# Patient Record
Sex: MALE | Race: WHITE | Employment: OTHER | URBAN - METROPOLITAN AREA
[De-identification: names, ages, dates, MRNs, and addresses within clinical notes are randomized per-mention and may not be internally consistent; named-entity substitution may affect disease eponyms.]

---

## 2017-04-07 ENCOUNTER — HOSPITAL ENCOUNTER (OUTPATIENT)
Dept: LAB | Age: 72
Discharge: HOME | End: 2017-04-07
Attending: INTERNAL MEDICINE
Payer: MEDICARE

## 2017-04-07 DIAGNOSIS — I48.92 ATRIAL FLUTTER, UNSPECIFIED TYPE (HCC): ICD-10-CM

## 2017-04-07 PROBLEM — I21.19 AMI INFERIOR WALL (HCC): Status: ACTIVE | Noted: 2017-04-07

## 2017-04-07 PROBLEM — I49.3 PVC'S (PREMATURE VENTRICULAR CONTRACTIONS): Status: ACTIVE | Noted: 2017-04-07

## 2017-04-07 PROBLEM — I25.10 CORONARY ATHEROSCLEROSIS OF NATIVE CORONARY VESSEL: Status: ACTIVE | Noted: 2017-04-07

## 2017-04-07 PROBLEM — Z98.61 S/P PTCA (PERCUTANEOUS TRANSLUMINAL CORONARY ANGIOPLASTY): Status: ACTIVE | Noted: 2017-04-07

## 2017-04-07 PROBLEM — I48.91 ATRIAL FIBRILLATION (HCC): Status: ACTIVE | Noted: 2017-04-07

## 2017-04-07 PROBLEM — E78.5 DYSLIPIDEMIA: Status: ACTIVE | Noted: 2017-04-07

## 2017-04-07 PROBLEM — I10 HYPERTENSION: Status: ACTIVE | Noted: 2017-04-07

## 2017-04-07 LAB
ANION GAP BLD CALC-SCNC: 8 MMOL/L
BASOPHILS # BLD AUTO: 0 K/UL (ref 0–0.2)
BASOPHILS # BLD: 0 % (ref 0–2)
BUN SERPL-MCNC: 14 MG/DL (ref 8–23)
CALCIUM SERPL-MCNC: 8.6 MG/DL (ref 8.3–10.4)
CHLORIDE SERPL-SCNC: 98 MMOL/L (ref 98–107)
CO2 SERPL-SCNC: 30 MMOL/L (ref 23–32)
CREAT SERPL-MCNC: 1.2 MG/DL (ref 0.6–1)
DIFFERENTIAL METHOD BLD: ABNORMAL
EOSINOPHIL # BLD: 0.2 K/UL (ref 0–0.8)
EOSINOPHIL NFR BLD: 2 % (ref 0.5–7.8)
ERYTHROCYTE [DISTWIDTH] IN BLOOD BY AUTOMATED COUNT: 12.9 % (ref 11.9–14.6)
GLUCOSE SERPL-MCNC: 101 MG/DL (ref 65–100)
HCT VFR BLD AUTO: 39.9 % (ref 41.1–50.3)
HGB BLD-MCNC: 14.4 G/DL (ref 13.6–17.2)
LYMPHOCYTES # BLD AUTO: 24 % (ref 13–44)
LYMPHOCYTES # BLD: 1.6 K/UL (ref 0.5–4.6)
MCH RBC QN AUTO: 32.8 PG (ref 26.1–32.9)
MCHC RBC AUTO-ENTMCNC: 36.1 G/DL (ref 31.4–35)
MCV RBC AUTO: 90.9 FL (ref 79.6–97.8)
MONOCYTES # BLD: 0.7 K/UL (ref 0.1–1.3)
MONOCYTES NFR BLD AUTO: 10 % (ref 4–12)
NEUTS SEG # BLD: 4.4 K/UL (ref 1.7–8.2)
NEUTS SEG NFR BLD AUTO: 64 % (ref 43–78)
PLATELET # BLD AUTO: 179 K/UL (ref 150–450)
PMV BLD AUTO: 8.8 FL (ref 10.8–14.1)
POTASSIUM SERPL-SCNC: 3.7 MMOL/L (ref 3.5–5.1)
RBC # BLD AUTO: 4.39 M/UL (ref 4.23–5.67)
SODIUM SERPL-SCNC: 136 MMOL/L (ref 136–145)
T4 SERPL-MCNC: 7.3 UG/DL (ref 4.8–13.9)
TSH SERPL DL<=0.005 MIU/L-ACNC: 3.76 UIU/ML (ref 0.36–3.74)
WBC # BLD AUTO: 6.9 K/UL (ref 4.3–11.1)

## 2017-04-07 PROCEDURE — 80048 BASIC METABOLIC PNL TOTAL CA: CPT | Performed by: INTERNAL MEDICINE

## 2017-04-07 PROCEDURE — 36415 COLL VENOUS BLD VENIPUNCTURE: CPT | Performed by: INTERNAL MEDICINE

## 2017-04-07 PROCEDURE — 85025 COMPLETE CBC W/AUTO DIFF WBC: CPT | Performed by: INTERNAL MEDICINE

## 2017-04-07 PROCEDURE — 84436 ASSAY OF TOTAL THYROXINE: CPT | Performed by: INTERNAL MEDICINE

## 2017-04-07 PROCEDURE — 84443 ASSAY THYROID STIM HORMONE: CPT | Performed by: INTERNAL MEDICINE

## 2018-07-20 ENCOUNTER — APPOINTMENT (OUTPATIENT)
Dept: GENERAL RADIOLOGY | Age: 73
DRG: 065 | End: 2018-07-20
Attending: EMERGENCY MEDICINE
Payer: MEDICARE

## 2018-07-20 ENCOUNTER — HOSPITAL ENCOUNTER (EMERGENCY)
Age: 73
Discharge: STILL ADMITTED OR ADMINISTRATIVE | End: 2018-07-20
Attending: EMERGENCY MEDICINE
Payer: COMMERCIAL

## 2018-07-20 ENCOUNTER — APPOINTMENT (OUTPATIENT)
Dept: CT IMAGING | Age: 73
DRG: 065 | End: 2018-07-20
Attending: EMERGENCY MEDICINE
Payer: MEDICARE

## 2018-07-20 ENCOUNTER — HOSPITAL ENCOUNTER (INPATIENT)
Age: 73
LOS: 1 days | Discharge: HOME | DRG: 065 | End: 2018-07-23
Attending: EMERGENCY MEDICINE | Admitting: INTERNAL MEDICINE
Payer: MEDICARE

## 2018-07-20 DIAGNOSIS — I63.9 CEREBROVASCULAR ACCIDENT (CVA), UNSPECIFIED MECHANISM (HCC): Primary | ICD-10-CM

## 2018-07-20 PROBLEM — R29.810 FACIAL DROOP: Status: ACTIVE | Noted: 2018-07-20

## 2018-07-20 PROBLEM — R47.81 SLURRED SPEECH: Status: ACTIVE | Noted: 2018-07-20

## 2018-07-20 PROBLEM — G45.9 TIA (TRANSIENT ISCHEMIC ATTACK): Status: ACTIVE | Noted: 2018-07-20

## 2018-07-20 PROBLEM — I48.92 ATRIAL FLUTTER (HCC): Status: RESOLVED | Noted: 2017-04-07 | Resolved: 2018-07-20

## 2018-07-20 LAB
ANION GAP SERPL CALC-SCNC: 8 MMOL/L (ref 7–16)
BASOPHILS # BLD: 0 K/UL (ref 0–0.2)
BASOPHILS NFR BLD: 0 % (ref 0–2)
BUN SERPL-MCNC: 15 MG/DL (ref 8–23)
CALCIUM SERPL-MCNC: 9.1 MG/DL (ref 8.3–10.4)
CHLORIDE SERPL-SCNC: 96 MMOL/L (ref 98–107)
CO2 SERPL-SCNC: 29 MMOL/L (ref 21–32)
CREAT SERPL-MCNC: 0.93 MG/DL (ref 0.8–1.5)
DIFFERENTIAL METHOD BLD: ABNORMAL
EOSINOPHIL # BLD: 0.1 K/UL (ref 0–0.8)
EOSINOPHIL NFR BLD: 1 % (ref 0.5–7.8)
ERYTHROCYTE [DISTWIDTH] IN BLOOD BY AUTOMATED COUNT: 13.1 % (ref 11.9–14.6)
GLUCOSE BLD STRIP.AUTO-MCNC: 102 MG/DL (ref 65–100)
GLUCOSE SERPL-MCNC: 94 MG/DL (ref 65–100)
HCT VFR BLD AUTO: 40.1 % (ref 41.1–50.3)
HGB BLD-MCNC: 15 G/DL (ref 13.6–17.2)
IMM GRANULOCYTES # BLD: 0 K/UL (ref 0–0.5)
IMM GRANULOCYTES NFR BLD AUTO: 0 % (ref 0–5)
INR BLD: 1 (ref 0.9–1.2)
LACTATE BLD-SCNC: 1.1 MMOL/L (ref 0.5–1.9)
LYMPHOCYTES # BLD: 1.9 K/UL (ref 0.5–4.6)
LYMPHOCYTES NFR BLD: 19 % (ref 13–44)
MCH RBC QN AUTO: 34.5 PG (ref 26.1–32.9)
MCHC RBC AUTO-ENTMCNC: 37.4 G/DL (ref 31.4–35)
MCV RBC AUTO: 92.2 FL (ref 79.6–97.8)
MONOCYTES # BLD: 0.7 K/UL (ref 0.1–1.3)
MONOCYTES NFR BLD: 7 % (ref 4–12)
NEUTS SEG # BLD: 7.3 K/UL (ref 1.7–8.2)
NEUTS SEG NFR BLD: 73 % (ref 43–78)
PLATELET # BLD AUTO: 214 K/UL (ref 150–450)
PMV BLD AUTO: 9 FL (ref 10.8–14.1)
POTASSIUM SERPL-SCNC: 3.5 MMOL/L (ref 3.5–5.1)
PT BLD: 11.6 SECS (ref 9.6–11.6)
RBC # BLD AUTO: 4.35 M/UL (ref 4.23–5.67)
SODIUM SERPL-SCNC: 133 MMOL/L (ref 136–145)
TROPONIN I BLD-MCNC: 0 NG/ML (ref 0.02–0.05)
WBC # BLD AUTO: 10.1 K/UL (ref 4.3–11.1)

## 2018-07-20 PROCEDURE — 85610 PROTHROMBIN TIME: CPT | Performed by: EMERGENCY MEDICINE

## 2018-07-20 PROCEDURE — 70450 CT HEAD/BRAIN W/O DYE: CPT

## 2018-07-20 PROCEDURE — 82962 GLUCOSE BLOOD TEST: CPT

## 2018-07-20 PROCEDURE — 94762 N-INVAS EAR/PLS OXIMTRY CONT: CPT | Performed by: EMERGENCY MEDICINE

## 2018-07-20 PROCEDURE — 71045 X-RAY EXAM CHEST 1 VIEW: CPT

## 2018-07-20 PROCEDURE — 96374 THER/PROPH/DIAG INJ IV PUSH: CPT | Performed by: EMERGENCY MEDICINE

## 2018-07-20 PROCEDURE — 85025 COMPLETE CBC W/AUTO DIFF WBC: CPT | Performed by: EMERGENCY MEDICINE

## 2018-07-20 PROCEDURE — 84484 ASSAY OF TROPONIN QUANT: CPT

## 2018-07-20 PROCEDURE — 81003 URINALYSIS AUTO W/O SCOPE: CPT | Performed by: EMERGENCY MEDICINE

## 2018-07-20 PROCEDURE — 85610 PROTHROMBIN TIME: CPT

## 2018-07-20 PROCEDURE — 74011250636 HC RX REV CODE- 250/636: Performed by: FAMILY MEDICINE

## 2018-07-20 PROCEDURE — 74011000250 HC RX REV CODE- 250: Performed by: EMERGENCY MEDICINE

## 2018-07-20 PROCEDURE — 99218 HC RM OBSERVATION: CPT

## 2018-07-20 PROCEDURE — 93005 ELECTROCARDIOGRAM TRACING: CPT | Performed by: EMERGENCY MEDICINE

## 2018-07-20 PROCEDURE — 83605 ASSAY OF LACTIC ACID: CPT

## 2018-07-20 PROCEDURE — 80048 BASIC METABOLIC PNL TOTAL CA: CPT | Performed by: EMERGENCY MEDICINE

## 2018-07-20 PROCEDURE — 75810000275 HC EMERGENCY DEPT VISIT NO LEVEL OF CARE: Performed by: EMERGENCY MEDICINE

## 2018-07-20 PROCEDURE — 99285 EMERGENCY DEPT VISIT HI MDM: CPT | Performed by: EMERGENCY MEDICINE

## 2018-07-20 RX ORDER — ASPIRIN 81 MG/1
81 TABLET ORAL DAILY
Status: DISCONTINUED | OUTPATIENT
Start: 2018-07-21 | End: 2018-07-20 | Stop reason: SDUPTHER

## 2018-07-20 RX ORDER — ENALAPRILAT 1.25 MG/ML
1.25 INJECTION INTRAVENOUS
Status: DISCONTINUED | OUTPATIENT
Start: 2018-07-20 | End: 2018-07-23 | Stop reason: HOSPADM

## 2018-07-20 RX ORDER — PANTOPRAZOLE SODIUM 40 MG/1
40 TABLET, DELAYED RELEASE ORAL
Status: DISCONTINUED | OUTPATIENT
Start: 2018-07-21 | End: 2018-07-23 | Stop reason: HOSPADM

## 2018-07-20 RX ORDER — ATORVASTATIN CALCIUM 40 MG/1
40 TABLET, FILM COATED ORAL
Status: DISCONTINUED | OUTPATIENT
Start: 2018-07-20 | End: 2018-07-20

## 2018-07-20 RX ORDER — SODIUM CHLORIDE 9 MG/ML
100 INJECTION, SOLUTION INTRAVENOUS CONTINUOUS
Status: DISPENSED | OUTPATIENT
Start: 2018-07-20 | End: 2018-07-21

## 2018-07-20 RX ORDER — LABETALOL HYDROCHLORIDE 5 MG/ML
20 INJECTION, SOLUTION INTRAVENOUS
Status: COMPLETED | OUTPATIENT
Start: 2018-07-20 | End: 2018-07-20

## 2018-07-20 RX ORDER — MORPHINE SULFATE 2 MG/ML
2 INJECTION, SOLUTION INTRAMUSCULAR; INTRAVENOUS
Status: DISCONTINUED | OUTPATIENT
Start: 2018-07-20 | End: 2018-07-23 | Stop reason: HOSPADM

## 2018-07-20 RX ORDER — HYDROCODONE BITARTRATE AND ACETAMINOPHEN 10; 325 MG/1; MG/1
1 TABLET ORAL
Status: DISCONTINUED | OUTPATIENT
Start: 2018-07-20 | End: 2018-07-23 | Stop reason: HOSPADM

## 2018-07-20 RX ORDER — SODIUM CHLORIDE 0.9 % (FLUSH) 0.9 %
5-10 SYRINGE (ML) INJECTION EVERY 8 HOURS
Status: DISCONTINUED | OUTPATIENT
Start: 2018-07-20 | End: 2018-07-23 | Stop reason: HOSPADM

## 2018-07-20 RX ORDER — ASPIRIN 325 MG
325 TABLET ORAL DAILY
Status: DISCONTINUED | OUTPATIENT
Start: 2018-07-21 | End: 2018-07-22

## 2018-07-20 RX ORDER — SODIUM CHLORIDE 0.9 % (FLUSH) 0.9 %
5-10 SYRINGE (ML) INJECTION AS NEEDED
Status: DISCONTINUED | OUTPATIENT
Start: 2018-07-20 | End: 2018-07-23 | Stop reason: HOSPADM

## 2018-07-20 RX ORDER — GABAPENTIN 400 MG/1
400 CAPSULE ORAL 2 TIMES DAILY
Status: DISCONTINUED | OUTPATIENT
Start: 2018-07-21 | End: 2018-07-23 | Stop reason: HOSPADM

## 2018-07-20 RX ORDER — METOPROLOL SUCCINATE 100 MG/1
100 TABLET, EXTENDED RELEASE ORAL DAILY
Status: DISCONTINUED | OUTPATIENT
Start: 2018-07-21 | End: 2018-07-23 | Stop reason: HOSPADM

## 2018-07-20 RX ORDER — CARBAMAZEPINE 200 MG/1
200 TABLET ORAL 2 TIMES DAILY
Status: DISCONTINUED | OUTPATIENT
Start: 2018-07-21 | End: 2018-07-21

## 2018-07-20 RX ORDER — GABAPENTIN 300 MG/1
300 CAPSULE ORAL 3 TIMES DAILY
Status: DISCONTINUED | OUTPATIENT
Start: 2018-07-20 | End: 2018-07-20

## 2018-07-20 RX ADMIN — LABETALOL HYDROCHLORIDE 20 MG: 5 INJECTION INTRAVENOUS at 19:22

## 2018-07-20 RX ADMIN — SODIUM CHLORIDE 100 ML/HR: 900 INJECTION, SOLUTION INTRAVENOUS at 23:29

## 2018-07-20 RX ADMIN — Medication 10 ML: at 23:29
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
## 2018-07-20 NOTE — IP AVS SNAPSHOT
[facility] 
 
 
 [address] 
[phone] Patient: Andrew Gonzalez MRN: [id number] [id number] About your hospitalization You were admitted on:  July 20, 2018 You last received care in the:  [facility] 7 MED SURG You were discharged on:  July 23, 2018 Why you were hospitalized Your primary diagnosis was:  Stroke (Hcc) Your diagnoses also included:  Tia (Transient Ischemic Attack), Hypertension, Dyslipidemia, Atrial Fibrillation (Hcc), Coronary Atherosclerosis Of Native Coronary Vessel, Slurred Speech, Facial Droop Follow-up Information Follow up With Details Comments Contact Info Eri Wells MD On 8/1/2018 at 10:45 a.m. [address] [place] [address] Drive [address] [facility] [address] 
[phone] Your Scheduled Appointments Wednesday August 01, 2018  7:30 AM EDT  
ECHOCARDIOGRAM with CLARIBEL ECHO 26 [facility] CARDIOLOGY [place] OFFICE ([address] [place] [address]) 2 Kelly Galvez 
[address] Sonja Cooper 81  
[phone] Friday August 17, 2018  8:45 AM EDT Office Visit with Margarita Wilkins MD  
[facility] ([address] [place] [address]) 2 Kelly [address] Sonja Cooper 81  
[phone] Discharge Orders None A check john indicates which time of day the medication should be taken. My Medications START taking these medications Instructions Each Dose to Equal  
 Morning Noon Evening Bedtime  
 fenofibrate 160 mg tablet Commonly known as:  LOFIBRA Start taking on:  7/24/2018 Your next dose is:  Tomorrow Morning Take 1 Tab by mouth daily. 160 mg CONTINUE taking these medications Instructions Each Dose to Equal  
 Morning Noon Evening Bedtime  
 apixaban 5 mg tablet Commonly known as:  Darren Albert Your next dose is: This evening Take 1 Tab by mouth two (2) times a day. 5 mg APPLE CIDER VINEGAR PO Your next dose is:  Resume home schedule Take  by mouth. aspirin delayed-release 81 mg tablet Your next dose is:  Tomorrow Morning Take 1 Tab by mouth daily. 81 mg CALCIUM 600-D3 PLUS PO Your next dose is:  Resume home schedule Take  by mouth. co-enzyme Q-10 100 mg capsule Commonly known as:  CO Q-10 Your next dose is:  Tomorrow Morning Take 100 mg by mouth daily. 100 mg FISH OIL PO Your next dose is:  Resume home schedule Take  by mouth.  
     
   
   
   
  
 gabapentin 300 mg capsule Commonly known as:  NEURONTIN Your next dose is: This evening Take 400 mg by mouth two (2) times a day. 400 mg  
    
  
   
   
  
   
  
 losartan-hydroCHLOROthiazide 100-25 mg per tablet Commonly known as:  HYZAAR Your next dose is:  Tomorrow Morning TAKE 1 TABLET BY MOUTH DAILY  
     
  
   
   
   
  
 metoprolol succinate 100 mg tablet Commonly known as:  TOPROL-XL Your next dose is:  Tomorrow Morning TAKE 1 TABLET BY MOUTH DAILY  
     
  
   
   
   
  
 multivitamin tablet Commonly known as:  ONE A DAY Your next dose is:  Tomorrow Morning Take 1 Tab by mouth daily. 1 Tab NITROSTAT 0.4 mg SL tablet Generic drug:  nitroglycerin TAKE AS DIRECTED. NORCO  mg tablet Generic drug:  HYDROcodone-acetaminophen Your next dose is: Take on as needed schedule Take 1 Tab by mouth two (2) times daily as needed for Pain. 1 Tab PriLOSEC OTC 20 mg tablet Generic drug:  omeprazole Your next dose is:  Tomorrow Morning Take 20 mg by mouth daily. Indications: patient unsure of particular medication  20 mg  
 PROBIOTIC 4X PO Your next dose is:  Tomorrow Morning Take  by mouth daily. TEGretol 200 mg tablet Generic drug:  carBAMazepine Your next dose is: This evening Take 200 mg by mouth two (2) times a day. 200 mg  
    
  
   
   
  
   
  
 TURMERIC ROOT EXTRACT PO Your next dose is:  Resume home schedule Take  by mouth. VITAMIN B12 PO Your next dose is:  Resume home schedule Take  by mouth. Where to Get Your Medications Information on where to get these meds will be given to you by the nurse or doctor. ! Ask your nurse or doctor about these medications  
  fenofibrate 160 mg tablet Opioid Education Prescription Opioids: What You Need to Know: 
 
Prescription opioids can be used to help relieve moderate-to-severe pain and are often prescribed following a surgery or injury, or for certain health conditions. These medications can be an important part of treatment but also come with serious risks. Opioids are strong pain medicines. Examples include hydrocodone, oxycodone, fentanyl, and morphine. Heroin is an example of an illegal opioid. It is important to work with your health care provider to make sure you are getting the safest, most effective care. WHAT ARE THE RISKS AND SIDE EFFECTS OF OPIOID USE? Prescription opioids carry serious risks of addiction and overdose, especially with prolonged use. An opioid overdose, often marked by slow breathing, can cause sudden death. The use of prescription opioids can have a number of side effects as well, even when taken as directed. · Tolerance-meaning you might need to take more of a medication for the same pain relief · Physical dependence-meaning you have symptoms of withdrawal when the medication is stopped.   Withdrawal symptoms can include nausea, sweating, chills, diarrhea, stomach cramps, and muscle aches. Withdrawal can last up to several weeks, depending on which drug you took and how long you took it. · Increased sensitivity to pain · Constipation · Nausea, vomiting, and dry mouth · Sleepiness and dizziness · Confusion · Depression · Low levels of testosterone that can result in lower sex drive, energy, and strength · Itching and sweating RISKS ARE GREATER WITH:      
· History of drug misuse, substance use disorder, or overdose · Mental health conditions (such as depression or anxiety) · Sleep apnea · Older age (72 years or older) · Pregnancy Avoid alcohol while taking prescription opioids. Also, unless specifically advised by your health care provider, medications to avoid include: · Benzodiazepines (such as Xanax or Valium) · Muscle relaxants (such as Soma or Flexeril) · Hypnotics (such as Ambien or Lunesta) · Other prescription opioids KNOW YOUR OPTIONS Talk to your health care provider about ways to manage your pain that don't involve prescription opioids. Some of these options may actually work better and have fewer risks and side effects. Options may include: 
· Pain relievers such as acetaminophen, ibuprofen, and naproxen · Some medications that are also used for depression or seizures · Physical therapy and exercise · Counseling to help patients learn how to cope better with triggers of pain and stress. · Application of heat or cold compress · Massage therapy · Relaxation techniques Be Informed Make sure you know the name of your medication, how much and how often to take it, and its potential risks & side effects. IF YOU ARE PRESCRIBED OPIOIDS FOR PAIN: 
· Never take opioids in greater amounts or more often than prescribed. Remember the goal is not to be pain-free but to manage your pain at a tolerable level. · Follow up with your primary care provider to: · Work together to create a plan on how to manage your pain. · Talk about ways to help manage your pain that don't involve prescription opioids. · Talk about any and all concerns and side effects. · Help prevent misuse and abuse. · Never sell or share prescription opioids · Help prevent misuse and abuse. · Store prescription opioids in a secure place and out of reach of others (this may include visitors, children, friends, and family). · Safely dispose of unused/unwanted prescription opioids: Find your community drug take-back program or your pharmacy mail-back program, or flush them down the toilet, following guidance from the Food and Drug Administration (www.fda.gov/Drugs/ResourcesForYou). · Visit www.cdc.gov/drugoverdose to learn about the risks of opioid abuse and overdose. · If you believe you may be struggling with addiction, tell your health care provider and ask for guidance or call [employer] at 4-262-854-HQEU. Discharge Instructions Stroke: After Your Visit Your Care Instructions You have had a stroke. Risk factors for stroke include being overweight, smoking, and sedentary lifestyle. This means that the blood flow to a part of your brain was blocked for some time, which damages the nerve cells in that part of the brain. The part of your body controlled by that part of your brain may not function properly now. The brain is an amazing organ that can heal itself to some degree. The stroke you had damaged part of your brain, but other parts of your brain may take over in some way for the damaged areas. You have already started this process. Going home may be hard for you and your family. The more you can try to do for yourself, the better. Remember to take each day one at a time. Follow-up care is a key part of your treatment and safety.  Be sure to make and go to all appointments, and call your doctor if you are having problems. Its also a good idea to know your test results and keep a list of the medicines you take. How can you care for yourself at home? Enter a stroke rehabilitation (rehab) program, if your doctor recommends it. Physical, speech, and occupational therapies can help you manage bathing, dressing, eating, and other basics of daily living. Eat a heart-healthy diet that is low in cholesterol, saturated fat, and salt. Eat lots of fresh fruits and vegetables and foods high in fiber. Increase your activities slowly. Take short rest breaks when you get tired. Gradually increase the amount you walk. Start out by walking a little more than you did the day before. Do not drive until your doctor says it is okay. It is normal to feel sad or depressed after a stroke. If the blues last, talk to your doctor. If you are having problems with urine leakage, go to the bathroom at regular times, including when you first wake up and at bedtime. Also, limit fluids after dinner. If you are constipated, drink plenty of fluids, enough so that your urine is light yellow or clear like water. If you have kidney, heart, or liver disease and have to limit fluids, talk with your doctor before you increase the amount of fluids you drink. Set up a regular time for using the toilet. If you continue to have constipation, your doctor may suggest using a bulking agent, such as Metamucil, or a stool softener, laxative, or enema. Medicines Take your medicines exactly as prescribed. Call your doctor if you think you are having a problem with your medicine. You may be taking several medicines. ACE (angiotensin-converting enzyme) inhibitors, angiotensin II receptor blockers (ARBs), beta-blockers, diuretics (water pills), and calcium channel blockers control your blood pressure. Statins help lower cholesterol.  Your doctor may also prescribe medicines for depression, pain, sleep problems, anxiety, or agitation. If your doctor has given you medicine that prevents blood clots, such as warfarin (Coumadin), aspirin combined with extended-release dipyridamole (Aggrenox), clopidogrel (Plavix), or aspirin to prevent another stroke, you should: 
Tell your dentist, pharmacist, and other health professionals that you take these medicines. Watch for unusual bruising or bleeding, such as blood in your urine, red or black stools, or bleeding from your nose or gums. Get regular blood tests to check your clotting time if you are taking Coumadin. Wear medical alert jewelry that says you take blood thinners. You can buy this at most drugsEarlyDoces. Do not take any over-the-counter medicines or herbal products without talking to your doctor first. 
If you take birth control pills or hormone replacement therapy, talk to your doctor about whether they are right for you. For family members and caregivers Make the home safe. Set up a room so that your loved one does not have to climb stairs. Be sure the bathroom is on the same floor. Move throw rugs and furniture that could cause falls, and make sure that the lighting is good. Put grab bars and seats in tubs and showers. Find out what your loved one can do and what he or she needs help with. Try not to do things for your loved one that your loved one can do on his or her own. Help him or her learn and practice new skills. Visit and talk with your loved one often. Try doing activities together that you both enjoy, such as playing cards or board games. Keep in touch with your loved one's friends as much as you can, and encourage them to visit. Take care of yourself. Do not try to do everything yourself. Ask other family members to help. Eat well, get enough rest, and take time to do things that you enjoy. Keep up with your own doctor visits, and make sure to take your medicines regularly. Get out of the house as much as you can. Join a local support group. Find out if you qualify for home health care visits to help with rehab or for adult day care. When should you call for help? Call 911 anytime you think you may need emergency care. For example, call if: 
You have signs of another stroke. These may include: 
Sudden numbness, paralysis, or weakness in your face, arm, or leg, especially on only one side of your body. New problems with walking or balance. Sudden vision changes. Drooling or slurred speech. New problems speaking or understanding simple statements, or you feel confused. A sudden, severe headache that is different from past headaches. Call 911 even if these symptoms go away in a few minutes. You cough up blood. You vomit blood or what looks like coffee grounds. You pass maroon or very bloody stools. Call your doctor now or seek immediate medical care if: 
You have new bruises or blood spots under your skin. You have a nosebleed. Your gums bleed when you brush your teeth. You have blood in your urine. Your stools are black and tarlike or have streaks of blood. You have vaginal bleeding when you are not having your period, or heavy period bleeding. You have new symptoms that may be related to your stroke, such as falls or trouble swallowing. Watch closely for changes in your health, and be sure to contact your doctor if you have any problems. Where can you learn more? Go to [employer].be Enter X324  in the search box to learn more about \"Stroke: After Your Visit\". © 5883-9048 Healthwise, Incorporated. Care instructions adapted under license by [facility] "[employer]" (which disclaims liability or warranty for this information).  This care instruction is for use with your licensed healthcare professional. If you have questions about a medical condition or this instruction, always ask your healthcare professional. Mayra Tejada disclaims any warranty or liability for your use of this information. DISCHARGE SUMMARY from Nurse PATIENT INSTRUCTIONS: 
 
 
F-face looks uneven A-arms unable to move or move unevenly S-speech slurred or non-existent T-time-call 911 as soon as signs and symptoms begin-DO NOT go Back to bed or wait to see if you get better-TIME IS BRAIN. Warning Signs of HEART ATTACK Call 911 if you have these symptoms: 
? Chest discomfort. Most heart attacks involve discomfort in the center of the chest that lasts more than a few minutes, or that goes away and comes back. It can feel like uncomfortable pressure, squeezing, fullness, or pain. ? Discomfort in other areas of the upper body. Symptoms can include pain or discomfort in one or both arms, the back, neck, jaw, or stomach. ? Shortness of breath with or without chest discomfort. ? Other signs may include breaking out in a cold sweat, nausea, or lightheadedness. Don't wait more than five minutes to call [address]! Fast action can save your life. Calling 911 is almost always the fastest way to get lifesaving treatment. Emergency Medical Services staff can begin treatment when they arrive  up to an hour sooner than if someone gets to the hospital by car. The discharge information has been reviewed with the patient. The patient verbalized understanding. Discharge medications reviewed with the patient and appropriate educational materials and side effects teaching were provided. ___________________________________________________________________________________________________________________________________ MyChart Announcement  We are excited to announce that we are making your provider's discharge notes available to you in eMeter. You will see these notes when they are completed and signed by the physician that discharged you from your recent hospital stay. If you have any questions or concerns about any information you see in eMeter, please call the Health Information Department where you were seen or reach out to your Primary Care Provider for more information about your plan of care. Introducing [facility]! [employer] introduces eMeter patient portal. Now you can access parts of your medical record, email your doctor's office, and request medication refills online. 1. In your internet browser, go to [email or web address]://[email or web address]. [employer] 2. Click on the First Time User? Click Here link in the Sign In box. You will see the New Member Sign Up page. 3. Enter your eMeter Access Code exactly as it appears below. You will not need to use this code after youve completed the sign-up process. If you do not sign up before the expiration date, you must request a new code. · eMeter Access Code: [email or web address] Expires: 10/18/2018  6:55 PM 
 
4. Enter the last four digits of your Social Security Number (xxxx) and Date of Birth (mm/dd/yyyy) as indicated and click Submit. You will be taken to the next sign-up page. 5. Create a eMeter ID. This will be your eMeter login ID and cannot be changed, so think of one that is secure and easy to remember. 6. Create a eMeter password. You can change your password at any time. 7. Enter your Password Reset Question and Answer. This can be used at a later time if you forget your password. 8. Enter your e-mail address. You will receive e-mail notification when new information is available in [address] 9. Click Sign Up. You can now view and download portions of your medical record. 10. Click the Download Summary menu link to download a portable copy of your medical information. If you have questions, please visit the Frequently Asked Questions section of the MyChart website. Remember, [employer] is NOT to be used for urgent needs. For medical emergencies, dial 911. Now available from your iPhone and Android! Introducing Pino Kimbrough As a [address] [place] [address] patient, I wanted to make you aware of our electronic visit tool called Pino Kimbrough. [facility] allows you to connect within minutes with a medical provider 24 hours a day, seven days a week via a mobile device or tablet or logging into a secure website from your computer. You can access Pino Kimbrough from anywhere in the [place]. A virtual visit might be right for you when you have a simple condition and feel like you just dont want to get out of bed, or cant get away from work for an appointment, when your regular [address] [place] [address] provider is not available (evenings, weekends or holidays), or when youre out of town and need minor care. Electronic visits cost only $49 and if the [facility] [address] 24[facility]7 provider determines a prescription is needed to treat your condition, one can be electronically transmitted to a nearby pharmacy*. Please take a moment to enroll today if you have not already done so. The enrollment process is free and takes just a few minutes. To enroll, please download the [employer] 24/7 amaya to your tablet or phone, or visit [email or web address] to enroll on your computer. And, as an [address] [place] [address] patient with a [employer] account, the results of your visits will be scanned into your electronic medical record and your primary care provider will be able to view the scanned results. We urge you to continue to see your regular [address] [place] [address] provider for your ongoing medical care.   And while your primary care provider may not be the one available when you seek a Pino Kimbrough virtual visit, the peace of mind you get from getting a real diagnosis real time can be priceless. For more information on Pino Kimbrough, view our Frequently Asked Questions (FAQs) at [email or web address]. Sincerely, 
 
Sanjuanita Rebollar MD 
Chief Medical Officer [employer] *:  certain medications cannot be prescribed via Pino Kimbrough Providers Seen During Your Hospitalization Provider Specialty Primary office phone Teo Steven MD Emergency Medicine [phone] Vaishnavi Alfredo MD [facility] [phone] Immunizations Administered for This Admission Name Date  
 TB Skin Test (PPD) Intradermal 7/22/2018 Your Primary Care Physician (PCP) Primary Care Physician Office Phone Office Fax Mathew Frederick [phone] You are allergic to the following Allergen Reactions Codeine Unknown (comments) Iodinated Contrast- Oral And Iv Dye Unknown (comments) Recent Documentation Height Weight BMI Smoking Status 1.727 m 86.2 kg 28.89 kg/m2 Former Smoker Emergency Contacts Name Discharge Info Relation Home Work Mobile Brenton Kiran [22] [phone] Rosaura Boudreaux  Daughter [21] [phone] Patient Belongings The following personal items are in your possession at time of discharge: 
  Dental Appliances: With patient, Partials, Lowers, Uppers  Visual Aid: Glasses, With patient      Home Medications: None   Jewelry: None  Clothing: At bedside    Other Valuables: Eyeglasses  Personal Items Sent to Safe: none Please provide this summary of care documentation to your next provider. Signatures-by signing, you are acknowledging that this After Visit Summary has been reviewed with you and you have received a copy. Patient Signature:  ____________________________________________________________ Date:  ____________________________________________________________  
  
Janyth Mins Provider Signature:  ____________________________________________________________ Date:  ____________________________________________________________

## 2018-07-21 ENCOUNTER — APPOINTMENT (OUTPATIENT)
Dept: MRI IMAGING | Age: 73
DRG: 065 | End: 2018-07-21
Attending: FAMILY MEDICINE
Payer: MEDICARE

## 2018-07-21 ENCOUNTER — APPOINTMENT (OUTPATIENT)
Dept: ULTRASOUND IMAGING | Age: 73
DRG: 065 | End: 2018-07-21
Attending: FAMILY MEDICINE
Payer: MEDICARE

## 2018-07-21 LAB
ANION GAP SERPL CALC-SCNC: 7 MMOL/L (ref 7–16)
ATRIAL RATE: 76 BPM
BNP SERPL-MCNC: 360 PG/ML
BUN SERPL-MCNC: 10 MG/DL (ref 8–23)
CALCIUM SERPL-MCNC: 8.3 MG/DL (ref 8.3–10.4)
CALCULATED P AXIS, ECG09: 66 DEGREES
CALCULATED R AXIS, ECG10: -20 DEGREES
CALCULATED T AXIS, ECG11: 81 DEGREES
CHLORIDE SERPL-SCNC: 101 MMOL/L (ref 98–107)
CHOLEST SERPL-MCNC: 184 MG/DL
CO2 SERPL-SCNC: 29 MMOL/L (ref 21–32)
CREAT SERPL-MCNC: 0.72 MG/DL (ref 0.8–1.5)
DIAGNOSIS, 93000: NORMAL
ERYTHROCYTE [DISTWIDTH] IN BLOOD BY AUTOMATED COUNT: 13.1 % (ref 11.9–14.6)
EST. AVERAGE GLUCOSE BLD GHB EST-MCNC: 97 MG/DL
GLUCOSE SERPL-MCNC: 96 MG/DL (ref 65–100)
HBA1C MFR BLD: 5 % (ref 4.8–6)
HCT VFR BLD AUTO: 36.3 % (ref 41.1–50.3)
HDLC SERPL-MCNC: 31 MG/DL (ref 40–60)
HDLC SERPL: 5.9 {RATIO}
HGB BLD-MCNC: 13.4 G/DL (ref 13.6–17.2)
LDLC SERPL CALC-MCNC: 123.6 MG/DL
LIPID PROFILE,FLP: ABNORMAL
MCH RBC QN AUTO: 33.9 PG (ref 26.1–32.9)
MCHC RBC AUTO-ENTMCNC: 34 G/DL (ref 31.4–35)
MCV RBC AUTO: 91.9 FL (ref 79.6–97.8)
P-R INTERVAL, ECG05: 188 MS
PLATELET # BLD AUTO: 161 K/UL (ref 150–450)
PMV BLD AUTO: 8.7 FL (ref 10.8–14.1)
POTASSIUM SERPL-SCNC: 3 MMOL/L (ref 3.5–5.1)
POTASSIUM SERPL-SCNC: 3.1 MMOL/L (ref 3.5–5.1)
Q-T INTERVAL, ECG07: 400 MS
QRS DURATION, ECG06: 120 MS
QTC CALCULATION (BEZET), ECG08: 450 MS
RBC # BLD AUTO: 3.95 M/UL (ref 4.23–5.67)
SODIUM SERPL-SCNC: 137 MMOL/L (ref 136–145)
TRIGL SERPL-MCNC: 147 MG/DL (ref 35–150)
VENTRICULAR RATE, ECG03: 76 BPM
VLDLC SERPL CALC-MCNC: 29.4 MG/DL (ref 6–23)
WBC # BLD AUTO: 6.2 K/UL (ref 4.3–11.1)

## 2018-07-21 PROCEDURE — C8929 TTE W OR WO FOL WCON,DOPPLER: HCPCS

## 2018-07-21 PROCEDURE — 97530 THERAPEUTIC ACTIVITIES: CPT

## 2018-07-21 PROCEDURE — 93880 EXTRACRANIAL BILAT STUDY: CPT

## 2018-07-21 PROCEDURE — G8996 SWALLOW CURRENT STATUS: HCPCS

## 2018-07-21 PROCEDURE — 74011250637 HC RX REV CODE- 250/637: Performed by: FAMILY MEDICINE

## 2018-07-21 PROCEDURE — 80048 BASIC METABOLIC PNL TOTAL CA: CPT | Performed by: FAMILY MEDICINE

## 2018-07-21 PROCEDURE — 74011250636 HC RX REV CODE- 250/636: Performed by: NURSE PRACTITIONER

## 2018-07-21 PROCEDURE — 97161 PT EVAL LOW COMPLEX 20 MIN: CPT

## 2018-07-21 PROCEDURE — 70551 MRI BRAIN STEM W/O DYE: CPT

## 2018-07-21 PROCEDURE — 92610 EVALUATE SWALLOWING FUNCTION: CPT

## 2018-07-21 PROCEDURE — 80061 LIPID PANEL: CPT | Performed by: FAMILY MEDICINE

## 2018-07-21 PROCEDURE — G8987 SELF CARE CURRENT STATUS: HCPCS

## 2018-07-21 PROCEDURE — 74011000250 HC RX REV CODE- 250: Performed by: FAMILY MEDICINE

## 2018-07-21 PROCEDURE — G8988 SELF CARE GOAL STATUS: HCPCS

## 2018-07-21 PROCEDURE — 36415 COLL VENOUS BLD VENIPUNCTURE: CPT | Performed by: FAMILY MEDICINE

## 2018-07-21 PROCEDURE — 85027 COMPLETE CBC AUTOMATED: CPT | Performed by: FAMILY MEDICINE

## 2018-07-21 PROCEDURE — 83880 ASSAY OF NATRIURETIC PEPTIDE: CPT | Performed by: NURSE PRACTITIONER

## 2018-07-21 PROCEDURE — G8978 MOBILITY CURRENT STATUS: HCPCS

## 2018-07-21 PROCEDURE — 97165 OT EVAL LOW COMPLEX 30 MIN: CPT

## 2018-07-21 PROCEDURE — 74011250636 HC RX REV CODE- 250/636: Performed by: FAMILY MEDICINE

## 2018-07-21 PROCEDURE — 83036 HEMOGLOBIN GLYCOSYLATED A1C: CPT | Performed by: FAMILY MEDICINE

## 2018-07-21 PROCEDURE — 99218 HC RM OBSERVATION: CPT

## 2018-07-21 PROCEDURE — 74011250637 HC RX REV CODE- 250/637: Performed by: NURSE PRACTITIONER

## 2018-07-21 PROCEDURE — 77030020263 HC SOL INJ SOD CL0.9% LFCR 1000ML

## 2018-07-21 PROCEDURE — G8979 MOBILITY GOAL STATUS: HCPCS

## 2018-07-21 PROCEDURE — G8997 SWALLOW GOAL STATUS: HCPCS

## 2018-07-21 PROCEDURE — 84132 ASSAY OF SERUM POTASSIUM: CPT | Performed by: NURSE PRACTITIONER

## 2018-07-21 RX ORDER — POTASSIUM CHLORIDE 20 MEQ/1
40 TABLET, EXTENDED RELEASE ORAL
Status: COMPLETED | OUTPATIENT
Start: 2018-07-21 | End: 2018-07-21

## 2018-07-21 RX ORDER — CARBAMAZEPINE 200 MG/1
100 TABLET ORAL 2 TIMES DAILY
Status: DISCONTINUED | OUTPATIENT
Start: 2018-07-21 | End: 2018-07-23 | Stop reason: HOSPADM

## 2018-07-21 RX ORDER — POTASSIUM CHLORIDE 14.9 MG/ML
20 INJECTION INTRAVENOUS
Status: COMPLETED | OUTPATIENT
Start: 2018-07-21 | End: 2018-07-21

## 2018-07-21 RX ADMIN — HYDROCHLOROTHIAZIDE: 25 TABLET ORAL at 10:23

## 2018-07-21 RX ADMIN — CARBAMAZEPINE 100 MG: 200 TABLET ORAL at 10:23

## 2018-07-21 RX ADMIN — MORPHINE SULFATE 2 MG: 2 INJECTION, SOLUTION INTRAMUSCULAR; INTRAVENOUS at 07:21

## 2018-07-21 RX ADMIN — PANTOPRAZOLE SODIUM 40 MG: 40 TABLET, DELAYED RELEASE ORAL at 10:23

## 2018-07-21 RX ADMIN — CARBAMAZEPINE 100 MG: 200 TABLET ORAL at 22:23

## 2018-07-21 RX ADMIN — GABAPENTIN 400 MG: 400 CAPSULE ORAL at 22:23

## 2018-07-21 RX ADMIN — APIXABAN 5 MG: 5 TABLET, FILM COATED ORAL at 22:23

## 2018-07-21 RX ADMIN — APIXABAN 5 MG: 5 TABLET, FILM COATED ORAL at 10:24

## 2018-07-21 RX ADMIN — GABAPENTIN 400 MG: 400 CAPSULE ORAL at 10:23

## 2018-07-21 RX ADMIN — HYDROCODONE BITARTRATE AND ACETAMINOPHEN 1 TABLET: 10; 325 TABLET ORAL at 22:23

## 2018-07-21 RX ADMIN — POTASSIUM CHLORIDE 40 MEQ: 20 TABLET, EXTENDED RELEASE ORAL at 10:23

## 2018-07-21 RX ADMIN — ASPIRIN 325 MG ORAL TABLET 325 MG: 325 PILL ORAL at 10:23

## 2018-07-21 RX ADMIN — Medication 10 ML: at 07:22

## 2018-07-21 RX ADMIN — POTASSIUM CHLORIDE 40 MEQ: 20 TABLET, EXTENDED RELEASE ORAL at 15:44

## 2018-07-21 RX ADMIN — PERFLUTREN 1 ML: 6.52 INJECTION, SUSPENSION INTRAVENOUS at 12:00

## 2018-07-21 RX ADMIN — POTASSIUM CHLORIDE 20 MEQ: 200 INJECTION, SOLUTION INTRAVENOUS at 19:00

## 2018-07-21 RX ADMIN — POTASSIUM CHLORIDE 20 MEQ: 200 INJECTION, SOLUTION INTRAVENOUS at 15:44

## 2018-07-21 RX ADMIN — METOPROLOL SUCCINATE 100 MG: 100 TABLET, EXTENDED RELEASE ORAL at 10:24

## 2018-07-21 NOTE — PROGRESS NOTES
Problem: Falls - Risk of  Goal: *Absence of Falls  Document Sasha Fall Risk and appropriate interventions in the flowsheet.    Fall Risk Interventions:  Mobility Interventions: Bed/chair exit alarm, OT consult for ADLs, Patient to call before getting OOB, PT Consult for mobility concerns, PT Consult for assist device competence, Strengthening exercises (ROM-active/passive), Utilize walker, cane, or other assistive device         Medication Interventions: Assess postural VS orthostatic hypotension, Bed/chair exit alarm, Patient to call before getting OOB, Teach patient to arise slowly    Elimination Interventions: Call light in reach, Bed/chair exit alarm, Patient to call for help with toileting needs, Toileting schedule/hourly rounds

## 2018-07-21 NOTE — PROGRESS NOTES
Ischemic Stroke without Activase/TIA     VTE Prophylaxis: Yes on Eliquis     Antiplatelet: Yes: Aspirin     Statin if LDL Greater Than or Equal to100: NO     BP Parameters: Less Than 220/120 for 24 hours, IV PRN for >180 SBP     Controlled With: PRN - IV     Dysphagia Screen Completed: Yes: Fail     Patient has PEG, NG Tube, Feeding Tube: No     Medication orders per above route: No - Call MD; Consider consult to pharmacy     Nutrition Status: NPO until cleared by speech     NIH Stroke Scale Complete: Yes: 5     Frequency of Vital Signs: Every 4 hours      Frequency of Neuro Checks: Every 4 hours     Daily Education/Care Plan Updated: YES    Bedside report with Devin Oconnor RN

## 2018-07-21 NOTE — ROUTINE PROCESS
Spoke with Fredrick Culver NP about ordering a Statin normally ordered in CVA/ TIA work-up. Ish said they are holding off on a statin for now due to myalgia.

## 2018-07-21 NOTE — PROGRESS NOTES
Verified with Graciela LILLY in the ED that patient's Urine dipstick was collected and that it was normal.  She states that it will be scanned in the system later.
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
## 2018-07-21 NOTE — ED NOTES
TRANSFER - OUT REPORT:    Verbal report given to MAXX Abdul on Sloan Flores  being transferred to [phone] for routine progression of care       Report consisted of patients Situation, Background, Assessment and   Recommendations(SBAR). Information from the following report(s) SBAR, ED Summary, Intake/Output and Recent Results was reviewed with the receiving nurse. Lines:   Peripheral IV Left Antecubital (Active)   Site Assessment Clean, dry, & intact 7/20/2018  7:10 PM   Phlebitis Assessment 0 7/20/2018  7:10 PM   Infiltration Assessment 0 7/20/2018  7:10 PM   Dressing Status Clean, dry, & intact 7/20/2018  7:10 PM       Peripheral IV 07/20/18 Left Forearm (Active)   Site Assessment Clean, dry, & intact 7/20/2018  8:14 PM   Phlebitis Assessment 0 7/20/2018  8:14 PM   Infiltration Assessment 0 7/20/2018  8:14 PM   Dressing Status Clean, dry, & intact 7/20/2018  8:14 PM        Opportunity for questions and clarification was provided.       Patient transported with:   O2 @ 4 liters

## 2018-07-21 NOTE — PROGRESS NOTES
7/21/2018  Attempted to see patient this afternoon for OT evaluation, however pt currently getting an ECHO. Will re-attempt as time permits.    Thank you,  Gume Geller, OT

## 2018-07-21 NOTE — PROGRESS NOTES
Notified Dr. Kelly Hutton on patient's PTA medications and NPO status. New orders received, see MAR.

## 2018-07-21 NOTE — PROGRESS NOTES
Problem: Pressure Injury - Risk of  Goal: *Prevention of pressure injury  Document Elie Scale and appropriate interventions in the flowsheet. Outcome: Progressing Towards Goal  Pressure Injury Interventions:  Sensory Interventions: Assess changes in LOC    Moisture Interventions: Absorbent underpads    Activity Interventions: Increase time out of bed, Pressure redistribution bed/mattress(bed type), PT/OT evaluation    Mobility Interventions: HOB 30 degrees or less, Pressure redistribution bed/mattress (bed type), PT/OT evaluation    Nutrition Interventions: Document food/fluid/supplement intake                    Problem: Falls - Risk of  Goal: *Absence of Falls  Document Sasha Fall Risk and appropriate interventions in the flowsheet.    Outcome: Progressing Towards Goal  Fall Risk Interventions:  Mobility Interventions: Bed/chair exit alarm, Patient to call before getting OOB, PT Consult for mobility concerns, PT Consult for assist device competence         Medication Interventions: Evaluate medications/consider consulting pharmacy    Elimination Interventions: Call light in reach, Patient to call for help with toileting needs

## 2018-07-21 NOTE — PROGRESS NOTES
Problem: Self Care Deficits Care Plan (Adult)  Goal: *Acute Goals and Plan of Care (Insert Text)  1. Patient will complete dynamic standing balance for ADL with supervision to improve safety with daily activity. 2. Patient will complete total body dressing and bathing with modified independence and adaptive equipment as needed. 3. Patient will participate in BUE therapeutic exercises to increase strength in R UE to at least 4+/5 for participation in ADLs and functional transfers. 4. Patient will participate in [address] [place] [address] therapeutic activities to increase coordination in R UE to [facility] for bimanual fine motor ADLs. 5. Patient will tolerate 30 minutes of ADL with 1-2 rest breaks to improve activity tolerance for ADL. 6. Patient will be independent with functional transfers to demonstrate appropriate safety and decreased risk for falls. Timeframe: 7 visits       OCCUPATIONAL THERAPY: Initial Assessment, Treatment Day: 1st and PM 7/21/2018  OBSERVATION: Hospital Day: 2  [place] / [place] / Product Type: Medicare /      NAME/AGE/GENDER: Jade Sidhu is a 74 y.o. 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 Susan Woo was admitted with stroke-like symptoms. Pt lives with his friend in a two story home (lives on the first) with a walk-in shower and is very independent and working at baseline. Pt's MRI reveals tiny infarcts in the L MCA/PCA watershed territory. This session, pt presented supine in the bed with supportive family/friends at bedside. Pt is alert and appropriate for age.  Pt does c/o headache and has nasal cannula in his nose (typically does not wear O2). 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 Pt was supervision for bed mobility. Pt does demonstrate decreased strength in the R dominant UE/ strength. Pt also reports impaired sensation in the R UE as compared L UE. Pt had some difficulty with proprioception of the R UE with testing. Pt also appears to be less coordinated in the R UE with rapid alternating movements and finger opposition. Pt completes functional mobility/transfers with CGA. Pt's O2 removed and O2 dropped to 88%, but this quickly improved to 95% with deep breathing. Pt was able to complete functional mobility to the bathroom and stood with SBA in the bathroom. Pt able to wash hands without difficulty. Pt worked on opening packages/bottles with R hand with some increased time and difficulty with use of R hand. Pt was able to don/doff socks with no problem at the edge of the bed. Pt demonstrated deficits in strength, sensation, coordination and dynamic balance impacting ADLs. At the end of the session, pt was left supine in bed with needs in reach. Pt presented below functional baseline and would benefit from skilled OT services to address deficits. This section established at most recent assessment   PROBLEM LIST (Impairments causing functional limitations):  1. Decreased Strength  2. Decreased ADL/Functional Activities  3. Decreased Transfer Abilities  4. Decreased Ambulation Ability/Technique  5. Decreased Balance  6. Increased Pain  7. Decreased Activity Tolerance  8. Increased Shortness of Breath  9. Decreased Flexibility/Joint Mobility  10. Decreased [place] with Home Exercise Program   INTERVENTIONS PLANNED: (Benefits and precautions of occupational therapy have been discussed with the patient.)  1. Activities of daily living training  2. Adaptive equipment training  3. Balance training  4. Clothing management  5.  Neuromuscular re-eduation  6. Therapeutic activity  7. Therapeutic exercise     TREATMENT PLAN: Frequency/Duration: Follow patient 3 times per week to address above goals. Rehabilitation Potential For Stated Goals: Excellent     RECOMMENDED REHABILITATION/EQUIPMENT: (at time of discharge pending progress): Due to the probability of continued deficits (see above) this patient will likely need continued skilled occupational therapy after discharge. (potentially outpatient occupational therapy services)  Equipment:    TBD   ui           OCCUPATIONAL PROFILE AND HISTORY:   History of Present Injury/Illness (Reason for Referral):  See H&P  Past Medical History/Comorbidities:   Mr. Tom Ramey  has a past medical history of Atrial fibrillation ([address] Atrial flutter ([address] (4/7/2017); and CAD (coronary artery disease). Mr. Tom Ramey  has a past surgical history that includes hx heart catheterization (08/14/2006) and hx coronary stent placement. Social History/Living Environment:   Home Environment: Private residence  # Steps to Enter: 0  One/Two Story Residence: Two story, live on 1st floor  # of Interior Steps: 13  Height of Each Step (in): 7 inches  Interior Rails: Both  Lift Chair Available: No  Living Alone: No  Support Systems: Family member(s), Friends \ neighbors  Patient Expects to be Discharged to[de-identified] Private residence  Current DME Used/Available at Home: Blood pressure cuff  Tub or Shower Type:  (pt has both)  Prior Level of Function/Work/Activity:  Pt lives at home with a friend. Prior to admission pt was independent with ADL/working/driving.    Dominant Side:         RIGHT  Personal Factors:          Profession:  Works as a contractor (stands on roofs, etc)        Other factors that influence how disability is experienced by the patient:  Hx of CAD, a-fib   Number of Personal Factors/Comorbidities that affect the Plan of Care: Extensive review of physical, cognitive, and psychosocial performance (3+):  HIGH COMPLEXITY   ASSESSMENT OF OCCUPATIONAL PERFORMANCE[de-identified]   Activities of Daily Living:   Basic ADLs (From Assessment) Complex ADLs (From Assessment)   Feeding: Setup  Oral Facial Hygiene/Grooming: Supervision  Bathing: Contact guard assistance  Upper Body Dressing: Setup  Lower Body Dressing: Contact guard assistance  Toileting: Stand by assistance Instrumental ADL  Meal Preparation: Minimum assistance  Homemaking: Moderate assistance   Grooming/Bathing/Dressing Activities of Daily Living     Cognitive Retraining  Safety/Judgement: Fall prevention                 Functional Transfers  Toilet Transfer : Contact guard assistance     Bed/Mat Mobility  Rolling: Supervision  Supine to Sit: Supervision  Sit to Supine: Supervision  Sit to Stand: Contact guard assistance  Bed to Chair: Contact guard assistance  Scooting: Supervision       Most Recent Physical Functioning:   Gross Assessment:  AROM: Generally decreased, functional  Strength: Generally decreased, functional  Coordination: Generally decreased, functional (slowed in the R UE)  Tone: Normal  Sensation: Impaired (decreased in the R UE)               Posture:  Posture (WDL): Exceptions to WDL  Posture Assessment:  Forward head, Rounded shoulders  Balance:  Sitting: Intact  Standing: Impaired  Standing - Static: Good  Standing - Dynamic : Fair Bed Mobility:  Rolling: Supervision  Supine to Sit: Supervision  Sit to Supine: Supervision  Scooting: Supervision  Wheelchair Mobility:     Transfers:  Sit to Stand: Contact guard assistance  Stand to Sit: Contact guard assistance  Bed to Chair: Contact guard assistance            Patient Vitals for the past 6 hrs:   BP BP Patient Position SpO2 Pulse   07/21/18 1200 165/74 At rest 98 % 77   07/21/18 1422 (!) 172/92 At rest 98 % 98       Mental Status  Neurologic State: Alert  Orientation Level: Oriented X4  Cognition: Appropriate for age attention/concentration, Follows commands  Perception: Appears intact  Perseveration: No perseveration noted  Safety/Judgement: Fall prevention                          Physical Skills Involved:  1. Balance  2. Strength  3. Activity Tolerance  4. Sensation  5. Fine Motor Control  6. Gross Motor Control  7. Pain (acute) Cognitive Skills Affected (resulting in the inability to perform in a timely and safe manner):  1. None Psychosocial Skills Affected:  1. Habits/Routines   Number of elements that affect the Plan of Care: 5+:  HIGH COMPLEXITY   CLINICAL DECISION MAKIN[address] [place] [address] AM-PAC 6 Clicks   Daily Activity Inpatient Short Form  How much help from another person does the patient currently need. .. Total A Lot A Little None   1. Putting on and taking off regular lower body clothing? [] 1   [] 2   [x] 3   [] 4   2. Bathing (including washing, rinsing, drying)? [] 1   [] 2   [x] 3   [] 4   3. Toileting, which includes using toilet, bedpan or urinal?   [] 1   [] 2   [x] 3   [] 4   4. Putting on and taking off regular upper body clothing? [] 1   [] 2   [x] 3   [] 4   5. Taking care of personal grooming such as brushing teeth? [] 1   [] 2   [x] 3   [] 4   6. Eating meals? [] 1   [] 2   [x] 3   [] 4   © , Trustees of [address] [place] [address], under license to [employer] | [employer]. All rights reserved      Score:  Initial: 18 Most Recent: X (Date: -- )    Interpretation of Tool:  Represents activities that are increasingly more difficult (i.e. Bed mobility, Transfers, Gait). Score 24 23 22-20 19-15 14-10 9-7 6     Modifier CH CI CJ CK CL CM CN      ? Self Care:     - CURRENT STATUS: CK - 40%-59% impaired, limited or restricted    - GOAL STATUS: CJ - 20%-39% impaired, limited or restricted    - D/C STATUS:  ---------------To be determined---------------  [place] / [place] / Product Type: Medicare /      Medical Necessity:     · Patient demonstrates excellent rehab potential due to higher previous functional level.   Reason for Services/Other Comments:  · Patient continues to require skilled intervention due to decreased functional use of R dominant UE for ADL/functional transfers. Use of outcome tool(s) and clinical judgement create a POC that gives a: LOW COMPLEXITY         TREATMENT:   (In addition to Assessment/Re-Assessment sessions the following treatments were rendered)     Pre-treatment Symptoms/Complaints:    Pain: Initial:   Pain Intensity 1: 4  Pain Location 1: Head  Pain Intervention(s) 1: Repositioned  Post Session:  same     Therapeutic Activity: (    8 minutes): Therapeutic activities including Bed transfers, Chair transfers, Ambulation on level ground and activities such as opening packages, bottles, and donning/doffing socks with R hand to improve mobility, strength, balance and coordination. Required minimal Safety awareness training;Manual cues; Verbal cues; Visual/Demos to promote dynamic balance in standing.     n/a    Braces/Orthotics/Lines/Etc:   · IV  · O2 Device: Nasal cannula  Treatment/Session Assessment:    · Response to Treatment:  Pt tolerated well with excellent participation. · Interdisciplinary Collaboration:   o Occupational Therapist  o Registered Nurse  · After treatment position/precautions:   o Supine in bed  o Bed/Chair-wheels locked  o Call light within reach  o RN notified  o Family at bedside   · Compliance with Program/Exercises: Will assess as treatment progresses. · Recommendations/Intent for next treatment session: \"Next visit will focus on advancements to more challenging activities and reduction in assistance provided\".   Total Treatment Duration:  OT Patient Time In/Time Out  Time In: 1422  Time Out: 1200 [facility]
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
## 2018-07-21 NOTE — PROGRESS NOTES
Problem: Dysphagia (Adult)  Goal: *Acute Goals and Plan of Care (Insert Text)  STG: Pt will demonstrate zero signs/sx of aspiration with regular textures with thin liquids with 90% accuracy during all meals. STG: Pt will complete a full speech, language and cognitive evaluation without assistance with 100% accuracy during session. LTG: Pt will demonstrate zero signs/sx of aspiration with least restrictive diet with 100% accuracy during all meals. Speech language pathology: bedside swallow note: Initial Assessment    NAME/AGE/GENDER: Becky Shannon is a 74 y.o. male  DATE: 7/21/2018  PRIMARY DIAGNOSIS: TIA (transient ischemic attack)       ICD-10: Treatment Diagnosis: R13.11 oral phase dysphagia  INTERDISCIPLINARY COLLABORATION: Registered Nurse  PRECAUTIONS/ALLERGIES: Codeine and Iodinated contrast- oral and iv dyeASSESSMENT:Based on the objective data described below, Mr. Krystian Virgen presents with no overt signs/sx of aspiration with thin via cup and straw, pureed, mixed and solid. Recommend regular/thin. Will follow for ST evaluation pending MRI results. Patient will benefit from skilled intervention to address the below impairments. ?????? ? ? This section established at most recent assessment??????????  PROBLEM LIST (Impairments causing functional limitations):  1. dysphagia  REHABILITATION POTENTIAL FOR STATED GOALS: Good  PLAN OF CARE:   Patient will benefit from skilled intervention to address the following impairments.   RECOMMENDATIONS AND PLANNED INTERVENTIONS (Benefits and precautions of therapy have been discussed with the patient.):  · PO:  Regular  · Liquids:  regular thin  MEDICATIONS:  · With liquid  COMPENSATORY STRATEGIES/MODIFICATIONS INCLUDING:  · None  OTHER RECOMMENDATIONS (including follow up treatment recommendations):   · Patient education  RECOMMENDED DIET MODIFICATIONS DISCUSSED WITH:  · Nursing  · Patient  FREQUENCY/DURATION: Continue to follow patient 3 times a week RECOMMENDED REHABILITATION/EQUIPMENT: (at time of discharge pending progress): Due to the probability of continued deficits (see above) this patient will likely need continued skilled speech therapy after discharge. SUBJECTIVE:   I am feeling better than yesterday  History of Present Injury/Illness: Mr. Lupis Menendez  has a past medical history of Atrial fibrillation ([facility] [address] Atrial flutter ([facility] [address] (4/7/2017); and CAD (coronary artery disease). He also  has a past surgical history that includes hx heart catheterization (08/14/2006) and hx coronary stent placement. Present Symptoms:  Pain Intensity 1: 5  Pain Location 1: Face  Pain Orientation 1: Right  Pain Intervention(s) 1: Medication (see MAR)  Current Medications:   No current facility-administered medications on file prior to encounter. Current Outpatient Prescriptions on File Prior to Encounter   Medication Sig Dispense Refill    TURMERIC ROOT EXTRACT PO Take  by mouth.  APPLE CIDER VINEGAR PO Take  by mouth.  metoprolol succinate (TOPROL-XL) 100 mg tablet TAKE 1 TABLET BY MOUTH DAILY 30 Tab 5    apixaban (ELIQUIS) 5 mg tablet Take 1 Tab by mouth two (2) times a day. 60 Tab 11    losartan-hydroCHLOROthiazide (HYZAAR) 100-25 mg per tablet TAKE 1 TABLET BY MOUTH DAILY 30 Tab 6    aspirin delayed-release 81 mg tablet Take 1 Tab by mouth daily. 1 Tab 0    co-enzyme Q-10 (CO Q-10) 100 mg capsule Take 100 mg by mouth daily.  DOCOSAHEXANOIC ACID/EPA (FISH OIL PO) Take  by mouth.  multivitamin (ONE A DAY) tablet Take 1 Tab by mouth daily.  CYANOCOBALAMIN, VITAMIN B-12, (VITAMIN B12 PO) Take  by mouth.  CA/D3/MAG OX/ZINC//ARLETTE/BOR (CALCIUM 600-D3 PLUS PO) Take  by mouth.  B INFANTIS/B ANI/B CHESTER/B BIFID (PROBIOTIC 4X PO) Take  by mouth daily.  omeprazole (PRILOSEC OTC) 20 mg tablet Take 20 mg by mouth daily.  Indications: patient unsure of particular medication      HYDROcodone-acetaminophen (NORCO)  mg tablet Take 1 Tab by mouth two (2) times daily as needed for Pain.  gabapentin (NEURONTIN) 300 mg capsule Take 400 mg by mouth two (2) times a day.  carBAMazepine (TEGRETOL) 200 mg tablet Take 200 mg by mouth two (2) times a day.  NITROSTAT 0.4 mg SL tablet TAKE AS DIRECTED. 25 Tab 11     Current Dietary Status:     NPO  Social History/Home Situation:   Home Environment: Private residence  # Steps to Enter: 0  One/Two Story Residence: Two story  # of Interior Steps: 13  Height of Each Step (in): 7 inches  Interior Rails: Both  Lift Chair Available: No  Living Alone: No  Support Systems: Family member(s), Friends \ neighbors, Child(flakita)  Patient Expects to be Discharged to[de-identified] Private residence  Current DME Used/Available at Home: Blood pressure cuff  OBJECTIVE:     Oral Motor Structure/Speech:  Oral-Motor Structure/Motor Speech  Labial: No impairment  Oral Hygiene: adequate  Lingual: No impairment    Cognitive and Communication Status:  Neurologic State: Alert  Orientation Level: Oriented X4  Cognition: Follows commands  Perception: Appears intact  Perseveration: No perseveration noted  Safety/Judgement: Fall prevention    BEDSIDE SWALLOW EVALUATION  Oral Assessment:  Oral Assessment  Labial: No impairment  Oral Hygiene: adequate  Lingual: No impairment  P.O. Trials:  Patient Position: upright in bed    The patient was given teaspoon amounts of the following:   Consistency Presented: Puree; Solid; Thin liquid;Mixed consistency  How Presented: Straw;Spoon;Cup/sip; Self-fed/presented    ORAL PHASE:  Bolus Acceptance: No impairment  Bolus Formation/Control: No impairment  Propulsion: No impairment     Oral Residue: None    PHARYNGEAL PHASE:  Initiation of Swallow: No impairment     Aspiration Signs/Symptoms: None  Vocal Quality: No impairment           Pharyngeal Phase Characteristics: No impairment, issues, or problems     OTHER OBSERVATIONS:  Rate/bite size: WNL   Endurance: WNL   Comments:      Tool Used: Dysphagia Outcome and Severity Scale (RACHANA)    Score Comments   Normal Diet  [] 7 With no strategies or extra time needed   Functional Swallow  [x] 6 May have mild oral or pharyngeal delay       Mild Dysphagia    [] 5 Which may require one diet consistency restricted (those who demonstrate penetration which is entirely cleared on MBS would be included)   Mild-Moderate Dysphagia  [] 4 With 1-2 diet consistencies restricted       Moderate Dysphagia  [] 3 With 2 or more diet consistencies restricted       Moderately Severe Dysphagia  [] 2 With partial PO strategies (trials with ST only)       Severe Dysphagia  [] 1 With inability to tolerate any PO safely          Score:  Initial: 6 Most Recent: X (Date: -- )   Interpretation of Tool: The Dysphagia Outcome and Severity Scale (RACHANA) is a simple, easy-to-use, 7-point scale developed to systematically rate the functional severity of dysphagia based on objective assessment and make recommendations for diet level, independence level, and type of nutrition. Score 7 6 5 4 3 2 1   Modifier CH CI CJ CK CL CM CN   ?  Swallowing:     - CURRENT STATUS: CI - 1%-19% impaired, limited or restricted    - GOAL STATUS:  CH - 0% impaired, limited or restricted    - D/C STATUS:  ---------------To be determined---------------  [place] / [place] / Product Type: Medicare /     TREATMENT:    (In addition to Assessment/Re-Assessment sessions the following treatments were rendered)  Assessment/Reassessment only, no treatment provided today  MODALITIES:         ORAL MOTOR  EXERCISES:        LARYNGEAL / PHARYNGEAL EXERCISES:        __________________________________________________________________________________________________  Safety:   After treatment position/precautions:  · Call light within reach  · RN notified  · Family at bedside  · Upright in Bed  Treatment Assessment:    Progression/Medical Necessity:   · Patient demonstrates good rehab potential due to higher previous functional level. Compliance with Program/Exercises: Will assess as treatment progresses. Reason for Continuation of Services/Other Comments:  · dysphagia  Recommendations/Intent for next treatment session: \"Treatment next visit will focus on diet tolerance/ST evaluation \".     Total Treatment Duration:  Time In: 3300  Time Out: [address] [place] MA/CCC/SLP
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
## 2018-07-21 NOTE — PROGRESS NOTES
Ischemic Stroke without Activase/TIA      VTE Prophylaxis: Yes on Eliquis      Antiplatelet: Yes: Aspirin      Statin if LDL Greater Than or Equal to100: NO      BP Parameters: Less Than 220/120 for 24 hours, IV PRN for >180 SBP      Controlled With: PRN - IV      Dysphagia Screen Completed: Yes: Pass      Patient has PEG, NG Tube, Feeding Tube: No      Medication orders per above route: Yes      Nutrition Status: Adequate. Regular diet.      NIH Stroke Scale Complete: Yes: 5      Frequency of Vital Signs: Every 4 hours      Frequency of Neuro Checks: Every 4 hours      Daily Education/Care Plan Updated: YES     Bedside report with Zeeshan Montes RN.

## 2018-07-21 NOTE — PROGRESS NOTES
Report from [facility] in the ER    Ischemic Stroke without Activase/TIA    VTE Prophylaxis: No    Antiplatelet: Yes: Aspirin    Statin if LDL Greater Than or Equal to100: Yes: Lipitor (once cleared by speech)    BP Parameters: Less Than 220/120 for 24 hours, then consult MD for parameters    Controlled With: PRN - IV    Dysphagia Screen Completed: Yes: Fail    Patient has PEG, NG Tube, Feeding Tube: No    Medication orders per above route: No - Call MD; Consider consult to pharmacy    Nutrition Status: NPO until cleared by speech    NIH Stroke Scale Complete: Yes: 5    Frequency of Vital Signs: Every 4 hours     Frequency of Neuro Checks: Every 4 hours    Daily Education/Care Plan Updated: Will be once patient arives on floor     TRANSFER - IN REPORT:    Verbal report received from [place], RN on [employer]  being received from ED for routine progression of care      Report consisted of patients Situation, Background, Assessment and   Recommendations(SBAR). Information from the following report(s) SBAR, Kardex, ED Summary, [facility] and Recent Results was reviewed with the receiving nurse. Opportunity for questions and clarification was provided. Assessment completed upon patients arrival to unit and care assumed.              Renetta Candelario RN

## 2018-07-21 NOTE — PROGRESS NOTES
07/20/18 2151   Assessment  Type   Assessment Type Admission assessment   Dual Skin Pressure Injury Assessment   Dual Skin Pressure Injury Assessment WDL   Second Care Provider (Based on Facility Policy) Lilian Caldera RN   Skin Integumentary   Skin Integumentary (WDL) X   Skin Color Appropriate for ethnicity   Skin Condition/Temp Dry; Warm   Skin Integrity Scars (comment); Tattoos (comment)  (abdomen)   Hair Growth Present   Varicosities Present       Dual Skin Assessment    Skin assessment completed with Lilian Caldera RN. Patient has a scar to his abdomen. Patient has varicosities to his bilateral lower legs. Patient has dry flaky bilateral feet with thickened toenails. Patient has scattered tattoos. No other skin issues noted. No abnormalities noted.           Nahid Lucia RN    7/21/2018 5:36 AM

## 2018-07-22 PROBLEM — I63.9 STROKE (HCC): Status: ACTIVE | Noted: 2018-07-22

## 2018-07-22 LAB
ANION GAP SERPL CALC-SCNC: 6 MMOL/L (ref 7–16)
BUN SERPL-MCNC: 8 MG/DL (ref 8–23)
CALCIUM SERPL-MCNC: 8.8 MG/DL (ref 8.3–10.4)
CHLORIDE SERPL-SCNC: 106 MMOL/L (ref 98–107)
CO2 SERPL-SCNC: 26 MMOL/L (ref 21–32)
CREAT SERPL-MCNC: 0.76 MG/DL (ref 0.8–1.5)
ERYTHROCYTE [DISTWIDTH] IN BLOOD BY AUTOMATED COUNT: 13.8 % (ref 11.9–14.6)
GLUCOSE SERPL-MCNC: 98 MG/DL (ref 65–100)
HCT VFR BLD AUTO: 40.1 % (ref 41.1–50.3)
HGB BLD-MCNC: 14.2 G/DL (ref 13.6–17.2)
MCH RBC QN AUTO: 33.5 PG (ref 26.1–32.9)
MCHC RBC AUTO-ENTMCNC: 35.4 G/DL (ref 31.4–35)
MCV RBC AUTO: 94.6 FL (ref 79.6–97.8)
PLATELET # BLD AUTO: 158 K/UL (ref 150–450)
PMV BLD AUTO: 8.7 FL (ref 10.8–14.1)
POTASSIUM SERPL-SCNC: 4.2 MMOL/L (ref 3.5–5.1)
RBC # BLD AUTO: 4.24 M/UL (ref 4.23–5.67)
SODIUM SERPL-SCNC: 138 MMOL/L (ref 136–145)
WBC # BLD AUTO: 7.2 K/UL (ref 4.3–11.1)

## 2018-07-22 PROCEDURE — 65270000029 HC RM PRIVATE

## 2018-07-22 PROCEDURE — 36415 COLL VENOUS BLD VENIPUNCTURE: CPT | Performed by: FAMILY MEDICINE

## 2018-07-22 PROCEDURE — 85027 COMPLETE CBC AUTOMATED: CPT | Performed by: FAMILY MEDICINE

## 2018-07-22 PROCEDURE — 74011250637 HC RX REV CODE- 250/637: Performed by: FAMILY MEDICINE

## 2018-07-22 PROCEDURE — 74011250637 HC RX REV CODE- 250/637: Performed by: INTERNAL MEDICINE

## 2018-07-22 PROCEDURE — 80048 BASIC METABOLIC PNL TOTAL CA: CPT | Performed by: FAMILY MEDICINE

## 2018-07-22 PROCEDURE — 86580 TB INTRADERMAL TEST: CPT | Performed by: INTERNAL MEDICINE

## 2018-07-22 PROCEDURE — 99218 HC RM OBSERVATION: CPT

## 2018-07-22 PROCEDURE — 74011000302 HC RX REV CODE- 302: Performed by: INTERNAL MEDICINE

## 2018-07-22 RX ORDER — FENOFIBRATE 160 MG/1
160 TABLET ORAL DAILY
Status: DISCONTINUED | OUTPATIENT
Start: 2018-07-23 | End: 2018-07-23 | Stop reason: HOSPADM

## 2018-07-22 RX ORDER — GUAIFENESIN 100 MG/5ML
81 LIQUID (ML) ORAL DAILY
Status: DISCONTINUED | OUTPATIENT
Start: 2018-07-23 | End: 2018-07-23 | Stop reason: HOSPADM

## 2018-07-22 RX ORDER — POTASSIUM CHLORIDE 20 MEQ/1
40 TABLET, EXTENDED RELEASE ORAL
Status: COMPLETED | OUTPATIENT
Start: 2018-07-22 | End: 2018-07-22

## 2018-07-22 RX ADMIN — HYDROCODONE BITARTRATE AND ACETAMINOPHEN 1 TABLET: 10; 325 TABLET ORAL at 21:51

## 2018-07-22 RX ADMIN — GABAPENTIN 400 MG: 400 CAPSULE ORAL at 08:33

## 2018-07-22 RX ADMIN — HYDROCODONE BITARTRATE AND ACETAMINOPHEN 1 TABLET: 10; 325 TABLET ORAL at 11:55

## 2018-07-22 RX ADMIN — Medication 10 ML: at 05:40

## 2018-07-22 RX ADMIN — POTASSIUM CHLORIDE 40 MEQ: 20 TABLET, EXTENDED RELEASE ORAL at 08:44

## 2018-07-22 RX ADMIN — ASPIRIN 325 MG ORAL TABLET 325 MG: 325 PILL ORAL at 08:33

## 2018-07-22 RX ADMIN — CARBAMAZEPINE 100 MG: 200 TABLET ORAL at 08:33

## 2018-07-22 RX ADMIN — TUBERCULIN PURIFIED PROTEIN DERIVATIVE 5 UNITS: 5 INJECTION, SOLUTION INTRADERMAL at 08:45

## 2018-07-22 RX ADMIN — HYDROCODONE BITARTRATE AND ACETAMINOPHEN 1 TABLET: 10; 325 TABLET ORAL at 05:43

## 2018-07-22 RX ADMIN — HYDROCHLOROTHIAZIDE: 25 TABLET ORAL at 08:33

## 2018-07-22 RX ADMIN — Medication 5 ML: at 21:51

## 2018-07-22 RX ADMIN — APIXABAN 5 MG: 5 TABLET, FILM COATED ORAL at 21:52

## 2018-07-22 RX ADMIN — METOPROLOL SUCCINATE 100 MG: 100 TABLET, EXTENDED RELEASE ORAL at 08:33

## 2018-07-22 RX ADMIN — PANTOPRAZOLE SODIUM 40 MG: 40 TABLET, DELAYED RELEASE ORAL at 05:44

## 2018-07-22 RX ADMIN — CARBAMAZEPINE 100 MG: 200 TABLET ORAL at 21:52

## 2018-07-22 RX ADMIN — APIXABAN 5 MG: 5 TABLET, FILM COATED ORAL at 08:33

## 2018-07-22 RX ADMIN — Medication 5 ML: at 14:00

## 2018-07-22 RX ADMIN — GABAPENTIN 400 MG: 400 CAPSULE ORAL at 21:50

## 2018-07-22 NOTE — PROGRESS NOTES
Ischemic Stroke without Activase/TIA      VTE Prophylaxis: Yes on Eliquis      Antiplatelet: Yes: Aspirin      Statin if LDL Greater Than or Equal to100: NO - due to patient's history of myalgia.      BP Parameters: Less Than 220/120 for 24 hours, IV PRN for >180 SBP      Controlled With: PRN - IV      Dysphagia Screen Completed: Yes: Pass      Patient has PEG, NG Tube, Feeding Tube: No      Medication orders per above route: No - Call MD; Consider consult to pharmacy      Nutrition Status: Cardiac      NIH Stroke Scale Complete: Yes: 5      Frequency of Vital Signs: Every 4 hours      Frequency of Neuro Checks: Every 4 hours      Daily Education/Care Plan Updated: YES      Bedside report with Simin Baca RN

## 2018-07-22 NOTE — PROGRESS NOTES
Problem: Falls - Risk of  Goal: *Absence of Falls  Document Sasha Fall Risk and appropriate interventions in the flowsheet.    Outcome: Progressing Towards Goal  Fall Risk Interventions:  Mobility Interventions: Bed/chair exit alarm, OT consult for ADLs, Patient to call before getting OOB, PT Consult for mobility concerns, PT Consult for assist device competence, Strengthening exercises (ROM-active/passive), Utilize walker, cane, or other assistive device         Medication Interventions: Assess postural VS orthostatic hypotension, Bed/chair exit alarm, Patient to call before getting OOB, Teach patient to arise slowly    Elimination Interventions: Bed/chair exit alarm, Call light in reach, Patient to call for help with toileting needs, Toileting schedule/hourly rounds

## 2018-07-22 NOTE — PROGRESS NOTES
Problem: Pressure Injury - Risk of  Goal: *Prevention of pressure injury  Document Elie Scale and appropriate interventions in the flowsheet. Outcome: Progressing Towards Goal  Pressure Injury Interventions:  Sensory Interventions: Assess changes in LOC    Moisture Interventions: Absorbent underpads, Limit adult briefs    Activity Interventions: Increase time out of bed, Pressure redistribution bed/mattress(bed type), PT/OT evaluation    Mobility Interventions: HOB 30 degrees or less, Pressure redistribution bed/mattress (bed type), PT/OT evaluation    Nutrition Interventions: Document food/fluid/supplement intake                    Problem: Falls - Risk of  Goal: *Absence of Falls  Document Sasha Fall Risk and appropriate interventions in the flowsheet.    Outcome: Progressing Towards Goal  Fall Risk Interventions:  Mobility Interventions: Bed/chair exit alarm, PT Consult for mobility concerns, PT Consult for assist device competence         Medication Interventions: Patient to call before getting OOB    Elimination Interventions: Call light in reach
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
## 2018-07-22 NOTE — PROGRESS NOTES
Attempted to visit  Family present    Tomasa Cuadra, staff [address], [id number] [facility] [address]  /   [email or web address]

## 2018-07-22 NOTE — PROGRESS NOTES
Ischemic Stroke without Activase/TIA      VTE Prophylaxis: Yes on Eliquis      Antiplatelet: Yes: Aspirin      Statin if LDL Greater Than or Equal to100: NO - due to patient's history of myalgia.      BP Parameters: Less Than 220/120 for 24 hours, IV PRN for >180 SBP      Controlled With: PRN - IV      Dysphagia Screen Completed: Yes: Pass      Patient has PEG, NG Tube, Feeding Tube: No      Medication orders per above route:  Yes      Nutrition Status: Cardiac      NIH Stroke Scale Complete: Yes: 5      Frequency of Vital Signs: Every 4 hours      Frequency of Neuro Checks: Every 4 hours      Daily Education/Care Plan Updated: YES     Bedside report with [facility] RN  [address], RN

## 2018-07-23 VITALS
RESPIRATION RATE: 18 BRPM | DIASTOLIC BLOOD PRESSURE: 81 MMHG | OXYGEN SATURATION: 97 % | HEART RATE: 69 BPM | SYSTOLIC BLOOD PRESSURE: 149 MMHG | TEMPERATURE: 97.8 F | WEIGHT: 190 LBS | BODY MASS INDEX: 28.79 KG/M2 | HEIGHT: 68 IN

## 2018-07-23 LAB
ANION GAP SERPL CALC-SCNC: 9 MMOL/L (ref 7–16)
BUN SERPL-MCNC: 11 MG/DL (ref 8–23)
CALCIUM SERPL-MCNC: 8.3 MG/DL (ref 8.3–10.4)
CHLORIDE SERPL-SCNC: 104 MMOL/L (ref 98–107)
CO2 SERPL-SCNC: 25 MMOL/L (ref 21–32)
CREAT SERPL-MCNC: 0.88 MG/DL (ref 0.8–1.5)
ERYTHROCYTE [DISTWIDTH] IN BLOOD BY AUTOMATED COUNT: 13.8 % (ref 11.9–14.6)
GLUCOSE SERPL-MCNC: 82 MG/DL (ref 65–100)
HCT VFR BLD AUTO: 39.3 % (ref 41.1–50.3)
HGB BLD-MCNC: 14 G/DL (ref 13.6–17.2)
MCH RBC QN AUTO: 34.1 PG (ref 26.1–32.9)
MCHC RBC AUTO-ENTMCNC: 35.6 G/DL (ref 31.4–35)
MCV RBC AUTO: 95.9 FL (ref 79.6–97.8)
MM INDURATION POC: 0 MM (ref 0–5)
PLATELET # BLD AUTO: 163 K/UL (ref 150–450)
PMV BLD AUTO: 8.9 FL (ref 10.8–14.1)
POTASSIUM SERPL-SCNC: 3.6 MMOL/L (ref 3.5–5.1)
PPD POC: NEGATIVE NEGATIVE
RBC # BLD AUTO: 4.1 M/UL (ref 4.23–5.67)
SODIUM SERPL-SCNC: 138 MMOL/L (ref 136–145)
WBC # BLD AUTO: 6.1 K/UL (ref 4.3–11.1)

## 2018-07-23 PROCEDURE — 74011250637 HC RX REV CODE- 250/637: Performed by: FAMILY MEDICINE

## 2018-07-23 PROCEDURE — 74011250637 HC RX REV CODE- 250/637: Performed by: INTERNAL MEDICINE

## 2018-07-23 PROCEDURE — 80048 BASIC METABOLIC PNL TOTAL CA: CPT | Performed by: FAMILY MEDICINE

## 2018-07-23 PROCEDURE — 85027 COMPLETE CBC AUTOMATED: CPT | Performed by: FAMILY MEDICINE

## 2018-07-23 PROCEDURE — 36415 COLL VENOUS BLD VENIPUNCTURE: CPT | Performed by: FAMILY MEDICINE

## 2018-07-23 PROCEDURE — 92523 SPEECH SOUND LANG COMPREHEN: CPT

## 2018-07-23 RX ORDER — FENOFIBRATE 160 MG/1
160 TABLET ORAL DAILY
Qty: 30 TAB | Refills: 0 | Status: SHIPPED | OUTPATIENT
Start: 2018-07-24 | End: 2018-08-17 | Stop reason: SDUPTHER

## 2018-07-23 RX ADMIN — FENOFIBRATE 160 MG: 160 TABLET ORAL at 08:50

## 2018-07-23 RX ADMIN — APIXABAN 5 MG: 5 TABLET, FILM COATED ORAL at 08:01

## 2018-07-23 RX ADMIN — METOPROLOL SUCCINATE 100 MG: 100 TABLET, EXTENDED RELEASE ORAL at 08:01

## 2018-07-23 RX ADMIN — HYDROCODONE BITARTRATE AND ACETAMINOPHEN 1 TABLET: 10; 325 TABLET ORAL at 06:20

## 2018-07-23 RX ADMIN — HYDROCHLOROTHIAZIDE: 25 TABLET ORAL at 08:00

## 2018-07-23 RX ADMIN — Medication 10 ML: at 06:20

## 2018-07-23 RX ADMIN — ASPIRIN 81 MG 81 MG: 81 TABLET ORAL at 08:01

## 2018-07-23 RX ADMIN — GABAPENTIN 400 MG: 400 CAPSULE ORAL at 06:26

## 2018-07-23 RX ADMIN — PANTOPRAZOLE SODIUM 40 MG: 40 TABLET, DELAYED RELEASE ORAL at 06:19

## 2018-07-23 RX ADMIN — Medication 10 ML: at 14:07

## 2018-07-23 RX ADMIN — CARBAMAZEPINE 100 MG: 200 TABLET ORAL at 06:26

## 2018-07-23 NOTE — PROGRESS NOTES
Problem: Interdisciplinary Rounds  Goal: Interdisciplinary Rounds  Outcome: Progressing Towards Goal  Interdisciplinary team rounds were held 7/23/2018 with the following team members:Care Management, Nurse Practitioner, Physical Therapy and  and the patient. Anticipate discharge home today after cardiology & neurology consults. Plan of care discussed. See clinical pathway and/or care plan for interventions and desired outcomes.

## 2018-07-23 NOTE — PROGRESS NOTES
During IDT rounds on this date team discussed discharge plan with patient. Therapies are recommending PT/OT/ST outpatient follow up. CM submitted by fax revised order to [facility] therapy center for ST/PT/OT OP services at [facility] location.

## 2018-07-23 NOTE — PROGRESS NOTES
[facility] CARDIOLOGY PROGRESS NOTE 
      
 
7/23/2018 3:38 PM 
 
Admit Date: 7/20/2018 Subjective:  
Patient is back to baseline. CVA is very unusual for AFib event as these are generally large CVA's. ROS: 
Cardiovascular:  As noted above Objective:  
  
Vitals:  
 07/23/18 0000 07/23/18 0400 07/23/18 0745 07/23/18 1122 BP: 128/73 127/78 (!) 170/99 145/81 Pulse: 75 70 67 66 Resp: 18 18 16 19 Temp: 97.8 °F (36.6 °C) 97.5 °F (36.4 °C) 97.9 °F (36.6 °C) 97.8 °F (36.6 °C) SpO2: 97% 99% 97% 96% Weight:      
Height:      
 
 
Physical Exam: 
General-No Acute Distress Neck- supple, no JVD 
CV- regular rate and rhythm no MRG Lung- clear bilaterally Abd- soft, nontender, nondistended Ext- no edema bilaterally. Skin- warm and dry Data Review:  
Recent Labs  
   07/23/18 
 0516  07/22/18 
 0724   07/21/18 
 0507   07/20/18 
 1859 NA  138  138   --   137   < >   --   
K  3.6  4.2   < >  3.0*   < >   --   
BUN  11  8   --   10   < >   --   
CREA  0.88  0.76*   --   0.72*   < >   --   
GLU  82  98   --   96   < >   --   
WBC  6.1  7.2   --   6.2   < >   --   
HGB  14.0  14.2   --   13.4*   < >   --   
HCT  39.3*  40.1*   --   36.3*   < >   --   
PLT  163  158   --   161   < >   --   
INR   --    --    --    --    --   1.0  
CHOL   --    --    --   184   --    --   
LDLC   --    --    --   123.6*   --    --   
HDL   --    --    --   31*   --    --   
 < > = values in this interval not displayed. Assessment/Plan:  
 
Principal Problem: 
  Stroke ([address] (7/22/2018) Encouraged compliance with medical therapy - Eliquis 5mg BID and ASA 81mg daily Active Problems: 
  Dyslipidemia (4/7/2017) Intolerant of statin therapy - consider Repatha in office due to recent stroke most likely be due to microemboli from aorta or carotid arteries Hypertension (4/7/2017) This is controlled Coronary atherosclerosis of native coronary vessel (4/7/2017)   This is stable Atrial fibrillation ([facility] [facility] (4/7/2017) Restart Eliquis 5mg BID and add ASA 81mg daily TIA (transient ischemic attack) (7/20/2018) As above Slurred speech (7/20/2018) Resolved Facial droop (7/20/2018) Resolved Patient stable to DC home. Follow up as scheduled in office Dodie Mata MD 
7/23/2018 3:38 PM

## 2018-07-23 NOTE — PROGRESS NOTES
Ischemic Stroke without Activase/TIA      VTE Prophylaxis: Yes on Eliquis      Antiplatelet: Yes: Aspirin      Statin if LDL Greater Than or Equal to100: NO - due to patient's history of myalgia.      BP Parameters: Less Than 220/120 for 24 hours, IV PRN for >180 SBP      Controlled With: PRN - IV      Dysphagia Screen Completed: Yes: Pass      Patient has PEG, NG Tube, Feeding Tube: No      Medication orders per above route:  Yes      Nutrition Status: Cardiac      NIH Stroke Scale Complete: Yes: 5      Frequency of Vital Signs: Every 4 hours      Frequency of Neuro Checks: Every 4 hours      Daily Education/Care Plan Updated: YES      Bedside report with Marquis Chen RN  [id number], RN

## 2018-07-23 NOTE — PROGRESS NOTES
STG: Pt will participate with further assessment of cognitive function with higher level ADLs x1  LTG: Pt will reach highest cognitive function for maximum independence at discharge    Speech language pathology: Speech-language and cognitive note: Initial Assessment    NAME/AGE/GENDER: Mecca Rater is a 74 y.o. male  DATE: 7/23/2018  PRIMARY DIAGNOSIS: TIA (transient ischemic attack)  Stroke [facility])       ICD-10: Treatment Diagnosis: cognitive communication deficit R41.841  INTERDISCIPLINARY COLLABORATION: case managerASSESSMENT:Based on the objective data described below, Mr. Cori Anthony presents with mild cognitive deficits related to organization, executive function, and short term memory. 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 He is oriented and appropriate in conversation. Iftikhar Cognitive Assessment completed with a score of 22/30 and norm score of 26 or greater. Executive function: 2/5; Pt demonstrated difficulty with basic trail making task and had difficulty setting the correct time on a clock with max cues required; pt understood the time presented as it was written out and verbalized and he repeated back to clinician several times but demonstrated difficulty problem solving through where to place the hands  Naming:   3/3  Attention:   5/6; Pt has bilateral hearing loss with 60% loss with high frequency so this was accounted for during attention tasks  Language:   2/3; 8 words elicited during divergent naming task; avg being 11  Abstraction:   2/2  Delayed Recall:   3/5; recalled 4/5 with semantic cues  Orientation:   5/6; patient read his anticipated discharge date from the board instead of today's date  Several small infarcts in L MCA territory. Appears to be functioning slightly below baseline with higher level cognitive tasks scoring 22/30 with a norm score of 26 or greater on cognitive assessment and is independent and working at baseline.   Recommendation for follow up therapy discussed with patient. His wife was on the phone and then sleeping during the assessment. Recommend OP speech therapy for cognitive tx be included to discharge plan. Patient will benefit from skilled intervention to address the below impairments. ?????? ? ? This section established at most recent assessment??????????  PROBLEM LIST (Impairments causing functional limitations): 1. cognition  REHABILITATION POTENTIAL FOR STATED GOALS: Good  PLAN OF CARE:   Patient will benefit from skilled intervention to address the following impairments. INTERVENTIONS PLANNED: (Benefits and precautions of therapy have been discussed with the patient.)  1. cognition  FREQUENCY/DURATION: Continue to follow patient 3 times a week for duration of hospital stay to address above goals. RECOMMENDED REHABILITATION/EQUIPMENT: (at time of discharge pending progress): Due to the probability of continued deficits (see above) this patient will likely need continued skilled speech therapy after discharge. SUBJECTIVE:   Cooperative. History of Present Injury/Illness: Mr. Susan Woo  has a past medical history of Atrial fibrillation ([facility] [address] Atrial flutter ([facility] (4/7/2017); and CAD (coronary artery disease). .  He also  has a past surgical history that includes hx heart catheterization (08/14/2006) and hx coronary stent placement. Present Symptoms: AmS   Pain Intensity 1: 0  Pain Location 1: Face, Head  Pain Orientation 1: Right  Pain Intervention(s) 1: Medication (see MAR)  Current Medications:   No current facility-administered medications on file prior to encounter. Current Outpatient Prescriptions on File Prior to Encounter   Medication Sig Dispense Refill    TURMERIC ROOT EXTRACT PO Take  by mouth.  APPLE CIDER VINEGAR PO Take  by mouth.  metoprolol succinate (TOPROL-XL) 100 mg tablet TAKE 1 TABLET BY MOUTH DAILY 30 Tab 5    apixaban (ELIQUIS) 5 mg tablet Take 1 Tab by mouth two (2) times a day.  60 Tab 11  losartan-hydroCHLOROthiazide (HYZAAR) 100-25 mg per tablet TAKE 1 TABLET BY MOUTH DAILY 30 Tab 6    aspirin delayed-release 81 mg tablet Take 1 Tab by mouth daily. 1 Tab 0    co-enzyme Q-10 (CO Q-10) 100 mg capsule Take 100 mg by mouth daily.  DOCOSAHEXANOIC ACID/EPA (FISH OIL PO) Take  by mouth.  multivitamin (ONE A DAY) tablet Take 1 Tab by mouth daily.  CYANOCOBALAMIN, VITAMIN B-12, (VITAMIN B12 PO) Take  by mouth.  CA/D3/MAG OX/ZINC//ARLETTE/BOR (CALCIUM 600-D3 PLUS PO) Take  by mouth.  B INFANTIS/B ANI/B CHESTER/B BIFID (PROBIOTIC 4X PO) Take  by mouth daily.  omeprazole (PRILOSEC OTC) 20 mg tablet Take 20 mg by mouth daily. Indications: patient unsure of particular medication      HYDROcodone-acetaminophen (NORCO)  mg tablet Take 1 Tab by mouth two (2) times daily as needed for Pain.  gabapentin (NEURONTIN) 300 mg capsule Take 400 mg by mouth two (2) times a day.  carBAMazepine (TEGRETOL) 200 mg tablet Take 200 mg by mouth two (2) times a day.       NITROSTAT 0.4 mg SL tablet TAKE AS DIRECTED. 25 Tab 11     Current Dietary Status:  Regular textures      History of reflux:  yes   Reflux medication: prilosec  Social History/Home Situation: home with wife  Home Environment: Private residence  # Steps to Enter: 0  One/Two Story Residence: Two story, live on 1st floor  # of Interior Steps: 13  Height of Each Step (in): 7 inches  Interior Rails: Both  Lift Chair Available: No  Living Alone: No  Support Systems: Family member(s), Friends \ neighbors  Patient Expects to be Discharged to[de-identified] Private residence  Current DME Used/Available at Home: Blood pressure cuff  Tub or Shower Type:  (pt has both)  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  OBJECTIVE:   Oral Motor Structure/Speech:  Oral-Motor Structure/Motor Speech  Labial: No impairment  Oral Hygiene: adequate  Lingual: No impairment    SPEECH-LANGUAGE COGNITIVE EVALUATION  Tests Given:MOCA    Mental Status:  Neurologic State: Alert  Orientation Level: Oriented X4  Cognition: Follows commands       Motor Speech:   WFL    Auditory Comprehension:    hearing loss    Reading Comprehension:   Reading Comprehension  Visual Impairment: Glasses/contacts  Pre-Morbid Reading Status: Literate  Scanning/Tracking : No impairment    Neuro-Linguistics:  Verbal Reasoning Tasks: No Impairment     Verbal Organization: Impaired (mild)      Attention: Impaired    Memory: Impaired (mild)       Pragmatics:  Pragmatics Impairment: No impairment       Assessment/Reassessment only, no treatment provided today    Tool Used: Functional [place] Measure (FIMTM)   Score Comments   Eating       Comprehension       Expression       Social Interaction       Problem Solving       Memory  5        Score:  Initial: 5 Most Recent: X (Date: -- )   Interpretation of Tool: Provides a uniform system of measurement for disability based on the International Classification of Impairment, Disabilities and Handicaps; measures the level of a patient's disability and indicates how much assistance is required for the individual to carry out activities of daily living. Score 7 6 5 4 3 2 1   Modifier CH CI CJ CK CL CM CN   ? Swallowing:     - CURRENT STATUS: CJ - 20%-39% impaired, limited or restricted    - GOAL STATUS:  CI - 1%-19% impaired, limited or restricted    - D/C STATUS:  ---------------To be determined---------------  [place] / [place] / Product Type: Medicare /   __________________________________________________________________________________________________  Safety:   After treatment position/precautions:  · Family at bedside  · Upright in Bed  Progression/Medical Necessity:   · Skilled intervention continues to be required due to decreased cognitive skills and decreased independence with activities of daily living. Compliance with Program/Exercises: Will assess as treatment progresses. Reason for Continuation of Services/Other Comments:  · Patient continues to require skilled intervention due to patient unable to attend/participate in therapy as expected.   Recommendations/Intent for next treatment session: \"Treatment next visit will focus on higher level cognitive tasks  Total Treatment Duration:  Time In: 1003  Time Out: [address] MURPHY, CCC-SLP

## 2018-07-23 NOTE — DISCHARGE INSTRUCTIONS
Stroke: After Your Visit     Your Care Instructions     You have had a stroke. Risk factors for stroke include being overweight, smoking, and sedentary lifestyle. This means that the blood flow to a part of your brain was blocked for some time, which damages the nerve cells in that part of the brain. The part of your body controlled by that part of your brain may not function properly now. The brain is an amazing organ that can heal itself to some degree. The stroke you had damaged part of your brain, but other parts of your brain may take over in some way for the damaged areas. You have already started this process. Going home may be hard for you and your family. The more you can try to do for yourself, the better. Remember to take each day one at a time. Follow-up care is a key part of your treatment and safety. Be sure to make and go to all appointments, and call your doctor if you are having problems. Its also a good idea to know your test results and keep a list of the medicines you take. How can you care for yourself at home? Enter a stroke rehabilitation (rehab) program, if your doctor recommends it. Physical, speech, and occupational therapies can help you manage bathing, dressing, eating, and other basics of daily living. Eat a heart-healthy diet that is low in cholesterol, saturated fat, and salt. Eat lots of fresh fruits and vegetables and foods high in fiber. Increase your activities slowly. Take short rest breaks when you get tired. Gradually increase the amount you walk. Start out by walking a little more than you did the day before. Do not drive until your doctor says it is okay. It is normal to feel sad or depressed after a stroke. If the blues last, talk to your doctor. If you are having problems with urine leakage, go to the bathroom at regular times, including when you first wake up and at bedtime. Also, limit fluids after dinner.   If you are constipated, drink plenty of fluids, enough so that your urine is light yellow or clear like water. If you have kidney, heart, or liver disease and have to limit fluids, talk with your doctor before you increase the amount of fluids you drink. Set up a regular time for using the toilet. If you continue to have constipation, your doctor may suggest using a bulking agent, such as Metamucil, or a stool softener, laxative, or enema. Medicines  Take your medicines exactly as prescribed. Call your doctor if you think you are having a problem with your medicine. You may be taking several medicines. ACE (angiotensin-converting enzyme) inhibitors, angiotensin II receptor blockers (ARBs), beta-blockers, diuretics (water pills), and calcium channel blockers control your blood pressure. Statins help lower cholesterol. Your doctor may also prescribe medicines for depression, pain, sleep problems, anxiety, or agitation. If your doctor has given you medicine that prevents blood clots, such as warfarin (Coumadin), aspirin combined with extended-release dipyridamole (Aggrenox), clopidogrel (Plavix), or aspirin to prevent another stroke, you should:  Tell your dentist, pharmacist, and other health professionals that you take these medicines. Watch for unusual bruising or bleeding, such as blood in your urine, red or black stools, or bleeding from your nose or gums. Get regular blood tests to check your clotting time if you are taking Coumadin. Wear medical alert jewelry that says you take blood thinners. You can buy this at most drugstores. Do not take any over-the-counter medicines or herbal products without talking to your doctor first.  If you take birth control pills or hormone replacement therapy, talk to your doctor about whether they are right for you. For family members and caregivers  Make the home safe. Set up a room so that your loved one does not have to climb stairs. Be sure the bathroom is on the same floor.  Move throw rugs and furniture that could cause falls, and make sure that the lighting is good. Put grab bars and seats in tubs and showers. Find out what your loved one can do and what he or she needs help with. Try not to do things for your loved one that your loved one can do on his or her own. Help him or her learn and practice new skills. Visit and talk with your loved one often. Try doing activities together that you both enjoy, such as playing cards or board games. Keep in touch with your loved one's friends as much as you can, and encourage them to visit. Take care of yourself. Do not try to do everything yourself. Ask other family members to help. Eat well, get enough rest, and take time to do things that you enjoy. Keep up with your own doctor visits, and make sure to take your medicines regularly. Get out of the house as much as you can. Join a local support group. Find out if you qualify for home health care visits to help with rehab or for adult day care. When should you call for help? Call 911 anytime you think you may need emergency care. For example, call if:  You have signs of another stroke. These may include:  Sudden numbness, paralysis, or weakness in your face, arm, or leg, especially on only one side of your body. New problems with walking or balance. Sudden vision changes. Drooling or slurred speech. New problems speaking or understanding simple statements, or you feel confused. A sudden, severe headache that is different from past headaches. Call 911 even if these symptoms go away in a few minutes. You cough up blood. You vomit blood or what looks like coffee grounds. You pass maroon or very bloody stools. Call your doctor now or seek immediate medical care if:  You have new bruises or blood spots under your skin. You have a nosebleed. Your gums bleed when you brush your teeth. You have blood in your urine. Your stools are black and tarlike or have streaks of blood.   You have vaginal bleeding when you are not having your period, or heavy period bleeding. You have new symptoms that may be related to your stroke, such as falls or trouble swallowing. Watch closely for changes in your health, and be sure to contact your doctor if you have any problems. Where can you learn more? Go to LocPlanet.be    Enter C294  in the search box to learn more about \"Stroke: After Your Visit\". © 1865-2419 [employer], [employer]. Care instructions adapted under license by [employer] (which disclaims liability or warranty for this information). This care instruction is for use with your licensed healthcare professional. If you have questions about a medical condition or this instruction, always ask your healthcare professional. [place] any warranty or liability for your use of this information. DISCHARGE SUMMARY from Nurse    PATIENT INSTRUCTIONS:    After general anesthesia or intravenous sedation, for 24 hours or while taking prescription Narcotics:  · Limit your activities  · Do not drive and operate hazardous machinery  · Do not make important personal or business decisions  · Do  not drink alcoholic beverages  · If you have not urinated within 8 hours after discharge, please contact your surgeon on call. Report the following to your surgeon:  · Excessive pain, swelling, redness or odor of or around the surgical area  · Temperature over 100.5  · Nausea and vomiting lasting longer than 4 hours or if unable to take medications  · Any signs of decreased circulation or nerve impairment to extremity: change in color, persistent  numbness, tingling, coldness or increase pain  · Any questions    What to do at Home:  Recommended activity: Activity as tolerated, cardiac diet as tolerated. *  Please give a list of your current medications to your Primary Care Provider.     *  Please update this list whenever your medications are discontinued, doses are      changed, or new medications (including over-the-counter products) are added. *  Please carry medication information at all times in case of emergency situations. These are general instructions for a healthy lifestyle:    No smoking/ No tobacco products/ Avoid exposure to second hand smoke  Surgeon General's Warning:  Quitting smoking now greatly reduces serious risk to your health. Obesity, smoking, and sedentary lifestyle greatly increases your risk for illness    A healthy diet, regular physical exercise & weight monitoring are important for maintaining a healthy lifestyle    You may be retaining fluid if you have a history of heart failure or if you experience any of the following symptoms:  Weight gain of 3 pounds or more overnight or 5 pounds in a week, increased swelling in our hands or feet or shortness of breath while lying flat in bed. Please call your doctor as soon as you notice any of these symptoms; do not wait until your next office visit. Recognize signs and symptoms of STROKE:    F-face looks uneven    A-arms unable to move or move unevenly    S-speech slurred or non-existent    T-time-call 911 as soon as signs and symptoms begin-DO NOT go       Back to bed or wait to see if you get better-TIME IS BRAIN. Warning Signs of HEART ATTACK     Call 911 if you have these symptoms:   Chest discomfort. Most heart attacks involve discomfort in the center of the chest that lasts more than a few minutes, or that goes away and comes back. It can feel like uncomfortable pressure, squeezing, fullness, or pain.  Discomfort in other areas of the upper body. Symptoms can include pain or discomfort in one or both arms, the back, neck, jaw, or stomach.  Shortness of breath with or without chest discomfort.  Other signs may include breaking out in a cold sweat, nausea, or lightheadedness. Don't wait more than five minutes to call 911 - MINUTES MATTER! Fast action can save your life.  Calling 911 is almost always the fastest way to get lifesaving treatment. Emergency Medical Services staff can begin treatment when they arrive -- up to an hour sooner than if someone gets to the hospital by car. The discharge information has been reviewed with the patient. The patient verbalized understanding. Discharge medications reviewed with the patient and appropriate educational materials and side effects teaching were provided.   ___________________________________________________________________________________________________________________________________
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
## 2018-07-23 NOTE — PROGRESS NOTES
Problem: Pressure Injury - Risk of  Goal: *Prevention of pressure injury  Document Elie Scale and appropriate interventions in the flowsheet. Outcome: Progressing Towards Goal  Pressure Injury Interventions:  Sensory Interventions: Assess changes in LOC, Avoid rigorous massage over bony prominences, Check visual cues for pain, Discuss PT/OT consult with provider, Keep linens dry and wrinkle-free, Minimize linen layers, Pressure redistribution bed/mattress (bed type), Use 30-degree side-lying position    Moisture Interventions: Absorbent underpads    Activity Interventions: Increase time out of bed, Pressure redistribution bed/mattress(bed type), PT/OT evaluation    Mobility Interventions: HOB 30 degrees or less, Pressure redistribution bed/mattress (bed type), PT/OT evaluation    Nutrition Interventions: Document food/fluid/supplement intake                    Problem: Falls - Risk of  Goal: *Absence of Falls  Document Sasha Fall Risk and appropriate interventions in the flowsheet.    Outcome: Progressing Towards Goal  Fall Risk Interventions:  Mobility Interventions: Bed/chair exit alarm, OT consult for ADLs, Patient to call before getting OOB, PT Consult for mobility concerns, PT Consult for assist device competence         Medication Interventions: Evaluate medications/consider consulting pharmacy, Patient to call before getting OOB    Elimination Interventions: Call light in reach, Patient to call for help with toileting needs

## 2018-07-23 NOTE — PROGRESS NOTES
CM met with patient regarding discharge planning consult. Also present was patient's friend, Farhan Denton, with whom he lives. Patient and friend live in a 2 level home with 10 stairs inside, 0 steps at the entrance, and a walk-in shower. Patient's son and daughter live nearby. No additional support network available. Prior to admission, patient was fully independent ambulating, managing ADLs, driving, and working full time in construction (installing windows). He confirmed his PCP as listed; last contact approximately 6 months ago. Confirmed insurance as listed as well. No history of HH or STR. No use of home oxygen or dialysis. Patient has access to [facility] [employer] and cane at home, but has not had need of them in the past.  No anticipated need for additional DME. Patient reports having living will, but not HCPOA. He is not a . PLAN FOR DISCHARGE:   Plan discussed with patient and friend, Farhan Denton. Patient to return home with OP therapy services through [facility] location. Order faxed for OP therapy to therapy center scheduling on this date. Patient states he feels as though his strength is back to baseline, but agrees to follow up with OP PT for balance as recommended. No other needs anticipated at this time. CM will remain available. Care Management Interventions  PCP Verified by CM: Yes  Mode of Transport at Discharge: Other (see comment) (Friend Katheryn Crews - [phone])  Transition of Care Consult (CM Consult): Discharge Planning (Home with OP therapy for balance needs)  Discharge Durable Medical Equipment: No  Physical Therapy Consult: Yes  Occupational Therapy Consult: Yes  Speech Therapy Consult: Yes  Current Support Network: Own Home, Other, Family Lives Nearby (Lives with friend Kimmy Delgado).  Son/daughter live nearby)  Confirm Follow Up Transport: Friends  Plan discussed with Pt/Family/Caregiver: Yes  Freedom of Choice Offered: Yes  Discharge Location  Discharge Placement: Home with outpatient services

## 2019-01-31 PROBLEM — I50.22 CHRONIC SYSTOLIC CONGESTIVE HEART FAILURE (HCC): Chronic | Status: ACTIVE | Noted: 2019-01-31

## 2019-02-08 ENCOUNTER — HOSPITAL ENCOUNTER (OUTPATIENT)
Dept: LAB | Age: 74
Discharge: HOME | End: 2019-02-08
Attending: INTERNAL MEDICINE
Payer: MEDICARE

## 2019-02-08 DIAGNOSIS — I25.118 ATHEROSCLEROSIS OF NATIVE CORONARY ARTERY OF NATIVE HEART WITH STABLE ANGINA PECTORIS (HCC): ICD-10-CM

## 2019-02-08 LAB
ANION GAP SERPL CALC-SCNC: 5 MMOL/L
BASOPHILS # BLD: 0.1 K/UL (ref 0–0.2)
BASOPHILS NFR BLD: 1 % (ref 0–2)
BUN SERPL-MCNC: 16 MG/DL (ref 8–23)
CALCIUM SERPL-MCNC: 8.4 MG/DL (ref 8.3–10.4)
CHLORIDE SERPL-SCNC: 102 MMOL/L (ref 98–107)
CO2 SERPL-SCNC: 29 MMOL/L (ref 21–32)
CREAT SERPL-MCNC: 1.1 MG/DL (ref 0.8–1.5)
DIFFERENTIAL METHOD BLD: ABNORMAL
EOSINOPHIL # BLD: 0.1 K/UL (ref 0–0.8)
EOSINOPHIL NFR BLD: 2 % (ref 0.5–7.8)
ERYTHROCYTE [DISTWIDTH] IN BLOOD BY AUTOMATED COUNT: 12.9 % (ref 11.9–14.6)
GLUCOSE SERPL-MCNC: 101 MG/DL (ref 65–100)
HCT VFR BLD AUTO: 40.1 % (ref 41.1–50.3)
HGB BLD-MCNC: 14.1 G/DL (ref 13.6–17.2)
IMM GRANULOCYTES # BLD AUTO: 0 K/UL (ref 0–0.5)
IMM GRANULOCYTES NFR BLD AUTO: 0 % (ref 0–5)
LYMPHOCYTES # BLD: 1.3 K/UL (ref 0.5–4.6)
LYMPHOCYTES NFR BLD: 20 % (ref 13–44)
MCH RBC QN AUTO: 33.3 PG (ref 26.1–32.9)
MCHC RBC AUTO-ENTMCNC: 35.2 G/DL (ref 31.4–35)
MCV RBC AUTO: 94.8 FL (ref 79.6–97.8)
MONOCYTES # BLD: 0.5 K/UL (ref 0.1–1.3)
MONOCYTES NFR BLD: 7 % (ref 4–12)
NEUTS SEG # BLD: 4.8 K/UL (ref 1.7–8.2)
NEUTS SEG NFR BLD: 71 % (ref 43–78)
NRBC # BLD: 0 K/UL (ref 0–0.2)
PLATELET # BLD AUTO: 224 K/UL (ref 150–450)
PMV BLD AUTO: 8.3 FL (ref 9.4–12.3)
POTASSIUM SERPL-SCNC: 3.5 MMOL/L (ref 3.5–5.1)
RBC # BLD AUTO: 4.23 M/UL (ref 4.23–5.6)
SODIUM SERPL-SCNC: 136 MMOL/L (ref 136–145)
WBC # BLD AUTO: 6.8 K/UL (ref 4.3–11.1)

## 2019-02-08 PROCEDURE — 80048 BASIC METABOLIC PNL TOTAL CA: CPT

## 2019-02-08 PROCEDURE — 36415 COLL VENOUS BLD VENIPUNCTURE: CPT

## 2019-02-08 PROCEDURE — 85025 COMPLETE CBC W/AUTO DIFF WBC: CPT

## 2019-06-06 ENCOUNTER — APPOINTMENT (OUTPATIENT)
Dept: GENERAL RADIOLOGY | Age: 74
DRG: 229 | End: 2019-06-06
Attending: EMERGENCY MEDICINE
Payer: MEDICARE

## 2019-06-06 ENCOUNTER — HOSPITAL ENCOUNTER (INPATIENT)
Age: 74
LOS: 11 days | Discharge: NURSING, REHAB OR LONG-TERM CARE | DRG: 229 | End: 2019-06-17
Attending: EMERGENCY MEDICINE | Admitting: THORACIC SURGERY (CARDIOTHORACIC VASCULAR SURGERY)
Payer: MEDICARE

## 2019-06-06 DIAGNOSIS — R04.2 HEMOPTYSIS: ICD-10-CM

## 2019-06-06 DIAGNOSIS — I48.91 ATRIAL FIBRILLATION WITH RVR (HCC): ICD-10-CM

## 2019-06-06 DIAGNOSIS — Z99.11 ENCOUNTER FOR WEANING FROM VENTILATOR (HCC): ICD-10-CM

## 2019-06-06 DIAGNOSIS — Z95.1 S/P CABG X 3: ICD-10-CM

## 2019-06-06 DIAGNOSIS — I50.22 CHRONIC SYSTOLIC CONGESTIVE HEART FAILURE (HCC): Chronic | ICD-10-CM

## 2019-06-06 DIAGNOSIS — R79.89 ELEVATED BRAIN NATRIURETIC PEPTIDE (BNP) LEVEL: ICD-10-CM

## 2019-06-06 DIAGNOSIS — I48.91 ATRIAL FIBRILLATION, UNSPECIFIED TYPE (HCC): ICD-10-CM

## 2019-06-06 DIAGNOSIS — Z87.891 HISTORY OF SMOKING 25-50 PACK YEARS: ICD-10-CM

## 2019-06-06 DIAGNOSIS — J39.8 TRACHEOBRONCHOMALACIA: ICD-10-CM

## 2019-06-06 DIAGNOSIS — I21.4 NSTEMI (NON-ST ELEVATED MYOCARDIAL INFARCTION) (HCC): Primary | ICD-10-CM

## 2019-06-06 DIAGNOSIS — I25.118 ATHEROSCLEROSIS OF NATIVE CORONARY ARTERY OF NATIVE HEART WITH STABLE ANGINA PECTORIS (HCC): ICD-10-CM

## 2019-06-06 DIAGNOSIS — G50.0 TRIGEMINAL NEURALGIA: Chronic | ICD-10-CM

## 2019-06-06 PROBLEM — I49.3 PVC'S (PREMATURE VENTRICULAR CONTRACTIONS): Status: RESOLVED | Noted: 2017-04-07 | Resolved: 2019-06-06

## 2019-06-06 PROBLEM — R47.81 SLURRED SPEECH: Status: RESOLVED | Noted: 2018-07-20 | Resolved: 2019-06-06

## 2019-06-06 PROBLEM — I21.19 AMI INFERIOR WALL (HCC): Status: RESOLVED | Noted: 2017-04-07 | Resolved: 2019-06-06

## 2019-06-06 PROBLEM — R07.9 CHEST PAIN: Status: ACTIVE | Noted: 2019-06-06

## 2019-06-06 PROBLEM — I63.9 STROKE (HCC): Status: RESOLVED | Noted: 2018-07-22 | Resolved: 2019-06-06

## 2019-06-06 PROBLEM — R29.810 FACIAL DROOP: Status: RESOLVED | Noted: 2018-07-20 | Resolved: 2019-06-06

## 2019-06-06 PROBLEM — Z98.61 S/P PTCA (PERCUTANEOUS TRANSLUMINAL CORONARY ANGIOPLASTY): Status: RESOLVED | Noted: 2017-04-07 | Resolved: 2019-06-06

## 2019-06-06 LAB
ALBUMIN SERPL-MCNC: 3.9 G/DL (ref 3.2–4.6)
ALBUMIN/GLOB SERPL: 1.1 {RATIO} (ref 1.2–3.5)
ALP SERPL-CCNC: 75 U/L (ref 50–136)
ALT SERPL-CCNC: 21 U/L (ref 12–65)
ANION GAP SERPL CALC-SCNC: 11 MMOL/L (ref 7–16)
AST SERPL-CCNC: 16 U/L (ref 15–37)
BASOPHILS # BLD: 0 K/UL (ref 0–0.2)
BASOPHILS NFR BLD: 0 % (ref 0–2)
BILIRUB SERPL-MCNC: 1.5 MG/DL (ref 0.2–1.1)
BUN SERPL-MCNC: 9 MG/DL (ref 8–23)
CALCIUM SERPL-MCNC: 9.1 MG/DL (ref 8.3–10.4)
CHLORIDE SERPL-SCNC: 99 MMOL/L (ref 98–107)
CO2 SERPL-SCNC: 23 MMOL/L (ref 21–32)
CREAT SERPL-MCNC: 0.98 MG/DL (ref 0.8–1.5)
DIFFERENTIAL METHOD BLD: ABNORMAL
EOSINOPHIL # BLD: 0.1 K/UL (ref 0–0.8)
EOSINOPHIL NFR BLD: 1 % (ref 0.5–7.8)
ERYTHROCYTE [DISTWIDTH] IN BLOOD BY AUTOMATED COUNT: 13.3 % (ref 11.9–14.6)
GLOBULIN SER CALC-MCNC: 3.4 G/DL (ref 2.3–3.5)
GLUCOSE SERPL-MCNC: 130 MG/DL (ref 65–100)
HCT VFR BLD AUTO: 40.5 % (ref 41.1–50.3)
HGB BLD-MCNC: 14.7 G/DL (ref 13.6–17.2)
IMM GRANULOCYTES # BLD AUTO: 0 K/UL (ref 0–0.5)
IMM GRANULOCYTES NFR BLD AUTO: 0 % (ref 0–5)
LYMPHOCYTES # BLD: 1.9 K/UL (ref 0.5–4.6)
LYMPHOCYTES NFR BLD: 20 % (ref 13–44)
MCH RBC QN AUTO: 32.9 PG (ref 26.1–32.9)
MCHC RBC AUTO-ENTMCNC: 36.3 G/DL (ref 31.4–35)
MCV RBC AUTO: 90.6 FL (ref 79.6–97.8)
MONOCYTES # BLD: 0.6 K/UL (ref 0.1–1.3)
MONOCYTES NFR BLD: 7 % (ref 4–12)
NEUTS SEG # BLD: 6.6 K/UL (ref 1.7–8.2)
NEUTS SEG NFR BLD: 71 % (ref 43–78)
NRBC # BLD: 0 K/UL (ref 0–0.2)
PLATELET # BLD AUTO: 231 K/UL (ref 150–450)
PMV BLD AUTO: 8.7 FL (ref 9.4–12.3)
POTASSIUM SERPL-SCNC: 3.2 MMOL/L (ref 3.5–5.1)
PROT SERPL-MCNC: 7.3 G/DL (ref 6.3–8.2)
RBC # BLD AUTO: 4.47 M/UL (ref 4.23–5.6)
SODIUM SERPL-SCNC: 133 MMOL/L (ref 136–145)
TROPONIN I BLD-MCNC: 0.14 NG/ML (ref 0.02–0.05)
WBC # BLD AUTO: 9.3 K/UL (ref 4.3–11.1)

## 2019-06-06 PROCEDURE — 93005 ELECTROCARDIOGRAM TRACING: CPT | Performed by: EMERGENCY MEDICINE

## 2019-06-06 PROCEDURE — 80053 COMPREHEN METABOLIC PANEL: CPT

## 2019-06-06 PROCEDURE — 74011250636 HC RX REV CODE- 250/636: Performed by: EMERGENCY MEDICINE

## 2019-06-06 PROCEDURE — 74011000250 HC RX REV CODE- 250: Performed by: NURSE PRACTITIONER

## 2019-06-06 PROCEDURE — 74011250636 HC RX REV CODE- 250/636: Performed by: NURSE PRACTITIONER

## 2019-06-06 PROCEDURE — 74011000258 HC RX REV CODE- 258: Performed by: NURSE PRACTITIONER

## 2019-06-06 PROCEDURE — 74011250637 HC RX REV CODE- 250/637: Performed by: EMERGENCY MEDICINE

## 2019-06-06 PROCEDURE — 96374 THER/PROPH/DIAG INJ IV PUSH: CPT | Performed by: EMERGENCY MEDICINE

## 2019-06-06 PROCEDURE — 85025 COMPLETE CBC W/AUTO DIFF WBC: CPT

## 2019-06-06 PROCEDURE — 71045 X-RAY EXAM CHEST 1 VIEW: CPT

## 2019-06-06 PROCEDURE — 74011000250 HC RX REV CODE- 250: Performed by: EMERGENCY MEDICINE

## 2019-06-06 PROCEDURE — 74011636637 HC RX REV CODE- 636/637: Performed by: NURSE PRACTITIONER

## 2019-06-06 PROCEDURE — 74011250637 HC RX REV CODE- 250/637: Performed by: NURSE PRACTITIONER

## 2019-06-06 PROCEDURE — 99285 EMERGENCY DEPT VISIT HI MDM: CPT | Performed by: EMERGENCY MEDICINE

## 2019-06-06 PROCEDURE — 84484 ASSAY OF TROPONIN QUANT: CPT

## 2019-06-06 PROCEDURE — 96375 TX/PRO/DX INJ NEW DRUG ADDON: CPT | Performed by: EMERGENCY MEDICINE

## 2019-06-06 PROCEDURE — 65660000000 HC RM CCU STEPDOWN

## 2019-06-06 RX ORDER — SODIUM CHLORIDE 0.9 % (FLUSH) 0.9 %
5-40 SYRINGE (ML) INJECTION EVERY 8 HOURS
Status: DISCONTINUED | OUTPATIENT
Start: 2019-06-07 | End: 2019-06-07

## 2019-06-06 RX ORDER — METOPROLOL SUCCINATE 50 MG/1
100 TABLET, EXTENDED RELEASE ORAL DAILY
Status: DISCONTINUED | OUTPATIENT
Start: 2019-06-07 | End: 2019-06-11

## 2019-06-06 RX ORDER — HEPARIN SODIUM 5000 [USP'U]/100ML
12-25 INJECTION, SOLUTION INTRAVENOUS
Status: DISCONTINUED | OUTPATIENT
Start: 2019-06-06 | End: 2019-06-07

## 2019-06-06 RX ORDER — HYDROMORPHONE HYDROCHLORIDE 1 MG/ML
0.2 INJECTION, SOLUTION INTRAMUSCULAR; INTRAVENOUS; SUBCUTANEOUS ONCE
Status: COMPLETED | OUTPATIENT
Start: 2019-06-06 | End: 2019-06-06

## 2019-06-06 RX ORDER — ASPIRIN 81 MG/1
81 TABLET ORAL DAILY
Status: DISCONTINUED | OUTPATIENT
Start: 2019-06-07 | End: 2019-06-11

## 2019-06-06 RX ORDER — ACETAMINOPHEN 325 MG/1
650 TABLET ORAL
Status: DISCONTINUED | OUTPATIENT
Start: 2019-06-06 | End: 2019-06-10

## 2019-06-06 RX ORDER — SENNOSIDES 8.6 MG/1
2 TABLET ORAL AS NEEDED
COMMUNITY
End: 2019-07-19 | Stop reason: SDUPTHER

## 2019-06-06 RX ORDER — DILTIAZEM HYDROCHLORIDE 5 MG/ML
20 INJECTION INTRAVENOUS
Status: COMPLETED | OUTPATIENT
Start: 2019-06-06 | End: 2019-06-06

## 2019-06-06 RX ORDER — DIPHENHYDRAMINE HCL 25 MG
50 CAPSULE ORAL EVERY 6 HOURS
Status: DISCONTINUED | OUTPATIENT
Start: 2019-06-07 | End: 2019-06-08

## 2019-06-06 RX ORDER — NITROGLYCERIN 0.4 MG/1
0.4 TABLET SUBLINGUAL
Status: DISCONTINUED | OUTPATIENT
Start: 2019-06-06 | End: 2019-06-11

## 2019-06-06 RX ORDER — HYDROCODONE BITARTRATE AND ACETAMINOPHEN 10; 325 MG/1; MG/1
1 TABLET ORAL
Status: DISCONTINUED | OUTPATIENT
Start: 2019-06-06 | End: 2019-06-10 | Stop reason: ALTCHOICE

## 2019-06-06 RX ORDER — NITROGLYCERIN 0.4 MG/1
0.4 TABLET SUBLINGUAL
Status: DISCONTINUED | OUTPATIENT
Start: 2019-06-06 | End: 2019-06-06 | Stop reason: SDUPTHER

## 2019-06-06 RX ORDER — PANTOPRAZOLE SODIUM 40 MG/1
40 TABLET, DELAYED RELEASE ORAL
Status: DISCONTINUED | OUTPATIENT
Start: 2019-06-07 | End: 2019-06-11

## 2019-06-06 RX ORDER — ASPIRIN 325 MG
325 TABLET ORAL
Status: COMPLETED | OUTPATIENT
Start: 2019-06-06 | End: 2019-06-06

## 2019-06-06 RX ORDER — ONDANSETRON 2 MG/ML
4 INJECTION INTRAMUSCULAR; INTRAVENOUS
Status: DISCONTINUED | OUTPATIENT
Start: 2019-06-06 | End: 2019-06-10

## 2019-06-06 RX ORDER — SODIUM CHLORIDE 0.9 % (FLUSH) 0.9 %
5-40 SYRINGE (ML) INJECTION AS NEEDED
Status: DISCONTINUED | OUTPATIENT
Start: 2019-06-06 | End: 2019-06-11

## 2019-06-06 RX ORDER — FENOFIBRATE 160 MG/1
160 TABLET ORAL DAILY
Status: DISCONTINUED | OUTPATIENT
Start: 2019-06-07 | End: 2019-06-17 | Stop reason: HOSPADM

## 2019-06-06 RX ORDER — CARBAMAZEPINE 200 MG/1
200 TABLET ORAL 2 TIMES DAILY
Status: DISCONTINUED | OUTPATIENT
Start: 2019-06-07 | End: 2019-06-17 | Stop reason: HOSPADM

## 2019-06-06 RX ORDER — SODIUM CHLORIDE 9 MG/ML
75 INJECTION, SOLUTION INTRAVENOUS CONTINUOUS
Status: DISCONTINUED | OUTPATIENT
Start: 2019-06-07 | End: 2019-06-08

## 2019-06-06 RX ORDER — MORPHINE SULFATE 2 MG/ML
2 INJECTION, SOLUTION INTRAMUSCULAR; INTRAVENOUS
Status: DISCONTINUED | OUTPATIENT
Start: 2019-06-06 | End: 2019-06-09

## 2019-06-06 RX ORDER — FAMOTIDINE 20 MG/1
20 TABLET, FILM COATED ORAL 2 TIMES DAILY
Status: DISCONTINUED | OUTPATIENT
Start: 2019-06-07 | End: 2019-06-10

## 2019-06-06 RX ORDER — PREDNISONE 50 MG/1
50 TABLET ORAL EVERY 6 HOURS
Status: DISCONTINUED | OUTPATIENT
Start: 2019-06-07 | End: 2019-06-08

## 2019-06-06 RX ADMIN — NITROGLYCERIN 0.4 MG: 0.4 TABLET, ORALLY DISINTEGRATING SUBLINGUAL at 21:49

## 2019-06-06 RX ADMIN — FAMOTIDINE 20 MG: 20 TABLET ORAL at 23:57

## 2019-06-06 RX ADMIN — PREDNISONE 50 MG: 50 TABLET ORAL at 23:57

## 2019-06-06 RX ADMIN — DILTIAZEM HYDROCHLORIDE 2.5 MG/HR: 5 INJECTION, SOLUTION INTRAVENOUS at 22:55

## 2019-06-06 RX ADMIN — HEPARIN SODIUM 12 UNITS/KG/HR: 5000 INJECTION, SOLUTION INTRAVENOUS at 22:57

## 2019-06-06 RX ADMIN — NITROGLYCERIN 1 INCH: 20 OINTMENT TOPICAL at 23:57

## 2019-06-06 RX ADMIN — Medication 10 ML: at 23:57

## 2019-06-06 RX ADMIN — HYDROMORPHONE HYDROCHLORIDE 0.2 MG: 1 INJECTION, SOLUTION INTRAMUSCULAR; INTRAVENOUS; SUBCUTANEOUS at 20:44

## 2019-06-06 RX ADMIN — ASPIRIN 325 MG ORAL TABLET 325 MG: 325 PILL ORAL at 21:35

## 2019-06-06 RX ADMIN — DIPHENHYDRAMINE HYDROCHLORIDE 50 MG: 25 CAPSULE ORAL at 23:57

## 2019-06-06 RX ADMIN — HYDROCODONE BITARTRATE AND ACETAMINOPHEN 1 TABLET: 10; 325 TABLET ORAL at 23:57

## 2019-06-06 RX ADMIN — SODIUM CHLORIDE 75 ML/HR: 900 INJECTION, SOLUTION INTRAVENOUS at 23:45

## 2019-06-06 RX ADMIN — DILTIAZEM HYDROCHLORIDE 20 MG: 5 INJECTION INTRAVENOUS at 21:34

## 2019-06-06 NOTE — ED TRIAGE NOTES
C/o right sided facial pain, onset this AM approx 1100. Reports hx trigeminal neuralgia. Currently taking tegretol, gabapentin and norco however denies relief. Reports substernal chest pain non-radiating onset approx 3 hours ago r/t facial pain. States constant since onset. Noted to have contacted pain management today, states no call back. Noted MD to have ordered medrol however denies receiving notification. Heart rate in 130s.

## 2019-06-07 ENCOUNTER — APPOINTMENT (OUTPATIENT)
Dept: ULTRASOUND IMAGING | Age: 74
DRG: 229 | End: 2019-06-07
Attending: PHYSICIAN ASSISTANT
Payer: MEDICARE

## 2019-06-07 PROBLEM — I21.4 NSTEMI (NON-ST ELEVATED MYOCARDIAL INFARCTION) (HCC): Status: ACTIVE | Noted: 2019-06-06

## 2019-06-07 LAB
ANION GAP SERPL CALC-SCNC: 8 MMOL/L (ref 7–16)
APTT PPP: 100.8 SEC (ref 24.7–39.8)
APTT PPP: 50 SEC (ref 24.7–39.8)
APTT PPP: 66.6 SEC (ref 24.7–39.8)
ATRIAL RATE: 127 BPM
ATRIAL RATE: 73 BPM
BUN SERPL-MCNC: 10 MG/DL (ref 8–23)
CALCIUM SERPL-MCNC: 8.2 MG/DL (ref 8.3–10.4)
CALCULATED P AXIS, ECG09: 69 DEGREES
CALCULATED R AXIS, ECG10: -45 DEGREES
CALCULATED R AXIS, ECG10: -47 DEGREES
CALCULATED T AXIS, ECG11: 103 DEGREES
CALCULATED T AXIS, ECG11: 122 DEGREES
CHLORIDE SERPL-SCNC: 101 MMOL/L (ref 98–107)
CHOLEST SERPL-MCNC: 178 MG/DL
CO2 SERPL-SCNC: 26 MMOL/L (ref 21–32)
CREAT SERPL-MCNC: 0.81 MG/DL (ref 0.8–1.5)
DIAGNOSIS, 93000: NORMAL
DIAGNOSIS, 93000: NORMAL
ERYTHROCYTE [DISTWIDTH] IN BLOOD BY AUTOMATED COUNT: 13.4 % (ref 11.9–14.6)
GLUCOSE SERPL-MCNC: 132 MG/DL (ref 65–100)
HCT VFR BLD AUTO: 37.4 % (ref 41.1–50.3)
HDLC SERPL-MCNC: 37 MG/DL (ref 40–60)
HDLC SERPL: 4.8 {RATIO}
HGB BLD-MCNC: 13.3 G/DL (ref 13.6–17.2)
LDLC SERPL CALC-MCNC: 126.2 MG/DL
LIPID PROFILE,FLP: ABNORMAL
MCH RBC QN AUTO: 32.8 PG (ref 26.1–32.9)
MCHC RBC AUTO-ENTMCNC: 35.6 G/DL (ref 31.4–35)
MCV RBC AUTO: 92.1 FL (ref 79.6–97.8)
NRBC # BLD: 0 K/UL (ref 0–0.2)
P-R INTERVAL, ECG05: 186 MS
PLATELET # BLD AUTO: 174 K/UL (ref 150–450)
PMV BLD AUTO: 8.7 FL (ref 9.4–12.3)
POTASSIUM SERPL-SCNC: 3.6 MMOL/L (ref 3.5–5.1)
Q-T INTERVAL, ECG07: 342 MS
Q-T INTERVAL, ECG07: 440 MS
QRS DURATION, ECG06: 114 MS
QRS DURATION, ECG06: 120 MS
QTC CALCULATION (BEZET), ECG08: 484 MS
QTC CALCULATION (BEZET), ECG08: 495 MS
RBC # BLD AUTO: 4.06 M/UL (ref 4.23–5.6)
SODIUM SERPL-SCNC: 135 MMOL/L (ref 136–145)
TRIGL SERPL-MCNC: 74 MG/DL (ref 35–150)
TROPONIN I SERPL-MCNC: 21.3 NG/ML (ref 0.02–0.05)
VENTRICULAR RATE, ECG03: 126 BPM
VENTRICULAR RATE, ECG03: 73 BPM
VLDLC SERPL CALC-MCNC: 14.8 MG/DL (ref 6–23)
WBC # BLD AUTO: 7.9 K/UL (ref 4.3–11.1)

## 2019-06-07 PROCEDURE — 85730 THROMBOPLASTIN TIME PARTIAL: CPT

## 2019-06-07 PROCEDURE — 81003 URINALYSIS AUTO W/O SCOPE: CPT

## 2019-06-07 PROCEDURE — 74011000258 HC RX REV CODE- 258: Performed by: INTERNAL MEDICINE

## 2019-06-07 PROCEDURE — C8929 TTE W OR WO FOL WCON,DOPPLER: HCPCS

## 2019-06-07 PROCEDURE — 84484 ASSAY OF TROPONIN QUANT: CPT

## 2019-06-07 PROCEDURE — 74011636637 HC RX REV CODE- 636/637: Performed by: NURSE PRACTITIONER

## 2019-06-07 PROCEDURE — 74011000250 HC RX REV CODE- 250: Performed by: INTERNAL MEDICINE

## 2019-06-07 PROCEDURE — 65660000000 HC RM CCU STEPDOWN

## 2019-06-07 PROCEDURE — C1887 CATHETER, GUIDING: HCPCS

## 2019-06-07 PROCEDURE — C1769 GUIDE WIRE: HCPCS

## 2019-06-07 PROCEDURE — 74011636320 HC RX REV CODE- 636/320: Performed by: INTERNAL MEDICINE

## 2019-06-07 PROCEDURE — 80061 LIPID PANEL: CPT

## 2019-06-07 PROCEDURE — 77030019569 HC BND COMPR RAD TERU -B

## 2019-06-07 PROCEDURE — 99152 MOD SED SAME PHYS/QHP 5/>YRS: CPT

## 2019-06-07 PROCEDURE — 77030015766

## 2019-06-07 PROCEDURE — 93458 L HRT ARTERY/VENTRICLE ANGIO: CPT

## 2019-06-07 PROCEDURE — 93880 EXTRACRANIAL BILAT STUDY: CPT

## 2019-06-07 PROCEDURE — 74011250637 HC RX REV CODE- 250/637: Performed by: INTERNAL MEDICINE

## 2019-06-07 PROCEDURE — 74011250637 HC RX REV CODE- 250/637: Performed by: PHYSICIAN ASSISTANT

## 2019-06-07 PROCEDURE — 36415 COLL VENOUS BLD VENIPUNCTURE: CPT

## 2019-06-07 PROCEDURE — 74011250636 HC RX REV CODE- 250/636: Performed by: INTERNAL MEDICINE

## 2019-06-07 PROCEDURE — 74011250637 HC RX REV CODE- 250/637: Performed by: NURSE PRACTITIONER

## 2019-06-07 PROCEDURE — 80048 BASIC METABOLIC PNL TOTAL CA: CPT

## 2019-06-07 PROCEDURE — C1725 CATH, TRANSLUMIN NON-LASER: HCPCS

## 2019-06-07 PROCEDURE — C1894 INTRO/SHEATH, NON-LASER: HCPCS

## 2019-06-07 PROCEDURE — 93005 ELECTROCARDIOGRAM TRACING: CPT | Performed by: INTERNAL MEDICINE

## 2019-06-07 PROCEDURE — 85027 COMPLETE CBC AUTOMATED: CPT

## 2019-06-07 PROCEDURE — 77010033678 HC OXYGEN DAILY

## 2019-06-07 PROCEDURE — 4A023N7 MEASUREMENT OF CARDIAC SAMPLING AND PRESSURE, LEFT HEART, PERCUTANEOUS APPROACH: ICD-10-PCS | Performed by: INTERNAL MEDICINE

## 2019-06-07 PROCEDURE — 74011250636 HC RX REV CODE- 250/636

## 2019-06-07 PROCEDURE — 77030004558 HC CATH ANGI DX SUPR TORQ CARD -A

## 2019-06-07 PROCEDURE — 77030020263 HC SOL INJ SOD CL0.9% LFCR 1000ML

## 2019-06-07 PROCEDURE — 87641 MR-STAPH DNA AMP PROBE: CPT

## 2019-06-07 PROCEDURE — 94760 N-INVAS EAR/PLS OXIMETRY 1: CPT

## 2019-06-07 PROCEDURE — 74011250636 HC RX REV CODE- 250/636: Performed by: NURSE PRACTITIONER

## 2019-06-07 PROCEDURE — 99153 MOD SED SAME PHYS/QHP EA: CPT

## 2019-06-07 PROCEDURE — B2111ZZ FLUOROSCOPY OF MULTIPLE CORONARY ARTERIES USING LOW OSMOLAR CONTRAST: ICD-10-PCS | Performed by: INTERNAL MEDICINE

## 2019-06-07 PROCEDURE — B2151ZZ FLUOROSCOPY OF LEFT HEART USING LOW OSMOLAR CONTRAST: ICD-10-PCS | Performed by: INTERNAL MEDICINE

## 2019-06-07 RX ORDER — SODIUM CHLORIDE 0.9 % (FLUSH) 0.9 %
5-40 SYRINGE (ML) INJECTION EVERY 8 HOURS
Status: DISCONTINUED | OUTPATIENT
Start: 2019-06-07 | End: 2019-06-11

## 2019-06-07 RX ORDER — SODIUM CHLORIDE 9 MG/ML
250 INJECTION, SOLUTION INTRAVENOUS CONTINUOUS
Status: DISPENSED | OUTPATIENT
Start: 2019-06-07 | End: 2019-06-07

## 2019-06-07 RX ORDER — SODIUM CHLORIDE 0.9 % (FLUSH) 0.9 %
5-40 SYRINGE (ML) INJECTION AS NEEDED
Status: DISCONTINUED | OUTPATIENT
Start: 2019-06-07 | End: 2019-06-07

## 2019-06-07 RX ORDER — FENTANYL CITRATE 50 UG/ML
25-100 INJECTION, SOLUTION INTRAMUSCULAR; INTRAVENOUS
Status: DISCONTINUED | OUTPATIENT
Start: 2019-06-07 | End: 2019-06-10 | Stop reason: HOSPADM

## 2019-06-07 RX ORDER — ROSUVASTATIN CALCIUM 20 MG/1
40 TABLET, COATED ORAL
Status: DISCONTINUED | OUTPATIENT
Start: 2019-06-07 | End: 2019-06-17 | Stop reason: HOSPADM

## 2019-06-07 RX ORDER — DILTIAZEM HYDROCHLORIDE 5 MG/ML
20 INJECTION INTRAVENOUS ONCE
Status: COMPLETED | OUTPATIENT
Start: 2019-06-07 | End: 2019-06-07

## 2019-06-07 RX ORDER — HEPARIN SODIUM 200 [USP'U]/100ML
2 INJECTION, SOLUTION INTRAVENOUS CONTINUOUS
Status: ACTIVE | OUTPATIENT
Start: 2019-06-07 | End: 2019-06-07

## 2019-06-07 RX ORDER — LOSARTAN POTASSIUM 50 MG/1
100 TABLET ORAL DAILY
Status: DISCONTINUED | OUTPATIENT
Start: 2019-06-08 | End: 2019-06-09 | Stop reason: SDUPTHER

## 2019-06-07 RX ORDER — HEPARIN SODIUM 5000 [USP'U]/ML
35 INJECTION, SOLUTION INTRAVENOUS; SUBCUTANEOUS ONCE
Status: COMPLETED | OUTPATIENT
Start: 2019-06-07 | End: 2019-06-07

## 2019-06-07 RX ORDER — HEPARIN SODIUM 5000 [USP'U]/100ML
12-25 INJECTION, SOLUTION INTRAVENOUS
Status: DISCONTINUED | OUTPATIENT
Start: 2019-06-07 | End: 2019-06-11

## 2019-06-07 RX ORDER — DOCUSATE SODIUM 100 MG/1
100 CAPSULE, LIQUID FILLED ORAL
Status: DISCONTINUED | OUTPATIENT
Start: 2019-06-07 | End: 2019-06-11

## 2019-06-07 RX ORDER — LIDOCAINE HYDROCHLORIDE 10 MG/ML
2-20 INJECTION INFILTRATION; PERINEURAL
Status: DISCONTINUED | OUTPATIENT
Start: 2019-06-07 | End: 2019-06-10 | Stop reason: HOSPADM

## 2019-06-07 RX ORDER — MIDAZOLAM HYDROCHLORIDE 1 MG/ML
.5-2 INJECTION, SOLUTION INTRAMUSCULAR; INTRAVENOUS
Status: DISCONTINUED | OUTPATIENT
Start: 2019-06-07 | End: 2019-06-10 | Stop reason: HOSPADM

## 2019-06-07 RX ADMIN — DIPHENHYDRAMINE HYDROCHLORIDE 50 MG: 25 CAPSULE ORAL at 06:13

## 2019-06-07 RX ADMIN — PANTOPRAZOLE SODIUM 40 MG: 40 TABLET, DELAYED RELEASE ORAL at 04:51

## 2019-06-07 RX ADMIN — HEPARIN SODIUM 2 ML/HR: 5000 INJECTION, SOLUTION INTRAVENOUS; SUBCUTANEOUS at 10:43

## 2019-06-07 RX ADMIN — HYDROCODONE BITARTRATE AND ACETAMINOPHEN 1 TABLET: 10; 325 TABLET ORAL at 04:51

## 2019-06-07 RX ADMIN — PREDNISONE 50 MG: 50 TABLET ORAL at 06:13

## 2019-06-07 RX ADMIN — NITROGLYCERIN 1 INCH: 20 OINTMENT TOPICAL at 05:14

## 2019-06-07 RX ADMIN — FAMOTIDINE 20 MG: 20 TABLET ORAL at 07:12

## 2019-06-07 RX ADMIN — SODIUM CHLORIDE 75 ML/HR: 900 INJECTION, SOLUTION INTRAVENOUS at 21:22

## 2019-06-07 RX ADMIN — FENTANYL CITRATE 50 MCG: 50 INJECTION, SOLUTION INTRAMUSCULAR; INTRAVENOUS at 10:55

## 2019-06-07 RX ADMIN — HYDROCHLOROTHIAZIDE: 25 TABLET ORAL at 07:10

## 2019-06-07 RX ADMIN — HEPARIN SODIUM 3050 UNITS: 5000 INJECTION INTRAVENOUS; SUBCUTANEOUS at 20:32

## 2019-06-07 RX ADMIN — HEPARIN SODIUM 16 UNITS/KG/HR: 5000 INJECTION, SOLUTION INTRAVENOUS at 21:23

## 2019-06-07 RX ADMIN — MIDAZOLAM HYDROCHLORIDE 2 MG: 1 INJECTION, SOLUTION INTRAMUSCULAR; INTRAVENOUS at 10:55

## 2019-06-07 RX ADMIN — DIPHENHYDRAMINE HYDROCHLORIDE 50 MG: 25 CAPSULE ORAL at 14:09

## 2019-06-07 RX ADMIN — METOPROLOL SUCCINATE 100 MG: 50 TABLET, EXTENDED RELEASE ORAL at 07:12

## 2019-06-07 RX ADMIN — DILTIAZEM HYDROCHLORIDE 20 MG: 5 INJECTION INTRAVENOUS at 07:40

## 2019-06-07 RX ADMIN — SODIUM CHLORIDE 75 ML/HR: 900 INJECTION, SOLUTION INTRAVENOUS at 14:53

## 2019-06-07 RX ADMIN — HEPARIN SODIUM 3050 UNITS: 5000 INJECTION INTRAVENOUS; SUBCUTANEOUS at 04:43

## 2019-06-07 RX ADMIN — NITROGLYCERIN 0.4 MG: 0.4 TABLET, ORALLY DISINTEGRATING SUBLINGUAL at 18:41

## 2019-06-07 RX ADMIN — Medication 10 ML: at 21:20

## 2019-06-07 RX ADMIN — ASPIRIN 81 MG: 81 TABLET, COATED ORAL at 07:12

## 2019-06-07 RX ADMIN — MORPHINE SULFATE 2 MG: 2 INJECTION, SOLUTION INTRAMUSCULAR; INTRAVENOUS at 05:12

## 2019-06-07 RX ADMIN — PERFLUTREN 1 ML: 6.52 INJECTION, SUSPENSION INTRAVENOUS at 15:32

## 2019-06-07 RX ADMIN — VERAPAMIL HYDROCHLORIDE 2 ML: 2.5 INJECTION, SOLUTION INTRAVENOUS at 11:00

## 2019-06-07 RX ADMIN — SODIUM CHLORIDE 250 ML/HR: 900 INJECTION, SOLUTION INTRAVENOUS at 13:13

## 2019-06-07 RX ADMIN — GABAPENTIN 400 MG: 100 CAPSULE ORAL at 18:30

## 2019-06-07 RX ADMIN — DEXTROSE 150 MG: 50 INJECTION, SOLUTION INTRAVENOUS at 13:19

## 2019-06-07 RX ADMIN — PREDNISONE 50 MG: 50 TABLET ORAL at 13:23

## 2019-06-07 RX ADMIN — FAMOTIDINE 20 MG: 20 TABLET ORAL at 18:30

## 2019-06-07 RX ADMIN — NITROGLYCERIN 1 INCH: 20 OINTMENT TOPICAL at 18:30

## 2019-06-07 RX ADMIN — FENOFIBRATE 160 MG: 160 TABLET ORAL at 07:12

## 2019-06-07 RX ADMIN — CARBAMAZEPINE 200 MG: 200 TABLET ORAL at 18:30

## 2019-06-07 RX ADMIN — LIDOCAINE HYDROCHLORIDE 4 ML: 10 INJECTION, SOLUTION INFILTRATION; PERINEURAL at 10:57

## 2019-06-07 RX ADMIN — GABAPENTIN 400 MG: 100 CAPSULE ORAL at 07:11

## 2019-06-07 RX ADMIN — DOCUSATE SODIUM 100 MG: 100 CAPSULE, LIQUID FILLED ORAL at 19:19

## 2019-06-07 RX ADMIN — NITROGLYCERIN 1 INCH: 20 OINTMENT TOPICAL at 13:23

## 2019-06-07 RX ADMIN — Medication 10 ML: at 05:02

## 2019-06-07 RX ADMIN — PREDNISONE 50 MG: 50 TABLET ORAL at 18:29

## 2019-06-07 RX ADMIN — ROSUVASTATIN CALCIUM 40 MG: 20 TABLET, FILM COATED ORAL at 19:19

## 2019-06-07 RX ADMIN — Medication 10 ML: at 13:24

## 2019-06-07 RX ADMIN — AMIODARONE HYDROCHLORIDE 1 MG/MIN: 900 INJECTION, SOLUTION INTRAVENOUS at 13:24

## 2019-06-07 RX ADMIN — NITROGLYCERIN 1 INCH: 20 OINTMENT TOPICAL at 23:11

## 2019-06-07 RX ADMIN — MORPHINE SULFATE 2 MG: 2 INJECTION, SOLUTION INTRAMUSCULAR; INTRAVENOUS at 14:49

## 2019-06-07 RX ADMIN — HYDROCODONE BITARTRATE AND ACETAMINOPHEN 1 TABLET: 10; 325 TABLET ORAL at 23:32

## 2019-06-07 RX ADMIN — IOPAMIDOL 120 ML: 755 INJECTION, SOLUTION INTRAVENOUS at 11:27

## 2019-06-07 RX ADMIN — PREDNISONE 50 MG: 50 TABLET ORAL at 23:11

## 2019-06-07 RX ADMIN — CARBAMAZEPINE 200 MG: 200 TABLET ORAL at 07:12

## 2019-06-07 RX ADMIN — BIVALIRUDIN 1.75 MG/KG/HR: 250 INJECTION, POWDER, LYOPHILIZED, FOR SOLUTION INTRAVENOUS at 11:07

## 2019-06-07 RX ADMIN — DIPHENHYDRAMINE HYDROCHLORIDE 50 MG: 25 CAPSULE ORAL at 23:11

## 2019-06-07 RX ADMIN — AMIODARONE HYDROCHLORIDE 1 MG/MIN: 900 INJECTION, SOLUTION INTRAVENOUS at 21:20

## 2019-06-07 NOTE — PROCEDURES
Brief Cardiac Procedure Note    Patient: Natalie Phillip MRN: [id number]  SSN: [id number]    YOB: 1945  Age: 66 y.o. Sex: male      Date of Procedure: 6/7/2019     Pre-procedure Diagnosis: NSTEMI    Post-procedure Diagnosis: Coronary Artery Disease    Procedure: Left Heart Catheterization with Percutaneous Coronary Intervention    Brief Description of Procedure: See note    Performed By: Guido Sagastume MD     Assistants: None    Anesthesia: Moderate Sedation    Estimated Blood Loss: Less than 10 mL      Specimens: None    Implants: None    Findings:   LV:  EF 40%  LM:  NML  LAD:  95% prox and 90% mid  LCx:  100% prox and fills by collateral  RCA:  50% mid, 90% PDA, 80% MARY ANNE    PCI pLCx 100-100%   Unable to cross    Complications: None    Recommendations: Continue medical therapy.   CABG/MAZE/MICHEAL consult    Signed By: Guido Sagastume MD     June 7, 2019

## 2019-06-07 NOTE — PROGRESS NOTES
Chart screened by  for discharge planning. No needs identified at this time. Please consult  if any new issues arise. Pt admits awaiting washout for tentative CABG. Discharge plans pending surgery. Care Management Interventions  PCP Verified by CM: Yes(Dr. Lan Treadwell (last visit January 2019))  Mode of Transport at Discharge:  Other (see comment)(TBD based on need)  Transition of Care Consult (CM Consult): Discharge Planning  Physical Therapy Consult: No  Occupational Therapy Consult: No  Speech Therapy Consult: No  Current Support Network: Lives with Spouse, Own Home  Confirm Follow Up Transport: Family  Freedom of Choice Offered: Yes  Discharge Location  Discharge Placement: Unable to determine at this time

## 2019-06-07 NOTE — PROGRESS NOTES
TRANSFER - IN REPORT:    Verbal report received from Katya Edwards RN(name) on [employer]  being received from cath lab(unit) for routine progression of care      Report consisted of patients Situation, Background, Assessment and   Recommendations(SBAR). Information from the following report(s) SBAR, Kardex, Procedure Summary, MAR and Cardiac Rhythm NSR was reviewed with the receiving nurse. Opportunity for questions and clarification was provided. Assessment completed upon patients arrival to unit and care assumed. Dual skin assessment completed upon pt's return from cath lab. R radial puncture site with TR band intact, no bleeding/hematoma noted, radial pulse palpable 2+. Sacrum with no redness or breakdown noted. No other abnormalities.

## 2019-06-07 NOTE — ED PROVIDER NOTES
Patient complains of right facial pain and chest pain since noon today. He has chronic trigeminal neuralgia but has been worse today. Chest pain is a pressure / tightness associated with SOB. He had some chest pain yesterday that was relieved with nitroglycerin x 1. He has also had some hemoptysis for the past 3 mornings - just some blood streaked sputum that he coughed up while clearing his throat. He skipped his aspirin the last three days because of the blood. He has been taking his Eliquis. He has three Coronary stents - 2006. He has Atrial fibrillation. Heart rate has been fast today which he attributed to his pain. No nausea or vomiting.             Past Medical History:   Diagnosis Date    Atrial fibrillation ([address]     Atrial flutter ([facility] [address] 4/7/2017    CAD (coronary artery disease)        Past Surgical History:   Procedure Laterality Date    HX CORONARY STENT PLACEMENT      HX HEART CATHETERIZATION  08/14/2006         Family History:   Problem Relation Age of Onset    Other Other         cerebral aneurysm        Social History     Socioeconomic History    Marital status:      Spouse name: Not on file    Number of children: Not on file    Years of education: Not on file    Highest education level: Not on file   Occupational History    Not on file   Social Needs    Financial resource strain: Not on file    Food insecurity:     Worry: Not on file     Inability: Not on file    Transportation needs:     Medical: Not on file     Non-medical: Not on file   Tobacco Use    Smoking status: Former Smoker    Smokeless tobacco: Never Used   Substance and Sexual Activity    Alcohol use: No    Drug use: Not on file    Sexual activity: Not on file   Lifestyle    Physical activity:     Days per week: Not on file     Minutes per session: Not on file    Stress: Not on file   Relationships    Social connections:     Talks on phone: Not on file     Gets together: Not on file     Attends Taoist service: Not on file     Active member of club or organization: Not on file     Attends meetings of clubs or organizations: Not on file     Relationship status: Not on file    Intimate partner violence:     Fear of current or ex partner: Not on file     Emotionally abused: Not on file     Physically abused: Not on file     Forced sexual activity: Not on file   Other Topics Concern    Not on file   Social History Narrative    Not on file         ALLERGIES: Codeine and Iodinated contrast- oral and iv dye    Review of Systems   Constitutional: Negative for appetite change and fever. HENT:        Facial pain, hemoptysis   Eyes: Negative. Respiratory: Positive for shortness of breath. Negative for cough. Cardiovascular: Positive for chest pain and palpitations. Negative for leg swelling. Gastrointestinal: Negative. Genitourinary: Negative. Musculoskeletal: Negative. Neurological: Negative. Hematological:        Eliquis and aspirin       Vitals:    06/06/19 1948   BP: 162/85   Pulse: (!) 131   Resp: 20   Temp: 97.8 °F (36.6 °C)   SpO2: 97%   Weight: 88.5 kg (195 lb)   Height: 5' 8\" (1.727 m)            Physical Exam   Constitutional: He is oriented to person, place, and time. He appears well-developed and well-nourished. HENT:   Head: Normocephalic and atraumatic. Right Ear: External ear normal.   Left Ear: External ear normal.   Mouth/Throat: Oropharynx is clear and moist.   Eyes: Pupils are equal, round, and reactive to light. Conjunctivae and EOM are normal. No scleral icterus. Neck: Normal range of motion. Neck supple. No JVD present. Cardiovascular: Normal heart sounds and intact distal pulses. Irregularly irregular rate 130   Pulmonary/Chest: Effort normal and breath sounds normal.   Abdominal: Soft. Bowel sounds are normal. He exhibits no mass. There is no tenderness. Musculoskeletal: Normal range of motion. He exhibits no edema or tenderness.    Neurological: He is alert and oriented to person, place, and time. Skin: Skin is warm and dry. Psychiatric: He has a normal mood and affect. His behavior is normal.   Nursing note and vitals reviewed. MDM  Number of Diagnoses or Management Options  Diagnosis management comments: Chest pain and facial pain  Dialudid 0.2 mg IV - improved  EKG atrial fib with rate 130. IVCD LBBB pattern.    Labs reviewed  Troponin 0.14  Aspirin 325 mg po, nitroglycerin 0.4 SL, Cardizem 20 mg IV  Rate slowed to 75  Cardizem drip  Consult Cardiology - Dr. Andrew Rausch - will see         Amount and/or Complexity of Data Reviewed  Clinical lab tests: ordered and reviewed  Tests in the radiology section of CPT®: ordered  Decide to obtain previous medical records or to obtain history from someone other than the patient: yes  Review and summarize past medical records: yes  Discuss the patient with other providers: yes  Independent visualization of images, tracings, or specimens: yes    Critical Care  Total time providing critical care: 30-74 minutes (Critical care time 35 minutes)         Procedures

## 2019-06-07 NOTE — CONSULTS
Renny Saul. MD Piper Gross. MD Yash Haddad         6/6/2019        1945    REFERRING PHYSICIAN:  Dr. Audrey Davis:  The patient is a 66 y.o. male who presented to the ER with severe right sided jaw pain and chest pain. He was found to be in atrial fibrillation with RVR. His troponin was elevated. He has known history of a-fib and CAD with prior MI and PCI. He had a prior CVA when he was off of Eliquis. His troponin dora to 21.30. He converted to sinus rhythm spontaneously prior to cardioversion. He underwent cardiac catheterization today that showed severe multivessel disease. The proximal LCx was occluded and could not be crossed. He denies any prior episodes of chest pain prior to episode that brought him to the ER.      Risk factors include prior CVA, HTN, dyslipidemia    ** No prior cardiac surgery, prior vascular surgery, anesthetic complication, lung disease, DVT or PE, GI bleeding       Past Medical History:   Diagnosis Date    [facility] [address] [address]     Atrial flutter ([facility] [address] 4/7/2017    CAD (coronary artery disease)        Past Surgical History:   Procedure Laterality Date    HX CORONARY STENT PLACEMENT      HX HEART CATHETERIZATION  08/14/2006       Family History   Problem Relation Age of Onset    Other Other         cerebral aneurysm        Social History     Socioeconomic History    Marital status:      Spouse name: Not on file    Number of children: Not on file    Years of education: Not on file    Highest education level: Not on file   Occupational History    Not on file   Social Needs    Financial resource strain: Not on file    Food insecurity:     Worry: Not on file     Inability: Not on file    Transportation needs:     Medical: Not on file     Non-medical: Not on file   Tobacco Use    Smoking status: Former Smoker    Smokeless tobacco: Never Used   Substance and Sexual Activity    Alcohol use: No    Drug use: Not on file    Sexual activity: Not on file   Lifestyle    Physical activity:     Days per week: Not on file     Minutes per session: Not on file    Stress: Not on file   Relationships    Social connections:     Talks on phone: Not on file     Gets together: Not on file     Attends Confucianism service: Not on file     Active member of club or organization: Not on file     Attends meetings of clubs or organizations: Not on file     Relationship status: Not on file    Intimate partner violence:     Fear of current or ex partner: Not on file     Emotionally abused: Not on file     Physically abused: Not on file     Forced sexual activity: Not on file   Other Topics Concern    Not on file   Social History Narrative    Not on file       Allergies   Allergen Reactions    Codeine Unknown (comments)    Iodinated Contrast- Oral And Iv Dye Unknown (comments)       No current facility-administered medications on file prior to encounter. Current Outpatient Medications on File Prior to Encounter   Medication Sig Dispense Refill    senna (SENNA-GEN) 8.6 mg tablet Take 2 Tabs by mouth as needed for Constipation.  nicotinic acid (NIACIN) 500 mg tablet Take 500 mg by mouth Daily (before breakfast).  nitroglycerin (NITROSTAT) 0.4 mg SL tablet TAKE AS DIRECTED. 25 Tab 11    metoprolol succinate (TOPROL-XL) 100 mg tablet TAKE 1 TABLET BY MOUTH DAILY 30 Tab 5    losartan-hydroCHLOROthiazide (HYZAAR) 100-25 mg per tablet TAKE 1 TABLET BY MOUTH DAILY 30 Tab 6    apixaban (ELIQUIS) 5 mg tablet Take 1 Tab by mouth two (2) times a day. 60 Tab 11    fenofibrate (LOFIBRA) 160 mg tablet Take 1 Tab by mouth daily. 30 Tab 5    TURMERIC ROOT EXTRACT PO Take  by mouth.  APPLE CIDER VINEGAR PO Take  by mouth.  aspirin delayed-release 81 mg tablet Take 1 Tab by mouth daily. 1 Tab 0    co-enzyme Q-10 (CO Q-10) 100 mg capsule Take 100 mg by mouth daily.       DOCOSAHEXANOIC ACID/EPA (FISH OIL PO) Take  by mouth.      multivitamin (ONE A DAY) tablet Take 1 Tab by mouth daily.  CYANOCOBALAMIN, VITAMIN B-12, (VITAMIN B12 PO) Take  by mouth.  CA/D3/MAG OX/ZINC//ARLETTE/BOR (CALCIUM 600-D3 PLUS PO) Take  by mouth.  B INFANTIS/B ANI/B CHESTER/B BIFID (PROBIOTIC 4X PO) Take  by mouth daily.  omeprazole (PRILOSEC OTC) 20 mg tablet Take 20 mg by mouth daily. Indications: patient unsure of particular medication      HYDROcodone-acetaminophen (NORCO)  mg tablet Take 1 Tab by mouth two (2) times daily as needed for Pain.  gabapentin (NEURONTIN) 300 mg capsule Take 400 mg by mouth two (2) times a day.  carBAMazepine (TEGRETOL) 200 mg tablet Take 200 mg by mouth two (2) times a day. REVIEW OF SYSTEMS:  Review of Systems   Constitution: Negative for chills, fever, malaise/fatigue, weight gain and weight loss. HENT: Negative for ear pain, hearing loss, nosebleeds, sore throat and tinnitus. Eyes: Negative for blurred vision, vision loss in left eye and vision loss in right eye. Cardiovascular: Positive for chest pain. Negative for dyspnea on exertion, leg swelling, near-syncope, orthopnea, palpitations, paroxysmal nocturnal dyspnea and syncope. Respiratory: Negative for cough, hemoptysis, shortness of breath, sputum production and wheezing. Endocrine: Negative for cold intolerance, heat intolerance and polydipsia. Hematologic/Lymphatic: Does not bruise/bleed easily. Skin: Negative for color change and rash. Musculoskeletal: Negative for back pain, joint pain, joint swelling and myalgias. Pos for jaw pain   Gastrointestinal: Negative for abdominal pain, constipation, diarrhea, dysphagia, heartburn, hematemesis, melena, nausea and vomiting. Genitourinary: Negative for dysuria, frequency, hematuria and urgency. Neurological: Negative for difficulty with concentration, dizziness, headaches, light-headedness, numbness, paresthesias, seizures, vertigo and weakness. Psychiatric/Behavioral: Negative for altered mental status and depression. Physical Exam  Vitals:    06/07/19 1138 06/07/19 1216 06/07/19 1231 06/07/19 1245   BP: 117/64 128/74 126/77 134/78   Pulse: 70 71 70 72   Resp: 16 10 10 12   Temp:       SpO2: 94% 94% 96% 94%   Weight:       Height:           Physical Exam:  General: Well Developed, Well Nourished, No Acute Distress  HEENT: Normocephalic, pupils equal and round, no scleral icterus  Neck: supple, no JVD  Chest wall: No deformity  Heart: S1S2 with RRR without murmurs or gallops  Lungs: Clear throughout auscultation bilaterally without adventitious sounds  Vascular: Pulses are full and equal. There is no venous stasis disease.   Abd: soft, nontender, nondistended, with good bowel sounds, no pulsatile masses  Ext: warm, no edema, calves supple/nontender, pulses 2+ bilaterally  Skin: warm and dry, no rashes, cyanosis, jaundice, ecchymoses or evidence of skin breakdown  Psychiatric: Normal mood and affect  Neurologic: Alert and oriented X 3, no focal deficit noted    Labs:   Recent Labs     06/07/19  0359 06/06/19 2051 06/06/19 1957   *  --  133*   K 3.6  --  3.2*   BUN 10  --  9   CREA 0.81  --  0.98   *  --  130*   WBC 7.9  --  9.3   HGB 13.3*  --  14.7   HCT 37.4*  --  40.5*     --  231   CHOL 178  --   --    HDL 37*  --   --    CHHD 4.8  --   --    LDLC 126.2*  --   --    VLDL 14.8  --   --    TNIPOC  --  0.14*  --    TROIQ 21.30*  --   --        Lab Results   Component Value Date/Time    Cholesterol, total 178 06/07/2019 03:59 AM    HDL Cholesterol 37 (L) 06/07/2019 03:59 AM    LDL, calculated 126.2 (H) 06/07/2019 03:59 AM    VLDL, calculated 14.8 06/07/2019 03:59 AM    Triglyceride 74 06/07/2019 03:59 AM    CHOL/HDL Ratio 4.8 06/07/2019 03:59 AM       Recent Labs     06/07/19  0359 06/06/19 2051   TNIPOC  --  0.14*   TROIQ 21.30*  --        Assessment:     Principal Problem:    Atrial fibrillation with RVR ([facility] [address] (6/6/2019) Active Problems:    Dyslipidemia (4/7/2017)    Hypertension (4/7/2017)    Coronary atherosclerosis of native coronary vessel (4/7/2017)    Atrial fibrillation ([facility] [address] (4/7/2017)    Chronic systolic congestive heart failure ([facility] [address] (1/31/2019)    NSTEMI (6/6/2019)      Plan:     Loraine Alvarado is to see preoperative teaching film that thoroughly discusses procedure, risks, and possible complications. Risks, benefits, and alternatives were discussed to include, but not limited to:     1. Bleeding  2. Arrhythmia   3. Infection including mediastinitis  4. Myocardial infarction  5. Need for reoperation  6. Renal failure  7. Respiratory failure  8. Stroke  9. Potential death    Pt was on Eliquis prior to admission, last dose 6/6. Dr. Alex Dennis will personally view the cardiac catheterization films and labs. Echocardiogram is pending. Pt wishes to proceed with CABG surgery after Eliquis washout.        Aparna Baez PA-C

## 2019-06-07 NOTE — PROGRESS NOTES
TRANSFER - OUT REPORT:    Verbal report given to [facility] - DEEDEE MCDONNELL RN(name) on [employer]  being transferred to CPRU(unit) for routine progression of care       Report consisted of patients Situation, Background, Assessment and   Recommendations(SBAR). Information from the following report(s) Procedure Summary, MAR and Cardiac Rhythm NSR was reviewed with the receiving nurse. Lines:   Peripheral IV 06/06/19 Left Antecubital (Active)   Site Assessment Clean, dry, & intact 6/7/2019  7:41 AM   Phlebitis Assessment 0 6/7/2019  7:41 AM   Infiltration Assessment 0 6/7/2019  7:41 AM   Dressing Status Clean, dry, & intact 6/7/2019  7:41 AM   Dressing Type Tape;Transparent 6/7/2019  7:41 AM   Hub Color/Line Status Green; Infusing;Patent 6/7/2019  7:41 AM       Peripheral IV 06/06/19 Anterior;Proximal;Right Forearm (Active)   Site Assessment Clean, dry, & intact 6/7/2019  7:41 AM   Phlebitis Assessment 0 6/7/2019  7:41 AM   Infiltration Assessment 0 6/7/2019  7:41 AM   Dressing Status Clean, dry, & intact 6/7/2019  7:41 AM   Dressing Type Tape;Transparent 6/7/2019  7:41 AM   Hub Color/Line Status Green; Infusing;Patent 6/7/2019  7:41 AM        Opportunity for questions and clarification was provided.       Patient transported with:   O2 @ 2 liters  Registered Nurse     [facility] Dr ORSALES  Diagnostic only  CTS cx  Right radial access - TR band @ 1129 w/ 14 ml air in vband - site C/D/I  Versed 2mg IV  Fent 50 mcg IV  Angiomax

## 2019-06-07 NOTE — PROGRESS NOTES
Report received from [address] [place] [address]. Procedural findings communicated. Intra procedural  medication administration reviewed. Progression of care discussed.      Patient received into [id number] [facility] 4 post sheath removal.     Access site without bleeding or swelling yes    Dressing dry and intact yes    Patient instructed to limit movement to right upper extremity    Routine post procedural vital signs and site assessment initiated yes

## 2019-06-07 NOTE — H&P
[address] Cardiology History & Physical      Date of  Admission: 6/6/2019  7:50 PM     Primary Care Physician: Dr. Nahid Kan  Primary Cardiologist: Dr. Savanna Medina  Admitting Physician: Dr. Savanna Medina     CC: chest pain, palpitations, right jaw pain    HPI:  Justine Duran is a 66 y.o. male with past medical history of AFIB on Eliquis, CAD and trigeminal neuralgia who presented to the ER with complaints of severe right-sided facial/jaw pain that started around 11am. Approximately 3 hours PTA, he reports that he developed substernal chest pain but is relating it to his severe jaw/facial pain. Upon arrival to the ER, patient was found to be in AFIB w RVR with rates in the 130s. He was treated with 20mg IV Cardizem, 325mg ASA and 0.2mg IV dilaudid. He is now chest pain free and describes very mild discomfort in his jaw. He was seen in the office earlier this year due to worsening dyspnea. LHC and MARCK/eCV was recommended at that time, but patient did not schedule.      Social history -- former smoker      Past Medical History:   Diagnosis Date    Atrial fibrillation ([address]     Atrial flutter ([address] 4/7/2017    CAD (coronary artery disease)       Past Surgical History:   Procedure Laterality Date    HX CORONARY STENT PLACEMENT      HX HEART CATHETERIZATION  08/14/2006       Allergies   Allergen Reactions    Codeine Unknown (comments)    Iodinated Contrast- Oral And Iv Dye Unknown (comments)      Social History     Socioeconomic History    Marital status:      Spouse name: Not on file    Number of children: Not on file    Years of education: Not on file    Highest education level: Not on file   Occupational History    Not on file   Social Needs    Financial resource strain: Not on file    Food insecurity:     Worry: Not on file     Inability: Not on file    Transportation needs:     Medical: Not on file     Non-medical: Not on file   Tobacco Use    Smoking status: Former Smoker    Smokeless tobacco: Never Used   Substance and Sexual Activity    Alcohol use: No    Drug use: Not on file    Sexual activity: Not on file   Lifestyle    Physical activity:     Days per week: Not on file     Minutes per session: Not on file    Stress: Not on file   Relationships    Social connections:     Talks on phone: Not on file     Gets together: Not on file     Attends Voodoo service: Not on file     Active member of club or organization: Not on file     Attends meetings of clubs or organizations: Not on file     Relationship status: Not on file    Intimate partner violence:     Fear of current or ex partner: Not on file     Emotionally abused: Not on file     Physically abused: Not on file     Forced sexual activity: Not on file   Other Topics Concern    Not on file   Social History Narrative    Not on file     Family History   Problem Relation Age of Onset    Other Other         cerebral aneurysm         Current Facility-Administered Medications   Medication Dose Route Frequency    nitroglycerin (NITROSTAT) tablet 0.4 mg  0.4 mg SubLINGual Q5MIN PRN    dilTIAZem (CARDIZEM) 125 mg in dextrose 5% 125 mL infusion  0-15 mg/hr IntraVENous TITRATE    heparin 25,000 units in dextrose 500 mL infusion  12-25 Units/kg/hr IntraVENous TITRATE     Current Outpatient Medications   Medication Sig    nicotinic acid (NIACIN) 500 mg tablet Take 500 mg by mouth Daily (before breakfast).  nitroglycerin (NITROSTAT) 0.4 mg SL tablet TAKE AS DIRECTED.  metoprolol succinate (TOPROL-XL) 100 mg tablet TAKE 1 TABLET BY MOUTH DAILY    losartan-hydroCHLOROthiazide (HYZAAR) 100-25 mg per tablet TAKE 1 TABLET BY MOUTH DAILY    apixaban (ELIQUIS) 5 mg tablet Take 1 Tab by mouth two (2) times a day.  fenofibrate (LOFIBRA) 160 mg tablet Take 1 Tab by mouth daily.  TURMERIC ROOT EXTRACT PO Take  by mouth.  APPLE CIDER VINEGAR PO Take  by mouth.  aspirin delayed-release 81 mg tablet Take 1 Tab by mouth daily.     co-enzyme Q-10 (CO Q-10) 100 mg capsule Take 100 mg by mouth daily.  DOCOSAHEXANOIC ACID/EPA (FISH OIL PO) Take  by mouth.  multivitamin (ONE A DAY) tablet Take 1 Tab by mouth daily.  CYANOCOBALAMIN, VITAMIN B-12, (VITAMIN B12 PO) Take  by mouth.  CA/D3/MAG OX/ZINC//ARLETTE/BOR (CALCIUM 600-D3 PLUS PO) Take  by mouth.  B INFANTIS/B ANI/B CHESTER/B BIFID (PROBIOTIC 4X PO) Take  by mouth daily.  omeprazole (PRILOSEC OTC) 20 mg tablet Take 20 mg by mouth daily. Indications: patient unsure of particular medication    HYDROcodone-acetaminophen (NORCO)  mg tablet Take 1 Tab by mouth two (2) times daily as needed for Pain.  gabapentin (NEURONTIN) 300 mg capsule Take 400 mg by mouth two (2) times a day.  carBAMazepine (TEGRETOL) 200 mg tablet Take 200 mg by mouth two (2) times a day. Review of Systems    Review of Systems   Constitutional: Negative for diaphoresis. Respiratory: Negative for shortness of breath. Cardiovascular: Positive for chest pain and palpitations. Gastrointestinal: Negative for nausea and vomiting. Musculoskeletal:        + right sided jaw pain   All other systems reviewed and are negative. Subjective:     Visit Vitals  /77   Pulse 73   Temp 97.8 °F (36.6 °C)   Resp 15   Ht 5' 8\" (1.727 m)   Wt 88.5 kg (195 lb)   SpO2 97%   BMI 29.65 kg/m²     Physical Exam   Constitutional: He is oriented to person, place, and time and well-developed, well-nourished, and in no distress. Cardiovascular: Normal rate. An irregularly irregular rhythm present. Pulmonary/Chest: Effort normal and breath sounds normal.   Abdominal: Soft. Bowel sounds are normal.   Musculoskeletal: Normal range of motion. He exhibits no edema. Neurological: He is alert and oriented to person, place, and time. Skin: Skin is warm and dry.    Psychiatric: Affect normal.       Cardiographics  Telemetry: AFIB  ECG: atrial fibrillation, rate 130s  Echocardiogram: Not done    Labs: Recent Results (from the past 24 hour(s))   EKG, 12 LEAD, INITIAL    Collection Time: 06/06/19  7:44 PM   Result Value Ref Range    Ventricular Rate 126 BPM    Atrial Rate 127 BPM    QRS Duration 120 ms    Q-T Interval 342 ms    QTC Calculation (Bezet) 495 ms    Calculated R Axis -45 degrees    Calculated T Axis 103 degrees    Diagnosis       Atrial fibrillation with rapid ventricular response with premature   ventricular or aberrantly conducted complexes  Left axis deviation  Incomplete left bundle branch block  Marked ST abnormality, possible lateral subendocardial injury  Abnormal ECG  When compared with ECG of 20-JUL-2018 19:08,  Atrial fibrillation has replaced Sinus rhythm  Vent. rate has increased BY  50 BPM  Incomplete left bundle branch block is now Present     CBC WITH AUTOMATED DIFF    Collection Time: 06/06/19  7:57 PM   Result Value Ref Range    WBC 9.3 4.3 - 11.1 K/uL    RBC 4.47 4.23 - 5.6 M/uL    HGB 14.7 13.6 - 17.2 g/dL    HCT 40.5 (L) 41.1 - 50.3 %    MCV 90.6 79.6 - 97.8 FL    MCH 32.9 26.1 - 32.9 PG    MCHC 36.3 (H) 31.4 - 35.0 g/dL    RDW 13.3 11.9 - 14.6 %    PLATELET 739 927 - 022 K/uL    MPV 8.7 (L) 9.4 - 12.3 FL    ABSOLUTE NRBC 0.00 0.0 - 0.2 K/uL    DF AUTOMATED      NEUTROPHILS 71 43 - 78 %    LYMPHOCYTES 20 13 - 44 %    MONOCYTES 7 4.0 - 12.0 %    EOSINOPHILS 1 0.5 - 7.8 %    BASOPHILS 0 0.0 - 2.0 %    IMMATURE GRANULOCYTES 0 0.0 - 5.0 %    ABS. NEUTROPHILS 6.6 1.7 - 8.2 K/UL    ABS. LYMPHOCYTES 1.9 0.5 - 4.6 K/UL    ABS. MONOCYTES 0.6 0.1 - 1.3 K/UL    ABS. EOSINOPHILS 0.1 0.0 - 0.8 K/UL    ABS. BASOPHILS 0.0 0.0 - 0.2 K/UL    ABS. IMM.  GRANS. 0.0 0.0 - 0.5 K/UL   METABOLIC PANEL, COMPREHENSIVE    Collection Time: 06/06/19  7:57 PM   Result Value Ref Range    Sodium 133 (L) 136 - 145 mmol/L    Potassium 3.2 (L) 3.5 - 5.1 mmol/L    Chloride 99 98 - 107 mmol/L    CO2 23 21 - 32 mmol/L    Anion gap 11 7 - 16 mmol/L    Glucose 130 (H) 65 - 100 mg/dL    BUN 9 8 - 23 MG/DL    Creatinine 0. 98 0.8 - 1.5 MG/DL    GFR est AA >60 >60 ml/min/1.73m2    GFR est non-AA >60 >60 ml/min/1.73m2    Calcium 9.1 8.3 - 10.4 MG/DL    Bilirubin, total 1.5 (H) 0.2 - 1.1 MG/DL    ALT (SGPT) 21 12 - 65 U/L    AST (SGOT) 16 15 - 37 U/L    Alk. phosphatase 75 50 - 136 U/L    Protein, total 7.3 6.3 - 8.2 g/dL    Albumin 3.9 3.2 - 4.6 g/dL    Globulin 3.4 2.3 - 3.5 g/dL    A-G Ratio 1.1 (L) 1.2 - 3.5     POC TROPONIN-I    Collection Time: 06/06/19  8:51 PM   Result Value Ref Range    Troponin-I (POC) 0.14 (H) 0.02 - 0.05 ng/ml       Patient has been seen and examined by Dr. Sujit Garcia and he agrees with the following assessment and plan:     Assessment/Plan:        Atrial fibrillation with RVR -- admit to telemetry. Start IV cardizem for rate control. Last dose of Eliquis was the morning of 6/6, will start IV heparin drip without bolus. NPO after midnight for MARCK/eCV in the AM.       Chest pain -- follow serial cardiac enzymes. Continue daily ASA, ARB and BB. Not on statin therapy as outpatient. Contiue fenofibrate. Due to mildly elevated troponin, will start IV heparin (last dose of Eliquis was the AM of 6/6/19). NPO after midnight with IV hydration. Plan for C with possible PCI on 6/7/19. Pre-medications started on admission IV contrast allergy (prednisone 50mg Q6, benadryl 50mg Q6 and pepcid 20mg BID)     Dyslipidemia -- not on statin therapy as outpatient. Only takes fenofibrate      Hypertension -- continue Hyzaar and toprol. Monitor BP closely. Titrate medications as needed.        Coronary atherosclerosis of native coronary vessel -- see above      Overview: 2006:  PCI RCA - Liberte, PCI LAD Cypher, PCI LCx Cypher      01/2014:  Stress testing with fixed inferior defect - no ischemia      11/2014:  EF with EF 50-55% and inferior HK       07/2018:  EF 45-50%      01/2019:  EF 40-45%            Atrial fibrillation -- see above        Ema Lovelace NP  6/6/2019 10:28 PM

## 2019-06-07 NOTE — PROGRESS NOTES
Problem: Falls - Risk of  Goal: *Absence of Falls  Description  Document Tia Manus Fall Risk and appropriate interventions in the flowsheet.   Outcome: Progressing Towards Goal  Note:   Fall Risk Interventions:    Medication Interventions: Patient to call before getting OOB, Teach patient to arise slowly    Elimination Interventions: Call light in reach, Patient to call for help with toileting needs, Urinal in reach

## 2019-06-07 NOTE — PROGRESS NOTES
Pt converted back to NSR, HR 59-61, BP 96/52. No c/o pain at this time, will continue to monitor closely.

## 2019-06-07 NOTE — PROGRESS NOTES
TRANSFER - OUT REPORT:    Verbal report given to [address] on Becky Shannon  being transferred to [address] [place] [address] for routine post - op       Report consisted of patients Situation, Background, Assessment and   Recommendations(SBAR). Information from the following report(s) Procedure Summary, Intake/Output, MAR and Cardiac Rhythm NSR was reviewed with the receiving nurse. Lines:   Peripheral IV 06/06/19 Left Antecubital (Active)   Site Assessment Clean, dry, & intact 6/7/2019 12:45 PM   Phlebitis Assessment 0 6/7/2019 12:45 PM   Infiltration Assessment 0 6/7/2019 12:45 PM   Dressing Status Clean, dry, & intact 6/7/2019 12:45 PM   Dressing Type Tape;Transparent 6/7/2019 12:45 PM   Hub Color/Line Status Green;Capped 6/7/2019 12:45 PM       Peripheral IV 06/06/19 Anterior;Proximal;Right Forearm (Active)   Site Assessment Clean, dry, & intact 6/7/2019 12:45 PM   Phlebitis Assessment 0 6/7/2019 12:45 PM   Infiltration Assessment 0 6/7/2019 12:45 PM   Dressing Status Clean, dry, & intact 6/7/2019 12:45 PM   Dressing Type Tape;Transparent 6/7/2019 12:45 PM   Hub Color/Line Status Green;Capped 6/7/2019 12:45 PM        Opportunity for questions and clarification was provided.       Patient transported with:   Monitor  Registered Nurse

## 2019-06-07 NOTE — ED NOTES
TRANSFER - OUT REPORT:    Verbal report given to Dana Arguelles RN(name) on [employer]  being transferred to 2225(unit) for routine progression of care       Report consisted of patients Situation, Background, Assessment and   Recommendations(SBAR). Information from the following report(s) ED Summary, MAR, Recent Results and Cardiac Rhythm a.fib was reviewed with the receiving nurse. Lines:   Peripheral IV 06/06/19 Left Antecubital (Active)   Site Assessment Clean, dry, & intact 6/6/2019 11:08 PM   Phlebitis Assessment 0 6/6/2019 11:08 PM   Infiltration Assessment 0 6/6/2019 11:08 PM   Dressing Status Clean, dry, & intact 6/6/2019 11:08 PM   Hub Color/Line Status Green 6/6/2019 11:08 PM       Peripheral IV 06/06/19 Anterior;Proximal;Right Forearm (Active)   Site Assessment Clean, dry, & intact 6/6/2019 11:08 PM   Phlebitis Assessment 0 6/6/2019 11:08 PM   Infiltration Assessment 0 6/6/2019 11:08 PM   Dressing Status Clean, dry, & intact 6/6/2019 11:08 PM   Hub Color/Line Status Green 6/6/2019 11:08 PM        Opportunity for questions and clarification was provided.       Patient transported with:   Monitor  O2 @ 2 liters  Registered Nurse

## 2019-06-07 NOTE — PROGRESS NOTES
Bedside shift change report given to [id number] (oncoming nurse) by Leobardo Scherer (offgoing nurse). Report included the following information SBAR, Kardex, Intake/Output, MAR, Recent Results and Cardiac Rhythm A Fib.

## 2019-06-07 NOTE — PROCEDURES
[facility]  CARDIAC CATH    Name:  Etelvina Bennett  MR#:  [id number]  :  1945  ACCOUNT #:  [de-identified]  DATE OF SERVICE:  2019    PRIMARY CARDIOLOGIST:  Little Milner MD    PRIMARY CARE PHYSICIAN:  Tyron Srivastava MD    BRIEF HISTORY:  The patient is a 27-year-old gentleman admitted with AFib with RVR, chest pain, non-ST-elevation myocardial infarction, referred for cardiac catheterization. PROCEDURES PERFORMED:  Left heart catheterization with bilateral selective coronary angiography and left ventriculography. Attempted PCI of the left circumflex. PREOPERATIVE DIAGNOSIS:  Non-ST-elevation myocardial infarction. POSTOPERATIVE DIAGNOSIS:  Systolic heart failure with three-vessel atherosclerotic coronary disease. SURGEON:  Priyank Milner MD    ASSISTANT:  None. ANESTHESIA:  Conscious sedation. Start time 10:50, end time 11:29    MEDICATIONS:  2 mg of Versed, 50 mcg of fentanyl. MONITORING RN:  Nick Cordova. COMPLICATIONS:  None. IMPLANTS:  None. SPECIMENS:  None. ESTIMATED BLOOD LOSS:  10 mL. PROCEDURE:  After informed consent, he was prepped and draped in the usual sterile fashion. The right wrist was infiltrated with lidocaine. The right radial artery was accessed. A 6-[place] sheath was advanced. A 6-[place] Tiger catheter was utilized for left and right coronary injections. A 6-[place] angled pigtail for left ventriculography. Isovue contrast utilized. FINDINGS:  1. Left ventricle:  Mildly dilated left ventricular cavity with mild to moderate LV systolic dysfunction. Ejection fraction 40-45% with lateral akinesis. Left ventricular end-diastolic pressure measured 22 mmHg. There was no aortic valve gradient. 2.  Left main:  Left main is moderate in size, moderately calcified, bifurcates into LAD and circumflex systems. There is no significant disease. 3.  Left anterior descending coronary:   This is heavily calcified proximal to the previously placed stent. There is a 90% proximal stenosis and then 80% stenosis distal to the stent. The stent itself remains widely patent. There are two diagonals which remain widely patent. 4.  Left circumflex coronary: This is occluded proximally. 5.  Right coronary: This is heavily calcified. There is a long 50% prox and mid. There was a 90% posterior descending and 80% posterolateral branch stenosis. Both of these vessels are quite large. ATTEMPTED PCI:  Lesion:  Proximal left circumflex. Pre-stenosis 100%, post-stenosis 100%. DETAILS:  The patient was anticoagulated with Angiomax. A 6-[place] XB3.5 guide was utilized. The circumflex was retroflexed. We were unable to cross the cath. There were excellent collaterals to the circumflex from the right coronary artery and it was felt that this may represent more of a chronic lesion. Given the excellent collaterals and complicated three-vessel atherosclerotic coronary disease, the patient will be referred for coronary bypass grafting. Successful hemostasis with pneumatic radial band. NOTE:  The patient converted to sinus rhythm spontaneously. RECOMMENDATIONS:  1. Consideration for coronary bypass grafting. 2.  Spontaneous conversion to sinus rhythm. We will initiate IV amiodarone therapy to avoid recurrent AFib with RVR. 3.  Complex three-vessel atherosclerotic coronary disease as described above. 4.  Chronic systolic heart failure. 5.  Consideration for maze and left atrial appendage occlusion. Thank you for allowing us to participate in the care of this patient. Any questions or concerns, please feel free to contact me.       Jo Ham MD      [id number]  D:  06/07/2019 11:46  T:  06/07/2019 11:58  JOB #:  [id number]  CC:  MD Arely Ocampo MD

## 2019-06-07 NOTE — PROGRESS NOTES
TRANSFER - IN REPORT:    Verbal report received from [facility], [address] on Juana Burgos being received from ER for routine progression of care      Report consisted of patients Situation, Background, Assessment and Recommendations(SBAR). Information from the following report(s) SBAR, Kardex, MAR, Recent Results and Cardiac Rhythm A Fib was reviewed with the receiving nurse. Opportunity for questions and clarification was provided. Assessment completed upon patients arrival to unit and care assumed. Skin assessment completed. No breakdown noted.

## 2019-06-07 NOTE — PROGRESS NOTES
06/07/19 0617   Vital Signs   Pulse (Heart Rate) (!) 103   BP (!) 172/100   MAP (Monitor) 128   Increased Cardizem to 5 mg/hr.

## 2019-06-08 ENCOUNTER — APPOINTMENT (OUTPATIENT)
Dept: GENERAL RADIOLOGY | Age: 74
DRG: 229 | End: 2019-06-08
Attending: INTERNAL MEDICINE
Payer: MEDICARE

## 2019-06-08 LAB
APPEARANCE UR: CLEAR
APTT PPP: 133 SEC (ref 24.7–39.8)
APTT PPP: 56.6 SEC (ref 24.7–39.8)
APTT PPP: 80.7 SEC (ref 24.7–39.8)
BACTERIA SPEC CULT: NORMAL
BILIRUB UR QL: NEGATIVE
COLOR UR: ABNORMAL
EST. AVERAGE GLUCOSE BLD GHB EST-MCNC: 97 MG/DL
GLUCOSE UR STRIP.AUTO-MCNC: NEGATIVE MG/DL
HBA1C MFR BLD: 5 % (ref 4.8–6)
HGB UR QL STRIP: NEGATIVE
INR PPP: 1.2
KETONES UR QL STRIP.AUTO: ABNORMAL MG/DL
LEUKOCYTE ESTERASE UR QL STRIP.AUTO: NEGATIVE
MAGNESIUM SERPL-MCNC: 1.9 MG/DL (ref 1.8–2.4)
NITRITE UR QL STRIP.AUTO: NEGATIVE
PH UR STRIP: 6 [PH] (ref 5–9)
PROT UR STRIP-MCNC: NEGATIVE MG/DL
PROTHROMBIN TIME: 15.4 SEC (ref 11.7–14.5)
SERVICE CMNT-IMP: NORMAL
SP GR UR REFRACTOMETRY: 1.06 (ref 1–1.02)
UROBILINOGEN UR QL STRIP.AUTO: 0.2 EU/DL (ref 0.2–1)

## 2019-06-08 PROCEDURE — 83735 ASSAY OF MAGNESIUM: CPT

## 2019-06-08 PROCEDURE — 74011250637 HC RX REV CODE- 250/637: Performed by: THORACIC SURGERY (CARDIOTHORACIC VASCULAR SURGERY)

## 2019-06-08 PROCEDURE — 74011250637 HC RX REV CODE- 250/637: Performed by: INTERNAL MEDICINE

## 2019-06-08 PROCEDURE — 85610 PROTHROMBIN TIME: CPT

## 2019-06-08 PROCEDURE — 74011636637 HC RX REV CODE- 636/637: Performed by: NURSE PRACTITIONER

## 2019-06-08 PROCEDURE — 71045 X-RAY EXAM CHEST 1 VIEW: CPT

## 2019-06-08 PROCEDURE — 83036 HEMOGLOBIN GLYCOSYLATED A1C: CPT

## 2019-06-08 PROCEDURE — 74011250637 HC RX REV CODE- 250/637: Performed by: NURSE PRACTITIONER

## 2019-06-08 PROCEDURE — 65660000000 HC RM CCU STEPDOWN

## 2019-06-08 PROCEDURE — 74011250636 HC RX REV CODE- 250/636: Performed by: NURSE PRACTITIONER

## 2019-06-08 PROCEDURE — 74011250636 HC RX REV CODE- 250/636: Performed by: INTERNAL MEDICINE

## 2019-06-08 PROCEDURE — 36415 COLL VENOUS BLD VENIPUNCTURE: CPT

## 2019-06-08 PROCEDURE — 85730 THROMBOPLASTIN TIME PARTIAL: CPT

## 2019-06-08 PROCEDURE — 74011250637 HC RX REV CODE- 250/637: Performed by: PHYSICIAN ASSISTANT

## 2019-06-08 RX ORDER — AMIODARONE HYDROCHLORIDE 200 MG/1
400 TABLET ORAL EVERY 12 HOURS
Status: DISCONTINUED | OUTPATIENT
Start: 2019-06-08 | End: 2019-06-16

## 2019-06-08 RX ORDER — POLYETHYLENE GLYCOL 3350 17 G/17G
17 POWDER, FOR SOLUTION ORAL
Status: DISCONTINUED | OUTPATIENT
Start: 2019-06-08 | End: 2019-06-11

## 2019-06-08 RX ORDER — FUROSEMIDE 10 MG/ML
20 INJECTION INTRAMUSCULAR; INTRAVENOUS ONCE
Status: DISCONTINUED | OUTPATIENT
Start: 2019-06-08 | End: 2019-06-08

## 2019-06-08 RX ORDER — FUROSEMIDE 10 MG/ML
40 INJECTION INTRAMUSCULAR; INTRAVENOUS ONCE
Status: COMPLETED | OUTPATIENT
Start: 2019-06-08 | End: 2019-06-08

## 2019-06-08 RX ORDER — HEPARIN SODIUM 5000 [USP'U]/ML
35 INJECTION, SOLUTION INTRAVENOUS; SUBCUTANEOUS ONCE
Status: COMPLETED | OUTPATIENT
Start: 2019-06-08 | End: 2019-06-08

## 2019-06-08 RX ADMIN — HYDROCODONE BITARTRATE AND ACETAMINOPHEN 1 TABLET: 10; 325 TABLET ORAL at 20:45

## 2019-06-08 RX ADMIN — NITROGLYCERIN 1 INCH: 20 OINTMENT TOPICAL at 18:07

## 2019-06-08 RX ADMIN — NITROGLYCERIN 1 INCH: 20 OINTMENT TOPICAL at 06:17

## 2019-06-08 RX ADMIN — NITROGLYCERIN 1 INCH: 20 OINTMENT TOPICAL at 12:08

## 2019-06-08 RX ADMIN — CARBAMAZEPINE 200 MG: 200 TABLET ORAL at 18:06

## 2019-06-08 RX ADMIN — HYDROCODONE BITARTRATE AND ACETAMINOPHEN 1 TABLET: 10; 325 TABLET ORAL at 06:56

## 2019-06-08 RX ADMIN — FUROSEMIDE 40 MG: 10 INJECTION, SOLUTION INTRAVENOUS at 12:07

## 2019-06-08 RX ADMIN — METOPROLOL SUCCINATE 100 MG: 50 TABLET, EXTENDED RELEASE ORAL at 09:03

## 2019-06-08 RX ADMIN — HEPARIN SODIUM 3150 UNITS: 5000 INJECTION INTRAVENOUS; SUBCUTANEOUS at 19:00

## 2019-06-08 RX ADMIN — PANTOPRAZOLE SODIUM 40 MG: 40 TABLET, DELAYED RELEASE ORAL at 06:16

## 2019-06-08 RX ADMIN — FENOFIBRATE 160 MG: 160 TABLET ORAL at 09:03

## 2019-06-08 RX ADMIN — Medication 10 ML: at 09:14

## 2019-06-08 RX ADMIN — CARBAMAZEPINE 200 MG: 200 TABLET ORAL at 09:03

## 2019-06-08 RX ADMIN — GABAPENTIN 400 MG: 100 CAPSULE ORAL at 18:06

## 2019-06-08 RX ADMIN — HYDROCODONE BITARTRATE AND ACETAMINOPHEN 1 TABLET: 10; 325 TABLET ORAL at 15:23

## 2019-06-08 RX ADMIN — Medication 10 ML: at 20:44

## 2019-06-08 RX ADMIN — FUROSEMIDE 40 MG: 10 INJECTION, SOLUTION INTRAVENOUS at 21:58

## 2019-06-08 RX ADMIN — ROSUVASTATIN CALCIUM 40 MG: 20 TABLET, FILM COATED ORAL at 20:45

## 2019-06-08 RX ADMIN — FAMOTIDINE 20 MG: 20 TABLET ORAL at 09:03

## 2019-06-08 RX ADMIN — PREDNISONE 50 MG: 50 TABLET ORAL at 06:17

## 2019-06-08 RX ADMIN — NITROGLYCERIN 1 INCH: 20 OINTMENT TOPICAL at 23:07

## 2019-06-08 RX ADMIN — DOCUSATE SODIUM 100 MG: 100 CAPSULE, LIQUID FILLED ORAL at 20:45

## 2019-06-08 RX ADMIN — AMIODARONE HYDROCHLORIDE 400 MG: 200 TABLET ORAL at 20:44

## 2019-06-08 RX ADMIN — POLYETHYLENE GLYCOL 3350 17 G: 17 POWDER, FOR SOLUTION ORAL at 15:23

## 2019-06-08 RX ADMIN — GABAPENTIN 400 MG: 100 CAPSULE ORAL at 09:03

## 2019-06-08 RX ADMIN — DIPHENHYDRAMINE HYDROCHLORIDE 50 MG: 25 CAPSULE ORAL at 06:17

## 2019-06-08 RX ADMIN — ASPIRIN 81 MG: 81 TABLET, COATED ORAL at 09:03

## 2019-06-08 RX ADMIN — AMIODARONE HYDROCHLORIDE 400 MG: 200 TABLET ORAL at 09:32

## 2019-06-08 RX ADMIN — LOSARTAN POTASSIUM 100 MG: 50 TABLET ORAL at 09:02

## 2019-06-08 NOTE — PROGRESS NOTES
Problem: Falls - Risk of  Goal: *Absence of Falls  Description  Document Veena Duffy Fall Risk and appropriate interventions in the flowsheet.   Outcome: Progressing Towards Goal  Note:   Fall Risk Interventions:  Mobility Interventions: Communicate number of staff needed for ambulation/transfer, Patient to call before getting OOB    Medication Interventions: Patient to call before getting OOB, Teach patient to arise slowly    Elimination Interventions: Call light in reach, Patient to call for help with toileting needs, Urinal in reach, Stay With Me (per policy)

## 2019-06-08 NOTE — PROGRESS NOTES
Problem: Falls - Risk of  Goal: *Absence of Falls  Description  Document Ajay Askew Fall Risk and appropriate interventions in the flowsheet.   Outcome: Progressing Towards Goal

## 2019-06-08 NOTE — PROGRESS NOTES
[facility] CARDIOLOGY PROGRESS NOTE           6/8/2019 8:10 AM    Admit Date: 6/6/2019    Admit Diagnosis: Atrial fibrillation with RVR (HCC) [I48.91]      Subjective:   No complaints this AM, no chest pain or shortness of breath    Interval History: (History of pertinent interval events obtained from nursing staff)  Cath yesterday with obstructive CAD, plan for possible CABG    ROS:  GEN:  No fever or chills  Cardiovascular:  As noted above  Pulmonary:  As noted above  Neuro:  No new focal motor or sensory loss      Objective:     Vitals:    06/08/19 0259 06/08/19 0500 06/08/19 0600 06/08/19 0659   BP: 119/58 135/68 118/55 122/84   Pulse: 66 62 62 71   Resp: 18   18   Temp: 98.2 °F (36.8 °C)   97.9 °F (36.6 °C)   SpO2: 95%   94%   Weight: 89.4 kg (197 lb)      Height:           Physical Exam:  General-Well Developed, Well Nourished, No Acute Distress, Alert & Oriented x 3, appropriate mood. Neck- supple, no JVD  CV- regular rate and rhythm no MRG  Lung- clear bilaterally  Abd- soft, nontender, nondistended  Ext- no edema bilaterally.   Skin- warm and dry    Current Facility-Administered Medications   Medication Dose Route Frequency    polyethylene glycol (MIRALAX) packet 17 g  17 g Oral DAILY PRN    fentaNYL citrate (PF) injection  mcg   mcg IntraVENous Multiple    lidocaine (XYLOCAINE) 10 mg/mL (1 %) injection 2-20 mL  2-20 mL IntraDERMal Multiple    midazolam (VERSED) injection 0.5-2 mg  0.5-2 mg IntraVENous Multiple    rosuvastatin (CRESTOR) tablet 40 mg  40 mg Oral QHS    amiodarone (CORDARONE) 450 mg in dextrose 5% 250 mL infusion  1 mg/min IntraVENous CONTINUOUS    heparin 25,000 units in dextrose 500 mL infusion  12-25 Units/kg/hr IntraVENous TITRATE    sodium chloride (NS) flush 5-40 mL  5-40 mL IntraVENous Q8H    losartan (COZAAR) tablet 100 mg  100 mg Oral DAILY    docusate sodium (COLACE) capsule 100 mg  100 mg Oral DAILY PRN    aspirin delayed-release tablet 81 mg  81 mg Oral DAILY    carBAMazepine (TEGretol) tablet 200 mg  200 mg Oral BID    fenofibrate (LOFIBRA) tablet 160 mg  160 mg Oral DAILY    gabapentin (NEURONTIN) capsule 400 mg  400 mg Oral BID    metoprolol succinate (TOPROL-XL) XL tablet 100 mg  100 mg Oral DAILY    pantoprazole (PROTONIX) tablet 40 mg  40 mg Oral ACB    sodium chloride (NS) flush 5-40 mL  5-40 mL IntraVENous PRN    nitroglycerin (NITROBID) 2 % ointment 1 Inch  1 Inch Topical Q6H    nitroglycerin (NITROSTAT) tablet 0.4 mg  0.4 mg SubLINGual Q5MIN PRN    acetaminophen (TYLENOL) tablet 650 mg  650 mg Oral Q4H PRN    HYDROcodone-acetaminophen (NORCO)  mg tablet 1 Tab  1 Tab Oral Q4H PRN    ondansetron (ZOFRAN) injection 4 mg  4 mg IntraVENous Q4H PRN    morphine injection 2 mg  2 mg IntraVENous Q4H PRN    0.9% sodium chloride infusion  75 mL/hr IntraVENous CONTINUOUS    predniSONE (DELTASONE) tablet 50 mg  50 mg Oral Q6H    diphenhydrAMINE (BENADRYL) capsule 50 mg  50 mg Oral Q6H    famotidine (PEPCID) tablet 20 mg  20 mg Oral BID     Data Review:   Recent Results (from the past 24 hour(s))   EKG, 12 LEAD, INITIAL    Collection Time: 06/07/19 10:30 AM   Result Value Ref Range    Ventricular Rate 73 BPM    Atrial Rate 73 BPM    P-R Interval 186 ms    QRS Duration 114 ms    Q-T Interval 440 ms    QTC Calculation (Bezet) 484 ms    Calculated P Axis 69 degrees    Calculated R Axis -47 degrees    Calculated T Axis 122 degrees    Diagnosis       Normal sinus rhythm  Possible Left atrial enlargement  Left axis deviation  Incomplete right bundle branch block  Anterior infarct , age undetermined  ST & T wave abnormality, consider lateral ischemia  Abnormal ECG  When compared with ECG of 06-JUN-2019 19:44,  Sinus rhythm has replaced Atrial fibrillation  Vent.  rate has decreased BY  53 BPM  Incomplete right bundle branch block has replaced Incomplete left bundle   branch block  Confirmed by Lara Ruiz MD (), FIDE YOU ([id number]) on 6/7/2019 5:12:38 PM     PTT    Collection Time: 06/07/19  1:46 PM   Result Value Ref Range    aPTT 100.8 (H) 24.7 - 39.8 SEC   PTT    Collection Time: 06/07/19  7:20 PM   Result Value Ref Range    aPTT 66.6 (H) 24.7 - 39.8 SEC   MSSA/MRSA SC BY PCR, NASAL SWAB    Collection Time: 06/07/19 11:20 PM   Result Value Ref Range    Special Requests: NO SPECIAL REQUESTS      Culture result:        SA target not detected. A MRSA NEGATIVE, SA NEGATIVE test result does not preclude MRSA or SA nasal colonization.    URINALYSIS W/ RFLX MICROSCOPIC    Collection Time: 06/07/19 11:20 PM   Result Value Ref Range    Color VANESA      Appearance CLEAR      Specific gravity 1.060 (H) 1.001 - 1.023      pH (UA) 6.0 5.0 - 9.0      Protein NEGATIVE  NEG mg/dL    Glucose NEGATIVE  mg/dL    Ketone TRACE (A) NEG mg/dL    Bilirubin NEGATIVE  NEG      Blood NEGATIVE  NEG      Urobilinogen 0.2 0.2 - 1.0 EU/dL    Nitrites NEGATIVE  NEG      Leukocyte Esterase NEGATIVE  NEG     HEMOGLOBIN A1C WITH EAG    Collection Time: 06/08/19  2:48 AM   Result Value Ref Range    Hemoglobin A1c 5.0 4.8 - 6.0 %    Est. average glucose 97 mg/dL   PROTHROMBIN TIME + INR    Collection Time: 06/08/19  2:48 AM   Result Value Ref Range    Prothrombin time 15.4 (H) 11.7 - 14.5 sec    INR 1.2     MAGNESIUM    Collection Time: 06/08/19  2:48 AM   Result Value Ref Range    Magnesium 1.9 1.8 - 2.4 mg/dL   PTT    Collection Time: 06/08/19  2:48 AM   Result Value Ref Range    aPTT 133.0 (H) 24.7 - 39.8 SEC       EKG:  (EKG has been independently visualized by me with interpretation below)  Assessment:     Principal Problem:    Atrial fibrillation with RVR ([address] (6/6/2019)    Active Problems:    Dyslipidemia (4/7/2017)      Hypertension (4/7/2017)      Coronary atherosclerosis of native coronary vessel (4/7/2017)      Overview: 2006:  PCI RCA - Liberte, PCI LAD Cypher, PCI LCx Cypher      01/2014:  Stress testing with fixed inferior defect - no ischemia      11/2014:  EF with EF 50-55% and inferior HK       07/2018:  EF 45-50%      01/2019:  EF 40-45%            Atrial fibrillation ([facility] (4/7/2017)      Chronic systolic congestive heart failure ([facility] (1/31/2019)      Overview: 2014:  EF 55%      07/2018:  EF 45-50%      01/2019:  EF 40-45%      NSTEMI (non-ST elevated myocardial infarction) ([facility] (6/6/2019)      Plan:   1. CAD: CT surgery consulted, plan for probable CABG/Maze/MICHEAL closure, cont current meds  2. Afib: currently NSR, eliquis on hold for CABG, on heparin, transition to oral amiodarone today, cont metoprolol  3. PPx: heparin    Romaine Sharma MD  Cardiology/Electrophysiology

## 2019-06-08 NOTE — PROGRESS NOTES
Bedside shift change report given to Dahlia Contreras (oncoming nurse) by Nancy Borja (offgoing nurse). Report included the following information SBAR, Kardex, Intake/Output, MAR, Recent Results and Cardiac Rhythm NSR.

## 2019-06-09 ENCOUNTER — APPOINTMENT (OUTPATIENT)
Dept: CT IMAGING | Age: 74
DRG: 229 | End: 2019-06-09
Attending: NURSE PRACTITIONER
Payer: MEDICARE

## 2019-06-09 ENCOUNTER — ANESTHESIA EVENT (OUTPATIENT)
Dept: SURGERY | Age: 74
DRG: 229 | End: 2019-06-09
Payer: MEDICARE

## 2019-06-09 ENCOUNTER — APPOINTMENT (OUTPATIENT)
Dept: GENERAL RADIOLOGY | Age: 74
DRG: 229 | End: 2019-06-09
Attending: THORACIC SURGERY (CARDIOTHORACIC VASCULAR SURGERY)
Payer: MEDICARE

## 2019-06-09 PROBLEM — G50.0 TRIGEMINAL NEURALGIA: Chronic | Status: ACTIVE | Noted: 2019-06-09

## 2019-06-09 PROBLEM — E78.5 DYSLIPIDEMIA: Chronic | Status: ACTIVE | Noted: 2017-04-07

## 2019-06-09 PROBLEM — I10 HYPERTENSION: Chronic | Status: ACTIVE | Noted: 2017-04-07

## 2019-06-09 PROBLEM — R04.2 HEMOPTYSIS: Status: ACTIVE | Noted: 2019-06-09

## 2019-06-09 LAB
ANION GAP SERPL CALC-SCNC: 9 MMOL/L (ref 7–16)
APTT PPP: 126.2 SEC (ref 24.7–39.8)
APTT PPP: 82.4 SEC (ref 24.7–39.8)
ATRIAL RATE: 68 BPM
BASOPHILS # BLD: 0 K/UL (ref 0–0.2)
BASOPHILS NFR BLD: 0 % (ref 0–2)
BUN SERPL-MCNC: 20 MG/DL (ref 8–23)
CALCIUM SERPL-MCNC: 7.8 MG/DL (ref 8.3–10.4)
CALCULATED P AXIS, ECG09: 66 DEGREES
CALCULATED R AXIS, ECG10: -33 DEGREES
CALCULATED T AXIS, ECG11: 105 DEGREES
CHLORIDE SERPL-SCNC: 94 MMOL/L (ref 98–107)
CO2 SERPL-SCNC: 27 MMOL/L (ref 21–32)
CREAT SERPL-MCNC: 1.06 MG/DL (ref 0.8–1.5)
DIAGNOSIS, 93000: NORMAL
DIFFERENTIAL METHOD BLD: ABNORMAL
EOSINOPHIL # BLD: 0 K/UL (ref 0–0.8)
EOSINOPHIL NFR BLD: 0 % (ref 0.5–7.8)
ERYTHROCYTE [DISTWIDTH] IN BLOOD BY AUTOMATED COUNT: 13.9 % (ref 11.9–14.6)
GLUCOSE SERPL-MCNC: 87 MG/DL (ref 65–100)
HCT VFR BLD AUTO: 33.7 % (ref 41.1–50.3)
HGB BLD-MCNC: 12 G/DL (ref 13.6–17.2)
IMM GRANULOCYTES # BLD AUTO: 0.1 K/UL (ref 0–0.5)
IMM GRANULOCYTES NFR BLD AUTO: 0 % (ref 0–5)
LYMPHOCYTES # BLD: 1.6 K/UL (ref 0.5–4.6)
LYMPHOCYTES NFR BLD: 11 % (ref 13–44)
MCH RBC QN AUTO: 33.5 PG (ref 26.1–32.9)
MCHC RBC AUTO-ENTMCNC: 35.6 G/DL (ref 31.4–35)
MCV RBC AUTO: 94.1 FL (ref 79.6–97.8)
MONOCYTES # BLD: 1.4 K/UL (ref 0.1–1.3)
MONOCYTES NFR BLD: 10 % (ref 4–12)
NEUTS SEG # BLD: 11.1 K/UL (ref 1.7–8.2)
NEUTS SEG NFR BLD: 78 % (ref 43–78)
NRBC # BLD: 0 K/UL (ref 0–0.2)
P-R INTERVAL, ECG05: 170 MS
PLATELET # BLD AUTO: 178 K/UL (ref 150–450)
PMV BLD AUTO: 9.1 FL (ref 9.4–12.3)
POTASSIUM SERPL-SCNC: 3.5 MMOL/L (ref 3.5–5.1)
Q-T INTERVAL, ECG07: 452 MS
QRS DURATION, ECG06: 128 MS
QTC CALCULATION (BEZET), ECG08: 480 MS
RBC # BLD AUTO: 3.58 M/UL (ref 4.23–5.6)
SODIUM SERPL-SCNC: 130 MMOL/L (ref 136–145)
TROPONIN I SERPL-MCNC: 3.26 NG/ML (ref 0.02–0.05)
VENTRICULAR RATE, ECG03: 68 BPM
WBC # BLD AUTO: 14.1 K/UL (ref 4.3–11.1)

## 2019-06-09 PROCEDURE — 74011250637 HC RX REV CODE- 250/637: Performed by: NURSE PRACTITIONER

## 2019-06-09 PROCEDURE — 99223 1ST HOSP IP/OBS HIGH 75: CPT | Performed by: INTERNAL MEDICINE

## 2019-06-09 PROCEDURE — 74011250637 HC RX REV CODE- 250/637: Performed by: THORACIC SURGERY (CARDIOTHORACIC VASCULAR SURGERY)

## 2019-06-09 PROCEDURE — 74011000250 HC RX REV CODE- 250: Performed by: INTERNAL MEDICINE

## 2019-06-09 PROCEDURE — 84484 ASSAY OF TROPONIN QUANT: CPT

## 2019-06-09 PROCEDURE — 83520 IMMUNOASSAY QUANT NOS NONAB: CPT

## 2019-06-09 PROCEDURE — 0BJ08ZZ INSPECTION OF TRACHEOBRONCHIAL TREE, VIA NATURAL OR ARTIFICIAL OPENING ENDOSCOPIC: ICD-10-PCS | Performed by: INTERNAL MEDICINE

## 2019-06-09 PROCEDURE — 74011250637 HC RX REV CODE- 250/637: Performed by: PHYSICIAN ASSISTANT

## 2019-06-09 PROCEDURE — 36415 COLL VENOUS BLD VENIPUNCTURE: CPT

## 2019-06-09 PROCEDURE — 74011250636 HC RX REV CODE- 250/636: Performed by: INTERNAL MEDICINE

## 2019-06-09 PROCEDURE — 86923 COMPATIBILITY TEST ELECTRIC: CPT

## 2019-06-09 PROCEDURE — 77030012699 HC VLV SUC CNTRL OCOA -A: Performed by: INTERNAL MEDICINE

## 2019-06-09 PROCEDURE — 71045 X-RAY EXAM CHEST 1 VIEW: CPT

## 2019-06-09 PROCEDURE — 85730 THROMBOPLASTIN TIME PARTIAL: CPT

## 2019-06-09 PROCEDURE — 71250 CT THORAX DX C-: CPT

## 2019-06-09 PROCEDURE — 76040000027: Performed by: INTERNAL MEDICINE

## 2019-06-09 PROCEDURE — 99153 MOD SED SAME PHYS/QHP EA: CPT | Performed by: INTERNAL MEDICINE

## 2019-06-09 PROCEDURE — 93005 ELECTROCARDIOGRAM TRACING: CPT | Performed by: INTERNAL MEDICINE

## 2019-06-09 PROCEDURE — 94760 N-INVAS EAR/PLS OXIMETRY 1: CPT

## 2019-06-09 PROCEDURE — 74011250637 HC RX REV CODE- 250/637: Performed by: INTERNAL MEDICINE

## 2019-06-09 PROCEDURE — 31645 BRNCHSC W/THER ASPIR 1ST: CPT | Performed by: INTERNAL MEDICINE

## 2019-06-09 PROCEDURE — 80048 BASIC METABOLIC PNL TOTAL CA: CPT

## 2019-06-09 PROCEDURE — 99152 MOD SED SAME PHYS/QHP 5/>YRS: CPT | Performed by: INTERNAL MEDICINE

## 2019-06-09 PROCEDURE — 65660000000 HC RM CCU STEPDOWN

## 2019-06-09 PROCEDURE — 74011250636 HC RX REV CODE- 250/636

## 2019-06-09 PROCEDURE — 86900 BLOOD TYPING SEROLOGIC ABO: CPT

## 2019-06-09 PROCEDURE — 85025 COMPLETE CBC W/AUTO DIFF WBC: CPT

## 2019-06-09 PROCEDURE — 74011250636 HC RX REV CODE- 250/636: Performed by: NURSE PRACTITIONER

## 2019-06-09 PROCEDURE — 86038 ANTINUCLEAR ANTIBODIES: CPT

## 2019-06-09 PROCEDURE — 77010033678 HC OXYGEN DAILY

## 2019-06-09 PROCEDURE — 83516 IMMUNOASSAY NONANTIBODY: CPT

## 2019-06-09 RX ORDER — SODIUM CHLORIDE 9 MG/ML
1000 INJECTION, SOLUTION INTRAVENOUS CONTINUOUS
Status: DISCONTINUED | OUTPATIENT
Start: 2019-06-09 | End: 2019-06-09 | Stop reason: HOSPADM

## 2019-06-09 RX ORDER — NALOXONE HYDROCHLORIDE 0.4 MG/ML
0.1 INJECTION, SOLUTION INTRAMUSCULAR; INTRAVENOUS; SUBCUTANEOUS
Status: CANCELLED | OUTPATIENT
Start: 2019-06-09

## 2019-06-09 RX ORDER — FLUMAZENIL 0.1 MG/ML
0.2 INJECTION INTRAVENOUS AS NEEDED
Status: CANCELLED | OUTPATIENT
Start: 2019-06-09

## 2019-06-09 RX ORDER — NALOXONE HYDROCHLORIDE 0.4 MG/ML
0.4 INJECTION, SOLUTION INTRAMUSCULAR; INTRAVENOUS; SUBCUTANEOUS
Status: DISCONTINUED | OUTPATIENT
Start: 2019-06-09 | End: 2019-06-09 | Stop reason: HOSPADM

## 2019-06-09 RX ORDER — SODIUM CHLORIDE 0.9 % (FLUSH) 0.9 %
5-40 SYRINGE (ML) INJECTION AS NEEDED
Status: DISCONTINUED | OUTPATIENT
Start: 2019-06-09 | End: 2019-06-09 | Stop reason: HOSPADM

## 2019-06-09 RX ORDER — FLUMAZENIL 0.1 MG/ML
0.2 INJECTION INTRAVENOUS
Status: DISCONTINUED | OUTPATIENT
Start: 2019-06-09 | End: 2019-06-09 | Stop reason: HOSPADM

## 2019-06-09 RX ORDER — DIPHENHYDRAMINE HYDROCHLORIDE 50 MG/ML
12.5 INJECTION, SOLUTION INTRAMUSCULAR; INTRAVENOUS
Status: CANCELLED | OUTPATIENT
Start: 2019-06-09

## 2019-06-09 RX ORDER — HYDROMORPHONE HYDROCHLORIDE 2 MG/ML
0.5 INJECTION, SOLUTION INTRAMUSCULAR; INTRAVENOUS; SUBCUTANEOUS
Status: CANCELLED | OUTPATIENT
Start: 2019-06-09

## 2019-06-09 RX ORDER — LIDOCAINE HYDROCHLORIDE 40 MG/ML
SOLUTION TOPICAL AS NEEDED
Status: DISCONTINUED | OUTPATIENT
Start: 2019-06-09 | End: 2019-06-11

## 2019-06-09 RX ORDER — MORPHINE SULFATE 2 MG/ML
2 INJECTION, SOLUTION INTRAMUSCULAR; INTRAVENOUS
Status: DISCONTINUED | OUTPATIENT
Start: 2019-06-09 | End: 2019-06-11

## 2019-06-09 RX ORDER — LIDOCAINE HYDROCHLORIDE 20 MG/ML
JELLY TOPICAL AS NEEDED
Status: DISCONTINUED | OUTPATIENT
Start: 2019-06-09 | End: 2019-06-11

## 2019-06-09 RX ORDER — MUPIROCIN 20 MG/G
OINTMENT TOPICAL EVERY 12 HOURS
Status: DISCONTINUED | OUTPATIENT
Start: 2019-06-09 | End: 2019-06-10 | Stop reason: SDUPTHER

## 2019-06-09 RX ORDER — OXYCODONE HYDROCHLORIDE 5 MG/1
5 TABLET ORAL
Status: CANCELLED | OUTPATIENT
Start: 2019-06-09 | End: 2019-06-10

## 2019-06-09 RX ORDER — SODIUM CHLORIDE, SODIUM LACTATE, POTASSIUM CHLORIDE, CALCIUM CHLORIDE 600; 310; 30; 20 MG/100ML; MG/100ML; MG/100ML; MG/100ML
75 INJECTION, SOLUTION INTRAVENOUS CONTINUOUS
Status: CANCELLED | OUTPATIENT
Start: 2019-06-09

## 2019-06-09 RX ORDER — OXYCODONE HYDROCHLORIDE 5 MG/1
10 TABLET ORAL
Status: CANCELLED | OUTPATIENT
Start: 2019-06-09 | End: 2019-06-10

## 2019-06-09 RX ORDER — LOSARTAN POTASSIUM 50 MG/1
100 TABLET ORAL
Status: DISCONTINUED | OUTPATIENT
Start: 2019-06-09 | End: 2019-06-10

## 2019-06-09 RX ORDER — MUPIROCIN CALCIUM 20 MG/G
CREAM TOPICAL EVERY 12 HOURS
Status: DISCONTINUED | OUTPATIENT
Start: 2019-06-09 | End: 2019-06-09 | Stop reason: SDUPTHER

## 2019-06-09 RX ORDER — SODIUM CHLORIDE 0.9 % (FLUSH) 0.9 %
5-40 SYRINGE (ML) INJECTION EVERY 8 HOURS
Status: DISCONTINUED | OUTPATIENT
Start: 2019-06-09 | End: 2019-06-09 | Stop reason: HOSPADM

## 2019-06-09 RX ORDER — FENTANYL CITRATE 50 UG/ML
50 INJECTION, SOLUTION INTRAMUSCULAR; INTRAVENOUS
Status: DISCONTINUED | OUTPATIENT
Start: 2019-06-09 | End: 2019-06-09 | Stop reason: HOSPADM

## 2019-06-09 RX ORDER — MIDAZOLAM HYDROCHLORIDE 1 MG/ML
.25-5 INJECTION, SOLUTION INTRAMUSCULAR; INTRAVENOUS
Status: DISCONTINUED | OUTPATIENT
Start: 2019-06-09 | End: 2019-06-09 | Stop reason: HOSPADM

## 2019-06-09 RX ADMIN — LIDOCAINE HYDROCHLORIDE: 40 SOLUTION TOPICAL at 16:00

## 2019-06-09 RX ADMIN — NITROGLYCERIN 1 INCH: 20 OINTMENT TOPICAL at 23:43

## 2019-06-09 RX ADMIN — Medication 10 ML: at 06:14

## 2019-06-09 RX ADMIN — Medication 10 ML: at 21:40

## 2019-06-09 RX ADMIN — NITROGLYCERIN 1 INCH: 20 OINTMENT TOPICAL at 18:36

## 2019-06-09 RX ADMIN — MORPHINE SULFATE 2 MG: 2 INJECTION, SOLUTION INTRAMUSCULAR; INTRAVENOUS at 09:29

## 2019-06-09 RX ADMIN — AMIODARONE HYDROCHLORIDE 400 MG: 200 TABLET ORAL at 21:39

## 2019-06-09 RX ADMIN — LACTULOSE 30 ML: 20 SOLUTION ORAL at 18:45

## 2019-06-09 RX ADMIN — LOSARTAN POTASSIUM 100 MG: 50 TABLET ORAL at 21:39

## 2019-06-09 RX ADMIN — AMIODARONE HYDROCHLORIDE 400 MG: 200 TABLET ORAL at 08:48

## 2019-06-09 RX ADMIN — FAMOTIDINE 20 MG: 20 TABLET ORAL at 18:35

## 2019-06-09 RX ADMIN — SODIUM CHLORIDE 1000 ML: 900 INJECTION, SOLUTION INTRAVENOUS at 15:30

## 2019-06-09 RX ADMIN — GABAPENTIN 400 MG: 100 CAPSULE ORAL at 08:49

## 2019-06-09 RX ADMIN — FENTANYL CITRATE 50 MCG: 50 INJECTION, SOLUTION INTRAMUSCULAR; INTRAVENOUS at 15:30

## 2019-06-09 RX ADMIN — ROSUVASTATIN CALCIUM 40 MG: 20 TABLET, FILM COATED ORAL at 21:40

## 2019-06-09 RX ADMIN — MIDAZOLAM HYDROCHLORIDE 2 MG: 1 INJECTION, SOLUTION INTRAMUSCULAR; INTRAVENOUS at 15:30

## 2019-06-09 RX ADMIN — MORPHINE SULFATE 2 MG: 2 INJECTION, SOLUTION INTRAMUSCULAR; INTRAVENOUS at 11:27

## 2019-06-09 RX ADMIN — CARBAMAZEPINE 200 MG: 200 TABLET ORAL at 08:49

## 2019-06-09 RX ADMIN — METOPROLOL SUCCINATE 100 MG: 50 TABLET, EXTENDED RELEASE ORAL at 21:39

## 2019-06-09 RX ADMIN — Medication 10 ML: at 09:29

## 2019-06-09 RX ADMIN — MUPIROCIN: 20 OINTMENT TOPICAL at 21:40

## 2019-06-09 RX ADMIN — PANTOPRAZOLE SODIUM 40 MG: 40 TABLET, DELAYED RELEASE ORAL at 06:14

## 2019-06-09 RX ADMIN — NITROGLYCERIN 0.4 MG: 0.4 TABLET, ORALLY DISINTEGRATING SUBLINGUAL at 09:08

## 2019-06-09 RX ADMIN — NITROGLYCERIN 1 INCH: 20 OINTMENT TOPICAL at 12:00

## 2019-06-09 RX ADMIN — HEPARIN SODIUM 14 UNITS/KG/HR: 5000 INJECTION, SOLUTION INTRAVENOUS at 18:51

## 2019-06-09 RX ADMIN — MORPHINE SULFATE 2 MG: 2 INJECTION, SOLUTION INTRAMUSCULAR; INTRAVENOUS at 07:22

## 2019-06-09 RX ADMIN — NITROGLYCERIN 1 INCH: 20 OINTMENT TOPICAL at 06:14

## 2019-06-09 RX ADMIN — NITROGLYCERIN 0.4 MG: 0.4 TABLET, ORALLY DISINTEGRATING SUBLINGUAL at 08:53

## 2019-06-09 RX ADMIN — Medication 10 ML: at 18:57

## 2019-06-09 RX ADMIN — CARBAMAZEPINE 200 MG: 200 TABLET ORAL at 18:35

## 2019-06-09 RX ADMIN — LIDOCAINE HYDROCHLORIDE: 20 JELLY TOPICAL at 15:30

## 2019-06-09 RX ADMIN — GABAPENTIN 400 MG: 100 CAPSULE ORAL at 18:35

## 2019-06-09 RX ADMIN — HEPARIN SODIUM 14 UNITS/KG/HR: 5000 INJECTION, SOLUTION INTRAVENOUS at 17:25

## 2019-06-09 RX ADMIN — HYDROCODONE BITARTRATE AND ACETAMINOPHEN 1 TABLET: 10; 325 TABLET ORAL at 00:40

## 2019-06-09 RX ADMIN — HYDROCODONE BITARTRATE AND ACETAMINOPHEN 1 TABLET: 10; 325 TABLET ORAL at 06:14

## 2019-06-09 RX ADMIN — Medication 10 ML: at 08:50

## 2019-06-09 RX ADMIN — FENTANYL CITRATE 50 MCG: 50 INJECTION, SOLUTION INTRAMUSCULAR; INTRAVENOUS at 15:35

## 2019-06-09 RX ADMIN — Medication 10 ML: at 16:00

## 2019-06-09 NOTE — PROGRESS NOTES
Pt in route to bronch lab and converted to A fib HR ranging 80-99. When pt returned from [facility] lab his HR ranged from 70-90 A FIB.

## 2019-06-09 NOTE — PROGRESS NOTES
IV heparin rate has been restarted 14 units/kg/hr.  Order placed for PTT at 2330    Lab Results   Component Value Date/Time    aPTT 82.4 (H) 06/09/2019 08:10 AM

## 2019-06-09 NOTE — PROGRESS NOTES
TRANSFER - OUT REPORT:    Verbal report given to Steffany Gilliam RN(name) on [employer]  being transferred to 2225(unit) for routine post - op bronchoscopy. Report consisted of patients Situation, Background, Assessment and   Recommendations(SBAR). Information from the following report(s) SBAR, Procedure Summary, Recent Results and Cardiac Rhythm Afib was reviewed with the receiving nurse. Lines:   Peripheral IV 06/06/19 Left Antecubital (Active)   Site Assessment Clean, dry, & intact 6/9/2019  3:00 PM   Phlebitis Assessment 0 6/9/2019  3:00 PM   Infiltration Assessment 0 6/9/2019  3:00 PM   Dressing Status Clean, dry, & intact 6/9/2019  3:00 PM   Dressing Type Transparent;Tape 6/9/2019  3:00 PM   Hub Color/Line Status Patent;Capped;Green 6/9/2019  3:00 PM   Alcohol Cap Used No 6/9/2019  3:00 PM       Peripheral IV 06/06/19 Anterior;Proximal;Right Forearm (Active)   Site Assessment Clean, dry, & intact 6/9/2019  3:00 PM   Phlebitis Assessment 0 6/9/2019  3:00 PM   Infiltration Assessment 0 6/9/2019  3:00 PM   Dressing Status Clean, dry, & intact 6/9/2019  3:00 PM   Dressing Type Transparent;Tape 6/9/2019  3:00 PM   Hub Color/Line Status Patent;Capped;Green 6/9/2019  3:00 PM   Alcohol Cap Used No 6/9/2019  3:00 PM    Opportunity for questions and clarification was provided.       Patient transported with:   O2 @ 6 liters

## 2019-06-09 NOTE — PROGRESS NOTES
Had SOB last night requiring Lasix. Also episode of hemoptysis. Needs CABG and Maze. Will schedule for tomorrow after clearance by Pulmonary for the hemoptysis. I have examined the patient, no changes in patient's medical status. Necessity for procedure is still present and the H&P is still current.

## 2019-06-09 NOTE — ACP (ADVANCE CARE PLANNING)
HCPOA consult & HCPOA completed. Requested by staff on behalf of pt & family; pt will have surgery tomorrow. Also, initial visit to assess pt's spiritual needs. Feeling today?  good    Receiving good care?  yes    Needs from Spiritual Care:  prayers    Ministry of presence & prayer to demonstrate caring & concern, convey emotional & spiritual support.     Chaplain Marcia Pedro MDiv,ThM,PhD

## 2019-06-09 NOTE — PROGRESS NOTES
[facility] CARDIOLOGY PROGRESS NOTE           6/9/2019 7:40 AM    Admit Date: 6/6/2019    Admit Diagnosis: Atrial fibrillation with RVR ([address] [I48.91]      Subjective:   Shortness of breath worse yesterday and overnight, no chest pain, coughing up some blood tinged sputum this AM    Interval History: (History of pertinent interval events obtained from nursing staff)  Blood tinged sputum as noted above, 2200 UOP but only -440    ROS:  GEN:  No fever or chills  Cardiovascular:  As noted above  Pulmonary:  As noted above  Neuro:  No new focal motor or sensory loss      Objective:     Vitals:    06/08/19 2307 06/09/19 0345 06/09/19 0616 06/09/19 0718   BP: 106/61 98/58 140/71 119/71   Pulse: 60 (!) 57 61 68   Resp: 20 20  22   Temp: 98.1 °F (36.7 °C) 97.5 °F (36.4 °C)  98.4 °F (36.9 °C)   SpO2: 95% 96%  97%   Weight:  93 kg (205 lb)     Height:           Physical Exam:  General-Well Developed, Well Nourished, No Acute Distress, Alert & Oriented x 3, appropriate mood. Neck- supple, no JVD  CV- regular rate and rhythm no MRG  Lung- bibasilar crackles  Abd- soft, nontender, nondistended  Ext- no edema bilaterally.   Skin- warm and dry    Current Facility-Administered Medications   Medication Dose Route Frequency    polyethylene glycol (MIRALAX) packet 17 g  17 g Oral DAILY PRN    amiodarone (CORDARONE) tablet 400 mg  400 mg Oral Q12H    fentaNYL citrate (PF) injection  mcg   mcg IntraVENous Multiple    lidocaine (XYLOCAINE) 10 mg/mL (1 %) injection 2-20 mL  2-20 mL IntraDERMal Multiple    midazolam (VERSED) injection 0.5-2 mg  0.5-2 mg IntraVENous Multiple    rosuvastatin (CRESTOR) tablet 40 mg  40 mg Oral QHS    heparin 25,000 units in dextrose 500 mL infusion  12-25 Units/kg/hr IntraVENous TITRATE    sodium chloride (NS) flush 5-40 mL  5-40 mL IntraVENous Q8H    losartan (COZAAR) tablet 100 mg  100 mg Oral DAILY    docusate sodium (COLACE) capsule 100 mg  100 mg Oral DAILY PRN  aspirin delayed-release tablet 81 mg  81 mg Oral DAILY    carBAMazepine (TEGretol) tablet 200 mg  200 mg Oral BID    fenofibrate (LOFIBRA) tablet 160 mg  160 mg Oral DAILY    gabapentin (NEURONTIN) capsule 400 mg  400 mg Oral BID    metoprolol succinate (TOPROL-XL) XL tablet 100 mg  100 mg Oral DAILY    pantoprazole (PROTONIX) tablet 40 mg  40 mg Oral ACB    sodium chloride (NS) flush 5-40 mL  5-40 mL IntraVENous PRN    nitroglycerin (NITROBID) 2 % ointment 1 Inch  1 Inch Topical Q6H    nitroglycerin (NITROSTAT) tablet 0.4 mg  0.4 mg SubLINGual Q5MIN PRN    acetaminophen (TYLENOL) tablet 650 mg  650 mg Oral Q4H PRN    HYDROcodone-acetaminophen (NORCO)  mg tablet 1 Tab  1 Tab Oral Q4H PRN    ondansetron (ZOFRAN) injection 4 mg  4 mg IntraVENous Q4H PRN    morphine injection 2 mg  2 mg IntraVENous Q4H PRN    famotidine (PEPCID) tablet 20 mg  20 mg Oral BID     Data Review:   Recent Results (from the past 24 hour(s))   PTT    Collection Time: 06/08/19 10:21 AM   Result Value Ref Range    aPTT 80.7 (H) 24.7 - 39.8 SEC   PTT    Collection Time: 06/08/19  5:01 PM   Result Value Ref Range    aPTT 56.6 (H) 24.7 - 39.8 SEC   PTT    Collection Time: 06/09/19 12:39 AM   Result Value Ref Range    aPTT 126.2 (H) 24.7 - 39.8 SEC       EKG:  (EKG has been independently visualized by me with interpretation below)  Assessment:     Principal Problem:    Atrial fibrillation with RVR ([facility] (6/6/2019)    Active Problems:    Dyslipidemia (4/7/2017)      Hypertension (4/7/2017)      Coronary atherosclerosis of native coronary vessel (4/7/2017)      Overview: 2006:  PCI RCA - Liberte, PCI LAD Cypher, PCI LCx Cypher      01/2014:  Stress testing with fixed inferior defect - no ischemia      11/2014:  EF with EF 50-55% and inferior HK       07/2018:  EF 45-50%      01/2019:  EF 40-45%            Atrial fibrillation ([facility] [address] (4/7/2017)      Chronic systolic congestive heart failure ([facility] (1/31/2019)      Overview: 2014: EF 55%      07/2018:  EF 45-50%      01/2019:  EF 40-45%      NSTEMI (non-ST elevated myocardial infarction) ([facility] [address] (6/6/2019)      Plan:   1. CAD: CT surgery consulted, plan for probable CABG/Maze/MICHEAL closure, cont current meds  2. Afib: currently NSR, eliquis on hold for CABG, on heparin, transitioned to oral amiodarone, cont metoprolol  3. Shortness of breath: additional lasix last night, CXR with improved pulmonary edema, follow up UOP and may give additional lasix pending lab work this AM  4. Hemoptysis: blood tinged sputum, H/H relatively stable, will continue heparin but if worsens may need to stop today  5. PPx: heparin    Clarisse Sharma MD  Cardiology/Electrophysiology

## 2019-06-09 NOTE — PROCEDURES
PROCEDURE    Bronchoscopy with airway inspection /cleanout     INDICATION   Hemoptysis    EQUIPMENT:  Olympus T 180 Bronchoscope    ANESTHESIA    Concious sedation with: Fentanyl 100 mcg IV; Versed 2mg IV; Lidocaine 200 mg to tracheo-bronchial tree and vocal cords. IMAGING:  CT Chest 6/9/19        AIRWAY INSPECTION    After obtaining informed consent, orally with use of a bite block, an Olympus T 180 Bronchoscope was introduced into the trachea without complication. RIGHT    LOCATION NORM/ABNORM DESCRIPTION   Larynx NL    VOCAL CORDS NL    TRACHEA ABNL Sonia Read blood, no focal bleeding lesion   KATHARINA ABNL \"   RMSB ABNL \"   RUL ABNL \"   BI ABNL \"   RML ABNL \"   RLL ABNL \"   SUP SEGM RLL NL    MED BASAL NL    ANTERIOR BASAL NL    LATERAL BASAL NL    POSTERIOR BASAL NL               LEFT    LOCATION NORM/ABNORM DESCRIPTION   LMSB ABNL Sonia Read blood, no focal bleeding lesion   ANGEL ABNL \"   LINGULA ABNL \"   LLL ABNL \"   SUPERIOR SEG LLL NL    SUYAPA-MEDIAL LLL NL    LATERAL LLL NL    POSTERIOR LLL NL      No samples were obtained both due to the presence of beltran blood in all airways and because the patient's sats were marginal during the procedure. The procedure was completed without complication and the patient tolerated the procedure well. EBL: None from procedure. Active bleeding throughout all airways. Recommendations:  Patient appears to have diffuse alveolar bleeding. In the past this has resolved with holding anticoagulation. Will check an TAMARA, ANCA panel and anti GBM to look for signs of vasculitis as a cause. If these are negative he likely has bland hemorrhage from elevated LVEDP. At present there is no intervention to stem his hemorrhage short of stopping his heparin drip. He will likely be at ongoing high risk of hemorrhage as long as he is on blood thinner. He already has significant B infiltrates seen on CT that are likely due to either blood, edema, or both.    He was somewhat difficult to oxygenate during bronchoscopy and may be difficult to oxygenate during and after surgical intervention as well.      Emil Buckner MD

## 2019-06-09 NOTE — PROGRESS NOTES
HCPOA consult & HCPOA completed. Requested by staff on behalf of pt & family; pt will have surgery tomorrow. Also, initial visit to assess pt's spiritual needs. Feeling today?  good    Receiving good care?  yes    Needs from Spiritual Care:  prayers    Ministry of presence & prayer to demonstrate caring & concern, convey emotional & spiritual support.     omer Gallardo MDiv,ThM,PhD

## 2019-06-09 NOTE — PROGRESS NOTES
Bedside shift change report given to Steffany Gilliam (oncoming nurse) by Saturnino Cuadra (offgoing nurse). Report included the following information SBAR, Kardex, Intake/Output, MAR, Recent Results and Cardiac Rhythm NSR.

## 2019-06-09 NOTE — ANESTHESIA PREPROCEDURE EVALUATION
Relevant Problems   No relevant active problems       Anesthetic History               Review of Systems / Medical History  Patient summary reviewed, nursing notes reviewed and pertinent labs reviewed    Pulmonary        Sleep apnea: No treatment        Comments: Hemoptysis - bronch 6/9 showed diffuse alveolar bleeding   Neuro/Psych       CVA: no residual symptoms      Comments: Trigeminal neuralgia Cardiovascular    Hypertension: well controlled        Dysrhythmias : atrial fibrillation  CAD and cardiac stents    Exercise tolerance: >4 METS  Comments: Echo - EF 55%, mild MR/TR  Cath - LAD 90%, Cx 100%, posteriorlateral/PD - 80%   GI/Hepatic/Renal  Within defined limits              Endo/Other  Within defined limits           Other Findings            Physical Exam    Airway  Mallampati: II  TM Distance: 4 - 6 cm  Neck ROM: normal range of motion   Mouth opening: Normal     Cardiovascular    Rhythm: irregular  Rate: normal         Dental    Dentition: Lower partial plate and Upper partial plate     Pulmonary  Breath sounds clear to auscultation               Abdominal         Other Findings            Anesthetic Plan    ASA: 4  Anesthesia type: general    Monitoring Plan: Arterial line, BIS, CVP, [place] and MARCK      Induction: Intravenous  Anesthetic plan and risks discussed with: Patient and Son / Daughter

## 2019-06-09 NOTE — PROGRESS NOTES
Spoke with Thai Nguyen NP concerning increased crackles and pt coughing up frothy, bloody sputum. New orders for lasix 40 mg IV one time and chest xray in am received.

## 2019-06-09 NOTE — PROGRESS NOTES
Dr Marge Merrill paged. Pt c/o right jaw pain level 10. Pt states his chronic pain does not normally hurt at this level. Nitro. 2 tabs given no relief from jaw pain. Dr. Carlos Eduardo Lott ordered 12 lead EKG, increased frequency of morphine and troponin level.

## 2019-06-09 NOTE — CONSULTS
CONSULT NOTE    Mignon Montana    6/9/2019    Date of Admission:  6/6/2019    The patient's chart is reviewed and the patient is discussed with the staff. Subjective:     Patient is a 66 y.o.  male seen and evaluated at the request of Dr. Concetta Chaney. Patient has a history of TIA, trigeminal neuralgia, HTN, HL, a.fib on Eliquis / ASA, chronic sCHF, and CAD. He is followed by Cardiology as an outpatient and was seen for worsening dyspnea with recommendation for [address] / MARCK/eCV but this was never scheduled. He presented to the ER with c/o R sided facial/jaw pain and substernal chest pain. In the ER he was found to be in a.fib with RVR with HR in the 130s and troponin of 0.14 >>>21.30. 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 He was started on a heparin gtt (last dose of eliquis 6/6) in anticipation of surgical intervention. He was given lasix for volume overload with 2 liters urine output yesterday (net -265 ml). Patient has a remote history of tobacco abuse  - smoked 1.5 ppd x 14 years before quitting 40 years ago. He has never been dx with lung disease, does not require inhalers or home O2. Patient states that he has had some hemoptysis over the last couple of months but it is usually pink, frothy sputum and not dark red blood as he is currently having. He usually stops his ASA and it resolves. Yesterday he had several episodes where he coughed up a moderate amount of dark red blood. He has not had any so far today. Surgery is scheduled for tomorrow and we have been consulted for airway inspection for surgical clearance.         Review of Systems  Constitutional: negative  Eyes: negative  Ears, nose, mouth, throat, and face: positive for trigeminal neuralgia / some recent difficulty swallowing  Respiratory: positive for cough, hemoptysis or dyspnea on exertion  Cardiovascular: negative for chest pain, palpitations, lower extremity edema  Gastrointestinal: positive for reflux symptoms  Genitourinary:negative  Integument/breast: negative  Hematologic/lymphatic: negative  Musculoskeletal:negative  Neurological: negative  Behavioral/Psych: negative  Endocrine: negative    Patient Active Problem List   Diagnosis Code    Dyslipidemia E78.5    Hypertension I10    Coronary atherosclerosis of native coronary vessel I25.10    Atrial fibrillation (HCC) I48.91    TIA (transient ischemic attack) G45.9    Chronic systolic congestive heart failure (HCC) I50.22    Atrial fibrillation with RVR (HCC) I48.91    NSTEMI (non-ST elevated myocardial infarction) ([facility] [address] I21.4       Prior to Admission Medications   Prescriptions Last Dose Informant Patient Reported? Taking? APPLE CIDER VINEGAR PO   Yes No   Sig: Take  by mouth. B INFANTIS/B ANI/B CHESTER/B BIFID (PROBIOTIC 4X PO)   Yes No   Sig: Take  by mouth daily. CA/D3/MAG OX/ZINC//ARLETTE/BOR (CALCIUM 600-D3 PLUS PO)   Yes No   Sig: Take  by mouth. CYANOCOBALAMIN, VITAMIN B-12, (VITAMIN B12 PO)   Yes No   Sig: Take  by mouth. DOCOSAHEXANOIC ACID/EPA (FISH OIL PO)   Yes No   Sig: Take  by mouth. HYDROcodone-acetaminophen (NORCO)  mg tablet   Yes No   Sig: Take 1 Tab by mouth two (2) times daily as needed for Pain. TURMERIC ROOT EXTRACT PO   Yes No   Sig: Take  by mouth. apixaban (ELIQUIS) 5 mg tablet   No No   Sig: Take 1 Tab by mouth two (2) times a day. aspirin delayed-release 81 mg tablet   No No   Sig: Take 1 Tab by mouth daily. carBAMazepine (TEGRETOL) 200 mg tablet   Yes No   Sig: Take 200 mg by mouth two (2) times a day. co-enzyme Q-10 (CO Q-10) 100 mg capsule   Yes No   Sig: Take 100 mg by mouth daily. fenofibrate (LOFIBRA) 160 mg tablet   No No   Sig: Take 1 Tab by mouth daily. gabapentin (NEURONTIN) 300 mg capsule   Yes No   Sig: Take 400 mg by mouth two (2) times a day.    losartan-hydroCHLOROthiazide (HYZAAR) 100-25 mg per tablet   No No   Sig: TAKE 1 TABLET BY MOUTH DAILY   metoprolol succinate (TOPROL-XL) 100 mg tablet   No No   Sig: TAKE 1 TABLET BY MOUTH DAILY   multivitamin (ONE A DAY) tablet   Yes No   Sig: Take 1 Tab by mouth daily. nicotinic acid (NIACIN) 500 mg tablet Not Taking at Unknown time  Yes No   Sig: Take 500 mg by mouth Daily (before breakfast). nitroglycerin (NITROSTAT) 0.4 mg SL tablet   No No   Sig: TAKE AS DIRECTED.   omeprazole (PRILOSEC OTC) 20 mg tablet   Yes No   Sig: Take 20 mg by mouth daily. Indications: patient unsure of particular medication   senna (SENNA-GEN) 8.6 mg tablet   Yes Yes   Sig: Take 2 Tabs by mouth as needed for Constipation.       Facility-Administered Medications: None       Past Medical History:   Diagnosis Date    Atrial fibrillation ([facility] [address]     Atrial flutter ([facility] [address] 4/7/2017    CAD (coronary artery disease)     Cancer (HCC)     prostate    Ill-defined condition     trigeminal neuralgia    Sleep apnea     does not wear CPAP    Stroke [facility])      Past Surgical History:   Procedure Laterality Date    HX CORONARY STENT PLACEMENT      HX HEART CATHETERIZATION  08/14/2006    HX PROSTATE SURGERY       Social History     Socioeconomic History    Marital status:      Spouse name: Not on file    Number of children: Not on file    Years of education: Not on file    Highest education level: Not on file   Occupational History    Not on file   Social Needs    Financial resource strain: Not on file    Food insecurity:     Worry: Not on file     Inability: Not on file    Transportation needs:     Medical: Not on file     Non-medical: Not on file   Tobacco Use    Smoking status: Former Smoker    Smokeless tobacco: Never Used   Substance and Sexual Activity    Alcohol use: No    Drug use: Not on file    Sexual activity: Not on file   Lifestyle    Physical activity:     Days per week: Not on file     Minutes per session: Not on file    Stress: Not on file Relationships    Social connections:     Talks on phone: Not on file     Gets together: Not on file     Attends Rest[employer] service: Not on file     Active member of club or organization: Not on file     Attends meetings of clubs or organizations: Not on file     Relationship status: Not on file    Intimate partner violence:     Fear of current or ex partner: Not on file     Emotionally abused: Not on file     Physically abused: Not on file     Forced sexual activity: Not on file   Other Topics Concern     Service Not Asked    Blood Transfusions Not Asked    Caffeine Concern Not Asked    Occupational Exposure Not Asked    Hobby Hazards Not Asked    Sleep Concern Not Asked    Stress Concern Not Asked    Weight Concern Not Asked    Special Diet Not Asked    Back Care Not Asked    Exercise Not Asked    Bike Helmet Not Asked    Seat Belt Not Asked    Self-Exams Not Asked   Social History Narrative    Not on file     Family History   Problem Relation Age of Onset    Other Other         cerebral aneurysm      Allergies   Allergen Reactions    Codeine Unknown (comments)    Iodinated Contrast- Oral And Iv Dye Unknown (comments)       Current Facility-Administered Medications   Medication Dose Route Frequency    polyethylene glycol (MIRALAX) packet 17 g  17 g Oral DAILY PRN    amiodarone (CORDARONE) tablet 400 mg  400 mg Oral Q12H    fentaNYL citrate (PF) injection  mcg   mcg IntraVENous Multiple    lidocaine (XYLOCAINE) 10 mg/mL (1 %) injection 2-20 mL  2-20 mL IntraDERMal Multiple    midazolam (VERSED) injection 0.5-2 mg  0.5-2 mg IntraVENous Multiple    rosuvastatin (CRESTOR) tablet 40 mg  40 mg Oral QHS    heparin 25,000 units in dextrose 500 mL infusion  12-25 Units/kg/hr IntraVENous TITRATE    sodium chloride (NS) flush 5-40 mL  5-40 mL IntraVENous Q8H    losartan (COZAAR) tablet 100 mg  100 mg Oral DAILY    docusate sodium (COLACE) capsule 100 mg  100 mg Oral DAILY PRN  aspirin delayed-release tablet 81 mg  81 mg Oral DAILY    carBAMazepine (TEGretol) tablet 200 mg  200 mg Oral BID    fenofibrate (LOFIBRA) tablet 160 mg  160 mg Oral DAILY    gabapentin (NEURONTIN) capsule 400 mg  400 mg Oral BID    metoprolol succinate (TOPROL-XL) XL tablet 100 mg  100 mg Oral DAILY    pantoprazole (PROTONIX) tablet 40 mg  40 mg Oral ACB    sodium chloride (NS) flush 5-40 mL  5-40 mL IntraVENous PRN    nitroglycerin (NITROBID) 2 % ointment 1 Inch  1 Inch Topical Q6H    nitroglycerin (NITROSTAT) tablet 0.4 mg  0.4 mg SubLINGual Q5MIN PRN    acetaminophen (TYLENOL) tablet 650 mg  650 mg Oral Q4H PRN    HYDROcodone-acetaminophen (NORCO)  mg tablet 1 Tab  1 Tab Oral Q4H PRN    ondansetron (ZOFRAN) injection 4 mg  4 mg IntraVENous Q4H PRN    morphine injection 2 mg  2 mg IntraVENous Q4H PRN    famotidine (PEPCID) tablet 20 mg  20 mg Oral BID       Objective:     Vitals:    06/09/19 0345 06/09/19 0616 06/09/19 0718 06/09/19 0853   BP: 98/58 140/71 119/71 133/74   Pulse: (!) 57 61 68    Resp: 20  22    Temp: 97.5 °F (36.4 °C)  98.4 °F (36.9 °C)    SpO2: 96%  97%    Weight: 205 lb (93 kg)      Height:           PHYSICAL EXAM     Constitutional:  the patient is well developed and in no acute distress  EENMT:  Sclera clear, pupils equal, oral mucosa moist  Respiratory: diffuse crackles bilateral posterior, O2 at 2 lpm  Cardiovascular:  RRR without M,G,R  Gastrointestinal: soft and non-tender; with positive bowel sounds. Musculoskeletal: warm without cyanosis. There is no lower extremity edema.   Skin:  no jaundice or rashes, no open wounds   Neurologic: no gross neuro deficits     Psychiatric:  alert and oriented x 3    CXR:    6/8 6/9        Recent Labs     06/09/19  0810 06/08/19  0248 06/07/19  0359 06/06/19  1957   WBC 14.1*  --  7.9 9.3   HGB 12.0*  --  13.3* 14.7   HCT 33.7*  --  37.4* 40.5*     --  174 231   INR  --  1.2  --   --      Recent Labs     06/09/19  0810 06/08/19  0248 06/07/19  0359 06/06/19 1957   *  --  135* 133*   K 3.5  --  3.6 3.2*   CL 94*  --  101 99   GLU 87  --  132* 130*   CO2 27  --  26 23   BUN 20  --  10 9   CREA 1.06  --  0.81 0.98   MG  --  1.9  --   --    CA 7.8*  --  8.2* 9.1   TROIQ  --   --  21.30*  --    ALB  --   --   --  3.9   TBILI  --   --   --  1.5*   ALT  --   --   --  21   SGOT  --   --   --  16     No results for input(s): PH, PCO2, PO2, HCO3 in the last 72 hours. No results for input(s): LCAD, LAC in the last 72 hours.     Assessment:  (Medical Decision Making)     Hospital Problems  Date Reviewed: 6/9/2019          Codes Class Noted POA    Hemoptysis ICD-10-CM: R04.2  ICD-9-CM: 786.30  6/9/2019 Unknown    Dark red blood yesterday  None so far today  Has had some hemopytsis in the recent past but it was pink / frothy      Trigeminal neuralgia (Chronic) ICD-10-CM: G50.0  ICD-9-CM: 350.1  6/9/2019 Yes        * (Principal) Atrial fibrillation with RVR ([facility] ICD-10-CM: I48.91  ICD-9-CM: 427.31  6/6/2019 Yes    Rate now controlled      NSTEMI (non-ST elevated myocardial infarction) ([facility] ICD-10-CM: I21.4  ICD-9-CM: 410.70  6/6/2019 Yes    Severe MVCAD  Plan for CABG tomorrow      Chronic systolic congestive heart failure (HCC) (Chronic) ICD-10-CM: I50.22  ICD-9-CM: 428.22, 428.0  1/31/2019 Yes     2014:  EF 55%  07/2018:  EF 45-50%  01/2019:  EF 40-45%         Dyslipidemia (Chronic) ICD-10-CM: E78.5  ICD-9-CM: 272.4  4/7/2017 Yes        Hypertension (Chronic) ICD-10-CM: I10  ICD-9-CM: 401.9  4/7/2017 Yes        Coronary atherosclerosis of native coronary vessel ICD-10-CM: I25.10  ICD-9-CM: 414.01  4/7/2017 Yes     2006:  PCI RCA - Liberte, PCI LAD Cypher, PCI LCx Cypher  01/2014:  Stress testing with fixed inferior defect - no ischemia  11/2014:  EF with EF 50-55% and inferior HK   07/2018:  EF 45-50%  01/2019:  EF 40-45%         Atrial fibrillation ([facility] [address] ICD-10-CM: I48.91  ICD-9-CM: 427.31  4/7/2017 Yes         Na+ 130    WBC 7.9 >>>14.1    Plan:  (Medical Decision Making)     --NPO now - did have a small amount of breakfast this am  --CT chest without contrast  --hold heparin for 4 hours if ok with CT surgery  --plan for bronch this afternoon at 1500    More than 50% of the time documented was spent in face-to-face contact with the patient and in the care of the patient on the floor/unit where the patient is located. Thank you very much for this referral.  We appreciate the opportunity to participate in this patient's care. Will follow along with above stated plan. Khalif Tomlin NP      Lungs:  Diffuse B rhonchi. [place] blood in sputum cups next to him. Heart:  RRR with no Murmur/Rubs/Gallops    Additional Comments:    Patient with hemoptysis that began while on blood thinner. Only preceding symptom was dysphagia which he says he has been having for the past month. No unintentional weight loss. + chest pain. Only other bleeding happened a few weeks ago on eliquis and asa. No bleeding from other sites. WIll obtain CT chest to evaluate for any focal abnormalities. Bronch this afternoon for airway inspection. If focal bleeding site will attempt cautery. If diffuse oozing, however, no interventions other than holding anticoagulation. Not sure how minimal his post op anticoagulation regimen can be but minimizing blood thinners after his surgery will likely be necessary. I have spoken with and examined the patient. I agree with the above assessment and plan as documented.     Hugo Valdez MD

## 2019-06-10 ENCOUNTER — ANESTHESIA (OUTPATIENT)
Dept: SURGERY | Age: 74
DRG: 229 | End: 2019-06-10
Payer: MEDICARE

## 2019-06-10 ENCOUNTER — APPOINTMENT (OUTPATIENT)
Dept: GENERAL RADIOLOGY | Age: 74
DRG: 229 | End: 2019-06-10
Attending: PHYSICIAN ASSISTANT
Payer: MEDICARE

## 2019-06-10 PROBLEM — Z95.1 S/P CABG X 3: Status: ACTIVE | Noted: 2019-06-10

## 2019-06-10 PROBLEM — J39.8 TRACHEOBRONCHOMALACIA: Status: ACTIVE | Noted: 2019-06-10

## 2019-06-10 PROBLEM — I25.10 CAD (CORONARY ARTERY DISEASE): Status: ACTIVE | Noted: 2019-06-10

## 2019-06-10 PROBLEM — Z99.11 ENCOUNTER FOR WEANING FROM VENTILATOR (HCC): Status: ACTIVE | Noted: 2019-06-10

## 2019-06-10 LAB
ANION GAP SERPL CALC-SCNC: 6 MMOL/L (ref 7–16)
ANION GAP SERPL CALC-SCNC: 8 MMOL/L (ref 7–16)
APTT PPP: 33.7 SEC (ref 24.7–39.8)
APTT PPP: 54.9 SEC (ref 24.7–39.8)
ARTERIAL PATENCY WRIST A: ABNORMAL
ATRIAL RATE: 77 BPM
BASE DEFICIT BLD-SCNC: 1 MMOL/L
BASE DEFICIT BLD-SCNC: 1 MMOL/L
BASE EXCESS BLD CALC-SCNC: 0 MMOL/L
BASE EXCESS BLD CALC-SCNC: 1 MMOL/L
BASE EXCESS BLD CALC-SCNC: 2 MMOL/L
BASOPHILS # BLD: 0 K/UL (ref 0–0.2)
BASOPHILS NFR BLD: 0 % (ref 0–2)
BDY SITE: ABNORMAL
BODY TEMPERATURE: 98.6
BUN SERPL-MCNC: 15 MG/DL (ref 8–23)
BUN SERPL-MCNC: 16 MG/DL (ref 8–23)
CA-I BLD-MCNC: 1.06 MMOL/L (ref 1.12–1.32)
CA-I BLD-MCNC: 1.06 MMOL/L (ref 1.12–1.32)
CA-I BLD-MCNC: 1.07 MMOL/L (ref 1.12–1.32)
CA-I BLD-MCNC: 1.08 MMOL/L (ref 1.12–1.32)
CA-I BLD-MCNC: 1.09 MMOL/L (ref 1.12–1.32)
CA-I BLD-MCNC: 1.21 MMOL/L (ref 1.12–1.32)
CA-I BLD-MCNC: 1.27 MMOL/L (ref 1.12–1.32)
CALCIUM SERPL-MCNC: 7.9 MG/DL (ref 8.3–10.4)
CALCIUM SERPL-MCNC: 7.9 MG/DL (ref 8.3–10.4)
CALCULATED P AXIS, ECG09: 81 DEGREES
CALCULATED R AXIS, ECG10: -30 DEGREES
CALCULATED T AXIS, ECG11: 92 DEGREES
CHLORIDE SERPL-SCNC: 101 MMOL/L (ref 98–107)
CHLORIDE SERPL-SCNC: 96 MMOL/L (ref 98–107)
CO2 SERPL-SCNC: 26 MMOL/L (ref 21–32)
CO2 SERPL-SCNC: 30 MMOL/L (ref 21–32)
COLLECT TIME,HTIME: 1248
CREAT SERPL-MCNC: 1 MG/DL (ref 0.8–1.5)
CREAT SERPL-MCNC: 1.01 MG/DL (ref 0.8–1.5)
CRP SERPL HS-MCNC: 77.3 MG/L
DIAGNOSIS, 93000: NORMAL
DIFFERENTIAL METHOD BLD: ABNORMAL
EOSINOPHIL # BLD: 0 K/UL (ref 0–0.8)
EOSINOPHIL NFR BLD: 0 % (ref 0.5–7.8)
ERYTHROCYTE [DISTWIDTH] IN BLOOD BY AUTOMATED COUNT: 14 % (ref 11.9–14.6)
ERYTHROCYTE [DISTWIDTH] IN BLOOD BY AUTOMATED COUNT: 14 % (ref 11.9–14.6)
ERYTHROCYTE [SEDIMENTATION RATE] IN BLOOD: 16 MM/HR (ref 0–20)
EXHALED MINUTE VOLUME, VE: 11.4 L/MIN
FIBRINOGEN PPP-MCNC: 317 MG/DL (ref 190–501)
GAS FLOW.O2 O2 DELIVERY SYS: ABNORMAL L/MIN
GAS FLOW.O2 SETTING OXYMISER: 16 BPM
GLUCOSE BLD STRIP.AUTO-MCNC: 104 MG/DL (ref 65–100)
GLUCOSE BLD STRIP.AUTO-MCNC: 107 MG/DL (ref 65–100)
GLUCOSE BLD STRIP.AUTO-MCNC: 107 MG/DL (ref 65–100)
GLUCOSE BLD STRIP.AUTO-MCNC: 112 MG/DL (ref 65–100)
GLUCOSE BLD STRIP.AUTO-MCNC: 112 MG/DL (ref 65–100)
GLUCOSE BLD STRIP.AUTO-MCNC: 114 MG/DL (ref 65–100)
GLUCOSE BLD STRIP.AUTO-MCNC: 117 MG/DL (ref 65–100)
GLUCOSE BLD STRIP.AUTO-MCNC: 117 MG/DL (ref 65–100)
GLUCOSE BLD STRIP.AUTO-MCNC: 118 MG/DL (ref 65–100)
GLUCOSE BLD STRIP.AUTO-MCNC: 122 MG/DL (ref 65–100)
GLUCOSE BLD STRIP.AUTO-MCNC: 124 MG/DL (ref 65–100)
GLUCOSE BLD STRIP.AUTO-MCNC: 127 MG/DL (ref 65–100)
GLUCOSE BLD STRIP.AUTO-MCNC: 129 MG/DL (ref 65–100)
GLUCOSE BLD STRIP.AUTO-MCNC: 134 MG/DL (ref 65–100)
GLUCOSE BLD STRIP.AUTO-MCNC: 138 MG/DL (ref 65–100)
GLUCOSE BLD STRIP.AUTO-MCNC: 146 MG/DL (ref 65–100)
GLUCOSE SERPL-MCNC: 113 MG/DL (ref 65–100)
GLUCOSE SERPL-MCNC: 91 MG/DL (ref 65–100)
HCO3 BLD-SCNC: 24.2 MMOL/L (ref 22–26)
HCO3 BLD-SCNC: 24.2 MMOL/L (ref 22–26)
HCO3 BLD-SCNC: 25.8 MMOL/L (ref 22–26)
HCO3 BLD-SCNC: 26.3 MMOL/L (ref 22–26)
HCO3 BLD-SCNC: 26.5 MMOL/L (ref 22–26)
HCO3 BLD-SCNC: 27.2 MMOL/L (ref 22–26)
HCO3 BLD-SCNC: 27.6 MMOL/L (ref 22–26)
HCT VFR BLD AUTO: 25 % (ref 41.1–50.3)
HCT VFR BLD AUTO: 28 % (ref 41.1–50.3)
HCT VFR BLD AUTO: 29 % (ref 41.1–50.3)
HCT VFR BLD AUTO: 30.5 % (ref 41.1–50.3)
HGB BLD-MCNC: 10.1 G/DL (ref 13.6–17.2)
HGB BLD-MCNC: 10.6 G/DL (ref 13.6–17.2)
HGB BLD-MCNC: 8.6 G/DL (ref 13.6–17.2)
HGB BLD-MCNC: 9.9 G/DL (ref 13.6–17.2)
IMM GRANULOCYTES # BLD AUTO: 0 K/UL (ref 0–0.5)
IMM GRANULOCYTES NFR BLD AUTO: 0 % (ref 0–5)
INR PPP: 1.4
INSPIRATION.DURATION SETTING TIME VENT: 0.9 SEC
LYMPHOCYTES # BLD: 1 K/UL (ref 0.5–4.6)
LYMPHOCYTES NFR BLD: 10 % (ref 13–44)
MAGNESIUM SERPL-MCNC: 2.6 MG/DL (ref 1.8–2.4)
MAGNESIUM SERPL-MCNC: 2.7 MG/DL (ref 1.8–2.4)
MAGNESIUM SERPL-MCNC: 3.1 MG/DL (ref 1.8–2.4)
MCH RBC QN AUTO: 33.4 PG (ref 26.1–32.9)
MCH RBC QN AUTO: 33.9 PG (ref 26.1–32.9)
MCHC RBC AUTO-ENTMCNC: 34.8 G/DL (ref 31.4–35)
MCHC RBC AUTO-ENTMCNC: 35.4 G/DL (ref 31.4–35)
MCV RBC AUTO: 95.9 FL (ref 79.6–97.8)
MCV RBC AUTO: 96.2 FL (ref 79.6–97.8)
MONOCYTES # BLD: 1.3 K/UL (ref 0.1–1.3)
MONOCYTES NFR BLD: 12 % (ref 4–12)
NEUTS SEG # BLD: 7.7 K/UL (ref 1.7–8.2)
NEUTS SEG NFR BLD: 77 % (ref 43–78)
NRBC # BLD: 0 K/UL (ref 0–0.2)
NRBC # BLD: 0 K/UL (ref 0–0.2)
O2/TOTAL GAS SETTING VFR VENT: 50 %
P-R INTERVAL, ECG05: 200 MS
PCO2 BLD: 38.5 MMHG (ref 35–45)
PCO2 BLD: 41.9 MMHG (ref 35–45)
PCO2 BLD: 43.8 MMHG (ref 35–45)
PCO2 BLD: 44.1 MMHG (ref 35–45)
PCO2 BLD: 47.4 MMHG (ref 35–45)
PCO2 BLD: 47.7 MMHG (ref 35–45)
PCO2 BLD: 48.5 MMHG (ref 35–45)
PEEP RESPIRATORY: 8 CMH2O
PH BLD: 7.33 [PH] (ref 7.35–7.45)
PH BLD: 7.35 [PH] (ref 7.35–7.45)
PH BLD: 7.36 [PH] (ref 7.35–7.45)
PH BLD: 7.37 [PH] (ref 7.35–7.45)
PH BLD: 7.37 [PH] (ref 7.35–7.45)
PH BLD: 7.38 [PH] (ref 7.35–7.45)
PH BLD: 7.45 [PH] (ref 7.35–7.45)
PLATELET # BLD AUTO: 154 K/UL (ref 150–450)
PLATELET # BLD AUTO: 159 K/UL (ref 150–450)
PMV BLD AUTO: 9.3 FL (ref 9.4–12.3)
PMV BLD AUTO: 9.4 FL (ref 9.4–12.3)
PO2 BLD: 155 MMHG (ref 75–100)
PO2 BLD: 239 MMHG (ref 75–100)
PO2 BLD: 245 MMHG (ref 75–100)
PO2 BLD: 257 MMHG (ref 75–100)
PO2 BLD: 46 MMHG (ref 75–100)
PO2 BLD: 81 MMHG (ref 75–100)
PO2 BLD: 85 MMHG (ref 75–100)
POTASSIUM BLD-SCNC: 3.2 MMOL/L (ref 3.5–5.1)
POTASSIUM BLD-SCNC: 3.5 MMOL/L (ref 3.5–5.1)
POTASSIUM BLD-SCNC: 3.5 MMOL/L (ref 3.5–5.1)
POTASSIUM BLD-SCNC: 3.7 MMOL/L (ref 3.5–5.1)
POTASSIUM BLD-SCNC: 3.8 MMOL/L (ref 3.5–5.1)
POTASSIUM BLD-SCNC: 4.1 MMOL/L (ref 3.5–5.1)
POTASSIUM BLD-SCNC: 4.3 MMOL/L (ref 3.5–5.1)
POTASSIUM SERPL-SCNC: 3.4 MMOL/L (ref 3.5–5.1)
POTASSIUM SERPL-SCNC: 3.7 MMOL/L (ref 3.5–5.1)
POTASSIUM SERPL-SCNC: 3.8 MMOL/L (ref 3.5–5.1)
POTASSIUM SERPL-SCNC: 4.2 MMOL/L (ref 3.5–5.1)
PROTHROMBIN TIME: 16.5 SEC (ref 11.7–14.5)
Q-T INTERVAL, ECG07: 448 MS
QRS DURATION, ECG06: 124 MS
QTC CALCULATION (BEZET), ECG08: 506 MS
RBC # BLD AUTO: 2.92 M/UL (ref 4.23–5.6)
RBC # BLD AUTO: 3.17 M/UL (ref 4.23–5.6)
SAO2 % BLD: 100 % (ref 95–98)
SAO2 % BLD: 78 % (ref 95–98)
SAO2 % BLD: 95 % (ref 95–98)
SAO2 % BLD: 96 % (ref 95–98)
SAO2 % BLD: 99 % (ref 95–98)
SERVICE CMNT-IMP: ABNORMAL
SERVICE CMNT-IMP: ABNORMAL
SODIUM BLD-SCNC: 130 MMOL/L (ref 136–145)
SODIUM BLD-SCNC: 131 MMOL/L (ref 136–145)
SODIUM BLD-SCNC: 132 MMOL/L (ref 136–145)
SODIUM SERPL-SCNC: 132 MMOL/L (ref 136–145)
SODIUM SERPL-SCNC: 135 MMOL/L (ref 136–145)
SPECIMEN TYPE: ABNORMAL
VENTILATION MODE VENT: ABNORMAL
VENTRICULAR RATE, ECG03: 77 BPM
VT SETTING VENT: 500 ML
WBC # BLD AUTO: 10.1 K/UL (ref 4.3–11.1)
WBC # BLD AUTO: 11.5 K/UL (ref 4.3–11.1)

## 2019-06-10 PROCEDURE — 77030002970 HC SUT PLEDG TELE -A: Performed by: THORACIC SURGERY (CARDIOTHORACIC VASCULAR SURGERY)

## 2019-06-10 PROCEDURE — P9045 ALBUMIN (HUMAN), 5%, 250 ML: HCPCS

## 2019-06-10 PROCEDURE — 77030012699 HC VLV SUC CNTRL OCOA -A: Performed by: INTERNAL MEDICINE

## 2019-06-10 PROCEDURE — 77030005537 HC CATH URETH BARD -A: Performed by: THORACIC SURGERY (CARDIOTHORACIC VASCULAR SURGERY)

## 2019-06-10 PROCEDURE — 77030008477 HC STYL SATN SLP COVD -A: Performed by: ANESTHESIOLOGY

## 2019-06-10 PROCEDURE — 77030018836 HC SOL IRR NACL ICUM -A

## 2019-06-10 PROCEDURE — 85027 COMPLETE CBC AUTOMATED: CPT

## 2019-06-10 PROCEDURE — 74011250637 HC RX REV CODE- 250/637: Performed by: INTERNAL MEDICINE

## 2019-06-10 PROCEDURE — 77030002933 HC SUT MCRYL J&J -A: Performed by: THORACIC SURGERY (CARDIOTHORACIC VASCULAR SURGERY)

## 2019-06-10 PROCEDURE — 71045 X-RAY EXAM CHEST 1 VIEW: CPT

## 2019-06-10 PROCEDURE — 77030002520 HC INSRT CLMP LATIS STLTH AMR -B: Performed by: THORACIC SURGERY (CARDIOTHORACIC VASCULAR SURGERY)

## 2019-06-10 PROCEDURE — 85652 RBC SED RATE AUTOMATED: CPT

## 2019-06-10 PROCEDURE — 86038 ANTINUCLEAR ANTIBODIES: CPT

## 2019-06-10 PROCEDURE — 77030010938 HC CLP LIG TELE -A: Performed by: THORACIC SURGERY (CARDIOTHORACIC VASCULAR SURGERY)

## 2019-06-10 PROCEDURE — C1751 CATH, INF, PER/CENT/MIDLINE: HCPCS | Performed by: NURSE ANESTHETIST, CERTIFIED REGISTERED

## 2019-06-10 PROCEDURE — 77030006690 HC BLD OPHTH BVR BD -B: Performed by: THORACIC SURGERY (CARDIOTHORACIC VASCULAR SURGERY)

## 2019-06-10 PROCEDURE — 74011250637 HC RX REV CODE- 250/637: Performed by: NURSE PRACTITIONER

## 2019-06-10 PROCEDURE — 77030039425 HC BLD LARYNG TRULITE DISP TELE -A: Performed by: ANESTHESIOLOGY

## 2019-06-10 PROCEDURE — 77030005521 HC CATH URETH FOL38 BARD -B: Performed by: THORACIC SURGERY (CARDIOTHORACIC VASCULAR SURGERY)

## 2019-06-10 PROCEDURE — 93005 ELECTROCARDIOGRAM TRACING: CPT | Performed by: PHYSICIAN ASSISTANT

## 2019-06-10 PROCEDURE — 76010000198 HC CV SURG 5 TO 5.5 HR INTENSV-TIER 1: Performed by: THORACIC SURGERY (CARDIOTHORACIC VASCULAR SURGERY)

## 2019-06-10 PROCEDURE — 83735 ASSAY OF MAGNESIUM: CPT

## 2019-06-10 PROCEDURE — 77030013884 HC PRB ELECSRG BPLR ATRC -I: Performed by: THORACIC SURGERY (CARDIOTHORACIC VASCULAR SURGERY)

## 2019-06-10 PROCEDURE — 77030018547 HC SUT ETHBND1 J&J -B: Performed by: THORACIC SURGERY (CARDIOTHORACIC VASCULAR SURGERY)

## 2019-06-10 PROCEDURE — 76060000042 HC ANESTHESIA 5.5 TO 6 HR: Performed by: THORACIC SURGERY (CARDIOTHORACIC VASCULAR SURGERY)

## 2019-06-10 PROCEDURE — 77030013838 HC OCCL INT FLRST BAXT -B: Performed by: THORACIC SURGERY (CARDIOTHORACIC VASCULAR SURGERY)

## 2019-06-10 PROCEDURE — 77030013292 HC BOWL MX PRSM J&J -A: Performed by: NURSE ANESTHETIST, CERTIFIED REGISTERED

## 2019-06-10 PROCEDURE — 77030018834: Performed by: THORACIC SURGERY (CARDIOTHORACIC VASCULAR SURGERY)

## 2019-06-10 PROCEDURE — 77030010813: Performed by: THORACIC SURGERY (CARDIOTHORACIC VASCULAR SURGERY)

## 2019-06-10 PROCEDURE — 74011250637 HC RX REV CODE- 250/637: Performed by: THORACIC SURGERY (CARDIOTHORACIC VASCULAR SURGERY)

## 2019-06-10 PROCEDURE — 77030018571 HC SUT PROL1 J&J -B: Performed by: THORACIC SURGERY (CARDIOTHORACIC VASCULAR SURGERY)

## 2019-06-10 PROCEDURE — 77030002986 HC SUT PROL J&J -A: Performed by: THORACIC SURGERY (CARDIOTHORACIC VASCULAR SURGERY)

## 2019-06-10 PROCEDURE — 77030020263 HC SOL INJ SOD CL0.9% LFCR 1000ML

## 2019-06-10 PROCEDURE — 77030002996 HC SUT SLK J&J -A: Performed by: THORACIC SURGERY (CARDIOTHORACIC VASCULAR SURGERY)

## 2019-06-10 PROCEDURE — 77030013797 HC KT TRNSDUC PRSSR EDWD -A: Performed by: THORACIC SURGERY (CARDIOTHORACIC VASCULAR SURGERY)

## 2019-06-10 PROCEDURE — 77030006247 HC LD PCMKR MYOCRD BPLR TEMP MEDT -B: Performed by: THORACIC SURGERY (CARDIOTHORACIC VASCULAR SURGERY)

## 2019-06-10 PROCEDURE — 74011000250 HC RX REV CODE- 250

## 2019-06-10 PROCEDURE — 86580 TB INTRADERMAL TEST: CPT | Performed by: PHYSICIAN ASSISTANT

## 2019-06-10 PROCEDURE — 0BC38ZZ EXTIRPATION OF MATTER FROM RIGHT MAIN BRONCHUS, VIA NATURAL OR ARTIFICIAL OPENING ENDOSCOPIC: ICD-10-PCS | Performed by: INTERNAL MEDICINE

## 2019-06-10 PROCEDURE — 77030003010 HC SUT SURG STL J&J -B: Performed by: THORACIC SURGERY (CARDIOTHORACIC VASCULAR SURGERY)

## 2019-06-10 PROCEDURE — C1729 CATH, DRAINAGE: HCPCS | Performed by: THORACIC SURGERY (CARDIOTHORACIC VASCULAR SURGERY)

## 2019-06-10 PROCEDURE — 77030020751 HC FLTR TBNG TRNSFUS HAEM -A: Performed by: THORACIC SURGERY (CARDIOTHORACIC VASCULAR SURGERY)

## 2019-06-10 PROCEDURE — 82803 BLOOD GASES ANY COMBINATION: CPT

## 2019-06-10 PROCEDURE — 77030018729 HC ELECTRD DEFIB PAD CARD -B: Performed by: THORACIC SURGERY (CARDIOTHORACIC VASCULAR SURGERY)

## 2019-06-10 PROCEDURE — 74011000302 HC RX REV CODE- 302: Performed by: PHYSICIAN ASSISTANT

## 2019-06-10 PROCEDURE — 86141 C-REACTIVE PROTEIN HS: CPT

## 2019-06-10 PROCEDURE — 74011250636 HC RX REV CODE- 250/636

## 2019-06-10 PROCEDURE — 021109W BYPASS CORONARY ARTERY, TWO ARTERIES FROM AORTA WITH AUTOLOGOUS VENOUS TISSUE, OPEN APPROACH: ICD-10-PCS | Performed by: THORACIC SURGERY (CARDIOTHORACIC VASCULAR SURGERY)

## 2019-06-10 PROCEDURE — P9047 ALBUMIN (HUMAN), 25%, 50ML: HCPCS

## 2019-06-10 PROCEDURE — 77030031139 HC SUT VCRL2 J&J -A: Performed by: THORACIC SURGERY (CARDIOTHORACIC VASCULAR SURGERY)

## 2019-06-10 PROCEDURE — 77030034888 HC SUT PROL 2 J&J -B: Performed by: THORACIC SURGERY (CARDIOTHORACIC VASCULAR SURGERY)

## 2019-06-10 PROCEDURE — 77030020782 HC GWN BAIR PAWS FLX 3M -B: Performed by: NURSE ANESTHETIST, CERTIFIED REGISTERED

## 2019-06-10 PROCEDURE — 77030018548 HC SUT ETHBND2 J&J -B: Performed by: THORACIC SURGERY (CARDIOTHORACIC VASCULAR SURGERY)

## 2019-06-10 PROCEDURE — 76010000155 HC AUTO TRANSFUSION/CELL SAVER: Performed by: THORACIC SURGERY (CARDIOTHORACIC VASCULAR SURGERY)

## 2019-06-10 PROCEDURE — 74011250636 HC RX REV CODE- 250/636: Performed by: INTERNAL MEDICINE

## 2019-06-10 PROCEDURE — 77030029004 HC CLP ATRI LAA EXCL ATRC -G1: Performed by: THORACIC SURGERY (CARDIOTHORACIC VASCULAR SURGERY)

## 2019-06-10 PROCEDURE — 77030005518 HC CATH URETH FOL 2W BARD -B: Performed by: THORACIC SURGERY (CARDIOTHORACIC VASCULAR SURGERY)

## 2019-06-10 PROCEDURE — 74011250636 HC RX REV CODE- 250/636: Performed by: ANESTHESIOLOGY

## 2019-06-10 PROCEDURE — 74011000258 HC RX REV CODE- 258

## 2019-06-10 PROCEDURE — 77030009355 HC CRDPLG DEL SET QUES -C: Performed by: THORACIC SURGERY (CARDIOTHORACIC VASCULAR SURGERY)

## 2019-06-10 PROCEDURE — 77030016687: Performed by: THORACIC SURGERY (CARDIOTHORACIC VASCULAR SURGERY)

## 2019-06-10 PROCEDURE — 77030005401 HC CATH RAD ARRO -A: Performed by: NURSE ANESTHETIST, CERTIFIED REGISTERED

## 2019-06-10 PROCEDURE — 74011000250 HC RX REV CODE- 250: Performed by: INTERNAL MEDICINE

## 2019-06-10 PROCEDURE — 94640 AIRWAY INHALATION TREATMENT: CPT

## 2019-06-10 PROCEDURE — 77030016564 HC BLD STRNL SAW4 CNMD -B: Performed by: THORACIC SURGERY (CARDIOTHORACIC VASCULAR SURGERY)

## 2019-06-10 PROCEDURE — 77030025646 HC AUTOTRNSFUS KT TERU -C: Performed by: THORACIC SURGERY (CARDIOTHORACIC VASCULAR SURGERY)

## 2019-06-10 PROCEDURE — 77030002987 HC SUT PROL J&J -B: Performed by: THORACIC SURGERY (CARDIOTHORACIC VASCULAR SURGERY)

## 2019-06-10 PROCEDURE — 36600 WITHDRAWAL OF ARTERIAL BLOOD: CPT

## 2019-06-10 PROCEDURE — 02L70CK OCCLUSION OF LEFT ATRIAL APPENDAGE WITH EXTRALUMINAL DEVICE, OPEN APPROACH: ICD-10-PCS | Performed by: THORACIC SURGERY (CARDIOTHORACIC VASCULAR SURGERY)

## 2019-06-10 PROCEDURE — 85610 PROTHROMBIN TIME: CPT

## 2019-06-10 PROCEDURE — 74011250637 HC RX REV CODE- 250/637: Performed by: PHYSICIAN ASSISTANT

## 2019-06-10 PROCEDURE — 85730 THROMBOPLASTIN TIME PARTIAL: CPT

## 2019-06-10 PROCEDURE — 80048 BASIC METABOLIC PNL TOTAL CA: CPT

## 2019-06-10 PROCEDURE — 82962 GLUCOSE BLOOD TEST: CPT

## 2019-06-10 PROCEDURE — 74011250636 HC RX REV CODE- 250/636: Performed by: THORACIC SURGERY (CARDIOTHORACIC VASCULAR SURGERY)

## 2019-06-10 PROCEDURE — 02580ZZ DESTRUCTION OF CONDUCTION MECHANISM, OPEN APPROACH: ICD-10-PCS | Performed by: THORACIC SURGERY (CARDIOTHORACIC VASCULAR SURGERY)

## 2019-06-10 PROCEDURE — C1769 GUIDE WIRE: HCPCS | Performed by: THORACIC SURGERY (CARDIOTHORACIC VASCULAR SURGERY)

## 2019-06-10 PROCEDURE — 77030012390 HC DRN CHST BTL GTNG -B: Performed by: THORACIC SURGERY (CARDIOTHORACIC VASCULAR SURGERY)

## 2019-06-10 PROCEDURE — 76040000025: Performed by: INTERNAL MEDICINE

## 2019-06-10 PROCEDURE — 5A1221Z PERFORMANCE OF CARDIAC OUTPUT, CONTINUOUS: ICD-10-PCS | Performed by: THORACIC SURGERY (CARDIOTHORACIC VASCULAR SURGERY)

## 2019-06-10 PROCEDURE — 74011000272 HC RX REV CODE- 272: Performed by: THORACIC SURGERY (CARDIOTHORACIC VASCULAR SURGERY)

## 2019-06-10 PROCEDURE — 82947 ASSAY GLUCOSE BLOOD QUANT: CPT

## 2019-06-10 PROCEDURE — 02100Z9 BYPASS CORONARY ARTERY, ONE ARTERY FROM LEFT INTERNAL MAMMARY, OPEN APPROACH: ICD-10-PCS | Performed by: THORACIC SURGERY (CARDIOTHORACIC VASCULAR SURGERY)

## 2019-06-10 PROCEDURE — 85025 COMPLETE CBC W/AUTO DIFF WBC: CPT

## 2019-06-10 PROCEDURE — 77030002888 HC SUT CHRMC J&J -A: Performed by: THORACIC SURGERY (CARDIOTHORACIC VASCULAR SURGERY)

## 2019-06-10 PROCEDURE — 84132 ASSAY OF SERUM POTASSIUM: CPT

## 2019-06-10 PROCEDURE — 77030034927 HC PK PROC CPB INSPIRE PERF LIVA -F: Performed by: THORACIC SURGERY (CARDIOTHORACIC VASCULAR SURGERY)

## 2019-06-10 PROCEDURE — 31645 BRNCHSC W/THER ASPIR 1ST: CPT | Performed by: INTERNAL MEDICINE

## 2019-06-10 PROCEDURE — 77030002912 HC SUT ETHBND J&J -A: Performed by: THORACIC SURGERY (CARDIOTHORACIC VASCULAR SURGERY)

## 2019-06-10 PROCEDURE — 65610000006 HC RM INTENSIVE CARE

## 2019-06-10 PROCEDURE — 77030020407 HC IV BLD WRMR ST 3M -A: Performed by: NURSE ANESTHETIST, CERTIFIED REGISTERED

## 2019-06-10 PROCEDURE — 85018 HEMOGLOBIN: CPT

## 2019-06-10 PROCEDURE — 36415 COLL VENOUS BLD VENIPUNCTURE: CPT

## 2019-06-10 PROCEDURE — 77030025827 HC BG BLD DNR AUTLG MEDT -A: Performed by: THORACIC SURGERY (CARDIOTHORACIC VASCULAR SURGERY)

## 2019-06-10 PROCEDURE — 99233 SBSQ HOSP IP/OBS HIGH 50: CPT | Performed by: INTERNAL MEDICINE

## 2019-06-10 PROCEDURE — 06BQ0ZZ EXCISION OF LEFT SAPHENOUS VEIN, OPEN APPROACH: ICD-10-PCS | Performed by: THORACIC SURGERY (CARDIOTHORACIC VASCULAR SURGERY)

## 2019-06-10 PROCEDURE — 74011000250 HC RX REV CODE- 250: Performed by: PHYSICIAN ASSISTANT

## 2019-06-10 PROCEDURE — 77010033711 HC HIGH FLOW OXYGEN

## 2019-06-10 PROCEDURE — 77030008703 HC TU ET UNCUF COVD -A: Performed by: ANESTHESIOLOGY

## 2019-06-10 PROCEDURE — 77030018836 HC SOL IRR NACL ICUM -A: Performed by: THORACIC SURGERY (CARDIOTHORACIC VASCULAR SURGERY)

## 2019-06-10 PROCEDURE — 77030019908 HC STETH ESOPH SIMS -A: Performed by: NURSE ANESTHETIST, CERTIFIED REGISTERED

## 2019-06-10 PROCEDURE — 74011250636 HC RX REV CODE- 250/636: Performed by: PHYSICIAN ASSISTANT

## 2019-06-10 PROCEDURE — 77030013861 HC PNCH AORT CLNCUT QUES -B: Performed by: THORACIC SURGERY (CARDIOTHORACIC VASCULAR SURGERY)

## 2019-06-10 PROCEDURE — 85384 FIBRINOGEN ACTIVITY: CPT

## 2019-06-10 PROCEDURE — 77030013794 HC KT TRNSDUC BLD EDWD -B: Performed by: NURSE ANESTHETIST, CERTIFIED REGISTERED

## 2019-06-10 PROCEDURE — 77030020751 HC FLTR TBNG TRNSFUS HAEM -A: Performed by: NURSE ANESTHETIST, CERTIFIED REGISTERED

## 2019-06-10 PROCEDURE — 77030008771 HC TU NG SALEM SUMP -A: Performed by: ANESTHESIOLOGY

## 2019-06-10 DEVICE — DEVICE OCCL CLP L40MM STD HD ARTC PLUNG GRP STIFF SHFT FOR: Type: IMPLANTABLE DEVICE | Site: HEART | Status: FUNCTIONAL

## 2019-06-10 RX ORDER — SODIUM CHLORIDE, SODIUM LACTATE, POTASSIUM CHLORIDE, CALCIUM CHLORIDE 600; 310; 30; 20 MG/100ML; MG/100ML; MG/100ML; MG/100ML
INJECTION, SOLUTION INTRAVENOUS
Status: DISCONTINUED | OUTPATIENT
Start: 2019-06-10 | End: 2019-06-10 | Stop reason: HOSPADM

## 2019-06-10 RX ORDER — SODIUM CHLORIDE 0.9 % (FLUSH) 0.9 %
5-40 SYRINGE (ML) INJECTION EVERY 8 HOURS
Status: DISCONTINUED | OUTPATIENT
Start: 2019-06-10 | End: 2019-06-11

## 2019-06-10 RX ORDER — NOREPINEPHRINE BIT/0.9 % NACL 4MG/250ML
0-16 PLASTIC BAG, INJECTION (ML) INTRAVENOUS
Status: DISCONTINUED | OUTPATIENT
Start: 2019-06-10 | End: 2019-06-11

## 2019-06-10 RX ORDER — CEFAZOLIN SODIUM/WATER 2 G/20 ML
2 SYRINGE (ML) INTRAVENOUS EVERY 8 HOURS
Status: COMPLETED | OUTPATIENT
Start: 2019-06-10 | End: 2019-06-11

## 2019-06-10 RX ORDER — KETOROLAC TROMETHAMINE 15 MG/ML
15 INJECTION, SOLUTION INTRAMUSCULAR; INTRAVENOUS
Status: COMPLETED | OUTPATIENT
Start: 2019-06-10 | End: 2019-06-10

## 2019-06-10 RX ORDER — MAGNESIUM SULFATE 1 G/100ML
1 INJECTION INTRAVENOUS AS NEEDED
Status: DISCONTINUED | OUTPATIENT
Start: 2019-06-10 | End: 2019-06-11

## 2019-06-10 RX ORDER — OXYCODONE AND ACETAMINOPHEN 5; 325 MG/1; MG/1
1 TABLET ORAL
Status: DISCONTINUED | OUTPATIENT
Start: 2019-06-10 | End: 2019-06-17 | Stop reason: HOSPADM

## 2019-06-10 RX ORDER — SODIUM CHLORIDE, SODIUM LACTATE, POTASSIUM CHLORIDE, CALCIUM CHLORIDE 600; 310; 30; 20 MG/100ML; MG/100ML; MG/100ML; MG/100ML
75 INJECTION, SOLUTION INTRAVENOUS CONTINUOUS
Status: DISCONTINUED | OUTPATIENT
Start: 2019-06-10 | End: 2019-06-10 | Stop reason: HOSPADM

## 2019-06-10 RX ORDER — CEFAZOLIN SODIUM/WATER 2 G/20 ML
2 SYRINGE (ML) INTRAVENOUS
Status: COMPLETED | OUTPATIENT
Start: 2019-06-10 | End: 2019-06-10

## 2019-06-10 RX ORDER — MUPIROCIN 20 MG/G
OINTMENT TOPICAL 2 TIMES DAILY
Status: DISCONTINUED | OUTPATIENT
Start: 2019-06-10 | End: 2019-06-11

## 2019-06-10 RX ORDER — VECURONIUM BROMIDE FOR INJECTION 1 MG/ML
INJECTION, POWDER, LYOPHILIZED, FOR SOLUTION INTRAVENOUS AS NEEDED
Status: DISCONTINUED | OUTPATIENT
Start: 2019-06-10 | End: 2019-06-10 | Stop reason: HOSPADM

## 2019-06-10 RX ORDER — SUFENTANIL CITRATE 50 UG/ML
INJECTION EPIDURAL; INTRAVENOUS AS NEEDED
Status: DISCONTINUED | OUTPATIENT
Start: 2019-06-10 | End: 2019-06-10 | Stop reason: HOSPADM

## 2019-06-10 RX ORDER — NITROGLYCERIN 20 MG/100ML
10-100 INJECTION INTRAVENOUS
Status: DISCONTINUED | OUTPATIENT
Start: 2019-06-10 | End: 2019-06-11

## 2019-06-10 RX ORDER — KETOROLAC TROMETHAMINE 15 MG/ML
15 INJECTION, SOLUTION INTRAMUSCULAR; INTRAVENOUS EVERY 6 HOURS
Status: DISCONTINUED | OUTPATIENT
Start: 2019-06-11 | End: 2019-06-12

## 2019-06-10 RX ORDER — MIDAZOLAM HYDROCHLORIDE 1 MG/ML
INJECTION, SOLUTION INTRAMUSCULAR; INTRAVENOUS AS NEEDED
Status: DISCONTINUED | OUTPATIENT
Start: 2019-06-10 | End: 2019-06-10 | Stop reason: HOSPADM

## 2019-06-10 RX ORDER — DOBUTAMINE HYDROCHLORIDE 200 MG/100ML
2-10 INJECTION INTRAVENOUS
Status: DISCONTINUED | OUTPATIENT
Start: 2019-06-10 | End: 2019-06-11

## 2019-06-10 RX ORDER — MIDAZOLAM HYDROCHLORIDE 1 MG/ML
1 INJECTION, SOLUTION INTRAMUSCULAR; INTRAVENOUS
Status: DISCONTINUED | OUTPATIENT
Start: 2019-06-10 | End: 2019-06-11

## 2019-06-10 RX ORDER — PAPAVERINE HYDROCHLORIDE 30 MG/ML
INJECTION INTRAMUSCULAR; INTRAVENOUS AS NEEDED
Status: DISCONTINUED | OUTPATIENT
Start: 2019-06-10 | End: 2019-06-10 | Stop reason: HOSPADM

## 2019-06-10 RX ORDER — MUPIROCIN 20 MG/G
1 OINTMENT TOPICAL 2 TIMES DAILY
Status: DISCONTINUED | OUTPATIENT
Start: 2019-06-10 | End: 2019-06-10 | Stop reason: HOSPADM

## 2019-06-10 RX ORDER — ALBUMIN HUMAN 50 G/1000ML
25 SOLUTION INTRAVENOUS ONCE
Status: COMPLETED | OUTPATIENT
Start: 2019-06-10 | End: 2019-06-11

## 2019-06-10 RX ORDER — FENTANYL CITRATE 50 UG/ML
25 INJECTION, SOLUTION INTRAMUSCULAR; INTRAVENOUS ONCE
Status: COMPLETED | OUTPATIENT
Start: 2019-06-10 | End: 2019-06-10

## 2019-06-10 RX ORDER — POTASSIUM CHLORIDE 14.9 MG/ML
10 INJECTION INTRAVENOUS AS NEEDED
Status: DISCONTINUED | OUTPATIENT
Start: 2019-06-10 | End: 2019-06-11

## 2019-06-10 RX ORDER — ALBUTEROL SULFATE 0.83 MG/ML
2.5 SOLUTION RESPIRATORY (INHALATION)
Status: DISCONTINUED | OUTPATIENT
Start: 2019-06-10 | End: 2019-06-11

## 2019-06-10 RX ORDER — SODIUM CHLORIDE 9 MG/ML
INJECTION, SOLUTION INTRAVENOUS
Status: DISCONTINUED | OUTPATIENT
Start: 2019-06-10 | End: 2019-06-10 | Stop reason: HOSPADM

## 2019-06-10 RX ORDER — ETOMIDATE 2 MG/ML
INJECTION INTRAVENOUS AS NEEDED
Status: DISCONTINUED | OUTPATIENT
Start: 2019-06-10 | End: 2019-06-10 | Stop reason: HOSPADM

## 2019-06-10 RX ORDER — LIDOCAINE HYDROCHLORIDE 20 MG/ML
INJECTION, SOLUTION EPIDURAL; INFILTRATION; INTRACAUDAL; PERINEURAL AS NEEDED
Status: DISCONTINUED | OUTPATIENT
Start: 2019-06-10 | End: 2019-06-10 | Stop reason: HOSPADM

## 2019-06-10 RX ORDER — FENTANYL CITRATE 50 UG/ML
INJECTION, SOLUTION INTRAMUSCULAR; INTRAVENOUS
Status: COMPLETED
Start: 2019-06-10 | End: 2019-06-10

## 2019-06-10 RX ORDER — ONDANSETRON 2 MG/ML
4-8 INJECTION INTRAMUSCULAR; INTRAVENOUS
Status: DISCONTINUED | OUTPATIENT
Start: 2019-06-10 | End: 2019-06-11

## 2019-06-10 RX ORDER — SODIUM CHLORIDE 0.9 % (FLUSH) 0.9 %
5-40 SYRINGE (ML) INJECTION AS NEEDED
Status: DISCONTINUED | OUTPATIENT
Start: 2019-06-10 | End: 2019-06-10 | Stop reason: HOSPADM

## 2019-06-10 RX ORDER — ACETAMINOPHEN 325 MG/1
650 TABLET ORAL
Status: DISCONTINUED | OUTPATIENT
Start: 2019-06-10 | End: 2019-06-11

## 2019-06-10 RX ORDER — ROCURONIUM BROMIDE 10 MG/ML
INJECTION, SOLUTION INTRAVENOUS AS NEEDED
Status: DISCONTINUED | OUTPATIENT
Start: 2019-06-10 | End: 2019-06-10 | Stop reason: HOSPADM

## 2019-06-10 RX ORDER — AMIODARONE HYDROCHLORIDE 200 MG/1
600 TABLET ORAL DAILY
Status: DISCONTINUED | OUTPATIENT
Start: 2019-06-10 | End: 2019-06-10 | Stop reason: HOSPADM

## 2019-06-10 RX ORDER — MIDAZOLAM HYDROCHLORIDE 1 MG/ML
2 INJECTION, SOLUTION INTRAMUSCULAR; INTRAVENOUS
Status: DISCONTINUED | OUTPATIENT
Start: 2019-06-10 | End: 2019-06-10 | Stop reason: HOSPADM

## 2019-06-10 RX ORDER — HEPARIN SODIUM 1000 [USP'U]/ML
INJECTION, SOLUTION INTRAVENOUS; SUBCUTANEOUS AS NEEDED
Status: DISCONTINUED | OUTPATIENT
Start: 2019-06-10 | End: 2019-06-10 | Stop reason: HOSPADM

## 2019-06-10 RX ORDER — HEPARIN SODIUM 5000 [USP'U]/ML
35 INJECTION, SOLUTION INTRAVENOUS; SUBCUTANEOUS ONCE
Status: COMPLETED | OUTPATIENT
Start: 2019-06-10 | End: 2019-06-10

## 2019-06-10 RX ORDER — EPHEDRINE SULFATE 50 MG/ML
INJECTION, SOLUTION INTRAVENOUS AS NEEDED
Status: DISCONTINUED | OUTPATIENT
Start: 2019-06-10 | End: 2019-06-10 | Stop reason: HOSPADM

## 2019-06-10 RX ORDER — LIDOCAINE HYDROCHLORIDE 10 MG/ML
0.1 INJECTION INFILTRATION; PERINEURAL AS NEEDED
Status: DISCONTINUED | OUTPATIENT
Start: 2019-06-10 | End: 2019-06-10 | Stop reason: HOSPADM

## 2019-06-10 RX ORDER — SODIUM CHLORIDE 9 MG/ML
25 INJECTION, SOLUTION INTRAVENOUS CONTINUOUS
Status: DISCONTINUED | OUTPATIENT
Start: 2019-06-10 | End: 2019-06-11

## 2019-06-10 RX ORDER — NALOXONE HYDROCHLORIDE 0.4 MG/ML
0.4 INJECTION, SOLUTION INTRAMUSCULAR; INTRAVENOUS; SUBCUTANEOUS AS NEEDED
Status: DISCONTINUED | OUTPATIENT
Start: 2019-06-10 | End: 2019-06-11

## 2019-06-10 RX ORDER — NITROGLYCERIN 20 MG/100ML
INJECTION INTRAVENOUS
Status: DISCONTINUED | OUTPATIENT
Start: 2019-06-10 | End: 2019-06-10 | Stop reason: HOSPADM

## 2019-06-10 RX ORDER — SODIUM CHLORIDE 0.9 % (FLUSH) 0.9 %
5-40 SYRINGE (ML) INJECTION EVERY 8 HOURS
Status: DISCONTINUED | OUTPATIENT
Start: 2019-06-10 | End: 2019-06-10 | Stop reason: HOSPADM

## 2019-06-10 RX ORDER — ALBUMIN HUMAN 50 G/1000ML
SOLUTION INTRAVENOUS
Status: COMPLETED
Start: 2019-06-10 | End: 2019-06-10

## 2019-06-10 RX ORDER — CHLORHEXIDINE GLUCONATE 1.2 MG/ML
10 RINSE ORAL 2 TIMES DAILY
Status: DISCONTINUED | OUTPATIENT
Start: 2019-06-10 | End: 2019-06-11

## 2019-06-10 RX ORDER — CEFAZOLIN SODIUM 1 G/3ML
INJECTION, POWDER, FOR SOLUTION INTRAMUSCULAR; INTRAVENOUS AS NEEDED
Status: DISCONTINUED | OUTPATIENT
Start: 2019-06-10 | End: 2019-06-10 | Stop reason: HOSPADM

## 2019-06-10 RX ORDER — DEXTROSE, SODIUM CHLORIDE, AND POTASSIUM CHLORIDE 5; .45; .15 G/100ML; G/100ML; G/100ML
25 INJECTION INTRAVENOUS CONTINUOUS
Status: DISCONTINUED | OUTPATIENT
Start: 2019-06-10 | End: 2019-06-11

## 2019-06-10 RX ORDER — PROPOFOL 10 MG/ML
5-50 VIAL (ML) INTRAVENOUS
Status: DISCONTINUED | OUTPATIENT
Start: 2019-06-10 | End: 2019-06-11

## 2019-06-10 RX ORDER — SODIUM CHLORIDE 0.9 % (FLUSH) 0.9 %
5-40 SYRINGE (ML) INJECTION AS NEEDED
Status: DISCONTINUED | OUTPATIENT
Start: 2019-06-10 | End: 2019-06-11

## 2019-06-10 RX ORDER — DEXTROSE 50 % IN WATER (D50W) INTRAVENOUS SYRINGE
25 AS NEEDED
Status: DISCONTINUED | OUTPATIENT
Start: 2019-06-10 | End: 2019-06-11

## 2019-06-10 RX ORDER — PROTAMINE SULFATE 10 MG/ML
INJECTION, SOLUTION INTRAVENOUS AS NEEDED
Status: DISCONTINUED | OUTPATIENT
Start: 2019-06-10 | End: 2019-06-10 | Stop reason: HOSPADM

## 2019-06-10 RX ADMIN — SUFENTANIL CITRATE 30 MCG: 50 INJECTION EPIDURAL; INTRAVENOUS at 11:41

## 2019-06-10 RX ADMIN — METOPROLOL SUCCINATE 100 MG: 50 TABLET, EXTENDED RELEASE ORAL at 06:50

## 2019-06-10 RX ADMIN — ROCURONIUM BROMIDE 50 MG: 10 INJECTION, SOLUTION INTRAVENOUS at 07:16

## 2019-06-10 RX ADMIN — MUPIROCIN 1 G: 20 OINTMENT TOPICAL at 05:47

## 2019-06-10 RX ADMIN — SODIUM CHLORIDE, SODIUM LACTATE, POTASSIUM CHLORIDE, AND CALCIUM CHLORIDE 75 ML/HR: 600; 310; 30; 20 INJECTION, SOLUTION INTRAVENOUS at 06:05

## 2019-06-10 RX ADMIN — MIDAZOLAM HYDROCHLORIDE 1 MG: 1 INJECTION, SOLUTION INTRAMUSCULAR; INTRAVENOUS at 07:16

## 2019-06-10 RX ADMIN — ALBUTEROL SULFATE 2.5 MG: 2.5 SOLUTION RESPIRATORY (INHALATION) at 20:16

## 2019-06-10 RX ADMIN — MIDAZOLAM HYDROCHLORIDE 5 MG: 1 INJECTION, SOLUTION INTRAMUSCULAR; INTRAVENOUS at 10:57

## 2019-06-10 RX ADMIN — SUFENTANIL CITRATE 20 MCG: 50 INJECTION EPIDURAL; INTRAVENOUS at 08:51

## 2019-06-10 RX ADMIN — SODIUM CHLORIDE, SODIUM LACTATE, POTASSIUM CHLORIDE, CALCIUM CHLORIDE: 600; 310; 30; 20 INJECTION, SOLUTION INTRAVENOUS at 07:02

## 2019-06-10 RX ADMIN — AMIODARONE HYDROCHLORIDE 400 MG: 200 TABLET ORAL at 20:10

## 2019-06-10 RX ADMIN — SUFENTANIL CITRATE 20 MCG: 50 INJECTION EPIDURAL; INTRAVENOUS at 08:53

## 2019-06-10 RX ADMIN — ACETAMINOPHEN 325 MG: 325 TABLET, FILM COATED ORAL at 17:57

## 2019-06-10 RX ADMIN — OXYCODONE AND ACETAMINOPHEN 1 TABLET: 5; 325 TABLET ORAL at 17:24

## 2019-06-10 RX ADMIN — Medication 2 G: at 20:10

## 2019-06-10 RX ADMIN — MORPHINE SULFATE 2 MG: 2 INJECTION, SOLUTION INTRAMUSCULAR; INTRAVENOUS at 20:18

## 2019-06-10 RX ADMIN — Medication 10 ML: at 15:51

## 2019-06-10 RX ADMIN — EPHEDRINE SULFATE 5 MG: 50 INJECTION, SOLUTION INTRAVENOUS at 07:27

## 2019-06-10 RX ADMIN — FENTANYL CITRATE 25 MCG: 50 INJECTION, SOLUTION INTRAMUSCULAR; INTRAVENOUS at 15:45

## 2019-06-10 RX ADMIN — MUPIROCIN: 20 OINTMENT TOPICAL at 20:11

## 2019-06-10 RX ADMIN — GABAPENTIN 400 MG: 100 CAPSULE ORAL at 05:25

## 2019-06-10 RX ADMIN — ROCURONIUM BROMIDE 50 MG: 10 INJECTION, SOLUTION INTRAVENOUS at 10:57

## 2019-06-10 RX ADMIN — CHLORHEXIDINE GLUCONATE 10 ML: 1.2 RINSE ORAL at 14:00

## 2019-06-10 RX ADMIN — SODIUM CHLORIDE: 9 INJECTION, SOLUTION INTRAVENOUS at 07:36

## 2019-06-10 RX ADMIN — DEXTROSE MONOHYDRATE, SODIUM CHLORIDE, AND POTASSIUM CHLORIDE 25 ML/HR: 50; 4.5; 1.49 INJECTION, SOLUTION INTRAVENOUS at 14:00

## 2019-06-10 RX ADMIN — SUFENTANIL CITRATE 20 MCG: 50 INJECTION EPIDURAL; INTRAVENOUS at 08:55

## 2019-06-10 RX ADMIN — ALBUTEROL SULFATE 2.5 MG: 2.5 SOLUTION RESPIRATORY (INHALATION) at 16:57

## 2019-06-10 RX ADMIN — FAMOTIDINE 20 MG: 10 INJECTION, SOLUTION INTRAVENOUS at 20:11

## 2019-06-10 RX ADMIN — ROSUVASTATIN CALCIUM 40 MG: 20 TABLET, FILM COATED ORAL at 20:13

## 2019-06-10 RX ADMIN — FAMOTIDINE 20 MG: 20 TABLET ORAL at 05:24

## 2019-06-10 RX ADMIN — OXYCODONE AND ACETAMINOPHEN 1 TABLET: 5; 325 TABLET ORAL at 21:35

## 2019-06-10 RX ADMIN — FENTANYL CITRATE 25 MCG: 50 INJECTION INTRAMUSCULAR; INTRAVENOUS at 15:45

## 2019-06-10 RX ADMIN — AMIODARONE HYDROCHLORIDE 600 MG: 200 TABLET ORAL at 05:24

## 2019-06-10 RX ADMIN — ALBUMIN HUMAN 25 G: 50 SOLUTION INTRAVENOUS at 18:27

## 2019-06-10 RX ADMIN — MIDAZOLAM HYDROCHLORIDE 2 MG: 1 INJECTION, SOLUTION INTRAMUSCULAR; INTRAVENOUS at 07:02

## 2019-06-10 RX ADMIN — MORPHINE SULFATE 2 MG: 2 INJECTION, SOLUTION INTRAMUSCULAR; INTRAVENOUS at 14:30

## 2019-06-10 RX ADMIN — Medication 10 ML: at 20:22

## 2019-06-10 RX ADMIN — ASPIRIN 81 MG: 81 TABLET, COATED ORAL at 05:25

## 2019-06-10 RX ADMIN — FENOFIBRATE 160 MG: 160 TABLET ORAL at 05:24

## 2019-06-10 RX ADMIN — ETOMIDATE 14 MG: 2 INJECTION INTRAVENOUS at 07:16

## 2019-06-10 RX ADMIN — KETOROLAC TROMETHAMINE 15 MG: 15 INJECTION, SOLUTION INTRAMUSCULAR; INTRAVENOUS at 17:56

## 2019-06-10 RX ADMIN — LIDOCAINE HYDROCHLORIDE 60 MG: 20 INJECTION, SOLUTION EPIDURAL; INFILTRATION; INTRACAUDAL; PERINEURAL at 07:16

## 2019-06-10 RX ADMIN — VECURONIUM BROMIDE FOR INJECTION 3 MG: 1 INJECTION, POWDER, LYOPHILIZED, FOR SOLUTION INTRAVENOUS at 08:00

## 2019-06-10 RX ADMIN — TUBERCULIN PURIFIED PROTEIN DERIVATIVE 5 UNITS: 5 INJECTION, SOLUTION INTRADERMAL at 20:22

## 2019-06-10 RX ADMIN — VECURONIUM BROMIDE FOR INJECTION 3 MG: 1 INJECTION, POWDER, LYOPHILIZED, FOR SOLUTION INTRAVENOUS at 09:54

## 2019-06-10 RX ADMIN — SUFENTANIL CITRATE 150 MCG: 50 INJECTION EPIDURAL; INTRAVENOUS at 07:16

## 2019-06-10 RX ADMIN — HEPARIN SODIUM 26000 UNITS: 1000 INJECTION, SOLUTION INTRAVENOUS; SUBCUTANEOUS at 09:31

## 2019-06-10 RX ADMIN — PROTAMINE SULFATE 260 MG: 10 INJECTION, SOLUTION INTRAVENOUS at 11:36

## 2019-06-10 RX ADMIN — ALBUMIN (HUMAN) 25 G: 12.5 INJECTION, SOLUTION INTRAVENOUS at 18:27

## 2019-06-10 RX ADMIN — EPHEDRINE SULFATE 5 MG: 50 INJECTION, SOLUTION INTRAVENOUS at 09:28

## 2019-06-10 RX ADMIN — SODIUM CHLORIDE 25 ML/HR: 900 INJECTION, SOLUTION INTRAVENOUS at 13:40

## 2019-06-10 RX ADMIN — CEFAZOLIN SODIUM 2 G: 1 INJECTION, POWDER, FOR SOLUTION INTRAMUSCULAR; INTRAVENOUS at 11:30

## 2019-06-10 RX ADMIN — PANTOPRAZOLE SODIUM 40 MG: 40 TABLET, DELAYED RELEASE ORAL at 05:24

## 2019-06-10 RX ADMIN — NITROGLYCERIN 10 MCG/MIN: 20 INJECTION INTRAVENOUS at 07:40

## 2019-06-10 RX ADMIN — CARBAMAZEPINE 200 MG: 200 TABLET ORAL at 05:25

## 2019-06-10 RX ADMIN — SUFENTANIL CITRATE 10 MCG: 50 INJECTION EPIDURAL; INTRAVENOUS at 08:50

## 2019-06-10 RX ADMIN — DOBUTAMINE HYDROCHLORIDE 2 MCG/KG/MIN: 200 INJECTION INTRAVENOUS at 15:42

## 2019-06-10 RX ADMIN — HEPARIN SODIUM 3250 UNITS: 5000 INJECTION INTRAVENOUS; SUBCUTANEOUS at 00:39

## 2019-06-10 RX ADMIN — Medication 2 G: at 08:00

## 2019-06-10 NOTE — PROGRESS NOTES
Spiritual Care visit. Initial Visit, Pre Surgery Consult. Visit and prayer before patient goes to surgery.     Visit by Cassi Shine M.Ed., Th.B. ,Staff

## 2019-06-10 NOTE — PROGRESS NOTES
cxr noted and congested by. Given copious secretions will bronch and then they can extubation.     Nico Payne MD

## 2019-06-10 NOTE — PROGRESS NOTES
Critical Care Daily Progress Note: 6/10/2019  Admission Date: 6/6/2019     The patient's chart is reviewed and the patient is discussed with the staff. Patient is a 66 y.o.  male seen and evaluated at the request of Dr. Alex Dennis. Patient has a history of TIA, trigeminal neuralgia, HTN, HL, a.fib on Eliquis / ASA, chronic sCHF, and CAD. He is followed by Cardiology as an outpatient and was seen for worsening dyspnea with recommendation for [facility] / MARCK/eCV but this was never scheduled. He presented to the ER with c/o R sided facial/jaw pain and substernal chest pain. In the ER he was found to be in a.fib with RVR with HR in the 130s and troponin of 0.14 >>>21.30. 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 He was started on a heparin gtt (last dose of eliquis 6/6) in anticipation of surgical intervention. He was given lasix for volume overload with 2 liters urine output yesterday (net -265 ml).     Patient has a remote history of tobacco abuse  - smoked 1.5 ppd x 14 years before quitting 40 years ago. He has never been dx with lung disease, does not require inhalers or home O2. Patient states that he has had some hemoptysis over the last couple of months but it is usually pink, frothy sputum and not dark red blood as he is currently having. He usually stops his ASA and it resolves. Yesterday he had several episodes where he coughed up a moderate amount of dark red blood. He has not had any so far today. Surgery is scheduled for tomorrow and we have been consulted for airway inspection for surgical clearance. He had diagnostic bronch yesterday with no bleeding or lesions seen. Subjective:     Now post CABG x 3. Had no bleeding from lungs operatively. Now in SR post MAZE. Only on NTG at present.      Current Facility-Administered Medications   Medication Dose Route Frequency    0.9% sodium chloride infusion  25 mL/hr IntraVENous CONTINUOUS    dextrose 5% - 0.45% NaCl with KCl 20 mEq/L infusion  25 mL/hr IntraVENous CONTINUOUS    sodium chloride (NS) flush 5-40 mL  5-40 mL IntraVENous Q8H    sodium chloride (NS) flush 5-40 mL  5-40 mL IntraVENous PRN    acetaminophen (TYLENOL) tablet 650 mg  650 mg Oral Q4H PRN    oxyCODONE-acetaminophen (PERCOCET) 5-325 mg per tablet 1 Tab  1 Tab Oral Q4H PRN    naloxone (NARCAN) injection 0.4 mg  0.4 mg IntraVENous PRN    mupirocin (BACTROBAN) 2 % ointment   Both Nostrils BID    ceFAZolin (ANCEF) 2 g/20 mL in sterile water IV syringe  2 g IntraVENous Q8H    DOBUTamine (DOBUTREX) 500 mg/250 mL (2,000 mcg/mL) infusion  2-10 mcg/kg/min IntraVENous TITRATE    EPINEPHrine (ADRENALIN) 4 mg in 0.9% sodium chloride 250 mL infusion  0.01-0.08 mcg/kg/min IntraVENous TITRATE    nitroglycerin (Tridil) 200 mcg/ml infusion   mcg/min IntraVENous TITRATE    PHENYLephrine (ANTONELLA-SYNEPHRINE) 30 mg in 0.9% sodium chloride 250 mL infusion   mcg/min IntraVENous TITRATE    NOREPINephrine (LEVOPHED) 8 mg in dextrose 5% 250 mL infusion  0.01-0.2 mcg/kg/min IntraVENous TITRATE    ondansetron (ZOFRAN) injection 4-8 mg  4-8 mg IntraVENous Q4H PRN    insulin regular (NOVOLIN R, HUMULIN R) 100 Units in 0.9% sodium chloride 100 mL infusion  1 Units/hr IntraVENous TITRATE    dextrose (D50W) injection syrg 12.5 g  25 mL IntraVENous PRN    magnesium sulfate 1 g/100 ml IVPB (premix or compounded)  1 g IntraVENous PRN    potassium chloride 10 mEq in 50 ml IVPB  10 mEq IntraVENous PRN    midazolam (VERSED) injection 1 mg  1 mg IntraVENous Q1H PRN    propofol (DIPRIVAN) infusion  5-50 mcg/kg/min IntraVENous TITRATE    chlorhexidine (PERIDEX) 0.12 % mouthwash 10 mL  10 mL Oral BID    tuberculin injection 5 Units  5 Units IntraDERMal ONCE    famotidine (PF) (PEPCID) 20 mg in sodium chloride 0.9% 10 mL injection  20 mg IntraVENous Q12H    morphine injection 2 mg  2 mg IntraVENous Q1H PRN    lidocaine (XYLOCAINE) 4 % (40 mg/mL) topical solution   Topical PRN    promethazine (PHENERGAN) with saline injection 6.25 mg  6.25 mg IntraVENous MULTIPLE DOSE GIVEN    lidocaine (XYLOCAINE) 2 % jelly   Mucous Membrane PRN    polyethylene glycol (MIRALAX) packet 17 g  17 g Oral DAILY PRN    amiodarone (CORDARONE) tablet 400 mg  400 mg Oral Q12H    rosuvastatin (CRESTOR) tablet 40 mg  40 mg Oral QHS    heparin 25,000 units in dextrose 500 mL infusion  12-25 Units/kg/hr IntraVENous TITRATE    sodium chloride (NS) flush 5-40 mL  5-40 mL IntraVENous Q8H    docusate sodium (COLACE) capsule 100 mg  100 mg Oral DAILY PRN    aspirin delayed-release tablet 81 mg  81 mg Oral DAILY    carBAMazepine (TEGretol) tablet 200 mg  200 mg Oral BID    fenofibrate (LOFIBRA) tablet 160 mg  160 mg Oral DAILY    gabapentin (NEURONTIN) capsule 400 mg  400 mg Oral BID    metoprolol succinate (TOPROL-XL) XL tablet 100 mg  100 mg Oral DAILY    pantoprazole (PROTONIX) tablet 40 mg  40 mg Oral ACB    sodium chloride (NS) flush 5-40 mL  5-40 mL IntraVENous PRN    nitroglycerin (NITROBID) 2 % ointment 1 Inch  1 Inch Topical Q6H    nitroglycerin (NITROSTAT) tablet 0.4 mg  0.4 mg SubLINGual Q5MIN PRN    acetaminophen (TYLENOL) tablet 650 mg  650 mg Oral Q4H PRN    HYDROcodone-acetaminophen (NORCO)  mg tablet 1 Tab  1 Tab Oral Q4H PRN    ondansetron (ZOFRAN) injection 4 mg  4 mg IntraVENous Q4H PRN    famotidine (PEPCID) tablet 20 mg  20 mg Oral BID     Facility-Administered Medications Ordered in Other Encounters   Medication Dose Route Frequency    midazolam (VERSED) injection    PRN    lactated Ringers infusion    CONTINUOUS    ePHEDrine (MISTOLE) 50 mg/mL injection   IntraVENous PRN    SUFentanil (SUFENTA) injection   IntraVENous PRN    lidocaine (PF) (XYLOCAINE) 20 mg/mL (2 %) injection   IntraVENous PRN    etomidate (AMIDATE) 2 mg/mL injection    PRN    rocuronium (ZEMURON) injection IntraVENous PRN    nitroglycerin (Tridil) 200 mcg/ml infusion    CONTINUOUS    aminocaproic acid (AMICAR) 5 g in dextrose 5% 250 mL infusion   IntraVENous CONTINUOUS    PHENYLephrine (ANTONELLA-SYNEPHRINE) 30,000 mcg in 0.9% sodium chloride 250 mL infusion   IntraVENous CONTINUOUS    vecuronium (NORCURON) injection   IntraVENous PRN    heparin (porcine) 1,000 unit/mL injection    PRN    0.9% sodium chloride infusion    CONTINUOUS    EPINEPHrine (ADRENALIN) 4,000 mcg in 0.9% sodium chloride 250 mL infusion   IntraVENous CONTINUOUS    ceFAZolin (ANCEF) injection   IntraVENous PRN    protamine injection    PRN       Review of Systems    Unobtainable due to patient status. Objective:     Vitals:    06/10/19 0323 06/10/19 0500 06/10/19 0550 06/10/19 1228   BP: 113/70  132/73 118/55   Pulse: 70  83 70   Resp: 18  18 (!) 98   Temp: 98.1 °F (36.7 °C)  98.2 °F (36.8 °C) 97.7 °F (36.5 °C)   SpO2: 96%  97% 98%   Weight:  196 lb (88.9 kg)     Height:  5' 8\" (1.727 m)         Intake and Output:   06/08 1901 - 06/10 0700  In: 2260.9 [P.O.:1250; I.V.:1010.9]  Out: 1800 [Urine:1800]  06/10 0701 - 06/10 1900  In: 6376.5 [I.V.:6026.5]  Out: 56 [Urine:650]    Physical Exam:          Constitutional:  intubated and mechanically ventilated.   EENMT:  Sclera clear, pupils equal, oral mucosa moist  Respiratory: clear  Cardiovascular:  RRR with no M,G,R;  Gastrointestinal:  soft with no tenderness; positive bowel sounds present  Musculoskeletal:  warm with no cyanosis, no lower extremity edema  Skin:  no jaundice or ecchymosis  Neurologic: no gross neuro deficits     Psychiatric:  sedated    LINES:    ETT, CT x 2, RIJ cordis/SG, gomez, A-line    DRIPS:    NTG.    CXR:  pending          Ventilator Settings  Mode FIO2 Rate Tidal Volume Pressure PEEP                        Peak airway pressure:     Minute ventilation:       ABG:   Recent Labs     06/10/19  1153 06/10/19  1114 06/10/19  1043   PHI 7.350 7.372 7.364   PCO2I 43.8 47.4* 47.7*   PO2I 81 239* 245*   HCO3I 24.2 27.6* 27.2*        LAB  Recent Labs     06/10/19  1153 06/10/19  1114 06/10/19  1043 06/10/19  1009 06/10/19  0943   GLUCPOC 112* 124* 117* 107* 112*     Recent Labs     06/10/19  0320 06/09/19  0810 06/08/19  0248   WBC 10.1 14.1*  --    HGB 10.6* 12.0*  --    HCT 30.5* 33.7*  --     178  --    INR  --   --  1.2     Recent Labs     06/10/19  0320 06/09/19  0810 06/08/19  0248   * 130*  --    K 3.4* 3.5  --    CL 96* 94*  --    CO2 30 27  --    GLU 91 87  --    BUN 16 20  --    CREA 1.01 1.06  --    MG  --   --  1.9   CA 7.9* 7.8*  --      No results for input(s): LCAD, LAC in the last 72 hours. Assessment:  (Medical Decision Making)     Hospital Problems  Date Reviewed: 6/9/2019          Codes Class Noted POA    CAD (coronary artery disease) ICD-10-CM: I25.10  ICD-9-CM: 414.00  6/10/2019 Unknown        S/P CABG x 3 ICD-10-CM: Z95.1  ICD-9-CM: V45.81  6/10/2019 Unknown    Stable    Hemoptysis ICD-10-CM: R04.2  ICD-9-CM: 786.30  6/9/2019 Unknown    Seems to have resolved.  Etiology unclear    Trigeminal neuralgia (Chronic) ICD-10-CM: G50.0  ICD-9-CM: 350.1  6/9/2019 Yes        * (Principal) Atrial fibrillation with RVR (HCC) ICD-10-CM: I48.91  ICD-9-CM: 427.31  6/6/2019 Yes    Now in SR post MAZE    NSTEMI (non-ST elevated myocardial infarction) ([facility] [address] ICD-10-CM: I21.4  ICD-9-CM: 410.70  6/6/2019 Yes        Chronic systolic congestive heart failure (HCC) (Chronic) ICD-10-CM: I50.22  ICD-9-CM: 428.22, 428.0  1/31/2019 Yes    Overview Addendum 1/31/2019 11:22 AM by Ryan Hampton MD     2014:  EF 55%  07/2018:  EF 45-50%  01/2019:  EF 40-45%             Dyslipidemia (Chronic) ICD-10-CM: E78.5  ICD-9-CM: 272.4  4/7/2017 Yes        Hypertension (Chronic) ICD-10-CM: I10  ICD-9-CM: 401.9  4/7/2017 Yes        Coronary atherosclerosis of native coronary vessel ICD-10-CM: I25.10  ICD-9-CM: 414.01  4/7/2017 Yes    Overview Addendum 1/31/2019 11:15 AM by Alfreda Delcid, Claudeen Crisp, MD     2006:  PCI RCA - Liberte, PCI LAD Cypher, PCI LCx Cypher  01/2014:  Stress testing with fixed inferior defect - no ischemia  11/2014:  EF with EF 50-55% and inferior HK   07/2018:  EF 45-50%  01/2019:  EF 40-45%               Atrial fibrillation ([facility] [address] ICD-10-CM: I48.91  ICD-9-CM: 427.31  4/7/2017 Yes            Encounter for weaning from ventilator- wean per protocol    Plan:  (Medical Decision Making)     Wean vent per protocol. Check CXR once completed. Screen for underlying inflammatory disorder with CRP, ESR, TAMARA. Monitor for recurrent hemoptysis. Follow H/H.   --    More than 50% of the time documented was spent in face-to-face contact with the patient and in the care of the patient on the floor/unit where the patient is located.     Adry Spence MD

## 2019-06-10 NOTE — PROGRESS NOTES
Date of Surgery Update:  Leobardo Said was seen and examined. No significant change since seen by Dr. Terri Jenkins expect x-ray with increased congestion/infiltrates vs last x-ray Also with increased secretions when suctioning by respiratory recently. Some old bloody secretions.  .     Signed By: Jared Lyle MD     Ingrid 10, 2019 3:49 PM

## 2019-06-10 NOTE — PROGRESS NOTES
FAST TRACK PROTOCOL     Verbal report received from [address] [place] [address] on [facility]  being received from CVOR(unit) for ordered procedure      Report consisted of patients Situation, Background, Assessment and   Recommendations(SBAR). Information from the following report(s) SBAR, Kardex, OR Summary, Procedure Summary, Recent Results and Cardiac Rhythm NSR was reviewed with the receiving nurse. Opportunity for questions and clarification was provided. Assessment completed upon patients arrival to unit and care assumed. Risk Factors:  Impaired LV function  Acute coronary syndrome within the last 30 days  Re-operative surgery  Peripheral vascular disease  Preoperative intra-aortic balloon pump  Increased baseline creatinine  Complex surgery  Age > 79  Female sex  Surgery > 3 hours  Cross clamp time > 65 minutes    FAST TRACK PROTOCOL  I. Policy:     A patient meeting criteria is to be placed on a predefined fast-track cardiac surgery protocol. II. Purpose:   1. To provide guidelines to identify patients appropriate for fast-track cardiac surgery. 2. Identify risk factors for fast track cardiac surgery failure. 3. To provide a uniform system by which fast track patients progress from the ICU   setting to the intermediate care unit. 4. To decrease time to extubation of acceptable cardiac surgery patients to <6 hours post arrival to the CVICU.  5. To decrease the ICU length of stay while improving outcomes and reducing ICU associated cost.  III. Objectives:   1. Early extubation of cardiac surgery patients (<6 hrs). 2. Decrease length of stay in the CVICU. 3. Decrease length of stay in the hospital.    IV. Responsibility:  Anesthesiologist, Cardiac Surgeon, CRNA, Perfusionist, Pulmonologist, CVICU Nursing staff and Respiratory Care Practitioners    V. Procedure:  Perioperative and PACU Management of Fast Track Cardiac Surgery Patients  A. Identifying Fast Track Patients  1.  Elective and Urgent cases: Emergent cases are excluded from protocol. 2. Hemodynamically stable on minimal inotropic support. a. Epinephrine <0.05 mcg/kg/min and/or Norepinephrine <0.1mcg/kg/min   3. Core temperature >/= 36 C   4. Bleeding <200 mL/hr in the 1st hour then < 100mL/hr      a. Intraoperative transfusion is not an exclusion criteria as long as      there no evidence hemodynamically significant bleeding   5. Decision to place patient on Fast Track protocol is made by the interdisciplinary team upon arrival to the CVICU  6. SBAR will include a description of the intubation  B. Perioperative Management of Fast Track cardiac surgery patients:  1. Intraoperative medication goals shall be at the discretion of anesthesia care team based on evidence based fast-track recommendations. 2.  Hand off procedure with the CVICU nurse being primarily responsible for the care of the patient. a. Hand off sheet shall include: Difficulty of intubation, total         narcotic dosage, ventilator settings, inotropic support, cross        clamp time, risk factors for fast-track failure. b. A green [place] is to be placed at the patients bedside         identifying the patient as a fast track candidate. This will be        removed if patient fails to remain a fast track patient. 3.  Patients are to be accessed for reversal of residual neuromuscular        blockade 1 hour after anesthesia stop time. If residual neuromuscular        blockade is felt to be delaying extubation, CVICU staff will contact the        OR desk to have the patient accessed for reversal.     a. Anesthesiologist is to be notified prior to reversal agent          administration  C. Extubation criteria of Fast Track Patients:    1. Awake and Alert patients with clinically recovered motor strength    2. No new neurological deficits    3. Stable EKG    4. Chest radiograph within acceptable limits    5.   No concern for excessive bleeding (i.e. Bleeding < 200mL/hr in 1st        hour then <100 mL/hr)    6. Temperature > 36 C    7. Electrolytes within normal limits    8. Hemodynamically stable      a. MAP >65 mmHg or within 25% of baseline      b. HR> 50; <100     9. Acceptable ABG on FiO2 < 0.5      a. PaO2 >/= 90 mmHg     b. PaCO2 32-45 mmHg  c. pH 7.30-7.45  10. If anesthesia record shows a difficult intubation, DO NOT extubate            without notification of MD.  11. If ABG's are indicated, results must be maintained within acceptable         range for individual patient. 12. Patient can lift head off the pillow on command and or has firm . 13. Patient has intact gag, tracheal and carinal airway reflexes during             suctioning. 14. Patient is free from excessive respiratory secretions. 15. Extubate patient per protocol and maintain SaO2 >92%. 16. Respiratory mechanics when patient is on PS. Acceptable                                    mechanics:  a. VT >6cc/kg  b. NIF >-20 ccH2O  c. FVC > 8-10cc/kg. d. RSBI < 105  17. Pulmonologist will be informed of extubations when a patient does            not meet criteria defined in this protocol or is a known difficult airway  D. Post Extubation Care  1. Place patient on Airvo for 24-48 hours as tolerated. 2.   Continuous SpO2 monitoring  3. Draw ABG 30 minutes to an hour after extubation only if patient             deteriorates. 4.   Incentive spirometry, cough and deep breathe q1 hour while awake. 5.   Begin bronchodilator therapy if ordered. 6.   Wean O2 to keep SpO2 >92%. 7.   Notify pulmonologist if patient develops stridor/respiratory difficulty            following extubation. 8.   May give racemic epinephrine aerosol for development of stridor. E. Defined risk factors for fast track cardiac surgery failure include but not limited          to:    1. Impaired LV function    2. Acute coronary syndrome within the last 30 days    3. Re-operative surgery    4. Peripheral vascular disease    5. Preoperative intra-aortic balloon pump    6. Increased baseline creatinine    7. Complex surgery    8. Age > 79    9. Female sex    8. Surgery > 3 hours    11. Cross clamp time > 65 minutes  VI. Documentation   All relevant documentation shall be entered into the patients electronic medical  record in [address] [place] [address]. VII. Sources  Golden LOWE et al. Fast-Track Practice in Cardiac Surgery: Results and  Predictors of Outcome. Interactive Cardiovascular and Thoracic Surgery. 15 (2012) [phone]. Salbador PS et al. A Systematic Review of the Safety and Effectiveness of  Fast-Track Cardiac Anesthesia. Anesthesiology. 2003; 99: 982-987. Ken Setters Method in Cardiac Surgery: Evaluation of Risks and  Benefits of Continuous      Administration Technique. [address] [facility], [address]. 2008; [phone]. Sara PAYNE et al. Fast-Track Anesthesia and Cardiac Surgery: A Retrospective  Cohort Study of 9000     Patients. Anesthesia and Analgesia. Vol. 108 (2009) 3: 325-814. Garfield Zaragoza et al. Independent Risk Factors for Fast-Track Failure Using a  Predefined Fast-Track    Protocol in Preselected Cardiac Surgery Patients. Journal of Cardiothoracic and  Vascular Anesthesia. Vol 29, No 6, 2015: [id number].

## 2019-06-10 NOTE — PROGRESS NOTES
[facility] CARDIOLOGY PROGRESS NOTE           6/10/2019 4:57 PM    Admit Date: 6/6/2019      Subjective:   Patient now extubated. Hemodynamics are stable. ROS:  Cardiovascular:  As noted above    Objective:      Vitals:    06/10/19 1239 06/10/19 1245 06/10/19 1300 06/10/19 1512   BP:       Pulse: 74 77 70 71   Resp: 20 19   Temp:       SpO2: 96% 98% 99% 100%   Weight:       Height:           Physical Exam:  General-No Acute Distress  Neck- supple, no JVD  CV- regular rate and rhythm no MRG  Lung- diminished bilaterally  Abd- soft, nontender, nondistended  Ext- no edema bilaterally. Skin- warm and dry    Data Review:   Recent Labs     06/10/19  1252 06/10/19  0320 06/09/19  0810  06/08/19  0248   * 132* 130*   < >  --    K 3.7 3.4* 3.5   < >  --    MG 3.1*  --   --   --  1.9   BUN 15 16 20   < >  --    CREA 1.00 1.01 1.06   < >  --    * 91 87   < >  --    WBC 11.5* 10.1 14.1*   < >  --    HGB 9.9* 10.6* 12.0*   < >  --    HCT 28.0* 30.5* 33.7*   < >  --     159 178   < >  --    INR 1.4  --   --   --  1.2   TROIQ  --   --  3.26*  --   --     < > = values in this interval not displayed. Assessment/Plan:     Principal Problem:    Atrial fibrillation with RVR ([address] (6/6/2019)    In SR. S/P MAZE and MICHEAL occlusion. Active Problems:    Dyslipidemia (4/7/2017)    On statin therapy       Hypertension (4/7/2017)    This is stable and will monitor post-op      Coronary atherosclerosis of native coronary vessel (4/7/2017)    S/P CABG      Atrial fibrillation ([address] (4/7/2017)    As above       Chronic systolic congestive heart failure ([address] (1/31/2019)    Volume is stable and monitor. Restart OMT as tolerates.   EF 50-55% by echo 6/7/2019      NSTEMI (non-ST elevated myocardial infarction) ([facility] [address] (6/6/2019)    S/P CABG      Hemoptysis (6/9/2019)    Per pulmonary       CAD (coronary artery disease) (6/10/2019)    S/P CABG      S/P CABG x 3 (6/10/2019)    POD#1 Encounter for weaning from ventilator [facility]) (6/10/2019)    Extubated       Tracheobronchomalacia (6/10/2019)    Per pulmonary           Cristel Nieves MD  6/10/2019 4:57 PM

## 2019-06-10 NOTE — PERIOP NOTES
Updated patient family at 8:29 AM  Updated patient family at 10:05 AM  Updated patient family at 12:16 PM    Four digit code obtained

## 2019-06-10 NOTE — PROGRESS NOTES
Pt transferred to CVICU s/p surgery. CM to continue to follow for anticipated discharge needs. Discharge planning pending progress after surgery. CM to continue to follow.

## 2019-06-10 NOTE — PROGRESS NOTES
Changed to CPAP mode after pt passed all mechanics, suctioned mode/large amt of dark bloody secretions.  MD notified

## 2019-06-10 NOTE — ANESTHESIA PROCEDURE NOTES
Central Line Placement    Start time: 6/10/2019 7:24 AM  End time: 6/10/2019 7:36 AM  Performed by: Luna Murphy MD  Authorized by: Luna Murphy MD     Indications: central pressure monitoring, need for vasopressors and vascular access  Preanesthetic Checklist: patient identified, risks and benefits discussed, anesthesia consent, site marked, patient being monitored and timeout performed    Timeout Time: 07:23       Pre-procedure: All elements of maximal sterile barrier technique followed? Yes    2% Chlorhexidine for cutaneous antisepsis, Hand hygiene performed prior to catheter insertion and Ultrasound guidance    Sterile Ultrasound Technique followed?: Yes        Ultrasound Image Stored? Image stored    Procedure:   Prep:  Chlorhexidine  Location:  Internal jugular  Orientation:  Right  Patient position:  Trendelenburg  Catheter type:  Double lumen  Catheter size:  9 Fr    Number of attempts:  1  Successful placement: Yes      Assessment:   Post-procedure:  Catheter secured and sterile dressing applied  Assessment:  Blood return through all ports, free fluid flow and guidewire removal verified  Insertion:  Uncomplicated  Patient tolerance:  Patient tolerated the procedure well with no immediate complications  Real-time ultrasound guidance utilized for 18G Jelco access to IJ. Venous placement confirmed with manometry and wire visualization in vein. Double-lumen 9Fr catheter placed over wire using Seldinger technique. Wire removed and placed off of field. PA catheter floated without complication to depth of 51 cm.

## 2019-06-10 NOTE — PROGRESS NOTES
Dual skin assessment completed with secondary RN - sacrum visualized, no Allevyn on arrival, skin intact/no breakdown noted - Allevyn placed on arrival to CVICU.

## 2019-06-10 NOTE — PROGRESS NOTES
Patient transported via stretcher to Pre-op holding. Family at bedside went to the Surgical waiting area. No issues on transport. Rosamaria, accepting RN, was at bedside upon patient's arrival to Pre-Op.

## 2019-06-10 NOTE — PROGRESS NOTES
TRANSFER - OUT REPORT:    Verbal report given to Bobby Navarro RN on Jade Sidhu  being transferred to Pre-Op for routine progression of care       Report consisted of patients Situation, Background, Assessment and   Recommendations(SBAR). Information from the following report(s) SBAR, Kardex, ED Summary, Procedure Summary, Intake/Output, MAR, Recent Results, Med Rec Status and Cardiac Rhythm of Atrial Fibrillation was reviewed with the receiving nurse. Lines:   Peripheral IV 06/06/19 Left Antecubital (Active)   Site Assessment Clean, dry, & intact 6/9/2019 11:43 PM   Phlebitis Assessment 0 6/9/2019 11:43 PM   Infiltration Assessment 0 6/9/2019 11:43 PM   Dressing Status Clean, dry, & intact 6/9/2019 11:43 PM   Dressing Type Transparent;Tape 6/9/2019 11:43 PM   Hub Color/Line Status Infusing;Patent 6/9/2019 11:43 PM   Alcohol Cap Used No 6/9/2019  3:00 PM       Peripheral IV 06/06/19 Anterior;Proximal;Right Forearm (Active)   Site Assessment Clean, dry, & intact 6/9/2019 11:43 PM   Phlebitis Assessment 0 6/9/2019 11:43 PM   Infiltration Assessment 0 6/9/2019 11:43 PM   Dressing Status Clean, dry, & intact 6/9/2019 11:43 PM   Dressing Type Transparent;Tape 6/9/2019 11:43 PM   Hub Color/Line Status Capped; Patent 6/9/2019 11:43 PM   Alcohol Cap Used No 6/9/2019  3:00 PM        Opportunity for questions and clarification was provided.       Patient transported with:   Monitor  O2 @ 2 liters  Patient-specific medications from Pharmacy  Registered Nurse

## 2019-06-10 NOTE — PERIOP NOTES
TRANSFER - IN REPORT:    Verbal report received from Shoaib De Luna, RN on [facility]  being received from [phone] for routine progression of care      Report consisted of patients Situation, Background, Assessment and   Recommendations(SBAR). Information from the following report(s) SBAR was reviewed with the receiving nurse. Opportunity for questions and clarification was provided. Assessment to be completed upon patients arrival to unit and care to be assumed.

## 2019-06-10 NOTE — ANESTHESIA PROCEDURE NOTES
Arterial Line Placement    Start time: 6/10/2019 7:04 AM  End time: 6/10/2019 7:06 AM  Performed by: Socorro Oliveros CRNA  Authorized by: Darling Schuster MD     Pre-Procedure  Indications:  Arterial pressure monitoring and blood sampling  Preanesthetic Checklist: patient identified, risks and benefits discussed, anesthesia consent, site marked, patient being monitored, timeout performed and patient being monitored    Timeout Time: 07:04        Procedure:   Prep:  ChloraPrep  Seldinger Technique?: Yes    Orientation:  Left  Location:  Radial artery  Catheter size:  20 G  Number of attempts:  1    Assessment:   Post-procedure:  Line secured and sterile dressing applied  Patient Tolerance:  Patient tolerated the procedure well with no immediate complications  Comment:   Left arm prepped with ChloraPrep, 0.8ml of 1% lidocaine infiltrated at skin, Seldinger technique, good blood return, good waveform.

## 2019-06-10 NOTE — ANESTHESIA POSTPROCEDURE EVALUATION
Procedure(s):  CORONARY ARTERY BYPASS GRAFT (CABG X 3)/ LIMA  MAZE PROCEDURE/ CLIPPING OF LEFT ATRIAL APPENDAGE  VEIN HARVEST/ GREATER SAPHENOUS  ESOPHAGEAL TRANS ECHOCARDIOGRAM.    general    Anesthesia Post Evaluation      Multimodal analgesia: multimodal analgesia used between 6 hours prior to anesthesia start to PACU discharge  Patient location during evaluation: ICU  Patient participation: complete - patient cannot participate  Level of consciousness: obtunded/minimal responses  Airway patency: patent  Anesthetic complications: no  Cardiovascular status: acceptable and hemodynamically stable  Respiratory status: acceptable, ETT and ventilator  Hydration status: acceptable  Post anesthesia nausea and vomiting:  none      Vitals Value Taken Time   BP     Temp     Pulse 72 6/10/2019  2:22 PM   Resp 28 6/10/2019  2:22 PM   SpO2 99 % 6/10/2019  2:22 PM   Vitals shown include unvalidated device data.

## 2019-06-10 NOTE — PROGRESS NOTES
Bedside shift change report given to Ventura Denise RN (oncoming nurse) by Raghavendra Goldstein RN (offgoing nurse). Report included the following information SBAR, Kardex, ED Summary, Procedure Summary, Intake/Output, MAR, Recent Results, Med Rec Status and Cardiac Rhythm of Atrial Fibrillation.

## 2019-06-10 NOTE — PROGRESS NOTES
RT at bedside. Pt able to meet requirements for extubation (physical mechanics and NIF). Procedure explained to Pt. Pt nodded understanding. VSS. Pt extubated at this time. Mouth and throat suctioned as Pt coughed to removed all secretions. BBS , no stridor noted. Pt able to verbalize name and cough effectively. Pt tolerated procedure well, no acute distress noted. Pt placed on 40L 50%FiO2 via airvo, O2 sat 96%. Will monitor respiratory status.

## 2019-06-10 NOTE — ROUTINE PROCESS
Called to bedside to assist Dr. Heidy Freeman with bronchoscopy at bedside. Consent obtained via telephone from primary nurse. Timeout completed (patient identification completed). NO sedation medications given by ENDO RN. Patient tolerated procedure well, no adverse reactions. Report given to primary nurse in CV ICU. See MD note.

## 2019-06-10 NOTE — PROCEDURES
Procedure: Bronchoscopy    Time Out Done -- Correct Patient Identified. Everyone Agrees. Anesthesia- 25 mcg Fentanyl  Indication- as per above  Post Procedure diagnosis : as per above  Instrument-  Olympus bronchosope     Procedure   The flexible fiberoptic bronchoscope was introduced through the ETT with elbow. Advanced down trachea to quita. Quita sharp without any pathology noted. Noted dynamic collapse with respiration    Advanced to left main stem bronchus and down to ANGEL and LLL. Noted normal anatomy and segments. Clear -- very dynamic airway -- collapsing almost 90% with respiration. Scope then passed to Right main stem bronchus and noted some thick old bloody secretions. Cleared with saline. Dynamic airway with 90% collapse with respiration. Noted normal RUL anatomy. Then advanced to bronchus intermedius and noted RML and RLL with normal segments. Again with collapsible airway. Patient did have old bloody secretions/plugs that needed to be cleared with normal saline. No active bleeding noted. Patient tolerated procedure well, no complications. EBL -- none    Samples sent for:    No samples sent.       Wilbert Macias MD

## 2019-06-10 NOTE — PROGRESS NOTES
Changed pt black to PS of 6, +8 and 40% after Bronch per Dr. Chavez Jamey.  Received verbal order to continue to wean to extubate

## 2019-06-10 NOTE — ANESTHESIA PROCEDURE NOTES
MARCK  Date/Time: 6/10/2019 7:45 AM      Procedure Details: probe placement, image aquisition & interpretation        Procedure Note    Performed by: Zoila Hannon MD  Authorized by: Zoila Hannon MD       Indications: assessment of ascending aorta  Modalities: 2D, CF  Probe Type: multiplane  Insertion: atraumatic  Patient Status: intubated    Echocardiographic and Doppler Measurements   Aorta  Size  Diam(cm)  Dissection PlaqueThick(mm)  Plaque Mobile    Ascending normal   0-3     Arch normal   0-3     Descending normal   0-3           Valves  Annulus  Stenosis  Area/Grad  Regurg  Leaflet   Morph  Leaflet   Motion    Aortic normal none  1+ normal normal    Mitral normal none  1+ normal normal    Tricuspid normal none              Atria  Size  SEC (smoke)  Thrombus  Tumor  Device    Rt Atrium normal        Lt Atrium normal         Interatrial Septum Morphology: normal    Interventricular Septum Morphology: normal    Ventricle  Cavity Size  Cavity Dimension Hypertrophy  Thrombus  Gloal FXN  EF    RV normal    normal     LV normal    normal          Post Intervention Follow-up Study  Ventricular Global Function: unchanged  Ventricular Regional Function: unchanged     Valve  Function  Regurgitation  Area    Aortic no change 1+     Mitral no change 1+     Tricuspid       Prosthetic        Complications: None

## 2019-06-10 NOTE — PROGRESS NOTES
Hibiclens bath care provided per CABG pre-op orders. Patient tolerated well. Thorough care taken with chest and bilateral lower extremities. Deena hugger gown applied following bath care.

## 2019-06-11 ENCOUNTER — APPOINTMENT (OUTPATIENT)
Dept: GENERAL RADIOLOGY | Age: 74
DRG: 229 | End: 2019-06-11
Attending: PHYSICIAN ASSISTANT
Payer: MEDICARE

## 2019-06-11 LAB
ANION GAP SERPL CALC-SCNC: 6 MMOL/L (ref 7–16)
ATRIAL RATE: 63 BPM
BASOPHILS # BLD: 0 K/UL (ref 0–0.2)
BASOPHILS NFR BLD: 0 % (ref 0–2)
BUN SERPL-MCNC: 20 MG/DL (ref 8–23)
C-ANCA TITR SER IF: NORMAL TITER
CALCIUM SERPL-MCNC: 7.4 MG/DL (ref 8.3–10.4)
CALCULATED P AXIS, ECG09: 69 DEGREES
CALCULATED R AXIS, ECG10: 13 DEGREES
CALCULATED T AXIS, ECG11: 70 DEGREES
CHLORIDE SERPL-SCNC: 104 MMOL/L (ref 98–107)
CO2 SERPL-SCNC: 24 MMOL/L (ref 21–32)
CREAT SERPL-MCNC: 1.13 MG/DL (ref 0.8–1.5)
DIAGNOSIS, 93000: NORMAL
DIFFERENTIAL METHOD BLD: ABNORMAL
EOSINOPHIL # BLD: 0 K/UL (ref 0–0.8)
EOSINOPHIL NFR BLD: 0 % (ref 0.5–7.8)
ERYTHROCYTE [DISTWIDTH] IN BLOOD BY AUTOMATED COUNT: 14.3 % (ref 11.9–14.6)
GLUCOSE BLD STRIP.AUTO-MCNC: 100 MG/DL (ref 65–100)
GLUCOSE BLD STRIP.AUTO-MCNC: 105 MG/DL (ref 65–100)
GLUCOSE BLD STRIP.AUTO-MCNC: 111 MG/DL (ref 65–100)
GLUCOSE BLD STRIP.AUTO-MCNC: 147 MG/DL (ref 65–100)
GLUCOSE BLD STRIP.AUTO-MCNC: 156 MG/DL (ref 65–100)
GLUCOSE BLD STRIP.AUTO-MCNC: 169 MG/DL (ref 65–100)
GLUCOSE BLD STRIP.AUTO-MCNC: 97 MG/DL (ref 65–100)
GLUCOSE SERPL-MCNC: 96 MG/DL (ref 65–100)
HCT VFR BLD AUTO: 24.7 % (ref 41.1–50.3)
HGB BLD-MCNC: 8.4 G/DL (ref 13.6–17.2)
IMM GRANULOCYTES # BLD AUTO: 0 K/UL (ref 0–0.5)
IMM GRANULOCYTES NFR BLD AUTO: 1 % (ref 0–5)
LYMPHOCYTES # BLD: 0.3 K/UL (ref 0.5–4.6)
LYMPHOCYTES NFR BLD: 4 % (ref 13–44)
MAGNESIUM SERPL-MCNC: 2.6 MG/DL (ref 1.8–2.4)
MCH RBC QN AUTO: 32.8 PG (ref 26.1–32.9)
MCHC RBC AUTO-ENTMCNC: 34 G/DL (ref 31.4–35)
MCV RBC AUTO: 96.5 FL (ref 79.6–97.8)
MM INDURATION POC: 0 MM (ref 0–5)
MONOCYTES # BLD: 0.6 K/UL (ref 0.1–1.3)
MONOCYTES NFR BLD: 7 % (ref 4–12)
MYELOPEROXIDASE AB SER IA-ACNC: <9 U/ML (ref 0–9)
NEUTS SEG # BLD: 7.8 K/UL (ref 1.7–8.2)
NEUTS SEG NFR BLD: 89 % (ref 43–78)
NRBC # BLD: 0 K/UL (ref 0–0.2)
P-ANCA ATYPICAL TITR SER IF: NORMAL TITER
P-ANCA TITR SER IF: NORMAL TITER
P-R INTERVAL, ECG05: 188 MS
PLATELET # BLD AUTO: 96 K/UL (ref 150–450)
PMV BLD AUTO: 9.4 FL (ref 9.4–12.3)
POTASSIUM SERPL-SCNC: 3.9 MMOL/L (ref 3.5–5.1)
PPD POC: NEGATIVE NEGATIVE
PROTEINASE3 AB SER IA-ACNC: <3.5 U/ML (ref 0–3.5)
Q-T INTERVAL, ECG07: 458 MS
QRS DURATION, ECG06: 116 MS
QTC CALCULATION (BEZET), ECG08: 468 MS
RBC # BLD AUTO: 2.56 M/UL (ref 4.23–5.6)
SODIUM SERPL-SCNC: 134 MMOL/L (ref 136–145)
VENTRICULAR RATE, ECG03: 63 BPM
WBC # BLD AUTO: 8.7 K/UL (ref 4.3–11.1)

## 2019-06-11 PROCEDURE — 65660000004 HC RM CVT STEPDOWN

## 2019-06-11 PROCEDURE — 94640 AIRWAY INHALATION TREATMENT: CPT

## 2019-06-11 PROCEDURE — 83735 ASSAY OF MAGNESIUM: CPT

## 2019-06-11 PROCEDURE — 74011250637 HC RX REV CODE- 250/637: Performed by: THORACIC SURGERY (CARDIOTHORACIC VASCULAR SURGERY)

## 2019-06-11 PROCEDURE — 99233 SBSQ HOSP IP/OBS HIGH 50: CPT | Performed by: INTERNAL MEDICINE

## 2019-06-11 PROCEDURE — 74011250637 HC RX REV CODE- 250/637: Performed by: PHYSICIAN ASSISTANT

## 2019-06-11 PROCEDURE — 85025 COMPLETE CBC W/AUTO DIFF WBC: CPT

## 2019-06-11 PROCEDURE — 36600 WITHDRAWAL OF ARTERIAL BLOOD: CPT

## 2019-06-11 PROCEDURE — 80048 BASIC METABOLIC PNL TOTAL CA: CPT

## 2019-06-11 PROCEDURE — 77010033678 HC OXYGEN DAILY

## 2019-06-11 PROCEDURE — 74011250637 HC RX REV CODE- 250/637: Performed by: INTERNAL MEDICINE

## 2019-06-11 PROCEDURE — 74011000250 HC RX REV CODE- 250: Performed by: INTERNAL MEDICINE

## 2019-06-11 PROCEDURE — 82962 GLUCOSE BLOOD TEST: CPT

## 2019-06-11 PROCEDURE — 74011636637 HC RX REV CODE- 636/637: Performed by: THORACIC SURGERY (CARDIOTHORACIC VASCULAR SURGERY)

## 2019-06-11 PROCEDURE — 74011250636 HC RX REV CODE- 250/636: Performed by: PHYSICIAN ASSISTANT

## 2019-06-11 PROCEDURE — 71045 X-RAY EXAM CHEST 1 VIEW: CPT

## 2019-06-11 PROCEDURE — 74011250636 HC RX REV CODE- 250/636: Performed by: THORACIC SURGERY (CARDIOTHORACIC VASCULAR SURGERY)

## 2019-06-11 PROCEDURE — 74011000250 HC RX REV CODE- 250: Performed by: PHYSICIAN ASSISTANT

## 2019-06-11 PROCEDURE — 74011250637 HC RX REV CODE- 250/637: Performed by: NURSE PRACTITIONER

## 2019-06-11 PROCEDURE — 93005 ELECTROCARDIOGRAM TRACING: CPT | Performed by: PHYSICIAN ASSISTANT

## 2019-06-11 RX ORDER — POTASSIUM CHLORIDE 20 MEQ/1
40 TABLET, EXTENDED RELEASE ORAL
Status: DISCONTINUED | OUTPATIENT
Start: 2019-06-11 | End: 2019-06-17 | Stop reason: HOSPADM

## 2019-06-11 RX ORDER — ZOLPIDEM TARTRATE 5 MG/1
5 TABLET ORAL
Status: DISCONTINUED | OUTPATIENT
Start: 2019-06-11 | End: 2019-06-17 | Stop reason: HOSPADM

## 2019-06-11 RX ORDER — MAG HYDROX/ALUMINUM HYD/SIMETH 200-200-20
30 SUSPENSION, ORAL (FINAL DOSE FORM) ORAL
Status: DISCONTINUED | OUTPATIENT
Start: 2019-06-11 | End: 2019-06-17 | Stop reason: HOSPADM

## 2019-06-11 RX ORDER — POTASSIUM CHLORIDE 20 MEQ/1
20 TABLET, EXTENDED RELEASE ORAL
Status: DISCONTINUED | OUTPATIENT
Start: 2019-06-11 | End: 2019-06-17 | Stop reason: HOSPADM

## 2019-06-11 RX ORDER — FAMOTIDINE 20 MG/1
20 TABLET, FILM COATED ORAL 2 TIMES DAILY
Status: DISCONTINUED | OUTPATIENT
Start: 2019-06-11 | End: 2019-06-17 | Stop reason: HOSPADM

## 2019-06-11 RX ORDER — AMOXICILLIN 250 MG
2 CAPSULE ORAL 2 TIMES DAILY
Status: DISCONTINUED | OUTPATIENT
Start: 2019-06-11 | End: 2019-06-14

## 2019-06-11 RX ORDER — SODIUM CHLORIDE 0.9 % (FLUSH) 0.9 %
5-40 SYRINGE (ML) INJECTION EVERY 8 HOURS
Status: DISCONTINUED | OUTPATIENT
Start: 2019-06-11 | End: 2019-06-17 | Stop reason: HOSPADM

## 2019-06-11 RX ORDER — LANOLIN ALCOHOL/MO/W.PET/CERES
400 CREAM (GRAM) TOPICAL
Status: DISCONTINUED | OUTPATIENT
Start: 2019-06-11 | End: 2019-06-17 | Stop reason: HOSPADM

## 2019-06-11 RX ORDER — INSULIN LISPRO 100 [IU]/ML
INJECTION, SOLUTION INTRAVENOUS; SUBCUTANEOUS
Status: DISCONTINUED | OUTPATIENT
Start: 2019-06-11 | End: 2019-06-12

## 2019-06-11 RX ORDER — METOPROLOL TARTRATE 25 MG/1
25 TABLET, FILM COATED ORAL 2 TIMES DAILY
Status: DISCONTINUED | OUTPATIENT
Start: 2019-06-11 | End: 2019-06-12

## 2019-06-11 RX ORDER — MUPIROCIN 20 MG/G
OINTMENT TOPICAL 2 TIMES DAILY
Status: COMPLETED | OUTPATIENT
Start: 2019-06-11 | End: 2019-06-15

## 2019-06-11 RX ORDER — FUROSEMIDE 40 MG/1
40 TABLET ORAL DAILY
Status: DISCONTINUED | OUTPATIENT
Start: 2019-06-12 | End: 2019-06-12

## 2019-06-11 RX ORDER — ASPIRIN 81 MG/1
81 TABLET ORAL DAILY
Status: DISCONTINUED | OUTPATIENT
Start: 2019-06-12 | End: 2019-06-17 | Stop reason: HOSPADM

## 2019-06-11 RX ORDER — POTASSIUM CHLORIDE 750 MG/1
10 TABLET, EXTENDED RELEASE ORAL DAILY
Status: COMPLETED | OUTPATIENT
Start: 2019-06-12 | End: 2019-06-14

## 2019-06-11 RX ORDER — SODIUM CHLORIDE 0.9 % (FLUSH) 0.9 %
5-40 SYRINGE (ML) INJECTION AS NEEDED
Status: DISCONTINUED | OUTPATIENT
Start: 2019-06-11 | End: 2019-06-17 | Stop reason: HOSPADM

## 2019-06-11 RX ORDER — ACETAMINOPHEN 325 MG/1
650 TABLET ORAL
Status: DISCONTINUED | OUTPATIENT
Start: 2019-06-11 | End: 2019-06-17 | Stop reason: HOSPADM

## 2019-06-11 RX ORDER — AMIODARONE HYDROCHLORIDE 200 MG/1
200 TABLET ORAL EVERY 12 HOURS
Status: DISCONTINUED | OUTPATIENT
Start: 2019-06-11 | End: 2019-06-11

## 2019-06-11 RX ADMIN — METOPROLOL TARTRATE 25 MG: 25 TABLET, FILM COATED ORAL at 17:46

## 2019-06-11 RX ADMIN — KETOROLAC TROMETHAMINE 15 MG: 15 INJECTION, SOLUTION INTRAMUSCULAR; INTRAVENOUS at 21:45

## 2019-06-11 RX ADMIN — MUPIROCIN: 20 OINTMENT TOPICAL at 21:52

## 2019-06-11 RX ADMIN — SENNOSIDES AND DOCUSATE SODIUM 2 TABLET: 8.6; 5 TABLET ORAL at 21:45

## 2019-06-11 RX ADMIN — OXYCODONE AND ACETAMINOPHEN 1 TABLET: 5; 325 TABLET ORAL at 13:52

## 2019-06-11 RX ADMIN — OXYCODONE AND ACETAMINOPHEN 1 TABLET: 5; 325 TABLET ORAL at 06:14

## 2019-06-11 RX ADMIN — Medication 10 ML: at 21:45

## 2019-06-11 RX ADMIN — MUPIROCIN: 20 OINTMENT TOPICAL at 09:34

## 2019-06-11 RX ADMIN — Medication 10 ML: at 17:47

## 2019-06-11 RX ADMIN — CARBAMAZEPINE 200 MG: 200 TABLET ORAL at 17:46

## 2019-06-11 RX ADMIN — Medication 2 G: at 03:31

## 2019-06-11 RX ADMIN — FENOFIBRATE 160 MG: 160 TABLET ORAL at 09:31

## 2019-06-11 RX ADMIN — POTASSIUM CHLORIDE 10 MEQ: 200 INJECTION, SOLUTION INTRAVENOUS at 07:15

## 2019-06-11 RX ADMIN — KETOROLAC TROMETHAMINE 15 MG: 15 INJECTION, SOLUTION INTRAMUSCULAR; INTRAVENOUS at 01:11

## 2019-06-11 RX ADMIN — OXYCODONE AND ACETAMINOPHEN 1 TABLET: 5; 325 TABLET ORAL at 02:14

## 2019-06-11 RX ADMIN — OXYCODONE AND ACETAMINOPHEN 1 TABLET: 5; 325 TABLET ORAL at 21:44

## 2019-06-11 RX ADMIN — ALBUTEROL SULFATE 2.5 MG: 2.5 SOLUTION RESPIRATORY (INHALATION) at 13:24

## 2019-06-11 RX ADMIN — FAMOTIDINE 20 MG: 20 TABLET ORAL at 17:46

## 2019-06-11 RX ADMIN — Medication 10 ML: at 13:53

## 2019-06-11 RX ADMIN — INSULIN LISPRO 2 UNITS: 100 INJECTION, SOLUTION INTRAVENOUS; SUBCUTANEOUS at 21:53

## 2019-06-11 RX ADMIN — INSULIN LISPRO 2 UNITS: 100 INJECTION, SOLUTION INTRAVENOUS; SUBCUTANEOUS at 17:45

## 2019-06-11 RX ADMIN — FAMOTIDINE 20 MG: 10 INJECTION, SOLUTION INTRAVENOUS at 09:31

## 2019-06-11 RX ADMIN — GABAPENTIN 400 MG: 100 CAPSULE ORAL at 17:46

## 2019-06-11 RX ADMIN — AMIODARONE HYDROCHLORIDE 400 MG: 200 TABLET ORAL at 09:31

## 2019-06-11 RX ADMIN — Medication 1 EACH: at 17:45

## 2019-06-11 RX ADMIN — ROSUVASTATIN CALCIUM 40 MG: 20 TABLET, FILM COATED ORAL at 21:44

## 2019-06-11 RX ADMIN — AMIODARONE HYDROCHLORIDE 400 MG: 200 TABLET ORAL at 21:44

## 2019-06-11 RX ADMIN — ALBUTEROL SULFATE 2.5 MG: 2.5 SOLUTION RESPIRATORY (INHALATION) at 08:20

## 2019-06-11 RX ADMIN — KETOROLAC TROMETHAMINE 15 MG: 15 INJECTION, SOLUTION INTRAMUSCULAR; INTRAVENOUS at 09:31

## 2019-06-11 RX ADMIN — PANTOPRAZOLE SODIUM 40 MG: 40 TABLET, DELAYED RELEASE ORAL at 09:31

## 2019-06-11 RX ADMIN — Medication 10 ML: at 05:45

## 2019-06-11 RX ADMIN — KETOROLAC TROMETHAMINE 15 MG: 15 INJECTION, SOLUTION INTRAMUSCULAR; INTRAVENOUS at 15:46

## 2019-06-11 NOTE — PROGRESS NOTES
Problem: Falls - Risk of  Goal: *Absence of Falls  Description  Document Dante Diggs Fall Risk and appropriate interventions in the flowsheet.   Outcome: Progressing Towards Goal

## 2019-06-11 NOTE — PROGRESS NOTES
TRANSFER - IN REPORT: 
 
Verbal report to Raul Avendano RN,  received from Leroy Paez RN(name) on [employer]  being received from CV[facility](unit) for routine progression of care Report consisted of patients Situation, Background, Assessment and  
Recommendations(SBAR). Information from the following report(s) SBAR, Kardex, OR Summary, Intake/Output, MAR, Recent Results and Cardiac Rhythm NSR was reviewed with the receiving nurse. Opportunity for questions and clarification was provided. Assessment completed upon patients arrival to unit and care assumed. Dual skin assessment completed with RN. Allevyn dressing pulled back and replaced, no redness or breakdown noted.

## 2019-06-11 NOTE — PROGRESS NOTES
CV Progress Note    Admit Date: 6/6/2019    POD 1    Subjective:     Patient present conditions: Awake, Alert and Cooperative. Review of Systems   Cardiac: Vital signs stable. Lines in  Respiratory: Chest x-ray clear. Minimal chest tube drainage. extubated  Neuro: Moves all four extremities. Incision: Dry. GI: Taking liquids. Objective:     Vitals:  Blood pressure 110/59, pulse 61, temperature 98.1 °F (36.7 °C), resp. rate 10, height 5' 8\" (1.727 m), weight 196 lb (88.9 kg), SpO2 97 %. I/O:  No intake/output data recorded. 06/09 1901 - 06/11 0700  In: 8429.6 [P.O.:240; I.V.:7839.6]  Out: 2175 [Urine:1365]    Heart: No Murmur. Lung: Working with IS. Neuro: Cooperative. Incisions: Dry.     ECG/Telemetry: Unchanged from Pre-Op    Labs:  Recent Results (from the past 12 hour(s))   GLUCOSE, POC    Collection Time: 06/10/19  9:16 PM   Result Value Ref Range    Glucose (POC) 107 (H) 65 - 100 mg/dL   HGB & HCT    Collection Time: 06/10/19  9:18 PM   Result Value Ref Range    HGB 8.6 (L) 13.6 - 17.2 g/dL    HCT 25.0 (L) 41.1 - 50.3 %   POTASSIUM    Collection Time: 06/10/19  9:18 PM   Result Value Ref Range    Potassium 3.8 3.5 - 5.1 mmol/L   MAGNESIUM    Collection Time: 06/10/19  9:18 PM   Result Value Ref Range    Magnesium 2.6 (H) 1.8 - 2.4 mg/dL   GLUCOSE, POC    Collection Time: 06/10/19 10:54 PM   Result Value Ref Range    Glucose (POC) 114 (H) 65 - 100 mg/dL   GLUCOSE, POC    Collection Time: 06/11/19 12:15 AM   Result Value Ref Range    Glucose (POC) 111 (H) 65 - 100 mg/dL   GLUCOSE, POC    Collection Time: 06/11/19  3:00 AM   Result Value Ref Range    Glucose (POC) 105 (H) 65 - 100 mg/dL   CBC WITH AUTOMATED DIFF    Collection Time: 06/11/19  3:02 AM   Result Value Ref Range    WBC 8.7 4.3 - 11.1 K/uL    RBC 2.56 (L) 4.23 - 5.6 M/uL    HGB 8.4 (L) 13.6 - 17.2 g/dL    HCT 24.7 (L) 41.1 - 50.3 %    MCV 96.5 79.6 - 97.8 FL    MCH 32.8 26.1 - 32.9 PG    MCHC 34.0 31.4 - 35.0 g/dL    RDW 14.3 11.9 - 14.6 %    PLATELET 96 (L) 944 - 450 K/uL    MPV 9.4 9.4 - 12.3 FL    ABSOLUTE NRBC 0.00 0.0 - 0.2 K/uL    DF AUTOMATED      NEUTROPHILS 89 (H) 43 - 78 %    LYMPHOCYTES 4 (L) 13 - 44 %    MONOCYTES 7 4.0 - 12.0 %    EOSINOPHILS 0 (L) 0.5 - 7.8 %    BASOPHILS 0 0.0 - 2.0 %    IMMATURE GRANULOCYTES 1 0.0 - 5.0 %    ABS. NEUTROPHILS 7.8 1.7 - 8.2 K/UL    ABS. LYMPHOCYTES 0.3 (L) 0.5 - 4.6 K/UL    ABS. MONOCYTES 0.6 0.1 - 1.3 K/UL    ABS. EOSINOPHILS 0.0 0.0 - 0.8 K/UL    ABS. BASOPHILS 0.0 0.0 - 0.2 K/UL    ABS. IMM.  GRANS. 0.0 0.0 - 0.5 K/UL   METABOLIC PANEL, BASIC    Collection Time: 06/11/19  3:02 AM   Result Value Ref Range    Sodium 134 (L) 136 - 145 mmol/L    Potassium 3.9 3.5 - 5.1 mmol/L    Chloride 104 98 - 107 mmol/L    CO2 24 21 - 32 mmol/L    Anion gap 6 (L) 7 - 16 mmol/L    Glucose 96 65 - 100 mg/dL    BUN 20 8 - 23 MG/DL    Creatinine 1.13 0.8 - 1.5 MG/DL    GFR est AA >60 >60 ml/min/1.73m2    GFR est non-AA >60 >60 ml/min/1.73m2    Calcium 7.4 (L) 8.3 - 10.4 MG/DL   MAGNESIUM    Collection Time: 06/11/19  3:02 AM   Result Value Ref Range    Magnesium 2.6 (H) 1.8 - 2.4 mg/dL   GLUCOSE, POC    Collection Time: 06/11/19  5:09 AM   Result Value Ref Range    Glucose (POC) 100 65 - 100 mg/dL   EKG, 12 LEAD, SUBSEQUENT    Collection Time: 06/11/19  6:24 AM   Result Value Ref Range    Ventricular Rate 63 BPM    Atrial Rate 63 BPM    P-R Interval 188 ms    QRS Duration 116 ms    Q-T Interval 458 ms    QTC Calculation (Bezet) 468 ms    Calculated P Axis 69 degrees    Calculated R Axis 13 degrees    Calculated T Axis 70 degrees    Diagnosis       Normal sinus rhythm  Incomplete right bundle branch block  Borderline ECG  When compared with ECG of 10-MEHREEN-2019 12:45,  T wave inversion less evident in Lateral leads  Confirmed by NELLA ALANIS (), Nadja Paige ([id number]) on 6/11/2019 7:23:05 AM     GLUCOSE, POC    Collection Time: 06/11/19  6:42 AM   Result Value Ref Range    Glucose (POC) 97 65 - 100 mg/dL       Assessment: Stable. Plan transfer soon. Still requires inotrope, otherwise making good progress      Plan/Recommendations/Medical Decision Making:     Continue present treatment    Discontinue: Chest tubes today. See orders    Ira Jalloh.  Hector Kothari MD

## 2019-06-11 NOTE — PROGRESS NOTES
Problem: CABG: Day of Surgery (Initiate SCIP measures for post-op care)  Goal: Activity/Safety  Outcome: Progressing Towards Goal     Problem: CABG: Day of Surgery (Initiate SCIP measures for post-op care)  Goal: Treatments/Interventions/Procedures  Outcome: Progressing Towards Goal     Problem: CABG: Day of Surgery (Initiate SCIP measures for post-op care)  Goal: *Hemodynamically stable (eg: Cardiac output/index; pulmonary arterial pressures; mixed venous oxygen saturation within set parameters)  Outcome: Progressing Towards Goal     Problem: CABG: Day of Surgery (Initiate SCIP measures for post-op care)  Goal: Activity/Safety  Outcome: Progressing Towards Goal

## 2019-06-11 NOTE — PROGRESS NOTES
TRANSFER - OUT REPORT:    Verbal report given to German King RN(name) on [employer]  being transferred to [facility](unit) for routine progression of care       Report consisted of patients Situation, Background, Assessment and   Recommendations(SBAR). Information from the following report(s) SBAR, Kardex, OR Summary, Procedure Summary, Intake/Output, MAR and Recent Results was reviewed with the receiving nurse. Lines:   Double Lumen 06/10/19 Right Internal jugular (Active)   Central Line Being Utilized Yes 6/11/2019  7:00 AM   Criteria for Appropriate Use Hemodynamically unstable, requiring monitoring lines, vasopressors, or volume resuscitation 6/11/2019  7:00 AM   Site Assessment Clean, dry, & intact 6/11/2019  7:00 AM   Infiltration Assessment 0 6/11/2019  7:00 AM   Affected Extremity/Extremities Color distal to insertion site pink (or appropriate for race); Pulses palpable 6/11/2019  7:00 AM   Date of Last Dressing Change 06/10/19 6/11/2019  7:00 AM   Dressing Status Clean, dry, & intact; Occlusive 6/11/2019  7:00 AM   Dressing Type Disk with Chlorhexadine gluconate (CHG); Tape;Transparent 6/11/2019  7:00 AM   Action Taken Dressing changed 6/10/2019 12:28 PM   Proximal Hub Color/Line Status White;Capped; Infusing;Patent 6/11/2019  7:00 AM   Positive Blood Return (Medial Site) Yes 6/11/2019  7:00 AM   Distal Hub Color/Line Status Brown;Capped; Infusing;Patent 6/11/2019  7:00 AM   Positive Blood Return (Lateral Site) Yes 6/11/2019  7:00 AM   Alcohol Cap Used No 6/11/2019  7:00 AM       Peripheral IV 06/06/19 Left Antecubital (Active)   Site Assessment Clean, dry, & intact 6/11/2019  7:00 AM   Phlebitis Assessment 0 6/11/2019  7:00 AM   Infiltration Assessment 0 6/11/2019  7:00 AM   Dressing Status Clean, dry, & intact; Occlusive 6/11/2019  7:00 AM   Dressing Type Tape;Transparent 6/11/2019  7:00 AM   Hub Color/Line Status Green;Capped;Flushed;Patent 6/11/2019  7:00 AM   Alcohol Cap Used No 6/11/2019  7:00 AM Peripheral IV 06/06/19 Anterior;Proximal;Right Forearm (Active)   Site Assessment Clean, dry, & intact 6/11/2019  7:00 AM   Phlebitis Assessment 0 6/11/2019  7:00 AM   Infiltration Assessment 0 6/11/2019  7:00 AM   Dressing Status Clean, dry, & intact; Occlusive 6/11/2019  7:00 AM   Dressing Type Tape;Transparent 6/11/2019  7:00 AM   Hub Color/Line Status Green;Capped;Flushed;Patent 6/11/2019  7:00 AM   Alcohol Cap Used No 6/11/2019  7:00 AM        Opportunity for questions and clarification was provided.       Patient transported with:   Monitor  Registered Nurse

## 2019-06-11 NOTE — PROGRESS NOTES
Critical Care Daily Progress Note: 6/11/2019  Admission Date: 6/6/2019     The patient's chart is reviewed and the patient is discussed with the staff. Patient is a 66 y.o.  male seen and evaluated at the request of Dr. Mustapha Branham. Patient has a history of TIA, trigeminal neuralgia, HTN, HL, a.fib on Eliquis / ASA, chronic sCHF, and CAD. He is followed by Cardiology as an outpatient and was seen for worsening dyspnea with recommendation for [facility] / MARCK/eCV but this was never scheduled. He presented to the ER with c/o R sided facial/jaw pain and substernal chest pain. In the ER he was found to be in a.fib with RVR with HR in the 130s and troponin of 0.14 >>>21.30. 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 He was started on a heparin gtt (last dose of eliquis 6/6) in anticipation of surgical intervention. He was given lasix for volume overload with 2 liters urine output yesterday (net -265 ml).     Patient has a remote history of tobacco abuse  - smoked 1.5 ppd x 14 years before quitting 40 years ago. He has never been dx with lung disease, does not require inhalers or home O2. Patient states that he has had some hemoptysis over the last couple of months but it is usually pink, frothy sputum and not dark red blood as he is currently having. He usually stops his ASA and it resolves. Yesterday he had several episodes where he coughed up a moderate amount of dark red blood. He has not had any so far today. Surgery is scheduled for tomorrow and we have been consulted for airway inspection for surgical clearance. He had diagnostic bronch yesterday with no bleeding or lesions seen. Subjective:     CABG x 3 yesterday. Post op had bronch to clear old bloody mucus plugs and then extubate to airvo. Remains in NSR s/p MAZE.      Current Facility-Administered Medications   Medication Dose Route Frequency    0.9% sodium chloride infusion  25 mL/hr IntraVENous CONTINUOUS    dextrose 5% - 0.45% NaCl with KCl 20 mEq/L infusion  25 mL/hr IntraVENous CONTINUOUS    sodium chloride (NS) flush 5-40 mL  5-40 mL IntraVENous Q8H    sodium chloride (NS) flush 5-40 mL  5-40 mL IntraVENous PRN    acetaminophen (TYLENOL) tablet 650 mg  650 mg Oral Q4H PRN    oxyCODONE-acetaminophen (PERCOCET) 5-325 mg per tablet 1 Tab  1 Tab Oral Q4H PRN    naloxone (NARCAN) injection 0.4 mg  0.4 mg IntraVENous PRN    mupirocin (BACTROBAN) 2 % ointment   Both Nostrils BID    DOBUTamine (DOBUTREX) 500 mg/250 mL (2,000 mcg/mL) infusion  2-10 mcg/kg/min IntraVENous TITRATE    EPINEPHrine (ADRENALIN) 4 mg in 0.9% sodium chloride 250 mL infusion  0.01-0.08 mcg/kg/min IntraVENous TITRATE    nitroglycerin (Tridil) 200 mcg/ml infusion   mcg/min IntraVENous TITRATE    PHENYLephrine (ANTONELLA-SYNEPHRINE) 30,000 mcg in 250 mL infusion   mcg/min IntraVENous TITRATE    ondansetron (ZOFRAN) injection 4-8 mg  4-8 mg IntraVENous Q4H PRN    insulin regular (NOVOLIN R, HUMULIN R) 100 Units in 0.9% sodium chloride 100 mL infusion  1 Units/hr IntraVENous TITRATE    dextrose (D50W) injection syrg 12.5 g  25 mL IntraVENous PRN    magnesium sulfate 1 g/100 ml IVPB (premix or compounded)  1 g IntraVENous PRN    potassium chloride 10 mEq in 50 ml IVPB  10 mEq IntraVENous PRN    midazolam (VERSED) injection 1 mg  1 mg IntraVENous Q1H PRN    propofol (DIPRIVAN) infusion  5-50 mcg/kg/min IntraVENous TITRATE    chlorhexidine (PERIDEX) 0.12 % mouthwash 10 mL  10 mL Oral BID    tuberculin injection 5 Units  5 Units IntraDERMal ONCE    famotidine (PF) (PEPCID) 20 mg in sodium chloride 0.9% 10 mL injection  20 mg IntraVENous Q12H    NOREPINephrine (LEVOPHED) 4 mg/250 mL (16 mcg/mL) in NS infusion  0-16 mcg/min IntraVENous TITRATE    albuterol (PROVENTIL VENTOLIN) nebulizer solution 2.5 mg  2.5 mg Nebulization TID RT    ketorolac (TORADOL) injection 15 mg  15 mg IntraVENous Q6H    morphine injection 2 mg  2 mg IntraVENous Q1H PRN    lidocaine (XYLOCAINE) 4 % (40 mg/mL) topical solution   Topical PRN    promethazine (PHENERGAN) with saline injection 6.25 mg  6.25 mg IntraVENous MULTIPLE DOSE GIVEN    lidocaine (XYLOCAINE) 2 % jelly   Mucous Membrane PRN    polyethylene glycol (MIRALAX) packet 17 g  17 g Oral DAILY PRN    amiodarone (CORDARONE) tablet 400 mg  400 mg Oral Q12H    rosuvastatin (CRESTOR) tablet 40 mg  40 mg Oral QHS    heparin 25,000 units in dextrose 500 mL infusion  12-25 Units/kg/hr IntraVENous TITRATE    sodium chloride (NS) flush 5-40 mL  5-40 mL IntraVENous Q8H    docusate sodium (COLACE) capsule 100 mg  100 mg Oral DAILY PRN    aspirin delayed-release tablet 81 mg  81 mg Oral DAILY    carBAMazepine (TEGretol) tablet 200 mg  200 mg Oral BID    fenofibrate (LOFIBRA) tablet 160 mg  160 mg Oral DAILY    gabapentin (NEURONTIN) capsule 400 mg  400 mg Oral BID    metoprolol succinate (TOPROL-XL) XL tablet 100 mg  100 mg Oral DAILY    pantoprazole (PROTONIX) tablet 40 mg  40 mg Oral ACB    sodium chloride (NS) flush 5-40 mL  5-40 mL IntraVENous PRN    nitroglycerin (NITROSTAT) tablet 0.4 mg  0.4 mg SubLINGual Q5MIN PRN     Review of Systems  Post op chest pain, no subjective fevers, denies dyspnea. No hemoptysis today, no rash. Objective:     Vitals:    06/11/19 0531 06/11/19 0545 06/11/19 0601 06/11/19 0615   BP: 109/60  103/58    Pulse: 62 62 60 63   Resp: (!) 31 18 26 20   Temp:    98.2 °F (36.8 °C)   SpO2: 99% 99% 99% 94%   Weight:       Height:         Intake and Output:   06/09 0701 - 06/10 1900  In: 8210.1 [P.O.:490; I.V.:7370.1]  Out: 2055 [BGPVU:7128]  06/10 1901 - 06/11 0700  In: 1475.1 [P.O.:240; I.V.:1235.1]  Out: 695 [Urine:295]    Physical Exam:          Constitutional:  intubated and mechanically ventilated.   EENMT:  Sclera clear, pupils equal, oral mucosa moist  Respiratory: clear  Cardiovascular:  RRR with no M,G,R;  Gastrointestinal:  soft with no tenderness; positive bowel sounds present  Musculoskeletal:  warm with no cyanosis, no lower extremity edema  Skin:  no jaundice or ecchymosis  Neurologic: no gross neuro deficits     Psychiatric:  sedated    LINES:    CT x 2, RIJ cordis/SG, gomez, A-line    DRIPS:    Dobutamine 2-currently turned off  Naveen-currently turned off    CXR:  Continued bilateral infiltrates      Ventilator Settings  Mode FIO2 Rate Tidal Volume Pressure PEEP   Pressure support  40 %    500 ml  6 cm H2O  6 cm H20      Peak airway pressure: 16 cm H2O   Minute ventilation: 11.4 l/min     ABG:   Recent Labs     06/10/19  1252 06/10/19  1153 06/10/19  1114   PHI 7.370 7.350 7.372   PCO2I 41.9 43.8 47.4*   PO2I 85 81 239*   HCO3I 24.2 24.2 27.6*      LAB  Recent Labs     06/11/19  0509 06/11/19  0300 06/11/19  0015 06/10/19  2254 06/10/19  2116   GLUCPOC 100 105* 111* 114* 107*     Recent Labs     06/11/19  0302 06/10/19  2118 06/10/19  1628 06/10/19  1252 06/10/19  0320   WBC 8.7  --   --  11.5* 10.1   HGB 8.4* 8.6* 10.1* 9.9* 10.6*   HCT 24.7* 25.0* 29.0* 28.0* 30.5*   PLT 96*  --   --  154 159   INR  --   --   --  1.4  --      Recent Labs     06/11/19  0302 06/10/19  2118 06/10/19  1627 06/10/19  1252  06/10/19  0320   *  --   --  135*  --  132*   K 3.9 3.8 4.2 3.7  --  3.4*     --   --  101  --  96*   CO2 24  --   --  26  --  30   GLU 96  --   --  113*  --  91   BUN 20  --   --  15  --  16   CREA 1.13  --   --  1.00  --  1.01   MG 2.6* 2.6* 2.7* 3.1*   < >  --    CA 7.4*  --   --  7.9*  --  7.9*    < > = values in this interval not displayed. No results for input(s): LCAD, LAC in the last 72 hours.     Assessment:  (Medical Decision Making)     Hospital Problems  Date Reviewed: 6/9/2019          Codes Class Noted POA    CAD (coronary artery disease) ICD-10-CM: I25.10  ICD-9-CM: 414.00  6/10/2019 Unknown        S/P CABG x 3 ICD-10-CM: Z95.1  ICD-9-CM: V45.81  6/10/2019 Unknown Stable    Hemoptysis ICD-10-CM: R04.2  ICD-9-CM: 786.30  6/9/2019 Unknown    Seems to have resolved. Etiology unclear    Trigeminal neuralgia (Chronic) ICD-10-CM: G50.0  ICD-9-CM: 350.1  6/9/2019 Yes        * (Principal) Atrial fibrillation with RVR ([facility] ICD-10-CM: I48.91  ICD-9-CM: 427.31  6/6/2019 Yes    Now in SR post MAZE    NSTEMI (non-ST elevated myocardial infarction) ([facility] ICD-10-CM: I21.4  ICD-9-CM: 410.70  6/6/2019 Yes        Chronic systolic congestive heart failure (HCC) (Chronic) ICD-10-CM: I50.22  ICD-9-CM: 428.22, 428.0  1/31/2019 Yes    Overview Addendum 1/31/2019 11:22 AM by Markus Gorman MD     2014:  EF 55%  07/2018:  EF 45-50%  01/2019:  EF 40-45%             Dyslipidemia (Chronic) ICD-10-CM: E78.5  ICD-9-CM: 272.4  4/7/2017 Yes        Hypertension (Chronic) ICD-10-CM: I10  ICD-9-CM: 401.9  4/7/2017 Yes        Coronary atherosclerosis of native coronary vessel ICD-10-CM: I25.10  ICD-9-CM: 414.01  4/7/2017 Yes    Overview Addendum 1/31/2019 11:15 AM by Markus Gorman MD     2006:  PCI RCA - Liberte, PCI LAD Cypher, PCI LCx Cypher  01/2014:  Stress testing with fixed inferior defect - no ischemia  11/2014:  EF with EF 50-55% and inferior HK   07/2018:  EF 45-50%  01/2019:  EF 40-45%               Atrial fibrillation ([facility] ICD-10-CM: I48.91  ICD-9-CM: 427.31  4/7/2017 Yes            Encounter for weaning from ventilator- wean per protocol    Plan:  (Medical Decision Making)     Wean O2 as tolerated  CVP is elevated, consider early lasix given bilateral infiltrates on CXR though this may be residual blood. ESR was normal, Would consider repeating regular CRP (not high sensitivity) perhaps tomorrow further away from surgery. TAMARA, ANCA, Anti_GBM are pending  Would continue to hold full anticoagulation  Monitor for recurrent hemoptysis. Follow H/H.    Chest tubes per surgery  Mobility and IS  --    More than 50% of the time documented was spent in face-to-face contact with the patient and in the care of the patient on the floor/unit where the patient is located.     Annia Matos MD

## 2019-06-11 NOTE — PROGRESS NOTES
Pt's left radial art line removed at this time. Manual pressure held until hemostasis achieved. No bleeding or hematoma at site. Pressure dressing to site. Will monitor. Pt's [place] removed at this time. Pt given instructions for [place] pull and Pt v/u. [place] removed without difficulty, no ectopy seen on monitor. Line hub capped. Pt tolerated both procedures well. No acute distress noted. VSS throughout. Pt placed on NIBP Q 15min. Will monitor.

## 2019-06-11 NOTE — PROGRESS NOTES
[facility] CARDIOLOGY PROGRESS NOTE           6/11/2019    Admit Date: 6/6/2019      Subjective:   Patient alert and oriented. Hemodynamics are stable. ROS:  Cardiovascular:  As noted above    Objective:      Vitals:    06/11/19 0715 06/11/19 0730 06/11/19 0820 06/11/19 1030   BP:    126/73   Pulse: 62 61  65   Resp: 22 10  25   Temp:       SpO2: 97% 99% 97% 97%   Weight:       Height:           Physical Exam:  General-No Acute Distress  Neck- supple, no JVD  CV- regular rate and rhythm no MRG  Lung- diminished bilaterally  Abd- soft, nontender, nondistended  Ext- no edema bilaterally. Skin- warm and dry    Data Review:   Recent Labs     06/11/19  0302 06/10/19  2118  06/10/19  1252  06/09/19  0810   *  --   --  135*   < > 130*   K 3.9 3.8   < > 3.7   < > 3.5   MG 2.6* 2.6*   < > 3.1*  --   --    BUN 20  --   --  15   < > 20   CREA 1.13  --   --  1.00   < > 1.06   GLU 96  --   --  113*   < > 87   WBC 8.7  --   --  11.5*   < > 14.1*   HGB 8.4* 8.6*   < > 9.9*   < > 12.0*   HCT 24.7* 25.0*   < > 28.0*   < > 33.7*   PLT 96*  --   --  154   < > 178   INR  --   --   --  1.4  --   --    TROIQ  --   --   --   --   --  3.26*    < > = values in this interval not displayed. Assessment/Plan:     Principal Problem:    Atrial fibrillation with RVR ([address] (6/6/2019)    In SR. S/P MAZE and MICHEAL occlusion. Will need [address] when stable from surgical standpoint. Patient was on Eliquis preop. Active Problems:    Dyslipidemia (4/7/2017)    On statin therapy       Hypertension (4/7/2017)    This is stable and will monitor post-op      Coronary atherosclerosis of native coronary vessel (4/7/2017)    S/P CABG      Atrial fibrillation ([facility] (4/7/2017)    As above       Chronic systolic congestive heart failure ([facility] (1/31/2019)    Volume is stable and monitor. Restart OMT as tolerates.   EF 50-55% by echo 6/7/2019      NSTEMI (non-ST elevated myocardial infarction) ([facility] (6/6/2019)    S/P CABG Hemoptysis (6/9/2019)    Per pulmonary       CAD (coronary artery disease) (6/10/2019)    S/P CABG      S/P CABG x 3 (6/10/2019)    POD#1      Encounter for weaning from ventilator ([address] (6/10/2019)    Extubated       Tracheobronchomalacia (6/10/2019)    Per pulmonary           Yinka Rodriguez MD  6/11/2019

## 2019-06-11 NOTE — PROGRESS NOTES
Chest tubes off suction for >2 hours, no air leak noted with breathing or coughing. Pt ambulatory in hallway 200 feet prior to chest tube removal with minimal output after.     Chest tubes removed per orders during maximum inspiration. Sutures triple tied. Petroleum gauze placed over site and bandaged with 4x4 and tape. Pacer wires isolated and taped.     SWAN introducer cath removed with cath tip intact. Pressure held until hemostasis achieved. Site bandaged with gauze, antibiotic ointment, and tegaderm.     Pt remains resting in NAD, no s/sx SOB, SaO2 98% on supplemental O2 at 3lnc, breath sounds CTA bilaterally and diminished in bases as prior, no subcutaneous emphysema noted.  Will continue to monitor.

## 2019-06-11 NOTE — PROGRESS NOTES
Care Management Interventions  PCP Verified by CM: Yes(Dr. Pasquale Cornelius (last visit January 2019))  Mode of Transport at Discharge: Other (see comment)(TBD based on need)  Transition of Care Consult (CM Consult): Discharge Planning, Home Health, SNF  Physical Therapy Consult: Yes  Occupational Therapy Consult: No  Speech Therapy Consult: No  Current Support Network: Own Home, Other(Pt lives with his girlfriend, Italia Juárez)  Confirm Follow Up Transport: Other (see comment)(TBD based on need)  Plan discussed with Pt/Family/Caregiver: Yes  Freedom of Choice Offered: Yes  Discharge Location  Discharge Placement: Unable to determine at this time      This CM spoke with pt's son/POA, Nathan Ann, this day due to pt being in his room with nursing staff. Nathan Ann verified his PCP, insurance, emergency contact, and home address. Nathan Ann confirms pt with no difficulty obtaining his medication in the community. Pt lives at home with his girlfriend, Italia Juárez, with steps to enter. He has no home DME. Nathan Ann confirms that at baseline pt is independent with his ADLs including bathing, dressing, cooking, and driving. This CM and Nathan Ann discussed discharge planning - per Nathan Ann, they would like to have referral made to [facility] as they have spoken with admission's at facility. This CM made referral to facility via Jaswant Lao. Complicated family dynamic explained to the CM by Nathan Ann. Pt is still legally  to Efrem's mother (pt's address on file), however, he has been living with his girlfriend Italia Juárez for some time now. Nathan Ann and his sister, Luciana Valadez, have HCPOA and Durable POA. Son requests that staff do not explain pt's medical condition unless pt expressly voices his desire for staff to report information to her. Italia Juárez at this time does not have health care decision making capability over pt. Information relayed to nursing staff and request information be relayed to night shift nursing as well.      No other discharge needs and/or CM needs at this time. Will continue to follow.

## 2019-06-11 NOTE — PROGRESS NOTES
TRANSFER - IN REPORT:    Verbal report received from Asha Donis on Michelle Goldman being received from CVICU for routine progression of care      Report consisted of patients Situation, Background, Assessment and Recommendations(SBAR). Information from the following report(s) SBAR, Kardex, OR Summary, Procedure Summary, MAR and Recent Results was reviewed with the receiving nurse. Opportunity for questions and clarification was provided. Assessment completed upon patients arrival to unit and care assumed. Dual skin assessment completed on arrival: sacrum visualized, Allevyn peeled back - no breakdown noted.

## 2019-06-12 ENCOUNTER — APPOINTMENT (OUTPATIENT)
Dept: GENERAL RADIOLOGY | Age: 74
DRG: 229 | End: 2019-06-12
Attending: THORACIC SURGERY (CARDIOTHORACIC VASCULAR SURGERY)
Payer: MEDICARE

## 2019-06-12 LAB
ANA SER QL: NEGATIVE
ANA SER QL: NEGATIVE
ERYTHROCYTE [DISTWIDTH] IN BLOOD BY AUTOMATED COUNT: 14.6 % (ref 11.9–14.6)
GBM IGG SER-ACNC: 4 UNITS (ref 0–20)
GLUCOSE BLD STRIP.AUTO-MCNC: 136 MG/DL (ref 65–100)
GLUCOSE BLD STRIP.AUTO-MCNC: 138 MG/DL (ref 65–100)
GLUCOSE BLD STRIP.AUTO-MCNC: 154 MG/DL (ref 65–100)
HCT VFR BLD AUTO: 28.4 % (ref 41.1–50.3)
HGB BLD-MCNC: 9.5 G/DL (ref 13.6–17.2)
MAGNESIUM SERPL-MCNC: 2.9 MG/DL (ref 1.8–2.4)
MCH RBC QN AUTO: 33.7 PG (ref 26.1–32.9)
MCHC RBC AUTO-ENTMCNC: 33.5 G/DL (ref 31.4–35)
MCV RBC AUTO: 100.7 FL (ref 79.6–97.8)
MM INDURATION POC: 0 MM (ref 0–5)
NRBC # BLD: 0 K/UL (ref 0–0.2)
PLATELET # BLD AUTO: 132 K/UL (ref 150–450)
PMV BLD AUTO: 10.2 FL (ref 9.4–12.3)
POTASSIUM SERPL-SCNC: 4.2 MMOL/L (ref 3.5–5.1)
PPD POC: NEGATIVE NEGATIVE
RBC # BLD AUTO: 2.82 M/UL (ref 4.23–5.6)
WBC # BLD AUTO: 13.8 K/UL (ref 4.3–11.1)

## 2019-06-12 PROCEDURE — 74011250637 HC RX REV CODE- 250/637: Performed by: THORACIC SURGERY (CARDIOTHORACIC VASCULAR SURGERY)

## 2019-06-12 PROCEDURE — 65660000004 HC RM CVT STEPDOWN

## 2019-06-12 PROCEDURE — 82962 GLUCOSE BLOOD TEST: CPT

## 2019-06-12 PROCEDURE — 97110 THERAPEUTIC EXERCISES: CPT

## 2019-06-12 PROCEDURE — 97162 PT EVAL MOD COMPLEX 30 MIN: CPT

## 2019-06-12 PROCEDURE — 74011250636 HC RX REV CODE- 250/636: Performed by: INTERNAL MEDICINE

## 2019-06-12 PROCEDURE — 36415 COLL VENOUS BLD VENIPUNCTURE: CPT

## 2019-06-12 PROCEDURE — 74011250637 HC RX REV CODE- 250/637: Performed by: INTERNAL MEDICINE

## 2019-06-12 PROCEDURE — 83735 ASSAY OF MAGNESIUM: CPT

## 2019-06-12 PROCEDURE — 74011250636 HC RX REV CODE- 250/636: Performed by: THORACIC SURGERY (CARDIOTHORACIC VASCULAR SURGERY)

## 2019-06-12 PROCEDURE — 85027 COMPLETE CBC AUTOMATED: CPT

## 2019-06-12 PROCEDURE — 97530 THERAPEUTIC ACTIVITIES: CPT

## 2019-06-12 PROCEDURE — 99232 SBSQ HOSP IP/OBS MODERATE 35: CPT | Performed by: INTERNAL MEDICINE

## 2019-06-12 PROCEDURE — 77030012890

## 2019-06-12 PROCEDURE — 84132 ASSAY OF SERUM POTASSIUM: CPT

## 2019-06-12 PROCEDURE — 77010033678 HC OXYGEN DAILY

## 2019-06-12 PROCEDURE — 94760 N-INVAS EAR/PLS OXIMETRY 1: CPT

## 2019-06-12 PROCEDURE — 71046 X-RAY EXAM CHEST 2 VIEWS: CPT

## 2019-06-12 RX ORDER — METOPROLOL SUCCINATE 25 MG/1
25 TABLET, EXTENDED RELEASE ORAL DAILY
Status: DISCONTINUED | OUTPATIENT
Start: 2019-06-12 | End: 2019-06-14

## 2019-06-12 RX ORDER — BISACODYL 5 MG
10 TABLET, DELAYED RELEASE (ENTERIC COATED) ORAL DAILY PRN
Status: DISCONTINUED | OUTPATIENT
Start: 2019-06-12 | End: 2019-06-17 | Stop reason: HOSPADM

## 2019-06-12 RX ORDER — LOSARTAN POTASSIUM 25 MG/1
25 TABLET ORAL DAILY
Status: DISCONTINUED | OUTPATIENT
Start: 2019-06-12 | End: 2019-06-17 | Stop reason: HOSPADM

## 2019-06-12 RX ORDER — FUROSEMIDE 10 MG/ML
60 INJECTION INTRAMUSCULAR; INTRAVENOUS ONCE
Status: COMPLETED | OUTPATIENT
Start: 2019-06-12 | End: 2019-06-12

## 2019-06-12 RX ADMIN — AMIODARONE HYDROCHLORIDE 400 MG: 200 TABLET ORAL at 21:00

## 2019-06-12 RX ADMIN — SENNOSIDES AND DOCUSATE SODIUM 2 TABLET: 8.6; 5 TABLET ORAL at 09:03

## 2019-06-12 RX ADMIN — OXYCODONE AND ACETAMINOPHEN 1 TABLET: 5; 325 TABLET ORAL at 17:46

## 2019-06-12 RX ADMIN — BISACODYL 10 MG: 5 TABLET, COATED ORAL at 21:00

## 2019-06-12 RX ADMIN — CARBAMAZEPINE 200 MG: 200 TABLET ORAL at 17:43

## 2019-06-12 RX ADMIN — POTASSIUM CHLORIDE 10 MEQ: 10 TABLET, EXTENDED RELEASE ORAL at 09:04

## 2019-06-12 RX ADMIN — GABAPENTIN 400 MG: 100 CAPSULE ORAL at 17:43

## 2019-06-12 RX ADMIN — SENNOSIDES AND DOCUSATE SODIUM 2 TABLET: 8.6; 5 TABLET ORAL at 21:00

## 2019-06-12 RX ADMIN — OXYCODONE AND ACETAMINOPHEN 1 TABLET: 5; 325 TABLET ORAL at 04:24

## 2019-06-12 RX ADMIN — FENOFIBRATE 160 MG: 160 TABLET ORAL at 09:03

## 2019-06-12 RX ADMIN — Medication 10 ML: at 13:16

## 2019-06-12 RX ADMIN — FAMOTIDINE 20 MG: 20 TABLET ORAL at 17:43

## 2019-06-12 RX ADMIN — FAMOTIDINE 20 MG: 20 TABLET ORAL at 09:04

## 2019-06-12 RX ADMIN — AMIODARONE HYDROCHLORIDE 400 MG: 200 TABLET ORAL at 09:03

## 2019-06-12 RX ADMIN — METOPROLOL SUCCINATE 25 MG: 25 TABLET, EXTENDED RELEASE ORAL at 09:03

## 2019-06-12 RX ADMIN — Medication 10 ML: at 21:15

## 2019-06-12 RX ADMIN — Medication 10 ML: at 04:25

## 2019-06-12 RX ADMIN — OXYCODONE AND ACETAMINOPHEN 1 TABLET: 5; 325 TABLET ORAL at 13:15

## 2019-06-12 RX ADMIN — ROSUVASTATIN CALCIUM 40 MG: 20 TABLET, FILM COATED ORAL at 21:00

## 2019-06-12 RX ADMIN — LOSARTAN POTASSIUM 25 MG: 25 TABLET, FILM COATED ORAL at 09:05

## 2019-06-12 RX ADMIN — FUROSEMIDE 60 MG: 10 INJECTION, SOLUTION INTRAVENOUS at 09:05

## 2019-06-12 RX ADMIN — KETOROLAC TROMETHAMINE 15 MG: 15 INJECTION, SOLUTION INTRAMUSCULAR; INTRAVENOUS at 04:24

## 2019-06-12 RX ADMIN — ASPIRIN 81 MG: 81 TABLET, COATED ORAL at 09:04

## 2019-06-12 RX ADMIN — CARBAMAZEPINE 200 MG: 200 TABLET ORAL at 09:04

## 2019-06-12 RX ADMIN — MUPIROCIN: 20 OINTMENT TOPICAL at 21:50

## 2019-06-12 RX ADMIN — MUPIROCIN: 20 OINTMENT TOPICAL at 09:05

## 2019-06-12 RX ADMIN — GABAPENTIN 400 MG: 100 CAPSULE ORAL at 09:05

## 2019-06-12 NOTE — PROGRESS NOTES
Both chest tube sites with dressings saturated with sanguinous (middle site) and serosanguinous (L site) drainage after ambulating with PT. Also, most proximal as well as second most proximal LLE incisions completely dehisced at this point. Called and informed Darryl Clark, [address]. Telephone order received for wound consult for LLE incisions, and continue to monitor drainage from chest tube sites. Pt showered with dehisced LLE incisions covered with 4x4 and tegaderm, then cleaned with CHG and dressed with steri strips, 4x4 and paper tape. Chest tube incision sites cleaned with CHG in shower and dressed with 4x4, ABD pad and paper tape. Will continue to monitor.

## 2019-06-12 NOTE — PROGRESS NOTES
Bedside shift change report given to [facility] [id number] (oncoming nurse) by Davonte Edwards (offgoing nurse). Report included the following information SBAR, Kardex, Intake/Output, MAR, Recent Results and Cardiac Rhythm NSR.

## 2019-06-12 NOTE — PROGRESS NOTES
Problem: Mobility Impaired (Adult and Pediatric)  Goal: *Acute Goals and Plan of Care (Insert Text)  Description  LTG:  (1.)Mr. Cori Anthony will move from supine to sit and sit to supine , scoot up and down and roll side to side in bed with SUPERVISION within 7 treatment day(s). (2.)Mr. Cori Anthony will transfer from bed to chair and chair to bed with STAND BY ASSIST using the least restrictive device within 7 treatment day(s). (3.)Mr. Cori Anthony will ambulate with STAND BY ASSIST for 250 feet with the least restrictive device within 7 treatment day(s). (4.)Mr. Cori Anthony will participate in therapeutic activity/exercises x 23 minutes for increased activity tolerance within 7 treatment days. ________________________________________________________________________________________________     Outcome: Progressing Towards Goal     PHYSICAL THERAPY: Initial Assessment and AM 6/12/2019  INPATIENT: PT Visit Days : 1  [place] / Plan[place] B / Product Type: Medicare /       NAME/AGE/GENDER: Mecca Guerra is a 66 y.o. male   PRIMARY DIAGNOSIS: Atrial fibrillation with RVR (HCC) [I48.91]  CAD (coronary artery disease) [I25.10] S/P CABG x 3   S/P CABG x 3    Procedure(s) (LRB):  BRONCHOSCOPY at bedside (N/A)  2 Days Post-Op  ICD-10: Treatment Diagnosis:    · Generalized Muscle Weakness (M62.81)  · Difficulty in walking, Not elsewhere classified (R26.2)   Precaution/Allergies:  Codeine and Iodinated contrast- oral and iv dye      ASSESSMENT:     Mr. Cori Anthony is a 66 y.o. male in the hospital for the above who was sitting in recliner upon arrival.  Pt reports that he lives in a one story house with his significant other that has 1 step to enter. Pt also reported that PTA he was independent with ADLs and ambulated with independence. 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 Pt admitted to no recent falls in the past year.   Mr. Cori Anthony presents to PT with [facility] strength in B LEs. Pt also presents with good bilateral  strength. Pt educated on sternal precautions. During evaluation pt performed STS with Angel and fair standing balance. He ambulated in room with RW and CGA-Angel. Mr. Leo Blank could benefit from skilled PT as he is currently functioning below his baseline. Pt received treatment of therapeutic activity. He ambulated in zimmerman with RW/Angel demonstrating accelerated gait speed and increased trunk sway. Pt given cuing for safety and activity pacing before returning to recliner and performing seated exercises. This section established at most recent assessment   PROBLEM LIST (Impairments causing functional limitations):  1. Decreased Strength  2. Decreased Transfer Abilities  3. Decreased Ambulation Ability/Technique  4. Decreased Balance  5. Increased Pain  6. Decreased Activity Tolerance   INTERVENTIONS PLANNED: (Benefits and precautions of physical therapy have been discussed with the patient.)  1. Balance Exercise  2. Bed Mobility  3. Family Education  4. Gait Training  5. Neuromuscular Re-education/Strengthening  6. Therapeutic Activites  7. Therapeutic Exercise/Strengthening  8. Transfer Training     TREATMENT PLAN: Frequency/Duration: twice daily for duration of hospital stay  Rehabilitation Potential For Stated Goals: Good     REHAB RECOMMENDATIONS (at time of discharge pending progress):    Placement: It is my opinion, based on this patient's performance to date, that Mr. Leo Blank may benefit from participating in 1-2 additional therapy sessions in order to continue to assess for rehab potential and then make recommendation for disposition at discharge.   Equipment:    None at this time              HISTORY:   History of Present Injury/Illness (Reason for Referral):  See H&P  Past Medical History/Comorbidities:   Mr. Leo Blank  has a [facility] Atrial flutter ([facility] [address] (4/7/2017), CAD (coronary artery disease), Cancer ([facility] [address] Ill-defined condition, Sleep apnea, and Stroke ([facility] [address] Mr. Susan Woo  has a past surgical history that includes hx heart catheterization (08/14/2006); hx coronary stent placement; and hx prostate surgery. Social History/Living Environment:   Home Environment: Private residence  # Steps to Enter: 1  One/Two Story Residence: One story  Living Alone: No  Support Systems: Spouse/Significant Other/Partner  Patient Expects to be Discharged to[de-identified] Unknown  Current DME Used/Available at Home: None  Tub or Shower Type: Shower  Prior Level of Function/Work/Activity:  Lives with significant other and PTA pt was independent with ADLs and ambulation. Number of Personal Factors/Comorbidities that affect the Plan of Care: 1-2: MODERATE COMPLEXITY   EXAMINATION:   Most Recent Physical Functioning:   Gross Assessment:  AROM: Within functional limits  Strength: Within functional limits               Posture:     Balance:  Sitting: Intact; With support  Standing: Impaired  Standing - Static: Fair  Standing - Dynamic : Fair((-)) Bed Mobility:     Wheelchair Mobility:     Transfers:  Sit to Stand: Minimum assistance  Stand to Sit: Minimum assistance  Gait:     Speed/: Fluctuations; Accelerated  Step Length: Left shortened;Right shortened  Gait Abnormalities: Decreased step clearance;Trunk sway increased  Distance (ft): 100 Feet (ft)  Assistive Device: Walker, rolling  Ambulation - Level of Assistance: Contact guard assistance;Minimal assistance      Body Structures Involved:  1. Heart  2. Lungs  3. Bones  4. Muscles Body Functions Affected:  1. Cardio  2. Respiratory  3. Neuromusculoskeletal  4. Movement Related Activities and Participation Affected:  1. General Tasks and Demands  2. Mobility  3. Self Care  4. Domestic Life  5.  Community, Social and [place]   Number of elements that affect the Plan of Care: 4+: HIGH COMPLEXITY   CLINICAL PRESENTATION:   Presentation: Evolving clinical presentation with changing clinical characteristics: MODERATE COMPLEXITY   CLINICAL DECISION MAKING:   [facility] AM-PAC 6 Clicks   Basic Mobility Inpatient Short Form  How much difficulty does the patient currently have. .. Unable A Lot A Little None   1. Turning over in bed (including adjusting bedclothes, sheets and blankets)? ? 1   ? 2   ? 3   ? 4   2. Sitting down on and standing up from a chair with arms ( e.g., wheelchair, bedside commode, etc.)   ? 1   ? 2   ? 3   ? 4   3. Moving from lying on back to sitting on the side of the bed?   ? 1   ? 2   ? 3   ? 4   How much help from another person does the patient currently need. .. Total A Lot A Little None   4. Moving to and from a bed to a chair (including a wheelchair)? ? 1   ? 2   ? 3   ? 4   5. Need to walk in hospital room? ? 1   ? 2   ? 3   ? 4   6. Climbing 3-5 steps with a railing? ? 1   ? 2   ? 3   ? 4   © 2007, Trustees of [facility], under license to [employer]. All rights reserved      Score:  Initial: 18 Most Recent: X (Date: -- )    Interpretation of Tool:  Represents activities that are increasingly more difficult (i.e. Bed mobility, Transfers, Gait). Medical Necessity:     · Patient demonstrates good  ·  rehab potential due to higher previous functional level. Reason for Services/Other Comments:  · Patient continues to require skilled intervention due to decreased activity tolerance and functional mobility. · .   Use of outcome tool(s) and clinical judgement create a POC that gives a: Questionable prediction of patient's progress: MODERATE COMPLEXITY            TREATMENT:   (In addition to Assessment/Re-Assessment sessions the following treatments were rendered)   Pre-treatment Symptoms/Complaints:  Post op pain  Pain: Initial:   Pain Intensity 1: 4  Pain Location 1: Chest, Incisional  Post Session:  4/10     Therapeutic Activity: (    9 minutes):   Therapeutic activities including Chair transfers, Ambulation on level ground and seated exercises  to improve mobility, strength, balance and coordination. Required minimal   to promote static and dynamic balance in standing and safe use of RW with transfers/ambulation . Date:  6/12/19 Date:   Date:     ACTIVITY/EXERCISE AM PM AM PM AM PM   Seated LAQ 1 x 20 B        Seated marching 1 x 20 B        Seated AP 2 x 20 B        Seated hip abd/add 1 x 15 B                                   B = bilateral; AA = active assistive; A = active; P = passive        Braces/Orthotics/Lines/Etc:   · O2 Device: Nasal cannula  Treatment/Session Assessment:    · Response to Treatment:  Fairly given some decreased safety awareness. · Interdisciplinary Collaboration:   o Physical Therapist  o Registered Nurse  o   · After treatment position/precautions:   o Up in chair  o Bed/Chair-wheels locked  o Call light within reach  o RN notified  o Visitors at bedside   · Compliance with Program/Exercises: Will assess as treatment progresses  · Recommendations/Intent for next treatment session: \"Next visit will focus on advancements to more challenging activities and reduction in assistance provided\".   Total Treatment Duration:  PT Patient Time In/Time Out  Time In: 1051  Time Out: 8696 Guaynabofreddie Nelson PT, DPT

## 2019-06-12 NOTE — PROGRESS NOTES
This CM met with pt at bedside this to discuss discharge planning options. Per pt son, they have not actually spoken with pt about transitioning to STR. This CM met with pt at bedside and explained discharge options: SNF for STR vs. Home with homehealth. Pt listened to both options without objective to either. After this CM finished explanation pt did not have any questions or concerns. He requested he think about and this CM check back tomorrow. This CM phoned pt's son/POA, Jl Blackwood, to explained the discussion with pt regarding discharge planning. Explained to Jl Blcakwood that pt is alert and oriented and able to make own decisions so if pt wishes to return home with his gf, his wishes will be honored - Jl Blackwood verbalized understanding. No additional discharge needs at this time. CM to continue to follow.

## 2019-06-12 NOTE — PROGRESS NOTES
PT Note:  PT orders received/reviewed and evaluation attempted. Pt with recent removal of pacing wires. Evaluation will be re-attempted after hour of bedrest has been completed.   Thanks,  Trena Shea, PT, DPT

## 2019-06-12 NOTE — PROGRESS NOTES
Today's Date: 6/12/2019  Date of Admission: 6/6/2019    Chart Reviewed. Subjective:     Patient feels well. He denies any dyspnea. He has not had a BM yet. Medications Reviewed. Objective:     Vitals:    06/11/19 1908 06/11/19 2257 06/12/19 0241 06/12/19 0706   BP: 143/70 106/58 110/58 127/65   Pulse: 71 64 68 67   Resp: 20 20 20 18   Temp: 98.1 °F (36.7 °C) 98.7 °F (37.1 °C) 98.9 °F (37.2 °C) 98.3 °F (36.8 °C)   SpO2: 97% 96% 97% 97%   Weight: 209 lb (94.8 kg)  208 lb 11.2 oz (94.7 kg)    Height:           Intake and Output  Current Shift: No intake/output data recorded. Last 3 Shifts: 06/10 1901 - 06/12 0700  In: 2015.8 [P.O.:780; I.V.:1235.8]  Out: 1615 [Urine:935]    Physical Exam:  General: Well Developed, Well Nourished, No Acute Distress, Alert & Oriented x 3, Appropriate mood  Neck: supple, no JVD  Heart: S1S2 with RRR without murmurs or gallops  Lungs: Clear throughout auscultation bilaterally without adventitious sounds  Abd: soft, nontender, nondistended, with good bowel sounds  Ext: no edema bilaterally  Sternal incision: clean, dry, and intact  Skin: warm and dry    LABS  Data Review:   Recent Labs     06/12/19  0441 06/11/19  0302  06/10/19  1252   NA  --  134*  --  135*   K 4.2 3.9   < > 3.7   MG 2.9* 2.6*   < > 3.1*   BUN  --  20  --  15   CREA  --  1.13  --  1.00   GLU  --  96  --  113*   WBC 13.8* 8.7  --  11.5*   HGB 9.5* 8.4*   < > 9.9*   HCT 28.4* 24.7*   < > 28.0*   * 96*  --  154   INR  --   --   --  1.4    < > = values in this interval not displayed. Estimated Creatinine Clearance: 62.1 mL/min (based on SCr of 1.13 mg/dL).       Assessment/Plan:     Principal Problem:    S/P CABG x 3 (6/10/2019)  On ASA, BB, ARB, statin, pacing wires removed, on O2 by NC, continue PT    Active Problems:  Atrial fibrillation with RVR ([address] (6/6/2019)  Remains in sinus post op, will resume Eliquis 6/13, pacing wires removed today      Dyslipidemia (4/7/2017)  Continue statin Hypertension (4/7/2017)  BP stable      Coronary atherosclerosis of native coronary vessel (4/7/2017)  S/p CABG      Chronic systolic congestive heart failure ([facility] [address] (1/31/2019)  EF 50-55%      NSTEMI (non-ST elevated myocardial infarction) ([facility] [address] (6/6/2019)  S/p CABG      Hemoptysis (6/9/2019)  Due to diffuse alveolar bleeding, had bronch 6/9      Trigeminal neuralgia (6/9/2019)        CAD (coronary artery disease) (6/10/2019)  S/p CABG, continue medical therapy       Encounter for weaning from ventilator [facility]) (6/10/2019)        Tracheobronchomalacia (6/10/2019)  Pulmonary following           Jessica Macias PA-C

## 2019-06-12 NOTE — PROGRESS NOTES
TPW removed by TREVER Muhammad. Pt tolerated well. Pt instructed to remain in recliner for 1 hour, pt voiced understanding. BP cycling q 15 min, will continue to monitor.

## 2019-06-12 NOTE — PROGRESS NOTES
[facility]           6/12/2019    Admit Date: 6/6/2019      Subjective:   Patient alert and oriented. Hemodynamics are stable. ROS:  Cardiovascular:  As noted above    Objective:      Vitals:    06/11/19 1908 06/11/19 2257 06/12/19 0241 06/12/19 0706   BP: 143/70 106/58 110/58 127/65   Pulse: 71 64 68 67   Resp: 20 20 20 18   Temp: 98.1 °F (36.7 °C) 98.7 °F (37.1 °C) 98.9 °F (37.2 °C) 98.3 °F (36.8 °C)   SpO2: 97% 96% 97% 97%   Weight: 94.8 kg (209 lb)  94.7 kg (208 lb 11.2 oz)    Height:           Physical Exam:  General-No Acute Distress  Neck- supple, no JVD  CV- regular rate and rhythm no MRG  Lung- diminished bilaterally  Abd- soft, nontender, nondistended  Ext- no edema bilaterally. Skin- warm and dry    Data Review:   Recent Labs     06/12/19  0441 06/11/19  0302  06/10/19  1252  06/09/19  0810   NA  --  134*  --  135*   < > 130*   K 4.2 3.9   < > 3.7   < > 3.5   MG 2.9* 2.6*   < > 3.1*  --   --    BUN  --  20  --  15   < > 20   CREA  --  1.13  --  1.00   < > 1.06   GLU  --  96  --  113*   < > 87   WBC 13.8* 8.7  --  11.5*   < > 14.1*   HGB 9.5* 8.4*   < > 9.9*   < > 12.0*   HCT 28.4* 24.7*   < > 28.0*   < > 33.7*   * 96*  --  154   < > 178   INR  --   --   --  1.4  --   --    TROIQ  --   --   --   --   --  3.26*    < > = values in this interval not displayed. Assessment/Plan:     Principal Problem:    Atrial fibrillation with RVR ([address] (6/6/2019)    In SR. S/P MAZE and MICHEAL occlusion. Will need [address] when stable from surgical standpoint. Patient was on Eliquis preop.     Active Problems:    Dyslipidemia (4/7/2017)    On statin therapy       Hypertension (4/7/2017)    This is stable and will monitor post-op      Coronary atherosclerosis of native coronary vessel (4/7/2017)    S/P CABG      Atrial fibrillation ([facility] [address] (4/7/2017)    As above       Chronic systolic congestive heart failure ([facility] [address] (1/31/2019)    Volume is up and give dose of IV lasix and monitor. Start metoprolol succinate and losartan.   EF 50-55% by echo 6/7/2019      NSTEMI (non-ST elevated myocardial infarction) ([facility] [address] (6/6/2019)    S/P CABG      Hemoptysis (6/9/2019)    Per pulmonary       CAD (coronary artery disease) (6/10/2019)    S/P CABG      S/P CABG x 3 (6/10/2019)    POD#2      Encounter for weaning from ventilator ([facility] [address] (6/10/2019)    Extubated       Tracheobronchomalacia (6/10/2019)    Per pulmonary           Edu Yadav MD  6/12/2019

## 2019-06-12 NOTE — PROGRESS NOTES
Problem: Mobility Impaired (Adult and Pediatric)  Goal: *Acute Goals and Plan of Care (Insert Text)  Description  LTG:  (1.)Mr. Arlen Pierce will move from supine to sit and sit to supine , scoot up and down and roll side to side in bed with SUPERVISION within 7 treatment day(s). (2.)Mr. Arlen Pierce will transfer from bed to chair and chair to bed with STAND BY ASSIST using the least restrictive device within 7 treatment day(s). (3.)Mr. Arlen Pierce will ambulate with STAND BY ASSIST for 250 feet with the least restrictive device within 7 treatment day(s). (4.)Mr. Arlen Pierce will participate in therapeutic activity/exercises x 23 minutes for increased activity tolerance within 7 treatment days. ________________________________________________________________________________________________     Outcome: Progressing Towards Goal     PHYSICAL THERAPY: Daily Note and PM 6/12/2019  INPATIENT: PT Visit Days : 1  [place] / [place] / Product Type: Medicare /       NAME/AGE/GENDER: Jose Juan Kendrick is a 66 y.o. male   PRIMARY DIAGNOSIS: Atrial fibrillation with RVR (HCC) [I48.91]  CAD (coronary artery disease) [I25.10] S/P CABG x 3   S/P CABG x 3    Procedure(s) (LRB):  BRONCHOSCOPY at bedside (N/A)  2 Days Post-Op  ICD-10: Treatment Diagnosis:    · Generalized Muscle Weakness (M62.81)  · Difficulty in walking, Not elsewhere classified (R26.2)   Precaution/Allergies:  Codeine and Iodinated contrast- oral and iv dye      ASSESSMENT:     Mr. Arlen Pierce was supine in bed upon arrival and reporting some fatigue. He came to sitting with Angel and fgair sitting balance. Pt appeared more lethargic and BP recorded at 118/79. Pt given Angel for STS with RW and ambulated in zimmerman. Pt required CGA-Angel for ambulating again due to increased trunk sway and accelerated gait speed. Transferred to recliner and RN alerted to large amount of serosanguinous drainage on pt's gown.   SpO2 recorded at 91% after walk with slow rise to 94%. Pt performed TE with rest beaks. Progressed in ambulation distance. This section established at most recent assessment   PROBLEM LIST (Impairments causing functional limitations):  1. Decreased Strength  2. Decreased Transfer Abilities  3. Decreased Ambulation Ability/Technique  4. Decreased Balance  5. Increased Pain  6. Decreased Activity Tolerance   INTERVENTIONS PLANNED: (Benefits and precautions of physical therapy have been discussed with the patient.)  1. Balance Exercise  2. Bed Mobility  3. Family Education  4. Gait Training  5. Neuromuscular Re-education/Strengthening  6. Therapeutic Activites  7. Therapeutic Exercise/Strengthening  8. Transfer Training     TREATMENT PLAN: Frequency/Duration: twice daily for duration of hospital stay  Rehabilitation Potential For Stated Goals: Good     REHAB RECOMMENDATIONS (at time of discharge pending progress):    Placement: It is my opinion, based on this patient's performance to date, that Mr. Cinthya Pino may benefit from participating in 1-2 additional therapy sessions in order to continue to assess for rehab potential and then make recommendation for disposition at discharge. Equipment:    None at this time              HISTORY:   History of Present Injury/Illness (Reason for Referral):  See H&P  Past Medical History/Comorbidities:   Mr. Cinthya Pino  has a past medical history of Atrial fibrillation [facility]), Atrial flutter ([facility] [address] (4/7/2017), CAD (coronary artery disease), Cancer ([facility] [address] Ill-defined condition, Sleep apnea, and Stroke ([facility] [address] Mr. Cinthya Pino  has a past surgical history that includes hx heart catheterization (08/14/2006); hx coronary stent placement; and hx prostate surgery.   Social History/Living Environment:   Home Environment: Private residence  # Steps to Enter: 1  One/Two Story Residence: One story  Living Alone: No  Support Systems: Spouse/Significant Other/Partner  Patient Expects to be Discharged to[de-identified] Unknown  Current DME Used/Available at Home: None  Tub or Shower Type: Shower  Prior Level of Function/Work/Activity:  Lives with significant other and PTA pt was independent with ADLs and ambulation. Number of Personal Factors/Comorbidities that affect the Plan of Care: 1-2: MODERATE COMPLEXITY   EXAMINATION:   Most Recent Physical Functioning:   Gross Assessment:  AROM: Within functional limits  Strength: Within functional limits               Posture:     Balance:  Sitting: Impaired  Sitting - Static: Fair (occasional)  Sitting - Dynamic: Fair (occasional)  Standing: Impaired  Standing - Static: Fair  Standing - Dynamic : Poor Bed Mobility:  Supine to Sit: Minimum assistance  Scooting: Minimum assistance  Interventions: Manual cues; Safety awareness training;Verbal cues  Wheelchair Mobility:     Transfers:  Sit to Stand: Minimum assistance  Stand to Sit: Minimum assistance  Bed to Chair: Minimum assistance  Interventions: Safety awareness training;Manual cues; Verbal cues; Visual cues  Gait:     Speed/: Fluctuations; Accelerated  Step Length: Right shortened;Left shortened  Gait Abnormalities: Decreased step clearance;Trunk sway increased  Distance (ft): 125 Feet (ft)  Assistive Device: Walker, rolling  Ambulation - Level of Assistance: Contact guard assistance;Minimal assistance      Body Structures Involved:  1. Heart  2. Lungs  3. Bones  4. Muscles Body Functions Affected:  1. Cardio  2. Respiratory  3. Neuromusculoskeletal  4. Movement Related Activities and Participation Affected:  1. General Tasks and Demands  2. Mobility  3. Self Care  4. Domestic Life  5.  Community, Social and [place]   Number of elements that affect the Plan of Care: 4+: HIGH COMPLEXITY   CLINICAL PRESENTATION:   Presentation: Evolving clinical presentation with changing clinical characteristics: MODERATE COMPLEXITY   CLINICAL DECISION MAKIN [facility] Inpatient Short Form  How much difficulty does the patient currently have. .. Unable A Lot A Little None   1. Turning over in bed (including adjusting bedclothes, sheets and blankets)? ? 1   ? 2   ? 3   ? 4   2. Sitting down on and standing up from a chair with arms ( e.g., wheelchair, bedside commode, etc.)   ? 1   ? 2   ? 3   ? 4   3. Moving from lying on back to sitting on the side of the bed?   ? 1   ? 2   ? 3   ? 4   How much help from another person does the patient currently need. .. Total A Lot A Little None   4. Moving to and from a bed to a chair (including a wheelchair)? ? 1   ? 2   ? 3   ? 4   5. Need to walk in hospital room? ? 1   ? 2   ? 3   ? 4   6. Climbing 3-5 steps with a railing? ? 1   ? 2   ? 3   ? 4   © 2007, Trustees of [address] [place] [id number], under license to [employer] All rights reserved      Score:  Initial: 18 Most Recent: X (Date: -- )    Interpretation of Tool:  Represents activities that are increasingly more difficult (i.e. Bed mobility, Transfers, Gait). Medical Necessity:     · Patient demonstrates good  ·  rehab potential due to higher previous functional level. Reason for Services/Other Comments:  · Patient continues to require skilled intervention due to decreased activity tolerance and functional mobility. · .   Use of outcome tool(s) and clinical judgement create a POC that gives a: Questionable prediction of patient's progress: MODERATE COMPLEXITY            TREATMENT:   (In addition to Assessment/Re-Assessment sessions the following treatments were rendered)   Pre-treatment Symptoms/Complaints:  Post op pain  Pain: Initial:   Pain Intensity 1: 4  Pain Location 1: Chest, Incisional  Post Session:  7/10     Therapeutic Activity: (    15 minutes): Therapeutic activities including bed transfers, Chair transfers, and Ambulation on level ground to improve mobility, strength, balance and coordination. Required minimal   to promote static and dynamic balance in standing and safe use of RW with transfers/ambulation . Therapeutic Exercise: (8 Minutes):  Exercises per grid below to improve strength and activity tolerance. Required minimal visual and verbal cues to promote proper body alignment, promote proper body mechanics and promote proper body breathing techniques. Progressed repetitions and complexity of movement as indicated. Date:  6/12/19 Date:   Date:     ACTIVITY/EXERCISE AM PM AM PM AM PM   Seated LAQ 1 x 20 B 1 x 20 B       Seated marching 1 x 20 B 1 x 20 B       Seated AP 2 x 20 B 1 x 20 B  1 x 10 B       Seated hip abd/add 1 x 15 B 1 x 20 B                                  B = bilateral; AA = active assistive; A = active; P = passive        Braces/Orthotics/Lines/Etc:   · O2 Device: Nasal cannula  Treatment/Session Assessment:    · Response to Treatment:  See above. · Interdisciplinary Collaboration:   o Physical Therapist  o Registered Nurse  o Certified Nursing Assistant/Patient Care Technician  · After treatment position/precautions:   o Up in chair  o Bed/Chair-wheels locked  o CNA at bedside   · Compliance with Program/Exercises: Will assess as treatment progresses  · Recommendations/Intent for next treatment session: \"Next visit will focus on advancements to more challenging activities and reduction in assistance provided\".   Total Treatment Duration:  PT Patient Time In/Time Out  Time In: 1426  Time Out: [address] Elva Nelson PT, DPT

## 2019-06-12 NOTE — OP NOTES
[facility]  OPERATIVE REPORT    Name:  Nicolette Rascon  MR#:  [id number]  :  1945  ACCOUNT #:  [de-identified]  DATE OF SERVICE:  06/10/2019    PREOPERATIVE DIAGNOSES:  Coronary artery occlusive disease, paroxysmal atrial fibrillation. POSTOPERATIVE DIAGNOSES: Coronary artery occlusive disease, paroxysmal atrial fibrillation. PROCEDURE PERFORMED:  Three-vessel coronary artery bypass grafting using reverse saphenous vein graft to the obtuse marginal coronary, reverse saphenous vein graft to the right posterolateral coronary, and left internal mammary artery to left anterior descending coronary; maze procedure with ablation of the right and left pleural veins and clipping of the left atrial appendage; (arterial line and [place]-Jose catheter placed by Anesthesia); temporary right ventricular pacing wire. SURGEON:  Angelo Maradiaga MD    ASSISTANT:       ANESTHESIA:  General.    COMPLICATIONS:   .    SPECIMENS REMOVED:   .    IMPLANTS:   .    ESTIMATED BLOOD LOSS:  minimal.    CARDIOPULMONARY BYPASS TIME:  91 minutes. AORTIC CROSSCLAMP TIME:  58 minutes. PREOPERATIVE HISTORY:  This is a 78-year-old gentleman who is admitted to hospital with jaw pain that we had a previous diagnosis of trigeminal neuralgia. However, troponins were elevated. Subsequent cardiac catheterization showed severe triple-vessel disease. The patient had been on Xarelto for paroxysmal atrial fibrillation. This was discontinued and bypass surgery with maze procedure was recommended. WHAT WAS FOUND/WHAT WAS DONE:  The heart was exposed through median sternotomy. The heart was normal in size, contracted well. Three coronaries were grafted placing reverse vein to the right posterolateral coronary, reverse vein to the circumflex marginal coronary, and the internal mammary artery used to bypass the mid left anterior descending coronary.   Using the AtriCure radiofrequency device, both right and left pulmonary venous return, pulmonary veins were ablated. We ablated the base of the left atrial appendage and the left atrial appendage was clipped using the AtriCure clip. The patient came off bypass, was in sinus rhythm. PROCEDURE:  The patient was premedicated and brought to the operating room. The patient was placed supine on the table. Appropriate monitoring lines were placed including a [place]-Jose catheter and radial artery catheter. General endotracheal anesthesia was given. The anterior body and both legs were then prepped with Betadine. The patient draped into a sterile field. Saphenous vein was taken from the lower leg. The vein was prepared and the skin of the leg was closed with subcuticular closure technique. The chest was then opened in the midline and the sternum was opened vertically with a sternal saw. We opened widely into the left pleural cavity and the internal mammary artery was taken down. Heparin 300 units per kg was given. The patient was then cannulated, placed on bypass, and cooled to 32 degrees centigrade. The AtriCure device was brought to the field and there was ablation of the right and the left pulmonary veins. We also ablated the base of the left atrial appendage, and following this, the AtriCure mitral clip was brought to the field and used to clip the left atrial appendage. Aorta was then crossclamped. Blood cardioplegia was given antegrade. The circumflex marginal coronary was identified, opened for 5-mm length, and reverse vein was sutured to this vessel with a 7-0 Prolene. Next, the right posterolateral coronary was identified. Using a second reverse vein, an end-to-side anastomosis was carried out with 7-0 Prolene and finally the internal mammary artery was sutured to the mid left anterior descending coronary with a running 7-0 Prolene. Following this, the crossclamp was released and a partial clamp was placed on the ascending aorta.   Two 4-mm buttons of the aortic wall were removed. The proximal ends of both vein grafts were sutured directly to the aorta with 6-0 Prolene. The patient was then weaned from bypass without difficulty. All blood was returned to the patient after which cannulas were removed and pursestring sutures tied. Protamine was then given to reverse the heparin. Temporary right ventricular pacing wires were placed. A 32-Ghanaian tube was left to drain the mediastinum and left chest cavity. Hemostasis was satisfactory. Sternum was then approximated with interrupted stainless steel wire. Soft tissue was closed with Vicryl. The skin was closed with Vicryl using subcuticular closure technique. The patient tolerated the procedure well, was moved to the intensive care unit in stable condition. Sponge, needle, and instrument counts were reported as correct.       Adeel Ku MD      [id number]  D:  06/12/2019 7:48  T:  06/12/2019 7:55  JOB #:  [id number]  CC:  Nick Wright MD

## 2019-06-12 NOTE — PROGRESS NOTES
Large amount of serosanguinous drainage noted from L chest tube site when pt assisted to bathroom. Chest tube site dressing removed by PA when TPW pulled at 0833. Area cleaned with CHG and redressed with 4x4, ABD pad and paper tape. LLE proximal edge of proximal incision noted to bed not well approximated after ambulating to BR. Area cleaned with CHG, edges easily reapproximated with steri strip, 4x4 and paper tape applied over incision. Will continue to monitor all incisions.

## 2019-06-12 NOTE — PROGRESS NOTES
Problem: Falls - Risk of  Goal: *Absence of Falls  Description  Document Ajay Askew Fall Risk and appropriate interventions in the flowsheet.   Outcome: Progressing Towards Goal  Note:   Fall Risk Interventions:  Mobility Interventions: Communicate number of staff needed for ambulation/transfer, Patient to call before getting OOB, PT Consult for mobility concerns, Strengthening exercises (ROM-active/passive), Bed/chair exit alarm    Medication Interventions: Bed/chair exit alarm, Patient to call before getting OOB, Teach patient to arise slowly    Elimination Interventions: Bed/chair exit alarm, Call light in reach, Stay With Me (per policy), Patient to call for help with toileting needs, Urinal in reach

## 2019-06-12 NOTE — PROGRESS NOTES
Critical Care Daily Progress Note: 6/12/2019  Admission Date: 6/6/2019     The patient's chart is reviewed and the patient is discussed with the staff. Patient is a 66 y.o.  male seen and evaluated at the request of Dr. Popeye Camacho. Patient has a history of TIA, trigeminal neuralgia, HTN, HL, a.fib on Eliquis / ASA, chronic sCHF, and CAD. He is followed by Cardiology as an outpatient and was seen for worsening dyspnea with recommendation for [facility]/eCV but this was never scheduled. He presented to the ER with c/o R sided facial/jaw pain and substernal chest pain. In the ER he was found to be in a.fib with RVR with HR in the 130s and troponin of 0.14 >>>21.30. 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 He was started on a heparin gtt (last dose of eliquis 6/6) in anticipation of surgical intervention. He was given lasix for volume overload with 2 liters urine output yesterday (net -265 ml).     Patient has a remote history of tobacco abuse  - smoked 1.5 ppd x 14 years before quitting 40 years ago. He has never been dx with lung disease, does not require inhalers or home O2. Patient states that he has had some hemoptysis over the last couple of months but it is usually pink, frothy sputum and not dark red blood as he is currently having. He usually stops his ASA and it resolves. Yesterday he had several episodes where he coughed up a moderate amount of dark red blood. He has not had any so far today. Surgery is scheduled for tomorrow and we have been consulted for airway inspection for surgical clearance. He had diagnostic bronch yesterday with no bleeding or lesions seen. Subjective:     CABG x 3 6/10. Coughing up small amounts of sputum, but swallows and hasn't seen it. States cough is stronger. No new concerns. Feels like is doing well.      Current Facility-Administered Medications   Medication Dose Route Frequency    metoprolol succinate (TOPROL-XL) XL tablet 25 mg  25 mg Oral DAILY    losartan (COZAAR) tablet 25 mg  25 mg Oral DAILY    sodium chloride (NS) flush 5-40 mL  5-40 mL IntraVENous Q8H    sodium chloride (NS) flush 5-40 mL  5-40 mL IntraVENous PRN    alum-mag hydroxide-simeth (MYLANTA) oral suspension 30 mL  30 mL Oral Q4H PRN    famotidine (PEPCID) tablet 20 mg  20 mg Oral BID    acetaminophen (TYLENOL) tablet 650 mg  650 mg Oral Q4H PRN    zolpidem (AMBIEN) tablet 5 mg  5 mg Oral QHS PRN    mupirocin (BACTROBAN) 2 % ointment   Both Nostrils BID    aspirin delayed-release tablet 81 mg  81 mg Oral DAILY    magnesium oxide (MAG-OX) tablet 400 mg  400 mg Oral TID PRN    magnesium oxide (MAG-OX) tablet 400 mg  400 mg Oral QID PRN    potassium chloride (KLOR-CON) tablet 10 mEq  10 mEq Oral DAILY    potassium chloride (K-DUR, KLOR-CON) SR tablet 20 mEq  20 mEq Oral BID PRN    potassium chloride (K-DUR, KLOR-CON) SR tablet 40 mEq  40 mEq Oral BID PRN    lip protectant (BLISTEX) ointment 1 Each  1 Each Topical PRN    senna-docusate (PERICOLACE) 8.6-50 mg per tablet 2 Tab  2 Tab Oral BID    oxyCODONE-acetaminophen (PERCOCET) 5-325 mg per tablet 1 Tab  1 Tab Oral Q4H PRN    amiodarone (CORDARONE) tablet 400 mg  400 mg Oral Q12H    rosuvastatin (CRESTOR) tablet 40 mg  40 mg Oral QHS    carBAMazepine (TEGretol) tablet 200 mg  200 mg Oral BID    fenofibrate (LOFIBRA) tablet 160 mg  160 mg Oral DAILY    gabapentin (NEURONTIN) capsule 400 mg  400 mg Oral BID     Review of Systems  Post op chest pain, no subjective fevers, denies dyspnea. No hemoptysis today, no rash.      Objective:     Vitals:    06/12/19 0833 06/12/19 0847 06/12/19 0902 06/12/19 0917   BP: 153/84 (!) 170/95 168/67 147/68   Pulse: 74 74 74 77   Resp:       Temp:       SpO2:       Weight:       Height:         Intake and Output:   06/10 1901 - 06/12 0700  In: 2015.8 [P.O.:780; I.V.:1235.8]  Out: [facility]  06/12 0701 - 06/12 1900  In: 360 [P.O.:360]  Out: -     Physical Exam:          Constitutional:  intubated and mechanically ventilated. EENMT:  Sclera clear, pupils equal, oral mucosa moist  Respiratory: mild crackles bilaterally  Cardiovascular:  RRR with no M,G,R;  Gastrointestinal:  soft with no tenderness; positive bowel sounds present  Musculoskeletal:  warm with no cyanosis, no lower extremity edema  Skin:  no jaundice or ecchymosis  Neurologic: no gross neuro deficits     Psychiatric:  sedated    CXR:  6/12: improved r lung, LLL atelecatsis    Continued bilateral infiltrates      LAB  Recent Labs     06/12/19  0626 06/11/19  2143 06/11/19  1617 06/11/19  1515 06/11/19  0642   GLUCPOC 138* 169* 156* 147* 97     Recent Labs     06/12/19 0441 06/11/19  0302 06/10/19  2118  06/10/19  1252   WBC 13.8* 8.7  --   --  11.5*   HGB 9.5* 8.4* 8.6*   < > 9.9*   HCT 28.4* 24.7* 25.0*   < > 28.0*   * 96*  --   --  154   INR  --   --   --   --  1.4    < > = values in this interval not displayed. Recent Labs     06/12/19 0441 06/11/19  0302 06/10/19  2118  06/10/19  1252 06/10/19  0320   NA  --  134*  --   --  135* 132*   K 4.2 3.9 3.8   < > 3.7 3.4*   CL  --  104  --   --  101 96*   CO2  --  24  --   --  26 30   GLU  --  96  --   --  113* 91   BUN  --  20  --   --  15 16   CREA  --  1.13  --   --  1.00 1.01   MG 2.9* 2.6* 2.6*   < > 3.1*  --    CA  --  7.4*  --   --  7.9* 7.9*    < > = values in this interval not displayed. No results for input(s): LCAD, LAC in the last 72 hours. Assessment:  (Medical Decision Making)     Hospital Problems  Date Reviewed: 6/9/2019          Codes Class Noted POA    CAD (coronary artery disease) ICD-10-CM: I25.10  ICD-9-CM: 414.00  6/10/2019 Unknown        S/P CABG x 3 ICD-10-CM: Z95.1  ICD-9-CM: V45.81  6/10/2019 Unknown    Stable    Hemoptysis ICD-10-CM: R04.2  ICD-9-CM: 786.30  6/9/2019 Unknown    Seems to have resolved.  Etiology unclear    Trigeminal neuralgia (Chronic) ICD-10-CM: G50.0  ICD-9-CM: 350.1  6/9/2019 Yes        * (Principal) Atrial fibrillation with RVR ([facility] ICD-10-CM: I48.91  ICD-9-CM: 427.31  6/6/2019 Yes    Now in SR post MAZE    NSTEMI (non-ST elevated myocardial infarction) ([facility] ICD-10-CM: I21.4  ICD-9-CM: 410.70  6/6/2019 Yes        Chronic systolic congestive heart failure (HCC) (Chronic) ICD-10-CM: I50.22  ICD-9-CM: 428.22, 428.0  1/31/2019 Yes    Overview Addendum 1/31/2019 11:22 AM by Carlos Skinner MD     2014:  EF 55%  07/2018:  EF 45-50%  01/2019:  EF 40-45%             Dyslipidemia (Chronic) ICD-10-CM: E78.5  ICD-9-CM: 272.4  4/7/2017 Yes        Hypertension (Chronic) ICD-10-CM: I10  ICD-9-CM: 401.9  4/7/2017 Yes        Coronary atherosclerosis of native coronary vessel ICD-10-CM: I25.10  ICD-9-CM: 414.01  4/7/2017 Yes    Overview Addendum 1/31/2019 11:15 AM by Carlos Skinner MD     2006:  PCI RCA - Liberte, PCI LAD Cypher, PCI LCx Cypher  01/2014:  Stress testing with fixed inferior defect - no ischemia  11/2014:  EF with EF 50-55% and inferior HK   07/2018:  EF 45-50%  01/2019:  EF 40-45%               Atrial fibrillation ([facility] ICD-10-CM: I48.91  ICD-9-CM: 427.31  4/7/2017 Yes            Encounter for weaning from ventilator- wean per protocol    Plan:  (Medical Decision Making)     Wean O2 as tolerated  Lasix daily per protocol  ESR was normal, TAMARA, ANCA, Anti_GBM are negative  Monitor for recurrent hemoptysis. Could start DVT prophylaxis chemical today and could consider advancing to full anticoagulation in the next couple days if needed from cardiac standpoint. Follow H/H. Improved  Chest tubes removed today per surgery  Mobility and IS    More than 50% of the time documented was spent in face-to-face contact with the patient and in the care of the patient on the floor/unit where the patient is located.     Aniceto Nicolas MD

## 2019-06-13 PROBLEM — Z87.891 HISTORY OF SMOKING 25-50 PACK YEARS: Status: ACTIVE | Noted: 2019-06-13

## 2019-06-13 LAB
ABO + RH BLD: NORMAL
BLD PROD TYP BPU: NORMAL
BLOOD GROUP ANTIBODIES SERPL: NORMAL
BNP SERPL-MCNC: 573 PG/ML
BPU ID: NORMAL
CROSSMATCH RESULT,%XM: NORMAL
MAGNESIUM SERPL-MCNC: 2.6 MG/DL (ref 1.8–2.4)
MM INDURATION POC: 0 MM (ref 0–5)
POTASSIUM SERPL-SCNC: 4.1 MMOL/L (ref 3.5–5.1)
PPD POC: NEGATIVE NEGATIVE
SPECIMEN EXP DATE BLD: NORMAL
STATUS OF UNIT,%ST: NORMAL
UNIT DIVISION, %UDIV: 0

## 2019-06-13 PROCEDURE — 94760 N-INVAS EAR/PLS OXIMETRY 1: CPT

## 2019-06-13 PROCEDURE — 97530 THERAPEUTIC ACTIVITIES: CPT

## 2019-06-13 PROCEDURE — 83735 ASSAY OF MAGNESIUM: CPT

## 2019-06-13 PROCEDURE — 36415 COLL VENOUS BLD VENIPUNCTURE: CPT

## 2019-06-13 PROCEDURE — 74011250637 HC RX REV CODE- 250/637: Performed by: THORACIC SURGERY (CARDIOTHORACIC VASCULAR SURGERY)

## 2019-06-13 PROCEDURE — 84132 ASSAY OF SERUM POTASSIUM: CPT

## 2019-06-13 PROCEDURE — 74011000250 HC RX REV CODE- 250: Performed by: INTERNAL MEDICINE

## 2019-06-13 PROCEDURE — 74011250637 HC RX REV CODE- 250/637: Performed by: PHYSICIAN ASSISTANT

## 2019-06-13 PROCEDURE — 77030012890

## 2019-06-13 PROCEDURE — 97110 THERAPEUTIC EXERCISES: CPT

## 2019-06-13 PROCEDURE — 65660000004 HC RM CVT STEPDOWN

## 2019-06-13 PROCEDURE — 74011250636 HC RX REV CODE- 250/636: Performed by: INTERNAL MEDICINE

## 2019-06-13 PROCEDURE — 97605 NEG PRS WND THER DME<=50SQCM: CPT

## 2019-06-13 PROCEDURE — 77030019934 HC DRSG VAC ASST KCON -B

## 2019-06-13 PROCEDURE — 77010033678 HC OXYGEN DAILY

## 2019-06-13 PROCEDURE — 74750000023 HC WOUND THERAPY

## 2019-06-13 PROCEDURE — 74011250637 HC RX REV CODE- 250/637: Performed by: INTERNAL MEDICINE

## 2019-06-13 PROCEDURE — 77030019952 HC CANSTR VAC ASST KCON -B

## 2019-06-13 PROCEDURE — 94640 AIRWAY INHALATION TREATMENT: CPT

## 2019-06-13 PROCEDURE — 83880 ASSAY OF NATRIURETIC PEPTIDE: CPT

## 2019-06-13 PROCEDURE — 74011000250 HC RX REV CODE- 250: Performed by: PHYSICIAN ASSISTANT

## 2019-06-13 PROCEDURE — 99232 SBSQ HOSP IP/OBS MODERATE 35: CPT | Performed by: INTERNAL MEDICINE

## 2019-06-13 RX ORDER — ALBUTEROL SULFATE 0.83 MG/ML
2.5 SOLUTION RESPIRATORY (INHALATION)
Status: DISCONTINUED | OUTPATIENT
Start: 2019-06-13 | End: 2019-06-17 | Stop reason: HOSPADM

## 2019-06-13 RX ORDER — FUROSEMIDE 10 MG/ML
40 INJECTION INTRAMUSCULAR; INTRAVENOUS DAILY
Status: DISCONTINUED | OUTPATIENT
Start: 2019-06-13 | End: 2019-06-14

## 2019-06-13 RX ORDER — ALBUTEROL SULFATE 0.83 MG/ML
2.5 SOLUTION RESPIRATORY (INHALATION)
Status: COMPLETED | OUTPATIENT
Start: 2019-06-13 | End: 2019-06-13

## 2019-06-13 RX ORDER — BUDESONIDE 0.5 MG/2ML
500 INHALANT ORAL
Status: DISCONTINUED | OUTPATIENT
Start: 2019-06-13 | End: 2019-06-17 | Stop reason: HOSPADM

## 2019-06-13 RX ADMIN — ROSUVASTATIN CALCIUM 40 MG: 20 TABLET, FILM COATED ORAL at 22:04

## 2019-06-13 RX ADMIN — OXYCODONE AND ACETAMINOPHEN 1 TABLET: 5; 325 TABLET ORAL at 14:05

## 2019-06-13 RX ADMIN — CARBAMAZEPINE 200 MG: 200 TABLET ORAL at 17:25

## 2019-06-13 RX ADMIN — FENOFIBRATE 160 MG: 160 TABLET ORAL at 08:04

## 2019-06-13 RX ADMIN — MUPIROCIN: 20 OINTMENT TOPICAL at 22:07

## 2019-06-13 RX ADMIN — AMIODARONE HYDROCHLORIDE 400 MG: 200 TABLET ORAL at 22:03

## 2019-06-13 RX ADMIN — FAMOTIDINE 20 MG: 20 TABLET ORAL at 17:25

## 2019-06-13 RX ADMIN — GABAPENTIN 400 MG: 100 CAPSULE ORAL at 17:25

## 2019-06-13 RX ADMIN — FAMOTIDINE 20 MG: 20 TABLET ORAL at 08:05

## 2019-06-13 RX ADMIN — SENNOSIDES AND DOCUSATE SODIUM 2 TABLET: 8.6; 5 TABLET ORAL at 08:04

## 2019-06-13 RX ADMIN — LOSARTAN POTASSIUM 25 MG: 25 TABLET, FILM COATED ORAL at 08:05

## 2019-06-13 RX ADMIN — BUDESONIDE 500 MCG: 0.5 INHALANT RESPIRATORY (INHALATION) at 09:34

## 2019-06-13 RX ADMIN — Medication 10 ML: at 22:03

## 2019-06-13 RX ADMIN — FUROSEMIDE 40 MG: 10 INJECTION, SOLUTION INTRAVENOUS at 15:59

## 2019-06-13 RX ADMIN — ASPIRIN 81 MG: 81 TABLET, COATED ORAL at 08:05

## 2019-06-13 RX ADMIN — CARBAMAZEPINE 200 MG: 200 TABLET ORAL at 08:04

## 2019-06-13 RX ADMIN — POTASSIUM CHLORIDE 10 MEQ: 10 TABLET, EXTENDED RELEASE ORAL at 08:05

## 2019-06-13 RX ADMIN — AMIODARONE HYDROCHLORIDE 400 MG: 200 TABLET ORAL at 08:05

## 2019-06-13 RX ADMIN — ALBUTEROL SULFATE 2.5 MG: 2.5 SOLUTION RESPIRATORY (INHALATION) at 20:27

## 2019-06-13 RX ADMIN — METOPROLOL SUCCINATE 25 MG: 25 TABLET, EXTENDED RELEASE ORAL at 08:05

## 2019-06-13 RX ADMIN — APIXABAN 5 MG: 5 TABLET, FILM COATED ORAL at 17:25

## 2019-06-13 RX ADMIN — Medication 10 ML: at 06:11

## 2019-06-13 RX ADMIN — MUPIROCIN: 20 OINTMENT TOPICAL at 08:25

## 2019-06-13 RX ADMIN — ALBUTEROL SULFATE 2.5 MG: 2.5 SOLUTION RESPIRATORY (INHALATION) at 13:48

## 2019-06-13 RX ADMIN — APIXABAN 5 MG: 5 TABLET, FILM COATED ORAL at 09:47

## 2019-06-13 RX ADMIN — BUDESONIDE 500 MCG: 0.5 INHALANT RESPIRATORY (INHALATION) at 20:27

## 2019-06-13 RX ADMIN — ALBUTEROL SULFATE 2.5 MG: 2.5 SOLUTION RESPIRATORY (INHALATION) at 09:35

## 2019-06-13 RX ADMIN — GABAPENTIN 400 MG: 100 CAPSULE ORAL at 08:05

## 2019-06-13 NOTE — PROGRESS NOTES
This CM met with pt and his girlfriend, Lotus Kaufman, this morning at bedside to follow up on conversation with pt from yesterday regarding discharge planning. Pt is agreeable to STR and feels it would be beneficial.  Pt's girlfriend, Lotus Kaufman, is supportive of decision as well as pt needs to be strong enough to do for self before returning home with her. Informed pt that [facility] offered STR bed to him - pt accepted bed offer. This CM accepted and selected facility in [facility]. This CM updated pt's son, Justo Quinteros, on conversation with pt as well as at the pt's request.      No additional discharge needs at this time. CM to continue follow.

## 2019-06-13 NOTE — CDMP QUERY
Pt admitted with NSTEMI /Pt noted to have fluid overload documented . If possible, please document in progress notes and d/c summary if you are evaluating and /or treating any of the following: ? Acute on Chronic Systolic CHF ? Acute Systolic CHF ? Chronic Systolic and Diastolic CHF 
? Other, please specify ? Clinically unable to determine The medical record reflects the following: 
 
  Risk Factors: 66 male, hx of sCHF, afib, HTn,  
 
  Clinical Indicators: , +2 edema, EF 50-55% per documentation \" appears fluid overloaded on exam\" Treatment: IV lasix, supplemental oxygen, echo, Sandy Maria Courser RN Compliant Documentation Management Program 
[phone]

## 2019-06-13 NOTE — PROGRESS NOTES
Bedside and Verbal shift change report received from [place], [address] [facility].

## 2019-06-13 NOTE — PROGRESS NOTES
Problem: Mobility Impaired (Adult and Pediatric)  Goal: *Acute Goals and Plan of Care (Insert Text)  Description  LTG:  (1.)Mr. Krystian Virgen will move from supine to sit and sit to supine , scoot up and down and roll side to side in bed with SUPERVISION within 7 treatment day(s). (2.)Mr. Krystian Virgen will transfer from bed to chair and chair to bed with STAND BY ASSIST using the least restrictive device within 7 treatment day(s). (3.)Mr. Krystian Virgen will ambulate with STAND BY ASSIST for 250 feet with the least restrictive device within 7 treatment day(s). (4.)Mr. Krystian Virgen will participate in therapeutic activity/exercises x 23 minutes for increased activity tolerance within 7 treatment days. ________________________________________________________________________________________________     Outcome: Progressing Towards Goal     PHYSICAL THERAPY: Daily Note and AM 6/13/2019  INPATIENT: PT Visit Days : 2  [place] / [place] / Product Type: Medicare /       NAME/AGE/GENDER: Becky Shannon is a 66 y.o. male   PRIMARY DIAGNOSIS: Atrial fibrillation with RVR (HCC) [I48.91]  CAD (coronary artery disease) [I25.10] S/P CABG x 3   S/P CABG x 3    Procedure(s) (LRB):  BRONCHOSCOPY at bedside (N/A)  3 Days Post-Op  ICD-10: Treatment Diagnosis:    · Generalized Muscle Weakness (M62.81)  · Difficulty in walking, Not elsewhere classified (R26.2)   Precaution/Allergies:  Codeine and Iodinated contrast- oral and iv dye      ASSESSMENT:     Mr. Krystian Virgen is a 66 y.o. male in the hospital for the above who was sitting in recliner upon arrival.  Pt reports that he lives in a one story house with his significant other that has 1 step to enter. Pt also reported that PTA he was independent with ADLs and ambulated with independence. 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 Pt admitted to no recent falls in the past year.   Mr. Krystian Virgen is assisted to the bathroom during this treatment session  Gait training with rolling walker x 150 feet with slow collin. Patient is returned to the recliner and after a short rest break is instructed in therapeutic exercises for bilateral LE strengthening with written copy issued. Good session. Patient is making progress as he increased his gait distance this am.  Patient has a large amount of drainage from his chest tube area. RN is attending. Patient mobility is slow and delayed. Patient is left in the recliner with needs within reach. Mr. Paris Amor could benefit from skilled PT as he is currently functioning below his baseline. Will continue PT efforts. This section established at most recent assessment   PROBLEM LIST (Impairments causing functional limitations):  1. Decreased Strength  2. Decreased Transfer Abilities  3. Decreased Ambulation Ability/Technique  4. Decreased Balance  5. Increased Pain  6. Decreased Activity Tolerance   INTERVENTIONS PLANNED: (Benefits and precautions of physical therapy have been discussed with the patient.)  1. Balance Exercise  2. Bed Mobility  3. Family Education  4. Gait Training  5. Neuromuscular Re-education/Strengthening  6. Therapeutic Activites  7. Therapeutic Exercise/Strengthening  8. Transfer Training     TREATMENT PLAN: Frequency/Duration: twice daily for duration of hospital stay  Rehabilitation Potential For Stated Goals: Good     REHAB RECOMMENDATIONS (at time of discharge pending progress):    Placement: It is my opinion, based on this patient's performance to date, that Mr. Paris Amor may benefit from participating in 1-2 additional therapy sessions in order to continue to assess for rehab potential and then make recommendation for disposition at discharge.   Equipment:    None at this time              HISTORY:   History of Present Injury/Illness (Reason for Referral):  See H&P  Past Medical History/Comorbidities:   Mr. Paris Amor  has a past medical history of Atrial fibrillation [facility]), Atrial flutter [facility]) (4/7/2017), CAD (coronary artery disease), Cancer ([facility] [address] Ill-defined condition, Sleep apnea, and Stroke ([facility] [address] Mr. Booker Seaman  has a past surgical history that includes hx heart catheterization (08/14/2006); hx coronary stent placement; and hx prostate surgery. Social History/Living Environment:   Home Environment: Private residence  # Steps to Enter: 1  One/Two Story Residence: One story  Living Alone: No  Support Systems: Spouse/Significant Other/Partner  Patient Expects to be Discharged to[de-identified] Unknown  Current DME Used/Available at Home: None  Tub or Shower Type: Shower  Prior Level of Function/Work/Activity:  Lives with significant other and PTA pt was independent with ADLs and ambulation. Number of Personal Factors/Comorbidities that affect the Plan of Care: 1-2: MODERATE COMPLEXITY   EXAMINATION:   Most Recent Physical Functioning:   Gross Assessment:                  Posture:     Balance:  Sitting: Impaired  Sitting - Static: Fair (occasional)  Sitting - Dynamic: Fair (occasional)  Standing: Impaired  Standing - Static: Fair  Standing - Dynamic : Fair Bed Mobility:     Wheelchair Mobility:     Transfers:  Sit to Stand: Minimum assistance  Stand to Sit: Minimum assistance  Gait:     Gait Abnormalities: Decreased step clearance  Distance (ft): 150 Feet (ft)  Assistive Device: Walker, rolling  Ambulation - Level of Assistance: Contact guard assistance;Minimal assistance      Body Structures Involved:  1. Heart  2. Lungs  3. Bones  4. Muscles Body Functions Affected:  1. Cardio  2. Respiratory  3. Neuromusculoskeletal  4. Movement Related Activities and Participation Affected:  1. General Tasks and Demands  2. Mobility  3. Self Care  4. Domestic Life  5.  Community, Social and [address] [place], [place] [email or web address] [facility]   Number of elements that affect the Plan of Care: 4+: HIGH COMPLEXITY   CLINICAL PRESENTATION:   Presentation: Evolving clinical presentation with changing clinical characteristics: [address] [place] [id number] AM-PAC 6 Clicks   Basic Mobility Inpatient Short Form  How much difficulty does the patient currently have. .. Unable A Lot A Little None   1. Turning over in bed (including adjusting bedclothes, sheets and blankets)? ? 1   ? 2   ? 3   ? 4   2. Sitting down on and standing up from a chair with arms ( e.g., wheelchair, bedside commode, etc.)   ? 1   ? 2   ? 3   ? 4   3. Moving from lying on back to sitting on the side of the bed?   ? 1   ? 2   ? 3   ? 4   How much help from another person does the patient currently need. .. Total A Lot A Little None   4. Moving to and from a bed to a chair (including a wheelchair)? ? 1   ? 2   ? 3   ? 4   5. Need to walk in hospital room? ? 1   ? 2   ? 3   ? 4   6. Climbing 3-5 steps with a railing? ? 1   ? 2   ? 3   ? 4   © 2007, Trustees of [address] [place] [id number], under license to [employer]. All rights reserved      Score:  Initial: 18 Most Recent: X (Date: -- )    Interpretation of Tool:  Represents activities that are increasingly more difficult (i.e. Bed mobility, Transfers, Gait). Medical Necessity:     · Patient demonstrates good  ·  rehab potential due to higher previous functional level. Reason for Services/Other Comments:  · Patient continues to require skilled intervention due to decreased activity tolerance and functional mobility. · .   Use of outcome tool(s) and clinical judgement create a POC that gives a: Questionable prediction of patient's progress: MODERATE COMPLEXITY            TREATMENT:   (In addition to Assessment/Re-Assessment sessions the following treatments were rendered)   Pre-treatment Symptoms/Complaints: \"hello\"  Pain: Initial:   Pain Intensity 1: 0  Post Session:  0/10     Therapeutic Activity: (    14 minutes): Therapeutic activities including Chair transfers, Ambulation on level ground and seated exercises  to improve mobility, strength, balance and coordination.   Required minimal   to promote static and dynamic balance in standing and safe use of RW with transfers/ambulation . Therapeutic Exercise: ( 10 minutes):  Exercises per grid below to improve mobility, strength, balance and coordination. Required minimum verbal cues to promote proper body breathing techniques. Progressed repetitions and complexity of movement as indicated. Date:  6/1319 Date:   Date:     ACTIVITY/EXERCISE AM PM AM PM AM PM   Seated LAQ 10        Seated marching 10        Seated AP 10        Seated hip abd/add 10        Shoulder shugs 10        Gluteal sets 10                 B = bilateral; AA = active assistive; A = active; P = passive        Braces/Orthotics/Lines/Etc:   ·   Treatment/Session Assessment:    · Response to Treatment:  Mobility is slow and delayed  · Interdisciplinary Collaboration:   o Physical Therapy Assistant  o Registered Nurse  · After treatment position/precautions:   o Up in chair  o Bed/Chair-wheels locked  o Call light within reach  o RN notified  o Visitors at bedside   · Compliance with Program/Exercises: Will assess as treatment progresses  · Recommendations/Intent for next treatment session: \"Next visit will focus on advancements to more challenging activities and reduction in assistance provided\".   Total Treatment Duration:  PT Patient Time In/Time Out  Time In: 1005  Time Out: 1029    Madonna Lazo-Shanel, PTA

## 2019-06-13 NOTE — PROGRESS NOTES
Today's Date: 6/13/2019  Date of Admission: 6/6/2019    Chart Reviewed. Subjective:     Patient complains of wheezing and some dyspnea. Medications Reviewed. Objective:     Vitals:    06/13/19 0308 06/13/19 0730 06/13/19 0811 06/13/19 0825   BP: 125/65 165/78     Pulse: 73 78     Resp: 16 18     Temp: 99 °F (37.2 °C) 99.1 °F (37.3 °C)     SpO2: 92% 92% 92% 92%   Weight: 209 lb 1.6 oz (94.8 kg)      Height:           Intake and Output  Current Shift: No intake/output data recorded. Last 3 Shifts: 06/11 1901 - 06/13 0700  In: 1740 [P.O.:1740]  Out: 1200 [Urine:1200]    Physical Exam:  General: Well Developed, Well Nourished, No Acute Distress, Alert & Oriented x 3, Appropriate mood  Neck: supple, no JVD  Heart: S1S2 with RRR without murmurs or gallops  Lungs: expiratory wheezing bilaterally   Abd: soft, nontender, nondistended, with good bowel sounds  Ext: no edema bilaterally  Sternal incision: clean, dry, and intact  Skin: warm and dry    LABS  Data Review:   Recent Labs     06/13/19  0320 06/12/19  0441 06/11/19  0302  06/10/19  1252   NA  --   --  134*  --  135*   K 4.1 4.2 3.9   < > 3.7   MG 2.6* 2.9* 2.6*   < > 3.1*   BUN  --   --  20  --  15   CREA  --   --  1.13  --  1.00   GLU  --   --  96  --  113*   WBC  --  13.8* 8.7  --  11.5*   HGB  --  9.5* 8.4*   < > 9.9*   HCT  --  28.4* 24.7*   < > 28.0*   PLT  --  132* 96*  --  154   INR  --   --   --   --  1.4    < > = values in this interval not displayed. Estimated Creatinine Clearance: 62.1 mL/min (based on SCr of 1.13 mg/dL).       Assessment/Plan:     Principal Problem:    S/P CABG x 3 (6/10/2019)  On ASA, BB, ARB, statin, pacing wires removed, currently stable on room air, continue PT, has some drainage from CT sites and leg incisions, wound care to see regarding leg incisions     Active Problems:  Atrial fibrillation with RVR ([address] (6/6/2019)  Remains in sinus post op, will resume Eliquis, pacing wires removed       Dyslipidemia (4/7/2017)  Continue statin       Hypertension (4/7/2017)  BP stable       Coronary atherosclerosis of native coronary vessel (4/7/2017)  S/p CABG       Chronic systolic congestive heart failure ([facility] [address] (1/31/2019)  EF 50-55%       NSTEMI (non-ST elevated myocardial infarction) ([facility] [address] (6/6/2019)  S/p CABG       Hemoptysis (6/9/2019)  Due to diffuse alveolar bleeding, had bronch 6/9, will resume Eliquis and monitor        Trigeminal neuralgia (6/9/2019)          CAD (coronary artery disease) (6/10/2019)  S/p CABG, continue medical therapy        Encounter for weaning from ventilator [facility]) (6/10/2019)          Tracheobronchomalacia (6/10/2019)  Pulmonary following         Meagan Griffith PA-C

## 2019-06-13 NOTE — PROGRESS NOTES
Pulmonary Daily Progress Note: 6/13/2019  Admission Date: 6/6/2019     The patient's chart is reviewed and the patient is discussed with the staff. Patient is a 66 y.o.  male seen and evaluated at the request of Dr. Carla Doll. Patient has a history of TIA, trigeminal neuralgia, HTN, HL, a.fib on Eliquis / ASA, chronic sCHF, and CAD. He is followed by Cardiology as an outpatient and was seen for worsening dyspnea with recommendation for [facility] / MARCK/eCV but this was never scheduled. He presented to the ER with c/o R sided facial/jaw pain and substernal chest pain. In the ER he was found to be in a.fib with RVR with HR in the 130s and troponin of 0.14 >>>21.30. 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 He was started on a heparin gtt (last dose of eliquis 6/6) in anticipation of surgical intervention. He was given lasix for volume overload with 2 liters urine output yesterday (net -265 ml).     Patient has a remote history of tobacco abuse  - smoked 1.5 ppd x 14 years before quitting 40 years ago. He has never been dx with lung disease, does not require inhalers or home O2. Patient states that he has had some hemoptysis over the last couple of months but it is usually pink, frothy sputum and not dark red blood as he is currently having. He usually stops his ASA and it resolves. Yesterday he had several episodes where he coughed up a moderate amount of dark red blood. He has not had any so far today. Surgery is scheduled for tomorrow and we have been consulted for airway inspection for surgical clearance. He had diagnostic bronch yesterday with no bleeding or lesions seen. Subjective:     Patient in chair and has been having some wheezing. No pain. Does not use inhaler/nebs/oxygen at home. Did smoke for 28 pack years.      Review of Systems:  -Fever  -Headaches  -Chest pain  +Dyspnea, +wheezing, -cough  -Abdominal pain, -constipation  Leg swelling  All other organ systems grossly normal.      Current Facility-Administered Medications   Medication Dose Route Frequency    albuterol (PROVENTIL VENTOLIN) nebulizer solution 2.5 mg  2.5 mg Nebulization NOW    apixaban (ELIQUIS) tablet 5 mg  5 mg Oral BID    metoprolol succinate (TOPROL-XL) XL tablet 25 mg  25 mg Oral DAILY    losartan (COZAAR) tablet 25 mg  25 mg Oral DAILY    bisacodyl (DULCOLAX) tablet 10 mg  10 mg Oral DAILY PRN    sodium chloride (NS) flush 5-40 mL  5-40 mL IntraVENous Q8H    sodium chloride (NS) flush 5-40 mL  5-40 mL IntraVENous PRN    alum-mag hydroxide-simeth (MYLANTA) oral suspension 30 mL  30 mL Oral Q4H PRN    famotidine (PEPCID) tablet 20 mg  20 mg Oral BID    acetaminophen (TYLENOL) tablet 650 mg  650 mg Oral Q4H PRN    zolpidem (AMBIEN) tablet 5 mg  5 mg Oral QHS PRN    mupirocin (BACTROBAN) 2 % ointment   Both Nostrils BID    aspirin delayed-release tablet 81 mg  81 mg Oral DAILY    magnesium oxide (MAG-OX) tablet 400 mg  400 mg Oral TID PRN    magnesium oxide (MAG-OX) tablet 400 mg  400 mg Oral QID PRN    potassium chloride (KLOR-CON) tablet 10 mEq  10 mEq Oral DAILY    potassium chloride (K-DUR, KLOR-CON) SR tablet 20 mEq  20 mEq Oral BID PRN    potassium chloride (K-DUR, KLOR-CON) SR tablet 40 mEq  40 mEq Oral BID PRN    lip protectant (BLISTEX) ointment 1 Each  1 Each Topical PRN    senna-docusate (PERICOLACE) 8.6-50 mg per tablet 2 Tab  2 Tab Oral BID    oxyCODONE-acetaminophen (PERCOCET) 5-325 mg per tablet 1 Tab  1 Tab Oral Q4H PRN    amiodarone (CORDARONE) tablet 400 mg  400 mg Oral Q12H    rosuvastatin (CRESTOR) tablet 40 mg  40 mg Oral QHS    carBAMazepine (TEGretol) tablet 200 mg  200 mg Oral BID    fenofibrate (LOFIBRA) tablet 160 mg  160 mg Oral DAILY    gabapentin (NEURONTIN) capsule 400 mg  400 mg Oral BID          Objective:     Vitals:    06/13/19 0308 06/13/19 0730 06/13/19 6501 06/13/19 0825   BP: 125/65 165/78     Pulse: 73 78     Resp: 16 18     Temp: 99 °F (37.2 °C) 99.1 °F (37.3 °C)     SpO2: 92% 92% 92% 92%   Weight: 209 lb 1.6 oz (94.8 kg)      Height:         Intake and Output:   06/11 1901 - 06/13 0700  In: 1740 [P.O.:1740]  Out: 1200 [Urine:1200]  06/13 0701 - 06/13 1900  In: -   Out: 400 [Urine:400]    Physical Exam:          Constitutional:  Well developed WM in chair and NOT in any distress  EENMT:  Sclera clear, pupils equal, oral mucosa moist  Respiratory: few crackles in left base. No wheezing  Cardiovascular:  RRR with no M,G,R;  Gastrointestinal:  soft with no tenderness; positive bowel sounds present  Musculoskeletal:  warm with no cyanosis, no lower extremity edema  Skin:  no jaundice or ecchymosis  Neurologic: no gross neuro deficits     Psychiatric:  Awake and alert. CXR:  6/12: improved r lung, LLL atelecatsis    Continued bilateral infiltrates      LAB  Recent Labs     06/12/19  1553 06/12/19  1157 06/12/19  0626 06/11/19  2143 06/11/19  1617   GLUCPOC 154* 136* 138* 169* 156*     Recent Labs     06/12/19  0441 06/11/19  0302 06/10/19  2118  06/10/19  1252   WBC 13.8* 8.7  --   --  11.5*   HGB 9.5* 8.4* 8.6*   < > 9.9*   HCT 28.4* 24.7* 25.0*   < > 28.0*   * 96*  --   --  154   INR  --   --   --   --  1.4    < > = values in this interval not displayed. Recent Labs     06/13/19  0320 06/12/19  0441 06/11/19  0302  06/10/19  1252   NA  --   --  134*  --  135*   K 4.1 4.2 3.9   < > 3.7   CL  --   --  104  --  101   CO2  --   --  24  --  26   GLU  --   --  96  --  113*   BUN  --   --  20  --  15   CREA  --   --  1.13  --  1.00   MG 2.6* 2.9* 2.6*   < > 3.1*   CA  --   --  7.4*  --  7.9*    < > = values in this interval not displayed. No results for input(s): LCAD, LAC in the last 72 hours.     Assessment:  (Medical Decision Making)     Hospital Problems  Date Reviewed: 6/9/2019          Codes Class Noted POA    CAD (coronary artery disease) ICD-10-CM: I25.10  ICD-9-CM: 414.00  6/10/2019 Unknown        S/P CABG x 3 ICD-10-CM: Z95.1  ICD-9-CM: V45.81  6/10/2019 Unknown    Stable    Hemoptysis ICD-10-CM: R04.2  ICD-9-CM: 786.30  6/9/2019 Unknown    Seems to have resolved. Etiology unclear    Trigeminal neuralgia (Chronic) ICD-10-CM: G50.0  ICD-9-CM: 350.1  6/9/2019 Yes        * (Principal) Atrial fibrillation with RVR (HCC) ICD-10-CM: I48.91  ICD-9-CM: 427.31  6/6/2019 Yes    Now in SR post MAZE    NSTEMI (non-ST elevated myocardial infarction) ([facility] [address] ICD-10-CM: I21.4  ICD-9-CM: 410.70  6/6/2019 Yes        Chronic systolic congestive heart failure (HCC) (Chronic) ICD-10-CM: I50.22  ICD-9-CM: 428.22, 428.0  1/31/2019 Yes    Overview Addendum 1/31/2019 11:22 AM by Denise Downing MD     2014:  EF 55%  07/2018:  EF 45-50%  01/2019:  EF 40-45%             Dyslipidemia (Chronic) ICD-10-CM: E78.5  ICD-9-CM: 272.4  4/7/2017 Yes        Hypertension (Chronic) ICD-10-CM: I10  ICD-9-CM: 401.9  4/7/2017 Yes        Coronary atherosclerosis of native coronary vessel ICD-10-CM: I25.10  ICD-9-CM: 414.01  4/7/2017 Yes    Overview Addendum 1/31/2019 11:15 AM by Denise Downing MD     2006:  PCI RCA - Liberte, PCI LAD Cypher, PCI LCx Cypher  01/2014:  Stress testing with fixed inferior defect - no ischemia  11/2014:  EF with EF 50-55% and inferior HK   07/2018:  EF 45-50%  01/2019:  EF 40-45%               Atrial fibrillation ([facility] [address] ICD-10-CM: I48.91  ICD-9-CM: 427.31  4/7/2017 Yes    Still noted on monitor        Encounter for weaning from ventilator-  --off oxygen    History of smoking 15-30 pack years  --see below      Plan:  (Medical Decision Making)     --given history of 28 pack years of smoking may have some underlying COPD. Will need outpatient spirometry. For now will start on albuterol TID and Pulmicort BID with spiriva.  Will likely need outpatient LAMA/LABA  --on RA now and check ambulatory saturation and overnight  --NOT wheezing on my examination and hold steroids  --last x-ray noted to have improvement in infiltrates. Check BNP  --lasix per PMD  --PT/OT and continue IS  --Afib per PMD  --continue remaining treatment. More than 50% of the time documented was spent in face-to-face contact with the patient and in the care of the patient on the floor/unit where the patient is located.     Virgen Flores MD

## 2019-06-13 NOTE — PROGRESS NOTES
Bedside shift change report given to Sabi Mckeon [id number] (oncoming nurse) by [facility] (offgoing nurse). Report included the following information SBAR, Kardex, Intake/Output, MAR, Recent Results and Cardiac Rhythm NSR.

## 2019-06-13 NOTE — PROGRESS NOTES
Problem: Mobility Impaired (Adult and Pediatric)  Goal: *Acute Goals and Plan of Care (Insert Text)  Description  LTG:  (1.)Mr. Donny Alcantara will move from supine to sit and sit to supine , scoot up and down and roll side to side in bed with SUPERVISION within 7 treatment day(s). (2.)Mr. Donny Alcantara will transfer from bed to chair and chair to bed with STAND BY ASSIST using the least restrictive device within 7 treatment day(s). (3.)Mr. Donny Alcantara will ambulate with STAND BY ASSIST for 250 feet with the least restrictive device within 7 treatment day(s). (4.)Mr. Donny Alcantara will participate in therapeutic activity/exercises x 23 minutes for increased activity tolerance within 7 treatment days. ________________________________________________________________________________________________     Outcome: Progressing Towards Goal     PHYSICAL THERAPY: Daily Note and PM 6/13/2019  INPATIENT: PT Visit Days : 2  [place] / [place] / Product Type: Medicare /       NAME/AGE/GENDER: Rebeca Ma is a 66 y.o. male   PRIMARY DIAGNOSIS: Atrial fibrillation with RVR (HCC) [I48.91]  CAD (coronary artery disease) [I25.10] S/P CABG x 3   S/P CABG x 3    Procedure(s) (LRB):  BRONCHOSCOPY at bedside (N/A)  3 Days Post-Op  ICD-10: Treatment Diagnosis:    · Generalized Muscle Weakness (M62.81)  · Difficulty in walking, Not elsewhere classified (R26.2)   Precaution/Allergies:  Codeine and Iodinated contrast- oral and iv dye      ASSESSMENT:     Mr. Donny Alcantara is a 66 y.o. male in the hospital for the above who was sitting in recliner upon arrival.  Pt reports that he lives in a one story house with his significant other that has 1 step to enter. Pt also reported that PTA he was independent with ADLs and ambulated with independence. 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 Pt admitted to no recent falls in the past year.   Mr. Donny Alcantara is assisted to the bathroom during this treatment session  Gait training with rolling walker x 150 feet with slow collin. Patient is returned to the recliner and after a short rest break is instructed in therapeutic exercises for bilateral LE strengthening with written copy issued. Good session. Patient is making progress as he increased his gait distance this am.  Patient has a large amount of drainage from his chest tube area. RN is attending. Patient mobility is slow and delayed. Patient is left in the recliner with needs within reach. Mr. Gila Henriquez could benefit from skilled PT as he is currently functioning below his baseline. Will continue PT efforts. PM note:  Patient is sitting in the recliner and agreeable to therapy. Donned AMY hose, socks and underwear. Sit to stand with min assist.  Gait training with rolling walker x 150 feet with slow collin. Patient required one sitting rest break. Patient is placed in the bed at the end of the treatment session with needs within  Reach and alarm intact. Patient required instruction and assistance with log rolling technique. Doing better. Will continue PT efforts. This section established at most recent assessment   PROBLEM LIST (Impairments causing functional limitations):  1. Decreased Strength  2. Decreased Transfer Abilities  3. Decreased Ambulation Ability/Technique  4. Decreased Balance  5. Increased Pain  6. Decreased Activity Tolerance   INTERVENTIONS PLANNED: (Benefits and precautions of physical therapy have been discussed with the patient.)  1. Balance Exercise  2. Bed Mobility  3. Family Education  4. Gait Training  5. Neuromuscular Re-education/Strengthening  6. Therapeutic Activites  7. Therapeutic Exercise/Strengthening  8. Transfer Training     TREATMENT PLAN: Frequency/Duration: twice daily for duration of hospital stay  Rehabilitation Potential For Stated Goals: Good     REHAB RECOMMENDATIONS (at time of discharge pending progress):    Placement:   It is my opinion, based on this patient's performance to date, that Mr. Edna Herron may benefit from participating in 1-2 additional therapy sessions in order to continue to assess for rehab potential and then make recommendation for disposition at discharge. Equipment:    None at this time              HISTORY:   History of Present Injury/Illness (Reason for Referral):  See H&P  Past Medical History/Comorbidities:   Mr. Edna Herron  has a past medical history of Atrial fibrillation [facility]), Atrial flutter ([facility] [address] (4/7/2017), CAD (coronary artery disease), Cancer ([facility] [address] [facility] Ill-defined condition, Sleep apnea, and Stroke ([facility] Mr. Edna Herron  has a past surgical history that includes hx heart catheterization (08/14/2006); hx coronary stent placement; and hx prostate surgery. Social History/Living Environment:   Home Environment: Private residence  # Steps to Enter: 1  One/Two Story Residence: One story  Living Alone: No  Support Systems: Spouse/Significant Other/Partner  Patient Expects to be Discharged to[de-identified] Unknown  Current DME Used/Available at Home: None  Tub or Shower Type: Shower  Prior Level of Function/Work/Activity:  Lives with significant other and PTA pt was independent with ADLs and ambulation. Number of Personal Factors/Comorbidities that affect the Plan of Care: 1-2: MODERATE COMPLEXITY   EXAMINATION:   Most Recent Physical Functioning:   Gross Assessment:                  Posture:     Balance:  Sitting: Impaired  Sitting - Static: Fair (occasional)  Sitting - Dynamic: Fair (occasional)  Standing: Impaired  Standing - Static: Fair  Standing - Dynamic : Fair Bed Mobility:     Wheelchair Mobility:     Transfers:  Sit to Stand: Minimum assistance  Stand to Sit: Minimum assistance  Gait:     Gait Abnormalities: Decreased step clearance  Distance (ft): 150 Feet (ft)  Assistive Device: Walker, rolling  Ambulation - Level of Assistance: Contact guard assistance      Body Structures Involved:  1. Heart  2. Lungs  3. Bones  4.  Muscles Body Functions Affected:  1. Cardio  2. Respiratory  3. Neuromusculoskeletal  4. Movement Related Activities and Participation Affected:  1. General Tasks and Demands  2. Mobility  3. Self Care  4. Domestic Life  5. Community, Social and [place] [facility]   Number of elements that affect the Plan of Care: 4+: HIGH COMPLEXITY   CLINICAL PRESENTATION:   Presentation: Evolving clinical presentation with changing clinical characteristics: MODERATE COMPLEXITY   CLINICAL DECISION MAKIN [facility] Mobility Inpatient Short Form  How much difficulty does the patient currently have. .. Unable A Lot A Little None   1. Turning over in bed (including adjusting bedclothes, sheets and blankets)? ? 1   ? 2   ? 3   ? 4   2. Sitting down on and standing up from a chair with arms ( e.g., wheelchair, bedside commode, etc.)   ? 1   ? 2   ? 3   ? 4   3. Moving from lying on back to sitting on the side of the bed?   ? 1   ? 2   ? 3   ? 4   How much help from another person does the patient currently need. .. Total A Lot A Little None   4. Moving to and from a bed to a chair (including a wheelchair)? ? 1   ? 2   ? 3   ? 4   5. Need to walk in hospital room? ? 1   ? 2   ? 3   ? 4   6. Climbing 3-5 steps with a railing? ? 1   ? 2   ? 3   ? 4   © , Trustees of [facility] [employer], under license to [employer]. All rights reserved      Score:  Initial: 18 Most Recent: X (Date: -- )    Interpretation of Tool:  Represents activities that are increasingly more difficult (i.e. Bed mobility, Transfers, Gait). Medical Necessity:     · Patient demonstrates good  ·  rehab potential due to higher previous functional level. Reason for Services/Other Comments:  · Patient continues to require skilled intervention due to decreased activity tolerance and functional mobility.    · .   Use of outcome tool(s) and clinical judgement create a POC that gives a: Questionable prediction of patient's progress: MODERATE COMPLEXITY TREATMENT:   (In addition to Assessment/Re-Assessment sessions the following treatments were rendered)   Pre-treatment Symptoms/Complaints: \"Again\"  Pain: Initial:   Pain Intensity 1: 0  Post Session:  0/10     Therapeutic Activity: (    25 minutes): Therapeutic activities including Chair transfers, Ambulation on level ground and seated exercises  to improve mobility, strength, balance and coordination. Required minimal   to promote static and dynamic balance in standing and safe use of RW with transfers/ambulation . Therapeutic Exercise: (  minutes):  Exercises per grid below to improve mobility, strength, balance and coordination. Required minimum verbal cues to promote proper body breathing techniques. Progressed repetitions and complexity of movement as indicated. Date:  6/1319 Date:   Date:     ACTIVITY/EXERCISE AM PM AM PM AM PM   Seated LAQ 10        Seated marching 10        Seated AP 10        Seated hip abd/add 10        Shoulder shugs 10        Gluteal sets 10                 B = bilateral; AA = active assistive; A = active; P = passive        Braces/Orthotics/Lines/Etc:   ·   Treatment/Session Assessment:    · Response to Treatment:  Mobility is slow and delayed  · Interdisciplinary Collaboration:   o Physical Therapy Assistant  o Registered Nurse  · After treatment position/precautions:   o Up in chair  o Bed/Chair-wheels locked  o Call light within reach  o RN notified  o Visitors at bedside   · Compliance with Program/Exercises: Will assess as treatment progresses  · Recommendations/Intent for next treatment session: \"Next visit will focus on advancements to more challenging activities and reduction in assistance provided\".   Total Treatment Duration:  PT Patient Time In/Time Out  Time In: 1302  Time Out: 1327    Layton Ann PTA

## 2019-06-13 NOTE — PROGRESS NOTES
Nutrition  Reason for assessment: LOS day 6  Assessment:   Diet: Cardiac  Food/Nutrition Patient History: S/P CABG 6/10. Pt states that he did not eat breakfast this morning-only had cranberry juice and coffee. Family brought him a biscuit last night and he did not eat. Family member at bedside states that he was eating very well PTA. Pt has c/o nausea and no BM post-op. He is receptive to receiving ONS and has consumed Premier PRO in the past.  Denies any difficulty chewing/swallowing. Anthropometrics:Height: 5' 8\" (172.7 cm),  Weight: 94.8 kg (209 lb 1.6 oz), Weight Source: Bed, Body mass index is 31.79 kg/m². BMI class of overweight for age. Wt at admission (pre-op): 88.4 kg   Edema: Trace to BLEs  Macronutrient needs:  EER:  5441-8531 kcal /day (20-25 kcal/kg pre-op BW)(88.4 kg)  EPR:  106-124 grams protein/day (1.2-1.4 grams/kg pre-op BW)  Intake/Comparative Standards:Average intake for past 3 day(s)/3 recorded meal(s): 75%. This potentially meets ~85% of kcal and ~64% of protein needs    Nutrition Diagnosis: Inadequate oral intake r/t decreased appetite post-op as evidenced by pt meeting <90% EEN and <75% estimated protein needs. Intervention:  Meals and snacks: Continue current diet. Nutrition Supplement Therapy: add ensure enlive TID   Discharge nutrition plan: Too soon to determine.     Gemini Roberts [address], [address] [place] [address], [facility] [place] [address], [facility] [address]

## 2019-06-13 NOTE — PROGRESS NOTES
[facility] CARDIOLOGY PROGRESS NOTE           6/13/2019    Admit Date: 6/6/2019      Subjective:   Patient alert and oriented. HR stable. Patient reports feeling tired today. ROS:  Cardiovascular:  As noted above    Objective:      Vitals:    06/13/19 0811 06/13/19 0825 06/13/19 0937 06/13/19 1119   BP:    147/70   Pulse:    81   Resp:    19   Temp:    100.3 °F (37.9 °C)   SpO2: 92% 92% 93% 94%   Weight:       Height:           Physical Exam:  General-No Acute Distress  Neck- supple, no JVD  CV- regular rate and rhythm no MRG  Lung- diminished bilaterally  Abd- soft, nontender, nondistended  Ext- no edema bilaterally. Skin- warm and dry    Data Review:   Recent Labs     06/13/19  0320 06/12/19  0441 06/11/19  0302  06/10/19  1252   NA  --   --  134*  --  135*   K 4.1 4.2 3.9   < > 3.7   MG 2.6* 2.9* 2.6*   < > 3.1*   BUN  --   --  20  --  15   CREA  --   --  1.13  --  1.00   GLU  --   --  96  --  113*   WBC  --  13.8* 8.7  --  11.5*   HGB  --  9.5* 8.4*   < > 9.9*   HCT  --  28.4* 24.7*   < > 28.0*   PLT  --  132* 96*  --  154   INR  --   --   --   --  1.4    < > = values in this interval not displayed. Assessment/Plan:     Principal Problem:    Atrial fibrillation with RVR     In SR. S/P MAZE and MICHEAL occlusion. Will need [address] [place] [address] when stable from surgical standpoint. Patient was on Eliquis preop. Plans to go to the [facility] upon discharge. Active Problems:    Dyslipidemia     On statin therapy       Hypertension    This is stable and will monitor post-op      Coronary atherosclerosis of native coronary vessel     S/P CABG      Atrial fibrillation     As above       Chronic systolic congestive heart failure     Volume is up and give dose of IV lasix and monitor. Start metoprolol succinate and losartan.   EF 50-55% by echo 6/7/2019      NSTEMI (non-ST elevated myocardial infarction)    S/P CABG      Hemoptysis (6/9/2019)    Per pulmonary       CAD (coronary artery disease)    S/P CABG      S/P CABG x 3    POD#3      Encounter for weaning from ventilator     Extubated       Tracheobronchomalacia     Per pulmonary           Malou Page, NP  6/13/2019      [facility] CARDIOLOGY     6/13/2019     1:20 PM    I have personally seen and examined Corky Fix with  Edwar Bruno NP. I agree and confirm findings in history, physical exam, and assessment/plan as outlined above with following pertinent additions/exceptions:      PO from CABG planning on adding back ACE/BB and will give some IV lasix today appears fluid overloaded on exam. Check BMP tomorrow.     RRR normal S1/S2  Lungs Crackles  Abd NT ND  Normal PPP, 2+ edema  AOx3        Shasta Sifuentes MD

## 2019-06-13 NOTE — PROGRESS NOTES
Problem: Falls - Risk of  Goal: *Absence of Falls  Description  Document [place] Baird Fall Risk and appropriate interventions in the flowsheet. Outcome: Progressing Towards Goal  Note:   Fall Risk Interventions:  Mobility Interventions: Bed/chair exit alarm, Communicate number of staff needed for ambulation/transfer, Patient to call before getting OOB, PT Consult for mobility concerns, Strengthening exercises (ROM-active/passive)    Medication Interventions: Bed/chair exit alarm, Patient to call before getting OOB, Teach patient to arise slowly    Elimination Interventions: Call light in reach, Patient to call for help with toileting needs, Stay With Me (per policy), Urinal in reach              Problem: Pressure Injury - Risk of  Goal: *Prevention of pressure injury  Description  Document Elie Scale and appropriate interventions in the flowsheet.   Outcome: Progressing Towards Goal  Note:   Pressure Injury Interventions:  Sensory Interventions: Assess changes in LOC, Maintain/enhance activity level, Pressure redistribution bed/mattress (bed type)    Moisture Interventions: Absorbent underpads    Activity Interventions: Increase time out of bed, Pressure redistribution bed/mattress(bed type), PT/OT evaluation, Chair cushion    Mobility Interventions: Chair cushion, HOB 30 degrees or less, Pressure redistribution bed/mattress (bed type), PT/OT evaluation    Nutrition Interventions: Document food/fluid/supplement intake, Offer support with meals,snacks and hydration    Friction and Shear Interventions: Foam dressings/transparent film/skin sealants

## 2019-06-14 PROBLEM — R79.89 ELEVATED BRAIN NATRIURETIC PEPTIDE (BNP) LEVEL: Status: ACTIVE | Noted: 2019-06-14

## 2019-06-14 PROBLEM — Z99.11 ENCOUNTER FOR WEANING FROM VENTILATOR (HCC): Status: RESOLVED | Noted: 2019-06-10 | Resolved: 2019-06-14

## 2019-06-14 LAB
ANION GAP SERPL CALC-SCNC: 7 MMOL/L (ref 7–16)
BUN SERPL-MCNC: 27 MG/DL (ref 8–23)
CALCIUM SERPL-MCNC: 8 MG/DL (ref 8.3–10.4)
CHLORIDE SERPL-SCNC: 97 MMOL/L (ref 98–107)
CO2 SERPL-SCNC: 27 MMOL/L (ref 21–32)
CREAT SERPL-MCNC: 1.22 MG/DL (ref 0.8–1.5)
GLUCOSE SERPL-MCNC: 116 MG/DL (ref 65–100)
POTASSIUM SERPL-SCNC: 4 MMOL/L (ref 3.5–5.1)
SODIUM SERPL-SCNC: 131 MMOL/L (ref 136–145)

## 2019-06-14 PROCEDURE — 94640 AIRWAY INHALATION TREATMENT: CPT

## 2019-06-14 PROCEDURE — 74011250637 HC RX REV CODE- 250/637: Performed by: THORACIC SURGERY (CARDIOTHORACIC VASCULAR SURGERY)

## 2019-06-14 PROCEDURE — 74011250637 HC RX REV CODE- 250/637: Performed by: PHYSICIAN ASSISTANT

## 2019-06-14 PROCEDURE — 74750000023 HC WOUND THERAPY

## 2019-06-14 PROCEDURE — 74011250636 HC RX REV CODE- 250/636: Performed by: THORACIC SURGERY (CARDIOTHORACIC VASCULAR SURGERY)

## 2019-06-14 PROCEDURE — 74011250637 HC RX REV CODE- 250/637: Performed by: INTERNAL MEDICINE

## 2019-06-14 PROCEDURE — 97110 THERAPEUTIC EXERCISES: CPT

## 2019-06-14 PROCEDURE — 94762 N-INVAS EAR/PLS OXIMTRY CONT: CPT

## 2019-06-14 PROCEDURE — 74011000250 HC RX REV CODE- 250: Performed by: INTERNAL MEDICINE

## 2019-06-14 PROCEDURE — 74011250636 HC RX REV CODE- 250/636: Performed by: INTERNAL MEDICINE

## 2019-06-14 PROCEDURE — 36415 COLL VENOUS BLD VENIPUNCTURE: CPT

## 2019-06-14 PROCEDURE — 65660000004 HC RM CVT STEPDOWN

## 2019-06-14 PROCEDURE — 80048 BASIC METABOLIC PNL TOTAL CA: CPT

## 2019-06-14 PROCEDURE — 94760 N-INVAS EAR/PLS OXIMETRY 1: CPT

## 2019-06-14 PROCEDURE — 97530 THERAPEUTIC ACTIVITIES: CPT

## 2019-06-14 PROCEDURE — 99232 SBSQ HOSP IP/OBS MODERATE 35: CPT | Performed by: INTERNAL MEDICINE

## 2019-06-14 RX ORDER — ONDANSETRON 2 MG/ML
4 INJECTION INTRAMUSCULAR; INTRAVENOUS
Status: DISCONTINUED | OUTPATIENT
Start: 2019-06-14 | End: 2019-06-17 | Stop reason: HOSPADM

## 2019-06-14 RX ORDER — AMOXICILLIN 250 MG
2 CAPSULE ORAL
Status: DISCONTINUED | OUTPATIENT
Start: 2019-06-14 | End: 2019-06-17 | Stop reason: HOSPADM

## 2019-06-14 RX ORDER — METOPROLOL SUCCINATE 50 MG/1
50 TABLET, EXTENDED RELEASE ORAL DAILY
Status: DISCONTINUED | OUTPATIENT
Start: 2019-06-15 | End: 2019-06-15

## 2019-06-14 RX ADMIN — FAMOTIDINE 20 MG: 20 TABLET ORAL at 08:32

## 2019-06-14 RX ADMIN — CARBAMAZEPINE 200 MG: 200 TABLET ORAL at 18:07

## 2019-06-14 RX ADMIN — APIXABAN 5 MG: 5 TABLET, FILM COATED ORAL at 08:29

## 2019-06-14 RX ADMIN — ALBUTEROL SULFATE 2.5 MG: 2.5 SOLUTION RESPIRATORY (INHALATION) at 07:43

## 2019-06-14 RX ADMIN — ACETAMINOPHEN 650 MG: 325 TABLET, FILM COATED ORAL at 18:05

## 2019-06-14 RX ADMIN — LOSARTAN POTASSIUM 25 MG: 25 TABLET, FILM COATED ORAL at 08:31

## 2019-06-14 RX ADMIN — SENNOSIDES AND DOCUSATE SODIUM 2 TABLET: 8.6; 5 TABLET ORAL at 08:29

## 2019-06-14 RX ADMIN — ALBUTEROL SULFATE 2.5 MG: 2.5 SOLUTION RESPIRATORY (INHALATION) at 20:18

## 2019-06-14 RX ADMIN — AMIODARONE HYDROCHLORIDE 400 MG: 200 TABLET ORAL at 08:28

## 2019-06-14 RX ADMIN — APIXABAN 5 MG: 5 TABLET, FILM COATED ORAL at 18:07

## 2019-06-14 RX ADMIN — POTASSIUM CHLORIDE 10 MEQ: 10 TABLET, EXTENDED RELEASE ORAL at 08:36

## 2019-06-14 RX ADMIN — FAMOTIDINE 20 MG: 20 TABLET ORAL at 18:07

## 2019-06-14 RX ADMIN — AMIODARONE HYDROCHLORIDE 400 MG: 200 TABLET ORAL at 21:15

## 2019-06-14 RX ADMIN — FUROSEMIDE 40 MG: 10 INJECTION, SOLUTION INTRAVENOUS at 08:37

## 2019-06-14 RX ADMIN — CARBAMAZEPINE 200 MG: 200 TABLET ORAL at 08:33

## 2019-06-14 RX ADMIN — GABAPENTIN 400 MG: 100 CAPSULE ORAL at 18:07

## 2019-06-14 RX ADMIN — ASPIRIN 81 MG: 81 TABLET, COATED ORAL at 08:30

## 2019-06-14 RX ADMIN — MUPIROCIN: 20 OINTMENT TOPICAL at 08:28

## 2019-06-14 RX ADMIN — METOPROLOL SUCCINATE 25 MG: 25 TABLET, EXTENDED RELEASE ORAL at 08:31

## 2019-06-14 RX ADMIN — POTASSIUM CHLORIDE 40 MEQ: 20 TABLET, EXTENDED RELEASE ORAL at 09:19

## 2019-06-14 RX ADMIN — ACETAMINOPHEN 650 MG: 325 TABLET, FILM COATED ORAL at 08:26

## 2019-06-14 RX ADMIN — ACETAMINOPHEN 650 MG: 325 TABLET, FILM COATED ORAL at 13:05

## 2019-06-14 RX ADMIN — GABAPENTIN 400 MG: 100 CAPSULE ORAL at 08:31

## 2019-06-14 RX ADMIN — MUPIROCIN: 20 OINTMENT TOPICAL at 21:17

## 2019-06-14 RX ADMIN — Medication 10 ML: at 08:38

## 2019-06-14 RX ADMIN — ONDANSETRON 4 MG: 2 INJECTION INTRAMUSCULAR; INTRAVENOUS at 17:33

## 2019-06-14 RX ADMIN — Medication 10 ML: at 06:14

## 2019-06-14 RX ADMIN — ALBUTEROL SULFATE 2.5 MG: 2.5 SOLUTION RESPIRATORY (INHALATION) at 14:05

## 2019-06-14 RX ADMIN — ROSUVASTATIN CALCIUM 40 MG: 20 TABLET, FILM COATED ORAL at 21:14

## 2019-06-14 RX ADMIN — OXYCODONE AND ACETAMINOPHEN 1 TABLET: 5; 325 TABLET ORAL at 21:16

## 2019-06-14 RX ADMIN — Medication 10 ML: at 21:17

## 2019-06-14 RX ADMIN — Medication 10 ML: at 14:02

## 2019-06-14 RX ADMIN — BUDESONIDE 500 MCG: 0.5 INHALANT RESPIRATORY (INHALATION) at 20:18

## 2019-06-14 RX ADMIN — Medication 10 ML: at 17:34

## 2019-06-14 RX ADMIN — BUDESONIDE 500 MCG: 0.5 INHALANT RESPIRATORY (INHALATION) at 07:43

## 2019-06-14 RX ADMIN — TIOTROPIUM BROMIDE 18 MCG: 18 CAPSULE ORAL; RESPIRATORY (INHALATION) at 07:43

## 2019-06-14 NOTE — PROGRESS NOTES
Today's Date: 6/14/2019  Date of Admission: 6/6/2019    Chart Reviewed. Subjective:     Patient feels better. He was able to have BM yesterday. He notes less dyspnea/wheezing today. Medications Reviewed. Objective:     Vitals:    06/13/19 2300 06/14/19 0338 06/14/19 0743 06/14/19 0800   BP: 153/73 140/66  164/80   Pulse: 97 80  (!) 105   Resp: 18 18  20   Temp: 98.6 °F (37 °C) 99.4 °F (37.4 °C)  98.7 °F (37.1 °C)   SpO2: 98% 97% 95% 92%   Weight:  206 lb 3.2 oz (93.5 kg)     Height:           Intake and Output  Current Shift: 06/14 0701 - 06/14 1900  In: 120 [P.O.:120]  Out: 350 [Urine:350]   Last 3 Shifts: 06/12 1901 - 06/14 0700  In: 460 [P.O.:460]  Out: 1485 [Urine:1475; Drains:10]    Physical Exam:  General: Well Developed, Well Nourished, No Acute Distress, Alert & Oriented x 3, Appropriate mood  Neck: supple, no JVD  Heart: S1S2 with RRR without murmurs or gallops  Lungs: mostly clear, minimal expiratory wheezing   Abd: soft, nontender, nondistended, with good bowel sounds  Ext: no edema bilaterally  Sternal incision: clean, dry, and intact  Skin: warm and dry    LABS  Data Review:   Recent Labs     06/14/19  0328 06/13/19  0320 06/12/19  0441   *  --   --    K 4.0 4.1 4.2   MG  --  2.6* 2.9*   BUN 27*  --   --    CREA 1.22  --   --    *  --   --    WBC  --   --  13.8*   HGB  --   --  9.5*   HCT  --   --  28.4*   PLT  --   --  132*       Estimated Creatinine Clearance: 57.1 mL/min (based on SCr of 1.22 mg/dL).       Assessment/Plan:     Principal Problem:    S/P CABG x 3 (6/10/2019)  On ASA, BB, ARB, statin, pacing wires removed, currently stable on room air, continue PT, wound vac placed on leg incisions, continue wound care, back in a-fib and rate mildly elevated, will increase BB and monitor      Active Problems:  Atrial fibrillation with RVR ([address] (6/6/2019)  Back in a-fib, increase BB, on Eliquis       Dyslipidemia (4/7/2017)  Continue statin       Hypertension (4/7/2017)  BP stable       Coronary atherosclerosis of native coronary vessel (4/7/2017)  S/p CABG       Chronic systolic congestive heart failure ([facility] [address] (1/31/2019)  EF 50-55%       NSTEMI (non-ST elevated myocardial infarction) ([facility] [address] (6/6/2019)  S/p CABG       Hemoptysis (6/9/2019)  Due to diffuse alveolar bleeding, had bronch 6/9, Eliquis resumed, monitor        Trigeminal neuralgia (6/9/2019)          CAD (coronary artery disease) (6/10/2019)  S/p CABG, continue medical therapy        Encounter for weaning from ventilator [facility]) (6/10/2019)          Tracheobronchomalacia (6/10/2019)  Pulmonary following     Dispo   likely to [place] on Monday        Satna St PA-C

## 2019-06-14 NOTE — PROGRESS NOTES
[facility] CARDIOLOGY PROGRESS NOTE           6/14/2019 10:23 AM    Admit Date: 6/6/2019         Subjective: Doing better post lasix yesterday. Cr has increased mildly. Denies CP or SOB. ROS:  Cardiovascular:  As noted above    Objective:      Vitals:    06/13/19 2300 06/14/19 0338 06/14/19 0743 06/14/19 0800   BP: 153/73 140/66  164/80   Pulse: 97 80  (!) 105   Resp: 18 18  20   Temp: 98.6 °F (37 °C) 99.4 °F (37.4 °C)  98.7 °F (37.1 °C)   SpO2: 98% 97% 95% 92%   Weight:  93.5 kg (206 lb 3.2 oz)     Height:           On telemetry: afib      Physical Exam:  General: Well Developed, Well Nourished, No Acute Distress, Alert & Oriented x 3, Appropriate mood  Neck: supple, no JVD  Heart: irreg irreg  Lungs: Clear throughout auscultation bilaterally without adventitious sounds  Abd: soft, nontender, nondistended, with good bowel sounds  Ext: no edema bilaterally  Skin: warm and dry      Data Review:   Recent Labs     06/14/19  0328 06/13/19  0320 06/12/19  0441   *  --   --    K 4.0 4.1 4.2   MG  --  2.6* 2.9*   BUN 27*  --   --    CREA 1.22  --   --    *  --   --    WBC  --   --  13.8*   HGB  --   --  9.5*   HCT  --   --  28.4*   PLT  --   --  132*       No results for input(s): TNIPOC, TROIQ in the last 72 hours.         Assessment/Plan:     Principal Problem:    S/P CABG x 3 (6/10/2019)    Active Problems:    Dyslipidemia (4/7/2017)      Hypertension (4/7/2017)      Coronary atherosclerosis of native coronary vessel (4/7/2017)      Overview: 2006:  PCI RCA - Liberte, PCI LAD Cypher, PCI LCx Cypher      01/2014:  Stress testing with fixed inferior defect - no ischemia      11/2014:  EF with EF 50-55% and inferior HK       07/2018:  EF 45-50%      01/2019:  EF 40-45%            Atrial fibrillation ([address] (4/7/2017)      Chronic systolic congestive heart failure ([facility] (1/31/2019)      Overview: 2014:  EF 55%      07/2018:  EF 45-50%      01/2019:  EF 40-45%      Atrial fibrillation with RVR ([facility] [address] [facility] (6/6/2019)      NSTEMI (non-ST elevated myocardial infarction) ([facility] [address] [facility] (6/6/2019)      Hemoptysis (6/9/2019)      Trigeminal neuralgia (6/9/2019)      CAD (coronary artery disease) (6/10/2019)      Tracheobronchomalacia (6/10/2019)      History of smoking 25-50 pack years (6/13/2019)      Elevated brain natriuretic peptide (BNP) level (6/14/2019)    A/P  1) CAD - asa/statin  2) Afib - amiodarone/eliquis post maze and MICHEAL clip  3) LVEF - low normal with diastolic dysfunction will stop lasix today  4) HTN - controlled on BB and ACE      Chelo Norris MD  6/14/2019 10:23 AM

## 2019-06-14 NOTE — PROGRESS NOTES
Pt with tentative discharge to [facility] [address] this day, however, due to his need for a wound vac the facility will need to get one at their location prior to his admission. At this time will plan for admission to [facility] [address] tentatively Monday 6/17. This CM informed pt and son/POA at bedside of plan. No additional discharge needs at this time. CM to continue to follow.

## 2019-06-14 NOTE — PROGRESS NOTES
Problem: Mobility Impaired (Adult and Pediatric)  Goal: *Acute Goals and Plan of Care (Insert Text)  Description  LTG:  (1.)Mr. Tyler Jacob will move from supine to sit and sit to supine , scoot up and down and roll side to side in bed with SUPERVISION within 7 treatment day(s). (2.)Mr. Tyler Jacob will transfer from bed to chair and chair to bed with STAND BY ASSIST using the least restrictive device within 7 treatment day(s). (3.)Mr. Tyler Jacob will ambulate with STAND BY ASSIST for 250 feet with the least restrictive device within 7 treatment day(s). (4.)Mr. Tyler Jacob will participate in therapeutic activity/exercises x 23 minutes for increased activity tolerance within 7 treatment days. ________________________________________________________________________________________________     Outcome: Progressing Towards Goal     PHYSICAL THERAPY: Daily Note and AM 6/14/2019  INPATIENT: PT Visit Days : 3  [place] / [place] / Product Type: Medicare /       NAME/AGE/GENDER: Yolie Griggs is a 66 y.o. male   PRIMARY DIAGNOSIS: Atrial fibrillation with RVR (HCC) [I48.91]  CAD (coronary artery disease) [I25.10] S/P CABG x 3   S/P CABG x 3    Procedure(s) (LRB):  BRONCHOSCOPY at bedside (N/A)  4 Days Post-Op  ICD-10: Treatment Diagnosis:    · Generalized Muscle Weakness (M62.81)  · Difficulty in walking, Not elsewhere classified (R26.2)   Precaution/Allergies:  Codeine and Iodinated contrast- oral and iv dye      ASSESSMENT:     Mr. Tyler Jacob is a 66 y.o. male in the hospital for the above who was sitting in recliner upon arrival.  Pt reports that he lives in a one story house with his significant other that has 1 step to enter. Pt also reported that PTA he was independent with ADLs and ambulated with independence. 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 Pt admitted to no recent falls in the past year. Mr. Tyler Jacob is agreeable to therapy.   Sit to stand with min assist.  Standing balance fair,  gait training   rolling walker x 150 feet with slow collin and one sitting rest break. Patient is returned to the recliner and after a short rest break is instructed in therapeutic exercises for bilateral LE strengthening. Good session. Patient is making progress as he increased his gait distance this am.   Patient mobility is slow and delayed. Patient is left in the recliner with needs within reach. Patient is quite the talker. Mr. Jennifer Pickens could benefit from skilled PT as he is currently functioning below his baseline. Will continue PT efforts. This section established at most recent assessment   PROBLEM LIST (Impairments causing functional limitations):  1. Decreased Strength  2. Decreased Transfer Abilities  3. Decreased Ambulation Ability/Technique  4. Decreased Balance  5. Increased Pain  6. Decreased Activity Tolerance   INTERVENTIONS PLANNED: (Benefits and precautions of physical therapy have been discussed with the patient.)  1. Balance Exercise  2. Bed Mobility  3. Family Education  4. Gait Training  5. Neuromuscular Re-education/Strengthening  6. Therapeutic Activites  7. Therapeutic Exercise/Strengthening  8. Transfer Training     TREATMENT PLAN: Frequency/Duration: twice daily for duration of hospital stay  Rehabilitation Potential For Stated Goals: Good     REHAB RECOMMENDATIONS (at time of discharge pending progress):    Placement: It is my opinion, based on this patient's performance to date, that Mr. Jennifer Pickens may benefit from participating in 1-2 additional therapy sessions in order to continue to assess for rehab potential and then make recommendation for disposition at discharge.   Equipment:    None at this time              HISTORY:   History of Present Injury/Illness (Reason for Referral):  See H&P  Past Medical History/Comorbidities:   Mr. Jennifer Pickens  has a [facility] Atrial flutter ([facility] [address] (4/7/2017), CAD (coronary artery disease), Cancer ([facility] [address] Ill-defined condition, Sleep apnea, and Stroke ([facility] [address] Mr. Altagracia Jordan  has a past surgical history that includes hx heart catheterization (2006); hx coronary stent placement; and hx prostate surgery. Social History/Living Environment:   Home Environment: Private residence  # Steps to Enter: 1  One/Two Story Residence: One story  Living Alone: No  Support Systems: Spouse/Significant Other/Partner  Patient Expects to be Discharged to[de-identified] Unknown  Current DME Used/Available at Home: None  Tub or Shower Type: Shower  Prior Level of Function/Work/Activity:  Lives with significant other and PTA pt was independent with ADLs and ambulation. Number of Personal Factors/Comorbidities that affect the Plan of Care: 1-2: MODERATE COMPLEXITY   EXAMINATION:   Most Recent Physical Functioning:   Gross Assessment:                  Posture:     Balance:  Sitting: Impaired  Sitting - Static: Fair (occasional)  Sitting - Dynamic: Fair (occasional)  Standing: Impaired  Standing - Static: Fair  Standing - Dynamic : Fair Bed Mobility:     Wheelchair Mobility:     Transfers:  Sit to Stand: Minimum assistance  Stand to Sit: Minimum assistance  Gait:     Distance (ft): 150 Feet (ft)  Assistive Device: Gait belt;Walker, rolling  Ambulation - Level of Assistance: Contact guard assistance      Body Structures Involved:  1. Heart  2. Lungs  3. Bones  4. Muscles Body Functions Affected:  1. Cardio  2. Respiratory  3. Neuromusculoskeletal  4. Movement Related Activities and Participation Affected:  1. General Tasks and Demands  2. Mobility  3. Self Care  4. Domestic Life  5.  Community, Social and [place]   Number of elements that affect the Plan of Care: 4+: HIGH COMPLEXITY   CLINICAL PRESENTATION:   Presentation: Evolving clinical presentation with changing clinical characteristics: MODERATE COMPLEXITY   CLINICAL DECISION MAKIN [facility] Short Form  How much difficulty does the patient currently have. .. Unable A Lot A Little None   1. Turning over in bed (including adjusting bedclothes, sheets and blankets)? ? 1   ? 2   ? 3   ? 4   2. Sitting down on and standing up from a chair with arms ( e.g., wheelchair, bedside commode, etc.)   ? 1   ? 2   ? 3   ? 4   3. Moving from lying on back to sitting on the side of the bed?   ? 1   ? 2   ? 3   ? 4   How much help from another person does the patient currently need. .. Total A Lot A Little None   4. Moving to and from a bed to a chair (including a wheelchair)? ? 1   ? 2   ? 3   ? 4   5. Need to walk in hospital room? ? 1   ? 2   ? 3   ? 4   6. Climbing 3-5 steps with a railing? ? 1   ? 2   ? 3   ? 4   © 2007, Trustees of [facility] [employer], under license to [employer]. All rights reserved      Score:  Initial: 18 Most Recent: X (Date: -- )    Interpretation of Tool:  Represents activities that are increasingly more difficult (i.e. Bed mobility, Transfers, Gait). Medical Necessity:     · Patient demonstrates good  ·  rehab potential due to higher previous functional level. Reason for Services/Other Comments:  · Patient continues to require skilled intervention due to decreased activity tolerance and functional mobility. · .   Use of outcome tool(s) and clinical judgement create a POC that gives a: Questionable prediction of patient's progress: MODERATE COMPLEXITY            TREATMENT:   (In addition to Assessment/Re-Assessment sessions the following treatments were rendered)   Pre-treatment Symptoms/Complaints: \"hello\"  Pain: Initial:   Pain Intensity 1: 0  Post Session:  0/10     Therapeutic Activity: (    14 minutes): Therapeutic activities including Chair transfers, Ambulation on level ground and seated exercises  to improve mobility, strength, balance and coordination.   Required minimal   to promote static and dynamic balance in standing and safe use of RW with transfers/ambulation .     Therapeutic Exercise: ( 10 minutes):  Exercises per grid below to improve mobility, strength, balance and coordination. Required minimum verbal cues to promote proper body breathing techniques. Progressed repetitions and complexity of movement as indicated. Date:  6/14/19 Date:   Date:     ACTIVITY/EXERCISE AM PM AM PM AM PM   Seated LAQ 15        Seated marching 15        Seated AP 15        Seated hip abd/add 15        Shoulder shugs 15        Gluteal sets 15                 B = bilateral; AA = active assistive; A = active; P = passive        Braces/Orthotics/Lines/Etc:   · Wound vac  Treatment/Session Assessment:    · Response to Treatment:  Mobility is slow and delayed  · Interdisciplinary Collaboration:   o Physical Therapy Assistant  o Registered Nurse  · After treatment position/precautions:   o Up in chair  o Bed alarm/tab alert on  o Bed/Chair-wheels locked  o Call light within reach  o RN notified  o Visitors at bedside   · Compliance with Program/Exercises: Will assess as treatment progresses  · Recommendations/Intent for next treatment session: \"Next visit will focus on advancements to more challenging activities and reduction in assistance provided\".   Total Treatment Duration:  PT Patient Time In/Time Out  Time In: 1036  Time Out: [address] [place], PTA

## 2019-06-14 NOTE — PROGRESS NOTES
Problem: Mobility Impaired (Adult and Pediatric)  Goal: *Acute Goals and Plan of Care (Insert Text)  Description  LTG:  (1.)Mr. Gila Henriquez will move from supine to sit and sit to supine , scoot up and down and roll side to side in bed with SUPERVISION within 7 treatment day(s). (2.)Mr. Gila Henriquez will transfer from bed to chair and chair to bed with STAND BY ASSIST using the least restrictive device within 7 treatment day(s). (3.)Mr. Gila Henriquez will ambulate with STAND BY ASSIST for 250 feet with the least restrictive device within 7 treatment day(s). (4.)Mr. Gila Henriquez will participate in therapeutic activity/exercises x 23 minutes for increased activity tolerance within 7 treatment days. ________________________________________________________________________________________________     Outcome: Progressing Towards Goal     PHYSICAL THERAPY: Daily Note and PM 6/14/2019  INPATIENT: PT Visit Days : 3  [place] / Product Type: Medicare /       NAME/AGE/GENDER: Alberto Mcgee is a 66 y.o. male   PRIMARY DIAGNOSIS: Atrial fibrillation with RVR (HCC) [I48.91]  CAD (coronary artery disease) [I25.10] S/P CABG x 3   S/P CABG x 3    Procedure(s) (LRB):  BRONCHOSCOPY at bedside (N/A)  4 Days Post-Op  ICD-10: Treatment Diagnosis:    · Generalized Muscle Weakness (M62.81)  · Difficulty in walking, Not elsewhere classified (R26.2)   Precaution/Allergies:  Codeine and Iodinated contrast- oral and iv dye      ASSESSMENT:     Mr. Gila Henriquez is a 66 y.o. male in the hospital for the above who was sitting in recliner upon arrival.  Pt reports that he lives in a one story house with his significant other that has 1 step to enter. Pt also reported that PTA he was independent with ADLs and ambulated with independence. 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 Pt admitted to no recent falls in the past year. Mr. Gila Henriquez is agreeable to therapy.   Sit to stand with min assist.  Standing balance fair,  gait training   rolling walker x 150 feet with slow collin and one sitting rest break. Patient is returned to the recliner and after a short rest break is instructed in therapeutic exercises for bilateral LE strengthening. Good session. Patient is making progress as he increased his gait distance this am.   Patient mobility is slow and delayed. Patient is left in the recliner with needs within reach. Patient is quite the talker. Mr. Leo lBank could benefit from skilled PT as he is currently functioning below his baseline. Will continue PT efforts. PM note:  Patient is sitting in the recliner. Proud that he had a BM. Friend present. Patient requires assistance for sit to stand. Gait training continued with the patient maintaining am gait distance. Patient is returned to the room to supine in bed. Patient required a little assistance with bed mobility. Positioned for comfort with alarm intact. Making progress with goals. Will continue PT efforts. This section established at most recent assessment   PROBLEM LIST (Impairments causing functional limitations):  1. Decreased Strength  2. Decreased Transfer Abilities  3. Decreased Ambulation Ability/Technique  4. Decreased Balance  5. Increased Pain  6. Decreased Activity Tolerance   INTERVENTIONS PLANNED: (Benefits and precautions of physical therapy have been discussed with the patient.)  1. Balance Exercise  2. Bed Mobility  3. Family Education  4. Gait Training  5. Neuromuscular Re-education/Strengthening  6. Therapeutic Activites  7. Therapeutic Exercise/Strengthening  8. Transfer Training     TREATMENT PLAN: Frequency/Duration: twice daily for duration of hospital stay  Rehabilitation Potential For Stated Goals: Good     REHAB RECOMMENDATIONS (at time of discharge pending progress):    Placement:   It is my opinion, based on this patient's performance to date, that Mr. Leo Blank may benefit from participating in 1-2 additional therapy sessions in order to continue to assess for rehab potential and then make recommendation for disposition at discharge. Equipment:    None at this time              HISTORY:   History of Present Injury/Illness (Reason for Referral):  See H&P  Past Medical History/Comorbidities:   Mr. Leo Blank  has a past medical history of Atrial fibrillation [facility]), Atrial flutter ([facility] [address] (4/7/2017), CAD (coronary artery disease), Cancer ([facility] [address] Ill-defined condition, Sleep apnea, and [facility] Mr. Leo Blank  has a past surgical history that includes hx heart catheterization (08/14/2006); hx coronary stent placement; and hx prostate surgery. Social History/Living Environment:   Home Environment: Private residence  # Steps to Enter: 1  One/Two Story Residence: One story  Living Alone: No  Support Systems: Spouse/Significant Other/Partner  Patient Expects to be Discharged to[de-identified] Unknown  Current DME Used/Available at Home: None  Tub or Shower Type: Shower  Prior Level of Function/Work/Activity:  Lives with significant other and PTA pt was independent with ADLs and ambulation.        Number of Personal Factors/Comorbidities that affect the Plan of Care: 1-2: MODERATE COMPLEXITY   EXAMINATION:   Most Recent Physical Functioning:   Gross Assessment:                  Posture:     Balance:  Sitting: Impaired  Sitting - Static: Fair (occasional)  Sitting - Dynamic: Fair (occasional)  Standing: Impaired  Standing - Static: Fair  Standing - Dynamic : Fair Bed Mobility:  Rolling: Minimum assistance  Sit to Supine: Minimum assistance  Scooting: Minimum assistance  Wheelchair Mobility:     Transfers:  Sit to Stand: Minimum assistance  Stand to Sit: Minimum assistance  Gait:     Distance (ft): 150 Feet (ft)  Assistive Device: Gait belt;Walker, rolling  Ambulation - Level of Assistance: Contact guard assistance      Body Structures Involved:  1. Heart  2. Lungs  3. Bones  4. Muscles Body Functions Affected:  1. Cardio  2. Respiratory  3. Neuromusculoskeletal  4. Movement Related Activities and Participation Affected:  1. General Tasks and Demands  2. Mobility  3. Self Care  4. Domestic Life  5. Community, Social and [place]   Number of elements that affect the Plan of Care: 4+: HIGH COMPLEXITY   CLINICAL PRESENTATION:   Presentation: Evolving clinical presentation with changing clinical characteristics: MODERATE COMPLEXITY   CLINICAL DECISION MAKIN [facility] Mobility Inpatient Short Form  How much difficulty does the patient currently have. .. Unable A Lot A Little None   1. Turning over in bed (including adjusting bedclothes, sheets and blankets)? ? 1   ? 2   ? 3   ? 4   2. Sitting down on and standing up from a chair with arms ( e.g., wheelchair, bedside commode, etc.)   ? 1   ? 2   ? 3   ? 4   3. Moving from lying on back to sitting on the side of the bed?   ? 1   ? 2   ? 3   ? 4   How much help from another person does the patient currently need. .. Total A Lot A Little None   4. Moving to and from a bed to a chair (including a wheelchair)? ? 1   ? 2   ? 3   ? 4   5. Need to walk in hospital room? ? 1   ? 2   ? 3   ? 4   6. Climbing 3-5 steps with a railing? ? 1   ? 2   ? 3   ? 4   © , Trustees of [address] [place] [address] [address] [id number], under license to [employer]. All rights reserved      Score:  Initial: 18 Most Recent: X (Date: -- )    Interpretation of Tool:  Represents activities that are increasingly more difficult (i.e. Bed mobility, Transfers, Gait). Medical Necessity:     · Patient demonstrates good  ·  rehab potential due to higher previous functional level. Reason for Services/Other Comments:  · Patient continues to require skilled intervention due to decreased activity tolerance and functional mobility.    · .   Use of outcome tool(s) and clinical judgement create a POC that gives a: Questionable prediction of patient's progress: MODERATE COMPLEXITY            TREATMENT:   (In addition to Assessment/Re-Assessment sessions the following treatments were rendered)   Pre-treatment Symptoms/Complaints: \"Again\"  Pain: Initial:   Pain Intensity 1: 0  Post Session:  0/10     Therapeutic Activity: (    14 minutes): Therapeutic activities including Chair transfers, Ambulation on level ground and seated exercises  to improve mobility, strength, balance and coordination. Required minimal   to promote static and dynamic balance in standing and safe use of RW with transfers/ambulation . Therapeutic Exercise: (  minutes):  Exercises per grid below to improve mobility, strength, balance and coordination. Required minimum verbal cues to promote proper body breathing techniques. Progressed repetitions and complexity of movement as indicated. Date:  6/14/19 Date:   Date:     ACTIVITY/EXERCISE AM PM AM PM AM PM   Seated LAQ 15        Seated marching 15        Seated AP 15        Seated hip abd/add 15        Shoulder shugs 15        Gluteal sets 15                 B = bilateral; AA = active assistive; A = active; P = passive        Braces/Orthotics/Lines/Etc:   · Wound vac  Treatment/Session Assessment:    · Response to Treatment:  Mobility is slow and delayed  · Interdisciplinary Collaboration:   o Physical Therapy Assistant  o Registered Nurse  · After treatment position/precautions:   o Supine in bed  o Bed alarm/tab alert on  o Bed/Chair-wheels locked  o Bed in low position  o Call light within reach  o RN notified  o Visitors at bedside   · Compliance with Program/Exercises: Will assess as treatment progresses  · Recommendations/Intent for next treatment session: \"Next visit will focus on advancements to more challenging activities and reduction in assistance provided\".   Total Treatment Duration:  PT Patient Time In/Time Out  Time In: 1400  Time Out: 1416    Madonna Graham, PTA

## 2019-06-14 NOTE — PROGRESS NOTES
William Temple  Admission Date: 6/6/2019             Daily Progress Note: 6/14/2019    The patient's chart is reviewed and the patient is discussed with the staff. 66 y.o.  male with a history of TIA, trigeminal neuralgia, HTN, HL, a.fib on Eliquis / ASA, chronic sCHF, and CAD. [facility] is followed by Cardiology as an outpatient and was seen for worsening dyspnea with recommendation for LHC / MARCK/eCV but this was never scheduled. [facility] presented to the ER with c/o R sided facial/jaw pain and substernal chest pain.  In the ER he was found to be in a.fib with RVR with HR in the 130s and troponin of 0.14 >>>21. 27.  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 [facility] was started on a heparin gtt (last dose of eliquis 6/6) in anticipation of surgical intervention. [facility] was given lasix for volume overload with 2 liters urine output yesterday (net -265 ml).     Patient has a remote history of tobacco abuse  - smoked 1.5 ppd x 14 years before quitting 40 years ago. [facility] has never been dx with lung disease, does not require inhalers or home O2.  Patient states that he has had some hemoptysis over the last couple of months but it is usually pink, frothy sputum and not dark red blood as he is currently having.  He usually stops his ASA and it resolves. [place] he had several episodes where he coughed up a moderate amount of dark red blood.  He has not had any so far today.  Surgery is scheduled for tomorrow and we have been consulted for airway inspection for surgical clearance.  He had diagnostic bronch yesterday with no bleeding or lesions seen      Subjective:   Doing ok- off O2, left leg wound vac- 1085 out yesterday, in a fib    Current Facility-Administered Medications   Medication Dose Route Frequency    apixaban (ELIQUIS) tablet 5 mg  5 mg Oral BID    albuterol (PROVENTIL VENTOLIN) nebulizer solution 2.5 mg  2.5 mg Nebulization TID RT    budesonide (PULMICORT) 500 mcg/2 ml nebulizer suspension  500 mcg Nebulization BID RT    tiotropium (SPIRIVA) inhalation capsule 18 mcg  1 Cap Inhalation DAILY    furosemide (LASIX) injection 40 mg  40 mg IntraVENous DAILY    metoprolol succinate (TOPROL-XL) XL tablet 25 mg  25 mg Oral DAILY    losartan (COZAAR) tablet 25 mg  25 mg Oral DAILY    bisacodyl (DULCOLAX) tablet 10 mg  10 mg Oral DAILY PRN    sodium chloride (NS) flush 5-40 mL  5-40 mL IntraVENous Q8H    sodium chloride (NS) flush 5-40 mL  5-40 mL IntraVENous PRN    alum-mag hydroxide-simeth (MYLANTA) oral suspension 30 mL  30 mL Oral Q4H PRN    famotidine (PEPCID) tablet 20 mg  20 mg Oral BID    acetaminophen (TYLENOL) tablet 650 mg  650 mg Oral Q4H PRN    zolpidem (AMBIEN) tablet 5 mg  5 mg Oral QHS PRN    mupirocin (BACTROBAN) 2 % ointment   Both Nostrils BID    aspirin delayed-release tablet 81 mg  81 mg Oral DAILY    magnesium oxide (MAG-OX) tablet 400 mg  400 mg Oral TID PRN    magnesium oxide (MAG-OX) tablet 400 mg  400 mg Oral QID PRN    potassium chloride (KLOR-CON) tablet 10 mEq  10 mEq Oral DAILY    potassium chloride (K-DUR, KLOR-CON) SR tablet 20 mEq  20 mEq Oral BID PRN    potassium chloride (K-DUR, KLOR-CON) SR tablet 40 mEq  40 mEq Oral BID PRN    lip protectant (BLISTEX) ointment 1 Each  1 Each Topical PRN    senna-docusate (PERICOLACE) 8.6-50 mg per tablet 2 Tab  2 Tab Oral BID    oxyCODONE-acetaminophen (PERCOCET) 5-325 mg per tablet 1 Tab  1 Tab Oral Q4H PRN    amiodarone (CORDARONE) tablet 400 mg  400 mg Oral Q12H    rosuvastatin (CRESTOR) tablet 40 mg  40 mg Oral QHS    carBAMazepine (TEGretol) tablet 200 mg  200 mg Oral BID    fenofibrate (LOFIBRA) tablet 160 mg  160 mg Oral DAILY    gabapentin (NEURONTIN) capsule 400 mg  400 mg Oral BID       Review of Systems    Constitutional: negative for fever, chills, sweats  Cardiovascular: negative for chest pain, palpitations, syncope, edema  Gastrointestinal:  negative for dysphagia, reflux, vomiting, diarrhea, abdominal pain, or melena  Neurologic:  negative for focal weakness, numbness, headache    Objective:     Vitals:    06/13/19 2300 06/14/19 0338 06/14/19 0743 06/14/19 0800   BP: 153/73 140/66  164/80   Pulse: 97 80  (!) 105   Resp: 18 18  20   Temp: 98.6 °F (37 °C) 99.4 °F (37.4 °C)  98.7 °F (37.1 °C)   SpO2: 98% 97% 95% 92%   Weight:  206 lb 3.2 oz (93.5 kg)     Height:         Intake and Output:   06/12 1901 - 06/14 0700  In: 460 [P.O.:460]  Out: 1485 [PTBFX:0817; Drains:10]  No intake/output data recorded. Physical Exam:   Constitution:  the patient is well developed and in no acute distress  EENMT:  Sclera clear, pupils equal, oral mucosa moist  Respiratory: clear  Cardiovascular:  RRR without M,G,R  Gastrointestinal: soft and non-tender; with positive bowel sounds. Musculoskeletal: warm without cyanosis. There is no lower extremity edema. Skin:  no jaundice or rashes, sternal and leg wounds   Neurologic: no gross neuro deficits     Psychiatric:  alert and oriented x 3    CXR:  Elevated left hemidiaphragm      LAB  Recent Labs     06/12/19  1553 06/12/19  1157 06/12/19  0626 06/11/19  2143 06/11/19  1617   GLUCPOC 154* 136* 138* 169* 156*      Recent Labs     06/12/19  0441   WBC 13.8*   HGB 9.5*   HCT 28.4*   *     Recent Labs     06/14/19  0328 06/13/19  0320 06/12/19  0441   *  --   --    K 4.0 4.1 4.2   CL 97*  --   --    CO2 27  --   --    *  --   --    BUN 27*  --   --    CREA 1.22  --   --    MG  --  2.6* 2.9*   CA 8.0*  --   --      No results for input(s): PH, PCO2, PO2, HCO3, PHI, PCO2I, PO2I, HCO3I in the last 72 hours. No results for input(s): LCAD, LAC in the last 72 hours.       Assessment:  (Medical Decision Making)     Hospital Problems  Date Reviewed: 6/12/2019          Codes Class Noted POA    History of smoking 25-50 pack years ICD-10-CM: Z87.891  ICD-9-CM: V15.82 6/13/2019 Unknown        CAD (coronary artery disease) ICD-10-CM: I25.10  ICD-9-CM: 414.00  6/10/2019 Unknown        * (Principal) S/P CABG x 3 ICD-10-CM: Z95.1  ICD-9-CM: V45.81  6/10/2019 Unknown        Tracheobronchomalacia ICD-10-CM: J39.8  ICD-9-CM: 519.19  6/10/2019 Unknown        Hemoptysis ICD-10-CM: R04.2  ICD-9-CM: 786.30  6/9/2019 Unknown    better    Trigeminal neuralgia (Chronic) ICD-10-CM: G50.0  ICD-9-CM: 350.1  6/9/2019 Yes        [facility] RVR [facility] ICD-10-CM: I48.91  ICD-9-CM: 427.31  6/6/2019 Yes        NSTEMI (non-ST elevated myocardial infarction) ([facility] ICD-10-CM: I21.4  ICD-9-CM: 410.70  6/6/2019 Yes        Chronic systolic congestive heart failure (HCC) (Chronic) ICD-10-CM: I50.22  ICD-9-CM: 428.22, 428.0  1/31/2019 Yes    Overview Addendum 1/31/2019 11:22 AM by Ellen Justice MD     2014:  EF 55%  07/2018:  EF 45-50%  01/2019:  EF 40-45%         High bnp    Dyslipidemia (Chronic) ICD-10-CM: E78.5  ICD-9-CM: 272.4  4/7/2017 Yes        Hypertension (Chronic) ICD-10-CM: I10  ICD-9-CM: 401.9  4/7/2017 Yes        Coronary atherosclerosis of native coronary vessel ICD-10-CM: I25.10  ICD-9-CM: 414.01  4/7/2017 Yes    Overview Addendum 1/31/2019 11:15 AM by Ellen Justice MD     2006:  PCI RCA - Liberte, PCI LAD Cypher, PCI LCx Cypher  01/2014:  Stress testing with fixed inferior defect - no ischemia  11/2014:  EF with EF 50-55% and inferior HK   07/2018:  EF 45-50%  01/2019:  EF 40-45%               Atrial fibrillation ([facility] ICD-10-CM: I48.91  ICD-9-CM: 427.31  4/7/2017 Yes              Plan:  (Medical Decision Making)   1   IS  2   Lasix  3   To rehab next week?  --    More than 50% of the time documented was spent in face-to-face contact with the patient and in the care of the patient on the floor/unit where the patient is located.     Karey Delacruz MD

## 2019-06-14 NOTE — PROGRESS NOTES
Problem: Falls - Risk of  Goal: *Absence of Falls  Description  Document Melissa Zaidi Fall Risk and appropriate interventions in the flowsheet. Outcome: Progressing Towards Goal     Problem: Pressure Injury - Risk of  Goal: *Prevention of pressure injury  Description  Document Elie Scale and appropriate interventions in the flowsheet.   Outcome: Progressing Towards Goal

## 2019-06-14 NOTE — PROGRESS NOTES
Bedside shift change report given to Anaid Pink and Audrey Lopes RN's (oncoming nurse) by MAXX Coleman (offgoing nurse). Report included the following information SBAR, Kardex, Intake/Output, MAR and Recent Results.

## 2019-06-15 ENCOUNTER — APPOINTMENT (OUTPATIENT)
Dept: GENERAL RADIOLOGY | Age: 74
DRG: 229 | End: 2019-06-15
Attending: NURSE PRACTITIONER
Payer: MEDICARE

## 2019-06-15 PROCEDURE — 65660000004 HC RM CVT STEPDOWN

## 2019-06-15 PROCEDURE — 71046 X-RAY EXAM CHEST 2 VIEWS: CPT

## 2019-06-15 PROCEDURE — 74011250637 HC RX REV CODE- 250/637: Performed by: INTERNAL MEDICINE

## 2019-06-15 PROCEDURE — 74011000250 HC RX REV CODE- 250: Performed by: INTERNAL MEDICINE

## 2019-06-15 PROCEDURE — 74011250637 HC RX REV CODE- 250/637: Performed by: THORACIC SURGERY (CARDIOTHORACIC VASCULAR SURGERY)

## 2019-06-15 PROCEDURE — 99232 SBSQ HOSP IP/OBS MODERATE 35: CPT | Performed by: INTERNAL MEDICINE

## 2019-06-15 PROCEDURE — 97110 THERAPEUTIC EXERCISES: CPT

## 2019-06-15 PROCEDURE — 74011250637 HC RX REV CODE- 250/637: Performed by: PHYSICIAN ASSISTANT

## 2019-06-15 PROCEDURE — 94760 N-INVAS EAR/PLS OXIMETRY 1: CPT

## 2019-06-15 PROCEDURE — 97530 THERAPEUTIC ACTIVITIES: CPT

## 2019-06-15 PROCEDURE — 74750000023 HC WOUND THERAPY

## 2019-06-15 PROCEDURE — 94640 AIRWAY INHALATION TREATMENT: CPT

## 2019-06-15 RX ORDER — METOPROLOL SUCCINATE 50 MG/1
100 TABLET, EXTENDED RELEASE ORAL DAILY
Status: DISCONTINUED | OUTPATIENT
Start: 2019-06-15 | End: 2019-06-16

## 2019-06-15 RX ADMIN — ALBUTEROL SULFATE 2.5 MG: 2.5 SOLUTION RESPIRATORY (INHALATION) at 19:49

## 2019-06-15 RX ADMIN — AMIODARONE HYDROCHLORIDE 400 MG: 200 TABLET ORAL at 20:56

## 2019-06-15 RX ADMIN — LOSARTAN POTASSIUM 25 MG: 25 TABLET, FILM COATED ORAL at 08:43

## 2019-06-15 RX ADMIN — BUDESONIDE 500 MCG: 0.5 INHALANT RESPIRATORY (INHALATION) at 07:39

## 2019-06-15 RX ADMIN — OXYCODONE AND ACETAMINOPHEN 1 TABLET: 5; 325 TABLET ORAL at 15:15

## 2019-06-15 RX ADMIN — MUPIROCIN: 20 OINTMENT TOPICAL at 08:44

## 2019-06-15 RX ADMIN — OXYCODONE AND ACETAMINOPHEN 1 TABLET: 5; 325 TABLET ORAL at 08:50

## 2019-06-15 RX ADMIN — ALBUTEROL SULFATE 2.5 MG: 2.5 SOLUTION RESPIRATORY (INHALATION) at 13:34

## 2019-06-15 RX ADMIN — TIOTROPIUM BROMIDE 18 MCG: 18 CAPSULE ORAL; RESPIRATORY (INHALATION) at 07:39

## 2019-06-15 RX ADMIN — ASPIRIN 81 MG: 81 TABLET, COATED ORAL at 08:43

## 2019-06-15 RX ADMIN — AMIODARONE HYDROCHLORIDE 400 MG: 200 TABLET ORAL at 08:43

## 2019-06-15 RX ADMIN — GABAPENTIN 400 MG: 100 CAPSULE ORAL at 17:04

## 2019-06-15 RX ADMIN — FAMOTIDINE 20 MG: 20 TABLET ORAL at 08:43

## 2019-06-15 RX ADMIN — METOPROLOL SUCCINATE 100 MG: 50 TABLET, EXTENDED RELEASE ORAL at 08:42

## 2019-06-15 RX ADMIN — Medication 10 ML: at 15:25

## 2019-06-15 RX ADMIN — BUDESONIDE 500 MCG: 0.5 INHALANT RESPIRATORY (INHALATION) at 19:49

## 2019-06-15 RX ADMIN — OXYCODONE AND ACETAMINOPHEN 1 TABLET: 5; 325 TABLET ORAL at 20:55

## 2019-06-15 RX ADMIN — FAMOTIDINE 20 MG: 20 TABLET ORAL at 17:04

## 2019-06-15 RX ADMIN — APIXABAN 5 MG: 5 TABLET, FILM COATED ORAL at 08:43

## 2019-06-15 RX ADMIN — CARBAMAZEPINE 200 MG: 200 TABLET ORAL at 08:43

## 2019-06-15 RX ADMIN — Medication 10 ML: at 20:57

## 2019-06-15 RX ADMIN — APIXABAN 5 MG: 5 TABLET, FILM COATED ORAL at 17:04

## 2019-06-15 RX ADMIN — ALBUTEROL SULFATE 2.5 MG: 2.5 SOLUTION RESPIRATORY (INHALATION) at 07:39

## 2019-06-15 RX ADMIN — CARBAMAZEPINE 200 MG: 200 TABLET ORAL at 17:04

## 2019-06-15 RX ADMIN — GABAPENTIN 400 MG: 100 CAPSULE ORAL at 08:42

## 2019-06-15 RX ADMIN — Medication 5 ML: at 05:43

## 2019-06-15 RX ADMIN — ROSUVASTATIN CALCIUM 40 MG: 20 TABLET, FILM COATED ORAL at 20:56

## 2019-06-15 NOTE — PROGRESS NOTES
[facility] CARDIOLOGY PROGRESS NOTE           6/15/2019 10:23 AM    Admit Date: 6/6/2019         Subjective: . Denies CP or SOB. Af with rvr today    ROS:  Cardiovascular:  As noted above    Objective:      Vitals:    06/14/19 2300 06/15/19 0300 06/15/19 0734 06/15/19 0740   BP: 130/58 127/57 137/81    Pulse: 80 100 100    Resp: 18 19 18    Temp: 97.3 °F (36.3 °C) 98.2 °F (36.8 °C) 98.1 °F (36.7 °C)    SpO2: 92% 97% 97% 97%   Weight:  94.8 kg (209 lb)     Height:           On telemetry: afib      Physical Exam:  General: Well Developed, Well Nourished, No Acute Distress, Alert & Oriented x 3, Appropriate mood  Neck: supple, no JVD  Heart: irreg irreg  Lungs: Clear throughout auscultation bilaterally without adventitious sounds  Abd: soft, nontender, nondistended, with good bowel sounds  Ext: no edema bilaterally  Skin: warm and dry      Data Review:   Recent Labs     06/14/19  0328 06/13/19  0320   *  --    K 4.0 4.1   MG  --  2.6*   BUN 27*  --    CREA 1.22  --    *  --        No results for input(s): TNIPOC, TROIQ in the last 72 hours.         Assessment/Plan:     Principal Problem:    S/P CABG x 3 (6/10/2019)    Active Problems:    Dyslipidemia (4/7/2017)      Hypertension (4/7/2017)      Coronary atherosclerosis of native coronary vessel (4/7/2017)      Overview: 2006:  PCI RCA - Liberte, PCI LAD Cypher, PCI LCx Cypher      01/2014:  Stress testing with fixed inferior defect - no ischemia      11/2014:  EF with EF 50-55% and inferior HK       07/2018:  EF 45-50%      01/2019:  EF 40-45%            Atrial fibrillation ([facility] (4/7/2017)      Chronic systolic congestive heart failure ([facility] (1/31/2019)      Overview: 2014:  EF 55%      07/2018:  EF 45-50%      01/2019:  EF 40-45%      Atrial fibrillation with RVR ([address] (6/6/2019)      NSTEM[facility] (non-ST elevated [facility]) [facility] (6/6/2019)      Hemoptysis (6/9/2019)      Trigeminal neuralgia (6/9/2019)      CAD (coronary artery disease) (6/10/2019)      Tracheobronchomalacia (6/10/2019)      History of smoking 25-50 pack years (6/13/2019)      Elevated brain natriuretic peptide (BNP) level (6/14/2019)    A/P  1) CAD - asa/statin  2) Afib - amiodarone/eliquis post maze and MICHEAL clip  3) LVEF - low normal with diastolic dysfunction  4) HTN - controlled on BB and ACE      Umberto Naik MD  6/15/2019 10:23 AM

## 2019-06-15 NOTE — PROGRESS NOTES
Subjective:   No complaint    Objective:     Patient Vitals for the past 8 hrs:   BP Temp Pulse Resp SpO2   06/15/19 0740     97 %   06/15/19 0734 137/81 98.1 °F (36.7 °C) 100 18 97 %     Temp (24hrs), Av.6 °F (36.4 °C), Min:96.8 °F (36 °C), Max:98.2 °F (36.8 °C)        Heart:  regular rate and rhythm, S1, S2 normal, no murmur, click, rub or gallop  Lung:  clear to auscultation bilaterally   Incisions: Clean, dry, and intact    Labs:No results found for this or any previous visit (from the past 24 hour(s)).     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)    Active Problems:    Dyslipidemia (2017)      Hypertension (2017)      Coronary atherosclerosis of native coronary vessel (2017)      Overview: 2006:  PCI RCA - Liberte, PCI LAD Cypher, PCI LCx Cypher      2014:  Stress testing with fixed inferior defect - no ischemia      2014:  EF with EF 50-55% and inferior HK       2018:  EF 45-50%      2019:  EF 40-45%            Atrial fibrillation ([address] (2017)      Chronic systolic congestive heart failure ([address] (2019)      Overview: :  EF 55%      2018:  EF 45-50%      2019:  EF 40-45%      Atrial fibrillation with RVR ([address] (2019)      NSTEMI (non-ST elevated myocardial infarction) ([address] (2019)      Hemoptysis (2019)      Trigeminal neuralgia (2019)      CAD (coronary artery disease) (6/10/2019)      Tracheobronchomalacia (6/10/2019)      History of smoking 25-50 pack years (2019)      Elevated brain natriuretic peptide (BNP) level (2019)        Plan/Recommendations/Medical Decision Making:     Stable, ambulate, rehab Monday    See orders

## 2019-06-15 NOTE — PROGRESS NOTES
Мария Sharpe  Admission Date: 6/6/2019             Daily Progress Note: 6/15/2019    The patient's chart is reviewed and the patient is discussed with the staff. 66 y.o.  male with a history of TIA, trigeminal neuralgia, HTN, HL, a.fib on Eliquis / ASA, chronic sCHF, and CAD. [facility] is followed by Cardiology as an outpatient and was seen for worsening dyspnea with recommendation for LHC / MARCK/eCV but this was never scheduled. [facility] presented to the ER with c/o R sided facial/jaw pain and substernal chest pain.  In the ER he was found to be in a.fib with RVR with HR in the 130s and troponin of 0.14 >>>21. 27.  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 [facility] was started on a heparin gtt (last dose of eliquis 6/6) in anticipation of surgical intervention. [facility] was given lasix for volume overload with 2 liters urine output yesterday (net -265 ml).     Patient has a remote history of tobacco abuse  - smoked 1.5 ppd x 14 years before quitting 40 years ago. [facility] has never been dx with lung disease, does not require inhalers or home O2.  Patient states that he has had some hemoptysis over the last couple of months but it is usually pink, frothy sputum and not dark red blood as he is currently having.  He usually stops his ASA and it resolves. [place] he had several episodes where he coughed up a moderate amount of dark red blood.  He has not had any so far today.  Surgery is scheduled for tomorrow and we have been consulted for airway inspection for surgical clearance.  He had diagnostic bronch yesterday with no bleeding or lesions seen      Subjective:   Up in chair, off oxygen, reports productive cough, \"dark old blood\". Demonstrated [id number] with incentive spirometry.   Reports chest soreness with cough    Current Facility-Administered Medications   Medication Dose Route Frequency    metoprolol succinate (TOPROL-XL) XL tablet 50 mg  50 mg Oral DAILY    ondansetron (ZOFRAN) injection 4 mg  4 mg IntraVENous Q4H PRN    senna-docusate (PERICOLACE) 8.6-50 mg per tablet 2 Tab  2 Tab Oral BID PRN    apixaban (ELIQUIS) tablet 5 mg  5 mg Oral BID    albuterol (PROVENTIL VENTOLIN) nebulizer solution 2.5 mg  2.5 mg Nebulization TID RT    budesonide (PULMICORT) 500 mcg/2 ml nebulizer suspension  500 mcg Nebulization BID RT    tiotropium (SPIRIVA) inhalation capsule 18 mcg  1 Cap Inhalation DAILY    losartan (COZAAR) tablet 25 mg  25 mg Oral DAILY    bisacodyl (DULCOLAX) tablet 10 mg  10 mg Oral DAILY PRN    sodium chloride (NS) flush 5-40 mL  5-40 mL IntraVENous Q8H    sodium chloride (NS) flush 5-40 mL  5-40 mL IntraVENous PRN    alum-mag hydroxide-simeth (MYLANTA) oral suspension 30 mL  30 mL Oral Q4H PRN    famotidine (PEPCID) tablet 20 mg  20 mg Oral BID    acetaminophen (TYLENOL) tablet 650 mg  650 mg Oral Q4H PRN    zolpidem (AMBIEN) tablet 5 mg  5 mg Oral QHS PRN    mupirocin (BACTROBAN) 2 % ointment   Both Nostrils BID    aspirin delayed-release tablet 81 mg  81 mg Oral DAILY    magnesium oxide (MAG-OX) tablet 400 mg  400 mg Oral TID PRN    magnesium oxide (MAG-OX) tablet 400 mg  400 mg Oral QID PRN    potassium chloride (K-DUR, KLOR-CON) SR tablet 20 mEq  20 mEq Oral BID PRN    potassium chloride (K-DUR, KLOR-CON) SR tablet 40 mEq  40 mEq Oral BID PRN    lip protectant (BLISTEX) ointment 1 Each  1 Each Topical PRN    oxyCODONE-acetaminophen (PERCOCET) 5-325 mg per tablet 1 Tab  1 Tab Oral Q4H PRN    amiodarone (CORDARONE) tablet 400 mg  400 mg Oral Q12H    rosuvastatin (CRESTOR) tablet 40 mg  40 mg Oral QHS    carBAMazepine (TEGretol) tablet 200 mg  200 mg Oral BID    fenofibrate (LOFIBRA) tablet 160 mg  160 mg Oral DAILY    gabapentin (NEURONTIN) capsule 400 mg  400 mg Oral BID       Review of Systems    Constitutional: negative for fever, chills, sweats  Cardiovascular: negative for chest pain, palpitations, syncope, edema  Gastrointestinal:  negative for dysphagia, reflux, vomiting, diarrhea, abdominal pain, or melena  Neurologic:  negative for focal weakness, numbness, headache    Objective:     Vitals:    06/14/19 2300 06/15/19 0300 06/15/19 0734 06/15/19 0740   BP: 130/58 127/57 137/81    Pulse: 80 100 100    Resp: 18 19 18    Temp: 97.3 °F (36.3 °C) 98.2 °F (36.8 °C) 98.1 °F (36.7 °C)    SpO2: 92% 97% 97% 97%   Weight:  209 lb (94.8 kg)     Height:         Intake and Output:   06/13 1901 - 06/15 0700  In: 720 [P.O.:720]  Out: 2100 [Urine:2100]  No intake/output data recorded. Physical Exam:   Constitution:  the patient is well developed and in no acute distress  EENMT:  Sclera clear, pupils equal, oral mucosa moist  Respiratory: clear  Cardiovascular:  Irregular rate and rhythm without M,G,R  Gastrointestinal: soft and non-tender; with positive bowel sounds. Musculoskeletal: warm without cyanosis. Skin:  no jaundice or rashes, sternal and leg wounds, left leg wound vac   Neurologic: no gross neuro deficits     Psychiatric:  alert and oriented x 3    CXR:  Elevated left hemidiaphragm      6/12/19 chest xray- IMPRESSION:   1. Improved right basilar airspace opacities and slightly worsened left basilar  airspace opacities, pneumonia cannot be excluded. 2. Interval removal of a pulmonary arterial catheter. A metallic device overlies  the epigastric region which appears to be epicardial pacing wires. LAB  Recent Labs     06/12/19  1553 06/12/19  1157   GLUCPOC 154* 136*      No results for input(s): WBC, HGB, HCT, PLT, INR, HGBEXT, HCTEXT, PLTEXT, HGBEXT, HCTEXT, PLTEXT in the last 72 hours.     No lab exists for component: Alba Medina  Recent Labs     06/14/19  0328 06/13/19  0320   *  --    K 4.0 4.1   CL 97*  --    CO2 27  --    *  --    BUN 27*  --    CREA 1.22  --    MG  --  2.6*   CA 8.0*  --      No results for input(s): PH, PCO2, PO2, HCO3, PHI, PCO2I, PO2I, HCO3I in the last 72 hours. No results for input(s): LCAD, LAC in the last 72 hours. Assessment:  (Medical Decision Making)     Hospital Problems  Date Reviewed: 6/12/2019          Codes Class Noted POA    History of smoking 25-50 pack years ICD-10-CM: Z87.891  ICD-9-CM: V15.82  6/13/2019 Unknown        CAD (coronary artery disease) ICD-10-CM: I25.10  ICD-9-CM: 414.00  6/10/2019 Unknown        * (Principal) S/P CABG x 3 ICD-10-CM: Z95.1  ICD-9-CM: V45.81  6/10/2019 Unknown        Tracheobronchomalacia ICD-10-CM: J39.8  ICD-9-CM: 519.19  6/10/2019 Unknown        Hemoptysis ICD-10-CM: R04.2  ICD-9-CM: 786.30  6/9/2019 Unknown    better    Trigeminal neuralgia (Chronic) ICD-10-CM: G50.0  ICD-9-CM: 350.1  6/9/2019 Yes        Atrial fibrillation with RVR ([facility] ICD-10-CM: I48.91  ICD-9-CM: 427.31  6/6/2019 Yes        NSTEMI (non-ST elevated myocardial infarction) ([facility] ICD-10-CM: I21.4  ICD-9-CM: 410.70  6/6/2019 Yes        Chronic systolic congestive heart failure (HCC) (Chronic) ICD-10-CM: I50.22  ICD-9-CM: 428.22, 428.0  1/31/2019 Yes    Overview Addendum 1/31/2019 11:22 AM by Pretty Gonzales MD     2014:  EF 55%  07/2018:  EF 45-50%  01/2019:  EF 40-45%         High bnp    Dyslipidemia (Chronic) ICD-10-CM: E78.5  ICD-9-CM: 272.4  4/7/2017 Yes        Hypertension (Chronic) ICD-10-CM: I10  ICD-9-CM: 401.9  4/7/2017 Yes        Coronary atherosclerosis of native coronary vessel ICD-10-CM: I25.10  ICD-9-CM: 414.01  4/7/2017 Yes    Overview Addendum 1/31/2019 11:15 AM by Pretty Gonzales MD     2006:  PCI RCA - Liberte, PCI LAD Cypher, PCI LCx Cypher  01/2014:  Stress testing with fixed inferior defect - no ischemia  11/2014:  EF with EF 50-55% and inferior HK   07/2018:  EF 45-50%  01/2019:  EF 40-45%               Atrial fibrillation ([address] ICD-10-CM: I48.91  ICD-9-CM: 427.31  4/7/2017 Yes              Plan:  (Medical Decision Making)   1. Chest x-ray PA and lateral- reevaluate bilateral opacities  2. Receiving Albuterol, Pulmicort and spiriva  3. ? [place] Monday- awaiting wound vac at facility          More than 50% of the time documented was spent in face-to-face contact with the patient and in the care of the patient on the floor/unit where the patient is located. Gabriel Manriquez NP      Lungs:  clear  Heart:  RRR with no Murmur/Rubs/Gallops    Additional Comments:  [place] on Monday, CXR pending- ? May need lasix with elevated BNP, +4 L I/o and opacities on CXR    I have spoken with and examined the patient. I agree with the above assessment and plan as documented.     Little Colón MD

## 2019-06-15 NOTE — PROGRESS NOTES
Problem: Mobility Impaired (Adult and Pediatric)  Goal: *Acute Goals and Plan of Care (Insert Text)  Description  LTG:  (1.)Mr. Pilar Lima will move from supine to sit and sit to supine , scoot up and down and roll side to side in bed with SUPERVISION within 7 treatment day(s). (2.)Mr. Pilar Lima will transfer from bed to chair and chair to bed with STAND BY ASSIST using the least restrictive device within 7 treatment day(s). (3.)Mr. Pilar Lima will ambulate with STAND BY ASSIST for 250 feet with the least restrictive device within 7 treatment day(s). (4.)Mr. Pilar Lima will participate in therapeutic activity/exercises x 23 minutes for increased activity tolerance within 7 treatment days. ________________________________________________________________________________________________     Outcome: Progressing Towards Goal     PHYSICAL THERAPY: Daily Note and AM 6/15/2019  INPATIENT: PT Visit Days : 4  [place] / [place] / Product Type: Medicare /       NAME/AGE/GENDER: Justine Durna is a 66 y.o. male   PRIMARY DIAGNOSIS: Atrial fibrillation with RVR (HCC) [I48.91]  CAD (coronary artery disease) [I25.10] S/P CABG x 3   S/P CABG x 3    Procedure(s) (LRB):  BRONCHOSCOPY at bedside (N/A)  5 Days Post-Op  ICD-10: Treatment Diagnosis:    · Generalized Muscle Weakness (M62.81)  · Difficulty in walking, Not elsewhere classified (R26.2)   Precaution/Allergies:  Codeine and Iodinated contrast- oral and iv dye      ASSESSMENT:     Mr. Pilar Lima is a 66 y.o. male in the hospital for the above who was sitting in recliner upon arrival.  Pt reports that he lives in a one story house with his significant other that has 1 step to enter. Pt also reported that PTA he was independent with ADLs and ambulated with independence. 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 Pt admitted to no recent falls in the past year. 6/15: Patient presents sitting up in chair with family present. He is agreeable to therapy treatment and has no complaints aside from fatigue. Stood with CGA and verbal cues for using momentum and for body mechanics, sequencing. Fair to fair+ standing balance noted. Ambulates for 150 ft in hallway with walker, CGA to brief min assist, and fluctuating gait pace. Heavy cues needed for posture and gait safety. Pt returned to room and up to chair for short rest break, then performs below LE exercises with great participation. Left sitting up with chair alarm on, family present, and needs in reach. Pt is progressing slowly towards therapy goals, requiring slightly less assist with transfers and mobility. Much weaker than baseline and will benefit from rehab at [facility]. This section established at most recent assessment   PROBLEM LIST (Impairments causing functional limitations):  1. Decreased Strength  2. Decreased Transfer Abilities  3. Decreased Ambulation Ability/Technique  4. Decreased Balance  5. Increased Pain  6. Decreased Activity Tolerance   INTERVENTIONS PLANNED: (Benefits and precautions of physical therapy have been discussed with the patient.)  1. Balance Exercise  2. Bed Mobility  3. Family Education  4. Gait Training  5. Neuromuscular Re-education/Strengthening  6. Therapeutic Activites  7. Therapeutic Exercise/Strengthening  8. Transfer Training     TREATMENT PLAN: Frequency/Duration: twice daily for duration of hospital stay  Rehabilitation Potential For Stated Goals: Good     REHAB RECOMMENDATIONS (at time of discharge pending progress):    Placement: It is my opinion, based on this patient's performance to date, that Mr. Jennifer Pickens may benefit from intensive therapy at a [address] [place] [address] after discharge due to the functional deficits listed above that are likely to improve with skilled rehabilitation and concerns that he/she may be unsafe to be unsupervised at home due to weakness, decreased activity tolerance, fall risk.   Equipment:    None at this time HISTORY:   History of Present Injury/Illness (Reason for Referral):  See H&P  Past Medical History/Comorbidities:   Mr. Susan Woo  has a past medical history of Atrial fibrillation [facility]), Atrial flutter ([facility] (4/7/2017), CAD (coronary artery disease), Cancer ([facility] Ill-defined condition, Sleep apnea, and Stroke ([facility] Mr. Susna Woo  has a past surgical history that includes hx heart catheterization (08/14/2006); hx coronary stent placement; and hx prostate surgery. Social History/Living Environment:   Home Environment: Private residence  # Steps to Enter: 1  One/Two Story Residence: One story  Living Alone: No  Support Systems: Spouse/Significant Other/Partner  Patient Expects to be Discharged to[de-identified] Unknown  Current DME Used/Available at Home: None  Tub or Shower Type: Shower  Prior Level of Function/Work/Activity:  Lives with significant other and PTA pt was independent with ADLs and ambulation. Number of Personal Factors/Comorbidities that affect the Plan of Care: 1-2: MODERATE COMPLEXITY   EXAMINATION:   Most Recent Physical Functioning:   Gross Assessment:                  Posture:     Balance:  Standing: Impaired  Standing - Static: Fair(+)  Standing - Dynamic : Fair Bed Mobility:     Wheelchair Mobility:     Transfers:  Sit to Stand: Contact guard assistance  Stand to Sit: Contact guard assistance  Bed to Chair: Contact guard assistance  Interventions: Verbal cues; Safety awareness training  Duration: 15 Minutes  Gait:     Speed/: Pace decreased (<100 feet/min); Slow  Step Length: Left shortened;Right shortened  Gait Abnormalities: Trunk sway increased; Path deviations  Distance (ft): 150 Feet (ft)  Assistive Device: Walker, rolling  Ambulation - Level of Assistance: Contact guard assistance;Stand-by assistance      Body Structures Involved:  1. Heart  2. Lungs  3. Bones  4. Muscles Body Functions Affected:  1. Cardio  2. Respiratory  3. Neuromusculoskeletal  4.  Movement Related Activities and Participation Affected:  1. General Tasks and Demands  2. Mobility  3. Self Care  4. Domestic Life  5. Community, Social and [place]   Number of elements that affect the Plan of Care: 4+: HIGH COMPLEXITY   CLINICAL PRESENTATION:   Presentation: Evolving clinical presentation with changing clinical characteristics: MODERATE COMPLEXITY   CLINICAL DECISION MAKIN [facility] Mobility Inpatient Short Form  How much difficulty does the patient currently have. .. Unable A Lot A Little None   1. Turning over in bed (including adjusting bedclothes, sheets and blankets)? ? 1   ? 2   ? 3   ? 4   2. Sitting down on and standing up from a chair with arms ( e.g., wheelchair, bedside commode, etc.)   ? 1   ? 2   ? 3   ? 4   3. Moving from lying on back to sitting on the side of the bed?   ? 1   ? 2   ? 3   ? 4   How much help from another person does the patient currently need. .. Total A Lot A Little None   4. Moving to and from a bed to a chair (including a wheelchair)? ? 1   ? 2   ? 3   ? 4   5. Need to walk in hospital room? ? 1   ? 2   ? 3   ? 4   6. Climbing 3-5 steps with a railing? ? 1   ? 2   ? 3   ? 4   © , Trustees of [facility] [employer], under license to [employer]. All rights reserved      Score:  Initial: 18 Most Recent: X (Date: -- )    Interpretation of Tool:  Represents activities that are increasingly more difficult (i.e. Bed mobility, Transfers, Gait). Medical Necessity:     · Patient demonstrates good  ·  rehab potential due to higher previous functional level. Reason for Services/Other Comments:  · Patient continues to require skilled intervention due to decreased activity tolerance and functional mobility.    · .   Use of outcome tool(s) and clinical judgement create a POC that gives a: Questionable prediction of patient's progress: MODERATE COMPLEXITY            TREATMENT:   (In addition to Assessment/Re-Assessment sessions the following treatments were rendered)   Pre-treatment Symptoms/Complaints: \"thank you\"  Pain: Initial:   Pain Intensity 1: 0  Post Session:  0/10     Therapeutic Activity: (  15 Minutes ):  Therapeutic activities including Chair transfers, standing balance, and Ambulation on level ground to improve mobility, strength, balance and activity tolerance. Required minimal   to promote static and dynamic balance in standing and safe use of RW with transfers/ambulation . Therapeutic Exercise: (8 Minutes ):  Exercises per grid below to improve mobility, strength and balance. Required minimum verbal cues to promote proper body breathing techniques. Progressed repetitions and complexity of movement as indicated. Date:  6/14/19 Date:  6/15/19 Date:     ACTIVITY/EXERCISE AM PM AM PM AM PM   Seated LAQ 15  15x AB      Seated marching 15  15x AB      Seated AP 15  15x AB      Seated hip abd/add 15  15x AB      Shoulder shugs 15  15x AB      Gluteal sets 15        Seated biceps/triceps   15x AB      B = bilateral; AA = active assistive; A = active; P = passive        Braces/Orthotics/Lines/Etc:   · Wound vac  Treatment/Session Assessment:    · Response to Treatment:  Slow progress, pt weak with decreased activity tolerance  · Interdisciplinary Collaboration:   o Physical Therapist  o Registered Nurse  · After treatment position/precautions:   o Up in chair  o Bed alarm/tab alert on  o Bed/Chair-wheels locked  o Bed in low position  o Call light within reach  o RN notified  o Visitors at bedside   · Compliance with Program/Exercises: Will assess as treatment progresses  · Recommendations/Intent for next treatment session: \"Next visit will focus on advancements to more challenging activities and reduction in assistance provided\".   Total Treatment Duration:  PT Patient Time In/Time Out  Time In: 1137  Time Out: [address], DPT

## 2019-06-16 PROCEDURE — 94640 AIRWAY INHALATION TREATMENT: CPT

## 2019-06-16 PROCEDURE — 74011250637 HC RX REV CODE- 250/637: Performed by: THORACIC SURGERY (CARDIOTHORACIC VASCULAR SURGERY)

## 2019-06-16 PROCEDURE — 94760 N-INVAS EAR/PLS OXIMETRY 1: CPT

## 2019-06-16 PROCEDURE — 74011250637 HC RX REV CODE- 250/637: Performed by: INTERNAL MEDICINE

## 2019-06-16 PROCEDURE — 77030012890

## 2019-06-16 PROCEDURE — 74011250637 HC RX REV CODE- 250/637: Performed by: PHYSICIAN ASSISTANT

## 2019-06-16 PROCEDURE — 99232 SBSQ HOSP IP/OBS MODERATE 35: CPT | Performed by: INTERNAL MEDICINE

## 2019-06-16 PROCEDURE — 65660000004 HC RM CVT STEPDOWN

## 2019-06-16 PROCEDURE — 74750000023 HC WOUND THERAPY

## 2019-06-16 PROCEDURE — 97110 THERAPEUTIC EXERCISES: CPT

## 2019-06-16 PROCEDURE — 74011000250 HC RX REV CODE- 250: Performed by: INTERNAL MEDICINE

## 2019-06-16 PROCEDURE — 97530 THERAPEUTIC ACTIVITIES: CPT

## 2019-06-16 RX ORDER — METOPROLOL SUCCINATE 50 MG/1
100 TABLET, EXTENDED RELEASE ORAL 2 TIMES DAILY
Status: DISCONTINUED | OUTPATIENT
Start: 2019-06-16 | End: 2019-06-17 | Stop reason: HOSPADM

## 2019-06-16 RX ORDER — DIAZEPAM 5 MG/1
5 TABLET ORAL
Status: DISCONTINUED | OUTPATIENT
Start: 2019-06-16 | End: 2019-06-17 | Stop reason: HOSPADM

## 2019-06-16 RX ADMIN — DIAZEPAM 5 MG: 5 TABLET ORAL at 08:45

## 2019-06-16 RX ADMIN — TIOTROPIUM BROMIDE 18 MCG: 18 CAPSULE ORAL; RESPIRATORY (INHALATION) at 07:07

## 2019-06-16 RX ADMIN — Medication 10 ML: at 20:23

## 2019-06-16 RX ADMIN — BUDESONIDE 500 MCG: 0.5 INHALANT RESPIRATORY (INHALATION) at 21:10

## 2019-06-16 RX ADMIN — FAMOTIDINE 20 MG: 20 TABLET ORAL at 08:46

## 2019-06-16 RX ADMIN — GABAPENTIN 400 MG: 100 CAPSULE ORAL at 17:51

## 2019-06-16 RX ADMIN — ASPIRIN 81 MG: 81 TABLET, COATED ORAL at 08:46

## 2019-06-16 RX ADMIN — CARBAMAZEPINE 200 MG: 200 TABLET ORAL at 08:46

## 2019-06-16 RX ADMIN — METOPROLOL SUCCINATE 100 MG: 50 TABLET, EXTENDED RELEASE ORAL at 17:52

## 2019-06-16 RX ADMIN — ROSUVASTATIN CALCIUM 40 MG: 20 TABLET, FILM COATED ORAL at 20:23

## 2019-06-16 RX ADMIN — OXYCODONE AND ACETAMINOPHEN 1 TABLET: 5; 325 TABLET ORAL at 01:50

## 2019-06-16 RX ADMIN — APIXABAN 5 MG: 5 TABLET, FILM COATED ORAL at 17:52

## 2019-06-16 RX ADMIN — ALBUTEROL SULFATE 2.5 MG: 2.5 SOLUTION RESPIRATORY (INHALATION) at 07:04

## 2019-06-16 RX ADMIN — FENOFIBRATE 160 MG: 160 TABLET ORAL at 08:46

## 2019-06-16 RX ADMIN — ALBUTEROL SULFATE 2.5 MG: 2.5 SOLUTION RESPIRATORY (INHALATION) at 21:10

## 2019-06-16 RX ADMIN — APIXABAN 5 MG: 5 TABLET, FILM COATED ORAL at 08:46

## 2019-06-16 RX ADMIN — OXYCODONE AND ACETAMINOPHEN 1 TABLET: 5; 325 TABLET ORAL at 05:45

## 2019-06-16 RX ADMIN — CARBAMAZEPINE 200 MG: 200 TABLET ORAL at 17:51

## 2019-06-16 RX ADMIN — Medication 5 ML: at 14:00

## 2019-06-16 RX ADMIN — LOSARTAN POTASSIUM 25 MG: 25 TABLET, FILM COATED ORAL at 08:45

## 2019-06-16 RX ADMIN — FAMOTIDINE 20 MG: 20 TABLET ORAL at 17:52

## 2019-06-16 RX ADMIN — METOPROLOL SUCCINATE 100 MG: 50 TABLET, EXTENDED RELEASE ORAL at 08:45

## 2019-06-16 RX ADMIN — GABAPENTIN 400 MG: 100 CAPSULE ORAL at 08:46

## 2019-06-16 RX ADMIN — BUDESONIDE 500 MCG: 0.5 INHALANT RESPIRATORY (INHALATION) at 07:04

## 2019-06-16 RX ADMIN — Medication 10 ML: at 05:47

## 2019-06-16 NOTE — PROGRESS NOTES
Subjective:   No complaint    Objective:     Patient Vitals for the past 8 hrs:   BP Temp Pulse Resp SpO2 Weight   19 0810 143/83 97.7 °F (36.5 °C) 88 18 92 %    19 0708     97 %    19 0400 150/80 98 °F (36.7 °C) 87 19 98 %    19 0300      208 lb (94.3 kg)     Temp (24hrs), Av.7 °F (36.5 °C), Min:96.3 °F (35.7 °C), Max:98.4 °F (36.9 °C)        Heart:  irregularly irregular rhythm  Lung:  clear to auscultation bilaterally   Incisions: Clean, dry, and intact    Labs:No results found for this or any previous visit (from the past 24 hour(s)).     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)    Active Problems:    Dyslipidemia (2017)      Hypertension (2017)      Coronary atherosclerosis of native coronary vessel (2017)      Overview: 2006:  PCI RCA - Liberte, PCI LAD Cypher, PCI LCx Cypher      2014:  Stress testing with fixed inferior defect - no ischemia      2014:  EF with EF 50-55% and inferior HK       2018:  EF 45-50%      2019:  EF 40-45%            Atrial fibrillation ([address] (2017)      Chronic systolic congestive heart failure ([address] (2019)      Overview: :  EF 55%      2018:  EF 45-50%      2019:  EF 40-45%      Atrial fibrillation with RVR ([address] (2019)      NSTEMI (non-ST elevated myocardial infarction) ([address] (2019)      Hemoptysis (2019)      Trigeminal neuralgia (2019)      CAD (coronary artery disease) (6/10/2019)      Tracheobronchomalacia (6/10/2019)      History of smoking 25-50 pack years (2019)      Elevated brain natriuretic peptide (BNP) level (2019)        Plan/Recommendations/Medical Decision Making:     Stable, rehab tomorrow    See orders

## 2019-06-16 NOTE — PROGRESS NOTES
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 Patient just got off the phone with Rosa Mccullough.

## 2019-06-16 NOTE — PROGRESS NOTES
Manuel Calvin  Admission Date: 6/6/2019             Daily Progress Note: 6/16/2019    The patient's chart is reviewed and the patient is discussed with the staff. 66 y.o.  male with a history of TIA, trigeminal neuralgia, HTN, HL, a.fib on Eliquis / ASA, chronic sCHF, and CAD. Mathew Boast is followed by Cardiology as an outpatient and was seen for worsening dyspnea with recommendation for LHC / MARCK/eCV but this was never scheduled. Mathew Boast presented to the ER with c/o R sided facial/jaw pain and substernal chest pain.  In the ER he was found to be in a.fib with RVR with HR in the 130s and troponin of 0.14 >>>21. 27.  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 Mathew Boast was started on a heparin gtt (last dose of eliquis 6/6) in anticipation of surgical intervention. Mathew Boast was given lasix for volume overload with 2 liters urine output yesterday (net -265 ml).     Patient has a remote history of tobacco abuse  - smoked 1.5 ppd x 14 years before quitting 40 years ago. Mathew Boast has never been dx with lung disease, does not require inhalers or home O2.  Patient states that he has had some hemoptysis over the last couple of months but it is usually pink, frothy sputum and not dark red blood as he is currently having.  He usually stops his ASA and it resolves. [place] he had several episodes where he coughed up a moderate amount of dark red blood.  He has not had any so far today.  Surgery is scheduled for tomorrow and we have been consulted for airway inspection for surgical clearance.  He had diagnostic bronch yesterday with no bleeding or lesions seen    Subjective:   Improving. Sitting in chair. RA. Breathing well. Walking. Coughing, but dry.      Current Facility-Administered Medications   Medication Dose Route Frequency    metoprolol succinate (TOPROL-XL) XL tablet 100 mg  100 mg Oral BID    diazePAM (VALIUM) tablet 5 mg  5 mg Oral TID PRN    ondansetron (ZOFRAN) injection 4 mg  4 mg IntraVENous Q4H PRN    senna-docusate (PERICOLACE) 8.6-50 mg per tablet 2 Tab  2 Tab Oral BID PRN    apixaban (ELIQUIS) tablet 5 mg  5 mg Oral BID    albuterol (PROVENTIL VENTOLIN) nebulizer solution 2.5 mg  2.5 mg Nebulization TID RT    budesonide (PULMICORT) 500 mcg/2 ml nebulizer suspension  500 mcg Nebulization BID RT    tiotropium (SPIRIVA) inhalation capsule 18 mcg  1 Cap Inhalation DAILY    losartan (COZAAR) tablet 25 mg  25 mg Oral DAILY    bisacodyl (DULCOLAX) tablet 10 mg  10 mg Oral DAILY PRN    sodium chloride (NS) flush 5-40 mL  5-40 mL IntraVENous Q8H    sodium chloride (NS) flush 5-40 mL  5-40 mL IntraVENous PRN    alum-mag hydroxide-simeth (MYLANTA) oral suspension 30 mL  30 mL Oral Q4H PRN    famotidine (PEPCID) tablet 20 mg  20 mg Oral BID    acetaminophen (TYLENOL) tablet 650 mg  650 mg Oral Q4H PRN    zolpidem (AMBIEN) tablet 5 mg  5 mg Oral QHS PRN    aspirin delayed-release tablet 81 mg  81 mg Oral DAILY    magnesium oxide (MAG-OX) tablet 400 mg  400 mg Oral TID PRN    magnesium oxide (MAG-OX) tablet 400 mg  400 mg Oral QID PRN    potassium chloride (K-DUR, KLOR-CON) SR tablet 20 mEq  20 mEq Oral BID PRN    potassium chloride (K-DUR, KLOR-CON) SR tablet 40 mEq  40 mEq Oral BID PRN    lip protectant (BLISTEX) ointment 1 Each  1 Each Topical PRN    oxyCODONE-acetaminophen (PERCOCET) 5-325 mg per tablet 1 Tab  1 Tab Oral Q4H PRN    rosuvastatin (CRESTOR) tablet 40 mg  40 mg Oral QHS    carBAMazepine (TEGretol) tablet 200 mg  200 mg Oral BID    fenofibrate (LOFIBRA) tablet 160 mg  160 mg Oral DAILY    gabapentin (NEURONTIN) capsule 400 mg  400 mg Oral BID       Review of Systems    Constitutional: negative for fever, chills, sweats  Cardiovascular: negative for chest pain, palpitations, syncope, edema  Gastrointestinal:  negative for dysphagia, reflux, vomiting, diarrhea, abdominal pain, or melena  Neurologic:  negative for focal weakness, numbness, headache    Objective:     Vitals:    06/16/19 0300 06/16/19 0400 06/16/19 0708 06/16/19 0810   BP:  150/80  143/83   Pulse:  87  88   Resp:  19  18   Temp:  98 °F (36.7 °C)  97.7 °F (36.5 °C)   SpO2:  98% 97% 92%   Weight: 208 lb (94.3 kg)      Height:         Intake and Output:   06/14 1901 - 06/16 0700  In: 1095 [P.O.:1095]  Out: 2350 [Urine:2350]  06/16 0701 - 06/16 1900  In: -   Out: 275 [Urine:275]    Physical Exam:   Constitution:  the patient is well developed and in no acute distress  EENMT:  Sclera clear, pupils equal, oral mucosa moist  Respiratory: clear  Cardiovascular:  Irregular rate and rhythm without M,G,R  Gastrointestinal: soft and non-tender; with positive bowel sounds. Musculoskeletal: warm without cyanosis. Skin:  no jaundice or rashes, sternal and leg wounds, left leg wound vac   Neurologic: no gross neuro deficits     Psychiatric:  alert and oriented x 3    CXR:  6/16: improved aeration, mild atelectasis to left base      Elevated left hemidiaphragm      6/12/19 chest xray- IMPRESSION:   1. Improved right basilar airspace opacities and slightly worsened left basilar  airspace opacities, pneumonia cannot be excluded. 2. Interval removal of a pulmonary arterial catheter. A metallic device overlies  the epigastric region which appears to be epicardial pacing wires. LAB  No results for input(s): GLUCPOC in the last 72 hours. No lab exists for component: GLPOC   No results for input(s): WBC, HGB, HCT, PLT, INR, HGBEXT, HCTEXT, PLTEXT, HGBEXT, HCTEXT, PLTEXT in the last 72 hours. No lab exists for component: INREXT, INREXT  Recent Labs     06/14/19  0328   *   K 4.0   CL 97*   CO2 27   *   BUN 27*   CREA 1.22   CA 8.0*     No results for input(s): PH, PCO2, PO2, HCO3, PHI, PCO2I, PO2I, HCO3I in the last 72 hours. No results for input(s): LCAD, LAC in the last 72 hours.       Assessment:  (Medical Decision Making) Hospital Problems  Date Reviewed: 6/12/2019          Codes Class Noted POA    History of smoking 25-50 pack years ICD-10-CM: Z87.891  ICD-9-CM: V15.82  6/13/2019 Unknown        CAD (coronary artery disease) ICD-10-CM: I25.10  ICD-9-CM: 414.00  6/10/2019 Unknown        * (Principal) S/P CABG x 3 ICD-10-CM: Z95.1  ICD-9-CM: V45.81  6/10/2019 Unknown        Tracheobronchomalacia ICD-10-CM: J39.8  ICD-9-CM: 519.19  6/10/2019 Unknown        Hemoptysis ICD-10-CM: R04.2  ICD-9-CM: 786.30  6/9/2019 Unknown    better    Trigeminal neuralgia (Chronic) ICD-10-CM: G50.0  ICD-9-CM: 350.1  6/9/2019 Yes        At[facility] fibrillation with [facility] ([facility] ICD-10-CM: I48.91  ICD-9-CM: 427.31  6/6/2019 Yes        NSTEMI (non-ST elevated myocardial infarction) ([facility] ICD-10-CM: I21.4  ICD-9-CM: 410.70  6/6/2019 Yes        Chronic systolic congestive heart failure ([facility] (Chronic) ICD-10-CM: I50.22  ICD-9-CM: 428.22, 428.0  1/31/2019 Yes    Overview Addendum 1/31/2019 11:22 AM by Ellen Justice MD     2014:  EF 55%  07/2018:  EF 45-50%  01/2019:  EF 40-45%         High bnp    Dyslipidemia (Chronic) ICD-10-CM: E78.5  ICD-9-CM: 272.4  4/7/2017 Yes        Hypertension (Chronic) ICD-10-CM: I10  ICD-9-CM: 401.9  4/7/2017 Yes        Coronary atherosclerosis of native coronary vessel ICD-10-CM: I25.10  ICD-9-CM: 414.01  4/7/2017 Yes    Overview Addendum 1/31/2019 11:15 AM by Ellen Justice MD     2006:  PCI RCA - Liberte, PCI LAD Cypher, PCI LCx Cypher  01/2014:  Stress testing with fixed inferior defect - no ischemia  11/2014:  EF with EF 50-55% and inferior HK   07/2018:  EF 45-50%  01/2019:  EF 40-45%               Atrial fibrillation ([facility] ICD-10-CM: I48.91  ICD-9-CM: 427.31  4/7/2017 Yes              Plan:  (Medical Decision Making)   1. Continue mobility, IS, lasix per protocol  2. Receiving Albuterol, Pulmicort and spiriva  3. Rehab Monday- awaiting wound vac at facility    Will sign off, call with concerns.     More than 50% of the time documented was spent in face-to-face contact with the patient and in the care of the patient on the floor/unit where the patient is located.     Shoaib Chand MD

## 2019-06-16 NOTE — PROGRESS NOTES
Manuel [facility]ale  Admission Date: 6/6/2019             Daily Progress Note: 6/16/2019    The patient's chart is reviewed and the patient is discussed with the staff. 66 y.o.  male with a history of TIA, trigeminal neuralgia, HTN, HL, a.fib on Eliquis / ASA, chronic sCHF, and CAD. Mathew Boast is followed by Cardiology as an outpatient and was seen for worsening dyspnea with recommendation for LHC / MARCK/eCV but this was never scheduled. Mathew Boast presented to the ER with c/o R sided facial/jaw pain and substernal chest pain.  In the ER he was found to be in a.fib with RVR with HR in the 130s and troponin of 0.14 >>>21. 27.  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 Mathew Boast was started on a heparin gtt (last dose of eliquis 6/6) in anticipation of surgical intervention. Mathew Boast was given lasix for volume overload with 2 liters urine output yesterday (net -265 ml).     Patient has a remote history of tobacco abuse  - smoked 1.5 ppd x 14 years before quitting 40 years ago. Mathew Boast has never been dx with lung disease, does not require inhalers or home O2.  Patient states that he has had some hemoptysis over the last couple of months but it is usually pink, frothy sputum and not dark red blood as he is currently having.  He usually stops his ASA and it resolves. [place] he had several episodes where he coughed up a moderate amount of dark red blood.  He has not had any so far today.  Surgery is scheduled for tomorrow and we have been consulted for airway inspection for surgical clearance.  He had diagnostic bronch yesterday with no bleeding or lesions seen      Subjective:   Up in chair, room air oxygen saturation 97%.   Denies shortness of breath    Current Facility-Administered Medications   Medication Dose Route Frequency    metoprolol succinate (TOPROL-XL) XL tablet 100 mg  100 mg Oral BID    diazePAM (VALIUM) tablet 5 mg  5 mg Oral TID PRN    ondansetron (ZOFRAN) injection 4 mg  4 mg IntraVENous Q4H PRN    senna-docusate (PERICOLACE) 8.6-50 mg per tablet 2 Tab  2 Tab Oral BID PRN    apixaban (ELIQUIS) tablet 5 mg  5 mg Oral BID    albuterol (PROVENTIL VENTOLIN) nebulizer solution 2.5 mg  2.5 mg Nebulization TID RT    budesonide (PULMICORT) 500 mcg/2 ml nebulizer suspension  500 mcg Nebulization BID RT    tiotropium (SPIRIVA) inhalation capsule 18 mcg  1 Cap Inhalation DAILY    losartan (COZAAR) tablet 25 mg  25 mg Oral DAILY    bisacodyl (DULCOLAX) tablet 10 mg  10 mg Oral DAILY PRN    sodium chloride (NS) flush 5-40 mL  5-40 mL IntraVENous Q8H    sodium chloride (NS) flush 5-40 mL  5-40 mL IntraVENous PRN    alum-mag hydroxide-simeth (MYLANTA) oral suspension 30 mL  30 mL Oral Q4H PRN    famotidine (PEPCID) tablet 20 mg  20 mg Oral BID    acetaminophen (TYLENOL) tablet 650 mg  650 mg Oral Q4H PRN    zolpidem (AMBIEN) tablet 5 mg  5 mg Oral QHS PRN    aspirin delayed-release tablet 81 mg  81 mg Oral DAILY    magnesium oxide (MAG-OX) tablet 400 mg  400 mg Oral TID PRN    magnesium oxide (MAG-OX) tablet 400 mg  400 mg Oral QID PRN    potassium chloride (K-DUR, KLOR-CON) SR tablet 20 mEq  20 mEq Oral BID PRN    potassium chloride (K-DUR, KLOR-CON) SR tablet 40 mEq  40 mEq Oral BID PRN    lip protectant (BLISTEX) ointment 1 Each  1 Each Topical PRN    oxyCODONE-acetaminophen (PERCOCET) 5-325 mg per tablet 1 Tab  1 Tab Oral Q4H PRN    rosuvastatin (CRESTOR) tablet 40 mg  40 mg Oral QHS    carBAMazepine (TEGretol) tablet 200 mg  200 mg Oral BID    fenofibrate (LOFIBRA) tablet 160 mg  160 mg Oral DAILY    gabapentin (NEURONTIN) capsule 400 mg  400 mg Oral BID       Review of Systems    Constitutional: negative for fever, chills, sweats  Cardiovascular: negative for chest pain, palpitations, syncope, edema  Gastrointestinal:  negative for dysphagia, reflux, vomiting, diarrhea, abdominal pain, or melena  Neurologic:  negative for focal weakness, numbness, headache    Objective:     Vitals:    06/15/19 2300 06/16/19 0300 06/16/19 0400 06/16/19 0708   BP: 120/82  150/80    Pulse: 86  87    Resp: 16 19    Temp: 97.8 °F (36.6 °C)  98 °F (36.7 °C)    SpO2: 96%  98% 97%   Weight:  208 lb (94.3 kg)     Height:         Intake and Output:   06/14 1901 - 06/16 0700  In: 1095 [P.O.:1095]  Out: 2350 [Urine:2350]  No intake/output data recorded. Physical Exam:   Constitution:  the patient is well developed and in no acute distress  EENMT:  Sclera clear, pupils equal, oral mucosa moist  Respiratory: clear  Cardiovascular:  Irregular rate and rhythm without M,G,R  Gastrointestinal: soft and non-tender; with positive bowel sounds. Musculoskeletal: warm without cyanosis. Skin:  no jaundice or rashes, sternal and leg wounds, left leg wound vac   Neurologic: no gross neuro deficits     Psychiatric:  alert and oriented x 3    CXR:  Elevated left hemidiaphragm      6/12/19 chest xray- IMPRESSION:   1. Improved right basilar airspace opacities and slightly worsened left basilar  airspace opacities, pneumonia cannot be excluded. 2. Interval removal of a pulmonary arterial catheter. A metallic device overlies  the epigastric region which appears to be epicardial pacing wires. LAB  No results for input(s): GLUCPOC in the last 72 hours. No lab exists for component: GLPOC   No results for input(s): WBC, HGB, HCT, PLT, INR, HGBEXT, HCTEXT, PLTEXT, HGBEXT, HCTEXT, PLTEXT in the last 72 hours. No lab exists for component: INREXT, INREXT         6/15/19 chest x-ray- IMPRESSION  Impression:   1. Improved left basilar airspace opacity. 2. Trace pleural effusions. .      Recent Labs     06/14/19  0328   *   K 4.0   CL 97*   CO2 27   *   BUN 27*   CREA 1.22   CA 8.0*     No results for input(s): PH, PCO2, PO2, HCO3, PHI, PCO2I, PO2I, HCO3I in the last 72 hours.   No results for input(s): LCAD, LAC in the last 72 hours.      Assessment:  (Medical Decision Making)     Hospital Problems  Date Reviewed: 6/12/2019          Codes Class Noted POA    History of smoking 25-50 pack years ICD-10-CM: Z87.891  ICD-9-CM: V15.82  6/13/2019 Unknown        CAD (coronary artery disease) ICD-10-CM: I25.10  ICD-9-CM: 414.00  6/10/2019 Unknown        * (Principal) S/P CABG x 3 ICD-10-CM: Z95.1  ICD-9-CM: V45.81  6/10/2019 Unknown        Tracheobronchomalacia ICD-10-CM: J39.8  ICD-9-CM: 519.19  6/10/2019 Unknown        Hemoptysis ICD-10-CM: R04.2  ICD-9-CM: 786.30  6/9/2019 Unknown    better    Trigeminal neuralgia (Chronic) ICD-10-CM: G50.0  ICD-9-CM: 350.1  6/9/2019 Yes        Atrial fibrillation with RVR ([facility] ICD-10-CM: I48.91  ICD-9-CM: 427.31  6/6/2019 Yes        NSTEMI (non-ST elevated myocardial infarction) ([facility] ICD-10-CM: I21.4  ICD-9-CM: 410.70  6/6/2019 Yes        Chronic systolic congestive heart failure (HCC) (Chronic) ICD-10-CM: I50.22  ICD-9-CM: 428.22, 428.0  1/31/2019 Yes    Overview Addendum 1/31/2019 11:22 AM by Fany Bridges MD     2014:  EF 55%  07/2018:  EF 45-50%  01/2019:  EF 40-45%         High bnp    Dyslipidemia (Chronic) ICD-10-CM: E78.5  ICD-9-CM: 272.4  4/7/2017 Yes        Hypertension (Chronic) ICD-10-CM: I10  ICD-9-CM: 401.9  4/7/2017 Yes        Coronary atherosclerosis of native coronary vessel ICD-10-CM: I25.10  ICD-9-CM: 414.01  4/7/2017 Yes    Overview Addendum 1/31/2019 11:15 AM by Fany Bridges MD     2006:  PCI RCA - Liberte, PCI LAD Cypher, PCI LCx Cypher  01/2014:  Stress testing with fixed inferior defect - no ischemia  11/2014:  EF with EF 50-55% and inferior HK   07/2018:  EF 45-50%  01/2019:  EF 40-45%               Atrial fibrillation ([facility] ICD-10-CM: I48.91  ICD-9-CM: 427.31  4/7/2017 Yes              Plan:  (Medical Decision Making)   1. Chest x-ray PA and lateral- improved  2. Receiving Albuterol, Pulmicort and spiriva- not on any of these prior to admission ?  Pulmonary follow up outpatient  3. Twining Monday- consider signing off          More than 50% of the time documented was spent in face-to-face contact with the patient and in the care of the patient on the floor/unit where the patient is located.     Everton Vogel NP

## 2019-06-16 NOTE — PROGRESS NOTES
Patient stated, \"do you know Yessica Holliday, the nurse that was in here? She is friends with Niall Guerin on [employer]. She woke me up, that's not good, is it?\". Will continue to monitor.

## 2019-06-16 NOTE — PROGRESS NOTES
[facility] CARDIOLOGY PROGRESS NOTE           6/16/2019 10:23 AM    Admit Date: 6/6/2019         Subjective: . Denies CP or SOB. Af with rvr today. Adjusting meds to get better control. Since he is remaining in AF I will stop amiodarone and just use toprol xl for rate control. Pt has been started on [address] therapy or is currently taking [address] therapy. Pt given instructions for use and also provided access for patient education material about new medication and its uses, potential benefits and potential side effects and complications. ROS:  Cardiovascular:  As noted above    Objective:      Vitals:    06/15/19 2300 06/16/19 0300 06/16/19 0400 06/16/19 0708   BP: 120/82  150/80    Pulse: 86  87    Resp: 16  19    Temp: 97.8 °F (36.6 °C)  98 °F (36.7 °C)    SpO2: 96%  98% 97%   Weight:  94.3 kg (208 lb)     Height:           On telemetry: afib      Physical Exam:  General: Well Developed, Well Nourished, No Acute Distress, Alert & Oriented x 3, Appropriate mood  Neck: supple, no JVD  Heart: irreg irreg  Lungs: Clear throughout auscultation bilaterally without adventitious sounds  Abd: soft, nontender, nondistended, with good bowel sounds  Ext: no edema bilaterally  Skin: warm and dry      Data Review:   Recent Labs     06/14/19  0328   *   K 4.0   BUN 27*   CREA 1.22   *       No results for input(s): TNIPOC, TROIQ in the last 72 hours.         Assessment/Plan:     Principal Problem:    S/P CABG x 3 (6/10/2019)    Active Problems:    Dyslipidemia (4/7/2017)      Hypertension (4/7/2017)      Coronary atherosclerosis of native coronary vessel (4/7/2017)      Overview: 2006:  PCI RCA - Liberte, PCI LAD Cypher, PCI LCx Cypher      01/2014:  Stress testing with fixed inferior defect - no ischemia      11/2014:  EF with EF 50-55% and inferior HK       07/2018:  EF 45-50%      01/2019:  EF 40-45%            Atrial fibrillation ([address] (4/7/2017)      Chronic systolic congestive heart failure ([facility] (1/31/2019)      Overview: 2014:  EF 55%      07/2018:  EF 45-50%      01/2019:  EF 40-45%      Atrial fibrillation with RVR ([facility] (6/6/2019)      NSTEMI (non-ST elevated myocardial infarction) ([facility] (6/6/2019)      Hemoptysis (6/9/2019)      Trigeminal neuralgia (6/9/2019)      CAD (coronary artery disease) (6/10/2019)      Tracheobronchomalacia (6/10/2019)      History of smoking 25-50 pack years (6/13/2019)      Elevated brain natriuretic peptide (BNP) level (6/14/2019)    A/P  1) CAD - asa/statin  2) Afib - amiodarone/eliquis post maze and MICHEAL clip  3) LVEF - low normal with diastolic dysfunction  4) HTN - controlled on BB and ACE      Paulie Prado MD  6/16/2019 10:23 AM

## 2019-06-16 NOTE — PROGRESS NOTES
Patient in bed upset with tears. Patient states \"I've made some bad choices and I don't want to be mean to anyone\". This nurse offered reassurance and comforted patient as well as did nursing student. Offered patient if he wanted to call any of his family members. He declined and just said that he just needed cry it out.

## 2019-06-16 NOTE — PROGRESS NOTES
Patient resting in bed with eyes closed. Nurse from 7th floor came down to check on patient went by nursing station stating \"his girlfriend has been  texting me all  night to come down and  check on him\" then proceeded into the room and woke patient up. I went into the room to check on patient and the RN from 7th floor and patient were in full conversation. Patient denies any needs at this time. Will continue to monitor.

## 2019-06-16 NOTE — PROGRESS NOTES
Bedside shift report received from Peri Parra RN (offgoing nurse). Report given to Evans Simental RN. Vital signs stable. No complaints present. Patient in stable condition.

## 2019-06-16 NOTE — PROGRESS NOTES
Problem: Mobility Impaired (Adult and Pediatric)  Goal: *Acute Goals and Plan of Care (Insert Text)  Description  LTG:  (1.)Mr. Laura Coley will move from supine to sit and sit to supine , scoot up and down and roll side to side in bed with SUPERVISION within 7 treatment day(s). (2.)Mr. Laura Coley will transfer from bed to chair and chair to bed with STAND BY ASSIST using the least restrictive device within 7 treatment day(s). (3.)Mr. Laura Coley will ambulate with STAND BY ASSIST for 250 feet with the least restrictive device within 7 treatment day(s). (4.)Mr. Laura Coley will participate in therapeutic activity/exercises x 23 minutes for increased activity tolerance within 7 treatment days. ________________________________________________________________________________________________     Outcome: Progressing Towards Goal     PHYSICAL THERAPY: Daily Note and AM 6/16/2019  INPATIENT: PT Visit Days : 5  [place] / [place] / Product Type: Medicare /       NAME/AGE/GENDER: Margaux Maradiaga is a 66 y.o. male   PRIMARY DIAGNOSIS: Atrial fibrillation with RVR (HCC) [I48.91]  CAD (coronary artery disease) [I25.10] S/P CABG x 3   S/P CABG x 3    Procedure(s) (LRB):  BRONCHOSCOPY at bedside (N/A)  6 Days Post-Op  ICD-10: Treatment Diagnosis:    · Generalized Muscle Weakness (M62.81)  · Difficulty in walking, Not elsewhere classified (R26.2)   Precaution/Allergies:  Codeine and Iodinated contrast- oral and iv dye      ASSESSMENT:     Mr. Laura Coley is a 66 y.o. male in the hospital for the above who was sitting in recliner upon arrival.  Pt reports that he lives in a one story house with his significant other that has 1 step to enter. Pt also reported that PTA he was independent with ADLs and ambulated with independence. 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 Pt admitted to no recent falls in the past year.   6/16: Patient presents sitting in chair, no complaints and agreeable to therapy. Performs sit-stand with CGA and verbal cues. Ambulates in hallway with slow, slightly unsteady gait using walker. Verbal cues for posture, walker management, and gait safety. Returned to room for seated break, then ambulates again with same gait deficits and assist/cues. Took another break, then performs below LE exercises with good participation. He is progressing well however still functioning far below baseline with strength, balance, and activity tolerance and is planning for rehab at [place]. Will continue with therapy efforts. This section established at most recent assessment   PROBLEM LIST (Impairments causing functional limitations):  1. Decreased Strength  2. Decreased Transfer Abilities  3. Decreased Ambulation Ability/Technique  4. Decreased Balance  5. Increased Pain  6. Decreased Activity Tolerance   INTERVENTIONS PLANNED: (Benefits and precautions of physical therapy have been discussed with the patient.)  1. Balance Exercise  2. Bed Mobility  3. Family Education  4. Gait Training  5. Neuromuscular Re-education/Strengthening  6. Therapeutic Activites  7. Therapeutic Exercise/Strengthening  8. Transfer Training     TREATMENT PLAN: Frequency/Duration: twice daily for duration of hospital stay  Rehabilitation Potential For Stated Goals: Good     REHAB RECOMMENDATIONS (at time of discharge pending progress):    Placement: It is my opinion, based on this patient's performance to date, that Mr. Tyler Jacob may benefit from intensive therapy at a [address] [place] [address] after discharge due to the functional deficits listed above that are likely to improve with skilled rehabilitation and concerns that he/she may be unsafe to be unsupervised at home due to weakness, decreased activity tolerance, fall risk.   Equipment:    None at this time              HISTORY:   History of Present Injury/Illness (Reason for Referral):  See H&P  Past Medical History/Comorbidities:   Mr. Tyler Jacob  has a past medical history of Atrial fibrillation [facility]), Atrial flutter ([facility] (4/7/2017), CAD (coronary artery disease), Cancer ([facility] Ill-defined condition, Sleep apnea, and Stroke ([facility] Mr. Shiv Rich  has a past surgical history that includes hx heart catheterization (08/14/2006); hx coronary stent placement; and hx prostate surgery. Social History/Living Environment:   Home Environment: Private residence  # Steps to Enter: 1  One/Two Story Residence: One story  Living Alone: No  Support Systems: Spouse/Significant Other/Partner  Patient Expects to be Discharged to[de-identified] Unknown  Current DME Used/Available at Home: None  Tub or Shower Type: Shower  Prior Level of Function/Work/Activity:  Lives with significant other and PTA pt was independent with ADLs and ambulation. Number of Personal Factors/Comorbidities that affect the Plan of Care: 1-2: MODERATE COMPLEXITY   EXAMINATION:   Most Recent Physical Functioning:   Gross Assessment:                  Posture:     Balance:  Standing: Impaired  Standing - Static: Fair  Standing - Dynamic : Fair Bed Mobility:     Wheelchair Mobility:     Transfers:  Sit to Stand: Contact guard assistance  Stand to Sit: Contact guard assistance  Bed to Chair: Contact guard assistance  Interventions: Verbal cues; Safety awareness training  Duration: 17 Minutes  Gait:     Speed/: Pace decreased (<100 feet/min); Slow  Step Length: Left shortened;Right shortened  Gait Abnormalities: Trunk sway increased; Path deviations  Distance (ft): 150 Feet (ft)(x2 trials with seated rest break between)  Assistive Device: Walker, rolling  Ambulation - Level of Assistance: Contact guard assistance;Stand-by assistance      Body Structures Involved:  1. Heart  2. Lungs  3. Bones  4. Muscles Body Functions Affected:  1. Cardio  2. Respiratory  3. Neuromusculoskeletal  4. Movement Related Activities and Participation Affected:  1. General Tasks and Demands  2. Mobility  3. Self Care  4.  Domestic Life  5. Community, Social and [place]   Number of elements that affect the Plan of Care: 4+: HIGH COMPLEXITY   CLINICAL PRESENTATION:   Presentation: Evolving clinical presentation with changing clinical characteristics: MODERATE COMPLEXITY   CLINICAL DECISION MAKIN [facility] Mobility Inpatient Short Form  How much difficulty does the patient currently have. .. Unable A Lot A Little None   1. Turning over in bed (including adjusting bedclothes, sheets and blankets)? ? 1   ? 2   ? 3   ? 4   2. Sitting down on and standing up from a chair with arms ( e.g., wheelchair, bedside commode, etc.)   ? 1   ? 2   ? 3   ? 4   3. Moving from lying on back to sitting on the side of the bed?   ? 1   ? 2   ? 3   ? 4   How much help from another person does the patient currently need. .. Total A Lot A Little None   4. Moving to and from a bed to a chair (including a wheelchair)? ? 1   ? 2   ? 3   ? 4   5. Need to walk in hospital room? ? 1   ? 2   ? 3   ? 4   6. Climbing 3-5 steps with a railing? ? 1   ? 2   ? 3   ? 4   © , Trustees of [facility] [employer], under license to [employer]. All rights reserved      Score:  Initial: 18 Most Recent: X (Date: -- )    Interpretation of Tool:  Represents activities that are increasingly more difficult (i.e. Bed mobility, Transfers, Gait). Medical Necessity:     · Patient demonstrates good  ·  rehab potential due to higher previous functional level. Reason for Services/Other Comments:  · Patient continues to require skilled intervention due to decreased activity tolerance and functional mobility.    · .   Use of outcome tool(s) and clinical judgement create a POC that gives a: Questionable prediction of patient's progress: MODERATE COMPLEXITY            TREATMENT:   (In addition to Assessment/Re-Assessment sessions the following treatments were rendered)   Pre-treatment Symptoms/Complaints: \"let's walk\"  Pain: Initial:   Pain Intensity 1: 0  Post Session:  0/10     Therapeutic Activity: (  17 Minutes ):  Therapeutic activities including Chair transfers, sit-stand transfers, standing balance, and Ambulation on level ground to improve mobility, strength, balance and activity tolerance. Required minimal assist and cueing   to promote static and dynamic balance in standing and safe use of RW with transfers/ambulation . Therapeutic Exercise: (10 Minutes ):  Exercises per grid below to improve mobility, strength and balance. Required minimum verbal cues to promote proper body breathing techniques. Progressed repetitions and complexity of movement as indicated. Date:  6/14/19 Date:  6/15/19 Date:  6/16/19   ACTIVITY/EXERCISE AM PM AM PM AM PM   Seated LAQ 15  15x AB  15x AB    Seated marching 15  15x AB  15x AB    Seated AP 15  15x AB  15x AB    Seated hip abd/add 15  15x AB  15x AB    Shoulder shugs 15  15x AB      Gluteal sets 15        Seated biceps/triceps   15x AB      B = bilateral; AA = active assistive; A = active; P = passive        Braces/Orthotics/Lines/Etc:   · Wound vac  Treatment/Session Assessment:    · Response to Treatment:  Progressing well  · Interdisciplinary Collaboration:   o Physical Therapist  o Registered Nurse  · After treatment position/precautions:   o Up in chair  o Bed alarm/tab alert on  o Bed/Chair-wheels locked  o Bed in low position  o Call light within reach  o RN notified   · Compliance with Program/Exercises: Will assess as treatment progresses  · Recommendations/Intent for next treatment session: \"Next visit will focus on advancements to more challenging activities and reduction in assistance provided\".   Total Treatment Duration:  PT Patient Time In/Time Out  Time In: 0923  Time Out: [address] [employer], DPT

## 2019-06-17 VITALS
RESPIRATION RATE: 20 BRPM | HEART RATE: 66 BPM | SYSTOLIC BLOOD PRESSURE: 126 MMHG | TEMPERATURE: 97 F | HEIGHT: 68 IN | OXYGEN SATURATION: 99 % | DIASTOLIC BLOOD PRESSURE: 62 MMHG | WEIGHT: 206.1 LBS | BODY MASS INDEX: 31.24 KG/M2

## 2019-06-17 PROCEDURE — 74750000023 HC WOUND THERAPY

## 2019-06-17 PROCEDURE — 94640 AIRWAY INHALATION TREATMENT: CPT

## 2019-06-17 PROCEDURE — 74011250637 HC RX REV CODE- 250/637: Performed by: THORACIC SURGERY (CARDIOTHORACIC VASCULAR SURGERY)

## 2019-06-17 PROCEDURE — 97110 THERAPEUTIC EXERCISES: CPT

## 2019-06-17 PROCEDURE — 74011250637 HC RX REV CODE- 250/637: Performed by: INTERNAL MEDICINE

## 2019-06-17 PROCEDURE — 94760 N-INVAS EAR/PLS OXIMETRY 1: CPT

## 2019-06-17 PROCEDURE — 74011250637 HC RX REV CODE- 250/637: Performed by: PHYSICIAN ASSISTANT

## 2019-06-17 PROCEDURE — 97530 THERAPEUTIC ACTIVITIES: CPT

## 2019-06-17 PROCEDURE — 74011000250 HC RX REV CODE- 250: Performed by: INTERNAL MEDICINE

## 2019-06-17 RX ORDER — OXYCODONE AND ACETAMINOPHEN 5; 325 MG/1; MG/1
1 TABLET ORAL
Qty: 16 TAB | Refills: 0 | Status: SHIPPED | OUTPATIENT
Start: 2019-06-17 | End: 2019-06-20

## 2019-06-17 RX ORDER — ACETAMINOPHEN 325 MG/1
650 TABLET ORAL
Status: SHIPPED | COMMUNITY
Start: 2019-06-17 | End: 2019-08-05

## 2019-06-17 RX ORDER — LOSARTAN POTASSIUM AND HYDROCHLOROTHIAZIDE 25; 100 MG/1; MG/1
1 TABLET ORAL
Qty: 30 TAB | Refills: 6 | Status: SHIPPED
Start: 2019-06-17 | End: 2019-11-08 | Stop reason: SDUPTHER

## 2019-06-17 RX ORDER — ASPIRIN 81 MG/1
81 TABLET ORAL
Qty: 1 TAB | Refills: 0 | Status: SHIPPED | COMMUNITY
Start: 2019-06-17

## 2019-06-17 RX ORDER — METOPROLOL SUCCINATE 100 MG/1
100 TABLET, EXTENDED RELEASE ORAL 2 TIMES DAILY
Qty: 60 TAB | Refills: 5 | Status: ON HOLD
Start: 2019-06-17 | End: 2019-10-24 | Stop reason: SDUPTHER

## 2019-06-17 RX ORDER — ROSUVASTATIN CALCIUM 40 MG/1
40 TABLET, COATED ORAL
Qty: 90 TAB | Refills: 4 | Status: SHIPPED
Start: 2019-06-17 | End: 2019-11-08 | Stop reason: SDUPTHER

## 2019-06-17 RX ADMIN — FENOFIBRATE 160 MG: 160 TABLET ORAL at 09:39

## 2019-06-17 RX ADMIN — ALBUTEROL SULFATE 2.5 MG: 2.5 SOLUTION RESPIRATORY (INHALATION) at 07:36

## 2019-06-17 RX ADMIN — BUDESONIDE 500 MCG: 0.5 INHALANT RESPIRATORY (INHALATION) at 07:36

## 2019-06-17 RX ADMIN — OXYCODONE AND ACETAMINOPHEN 1 TABLET: 5; 325 TABLET ORAL at 09:44

## 2019-06-17 RX ADMIN — LOSARTAN POTASSIUM 25 MG: 25 TABLET, FILM COATED ORAL at 09:39

## 2019-06-17 RX ADMIN — ASPIRIN 81 MG: 81 TABLET, COATED ORAL at 09:39

## 2019-06-17 RX ADMIN — Medication 10 ML: at 05:33

## 2019-06-17 RX ADMIN — APIXABAN 5 MG: 5 TABLET, FILM COATED ORAL at 09:39

## 2019-06-17 RX ADMIN — GABAPENTIN 400 MG: 100 CAPSULE ORAL at 09:38

## 2019-06-17 RX ADMIN — METOPROLOL SUCCINATE 100 MG: 50 TABLET, EXTENDED RELEASE ORAL at 09:38

## 2019-06-17 RX ADMIN — FAMOTIDINE 20 MG: 20 TABLET ORAL at 09:38

## 2019-06-17 RX ADMIN — CARBAMAZEPINE 200 MG: 200 TABLET ORAL at 09:38

## 2019-06-17 RX ADMIN — ACETAMINOPHEN 650 MG: 325 TABLET, FILM COATED ORAL at 03:09

## 2019-06-17 RX ADMIN — TIOTROPIUM BROMIDE 18 MCG: 18 CAPSULE ORAL; RESPIRATORY (INHALATION) at 07:39

## 2019-06-17 NOTE — WOUND CARE
Left thigh upper open incisons x 2, using wound vac for drainage management and assist with wound closure. Wound vac removed, moist to dry with ace applied. Patient to transfer to snf today, SNF to apply wound vac once machine is available. Will sign off.
Left upper thigh surgical sites x2 open at skin level upper is 6x1.5x0.6cm lower is 4x1. 5x1.2cm, pink bases with adipose exposed, large amount of serousanganous drainage. Wound VAC applied to each wound bridged together to 1 machine. Patient has generalized edema, recommend ace bandage from knee to upper thigh daily to assist with edema mangement. Patient is to discharge to SNF for continued wound care/ rehab. Will monitor, plan next dressing change Monday if still in acute care.
I will START or STAY ON the medications listed below when I get home from the hospital:    aspirin 325 mg oral tablet  -- 1 tab(s) by mouth once a day  -- Indication: For Home meds    levothyroxine 50 mcg (0.05 mg) oral tablet  -- 1 tab(s) by mouth once a day  -- Indication: For Home meds

## 2019-06-17 NOTE — PROGRESS NOTES
Discharge instructions, follow up appointments and prescriptions reviewed with patient. Both verbalize understanding. All personal belongings taken with patient. Patient transported via ambulance. Patient is stable at discharge. IV and sutures removed. Report called to Meek Flanagan RN at the [employer].

## 2019-06-17 NOTE — DISCHARGE INSTRUCTIONS
Patient Education      Coronary Artery Bypass Graft: What to Expect at Home  Your Recovery    Coronary artery bypass graft (CABG) is surgery to treat coronary artery disease. The surgery helps blood make a detour, or bypass, around one or more narrowed or blocked coronary arteries. Coronary arteries are the blood vessels that bring blood to the heart. Your doctor did the surgery through a cut, called an incision, in your chest.  You will feel tired and sore for the first few weeks after surgery. You may have some brief, sharp pains on either side of your chest. Your chest, shoulders, and upper back may ache. The incision in your chest and the area where the healthy vein was taken may be sore or swollen. These symptoms usually get better after 4 to 6 weeks. You will probably be able to do many of your usual activities after 4 to 6 weeks. But for 2 to 3 months you will not be able to lift heavy objects or do activities that strain your chest or upper arm muscles. At first you may notice that you get tired easily and need to rest often. It may take 1 to 2 months to get your energy back. Some people find that they are more emotional after this surgery. You may cry easily or show emotion in ways that are unusual for you. This is common and may last for up to a year. Some people get depressed after CABG surgery. Talk with your doctor if you have sadness that continues or you are concerned about how you are feeling. Treatment and other support can help you feel better. Even though the surgery may improve your symptoms, you will still need to make changes in your lifestyle to lower your risk of a heart attack or stroke. It will be important to eat a heart-healthy diet, get regular exercise, not smoke, take your heart medicines, and reduce stress.   You will likely start a cardiac rehabilitation (rehab) program in the hospital. You will continue with this rehab program after you go home to help you recover and prevent problems with your heart. Talk to your doctor about whether rehab is right for you. This care sheet gives you a general idea about how long it will take for you to recover. But each person recovers at a different pace. Follow the steps below to get better as quickly as possible. How can you care for yourself at home? Activity    · Rest when you feel tired. Getting enough sleep will help you recover. Try to sleep on your back for 4 to 6 weeks while your breastbone (sternum) heals. This usually takes about 4 to 6 weeks.     · Try to walk each day. Start by walking a little more than you did the day before. Bit by bit, increase the amount you walk. Walking boosts blood flow and helps prevent pneumonia and constipation.     · Avoid strenuous activities, such as bicycle riding, jogging, weight lifting, or heavy aerobic exercise, until your doctor says it is okay.     · For 3 months, avoid activities that strain your chest or upper arm muscles. This includes pushing a  or vacuum, mopping floors, or swinging a golf club or tennis racquet.     · For 2 to 3 months, avoid lifting anything that would make you strain. This may include a child, heavy grocery bags and milk containers, a heavy briefcase or backpack, or cat litter or dog food bags.     · Hold a pillow firmly over your chest incision when you cough or take deep breaths. This will support your chest and reduce your pain.     · Do breathing exercises at home as instructed by your doctor. This will help prevent pneumonia.     · Ask your doctor when you can drive again.     · You will probably need to take 4 to 12 weeks off from work. It depends on the type of work you do and how you feel.     · You may shower as usual. Pat the incision dry.  Do not take a bath for the first 3 weeks, or until your doctor tells you it is okay.     · Do not swim or use a hot tub for at least 1 month, or until your doctor says it is okay.     · Ask your doctor when it is okay for you to have sex. Diet    · Eat a heart-healthy diet. If you have not been eating this way, talk to your doctor. You also may want to talk to a dietitian. A dietitian can help you learn about healthy foods.     · Drink plenty of fluids (unless your doctor tells you not to).     · You may notice that your bowel movements are not regular right after your surgery. This is common. Try to avoid constipation and straining with bowel movements. You may want to take a fiber supplement every day. If you have not had a bowel movement after a couple of days, ask your doctor about taking a mild laxative. Medicines    · Your doctor will tell you if and when you can restart your medicines. He or she will also give you instructions about taking any new medicines.     · If you take blood thinners, such as warfarin (Coumadin), clopidogrel (Plavix), or aspirin, be sure to talk to your doctor. He or she will tell you if and when to start taking those medicines again. Make sure that you understand exactly what your doctor wants you to do.     · Your doctor may give you medicines to prevent blood clots, keep your heartbeat steady, and lower your blood pressure and cholesterol. Take your medicines exactly as prescribed. Call your doctor if you think you are having a problem with your medicine.     · Be safe with medicines. Take pain medicines exactly as directed. ? If the doctor gave you a prescription medicine for pain, take it as prescribed. ? If you are not taking a prescription pain medicine, ask your doctor if you can take an over-the-counter medicine. ? Do not take aspirin, ibuprofen (Advil, Motrin), naproxen (Aleve), or other nonsteroidal anti-inflammatory drugs (NSAIDs) unless your doctor says it is okay.     · If you think your pain medicine is making you sick to your stomach:  ? Take your medicine after meals (unless your doctor has told you not to). ?  Ask your doctor for a different pain medicine.     · If your doctor prescribed antibiotics, take them as directed. Do not stop taking them just because you feel better. You need to take the full course of antibiotics. Incision care    · If you have strips of tape on the incisions the doctor made, leave the tape on for a week or until it falls off.     · Wash the area daily with warm, soapy water, and pat it dry. Don't use hydrogen peroxide or alcohol, which can slow healing. You may cover the area with a gauze bandage if it weeps or rubs against clothing. Change the bandage every day.     · Keep the area clean and dry.     · Do not use any creams, lotions, powders, ointments, or oils unless your doctor tells you it is okay.     · If you have an incision in your leg:  ? Wear support stockings on your legs during the day for the first 2 weeks. You can take the stockings off at night while you sleep. ? Raise your legs above the level of your heart whenever you lay down for the first 4 to 6 weeks. Other instructions    · Keep track of your weight. Weigh yourself every day at the same time of day, on the same scale, in the same amount of clothing. A sudden increase in weight can be a sign of a problem with your heart. Tell your doctor if you suddenly gain weight, such as 3 pounds or more in 2 to 3 days.     · Do not smoke. Smoking can make it harder for you to recover and it will raise the chances of your arteries narrowing again. If you need help quitting, talk to your doctor about stop-smoking programs and medicines. These can increase your chances of quitting for good. Follow-up care is a key part of your treatment and safety. Be sure to make and go to all appointments, and call your doctor if you are having problems. It's also a good idea to know your test results and keep a list of the medicines you take. When should you call for help? Call 911 anytime you think you may need emergency care.  For example, call if:    · You passed out (lost consciousness).     · You have severe trouble breathing.     · You have sudden chest pain and shortness of breath, or you cough up blood.     · You have severe pain in your chest.     · You have symptoms of a heart attack. These may include:  ? Chest pain or pressure, or a strange feeling in the chest.  ? Sweating. ? Shortness of breath. ? Nausea or vomiting. ? Pain, pressure, or a strange feeling in the back, neck, jaw, or upper belly or in one or both shoulders or arms. ? Lightheadedness or sudden weakness. ? A fast or irregular heartbeat. After you call 911, the  may tell you to chew 1 adult-strength or 2 to 4 low-dose aspirin. Wait for an ambulance. Do not try to drive yourself.     · You have angina symptoms (such as chest pain or pressure) that do not go away with rest or are not getting better within 5 minutes after you take a dose of nitroglycerin.    Call your doctor now or seek immediate medical care if:    · You have pain that does not get better after you take pain medicine.     · You have a fever over 100°F.     · You have loose stitches, or your incision comes open.     · Bright red blood has soaked through the bandage over your incision.     · You have signs of infection, such as:  ? Increased pain, swelling, warmth, or redness. ? Red streaks leading from the incision. ? Pus draining from the incision. ? Swollen lymph nodes in your neck, armpits, or groin. ? A fever.     · You have signs of a blood clot in a leg. If you had a vein removed from your leg, you may have tenderness and swelling while your leg heals. But signs of a blood clot may be in a different part of your leg and may include:  ? Pain in your calf, back of the knee, thigh, or groin. ?  Redness and swelling in your leg or groin.     · Your heartbeat feels very fast or slow, skips beats, or flutters.     · You are dizzy or lightheaded, or you feel like you may faint.     · You have new or increased shortness of breath.    Watch closely for changes in your health, and be sure to contact your doctor if:    · You gain weight suddenly, such as 3 pounds or more in 2 to 3 days.     · You have increased swelling in your legs, ankles, or feet.     · You have any concerns about your incision.     · You feel very sad or have other signs of depression, such as trouble sleeping or eating.     · You have questions about diet, exercise, quitting smoking, or stress reduction after surgery. Where can you learn more? Go to http://pete-michele.info/. Enter F759 in the search box to learn more about \"Coronary Artery Bypass Graft: What to Expect at Home. \"  Current as of: July 22, 2018  Content Version: 11.9  © 2006-2018 [employer]. Care instructions adapted under license by [employer] (which disclaims liability or warranty for this information). If you have questions about a medical condition or this instruction, always ask your healthcare professional. [place] [address] any warranty or liability for your use of this information. Reducing Heart Attack Risk With Daily Medicine: Care Instructions  Your Care Instructions    Heart disease is the number one cause of death. If you are at risk for heart disease, there are many medicines that can reduce your risk. These include:  · ACE inhibitors or ARBs. These are types of blood pressure medicines. They can reduce the risk of heart attacks and strokes if you are at high risk. · Statin medicines. These lower cholesterol. They can also reduce the risk of heart disease and strokes. · Aspirin and other antiplatelets. These prevent blood clots. They can help certain people lower their risk of a heart attack or stroke. · Beta-blocker medicines. These are a type of blood pressure and heart medicine. They can reduce the chance of early death if you have had a heart attack. All medicines can cause side effects.  So it is important to understand the pros and cons of any medicine you take. It is also important to take your medicines exactly as your doctor tells you to. Follow-up care is a key part of your treatment and safety. Be sure to make and go to all appointments, and call your doctor if you are having problems. It's also a good idea to know your test results and keep a list of the medicines you take. ACE inhibitors  ACE (angiotensin-converting enzyme) inhibitors are used for three main reasons. They lower blood pressure, protect the kidneys, and prevent heart attacks and strokes. Examples include benazepril (Lotensin), lisinopril (Prinivil, Zestril), and ramipril (Altace). An angiotensin II receptor blocker (ARB) may be used instead of an ACE inhibitor. ARBs help you in the same ways as ACE inhibitors. Examples include candesartan (Atacand), irbesartan (Avapro), and losartan (Cozaar). Before you start taking an ACE inhibitor or an ARB, make sure your doctor knows if:  · You are taking a water pill (diuretic). · You are taking potassium pills or using salt substitutes. · You are pregnant or breastfeeding. · You have had a kidney transplant or other kidney problems. ACE inhibitors and ARBs can cause side effects. Call your doctor right away if you have:  · Trouble breathing. · Swelling in your face, head, neck, or tongue. · Dizziness or lightheadedness. · A dry cough. Statins  Statins lower cholesterol. Examples include atorvastatin (Lipitor), lovastatin (Mevacor), pravastatin (Pravachol), and simvastatin (Zocor). Before you start taking a statin, make sure your doctor knows if:  · You have had a kidney transplant or other kidney problems. · You have liver disease. · You take any other prescription medicine, over-the-counter medicine, vitamins, supplements, or herbal remedies. · You are pregnant or breastfeeding. Statins can cause side effects. Call your doctor right away if you have:  · New, severe muscle aches. · Brown urine.   Aspirin  Taking an aspirin every day can lower your risk for a heart attack. A heart attack occurs when a blood vessel in the heart gets blocked. When this happens, oxygen can't get to the heart muscle, and part of the heart dies. Aspirin can help prevent blood clots that can block the blood vessels. Talk to your doctor before you start taking aspirin every day. He or she may recommend that you take one low-dose aspirin (81 mg) tablet each day, with a meal and a full glass of water. Taking aspirin isn't right for everyone. This is because it can cause serious bleeding. And you may not be able to use aspirin if you:  · Have asthma. · Have an ulcer or other stomach problem. · Take some other medicine (called a blood thinner) that prevents blood clots. · Are allergic to aspirin. Before having a surgery or procedure, tell your doctor or dentist that you take aspirin. He or she will tell you if you should stop taking aspirin beforehand. Make sure that you understand exactly what your doctor wants you to do. Aspirin can cause side effects. Call your doctor right away if you have:  · Unusual bleeding or bruising. · Nausea, vomiting, or heartburn. · Black or bloody stools. Beta-blockers  Beta-blockers are used for three main reasons. They lower blood pressure, relieve angina symptoms (such as chest pain or pressure), and reduce the chances of a second heart attack. They include atenolol (Tenormin), carvedilol (Coreg), and metoprolol (Lopressor). Before you start taking a beta-blocker, make sure your doctor knows if you have:  · Severe asthma or frequent asthma attacks. · A very slow pulse (less than 55 beats a minute). Beta-blockers can cause side effects. Call your doctor right away if you have:  · Wheezing or trouble breathing. · Dizziness or lightheadedness. · Asthma that gets worse. When should you call for help? Watch closely for changes in your health, and be sure to contact your doctor if you have any problems.   Where can you learn more? Go to http://pete-michele.info/. Enter R428 in the search box to learn more about \"Reducing Heart Attack Risk With Daily Medicine: Care Instructions. \"  Current as of: July 22, 2018  Content Version: 11.9  © 1857-7300 [email or web address]. Care instructions adapted under license by [employer] (which disclaims liability or warranty for this information). If you have questions about a medical condition or this instruction, always ask your healthcare professional. [address] any warranty or liability for your use of this information. Heart-Healthy Diet: Care Instructions  Your Care Instructions    A heart-healthy diet has lots of vegetables, fruits, nuts, beans, and whole grains, and is low in salt. It limits foods that are high in saturated fat, such as meats, cheeses, and fried foods. It may be hard to change your diet, but even small changes can lower your risk of heart attack and heart disease. Follow-up care is a key part of your treatment and safety. Be sure to make and go to all appointments, and call your doctor if you are having problems. It's also a good idea to know your test results and keep a list of the medicines you take. How can you care for yourself at home? Watch your portions  · Learn what a serving is. A \"serving\" and a \"portion\" are not always the same thing. Make sure that you are not eating larger portions than are recommended. For example, a serving of pasta is ½ cup. A serving size of meat is 2 to 3 ounces. A 3-ounce serving is about the size of a deck of cards. Measure serving sizes until you are good at [place]" them. Keep in mind that restaurants often serve portions that are 2 or 3 times the size of one serving. · To keep your energy level up and keep you from feeling hungry, eat often but in smaller portions. · Eat only the number of calories you need to stay at a healthy weight.  If you need to lose weight, eat fewer calories than your body burns (through exercise and other physical activity). Eat more fruits and vegetables  · Eat a variety of fruit and vegetables every day. Dark green, deep orange, red, or yellow fruits and vegetables are especially good for you. Examples include spinach, carrots, peaches, and berries. · Keep carrots, celery, and other veggies handy for snacks. Buy fruit that is in season and store it where you can see it so that you will be tempted to eat it. · Cook dishes that have a lot of veggies in them, such as stir-fries and soups. Limit saturated and trans fat  · Read food labels, and try to avoid saturated and trans fats. They increase your risk of heart disease. Trans fat is found in many processed foods such as cookies and crackers. · Use olive or canola oil when you cook. Try cholesterol-lowering spreads, such as Benecol or Take Control. · Bake, broil, grill, or steam foods instead of frying them. · Choose lean meats instead of high-fat meats such as hot dogs and sausages. Cut off all visible fat when you prepare meat. · Eat fish, skinless poultry, and meat alternatives such as soy products instead of high-fat meats. Soy products, such as tofu, may be especially good for your heart. · Choose low-fat or fat-free milk and dairy products. Eat fish  · Eat at least two servings of fish a week. Certain fish, such as salmon and tuna, contain omega-3 fatty acids, which may help reduce your risk of heart attack. Eat foods high in fiber  · Eat a variety of grain products every day. Include whole-grain foods that have lots of fiber and nutrients. Examples of whole-grain foods include oats, whole wheat bread, and brown rice. · Buy whole-grain breads and cereals, instead of white bread or pastries. Limit salt and sodium  · Limit how much salt and sodium you eat to help lower your blood pressure. · Taste food before you salt it. Add only a little salt when you think you need it. With time, your taste buds will adjust to less salt. · Eat fewer snack items, fast foods, and other high-salt, processed foods. Check food labels for the amount of sodium in packaged foods. · Choose low-sodium versions of canned goods (such as soups, vegetables, and beans). Limit sugar  · Limit drinks and foods with added sugar. These include candy, desserts, and soda pop. Limit alcohol  · Limit alcohol to no more than 2 drinks a day for men and 1 drink a day for women. Too much alcohol can cause health problems. When should you call for help? Watch closely for changes in your health, and be sure to contact your doctor if:    · You would like help planning heart-healthy meals. Where can you learn more? Go to http://pete-michele.info/. Enter V137 in the search box to learn more about \"Heart-Healthy Diet: Care Instructions. \"  Current as of: July 22, 2018  Content Version: 11.9  © 2006-2018 [employer]. Care instructions adapted under license by [employer] (which disclaims liability or warranty for this information). If you have questions about a medical condition or this instruction, always ask your healthcare professional. [place] [address] any warranty or liability for your use of this information.

## 2019-06-17 NOTE — PROGRESS NOTES
Cardiac Rehab: Spoke with patient regarding referral to cardiac rehab. Patient meets admission criteria based on CABG (06/12/19). Written information about Cardiac Rehab given and reviewed with patient. Discussed lifestyle modifications to promote cardiac wellness. Patient indicated that he may want to participate in the cardiac rehab program at the [facility] which is closer to his home in [facility]. We will forward his referral for Cardiac Rehab to the [facility] facility as requested. His Cardiologist is Dr. Jovan Pradhan.       Thank you,  COOKIE GrissomN, RN  Cardiopulmonary Rehabilitation Nurse Liaison  [facility]

## 2019-06-17 NOTE — PROGRESS NOTES
Bedside shift report given to Lupe Carmona RN (oncoming nurse) by Becca Burleson RN (offgoing nurse). Bedside shift report included the following information: SBAR, Kardex, ED Summary, MAR, and Recent Results.

## 2019-06-17 NOTE — DISCHARGE SUMMARY
Discharge Summary     Patient ID:  Justina Petersen  [id number]  54 y.o.  1945    Admit date: 6/6/2019    Discharge date:  6/17/2019    Admitting Physician: Daphne Keller MD     Discharge Physician: TREVER Hunter/Dr. Delmis Davila    Admission Diagnoses: Atrial fibrillation with RVR [facility]) [I48.91]  CAD (coronary artery disease) [I25.10]    Discharge Diagnoses:   Patient Active Problem List    Diagnosis Date Noted    Elevated brain natriuretic peptide (BNP) level 06/14/2019    History of smoking 25-50 pack years 06/13/2019    CAD (coronary artery disease) 06/10/2019    S/P CABG x 3 06/10/2019    Tracheobronchomalacia 06/10/2019    Hemoptysis 06/09/2019    Trigeminal neuralgia 06/09/2019    Atrial fibrillation with RVR ([facility] 06/06/2019    NSTEMI (non-ST elevated myocardial infarction) ([facility] 06/06/2019    Chronic systolic congestive heart failure ([facility] 01/31/2019    TIA (transient ischemic attack) 07/20/2018    Dyslipidemia 04/07/2017    Hypertension 04/07/2017    Coronary atherosclerosis of native coronary vessel 04/07/2017    Atrial fibrillation ([facility] 04/07/2017       Procedures this admission:  Diagnostic left heart catheterization  Coronary Artery Bypass Graft Surgery, MAZE and clipping of left atrial appendage  Consults: Cardiac Surgery, Pulmonary/Intensive Care    Hospital Course: The patient is a 66 y.o. male who presented to the ER with severe right sided jaw pain and chest pain. He was found to be in atrial fibrillation with RVR. His troponin was elevated. He has known history of a-fib and CAD with prior MI and PCI. He had a prior CVA when he was off of Eliquis. His troponin dora to 21.30. He converted to sinus rhythm spontaneously prior to cardioversion. He underwent cardiac catheterization that showed severe multivessel disease. The proximal LCx was occluded and could not be crossed.  He denied any prior episodes of chest pain prior to episode that brought him to the ER. CABG/MAZE was planned. He developed hemoptysis while on IV Heparin. He was seen by [facility] and underwent bronchoscopy that showed diffuse alveolar bleeding. Surgery was moved up. He underwent CABG x 3 with LIMA to LAD, reverse SVG to the OM, reverse SVG to the right posterolateral, MAZE, and left atrial appendage clipping on 6/10/19. He was transferred to [facility] on POD 1. He was in sinus rhythm post op. Eliquis was resumed. He was seen by wound care for leg incisions that opened up. A wound vac was placed. He had recurrent a-fib on 6/14. Rate became elevated and BB was increased. He progressed with PT but continued to feel weak. STR was recommended for continued rehab. He converted back to sinus rhythm. The morning of 6/17/19, patient was feeling well and was determined stable and ready for discharge to rehab facility. Patient was instructed on the importance of medication compliance and outpatient follow up. For maximized medical therapy for CAD, patient will continue ASA, BB, ACE-I and statin as well. **He is discharged with a wound vac on leg incision. The patient will have close transitional care follow up with [facility] Dr. Gisela Mahmood on June 24th at [address] [place] [address] AT [place]). The patient will follow up with Dr. Juany Dixon on July 2nd at 2:00pm and has been referred to cardiac rehab. DISPOSITION: The patient is being discharged to rehab facility in stable condition on a low saturated fat, low cholesterol and low salt diet. The patient is instructed to advance activities as tolerated to the limit of fatigue or shortness of breath. The patient is instructed to avoid all heavy lifting or activities that strain the chest or upper arm muscles. Strenuous activity should be avoided. The patient is instructed to watch for signs of infection which include: increasing area of redness, fever or purulent drainage at the incision site.  The patient is instructed to call the office or return to the ER for immediate evaluation for any shortness of breath or chest pain not relieved by NTG. Discharge Exam:   Visit Vitals  /74 (BP 1 Location: Left arm, BP Patient Position: At rest)   Pulse 69   Temp 97.1 °F (36.2 °C)   Resp 18   Ht 5' 8\" (1.727 m)   Wt 206 lb 1.6 oz (93.5 kg)   SpO2 98%   BMI 31.34 kg/m²         Physical Exam:  General: Well Developed, Well Nourished, No Acute Distress, Alert & Oriented  Neck: supple, no JVD  Heart: S1S2 with RRR without murmurs or gallops  Lungs: Clear throughout auscultation bilaterally without adventitious sounds  Abd: soft, nontender, nondistended, with good bowel sounds  Ext: warm, no edema  Sternal incision: clean, dry, and intact  Skin: warm and dry        Patient Instructions:   Current Discharge Medication List      START taking these medications    Details   acetaminophen (TYLENOL) 325 mg tablet Take 2 Tabs by mouth every four (4) hours as needed for Pain. oxyCODONE-acetaminophen (PERCOCET) 5-325 mg per tablet Take 1 Tab by mouth every four (4) hours as needed for Pain for up to 3 days. Max Daily Amount: 6 Tabs. Qty: 16 Tab, Refills: 0    Associated Diagnoses: S/P CABG x 3      rosuvastatin (CRESTOR) 40 mg tablet Take 1 Tab by mouth nightly. Qty: 90 Tab, Refills: 4         CONTINUE these medications which have CHANGED    Details   aspirin delayed-release 81 mg tablet Take 1 Tab by mouth nightly. Qty: 1 Tab, Refills: 0      losartan-hydroCHLOROthiazide (HYZAAR) 100-25 mg per tablet Take 1 Tab by mouth nightly. Qty: 30 Tab, Refills: 6      metoprolol succinate (TOPROL-XL) 100 mg tablet Take 1 Tab by mouth two (2) times a day. Qty: 60 Tab, Refills: 5         CONTINUE these medications which have NOT CHANGED    Details   senna (SENNA-GEN) 8.6 mg tablet Take 2 Tabs by mouth as needed for Constipation. nitroglycerin (NITROSTAT) 0.4 mg SL tablet TAKE AS DIRECTED.   Qty: 25 Tab, Refills: 11      apixaban (ELIQUIS) 5 mg tablet Take 1 Tab by mouth two (2) times a day. Qty: 60 Tab, Refills: 11    Associated Diagnoses: Atrial flutter, unspecified type (HCC)      TURMERIC ROOT EXTRACT PO Take  by mouth. APPLE CIDER VINEGAR PO Take  by mouth. co-enzyme Q-10 (CO Q-10) 100 mg capsule Take 100 mg by mouth daily. DOCOSAHEXANOIC ACID/EPA (FISH OIL PO) Take  by mouth.      multivitamin (ONE A DAY) tablet Take 1 Tab by mouth daily. CYANOCOBALAMIN, VITAMIN B-12, (VITAMIN B12 PO) Take  by mouth. CA/D3/MAG OX/ZINC//ARLETTE/BOR (CALCIUM 600-D3 PLUS PO) Take  by mouth. B INFANTIS/B ANI/B CHESTER/B BIFID (PROBIOTIC 4X PO) Take  by mouth daily. omeprazole (PRILOSEC OTC) 20 mg tablet Take 20 mg by mouth daily. Indications: patient unsure of particular medication      gabapentin (NEURONTIN) 300 mg capsule Take 400 mg by mouth two (2) times a day. carBAMazepine (TEGRETOL) 200 mg tablet Take 200 mg by mouth two (2) times a day.          STOP taking these medications       nicotinic acid (NIACIN) 500 mg tablet Comments:   Reason for Stopping:         fenofibrate (LOFIBRA) 160 mg tablet Comments:   Reason for Stopping:         HYDROcodone-acetaminophen (NORCO)  mg tablet Comments:   Reason for Stopping:                 Signed:  Sherrell Morin PA-C  6/17/2019  10:19 AM

## 2019-06-17 NOTE — PROGRESS NOTES
Bedside report received from Evans Simental RN. Opportunity for questions, concerns. Patient involved.

## 2019-06-17 NOTE — PROGRESS NOTES
Problem: Mobility Impaired (Adult and Pediatric)  Goal: *Acute Goals and Plan of Care (Insert Text)  Description  LTG:  (1.)Mr. Alexandra Mares will move from supine to sit and sit to supine , scoot up and down and roll side to side in bed with SUPERVISION within 7 treatment day(s). (2.)Mr. Alexandra Mares will transfer from bed to chair and chair to bed with STAND BY ASSIST using the least restrictive device within 7 treatment day(s). (3.)Mr. Alexandra Mares will ambulate with STAND BY ASSIST for 250 feet with the least restrictive device within 7 treatment day(s). Goal met 6/17/19  (4.)Mr. Alexandra Mares will participate in therapeutic activity/exercises x 23 minutes for increased activity tolerance within 7 treatment days. Goal met 6/17/19    ________________________________________________________________________________________________     Outcome: Progressing Towards Goal     PHYSICAL THERAPY: Daily Note and AM 6/17/2019  INPATIENT: PT Visit Days : 6  [place] / [place] / Product Type: Medicare /       NAME/AGE/GENDER: Abhinav Ellsworth is a 66 y.o. male   PRIMARY DIAGNOSIS: Atrial fibrillation with RVR (HCC) [I48.91]  CAD (coronary artery disease) [I25.10] S/P CABG x 3   S/P CABG x 3    Procedure(s) (LRB):  BRONCHOSCOPY at bedside (N/A)  7 Days Post-Op  ICD-10: Treatment Diagnosis:    · Generalized Muscle Weakness (M62.81)  · Difficulty in walking, Not elsewhere classified (R26.2)   Precaution/Allergies:  Codeine and Iodinated contrast- oral and iv dye      ASSESSMENT:     Mr. Alexandra Mares is a 66 y.o. male in the hospital for the above who was sitting in recliner upon arrival.  Pt reports that he lives in a one story house with his significant other that has 1 step to enter. Pt also reported that PTA he was independent with ADLs and ambulated with independence. 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 Pt admitted to no recent falls in the past year.    Patient presents sitting in chair, no complaints and agreeable to therapy. Performs sit-stand with CGA and verbal cues. Ambulates in hallway with slow, slightly unsteady gait using walker. Verbal cues for posture, walker management, and gait safety. Returned to room and after a short rest break the patient  then performs below LE exercises with good participation. He is progressing well however still functioning far below baseline with strength, balance. Will continue with therapy efforts. Scheduled for discharge to rehab today. This section established at most recent assessment   PROBLEM LIST (Impairments causing functional limitations):  1. Decreased Strength  2. Decreased Transfer Abilities  3. Decreased Ambulation Ability/Technique  4. Decreased Balance  5. Increased Pain  6. Decreased Activity Tolerance   INTERVENTIONS PLANNED: (Benefits and precautions of physical therapy have been discussed with the patient.)  1. Balance Exercise  2. Bed Mobility  3. Family Education  4. Gait Training  5. Neuromuscular Re-education/Strengthening  6. Therapeutic Activites  7. Therapeutic Exercise/Strengthening  8. Transfer Training     TREATMENT PLAN: Frequency/Duration: twice daily for duration of hospital stay  Rehabilitation Potential For Stated Goals: Good     REHAB RECOMMENDATIONS (at time of discharge pending progress):    Placement: It is my opinion, based on this patient's performance to date, that Mr. Paris Amor may benefit from intensive therapy at a [address] [place] [address] after discharge due to the functional deficits listed above that are likely to improve with skilled rehabilitation and concerns that he/she may be unsafe to be unsupervised at home due to weakness, decreased activity tolerance, fall risk.   Equipment:    None at this time              HISTORY:   History of Present Injury/Illness (Reason for Referral):  See H&P  Past Medical History/Comorbidities:   Mr. Paris Amor  has a past medical history of Atrial fibrillation ([facility] Atrial flutter ([facility] (2017), CAD (coronary artery disease), Cancer ([facility] Ill-defined condition, Sleep apnea, and Stroke ([facility] Mr. Gracia Moffett  has a past surgical history that includes hx heart catheterization (2006); hx coronary stent placement; and hx prostate surgery. Social History/Living Environment:   Home Environment: Private residence  # Steps to Enter: 1  One/Two Story Residence: One story  Living Alone: No  Support Systems: Spouse/Significant Other/Partner  Patient Expects to be Discharged to[de-identified] Unknown  Current DME Used/Available at Home: None  Tub or Shower Type: Shower  Prior Level of Function/Work/Activity:  Lives with significant other and PTA pt was independent with ADLs and ambulation. Number of Personal Factors/Comorbidities that affect the Plan of Care: 1-2: MODERATE COMPLEXITY   EXAMINATION:   Most Recent Physical Functioning:   Gross Assessment:                  Posture:     Balance:  Sitting: Intact  Sitting - Static: Good (unsupported)  Sitting - Dynamic: Fair (occasional)  Standing: Impaired  Standing - Static: Fair  Standing - Dynamic : Fair Bed Mobility:     Wheelchair Mobility:     Transfers:  Sit to Stand: Contact guard assistance  Stand to Sit: Contact guard assistance  Gait:     Distance (ft): 200 Feet (ft)  Assistive Device: Gait belt;Walker, rolling  Ambulation - Level of Assistance: Contact guard assistance      Body Structures Involved:  1. Heart  2. Lungs  3. Bones  4. Muscles Body Functions Affected:  1. Cardio  2. Respiratory  3. Neuromusculoskeletal  4. Movement Related Activities and Participation Affected:  1. General Tasks and Demands  2. Mobility  3. Self Care  4. Domestic Life  5.  Community, Social and [place]   Number of elements that affect the Plan of Care: 4+: HIGH COMPLEXITY   CLINICAL PRESENTATION:   Presentation: Evolving clinical presentation with changing clinical characteristics: MODERATE COMPLEXITY   CLINICAL DECISION MAKIN [facility] [address] [id number] AM-PAC 6 Clicks   Basic Mobility Inpatient Short Form  How much difficulty does the patient currently have. .. Unable A Lot A Little None   1. Turning over in bed (including adjusting bedclothes, sheets and blankets)? ? 1   ? 2   ? 3   ? 4   2. Sitting down on and standing up from a chair with arms ( e.g., wheelchair, bedside commode, etc.)   ? 1   ? 2   ? 3   ? 4   3. Moving from lying on back to sitting on the side of the bed?   ? 1   ? 2   ? 3   ? 4   How much help from another person does the patient currently need. .. Total A Lot A Little None   4. Moving to and from a bed to a chair (including a wheelchair)? ? 1   ? 2   ? 3   ? 4   5. Need to walk in hospital room? ? 1   ? 2   ? 3   ? 4   6. Climbing 3-5 steps with a railing? ? 1   ? 2   ? 3   ? 4   © 2007, Trustees of [address] [place] [address], under license to [employer]. All rights reserved      Score:  Initial: 18 Most Recent: X (Date: -- )    Interpretation of Tool:  Represents activities that are increasingly more difficult (i.e. Bed mobility, Transfers, Gait). Medical Necessity:     · Patient demonstrates good  ·  rehab potential due to higher previous functional level. Reason for Services/Other Comments:  · Patient continues to require skilled intervention due to decreased activity tolerance and functional mobility. · .   Use of outcome tool(s) and clinical judgement create a POC that gives a: Questionable prediction of patient's progress: MODERATE COMPLEXITY            TREATMENT:   (In addition to Assessment/Re-Assessment sessions the following treatments were rendered)   Pre-treatment Symptoms/Complaints: \"good morning\"  Pain: Initial:   Pain Intensity 1: 0  Post Session:  0/10     Therapeutic Activity: (   14 minutes ):  Therapeutic activities including Chair transfers, sit-stand transfers, standing balance, and Ambulation on level ground to improve mobility, strength, balance and activity tolerance.   Required minimal assist and cueing   to promote static and dynamic balance in standing and safe use of RW with transfers/ambulation . Therapeutic Exercise: ( 10 minutes ):  Exercises per grid below to improve mobility, strength and balance. Required minimum verbal cues to promote proper body breathing techniques. Progressed repetitions and complexity of movement as indicated. Date:  6/17/19 Date: Date:   ACTIVITY/EXERCISE AM PM AM PM AM PM   Seated LAQ 20        Seated marching 20        Seated AP 20        Seated hip abd/add 20        Shoulder shugs 20        Gluteal sets 20        Seated biceps/triceps         B = bilateral; AA = active assistive; A = active; P = passive        Braces/Orthotics/Lines/Etc:   · Wound vac  Treatment/Session Assessment:    · Response to Treatment:  Progressing well with gait and exercises  · Interdisciplinary Collaboration:   o Physical Therapy Assistant  o Registered Nurse  · After treatment position/precautions:   o Up in chair  o Bed alarm/tab alert on  o Bed/Chair-wheels locked  o Call light within reach  o RN notified   · Compliance with Program/Exercises: Compliant all of the time  · Recommendations/Intent for next treatment session: \"Next visit will focus on advancements to more challenging activities and reduction in assistance provided\".   Total Treatment Duration:  PT Patient Time In/Time Out  Time In: 0918  Time Out: 0942    Tracee Young, PTA

## 2019-06-17 NOTE — PROGRESS NOTES
Pt to discharge to [facility] this day. Transport arranged via [employer] for 11:30am . SNF packet complete. This CM informed pt and his daughter, Meek Flanagan, of transport time. No additional discharge needs voiced at this time. Milestones met.      Care Management Interventions  PCP Verified by CM: Yes(Dr. Vale Oliveira (last visit January 2019))  Mode of Transport at Discharge: BLS  Transition of Care Consult (CM Consult): Discharge Planning, SNF  Physical Therapy Consult: Yes  Occupational Therapy Consult: No  Speech Therapy Consult: No  Current Support Network: Own Home, Other(Pt lives with his girlfriend, Tran Welch)  Confirm Follow Up Transport: Other (see comment)(Scheduled transport)  Plan discussed with Pt/Family/Caregiver: Yes  Freedom of Choice Offered: Yes  Discharge Location  Discharge Placement: Skilled nursing facility

## 2019-06-24 PROBLEM — R60.0 LOCALIZED EDEMA: Status: ACTIVE | Noted: 2019-06-24

## 2019-07-13 ENCOUNTER — HOME HEALTH ADMISSION (OUTPATIENT)
Dept: HOME HEALTH SERVICES | Facility: HOME HEALTH | Age: 74
End: 2019-07-13

## 2019-07-16 ENCOUNTER — HOSPITAL ENCOUNTER (EMERGENCY)
Age: 74
Discharge: HOME | End: 2019-07-16
Attending: EMERGENCY MEDICINE
Payer: MEDICARE

## 2019-07-16 ENCOUNTER — APPOINTMENT (OUTPATIENT)
Dept: GENERAL RADIOLOGY | Age: 74
End: 2019-07-16
Attending: EMERGENCY MEDICINE
Payer: MEDICARE

## 2019-07-16 ENCOUNTER — HOSPITAL ENCOUNTER (EMERGENCY)
Dept: NUCLEAR MEDICINE | Age: 74
Discharge: HOME | End: 2019-07-16
Attending: EMERGENCY MEDICINE
Payer: MEDICARE

## 2019-07-16 ENCOUNTER — APPOINTMENT (OUTPATIENT)
Dept: CT IMAGING | Age: 74
End: 2019-07-16
Attending: EMERGENCY MEDICINE
Payer: MEDICARE

## 2019-07-16 VITALS
SYSTOLIC BLOOD PRESSURE: 154 MMHG | TEMPERATURE: 97.9 F | OXYGEN SATURATION: 100 % | WEIGHT: 203 LBS | BODY MASS INDEX: 30.77 KG/M2 | HEART RATE: 85 BPM | RESPIRATION RATE: 18 BRPM | DIASTOLIC BLOOD PRESSURE: 89 MMHG | HEIGHT: 68 IN

## 2019-07-16 DIAGNOSIS — I50.9 ACUTE ON CHRONIC CONGESTIVE HEART FAILURE, UNSPECIFIED HEART FAILURE TYPE (HCC): Primary | ICD-10-CM

## 2019-07-16 LAB
ALBUMIN SERPL-MCNC: 3.1 G/DL (ref 3.2–4.6)
ALBUMIN/GLOB SERPL: 0.8 {RATIO} (ref 1.2–3.5)
ALP SERPL-CCNC: 118 U/L (ref 50–136)
ALT SERPL-CCNC: 22 U/L (ref 12–65)
ANION GAP SERPL CALC-SCNC: 7 MMOL/L (ref 7–16)
AST SERPL-CCNC: 18 U/L (ref 15–37)
BASOPHILS # BLD: 0.1 K/UL (ref 0–0.2)
BASOPHILS NFR BLD: 1 % (ref 0–2)
BILIRUB SERPL-MCNC: 0.5 MG/DL (ref 0.2–1.1)
BNP SERPL-MCNC: 888 PG/ML
BUN SERPL-MCNC: 14 MG/DL (ref 8–23)
CALCIUM SERPL-MCNC: 8.9 MG/DL (ref 8.3–10.4)
CHLORIDE SERPL-SCNC: 95 MMOL/L (ref 98–107)
CO2 SERPL-SCNC: 29 MMOL/L (ref 21–32)
CREAT SERPL-MCNC: 0.99 MG/DL (ref 0.8–1.5)
DIFFERENTIAL METHOD BLD: ABNORMAL
EOSINOPHIL # BLD: 0.1 K/UL (ref 0–0.8)
EOSINOPHIL NFR BLD: 2 % (ref 0.5–7.8)
ERYTHROCYTE [DISTWIDTH] IN BLOOD BY AUTOMATED COUNT: 15.5 % (ref 11.9–14.6)
GLOBULIN SER CALC-MCNC: 3.9 G/DL (ref 2.3–3.5)
GLUCOSE SERPL-MCNC: 120 MG/DL (ref 65–100)
HCT VFR BLD AUTO: 30.1 % (ref 41.1–50.3)
HGB BLD-MCNC: 9.9 G/DL (ref 13.6–17.2)
IMM GRANULOCYTES # BLD AUTO: 0 K/UL (ref 0–0.5)
IMM GRANULOCYTES NFR BLD AUTO: 0 % (ref 0–5)
LYMPHOCYTES # BLD: 0.8 K/UL (ref 0.5–4.6)
LYMPHOCYTES NFR BLD: 11 % (ref 13–44)
MCH RBC QN AUTO: 30.8 PG (ref 26.1–32.9)
MCHC RBC AUTO-ENTMCNC: 32.9 G/DL (ref 31.4–35)
MCV RBC AUTO: 93.8 FL (ref 79.6–97.8)
MONOCYTES # BLD: 0.7 K/UL (ref 0.1–1.3)
MONOCYTES NFR BLD: 10 % (ref 4–12)
NEUTS SEG # BLD: 5.9 K/UL (ref 1.7–8.2)
NEUTS SEG NFR BLD: 77 % (ref 43–78)
NRBC # BLD: 0 K/UL (ref 0–0.2)
PLATELET # BLD AUTO: 236 K/UL (ref 150–450)
PMV BLD AUTO: 8.6 FL (ref 9.4–12.3)
POTASSIUM SERPL-SCNC: 3.3 MMOL/L (ref 3.5–5.1)
PROT SERPL-MCNC: 7 G/DL (ref 6.3–8.2)
RBC # BLD AUTO: 3.21 M/UL (ref 4.23–5.6)
SODIUM SERPL-SCNC: 131 MMOL/L (ref 136–145)
TROPONIN I SERPL-MCNC: 0.04 NG/ML (ref 0.02–0.05)
WBC # BLD AUTO: 7.7 K/UL (ref 4.3–11.1)

## 2019-07-16 PROCEDURE — 74011250637 HC RX REV CODE- 250/637: Performed by: EMERGENCY MEDICINE

## 2019-07-16 PROCEDURE — 96374 THER/PROPH/DIAG INJ IV PUSH: CPT | Performed by: EMERGENCY MEDICINE

## 2019-07-16 PROCEDURE — 80053 COMPREHEN METABOLIC PANEL: CPT

## 2019-07-16 PROCEDURE — 71046 X-RAY EXAM CHEST 2 VIEWS: CPT

## 2019-07-16 PROCEDURE — 99285 EMERGENCY DEPT VISIT HI MDM: CPT | Performed by: EMERGENCY MEDICINE

## 2019-07-16 PROCEDURE — 74011250636 HC RX REV CODE- 250/636: Performed by: EMERGENCY MEDICINE

## 2019-07-16 PROCEDURE — 85025 COMPLETE CBC W/AUTO DIFF WBC: CPT

## 2019-07-16 PROCEDURE — 83880 ASSAY OF NATRIURETIC PEPTIDE: CPT

## 2019-07-16 PROCEDURE — 93005 ELECTROCARDIOGRAM TRACING: CPT | Performed by: EMERGENCY MEDICINE

## 2019-07-16 PROCEDURE — 78582 LUNG VENTILAT&PERFUS IMAGING: CPT

## 2019-07-16 PROCEDURE — 84484 ASSAY OF TROPONIN QUANT: CPT

## 2019-07-16 RX ORDER — POTASSIUM CHLORIDE 20 MEQ/1
40 TABLET, EXTENDED RELEASE ORAL
Status: COMPLETED | OUTPATIENT
Start: 2019-07-16 | End: 2019-07-16

## 2019-07-16 RX ORDER — FUROSEMIDE 10 MG/ML
60 INJECTION INTRAMUSCULAR; INTRAVENOUS
Status: COMPLETED | OUTPATIENT
Start: 2019-07-16 | End: 2019-07-16

## 2019-07-16 RX ORDER — POTASSIUM CHLORIDE 1500 MG/1
TABLET, FILM COATED, EXTENDED RELEASE ORAL
Qty: 30 TAB | Refills: 0 | Status: SHIPPED | OUTPATIENT
Start: 2019-07-16 | End: 2019-08-02

## 2019-07-16 RX ADMIN — FUROSEMIDE 60 MG: 10 INJECTION, SOLUTION INTRAMUSCULAR; INTRAVENOUS at 23:21

## 2019-07-16 RX ADMIN — POTASSIUM CHLORIDE 40 MEQ: 20 TABLET, EXTENDED RELEASE ORAL at 23:21

## 2019-07-16 NOTE — ED TRIAGE NOTES
Reports SOB. States can only walk 10-15 feet and \"can't breathe. \"  Patient recently had a CABG-3 bypass.

## 2019-07-16 NOTE — ED NOTES
Report to [facility]. Deepak Felix RN to [facility] are of this pt at this time.

## 2019-07-16 NOTE — ED PROVIDER NOTES
57-year-old  male 5 weeks status post CABG presents to the emergency department complaining of severe dyspnea on exertion over the last 4 to 5 days, severe orthopnea as well. Patient is currently on blood thinners but does describe increased lower extremity edema left worse than right, but they did harvest the veins from the left leg. Denies fever or chills or cough. The history is provided by the patient and a relative. Shortness of Breath   This is a new problem. The average episode lasts 5 days. The problem occurs continuously. The current episode started more than 2 days ago. The problem has been gradually worsening. Associated symptoms include orthopnea and leg swelling. Pertinent negatives include no fever, no headaches, no coryza, no rhinorrhea, no sore throat, no swollen glands, no ear pain, no neck pain, no cough, no sputum production, no hemoptysis, no wheezing, no PND, no chest pain, no syncope, no vomiting, no abdominal pain, no rash, no leg pain and no claudication. It is unknown what precipitated the problem. He has tried nothing for the symptoms. The treatment provided no relief. He has had no prior hospitalizations. He has had no prior ED visits. He has had no prior ICU admissions. Associated medical issues include CAD, heart failure and recent surgery (CABG 5 weeks ago). Associated medical issues do not include asthma, COPD, pneumonia, chronic lung disease, PE, past MI or DVT.         Past Medical History:   Diagnosis Date    Atrial fibrillation ([facility] [address]     Atrial flutter ([facility] [address] 4/7/2017    CAD (coronary artery disease)     Cancer (HCC)     prostate    Ill-defined condition     trigeminal neuralgia    Myocardial infarction ([facility] [address] 06/2019    NSTEMI     Sleep apnea     does not wear CPAP    Stroke [facility])        Past Surgical History:   Procedure Laterality Date    HX CORONARY ARTERY BYPASS GRAFT  06/10/2019    3 vessel CABG with LIMA-LAD,SVG-OM,and SVG-R posterior lateral branch with MAZE & LA appendage clipping.  HX CORONARY STENT PLACEMENT      HX HEART CATHETERIZATION  08/14/2006    HX HEART CATHETERIZATION  06/07/2019    Severe 3 vessel CAD with LV EF=40-45% and unsuccessful LCX PCI.     HX PROSTATE SURGERY           Family History:   Problem Relation Age of Onset    Other Other         cerebral aneurysm        Social History     Socioeconomic History    Marital status:      Spouse name: Not on file    Number of children: Not on file    Years of education: Not on file    Highest education level: Not on file   Occupational History    Not on file   Social Needs    Financial resource strain: Not on file    Food insecurity:     Worry: Not on file     Inability: Not on file    Transportation needs:     Medical: Not on file     Non-medical: Not on file   Tobacco Use    Smoking status: Former Smoker     Packs/day: 2.00     Years: 14.00     Pack years: 28.00     Types: Cigarettes    Smokeless tobacco: Never Used    Tobacco comment: quit at age 27   Substance and Sexual Activity    Alcohol use: No    Drug use: Not on file    Sexual activity: Not on file   Lifestyle    Physical activity:     Days per week: Not on file     Minutes per session: Not on file    Stress: Not on file   Relationships    Social connections:     Talks on phone: Not on file     Gets together: Not on file     Attends Mandaen service: Not on file     Active member of club or organization: Not on file     Attends meetings of clubs or organizations: Not on file     Relationship status: Not on file    Intimate partner violence:     Fear of current or ex partner: Not on file     Emotionally abused: Not on file     Physically abused: Not on file     Forced sexual activity: Not on file   Other Topics Concern   [address] Service Not Asked    Blood Transfusions Not Asked    Caffeine Concern Not Asked    Occupational Exposure Not Asked   [place] Hazards Not Asked    Sleep Concern Not Asked    Stress Concern Not Asked    Weight Concern Not Asked    Special Diet Not Asked    Back Care Not Asked    Exercise Not Asked    Bike Helmet Not Asked   [address] Not Asked    Self-Exams Not Asked   Social History Narrative    Not on file         ALLERGIES: Codeine and Iodinated contrast- oral and iv dye    Review of Systems   Constitutional: Negative for fever. HENT: Negative for ear pain, rhinorrhea and sore throat. Respiratory: Positive for shortness of breath. Negative for cough, hemoptysis, sputum production, chest tightness and wheezing. Cardiovascular: Positive for orthopnea and leg swelling. Negative for chest pain, claudication, syncope and PND. Gastrointestinal: Negative for abdominal pain, nausea and vomiting. Musculoskeletal: Negative for neck pain. Skin: Negative for rash. Neurological: Negative for headaches. All other systems reviewed and are negative. Vitals:    07/16/19 1604   BP: 128/78   Pulse: 86   Resp: 18   Temp: 97.2 °F (36.2 °C)   SpO2: 98%   Weight: 92.1 kg (203 lb)   Height: 5' 8\" (1.727 m)            Physical Exam   Constitutional: He is oriented to person, place, and time. He appears well-developed and well-nourished. He appears distressed (mild). HENT:   Head: Normocephalic and atraumatic. Right Ear: External ear normal.   Left Ear: External ear normal.   Mouth/Throat: Oropharynx is clear and moist.   Eyes: Pupils are equal, round, and reactive to light. Conjunctivae and EOM are normal.   Neck: Normal range of motion. Neck supple. No thyromegaly present. Cardiovascular: Normal rate, regular rhythm, normal heart sounds and intact distal pulses. Pulmonary/Chest: Effort normal. He has no wheezes. He has no rhonchi. He has rales in the right lower field and the left lower field. Abdominal: Soft. Bowel sounds are normal. There is no tenderness. No hernia. Musculoskeletal: Normal range of motion.  He exhibits edema (3+ edema left lower extremity 2+ edema right lower extremitywithout evidence of redness or tenderness). Neurological: He is alert and oriented to person, place, and time. Skin: Skin is warm and dry. Capillary refill takes less than 2 seconds. Psychiatric: He has a normal mood and affect. His behavior is normal.   Nursing note and vitals reviewed. MDM  Number of Diagnoses or Management Options  Acute on chronic congestive heart failure, unspecified heart failure type [facility]): new and requires workup     Amount and/or Complexity of Data Reviewed  Clinical lab tests: ordered and reviewed  Tests in the radiology section of CPT®: ordered and reviewed  Obtain history from someone other than the patient: yes  Review and summarize past medical records: yes  Discuss the patient with other providers: yes (Discussed with Dr. Sara Graham of cardiology, recommends Lasix dose IV in the emergency department followed by Lasix 40 mg twice a day for 3 days with K-Dur 40 mEq twice a day for 3 days then decrease both the once daily.   Follow up with Dr. Adam Hoffman 1 week.)  Independent visualization of images, tracings, or specimens: yes    Risk of Complications, Morbidity, and/or Mortality  Presenting problems: high  Diagnostic procedures: moderate  Management options: moderate    Patient Progress  Patient progress: improved         Procedures      Results Reviewed:      Recent Results (from the past 24 hour(s))   EKG, 12 LEAD, INITIAL    Collection Time: 07/16/19  4:03 PM   Result Value Ref Range    Ventricular Rate 86 BPM    Atrial Rate 86 BPM    P-R Interval 184 ms    QRS Duration 118 ms    Q-T Interval 416 ms    QTC Calculation (Bezet) 497 ms    Calculated P Axis 83 degrees    Calculated R Axis 15 degrees    Calculated T Axis -165 degrees    Diagnosis       Sinus rhythm with marked sinus arrhythmia  Incomplete right bundle branch block  ST & T wave abnormality, consider inferolateral ischemia  Prolonged QT  Abnormal ECG  When compared with ECG of 11-JUN-2019 06:24,  Non-specific change in ST segment in Anterior leads  T wave inversion now evident in Inferior leads  T wave inversion more evident in Lateral leads     CBC WITH AUTOMATED DIFF    Collection Time: 07/16/19  4:07 PM   Result Value Ref Range    WBC 7.7 4.3 - 11.1 K/uL    RBC 3.21 (L) 4.23 - 5.6 M/uL    HGB 9.9 (L) 13.6 - 17.2 g/dL    HCT 30.1 (L) 41.1 - 50.3 %    MCV 93.8 79.6 - 97.8 FL    MCH 30.8 26.1 - 32.9 PG    MCHC 32.9 31.4 - 35.0 g/dL    RDW 15.5 (H) 11.9 - 14.6 %    PLATELET 070 753 - 545 K/uL    MPV 8.6 (L) 9.4 - 12.3 FL    ABSOLUTE NRBC 0.00 0.0 - 0.2 K/uL    DF AUTOMATED      NEUTROPHILS 77 43 - 78 %    LYMPHOCYTES 11 (L) 13 - 44 %    MONOCYTES 10 4.0 - 12.0 %    EOSINOPHILS 2 0.5 - 7.8 %    BASOPHILS 1 0.0 - 2.0 %    IMMATURE GRANULOCYTES 0 0.0 - 5.0 %    ABS. NEUTROPHILS 5.9 1.7 - 8.2 K/UL    ABS. LYMPHOCYTES 0.8 0.5 - 4.6 K/UL    ABS. MONOCYTES 0.7 0.1 - 1.3 K/UL    ABS. EOSINOPHILS 0.1 0.0 - 0.8 K/UL    ABS. BASOPHILS 0.1 0.0 - 0.2 K/UL    ABS. IMM. GRANS. 0.0 0.0 - 0.5 K/UL   METABOLIC PANEL, COMPREHENSIVE    Collection Time: 07/16/19  4:07 PM   Result Value Ref Range    Sodium 131 (L) 136 - 145 mmol/L    Potassium 3.3 (L) 3.5 - 5.1 mmol/L    Chloride 95 (L) 98 - 107 mmol/L    CO2 29 21 - 32 mmol/L    Anion gap 7 7 - 16 mmol/L    Glucose 120 (H) 65 - 100 mg/dL    BUN 14 8 - 23 MG/DL    Creatinine 0.99 0.8 - 1.5 MG/DL    GFR est AA >60 >60 ml/min/1.73m2    GFR est non-AA >60 >60 ml/min/1.73m2    Calcium 8.9 8.3 - 10.4 MG/DL    Bilirubin, total 0.5 0.2 - 1.1 MG/DL    ALT (SGPT) 22 12 - 65 U/L    AST (SGOT) 18 15 - 37 U/L    Alk.  phosphatase 118 50 - 136 U/L    Protein, total 7.0 6.3 - 8.2 g/dL    Albumin 3.1 (L) 3.2 - 4.6 g/dL    Globulin 3.9 (H) 2.3 - 3.5 g/dL    A-G Ratio 0.8 (L) 1.2 - 3.5     TROPONIN I    Collection Time: 07/16/19  4:07 PM   Result Value Ref Range    Troponin-I, Qt. 0.04 0.02 - 0.05 NG/ML   BNP    Collection Time: 07/16/19  4:07 PM   Result Value Ref Range     (H) 0 pg/mL     NM LUNG PERFUSION W VENT   Final Result   IMPRESSION: Normal ventilation/perfusion lung imaging study. XR CHEST PA LAT   Final Result   Impression:  Resolution of the left lower lobe atelectasis/infiltrate. Very   small pleural effusions, left larger than right. I discussed the results of all labs, procedures, radiographs, and treatments with the patient and available family. Treatment plan is agreed upon and the patient is ready for discharge. All voiced understanding of the discharge plan and medication instructions or changes as appropriate. Questions about treatment in the ED were answered. All were encouraged to return should symptoms worsen or new problems develop.

## 2019-07-17 LAB
ATRIAL RATE: 86 BPM
CALCULATED P AXIS, ECG09: 83 DEGREES
CALCULATED R AXIS, ECG10: 15 DEGREES
CALCULATED T AXIS, ECG11: -165 DEGREES
DIAGNOSIS, 93000: NORMAL
P-R INTERVAL, ECG05: 184 MS
Q-T INTERVAL, ECG07: 416 MS
QRS DURATION, ECG06: 118 MS
QTC CALCULATION (BEZET), ECG08: 497 MS
VENTRICULAR RATE, ECG03: 86 BPM

## 2019-07-17 NOTE — DISCHARGE INSTRUCTIONS
Patient Education     Increase your Lasix to 40 mg twice a day for 3 days, then go to 40 mg daily. For the first 3 days, also take K-Dur 40 mg twice a day, and after 3 days take it once a day. Follow-up with Dr. Estill Phalen next week. Heart Failure: Care Instructions  Your Care Instructions    Heart failure occurs when your heart does not pump as much blood as the body needs. Failure does not mean that the heart has stopped pumping but rather that it is not pumping as well as it should. Over time, this causes fluid buildup in your lungs and other parts of your body. Fluid buildup can cause shortness of breath, fatigue, swollen ankles, and other problems. By taking medicines regularly, reducing sodium (salt) in your diet, checking your weight every day, and making lifestyle changes, you can feel better and live longer. Follow-up care is a key part of your treatment and safety. Be sure to make and go to all appointments, and call your doctor if you are having problems. It's also a good idea to know your test results and keep a list of the medicines you take. How can you care for yourself at home? Medicines    · Be safe with medicines. Take your medicines exactly as prescribed. Call your doctor if you think you are having a problem with your medicine.     · Do not take any vitamins, over-the-counter medicine, or herbal products without talking to your doctor first. Onetha Popper not take ibuprofen (Advil or Motrin) and naproxen (Aleve) without talking to your doctor first. They could make your heart failure worse.     · You may be taking some of the following medicine. ? Beta-blockers can slow heart rate, decrease blood pressure, and improve your condition. Taking a beta-blocker may lower your chance of needing to be hospitalized. ? Angiotensin-converting enzyme inhibitors (ACEIs) reduce the heart's workload, lower blood pressure, and reduce swelling.  Taking an ACEI may lower your chance of needing to be hospitalized again.  ? Angiotensin II receptor blockers (ARBs) work like ACEIs. Your doctor may prescribe them instead of ACEIs. ? Diuretics, also called water pills, reduce swelling. ? Potassium supplements replace this important mineral, which is sometimes lost with diuretics. ? Aspirin and other blood thinners prevent blood clots, which can cause a stroke or heart attack.    You will get more details on the specific medicines your doctor prescribes. Diet    · Your doctor may suggest that you limit sodium to 2,000 milligrams (mg) a day or less. That is less than 1 teaspoon of salt a day, including all the salt you eat in cooking or in packaged foods. People get most of their sodium from processed foods. Fast food and restaurant meals also tend to be very high in sodium.     · Ask your doctor how much liquid you can drink each day. You may have to limit liquids.    Weight    · Weigh yourself without clothing at the same time each day. Record your weight. Call your doctor if you have a sudden weight gain, such as more than 2 to 3 pounds in a day or 5 pounds in a week. (Your doctor may suggest a different range of weight gain.) A sudden weight gain may mean that your heart failure is getting worse.    Activity level    · Start light exercise (if your doctor says it is okay). Even if you can only do a small amount, exercise will help you get stronger, have more energy, and manage your weight and your stress. Walking is an easy way to get exercise. Start out by walking a little more than you did before. Bit by bit, increase the amount you walk.     · When you exercise, watch for signs that your heart is working too hard. You are pushing yourself too hard if you cannot talk while you are exercising.  If you become short of breath or dizzy or have chest pain, stop, sit down, and rest.     · If you feel \"wiped out\" the day after you exercise, walk slower or for a shorter distance until you can work up to a better pace.     · Get enough rest at night. Sleeping with 1 or 2 pillows under your upper body and head may help you breathe easier.    Lifestyle changes    · Do not smoke. Smoking can make a heart condition worse. If you need help quitting, talk to your doctor about stop-smoking programs and medicines. These can increase your chances of quitting for good. Quitting smoking may be the most important step you can take to protect your heart.     · Limit alcohol to 2 drinks a day for men and 1 drink a day for women. Too much alcohol can cause health problems.     · Avoid getting sick from colds and the flu. Get a pneumococcal vaccine shot. If you have had one before, ask your doctor whether you need another dose. Get a flu shot each year. If you must be around people with colds or the flu, wash your hands often. When should you call for help? Call 911 if you have symptoms of sudden heart failure such as:    · You have severe trouble breathing.     · You cough up pink, foamy mucus.     · You have a new irregular or rapid heartbeat.    Call your doctor now or seek immediate medical care if:    · You have new or increased shortness of breath.     · You are dizzy or lightheaded, or you feel like you may faint.     · You have sudden weight gain, such as more than 2 to 3 pounds in a day or 5 pounds in a week. (Your doctor may suggest a different range of weight gain.)     · You have increased swelling in your legs, ankles, or feet.     · You are suddenly so tired or weak that you cannot do your usual activities.    Watch closely for changes in your health, and be sure to contact your doctor if you develop new symptoms. Where can you learn more? Go to http://pete-michele.info/. Enter E855 in the search box to learn more about \"Heart Failure: Care Instructions. \"  Current as of: July 22, 2018  Content Version: 11.9  © 1817-4227 [employer].  Care instructions adapted under license by [employer] (which disclaims liability or warranty for this information). If you have questions about a medical condition or this instruction, always ask your healthcare professional. [address] any warranty or liability for your use of this information.

## 2019-07-17 NOTE — ED NOTES
I have reviewed discharge instructions with the patient. The patient verbalized understanding. Patient left ED via Discharge Method: ambulatory to Home with family. Opportunity for questions and clarification provided. Patient given 1 scripts. To continue your aftercare when you leave the hospital, you may receive an automated call from our care team to check in on how you are doing. This is a free service and part of our promise to provide the best care and service to meet your aftercare needs.  If you have questions, or wish to unsubscribe from this service please call [phone]. Thank you for Choosing our [employer] [facility] Emergency Department.

## 2019-07-19 PROBLEM — I48.91 ATRIAL FIBRILLATION WITH RVR (HCC): Status: RESOLVED | Noted: 2019-06-06 | Resolved: 2019-07-19

## 2019-07-23 ENCOUNTER — HOSPITAL ENCOUNTER (OUTPATIENT)
Dept: CARDIAC REHAB | Age: 74
Discharge: HOME | End: 2019-07-23
Attending: PHYSICIAN ASSISTANT

## 2019-07-23 RX ORDER — TRAZODONE HYDROCHLORIDE 50 MG/1
50 TABLET ORAL
COMMUNITY
End: 2020-06-04

## 2019-07-23 NOTE — CARDIO/PULMONARY
Dear Dr. Karri Isaac: Thank you for referring your patient, David Monge (: 1945), to the Cardiopulmonary Rehabilitation Program at [facility].  Mr. Bebe Akins is a good candidate for the Cardiac Rehab Program and should see improvements with regular participation. We will be addressing appropriate interventions for modifiable risk factors with your patient during the next 12 weeks. We will contact you with any issues or concerns that may arise, or you can follow your patients progress through [address] [place] [address] at any time. A final summary will be available on [facility] when the program is completed. Again, thank you for your referral. If we can be of further assistance, please feel free to contact the Cardiopulmonary Rehab staff at [phone].     Sincerely,    COOKIE VegaN, RN  Cardiopulmonary Rehabilitation Nurse  [facility]

## 2019-07-23 NOTE — CARDIO/PULMONARY
Dear Dr. Sanjay Valladares: Your patient, Darren Rocha (: 1945), has taken the PHQ-9 as part of his admission to the Cardiac Rehab program. The results of this test indicate that he may be experiencing stress and/or depression. In our discussion, when asked if he was experiencing anxiety, depression, panic attacks or poor coping with daily life, he said he had some anxiety but Zoloft has helped with his anxiety and trazodone has helped with his sleep. He said he did not feel he needed any additional help at this time. It is our program protocol to contact the patient's PCP if the PHQ-9 score is greater than 9; his score was 22. He stated that he had no thoughts of harming himself or others. We want you to be aware of his score and our discussion today. Thank you.     Richa Sheridan, COOKIEN, RN  Cardiopulmonary Rehabilitation

## 2019-08-02 ENCOUNTER — HOSPITAL ENCOUNTER (OUTPATIENT)
Dept: LAB | Age: 74
Discharge: HOME | End: 2019-08-02
Payer: MEDICARE

## 2019-08-02 DIAGNOSIS — I50.22 CHRONIC SYSTOLIC CONGESTIVE HEART FAILURE (HCC): Chronic | ICD-10-CM

## 2019-08-02 LAB
ANION GAP SERPL CALC-SCNC: 7 MMOL/L (ref 7–16)
BNP SERPL-MCNC: 872 PG/ML
BUN SERPL-MCNC: 9 MG/DL (ref 8–23)
CALCIUM SERPL-MCNC: 8.9 MG/DL (ref 8.3–10.4)
CHLORIDE SERPL-SCNC: 98 MMOL/L (ref 98–107)
CO2 SERPL-SCNC: 30 MMOL/L (ref 21–32)
CREAT SERPL-MCNC: 0.87 MG/DL (ref 0.8–1.5)
GLUCOSE SERPL-MCNC: 105 MG/DL (ref 65–100)
MAGNESIUM SERPL-MCNC: 1.7 MG/DL (ref 1.8–2.4)
POTASSIUM SERPL-SCNC: 2.9 MMOL/L (ref 3.5–5.1)
SODIUM SERPL-SCNC: 135 MMOL/L (ref 136–145)

## 2019-08-02 PROCEDURE — 36415 COLL VENOUS BLD VENIPUNCTURE: CPT

## 2019-08-02 PROCEDURE — 83735 ASSAY OF MAGNESIUM: CPT

## 2019-08-02 PROCEDURE — 80048 BASIC METABOLIC PNL TOTAL CA: CPT

## 2019-08-02 PROCEDURE — 83880 ASSAY OF NATRIURETIC PEPTIDE: CPT

## 2019-08-06 ENCOUNTER — HOSPITAL ENCOUNTER (OUTPATIENT)
Dept: CARDIAC REHAB | Age: 74
Discharge: HOME | End: 2019-08-06
Payer: MEDICARE

## 2019-08-06 VITALS — HEIGHT: 68 IN | WEIGHT: 185.2 LBS | BODY MASS INDEX: 28.07 KG/M2

## 2019-08-06 PROCEDURE — 93798 PHYS/QHP OP CAR RHAB W/ECG: CPT

## 2019-08-07 ENCOUNTER — HOSPITAL ENCOUNTER (OUTPATIENT)
Dept: CARDIAC REHAB | Age: 74
End: 2019-08-07
Payer: MEDICARE

## 2019-08-09 ENCOUNTER — HOSPITAL ENCOUNTER (OUTPATIENT)
Dept: CARDIAC REHAB | Age: 74
Discharge: HOME | End: 2019-08-09
Payer: MEDICARE

## 2019-08-09 PROCEDURE — 93798 PHYS/QHP OP CAR RHAB W/ECG: CPT

## 2019-08-12 ENCOUNTER — HOSPITAL ENCOUNTER (OUTPATIENT)
Dept: LAB | Age: 74
Discharge: HOME | End: 2019-08-12
Payer: MEDICARE

## 2019-08-12 ENCOUNTER — HOSPITAL ENCOUNTER (OUTPATIENT)
Dept: CARDIAC REHAB | Age: 74
Discharge: HOME | End: 2019-08-12
Payer: MEDICARE

## 2019-08-12 DIAGNOSIS — I50.22 CHRONIC SYSTOLIC CONGESTIVE HEART FAILURE (HCC): Chronic | ICD-10-CM

## 2019-08-12 DIAGNOSIS — I10 ESSENTIAL HYPERTENSION: Chronic | ICD-10-CM

## 2019-08-12 LAB
ANION GAP SERPL CALC-SCNC: 9 MMOL/L (ref 7–16)
BUN SERPL-MCNC: 11 MG/DL (ref 8–23)
CALCIUM SERPL-MCNC: 8.8 MG/DL (ref 8.3–10.4)
CHLORIDE SERPL-SCNC: 100 MMOL/L (ref 98–107)
CO2 SERPL-SCNC: 25 MMOL/L (ref 21–32)
CREAT SERPL-MCNC: 1 MG/DL (ref 0.8–1.5)
GLUCOSE SERPL-MCNC: 127 MG/DL (ref 65–100)
POTASSIUM SERPL-SCNC: 3.5 MMOL/L (ref 3.5–5.1)
SODIUM SERPL-SCNC: 134 MMOL/L (ref 136–145)

## 2019-08-12 PROCEDURE — 80048 BASIC METABOLIC PNL TOTAL CA: CPT

## 2019-08-12 PROCEDURE — 93798 PHYS/QHP OP CAR RHAB W/ECG: CPT

## 2019-08-12 PROCEDURE — 36415 COLL VENOUS BLD VENIPUNCTURE: CPT

## 2019-08-14 ENCOUNTER — HOSPITAL ENCOUNTER (OUTPATIENT)
Dept: CARDIAC REHAB | Age: 74
Discharge: HOME | End: 2019-08-14
Payer: MEDICARE

## 2019-08-14 VITALS — BODY MASS INDEX: 28.28 KG/M2 | WEIGHT: 186 LBS

## 2019-08-14 PROCEDURE — 93798 PHYS/QHP OP CAR RHAB W/ECG: CPT

## 2019-08-16 ENCOUNTER — HOSPITAL ENCOUNTER (OUTPATIENT)
Dept: CARDIAC REHAB | Age: 74
Discharge: HOME | End: 2019-08-16
Payer: MEDICARE

## 2019-08-16 PROCEDURE — 93798 PHYS/QHP OP CAR RHAB W/ECG: CPT

## 2019-08-19 ENCOUNTER — HOSPITAL ENCOUNTER (OUTPATIENT)
Dept: CARDIAC REHAB | Age: 74
Discharge: HOME | End: 2019-08-19
Payer: MEDICARE

## 2019-08-19 VITALS — WEIGHT: 186 LBS | BODY MASS INDEX: 28.28 KG/M2

## 2019-08-19 PROCEDURE — 93798 PHYS/QHP OP CAR RHAB W/ECG: CPT

## 2019-08-21 ENCOUNTER — HOSPITAL ENCOUNTER (OUTPATIENT)
Dept: CARDIAC REHAB | Age: 74
End: 2019-08-21
Payer: MEDICARE

## 2019-08-23 ENCOUNTER — HOSPITAL ENCOUNTER (OUTPATIENT)
Dept: CARDIAC REHAB | Age: 74
Discharge: HOME | End: 2019-08-23
Payer: MEDICARE

## 2019-08-23 VITALS — WEIGHT: 182.4 LBS | BODY MASS INDEX: 27.73 KG/M2

## 2019-08-23 PROCEDURE — 93798 PHYS/QHP OP CAR RHAB W/ECG: CPT

## 2019-08-26 ENCOUNTER — HOSPITAL ENCOUNTER (OUTPATIENT)
Dept: CARDIAC REHAB | Age: 74
Discharge: HOME | End: 2019-08-26
Payer: MEDICARE

## 2019-08-26 PROCEDURE — 93798 PHYS/QHP OP CAR RHAB W/ECG: CPT

## 2019-08-28 ENCOUNTER — HOSPITAL ENCOUNTER (OUTPATIENT)
Dept: CARDIAC REHAB | Age: 74
Discharge: HOME | End: 2019-08-28
Payer: MEDICARE

## 2019-08-28 VITALS — BODY MASS INDEX: 27.67 KG/M2 | WEIGHT: 182 LBS

## 2019-08-28 PROCEDURE — 93798 PHYS/QHP OP CAR RHAB W/ECG: CPT

## 2019-09-04 ENCOUNTER — HOSPITAL ENCOUNTER (OUTPATIENT)
Dept: CARDIAC REHAB | Age: 74
Discharge: HOME | End: 2019-09-04
Payer: MEDICARE

## 2019-09-04 VITALS — WEIGHT: 183 LBS | BODY MASS INDEX: 27.83 KG/M2

## 2019-09-04 PROCEDURE — 93798 PHYS/QHP OP CAR RHAB W/ECG: CPT

## 2019-09-06 ENCOUNTER — HOSPITAL ENCOUNTER (OUTPATIENT)
Dept: CARDIAC REHAB | Age: 74
Discharge: HOME | End: 2019-09-06
Payer: MEDICARE

## 2019-09-06 PROCEDURE — 93798 PHYS/QHP OP CAR RHAB W/ECG: CPT

## 2019-09-09 ENCOUNTER — HOSPITAL ENCOUNTER (OUTPATIENT)
Dept: CARDIAC REHAB | Age: 74
Discharge: HOME | End: 2019-09-09
Payer: MEDICARE

## 2019-09-09 PROCEDURE — 93798 PHYS/QHP OP CAR RHAB W/ECG: CPT

## 2019-09-09 NOTE — PROGRESS NOTES
Kate Tomlin is a 66year old male CABG on 6/10/19 enrolled in cardiac rehabilitation, seen today for initial nutrition counseling. States fair energy level and good appetite today. Stated Nutrition Goals: to improve strength and stamina    Medical History: MI, SANGITA, HTN, HLD, CABG 6/10/19. Nutrition related medications/supplements: statin, betablocker, lasix 40mg- monitors weights, MVT, Probiotic, fish oil, Vit D. Nutrition Related Labs: , TG 74, HDL 37, .2 (H). 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 Physical Activity: Rehabilitation 3x per week. Food and Nutrition Intake History:   Follows a 75% plant based diet since CAGB. Has increased water intake to 1 gallon per day, and has decreased red meat intake to 1x per week. Eats out daily mainly at lunch. Stated 1 day diet recall includes   Breakfast:Bagel with PB, coffee  (black)  Lunch: Green beans, creamed potatoes, broccoli and 1 other vegetables that he could not remember. + water. Dinner: Popcorn and water. Beverages: water, coffee. Alcohol use: None     GI Hx: /Digestive Issues: GERD- states since his surgery his BM have not been regular. Attributes it to medications. Anthropometric Data:  BMI: 27.83 kg/m² ; BMI healthy for age >   Height: 5' 8\" (1.727 m). Weight: 83 kg (183 lb). - pt states he has had an intentional 20 pound weight loss x 2 months. Presently satisfied with his weight   Wt Readings from Last 3 Encounters:   09/04/19 83 kg (183 lb)   08/28/19 82.6 kg (182 lb)   08/23/19 82.7 kg (182 lb 6.4 oz)     Waist measurement (inches): 38.5 inches    Estimated Nutritional Needs:  Calories: MSJ x 1.3 (sedentary) for weight maintenance: 1840 kcal/day- no weight loss desired at this time.   Protein: ~83g/day (1g/kg BW)    Stage of Behavior Change: preparation     Nutrition Diagnosis:    Imbalanced intake of nutrients (protein) related to food related knowledge evidenced by food recall indicating inadequate intake based on assessed protein needs,     Nutrition Intervention:  1. Nutrition Education: Educated patient and spouse on cardioprotective meal pattern/Mediterranean meal pattern including   Guidelines for saturated fat (<7% total calories), sodium ( < 2000mg/day or follow MD recommendations), and added sugars ( < 25 grams for women/<36 grams for men) and high sources of each reviewed   Consumption of daily fiber intake with emphasis on 7-13 grams of soluble fiber daily and food sources reviewed.  Demonstrated portions sizes to support weight loss goals   Demonstrated food label reading and food amaya (GoGuide) for home use to support self efficacy goals.  Sodium education; reviwed\" salty six\" foods, food label reading, low sodium food sources and meal planning. 2. Reviewed lab/body comp results/goals, and nutrition modifications that will support improvements in body composition and lab values. 3  Optimize nutrition to maintain current body weigh and increase stamina/strengtht: Daily protein needs provided, protein food sources and adequacy reviewed to improve stamina and support rehab goals. Handouts provided for home use:    Lab/body comp with values .  [employer] plate method   Tips for reducing sodium    Nutrition Goals:    To improve diet quality, by decreasing intake of, sodium and refined CHO, and increasing intake of f/v by end of cardiac rehabilitation. Monitoring/Evaluation: RD to follow up with participant during rehab sessions for questions and assessment of progression toward goals. Compliance: Pt agreeable to improving protein intake.    Barriers: None identified at this time     Antonio Whitman, MS, RD, LD  Cardiac/Pulmonary Rehab Dietitian

## 2019-09-11 ENCOUNTER — HOSPITAL ENCOUNTER (OUTPATIENT)
Dept: CARDIAC REHAB | Age: 74
Discharge: HOME | End: 2019-09-11
Payer: MEDICARE

## 2019-09-11 VITALS — WEIGHT: 183 LBS | BODY MASS INDEX: 27.83 KG/M2

## 2019-09-11 PROCEDURE — 93798 PHYS/QHP OP CAR RHAB W/ECG: CPT

## 2019-09-13 ENCOUNTER — HOSPITAL ENCOUNTER (OUTPATIENT)
Dept: CARDIAC REHAB | Age: 74
End: 2019-09-13
Payer: MEDICARE

## 2019-09-16 ENCOUNTER — HOSPITAL ENCOUNTER (OUTPATIENT)
Dept: CARDIAC REHAB | Age: 74
End: 2019-09-16
Payer: MEDICARE

## 2019-09-18 ENCOUNTER — APPOINTMENT (OUTPATIENT)
Dept: CARDIAC REHAB | Age: 74
End: 2019-09-18
Payer: MEDICARE

## 2019-09-20 ENCOUNTER — HOSPITAL ENCOUNTER (OUTPATIENT)
Dept: CARDIAC REHAB | Age: 74
Discharge: HOME | End: 2019-09-20
Payer: MEDICARE

## 2019-09-20 VITALS — BODY MASS INDEX: 28.04 KG/M2 | WEIGHT: 184.4 LBS

## 2019-09-20 PROCEDURE — 93798 PHYS/QHP OP CAR RHAB W/ECG: CPT

## 2019-09-23 ENCOUNTER — HOSPITAL ENCOUNTER (OUTPATIENT)
Dept: CARDIAC REHAB | Age: 74
Discharge: HOME | End: 2019-09-23
Payer: MEDICARE

## 2019-09-23 PROCEDURE — 93798 PHYS/QHP OP CAR RHAB W/ECG: CPT

## 2019-09-25 ENCOUNTER — HOSPITAL ENCOUNTER (OUTPATIENT)
Dept: CARDIAC REHAB | Age: 74
Discharge: HOME | End: 2019-09-25
Payer: MEDICARE

## 2019-09-25 VITALS — BODY MASS INDEX: 27.98 KG/M2 | WEIGHT: 184 LBS

## 2019-09-25 PROCEDURE — 93798 PHYS/QHP OP CAR RHAB W/ECG: CPT

## 2019-09-27 ENCOUNTER — HOSPITAL ENCOUNTER (OUTPATIENT)
Dept: CARDIAC REHAB | Age: 74
Discharge: HOME | End: 2019-09-27
Payer: MEDICARE

## 2019-09-27 PROCEDURE — 93798 PHYS/QHP OP CAR RHAB W/ECG: CPT

## 2019-09-30 ENCOUNTER — HOSPITAL ENCOUNTER (OUTPATIENT)
Dept: CARDIAC REHAB | Age: 74
Discharge: HOME | End: 2019-09-30
Payer: MEDICARE

## 2019-09-30 PROCEDURE — 93798 PHYS/QHP OP CAR RHAB W/ECG: CPT

## 2019-10-02 ENCOUNTER — HOSPITAL ENCOUNTER (OUTPATIENT)
Dept: CARDIAC REHAB | Age: 74
Discharge: HOME | End: 2019-10-02
Payer: MEDICARE

## 2019-10-02 VITALS — WEIGHT: 185.8 LBS | BODY MASS INDEX: 28.25 KG/M2

## 2019-10-02 PROCEDURE — 93798 PHYS/QHP OP CAR RHAB W/ECG: CPT

## 2019-10-04 ENCOUNTER — APPOINTMENT (OUTPATIENT)
Dept: CARDIAC REHAB | Age: 74
End: 2019-10-04
Payer: MEDICARE

## 2019-10-07 ENCOUNTER — HOSPITAL ENCOUNTER (OUTPATIENT)
Dept: CARDIAC REHAB | Age: 74
Discharge: HOME | End: 2019-10-07
Payer: MEDICARE

## 2019-10-07 PROCEDURE — 93798 PHYS/QHP OP CAR RHAB W/ECG: CPT

## 2019-10-09 ENCOUNTER — HOSPITAL ENCOUNTER (OUTPATIENT)
Dept: CARDIAC REHAB | Age: 74
Discharge: HOME | End: 2019-10-09
Payer: MEDICARE

## 2019-10-09 VITALS — WEIGHT: 187.4 LBS | BODY MASS INDEX: 28.49 KG/M2

## 2019-10-09 PROCEDURE — 93798 PHYS/QHP OP CAR RHAB W/ECG: CPT

## 2019-10-11 ENCOUNTER — HOSPITAL ENCOUNTER (OUTPATIENT)
Dept: CARDIAC REHAB | Age: 74
End: 2019-10-11
Payer: MEDICARE

## 2019-10-14 ENCOUNTER — HOSPITAL ENCOUNTER (OUTPATIENT)
Dept: CARDIAC REHAB | Age: 74
End: 2019-10-14
Payer: MEDICARE

## 2019-10-16 ENCOUNTER — HOSPITAL ENCOUNTER (OUTPATIENT)
Dept: CARDIAC REHAB | Age: 74
End: 2019-10-16
Payer: MEDICARE

## 2019-10-18 ENCOUNTER — APPOINTMENT (OUTPATIENT)
Dept: CARDIAC REHAB | Age: 74
End: 2019-10-18
Payer: MEDICARE

## 2019-10-21 ENCOUNTER — HOSPITAL ENCOUNTER (OUTPATIENT)
Dept: CARDIAC REHAB | Age: 74
End: 2019-10-21
Payer: MEDICARE

## 2019-10-23 ENCOUNTER — APPOINTMENT (OUTPATIENT)
Dept: CARDIAC REHAB | Age: 74
End: 2019-10-23
Payer: MEDICARE

## 2019-10-24 ENCOUNTER — HOSPITAL ENCOUNTER (INPATIENT)
Age: 74
LOS: 2 days | Discharge: HOME | DRG: 291 | End: 2019-10-26
Attending: INTERNAL MEDICINE | Admitting: HOSPITALIST
Payer: MEDICARE

## 2019-10-24 ENCOUNTER — APPOINTMENT (OUTPATIENT)
Dept: GENERAL RADIOLOGY | Age: 74
DRG: 291 | End: 2019-10-24
Attending: HOSPITALIST
Payer: MEDICARE

## 2019-10-24 DIAGNOSIS — Z95.1 S/P CABG X 3: ICD-10-CM

## 2019-10-24 DIAGNOSIS — R60.0 LOCALIZED EDEMA: ICD-10-CM

## 2019-10-24 PROBLEM — I50.23 SYSTOLIC CHF, ACUTE ON CHRONIC (HCC): Chronic | Status: ACTIVE | Noted: 2019-01-31

## 2019-10-24 PROBLEM — R06.02 SOB (SHORTNESS OF BREATH): Status: ACTIVE | Noted: 2019-10-24

## 2019-10-24 LAB
ALBUMIN SERPL-MCNC: 3.5 G/DL (ref 3.2–4.6)
ALBUMIN/GLOB SERPL: 1 {RATIO} (ref 1.2–3.5)
ALP SERPL-CCNC: 120 U/L (ref 50–136)
ALT SERPL-CCNC: 29 U/L (ref 12–65)
ANION GAP SERPL CALC-SCNC: 7 MMOL/L (ref 7–16)
AST SERPL-CCNC: 20 U/L (ref 15–37)
ATRIAL RATE: 102 BPM
BILIRUB SERPL-MCNC: 1.7 MG/DL (ref 0.2–1.1)
BNP SERPL-MCNC: 986 PG/ML
BUN SERPL-MCNC: 14 MG/DL (ref 8–23)
CALCIUM SERPL-MCNC: 8.8 MG/DL (ref 8.3–10.4)
CALCULATED P AXIS, ECG09: 82 DEGREES
CALCULATED R AXIS, ECG10: -10 DEGREES
CALCULATED T AXIS, ECG11: 156 DEGREES
CHLORIDE SERPL-SCNC: 102 MMOL/L (ref 98–107)
CO2 SERPL-SCNC: 28 MMOL/L (ref 21–32)
CREAT SERPL-MCNC: 0.81 MG/DL (ref 0.8–1.5)
DIAGNOSIS, 93000: NORMAL
GLOBULIN SER CALC-MCNC: 3.6 G/DL (ref 2.3–3.5)
GLUCOSE SERPL-MCNC: 78 MG/DL (ref 65–100)
MAGNESIUM SERPL-MCNC: 1.7 MG/DL (ref 1.8–2.4)
P-R INTERVAL, ECG05: 186 MS
POTASSIUM SERPL-SCNC: 3.9 MMOL/L (ref 3.5–5.1)
PROT SERPL-MCNC: 7.1 G/DL (ref 6.3–8.2)
Q-T INTERVAL, ECG07: 362 MS
QRS DURATION, ECG06: 118 MS
QTC CALCULATION (BEZET), ECG08: 471 MS
SODIUM SERPL-SCNC: 137 MMOL/L (ref 136–145)
TROPONIN I SERPL-MCNC: 0.02 NG/ML (ref 0.02–0.05)
VENTRICULAR RATE, ECG03: 102 BPM

## 2019-10-24 PROCEDURE — 77010033711 HC HIGH FLOW OXYGEN

## 2019-10-24 PROCEDURE — 74011250637 HC RX REV CODE- 250/637: Performed by: NURSE PRACTITIONER

## 2019-10-24 PROCEDURE — 94640 AIRWAY INHALATION TREATMENT: CPT

## 2019-10-24 PROCEDURE — 65660000000 HC RM CCU STEPDOWN

## 2019-10-24 PROCEDURE — 83735 ASSAY OF MAGNESIUM: CPT

## 2019-10-24 PROCEDURE — 80053 COMPREHEN METABOLIC PANEL: CPT

## 2019-10-24 PROCEDURE — 36415 COLL VENOUS BLD VENIPUNCTURE: CPT

## 2019-10-24 PROCEDURE — 94760 N-INVAS EAR/PLS OXIMETRY 1: CPT

## 2019-10-24 PROCEDURE — 74011250637 HC RX REV CODE- 250/637: Performed by: HOSPITALIST

## 2019-10-24 PROCEDURE — 77030018846 HC SOL IRR STRL H20 ICUM -A

## 2019-10-24 PROCEDURE — 93005 ELECTROCARDIOGRAM TRACING: CPT | Performed by: NURSE PRACTITIONER

## 2019-10-24 PROCEDURE — 74011000250 HC RX REV CODE- 250: Performed by: NURSE PRACTITIONER

## 2019-10-24 PROCEDURE — 83880 ASSAY OF NATRIURETIC PEPTIDE: CPT

## 2019-10-24 PROCEDURE — 74011250636 HC RX REV CODE- 250/636: Performed by: NURSE PRACTITIONER

## 2019-10-24 PROCEDURE — 71045 X-RAY EXAM CHEST 1 VIEW: CPT

## 2019-10-24 PROCEDURE — 84484 ASSAY OF TROPONIN QUANT: CPT

## 2019-10-24 RX ORDER — GABAPENTIN 400 MG/1
400 CAPSULE ORAL EVERY 24 HOURS
Status: DISCONTINUED | OUTPATIENT
Start: 2019-10-24 | End: 2019-10-26 | Stop reason: HOSPADM

## 2019-10-24 RX ORDER — ASPIRIN 81 MG/1
81 TABLET ORAL
Status: DISCONTINUED | OUTPATIENT
Start: 2019-10-24 | End: 2019-10-26 | Stop reason: HOSPADM

## 2019-10-24 RX ORDER — METOPROLOL SUCCINATE 100 MG/1
100 TABLET, EXTENDED RELEASE ORAL DAILY
Status: DISCONTINUED | OUTPATIENT
Start: 2019-10-25 | End: 2019-10-26 | Stop reason: HOSPADM

## 2019-10-24 RX ORDER — ACETAMINOPHEN 325 MG/1
650 TABLET ORAL
Status: DISCONTINUED | OUTPATIENT
Start: 2019-10-24 | End: 2019-10-26 | Stop reason: HOSPADM

## 2019-10-24 RX ORDER — SERTRALINE HYDROCHLORIDE 50 MG/1
50 TABLET, FILM COATED ORAL DAILY
Status: DISCONTINUED | OUTPATIENT
Start: 2019-10-25 | End: 2019-10-26 | Stop reason: HOSPADM

## 2019-10-24 RX ORDER — GABAPENTIN 400 MG/1
800 CAPSULE ORAL
Status: DISCONTINUED | OUTPATIENT
Start: 2019-10-25 | End: 2019-10-26 | Stop reason: HOSPADM

## 2019-10-24 RX ORDER — LOSARTAN POTASSIUM 50 MG/1
100 TABLET ORAL DAILY
Status: DISCONTINUED | OUTPATIENT
Start: 2019-10-25 | End: 2019-10-26 | Stop reason: HOSPADM

## 2019-10-24 RX ORDER — CARBAMAZEPINE 200 MG/1
100 TABLET ORAL 2 TIMES DAILY
Status: DISCONTINUED | OUTPATIENT
Start: 2019-10-24 | End: 2019-10-26 | Stop reason: HOSPADM

## 2019-10-24 RX ORDER — HYDROCODONE BITARTRATE AND ACETAMINOPHEN 10; 325 MG/1; MG/1
1 TABLET ORAL
Status: DISCONTINUED | OUTPATIENT
Start: 2019-10-24 | End: 2019-10-26 | Stop reason: HOSPADM

## 2019-10-24 RX ORDER — PANTOPRAZOLE SODIUM 40 MG/1
40 TABLET, DELAYED RELEASE ORAL
Status: DISCONTINUED | OUTPATIENT
Start: 2019-10-25 | End: 2019-10-25

## 2019-10-24 RX ORDER — GABAPENTIN 400 MG/1
400 CAPSULE ORAL EVERY 24 HOURS
Status: DISCONTINUED | OUTPATIENT
Start: 2019-10-25 | End: 2019-10-26 | Stop reason: HOSPADM

## 2019-10-24 RX ORDER — DOCUSATE SODIUM 100 MG/1
100 CAPSULE, LIQUID FILLED ORAL
Status: DISCONTINUED | OUTPATIENT
Start: 2019-10-24 | End: 2019-10-26 | Stop reason: HOSPADM

## 2019-10-24 RX ORDER — FUROSEMIDE 10 MG/ML
40 INJECTION INTRAMUSCULAR; INTRAVENOUS EVERY 12 HOURS
Status: DISCONTINUED | OUTPATIENT
Start: 2019-10-24 | End: 2019-10-25

## 2019-10-24 RX ORDER — FUROSEMIDE 10 MG/ML
40 INJECTION INTRAMUSCULAR; INTRAVENOUS
Status: COMPLETED | OUTPATIENT
Start: 2019-10-24 | End: 2019-10-24

## 2019-10-24 RX ORDER — ROSUVASTATIN CALCIUM 20 MG/1
40 TABLET, COATED ORAL
Status: DISCONTINUED | OUTPATIENT
Start: 2019-10-24 | End: 2019-10-26 | Stop reason: HOSPADM

## 2019-10-24 RX ORDER — DOCUSATE SODIUM 100 MG/1
100 CAPSULE, LIQUID FILLED ORAL 2 TIMES DAILY
Status: DISCONTINUED | OUTPATIENT
Start: 2019-10-24 | End: 2019-10-24

## 2019-10-24 RX ORDER — IPRATROPIUM BROMIDE AND ALBUTEROL SULFATE 2.5; .5 MG/3ML; MG/3ML
3 SOLUTION RESPIRATORY (INHALATION)
Status: DISCONTINUED | OUTPATIENT
Start: 2019-10-24 | End: 2019-10-26 | Stop reason: HOSPADM

## 2019-10-24 RX ORDER — GABAPENTIN 400 MG/1
400 CAPSULE ORAL 3 TIMES DAILY
Status: DISCONTINUED | OUTPATIENT
Start: 2019-10-24 | End: 2019-10-24 | Stop reason: ALTCHOICE

## 2019-10-24 RX ORDER — TRAZODONE HYDROCHLORIDE 50 MG/1
50 TABLET ORAL
Status: DISCONTINUED | OUTPATIENT
Start: 2019-10-24 | End: 2019-10-26 | Stop reason: HOSPADM

## 2019-10-24 RX ORDER — FUROSEMIDE 10 MG/ML
40 INJECTION INTRAMUSCULAR; INTRAVENOUS DAILY
Status: DISCONTINUED | OUTPATIENT
Start: 2019-10-25 | End: 2019-10-24

## 2019-10-24 RX ORDER — POTASSIUM CHLORIDE 20 MEQ/1
20 TABLET, EXTENDED RELEASE ORAL EVERY 12 HOURS
Status: DISCONTINUED | OUTPATIENT
Start: 2019-10-24 | End: 2019-10-25

## 2019-10-24 RX ADMIN — HYDROCODONE BITARTRATE AND ACETAMINOPHEN 1 TABLET: 10; 325 TABLET ORAL at 23:57

## 2019-10-24 RX ADMIN — ASPIRIN 81 MG: 81 TABLET ORAL at 20:50

## 2019-10-24 RX ADMIN — IPRATROPIUM BROMIDE AND ALBUTEROL SULFATE 3 ML: .5; 3 SOLUTION RESPIRATORY (INHALATION) at 17:17

## 2019-10-24 RX ADMIN — FUROSEMIDE 40 MG: 10 INJECTION, SOLUTION INTRAMUSCULAR; INTRAVENOUS at 16:54

## 2019-10-24 RX ADMIN — CARBAMAZEPINE 100 MG: 200 TABLET ORAL at 17:01

## 2019-10-24 RX ADMIN — DOCUSATE SODIUM 100 MG: 100 CAPSULE, LIQUID FILLED ORAL at 17:01

## 2019-10-24 RX ADMIN — POTASSIUM CHLORIDE 20 MEQ: 20 TABLET, EXTENDED RELEASE ORAL at 20:50

## 2019-10-24 RX ADMIN — FUROSEMIDE 40 MG: 10 INJECTION, SOLUTION INTRAMUSCULAR; INTRAVENOUS at 17:48

## 2019-10-24 RX ADMIN — APIXABAN 5 MG: 5 TABLET, FILM COATED ORAL at 17:01

## 2019-10-24 RX ADMIN — ROSUVASTATIN CALCIUM 40 MG: 20 TABLET, COATED ORAL at 20:50

## 2019-10-24 RX ADMIN — IPRATROPIUM BROMIDE AND ALBUTEROL SULFATE 3 ML: .5; 3 SOLUTION RESPIRATORY (INHALATION) at 19:59

## 2019-10-24 RX ADMIN — GABAPENTIN 400 MG: 400 CAPSULE ORAL at 20:51

## 2019-10-24 NOTE — PROGRESS NOTES
introduced Spiritual Care services. Pt requested prayer and  offered prayer. No further needs at this time. Please consult Spiritual Care as needed. Marisela Badillo.

## 2019-10-24 NOTE — CONSULTS
[facility] Consult                Date of  Admission: 10/24/2019 12:03 PM     Primary Care Physician: Alex Aguirre., MD  Primary Cardiologist: Dr Angela Trinidad  Referring Physician: IVETT Martini  Consulting Physician: Dr Fidelia Zambrano    CC/Reason for consult: Heart Failure      Lety Guo is a 66 y.o. male with hx of HTN, A Fib, HLD, HFiEF, CAD s/p CABG x3 with MAZE, MICHEAL clip, TIA, tracheobronchomalacia, smoking, trigeminal neuralgia, and PVC's. The patient has had multiple recurrent problems post CABG including plural effusions and SOB. At his last Dr Appointment with Dr Angela Trinidad the patient EF has improved with medical therapy for HF. Since the 10th the patient started to take his lasix intermittently because he thought he might be on to much. He had reduced urine output at home, has not been on a cardiac diet, has not completed daily weights, and has not restricted his fluid intake. He started to feel SOB yesterday and went to see his PCP today prior to being sent to the ED. He has also noted some LLE with abd fullness. At night he has had orthopnea and now is at 90% of 7 lpm by NC. At the time of his exam no labs or xrays had been completed. He is noted to be HTN at the time of his admission. Recent Cardiac Synopsis w/ Labs  LHC/NST: 6/2019  1. Consideration for coronary bypass grafting. 2.  Spontaneous conversion to sinus rhythm. We will initiate IV amiodarone therapy to avoid recurrent AFib with RVR. 3.  Complex three-vessel atherosclerotic coronary disease as described above. 4.  Chronic systolic heart failure. 5.  Consideration for maze and left atrial appendage occlusion.   Echo: 9/16/2019 EF 40-45%, global hypokinesis, biatrial enlargement, moderate AI  EKG:  PPM Interrogation:     2006:  PCI RCA - Liberte, PCI LAD Cypher, PCI LCx Cypher  01/2014:  Stress testing with fixed inferior defect - no ischemia  11/2014:  EF with EF 50-55% and inferior HK   07/2018:  EF 45-50%  01/2019:  EF 40-45%  05/2019:  CABG LIMA-LAD, SVG-OM, SVG-MARY ANNE      Lab Results   Component Value Date     (L) 08/12/2019    K 3.5 08/12/2019    MG 1.7 (L) 08/02/2019    BUN 11 08/12/2019    CREA 1.00 08/12/2019    WBC 7.7 07/16/2019    HGB 9.9 (L) 07/16/2019     07/16/2019    INR 1.4 06/10/2019    TSH 3.760 (H) 04/07/2017    T4 7.3 04/07/2017        All Cardiac Markers in the last 24 hours:  No results found for: CPK, CK, CKMMB, CKMB, RCK3, CKMBT, CKMBPOC, CKNDX, CKND1, REINALDO, TROPT, TROIQ, PANCHO, TROPT, TNIPOC, BNP, BNPP, BNPNT    Wt Readings from Last 3 Encounters:   10/24/19 88.6 kg (195 lb 5.2 oz)   10/10/19 88.6 kg (195 lb 6.4 oz)   10/09/19 85 kg (187 lb 6.4 oz)         Patient Active Problem List   Diagnosis Code    Dyslipidemia E78.5    Hypertension I10    Coronary atherosclerosis of native coronary vessel I25.10    Atrial fibrillation (HCC) I48.91    TIA (transient ischemic attack) F87.2    Systolic CHF, acute on chronic ([facility]) I50.23    NSTEMI (non-ST elevated myocardial infarction) ([facility]) I21.4    Hemoptysis R04.2    Trigeminal neuralgia G50.0    CAD (coronary artery disease) I25.10    S/P CABG x 3 Z95.1    Tracheobronchomalacia J39.8    History of smoking 25-50 pack years Z87.891    Elevated brain natriuretic peptide (BNP) level R79.89    Localized edema R60.0    SOB (shortness of breath) R06.02       Past Medical History:   Diagnosis Date    Atrial fibrillation (HCC)     Atrial flutter ([facility] 4/7/2017    CAD (coronary artery disease) 2006    Cancer [facility]) 2011    prostate    GERD (gastroesophageal reflux disease)     takes omeprazole    Heart failure (HCC)     Hypertension     Ill-defined condition     trigeminal neuralgia    Myocardial infarction ([address] 06/06/2019    NSTEMI     Sleep apnea     does not wear CPAP    Stroke [facility] 2018    TIA      Past Surgical History:   Procedure Laterality Date    HX CORONARY ARTERY BYPASS GRAFT  06/10/2019    3 vessel CABG with LIMA-LAD,SVG-OM,and SVG-R posterior lateral branch with MAZE & LA appendage clipping.  HX CORONARY STENT PLACEMENT      HX HEART CATHETERIZATION  08/14/2006    HX HEART CATHETERIZATION  06/07/2019    Severe 3 vessel CAD with LV EF=40-45% and unsuccessful LCX PCI.     HX PROSTATE SURGERY       Allergies   Allergen Reactions    Codeine Unknown (comments)    Iodinated Contrast Media Unknown (comments)      Family History   Problem Relation Age of Onset    Other Other         cerebral aneurysm     Heart Disease Mother     Heart Disease Sister       Social History     Socioeconomic History    Marital status:      Spouse name: Not on file    Number of children: Not on file    Years of education: Not on file    Highest education level: Not on file   Occupational History    Not on file   Social Needs    Financial resource strain: Not on file    Food insecurity:     Worry: Not on file     Inability: Not on file    Transportation needs:     Medical: Not on file     Non-medical: Not on file   Tobacco Use    Smoking status: Former Smoker     Packs/day: 2.00     Years: 14.00     Pack years: 28.00     Types: Cigarettes    Smokeless tobacco: Never Used    Tobacco comment: quit at age 27   Substance and Sexual Activity    Alcohol use: No    Drug use: Not on file    Sexual activity: Not on file   Lifestyle    Physical activity:     Days per week: Not on file     Minutes per session: Not on file    Stress: Not on file   Relationships    Social connections:     Talks on phone: Not on file     Gets together: Not on file     Attends Yazidism service: Not on file     Active member of club or organization: Not on file     Attends meetings of clubs or organizations: Not on file     Relationship status: Not on file    Intimate partner violence:     Fear of current or ex partner: Not on file     Emotionally abused: Not on file     Physically abused: Not on file     Forced sexual activity: Not on file Other Topics Concern     Service Not Asked    Blood Transfusions Not Asked    Caffeine Concern Not Asked    Occupational Exposure Not Asked    Hobby Hazards Not Asked    Sleep Concern Not Asked    Stress Concern Not Asked    Weight Concern Not Asked    Special Diet Not Asked    Back Care Not Asked    Exercise Not Asked    Bike Helmet Not Asked    Seat Belt Not Asked    Self-Exams Not Asked   Social History Narrative    Not on file       Current Facility-Administered Medications   Medication Dose Route Frequency    apixaban (ELIQUIS) tablet 5 mg  5 mg Oral BID    aspirin delayed-release tablet 81 mg  81 mg Oral QHS    carBAMazepine (TEGretol) tablet 100 mg  100 mg Oral BID    docusate sodium (COLACE) capsule 100 mg  100 mg Oral BID    [START ON 10/25/2019] metoprolol succinate (TOPROL-XL) tablet 100 mg  100 mg Oral DAILY    [START ON 10/25/2019] pantoprazole (PROTONIX) tablet 40 mg  40 mg Oral ACB    potassium chloride (K-DUR, KLOR-CON) SR tablet 20 mEq  20 mEq Oral Q12H    [START ON 10/25/2019] sertraline (ZOLOFT) tablet 50 mg  50 mg Oral DAILY    rosuvastatin (CRESTOR) tablet 40 mg  40 mg Oral QHS    traZODone (DESYREL) tablet 50 mg  50 mg Oral QHS PRN    acetaminophen (TYLENOL) tablet 650 mg  650 mg Oral Q6H PRN    furosemide (LASIX) injection 40 mg  40 mg IntraVENous NOW    [START ON 10/25/2019] furosemide (LASIX) injection 40 mg  40 mg IntraVENous DAILY    albuterol-ipratropium (DUO-NEB) 2.5 MG-0.5 MG/3 ML  3 mL Nebulization Q4H RT    [START ON 10/25/2019] gabapentin (NEURONTIN) capsule 400 mg  400 mg Oral Q24H    And    [START ON 10/25/2019] gabapentin (NEURONTIN) capsule 800 mg  800 mg Oral PCL    And    gabapentin (NEURONTIN) capsule 400 mg  400 mg Oral Q24H       Review of Systems   Constitution: Negative for chills, fever, malaise/fatigue, weight gain and weight loss. HENT: Negative. Eyes: Negative.     Cardiovascular: Positive for dyspnea on exertion, leg swelling and orthopnea. Negative for chest pain (currently pain free), claudication, cyanosis, irregular heartbeat, near-syncope, palpitations, paroxysmal nocturnal dyspnea and syncope. Respiratory: Positive for shortness of breath. Negative for cough and wheezing. Endocrine: Negative. Skin: Negative. Musculoskeletal: Negative. Gastrointestinal: Negative for nausea and vomiting. Genitourinary: Negative. Neurological: Negative for dizziness. Psychiatric/Behavioral: Negative. Allergic/Immunologic: Negative. Physical Exam  Vitals:    10/24/19 1419 10/24/19 1610   BP: (!) 162/96    Pulse: 85    Resp: 20    Temp: 97.4 °F (36.3 °C)    SpO2: 96%    Weight:  88.6 kg (195 lb 5.2 oz)       Physical Exam:  General: Well Developed, Well Nourished, No Acute Distress  HEENT: pupils equal and round, no abnormalities noted  Neck: supple, no JVD, no carotid bruits  Heart: S1S2 with RRR without murmurs or gallops  Lungs: Decreased with rails all fields  Abd: soft, nontender,distended, fluid wave, with good bowel sounds  Ext: warm, +1-2 edema, calves supple/nontender, pulses 2+ bilaterally  Skin: warm and dry  Psychiatric: Normal mood and affect  Neurologic: Alert and oriented X 3      Labs: No results for input(s): NA, K, MG, BUN, CREA, GLU, WBC, HGB, HCT, PLT, INR, TRIGL, LDL, HDL, HGBEXT, HCTEXT, PLTEXT, INREXT in the last 72 hours. No lab exists for component: TROPI, TCHOL    Echo Results  (Last 48 hours)    None        No results found. Assessment/Plan:     Assessment:      Principal Problem:    Systolic CHF, acute on chronic (HCC) (1/31/2019)      Overview: 2014:  EF 55%      07/2018:  EF 45-50%      01/2019:  EF 40-45%      06/2019:  EF 50%      07/2019:  EF 35%      09/2019:  EF 40-45%  Continue HF approved BB, Add ARB back, Labs Pending, Pt has gotten 40 lasix already, Plan for 40 BID, Strict I and O, Daily weight, Low NA Cardiac Diet,  Renal function in the past as been close to 1.00. Replace K PRN. HF education to be completed while pt is inpatient. Further recommendations pending clinical course and attending recommendations. Active Problems:    Dyslipidemia (4/7/2017) Continue Statin      Hypertension (4/7/2017) on HF approved BB, restart ARB      Atrial fibrillation ([facility] [address] (4/7/2017) Hx of A Fib on Eliquis at home, S/p MAZE, and MICHEAL clip, EKG pending rate controlled by exam      SOB (shortness of breath) (10/24/2019) Likely 2nd to medication none compliance and now volume overloaded. Will need IV lasix to get back to dry weight, monitor vitals and daily BMP. Replace Electrolytes PRN,  Consider adding Aldactone. Will need to continue HF approved BB, add back ARB. With recent echo no need to repeat at this time. Further recommendations pending clinical course and attending recommendations. Thank you very much for this referral. We appreciate the opportunity to participate in this patient's care. We will follow along with above stated plan.     Rosa Maria Adrian, NP  Consulting MD: Dr Pineda Valdez

## 2019-10-24 NOTE — H&P
Hospitalist H&P Note     Admit Date:  10/24/2019 12:03 PM   Name:  Glen Geller   Age:  66 y.o.  :  1945   MRN:  [id number]   PCP:  Mag Snowden MD  Treatment Team: Attending Provider: Corrine Morales DO    HPI:   Patient history was obtained from the ER provider prior to seeing the patient. Patient is a 78-year-old male with past medical history significant for Hypertension, A. fib, dyslipidemia, chronic systolic CHF. Patient had an appointment today with his primary care provider as noted to be quite short of breath. This has apparently been getting worse over the past week and a half. Per the patient's wife he has not been taking Lasix at home nor has he been drinking sufficient fluids. Patient has a history of CABG and has had difficulty with pleural effusions and fluid retention. Prior echoes had showed severely reduced EF at 40 to 45%. Patient reports that he has not urinated very much in the past few days, has not had a fever heat that he knows of but has felt chilled intermittently. At the provider's office today the patient was positive for orthostatic hypotension changes. Patient was a direct admit to the eighth floor and hospitalist service asked to admit patient with acute on chronic systolic heart failure. 10 systems reviewed and negative except as noted in HPI.   Past Medical History:   Diagnosis Date    Atrial fibrillation ([address]     Atrial flutter ([address] 2017    CAD (coronary artery disease) 2006    Cancer [facility])     prostate    GERD (gastroesophageal reflux disease)     takes omeprazole    Heart failure ([address]     Hypertension     Ill-defined condition     trigeminal neuralgia    Myocardial infarction ([address] 2019    NSTEMI     Sleep apnea     does not wear CPAP    Stroke ([address] 2018    TIA      Past Surgical History:   Procedure Laterality Date    HX CORONARY ARTERY BYPASS GRAFT  06/10/2019    3 vessel CABG with LIMA-LAD,SVG-OM,and SVG-R posterior lateral branch with MAZE & LA appendage clipping.  HX CORONARY STENT PLACEMENT      HX HEART CATHETERIZATION  08/14/2006    HX HEART CATHETERIZATION  06/07/2019    Severe 3 vessel CAD with LV EF=40-45% and unsuccessful LCX PCI.  HX PROSTATE SURGERY        Allergies   Allergen Reactions    Codeine Unknown (comments)    Iodinated Contrast Media Unknown (comments)      Social History     Tobacco Use    Smoking status: Former Smoker     Packs/day: 2.00     Years: 14.00     Pack years: 28.00     Types: Cigarettes    Smokeless tobacco: Never Used    Tobacco comment: quit at age 27   Substance Use Topics    Alcohol use: No      Family History   Problem Relation Age of Onset    Other Other         cerebral aneurysm     Heart Disease Mother     Heart Disease Sister       Immunization History   Administered Date(s) Administered    TB Skin Test (PPD) Intradermal 07/22/2018, 06/10/2019     PTA Medications:  Prior to Admission Medications   Prescriptions Last Dose Informant Patient Reported? Taking? B INFANTIS/B ANI/B CHESTER/B BIFID (PROBIOTIC 4X PO)   Yes No   Sig: Take  by mouth daily. CA/D3/MAG OX/ZINC//ARLETTE/BOR (CALCIUM 600-D3 PLUS PO)   Yes No   Sig: Take  by mouth. CYANOCOBALAMIN, VITAMIN B-12, (VITAMIN B12 PO)   Yes No   Sig: Take  by mouth daily. DOCOSAHEXANOIC ACID/EPA (FISH OIL PO)   Yes No   Sig: Take  by mouth daily. HYDROcodone-acetaminophen (NORCO)  mg tablet   Yes No   Sig: Take 1 Tab by mouth every eight (8) hours as needed. TURMERIC ROOT EXTRACT PO   Yes No   Sig: Take  by mouth. apixaban (ELIQUIS) 5 mg tablet   No No   Sig: Take 1 Tab by mouth two (2) times a day. aspirin delayed-release 81 mg tablet   Yes No   Sig: Take 1 Tab by mouth nightly. carBAMazepine (TEGRETOL) 200 mg tablet   Yes No   Sig: Take 100 mg by mouth two (2) times a day. co-enzyme Q-10 (CO Q-10) 100 mg capsule   Yes No   Sig: Take 100 mg by mouth daily.    docusate sodium (COLACE) 100 mg capsule   Yes No   Sig: Take 100 mg by mouth two (2) times a day. furosemide (LASIX) 20 mg tablet   No No   Sig: Take 1 Tab by mouth daily. gabapentin (NEURONTIN) 400 mg capsule   No No   Sig: Take 2 tablets po [id number]   Patient taking differently: 400 mg three (3) times daily. 1 tab in am, 2 tabs after lunch, and 1 tab at night   losartan-hydroCHLOROthiazide (HYZAAR) 100-25 mg per tablet   No No   Sig: Take 1 Tab by mouth nightly. melatonin 3 mg tablet   Yes No   Sig: Take 5 mg by mouth nightly as needed. metoprolol succinate (TOPROL-XL) 100 mg tablet   No No   Sig: TAKE 1 TABLET BY MOUTH DAILY   multivitamin (ONE A DAY) tablet   Yes No   Sig: Take 1 Tab by mouth daily. nitroglycerin (NITROSTAT) 0.4 mg SL tablet   No No   Sig: TAKE AS DIRECTED.   omeprazole (PRILOSEC) 20 mg capsule   No No   Sig: Take 1 Cap by mouth daily. potassium chloride SR (K-TAB) 20 mEq tablet   No No   Sig: Take 2 Tabs by mouth daily. Take 3 tablets on 19 and 8/3/19 and then 2 tabs daily thereafter   Patient taking differently: Take 20 mEq by mouth daily. rosuvastatin (CRESTOR) 40 mg tablet   No No   Sig: Take 1 Tab by mouth nightly. sertraline (ZOLOFT) 50 mg tablet   Yes No   Si mg daily. traZODone (DESYREL) 50 mg tablet   Yes No   Sig: Take 50 mg by mouth nightly as needed. vit C/E/Zn/coppr/lutein/zeaxan (PRESERVISION AREDS-2 PO)   Yes No   Sig: Take  by mouth daily. Facility-Administered Medications: None       Objective:     Patient Vitals for the past 24 hrs:   Temp Pulse Resp BP SpO2   10/24/19 1419 97.4 °F (36.3 °C) 85 20 (!) 162/96 96 %     Oxygen Therapy  O2 Sat (%): 96 % (10/24/19 1419)  No intake or output data in the 24 hours ending 10/24/19 1604      Physical Exam:  General:    Well nourished. Alert. Eyes:   Normal sclera. Extraocular movements intact. ENT:  Normocephalic, atraumatic. Moist mucous membranes  CV:   RRR. No m/r/g. Peripheral pulses 2+.    Lungs:  Rales, exp wheezes  Abdomen: Soft, nontender, somewhat distended and tight. Extremities: Warm and dry. No cyanosis. 2+ edema BLE  Neurologic: No focal deficit  Skin:     No rashes or jaundice. Normal coloration  Psych:  Normal mood and affect. I reviewed the labs, imaging, EKGs, telemetry, and other studies done this admission. Data Review:   No results found for this or any previous visit (from the past 24 hour(s)). All Micro Results     None          Other Studies:  No results found.     Assessment and Plan:     Hospital Problems as of 10/24/2019 Date Reviewed: 8/20/2019          Codes Class Noted - Resolved POA    SOB (shortness of breath) ICD-10-CM: R06.02  ICD-9-CM: 786.05  10/24/2019 - Present Yes        * (Principal) Systolic CHF, acute on chronic (HCC) (Chronic) ICD-10-CM: I50.23  ICD-9-CM: 428.23, 428.0  1/31/2019 - Present Yes    Overview Addendum 10/10/2019  1:52 PM by Eliane Leavitt MD     2014:  EF 55%  07/2018:  EF 45-50%  01/2019:  EF 40-45%  06/2019:  EF 50%  07/2019:  EF 35%  09/2019:  EF 40-45%             Dyslipidemia (Chronic) ICD-10-CM: E78.5  ICD-9-CM: 272.4  4/7/2017 - Present Yes        Hypertension (Chronic) ICD-10-CM: I10  ICD-9-CM: 401.9  4/7/2017 - Present Yes        Atrial fibrillation ([facility] ICD-10-CM: I48.91  ICD-9-CM: 427.31  4/7/2017 - Present Yes              PLAN:  Acute on chronic systolic CHF  -IV Lasix  -Strict I&O  -Daily weights  -Check CMP, LFTs, BNP, CBC  -Cardiology consult  -Remote telemetry    Shortness of breath  -Oxygen to maintain sats above 90  -Nebulizers    Hypertension  -IV Lasix as noted above  -Continue home metoprolol  -Holding ACE until renal function known    A. fib  -Remote telemetry  -Continue beta-blocker    Dyslipidemia  -Continue home statin    Discharge planning: Home  DVT ppx: Patient on Eliquis    Code status:    Decision Maker: None on file    Admit status: Inpatient    Case reviewed with supervising physician - Otis Maddox MD    Estimated LOS: Greater than 2 midnights  Risk:  high    Signed:  Maryanne Alves NP

## 2019-10-24 NOTE — PROGRESS NOTES
Pt resting in the bed with friend at the bedside. Pt is stable. No skin breakdown to note. Dual skin assessment performed with Jessica Reyes RN. No needs expressed. Safety measures are in place.

## 2019-10-24 NOTE — PROGRESS NOTES
Pt resting in the bed with family/friends present in room. Pt is stable. Pt denies SOB. Respirations are even and unlabored with nasal cannula, 7 L/min. No needs expressed. SBAR report given to the oncoming nurse.

## 2019-10-25 ENCOUNTER — APPOINTMENT (OUTPATIENT)
Dept: CARDIAC REHAB | Age: 74
End: 2019-10-25
Payer: MEDICARE

## 2019-10-25 LAB
BASOPHILS # BLD: 0.1 K/UL (ref 0–0.2)
BASOPHILS NFR BLD: 1 % (ref 0–2)
DIFFERENTIAL METHOD BLD: ABNORMAL
EOSINOPHIL # BLD: 0.1 K/UL (ref 0–0.8)
EOSINOPHIL NFR BLD: 1 % (ref 0.5–7.8)
ERYTHROCYTE [DISTWIDTH] IN BLOOD BY AUTOMATED COUNT: 17.8 % (ref 11.9–14.6)
HCT VFR BLD AUTO: 36.8 % (ref 41.1–50.3)
HGB BLD-MCNC: 12.2 G/DL (ref 13.6–17.2)
IMM GRANULOCYTES # BLD AUTO: 0 K/UL (ref 0–0.5)
IMM GRANULOCYTES NFR BLD AUTO: 0 % (ref 0–5)
LYMPHOCYTES # BLD: 1.2 K/UL (ref 0.5–4.6)
LYMPHOCYTES NFR BLD: 13 % (ref 13–44)
MCH RBC QN AUTO: 31.4 PG (ref 26.1–32.9)
MCHC RBC AUTO-ENTMCNC: 33.2 G/DL (ref 31.4–35)
MCV RBC AUTO: 94.6 FL (ref 79.6–97.8)
MONOCYTES # BLD: 0.8 K/UL (ref 0.1–1.3)
MONOCYTES NFR BLD: 9 % (ref 4–12)
NEUTS SEG # BLD: 7.1 K/UL (ref 1.7–8.2)
NEUTS SEG NFR BLD: 77 % (ref 43–78)
NRBC # BLD: 0 K/UL (ref 0–0.2)
PLATELET # BLD AUTO: 192 K/UL (ref 150–450)
PMV BLD AUTO: 8.9 FL (ref 9.4–12.3)
RBC # BLD AUTO: 3.89 M/UL (ref 4.23–5.6)
WBC # BLD AUTO: 9.2 K/UL (ref 4.3–11.1)

## 2019-10-25 PROCEDURE — 65660000000 HC RM CCU STEPDOWN

## 2019-10-25 PROCEDURE — 74011000250 HC RX REV CODE- 250: Performed by: NURSE PRACTITIONER

## 2019-10-25 PROCEDURE — 74011250636 HC RX REV CODE- 250/636: Performed by: INTERNAL MEDICINE

## 2019-10-25 PROCEDURE — 94640 AIRWAY INHALATION TREATMENT: CPT

## 2019-10-25 PROCEDURE — 36415 COLL VENOUS BLD VENIPUNCTURE: CPT

## 2019-10-25 PROCEDURE — 74011250637 HC RX REV CODE- 250/637: Performed by: NURSE PRACTITIONER

## 2019-10-25 PROCEDURE — 85025 COMPLETE CBC W/AUTO DIFF WBC: CPT

## 2019-10-25 PROCEDURE — 74011250636 HC RX REV CODE- 250/636: Performed by: NURSE PRACTITIONER

## 2019-10-25 PROCEDURE — 94760 N-INVAS EAR/PLS OXIMETRY 1: CPT

## 2019-10-25 PROCEDURE — 77010033678 HC OXYGEN DAILY

## 2019-10-25 RX ORDER — FUROSEMIDE 40 MG/1
40 TABLET ORAL EVERY 12 HOURS
Status: DISCONTINUED | OUTPATIENT
Start: 2019-10-26 | End: 2019-10-26 | Stop reason: HOSPADM

## 2019-10-25 RX ORDER — POTASSIUM CHLORIDE 20 MEQ/1
20 TABLET, EXTENDED RELEASE ORAL DAILY
Status: DISCONTINUED | OUTPATIENT
Start: 2019-10-26 | End: 2019-10-26 | Stop reason: HOSPADM

## 2019-10-25 RX ORDER — FUROSEMIDE 10 MG/ML
40 INJECTION INTRAMUSCULAR; INTRAVENOUS EVERY 12 HOURS
Status: COMPLETED | OUTPATIENT
Start: 2019-10-25 | End: 2019-10-25

## 2019-10-25 RX ADMIN — LOSARTAN POTASSIUM 100 MG: 50 TABLET ORAL at 08:49

## 2019-10-25 RX ADMIN — APIXABAN 5 MG: 5 TABLET, FILM COATED ORAL at 08:49

## 2019-10-25 RX ADMIN — IPRATROPIUM BROMIDE AND ALBUTEROL SULFATE 3 ML: .5; 3 SOLUTION RESPIRATORY (INHALATION) at 23:24

## 2019-10-25 RX ADMIN — PANTOPRAZOLE SODIUM 40 MG: 40 TABLET, DELAYED RELEASE ORAL at 06:04

## 2019-10-25 RX ADMIN — IPRATROPIUM BROMIDE AND ALBUTEROL SULFATE 3 ML: .5; 3 SOLUTION RESPIRATORY (INHALATION) at 15:05

## 2019-10-25 RX ADMIN — IPRATROPIUM BROMIDE AND ALBUTEROL SULFATE 3 ML: .5; 3 SOLUTION RESPIRATORY (INHALATION) at 19:40

## 2019-10-25 RX ADMIN — METOPROLOL SUCCINATE 100 MG: 100 TABLET, EXTENDED RELEASE ORAL at 08:50

## 2019-10-25 RX ADMIN — CARBAMAZEPINE 100 MG: 200 TABLET ORAL at 08:50

## 2019-10-25 RX ADMIN — IPRATROPIUM BROMIDE AND ALBUTEROL SULFATE 3 ML: .5; 3 SOLUTION RESPIRATORY (INHALATION) at 03:54

## 2019-10-25 RX ADMIN — ROSUVASTATIN CALCIUM 40 MG: 20 TABLET, COATED ORAL at 20:55

## 2019-10-25 RX ADMIN — FUROSEMIDE 40 MG: 10 INJECTION, SOLUTION INTRAMUSCULAR; INTRAVENOUS at 17:08

## 2019-10-25 RX ADMIN — GABAPENTIN 400 MG: 400 CAPSULE ORAL at 20:55

## 2019-10-25 RX ADMIN — IPRATROPIUM BROMIDE AND ALBUTEROL SULFATE 3 ML: .5; 3 SOLUTION RESPIRATORY (INHALATION) at 08:14

## 2019-10-25 RX ADMIN — GABAPENTIN 800 MG: 400 CAPSULE ORAL at 12:18

## 2019-10-25 RX ADMIN — FUROSEMIDE 40 MG: 10 INJECTION, SOLUTION INTRAMUSCULAR; INTRAVENOUS at 06:04

## 2019-10-25 RX ADMIN — IPRATROPIUM BROMIDE AND ALBUTEROL SULFATE 3 ML: .5; 3 SOLUTION RESPIRATORY (INHALATION) at 11:38

## 2019-10-25 RX ADMIN — APIXABAN 5 MG: 5 TABLET, FILM COATED ORAL at 17:08

## 2019-10-25 RX ADMIN — CARBAMAZEPINE 100 MG: 200 TABLET ORAL at 17:08

## 2019-10-25 RX ADMIN — GABAPENTIN 400 MG: 400 CAPSULE ORAL at 06:04

## 2019-10-25 RX ADMIN — IPRATROPIUM BROMIDE AND ALBUTEROL SULFATE 3 ML: .5; 3 SOLUTION RESPIRATORY (INHALATION) at 01:19

## 2019-10-25 RX ADMIN — SERTRALINE HYDROCHLORIDE 50 MG: 50 TABLET ORAL at 08:49

## 2019-10-25 RX ADMIN — ASPIRIN 81 MG: 81 TABLET ORAL at 20:55

## 2019-10-25 NOTE — PROGRESS NOTES
Patient awake resting in recliner at this time. Respirations present. On room air. Visitor present in room. No needs expressed. Recliner wheels locked. Call light within reach. Bedside report given to Richie Funk [facility].

## 2019-10-25 NOTE — PROGRESS NOTES
Problem: Mobility Impaired (Adult and Pediatric)  Goal: *Acute Goals and Plan of Care (Insert Text)  Description  No goals. At baseline. O2 sats 92% on RA with 300 ft. Did drop at the end to 70s but right back up to 90s in the chair. Unclear if accurate as not symptomatic. Rosana Lopez PT     Outcome: Progressing Towards Goal       PHYSICAL THERAPY: Initial Assessment, Daily Note and Discharge 10/25/2019  INPATIENT:    [place] / Plan[place] / Product Type: Medicare /       NAME/AGE/GENDER: Judi Peraza is a 66 y.o. male   PRIMARY DIAGNOSIS: SOB (shortness of breath) [N79.37] Systolic CHF, acute on chronic (HCC)   Systolic CHF, acute on chronic (HCC)          ICD-10: Treatment Diagnosis:    Generalized Muscle Weakness (M62.81)  Difficulty in walking, Not elsewhere classified (R26.2)   Precaution/Allergies:  Codeine and Iodinated contrast media      ASSESSMENT:     Mr. Raghavendra Cobb presents supine O2 sats were 95% on RA. O2 sats dropped when he was on the edge of the bed putting on his socks as he holds his breath. Recovered quickly. Gait 300 ft with O2 sats staying above 90 except until the last 20 ft or so. Dropped to 70s but not clear if this was an accurate reading due to no symptoms. Mr. Raghavendra Cobb was independent with all aspects of mobility and gait. RN notified regarding O2 sats. At this time there is no indication for skilled PT. This section established at most recent assessment   PROBLEM LIST (Impairments causing functional limitations):  none    INTERVENTIONS PLANNED: (Benefits and precautions of physical therapy have been discussed with the patient.)  none      TREATMENT PLAN: Frequency/Duration: NA  Rehabilitation Potential For Stated Goals: NA     REHAB RECOMMENDATIONS (at time of discharge pending progress):    Placement: It is my opinion, based on this patient's performance to date, that Mr. Raghavendra Cobb may benefit from being discharged with NO further skilled therapy due to a proven ability to function at baseline. Equipment:   None at this time              HISTORY:   History of Present Injury/Illness (Reason for Referral):  Patient is a 66-year-old male with past medical history significant for Hypertension, A. fib, dyslipidemia, chronic systolic CHF. Patient had an appointment today with his primary care provider as noted to be quite short of breath. This has apparently been getting worse over the past week and a half. Per the patient's wife he has not been taking Lasix at home nor has he been drinking sufficient fluids. Patient has a history of CABG and has had difficulty with pleural effusions and fluid retention. Prior echoes had showed severely reduced EF at 40 to 45%. Patient reports that he has not urinated very much in the past few days, has not had a fever heat that he knows of but has felt chilled intermittently. At the provider's office today the patient was positive for orthostatic hypotension changes. Patient was a direct admit to the eighth floor and hospitalist service asked to admit patient with acute on chronic systolic heart failure. Past Medical History/Comorbidities:   Mr. Favian Flynn  has a past medical history of Atrial fibrillation [facility]), Atrial flutter ([facility] [address] (4/7/2017), CAD (coronary artery disease) (2006), Cancer ([address] (2011), GERD (gastroesophageal reflux disease), Heart failure ([address] Hypertension, Ill-defined condition, Myocardial infarction ([address] (06/06/2019), Sleep apnea, and Stroke ([address] (2018). He also has no past medical history of Aneurysm ([facility] [address] Mr. Favian Flynn  has a past surgical history that includes hx coronary stent placement; hx prostate surgery; hx heart catheterization (08/14/2006); hx heart catheterization (06/07/2019); and hx coronary artery bypass graft (06/10/2019).   Social History/Living Environment:   Home Environment: Private residence  # Steps to Enter: 1  One/Two Story Residence: One story  Living Alone: No  Support Systems: Child(flakita), Friends \ neighbors  Patient Expects to be Discharged to[de-identified] Private residence  Current DME Used/Available at Home: None  Prior Level of Function/Work/Activity:  independent  Age. CAD    Number of Personal Factors/Comorbidities that affect the Plan of Care: 1-2: MODERATE COMPLEXITY   EXAMINATION:   Most Recent Physical Functioning:   Gross Assessment:                  Posture:     Balance:  Sitting: Intact  Standing: Intact Bed Mobility:  Rolling: Independent  Supine to Sit: Independent  Wheelchair Mobility:     Transfers:  Sit to Stand: Independent  Stand to Sit: Independent  Gait:     Distance (ft): 300 Feet (ft)  Ambulation - Level of Assistance: Stand-by assistance      Body Structures Involved:  None Body Functions Affected:  None Activities and Participation Affected:  None   Number of elements that affect the Plan of Care: 1-2: LOW COMPLEXITY   CLINICAL PRESENTATION:   Presentation: Stable and uncomplicated: LOW COMPLEXITY   CLINICAL DECISION MAKIN[address] [place] [id number] AM-PAC 6 Clicks   Basic Mobility Inpatient Short Form  How much difficulty does the patient currently have. .. Unable A Lot A Little None   1. Turning over in bed (including adjusting bedclothes, sheets and blankets)? ? 1   ? 2   ? 3   ? 4   2. Sitting down on and standing up from a chair with arms ( e.g., wheelchair, bedside commode, etc.)   ? 1   ? 2   ? 3   ? 4   3. Moving from lying on back to sitting on the side of the bed?   ? 1   ? 2   ? 3   ? 4   How much help from another person does the patient currently need. .. Total A Lot A Little None   4. Moving to and from a bed to a chair (including a wheelchair)? ? 1   ? 2   ? 3   ? 4   5. Need to walk in hospital room? ? 1   ? 2   ? 3   ? 4   6. Climbing 3-5 steps with a railing? ? 1   ? 2   ? 3   ? 4   © 2007, Trustees of [address] [place] [id number], under license to [employer].  All rights reserved      Score:  Initial: 24 Most Recent: X (Date: -- ) Interpretation of Tool:  Represents activities that are increasingly more difficult (i.e. Bed mobility, Transfers, Gait). Medical Necessity:     NA.  Reason for Services/Other Comments:  NA.   Use of outcome tool(s) and clinical judgement create a POC that gives a: Clear prediction of patient's progress: LOW COMPLEXITY            TREATMENT:   (In addition to Assessment/Re-Assessment sessions the following treatments were rendered)   Pre-treatment Symptoms/Complaints:  none  Pain: Initial:   Pain Intensity 1: 0  Post Session:  none     Therapeutic Activity: (    8):  Therapeutic activities including Bed transfers, Chair transfers and Ambulation on level ground to improve mobility. Required no   to promote motor control of bilateral, upper extremity(s), lower extremity(s).          Braces/Orthotics/Lines/Etc:   O2 Device: Nasal cannula  Treatment/Session Assessment:    Response to Treatment:  good   Interdisciplinary Collaboration:   Registered Nurse  After treatment position/precautions:   Up in chair  Call light within reach  RN notified   Compliance with Program/Exercises: NA   Recommendations/Intent for next treatment session:  DC PT  Total Treatment Duration:  PT Patient Time In/Time Out  Time In: 1005  Time Out: [address], PT

## 2019-10-25 NOTE — PROGRESS NOTES
Bedside shift report received from [facility]. Assumed care of patient. Respirations regular rate & rhythm, on room air. No s/sx of distress. Sitting in chair. chair in low & locked position. Call light and personal belongings within reach. Family/visitors present at the bedside.

## 2019-10-25 NOTE — PROGRESS NOTES
Pt resting comfortably in bed at this time, alert and oriented times 3. No distress noted, respirations even and unlabored on 7 L NC high flow. 20 G IV left forearm clean dry and intact, no signs of infection noted. Pt denies pain at this time. Urinal at the bedside, pt instructed to call for assistance if needed, call light in place, will continue to monitor.

## 2019-10-25 NOTE — PROGRESS NOTES
Bedside report given to [facility] AT [facility], [address]. Pt. Is alert and oriented with no signs of distress or complaints of pain at this time.

## 2019-10-25 NOTE — INTERDISCIPLINARY ROUNDS
Interdisciplinary team rounds were held 10/25/2019 with the following team members:Care Management, Physical Therapy, Physician and Clinical Coordinator and the patient. Plan of care discussed. See clinical pathway and/or care plan for interventions and desired outcomes.

## 2019-10-25 NOTE — PROGRESS NOTES
Pt on room air at this time, 94%. Pt does not complain of shortness of breath. Will continue to monitor.

## 2019-10-25 NOTE — PROGRESS NOTES
[facility] CARDIOLOGY PROGRESS NOTE           10/25/2019 7:48 AM    Admit Date: 10/24/2019      Subjective:   Patient feels markedly better    ROS:  Cardiovascular:  As noted above    Objective:      Vitals:    10/25/19 0316 10/25/19 0356 10/25/19 0558 10/25/19 0730   BP: 118/70   136/74   Pulse: 86   81   Resp: 18   19   Temp: 97.5 °F (36.4 °C)   98 °F (36.7 °C)   SpO2: 95% 97%  97%   Weight:   84.5 kg (186 lb 4.6 oz)        Physical Exam:  General-No Acute Distress  Neck- supple, no JVD  CV- regular rate and rhythm no MRG  Lung- clear bilaterally  Abd- soft, nontender, nondistended  Ext- no edema bilaterally. Skin- warm and dry    Data Review:   Recent Labs     10/25/19  0616 10/24/19  1800   NA  --  137   K  --  3.9   MG  --  1.7*   BUN  --  14   CREA  --  0.81   GLU  --  78   WBC 9.2  --    HGB 12.2*  --    HCT 36.8*  --      --    TROIQ  --  0.02       Assessment/Plan:     Principal Problem:    Systolic CHF, acute on chronic (HCC) (1/31/2019)    EF 40-45%. Volume better. 1 additional IV lasix dose today and change to PO tomorrow.   Medical therapy appropriate     Active Problems:    Dyslipidemia (4/7/2017)    On rosuvastatin       Hypertension (4/7/2017)    Well controlled       Atrial fibrillation ([address] (4/7/2017)    On Eliquis       SOB (shortness of breath) (10/24/2019)    Improved and wean off of O2    Hopefully home tomorrow    Florentin Cano MD  10/25/2019 7:48 AM

## 2019-10-25 NOTE — COMMUNITY CARE MANAGEMENT
Met with patient in regards to HF bundle program and visits with health . Patient had very little insight into disease state/POC. Had lengthy conversation in regards to CHF and s/s recognition. We discussed importance of medication compliance as he recently had been taking diuretic sporadically. We discussed fluid and sodium restrictions as well. Patient is currently in cardiac rehab and continues to work. He declined PC consult. He is aware of HC and I have notified health  to notify patient and set up home visit s/p d/c. I have provided the patient a scale and contact information for questions/concerns s/p d/c.

## 2019-10-25 NOTE — PROGRESS NOTES
Pt complaining of pain Pt states he takes 10mg of Norco at home Q8 hrs PRN. Called Dr. Lynn Reasontigre order received. Allergy Warning reviewed pt states he does not have any allergies to Codeine and that it just constipated him.

## 2019-10-25 NOTE — PROGRESS NOTES
Hospitalist Note     Admit Date:  10/24/2019 12:03 PM   Name:  Keturah Meadows   Age:  66 y.o.  :  1945   MRN:  [id number]   PCP:  Sachi Rodrigues MD  Treatment Team: Attending Provider: Lucia De La Paz MD; Consulting Provider: Pete Cortez MD; Primary Nurse: Manuel Bowser, RN; Care Manager: Xiomara Iverson RN; Utilization Review: Oliver Cao Primary Nurse: Dione Vora     68-year-old male with past medical history significant for Hypertension, A. fib, dyslipidemia, chronic systolic CHF. Patient had an appointment today with his primary care provider as noted to be quite short of breath. This has apparently been getting worse over the past week and a half. Per the patient's wife he has not been taking Lasix at home nor has he been drinking sufficient fluids. Patient has a history of CABG and has had difficulty with pleural effusions and fluid retention. Prior echoes had showed severely reduced EF at 40 to 45%. Patient reports that he has not urinated very much in the past few days, has not had a fever heat that he knows of but has felt chilled intermittently. At the provider's office today the patient was positive for orthostatic hypotension changes. Patient was a direct admit to the eighth floor and hospitalist service asked to admit patient with acute on chronic systolic heart failure.     Today, on room air, lost about 4L of fluids, feels better    Objective:     Patient Vitals for the past 24 hrs:   Temp Pulse Resp BP SpO2   10/25/19 1526 97.7 °F (36.5 °C) 81 18 109/67 91 %   10/25/19 1506     98 %   10/25/19 1200  87      10/25/19 1142     95 %   10/25/19 1130 97.5 °F (36.4 °C) 83 18 136/72 97 %   10/25/19 1029     95 %   10/25/19 0814     95 %   10/25/19 0800  92      10/25/19 0730 98 °F (36.7 °C) 81 19 136/74 97 %   10/25/19 0356     97 %   10/25/19 0316 97.5 °F (36.4 °C) 86 18 118/70 95 %   10/25/19 0123     97 %   10/24/19 2333 97.7 °F (36.5 °C) 92 18 125/72 92 %   10/24/19 2001     92 %   10/24/19 1951 97.8 °F (36.6 °C) 98 19 152/84 90 %   10/24/19 1723     95 %     Oxygen Therapy  O2 Sat (%): 91 % (10/25/19 1526)  Pulse via Oximetry: 74 beats per minute (10/25/19 1506)  O2 Device: Room air (10/25/19 1506)  O2 Flow Rate (L/min): 8 l/min(tried to wean to 6L, pt desatted to 85) (10/25/19 7308)    Intake/Output Summary (Last 24 hours) at 10/25/2019 1554  Last data filed at 10/25/2019 1029  Gross per 24 hour   Intake 690 ml   Output 4650 ml   Net -3960 ml         Physical Exam:  General:    Well nourished. Alert. CV:   RRR. No m/r/g. Lungs:  lonny mild to moderate diffuse crackles  Abdomen: Soft, nontender, somewhat distended and tight. Extremities: Warm and dry. No cyanosis. no edema BLE  Neurologic: No focal deficit  Skin:     No rashes or jaundice. Normal coloration  Psych:  Normal mood and affect. I reviewed the labs, imaging, EKGs, telemetry, and other studies done this admission. Data Review:   Recent Results (from the past 24 hour(s))   BNP    Collection Time: 10/24/19  6:00 PM   Result Value Ref Range     (H) 0 pg/mL   MAGNESIUM    Collection Time: 10/24/19  6:00 PM   Result Value Ref Range    Magnesium 1.7 (L) 1.8 - 2.4 mg/dL   METABOLIC PANEL, COMPREHENSIVE    Collection Time: 10/24/19  6:00 PM   Result Value Ref Range    Sodium 137 136 - 145 mmol/L    Potassium 3.9 3.5 - 5.1 mmol/L    Chloride 102 98 - 107 mmol/L    CO2 28 21 - 32 mmol/L    Anion gap 7 7 - 16 mmol/L    Glucose 78 65 - 100 mg/dL    BUN 14 8 - 23 MG/DL    Creatinine 0.81 0.8 - 1.5 MG/DL    GFR est AA >60 >60 ml/min/1.73m2    GFR est non-AA >60 >60 ml/min/1.73m2    Calcium 8.8 8.3 - 10.4 MG/DL    Bilirubin, total 1.7 (H) 0.2 - 1.1 MG/DL    ALT (SGPT) 29 12 - 65 U/L    AST (SGOT) 20 15 - 37 U/L    Alk.  phosphatase 120 50 - 136 U/L    Protein, total 7.1 6.3 - 8.2 g/dL    Albumin 3.5 3.2 - 4.6 g/dL    Globulin 3.6 (H) 2.3 - 3.5 g/dL    A-G Ratio 1.0 (L) 1.2 - 3.5     TROPONIN I    Collection Time: 10/24/19  6:00 PM   Result Value Ref Range    Troponin-I, Qt. 0.02 0.02 - 0.05 NG/ML   EKG, 12 LEAD, INITIAL    Collection Time: 10/24/19  6:15 PM   Result Value Ref Range    Ventricular Rate 102 BPM    Atrial Rate 102 BPM    P-R Interval 186 ms    QRS Duration 118 ms    Q-T Interval 362 ms    QTC Calculation (Bezet) 471 ms    Calculated P Axis 82 degrees    Calculated R Axis -10 degrees    Calculated T Axis 156 degrees    Diagnosis       Sinus tachycardia  Incomplete right bundle branch block  ST & T wave abnormality, consider inferolateral ischemia  Abnormal ECG  When compared with ECG of 16-JUL-2019 16:03,  T wave inversion less evident in Inferior leads  Confirmed by Jaun Call MD (), LUCITA LAWSON ([id number]) on 10/24/2019 10:46:27 PM     CBC WITH AUTOMATED DIFF    Collection Time: 10/25/19  6:16 AM   Result Value Ref Range    WBC 9.2 4.3 - 11.1 K/uL    RBC 3.89 (L) 4.23 - 5.6 M/uL    HGB 12.2 (L) 13.6 - 17.2 g/dL    HCT 36.8 (L) 41.1 - 50.3 %    MCV 94.6 79.6 - 97.8 FL    MCH 31.4 26.1 - 32.9 PG    MCHC 33.2 31.4 - 35.0 g/dL    RDW 17.8 (H) 11.9 - 14.6 %    PLATELET 941 436 - 643 K/uL    MPV 8.9 (L) 9.4 - 12.3 FL    ABSOLUTE NRBC 0.00 0.0 - 0.2 K/uL    DF AUTOMATED      NEUTROPHILS 77 43 - 78 %    LYMPHOCYTES 13 13 - 44 %    MONOCYTES 9 4.0 - 12.0 %    EOSINOPHILS 1 0.5 - 7.8 %    BASOPHILS 1 0.0 - 2.0 %    IMMATURE GRANULOCYTES 0 0.0 - 5.0 %    ABS. NEUTROPHILS 7.1 1.7 - 8.2 K/UL    ABS. LYMPHOCYTES 1.2 0.5 - 4.6 K/UL    ABS. MONOCYTES 0.8 0.1 - 1.3 K/UL    ABS. EOSINOPHILS 0.1 0.0 - 0.8 K/UL    ABS. BASOPHILS 0.1 0.0 - 0.2 K/UL    ABS. IMM.  GRANS. 0.0 0.0 - 0.5 K/UL       All Micro Results     None          Other Studies:  Xr Chest Sngl V    Result Date: 10/24/2019  Chest portable CLINICAL INDICATION: Acute moderate dyspnea, pulmonary edema, previous heart surgery and heart attack, prostate cancer, coronary artery disease, sleep apnea, cardiac arrhythmia, GERD COMPARISON: 7/16/2019 TECHNIQUE: single AP portable view chest at 6:05 PM upright FINDINGS: Stable mediastinal and hilar contours given positioning and technique. Cardiac surgical changes, mediastinal clips, sternotomy wires again noted. There is unchanged chronic fracture of the most inferior sternotomy wire. There is diffuse pulmonary edema without evidence of a dense consolidation, pneumothorax, or large effusion. IMPRESSION: Pulmonary edema.        Assessment and Plan:     Hospital Problems as of 10/25/2019 Date Reviewed: 8/20/2019          Codes Class Noted - Resolved POA    SOB (shortness of breath) ICD-10-CM: R06.02  ICD-9-CM: 786.05  10/24/2019 - Present Yes        * (Principal) Systolic CHF, acute on chronic (HCC) (Chronic) ICD-10-CM: I50.23  ICD-9-CM: 428.23, 428.0  1/31/2019 - Present Yes    Overview Addendum 10/10/2019  1:52 PM by Clint Arthur MD     2014:  EF 55%  07/2018:  EF 45-50%  01/2019:  EF 40-45%  06/2019:  EF 50%  07/2019:  EF 35%  09/2019:  EF 40-45%             Dyslipidemia (Chronic) ICD-10-CM: E78.5  ICD-9-CM: 272.4  4/7/2017 - Present Yes        Hypertension (Chronic) ICD-10-CM: I10  ICD-9-CM: 401.9  4/7/2017 - Present Yes        Atrial fibrillation ([facility] [address] ICD-10-CM: I48.91  ICD-9-CM: 427.31  4/7/2017 - Present Yes              PLAN:  Acute on chronic systolic CHF  -IV Lasix  -Strict I&O  -Daily weights  -Check CMP, LFTs, BNP, CBC  -Cardiology consult  -Remote telemetry    Shortness of breath  -Oxygen to maintain sats above 90, was < 90 and needed 8L  -Nebulizers    Hypertension  -IV Lasix as noted above  -Continue home metoprolol  -Holding ACE until renal function known    A. fib  -Remote telemetry  -Continue beta-blocker    Dyslipidemia  -Continue home statin    Discharge planning: Home, likely tomorrow  Cont IV diuresis    Signed:  Quynh Agudelo MD

## 2019-10-25 NOTE — PROGRESS NOTES
Bedside report received from [address]. Assessment completed. Bed is in low and locked position with floor free of objects. Patient AxOx3, and resting quietly in bed. No needs noted at present. Respirations even and non labored. Verbalized understanding to call for needs. Call light within reach.

## 2019-10-25 NOTE — PROGRESS NOTES
Pt resting in chair comfortably at this time, alert and oriented times 3. No distress noted, respirations even and unlabored on room air. Pt denies pain at this time. Pt instructed to call for assistance if needed, call light in place, will continue to monitor.

## 2019-10-26 VITALS
RESPIRATION RATE: 20 BRPM | HEART RATE: 87 BPM | SYSTOLIC BLOOD PRESSURE: 144 MMHG | WEIGHT: 184.08 LBS | OXYGEN SATURATION: 93 % | BODY MASS INDEX: 27.99 KG/M2 | DIASTOLIC BLOOD PRESSURE: 84 MMHG | TEMPERATURE: 97.7 F

## 2019-10-26 PROCEDURE — 94640 AIRWAY INHALATION TREATMENT: CPT

## 2019-10-26 PROCEDURE — 74011250637 HC RX REV CODE- 250/637: Performed by: NURSE PRACTITIONER

## 2019-10-26 PROCEDURE — 74011250637 HC RX REV CODE- 250/637: Performed by: HOSPITALIST

## 2019-10-26 PROCEDURE — 74011250637 HC RX REV CODE- 250/637: Performed by: INTERNAL MEDICINE

## 2019-10-26 PROCEDURE — 74011000250 HC RX REV CODE- 250: Performed by: NURSE PRACTITIONER

## 2019-10-26 PROCEDURE — 94760 N-INVAS EAR/PLS OXIMETRY 1: CPT

## 2019-10-26 RX ORDER — DOCUSATE SODIUM 100 MG/1
100 CAPSULE, LIQUID FILLED ORAL
Qty: 60 CAP | Refills: 0 | Status: SHIPPED
Start: 2019-10-26

## 2019-10-26 RX ORDER — POTASSIUM CHLORIDE 1500 MG/1
20 TABLET, FILM COATED, EXTENDED RELEASE ORAL DAILY
Qty: 60 TAB | Refills: 0 | Status: SHIPPED
Start: 2019-10-26 | End: 2019-11-08

## 2019-10-26 RX ORDER — FUROSEMIDE 40 MG/1
40 TABLET ORAL DAILY
Qty: 90 TAB | Refills: 1 | Status: SHIPPED | OUTPATIENT
Start: 2019-10-26

## 2019-10-26 RX ADMIN — GABAPENTIN 400 MG: 400 CAPSULE ORAL at 06:41

## 2019-10-26 RX ADMIN — IPRATROPIUM BROMIDE AND ALBUTEROL SULFATE 3 ML: .5; 3 SOLUTION RESPIRATORY (INHALATION) at 07:57

## 2019-10-26 RX ADMIN — LOSARTAN POTASSIUM 100 MG: 50 TABLET ORAL at 08:51

## 2019-10-26 RX ADMIN — IPRATROPIUM BROMIDE AND ALBUTEROL SULFATE 3 ML: .5; 3 SOLUTION RESPIRATORY (INHALATION) at 11:10

## 2019-10-26 RX ADMIN — POTASSIUM CHLORIDE 20 MEQ: 20 TABLET, EXTENDED RELEASE ORAL at 08:50

## 2019-10-26 RX ADMIN — FUROSEMIDE 40 MG: 40 TABLET ORAL at 08:51

## 2019-10-26 RX ADMIN — APIXABAN 5 MG: 5 TABLET, FILM COATED ORAL at 08:50

## 2019-10-26 RX ADMIN — CARBAMAZEPINE 100 MG: 200 TABLET ORAL at 08:51

## 2019-10-26 RX ADMIN — SERTRALINE HYDROCHLORIDE 50 MG: 50 TABLET ORAL at 08:51

## 2019-10-26 RX ADMIN — METOPROLOL SUCCINATE 100 MG: 100 TABLET, EXTENDED RELEASE ORAL at 08:51

## 2019-10-26 RX ADMIN — IPRATROPIUM BROMIDE AND ALBUTEROL SULFATE 3 ML: .5; 3 SOLUTION RESPIRATORY (INHALATION) at 02:55

## 2019-10-26 NOTE — DISCHARGE INSTRUCTIONS
Patient Education        Cardiology as set up, BMP in 10 days, weight daily, cardiac diet, no salt, fluids < 1.5 L/day, PCP in 1-2 weeks, activity as tolerated. Heart Failure: Care Instructions  Your Care Instructions    Heart failure occurs when your heart does not pump as much blood as the body needs. Failure does not mean that the heart has stopped pumping but rather that it is not pumping as well as it should. Over time, this causes fluid buildup in your lungs and other parts of your body. Fluid buildup can cause shortness of breath, fatigue, swollen ankles, and other problems. By taking medicines regularly, reducing sodium (salt) in your diet, checking your weight every day, and making lifestyle changes, you can feel better and live longer. Follow-up care is a key part of your treatment and safety. Be sure to make and go to all appointments, and call your doctor if you are having problems. It's also a good idea to know your test results and keep a list of the medicines you take. How can you care for yourself at home? Medicines    · Be safe with medicines. Take your medicines exactly as prescribed. Call your doctor if you think you are having a problem with your medicine.     · Do not take any vitamins, over-the-counter medicine, or herbal products without talking to your doctor first. Otila Handy not take ibuprofen (Advil or Motrin) and naproxen (Aleve) without talking to your doctor first. They could make your heart failure worse.     · You may take some of the following medicine. ? Angiotensin-converting enzyme inhibitors (ACEIs) or angiotensin II receptor blockers (ARBs) reduce the heart's workload, lower blood pressure, and reduce swelling. Taking an ACEI or ARB may lower your chance of needing to be hospitalized. ? Beta-blockers can slow heart rate, decrease blood pressure, and improve your condition. Taking a beta-blocker may lower your chance of needing to be hospitalized. ?  Diuretics, also called water pills, reduce swelling.    You will get more details on the specific medicines your doctor prescribes. Diet    · Your doctor may suggest that you limit sodium. Your doctor can tell you how much sodium is right for you. An example is less than 3,000 mg a day. This includes all the salt you eat in cooking or in packaged foods. People get most of their sodium from processed foods. Fast food and restaurant meals also tend to be very high in sodium.     · Ask your doctor how much liquid you can drink each day. You may have to limit liquids.    Weight    · Weigh yourself without clothing at the same time each day. Record your weight. Call your doctor if you have a sudden weight gain, such as more than 2 to 3 pounds in a day or 5 pounds in a week. (Your doctor may suggest a different range of weight gain.) A sudden weight gain may mean that your heart failure is getting worse.    Activity level    · Start light exercise (if your doctor says it is okay). Even if you can only do a small amount, exercise will help you get stronger, have more energy, and manage your weight and your stress. Walking is an easy way to get exercise. Start out by walking a little more than you did before. Bit by bit, increase the amount you walk.     · When you exercise, watch for signs that your heart is working too hard. You are pushing yourself too hard if you cannot talk while you are exercising. If you become short of breath or dizzy or have chest pain, stop, sit down, and rest.     · If you feel \"wiped out\" the day after you exercise, walk slower or for a shorter distance until you can work up to a better pace.     · Get enough rest at night. Sleeping with 1 or 2 pillows under your upper body and head may help you breathe easier.    Lifestyle changes    · Do not smoke. Smoking can make a heart condition worse. If you need help quitting, talk to your doctor about stop-smoking programs and medicines.  These can increase your chances of quitting for good. Quitting smoking may be the most important step you can take to protect your heart.     · Limit alcohol to 2 drinks a day for men and 1 drink a day for women. Too much alcohol can cause health problems.     · Avoid getting sick from colds and the flu. Get a pneumococcal vaccine shot. If you have had one before, ask your doctor whether you need another dose. Get a flu shot each year. If you must be around people with colds or the flu, wash your hands often. When should you call for help? Call 911 if you have symptoms of sudden heart failure such as:    · You have severe trouble breathing.     · You cough up pink, foamy mucus.     · You have a new irregular or rapid heartbeat.    Call your doctor now or seek immediate medical care if:    · You have new or increased shortness of breath.     · You are dizzy or lightheaded, or you feel like you may faint.     · You have sudden weight gain, such as more than 2 to 3 pounds in a day or 5 pounds in a week. (Your doctor may suggest a different range of weight gain.)     · You have increased swelling in your legs, ankles, or feet.     · You are suddenly so tired or weak that you cannot do your usual activities.    Watch closely for changes in your health, and be sure to contact your doctor if you develop new symptoms. Where can you learn more? Go to http://pete-michele.info/. Enter A598 in the search box to learn more about \"Heart Failure: Care Instructions. \"  Current as of: April 9, 2019  Content Version: 12.2  © 2786-2415 [employer]. Care instructions adapted under license by [employer] (which disclaims liability or warranty for this information). If you have questions about a medical condition or this instruction, always ask your healthcare professional. [place] [address] any warranty or liability for your use of this information.          DISCHARGE SUMMARY from Nurse    PATIENT INSTRUCTIONS:    After general anesthesia or intravenous sedation, for 24 hours or while taking prescription Narcotics:  · Limit your activities  · Do not drive and operate hazardous machinery  · Do not make important personal or business decisions  · Do  not drink alcoholic beverages  · If you have not urinated within 8 hours after discharge, please contact your surgeon on call. Report the following to your surgeon:  · Excessive pain, swelling, redness or odor of or around the surgical area  · Temperature over 100.5  · Nausea and vomiting lasting longer than 4 hours or if unable to take medications  · Any signs of decreased circulation or nerve impairment to extremity: change in color, persistent  numbness, tingling, coldness or increase pain  · Any questions    What to do at Home:  Recommended activity: Activity as tolerated    If you experience any of the following symptoms fever 101.5, nausea, vomiting, pain to MD, chest pain and/or shortness of breath to ER please follow up with MD.    *  Please give a list of your current medications to your Primary Care Provider. *  Please update this list whenever your medications are discontinued, doses are      changed, or new medications (including over-the-counter products) are added. *  Please carry medication information at all times in case of emergency situations. These are general instructions for a healthy lifestyle:    No smoking/ No tobacco products/ Avoid exposure to second hand smoke  Surgeon General's Warning:  Quitting smoking now greatly reduces serious risk to your health.     Obesity, smoking, and sedentary lifestyle greatly increases your risk for illness    A healthy diet, regular physical exercise & weight monitoring are important for maintaining a healthy lifestyle    You may be retaining fluid if you have a history of heart failure or if you experience any of the following symptoms:  Weight gain of 3 pounds or more overnight or 5 pounds in a week, increased swelling in our hands or feet or shortness of breath while lying flat in bed. Please call your doctor as soon as you notice any of these symptoms; do not wait until your next office visit. The discharge information has been reviewed with the patient. The patient verbalized understanding. Discharge medications reviewed with the patient and appropriate educational materials and side effects teaching were provided.   ___________________________________________________________________________________________________________________________________

## 2019-10-26 NOTE — PROGRESS NOTES
10/26/2019 12:13 PM    Admit Date: 10/24/2019    Admit Diagnosis: SOB (shortness of breath) [R06.02]      Subjective:   No cp or sob      Objective:      Visit Vitals  /84   Pulse 87   Temp 97.7 °F (36.5 °C)   Resp 20   Wt 83.5 kg (184 lb 1.4 oz)   SpO2 93%   BMI 27.99 kg/m²       Physical Exam:  Rneay Marshal, Well Nourished, No Acute Distress, Alert & Oriented x 3, appropriate mood. Neck- supple, no JVD  CV- regular rate and rhythm no MRG  Lung- clear bilaterally  Abd- soft, nontender, nondistended  Ext- no edema bilaterally. Skin- warm and dry        Data Review:   Recent Labs     10/25/19  0616 10/24/19  1800   NA  --  137   K  --  3.9   BUN  --  14   CREA  --  0.81   WBC 9.2  --    HGB 12.2*  --    HCT 36.8*  --      --        Assessment/Plan:     Principal Problem:    Systolic CHF, acute on chronic (HCC) (1/31/2019)Improved with current therapy.  Will continue medications  Agree with meds and dc- improt of lasix stressed      Overview: 2014:  EF 55%      07/2018:  EF 45-50%      01/2019:  EF 40-45%      06/2019:  EF 50%      07/2019:  EF 35%      09/2019:  EF 40-45%    Active Problems:    Dyslipidemia (4/7/2017)      Hypertension (4/7/2017)      Atrial fibrillation ([facility] [address] (4/7/2017)      SOB (shortness of breath) (10/24/2019)

## 2019-10-26 NOTE — DISCHARGE SUMMARY
Physician Discharge Summary     Patient ID:  Darrel Diaz  [id number]  66 y.o.  1945    Admit date: 10/24/2019    Discharge date: 10-    Diagnosis:  1- Acute pulmonary edema due to acute on chronic systolic CHF, ef 59%, lost 5.7 L of fluids, improved with IV diuresis, seen by cardiology. Hx of CABG. Baseline weight 185 lb. 2- Acute hypoxic respiratory failure dt #1, resolved. 3- Hypertension, controlled. 4- hx of afib, on Eliquis. Hospital course:   66 y.o. male with hx of HTN, A Fib, HLD, HFiEF, CAD s/p CABG x3 with MAZE, MICHEAL clip, TIA, tracheobronchomalacia, smoking, trigeminal neuralgia, and PVC's. The patient has had multiple recurrent problems post CABG including plural effusions and SOB. At his last Dr Appointment with Dr Tasha Padgett the patient EF has improved with medical therapy for HF. Since the 10th the patient started to take his lasix intermittently because he thought he might be on to much. He had reduced urine output at home, has not been on a cardiac diet, has not completed daily weights, and has not restricted his fluid intake. He started to feel SOB yesterday and went to see his PCP today prior to being sent to the ED. He has also noted some LLE with abd fullness. At night he has had orthopnea and now is at 90% of 7 lpm by NC. At the time of his exam no labs or xrays had been completed. He is noted to be HTN at the time of his admission. Patient admitted and treated as above. On day of discharge, patient exam showed clear lungs and pt felt well on room air. PCP: Barbara Cadena MD    Patient Instructions:   Current Discharge Medication List      CONTINUE these medications which have CHANGED    Details   furosemide (LASIX) 40 mg tablet Take 1 Tab by mouth daily.   Qty: 90 Tab, Refills: 1    Associated Diagnoses: S/P CABG x 3; Localized edema      docusate sodium (COLACE) 100 mg capsule Take 1 Cap by mouth two (2) times daily as needed for Constipation. Qty: 60 Cap, Refills: 0      potassium chloride SR (K-TAB) 20 mEq tablet Take 1 Tab by mouth daily. Take 3 tablets on 8/2/19 and 8/3/19 and then 2 tabs daily thereafter  Qty: 60 Tab, Refills: 0         CONTINUE these medications which have NOT CHANGED    Details   omeprazole (PRILOSEC) 20 mg capsule Take 1 Cap by mouth daily. Qty: 90 Cap, Refills: 3      metoprolol succinate (TOPROL-XL) 100 mg tablet TAKE 1 TABLET BY MOUTH DAILY  Qty: 30 Tab, Refills: 5      traZODone (DESYREL) 50 mg tablet Take 50 mg by mouth nightly as needed. HYDROcodone-acetaminophen (NORCO)  mg tablet Take 1 Tab by mouth every eight (8) hours as needed. sertraline (ZOLOFT) 50 mg tablet 50 mg daily. melatonin 3 mg tablet Take 5 mg by mouth nightly as needed. gabapentin (NEURONTIN) 400 mg capsule Take 2 tablets po [id number]  Qty: 180 Cap, Refills: 6    Associated Diagnoses: Trigeminal neuralgia      aspirin delayed-release 81 mg tablet Take 1 Tab by mouth nightly. Qty: 1 Tab, Refills: 0      losartan-hydroCHLOROthiazide (HYZAAR) 100-25 mg per tablet Take 1 Tab by mouth nightly. Qty: 30 Tab, Refills: 6      rosuvastatin (CRESTOR) 40 mg tablet Take 1 Tab by mouth nightly. Qty: 90 Tab, Refills: 4      nitroglycerin (NITROSTAT) 0.4 mg SL tablet TAKE AS DIRECTED. Qty: 25 Tab, Refills: 11      apixaban (ELIQUIS) 5 mg tablet Take 1 Tab by mouth two (2) times a day. Qty: 60 Tab, Refills: 11    Associated Diagnoses: Atrial flutter, unspecified type (HCC)      carBAMazepine (TEGRETOL) 200 mg tablet Take 100 mg by mouth two (2) times a day.          STOP taking these medications       vit C/E/Zn/coppr/lutein/zeaxan (PRESERVISION AREDS-2 PO) Comments:   Reason for Stopping:         TURMERIC ROOT EXTRACT PO Comments:   Reason for Stopping:         co-enzyme Q-10 (CO Q-10) 100 mg capsule Comments:   Reason for Stopping:         DOCOSAHEXANOIC ACID/EPA (FISH OIL PO) Comments:   Reason for Stopping:         multivitamin (ONE A DAY) tablet Comments:   Reason for Stopping:         CYANOCOBALAMIN, VITAMIN B-12, (VITAMIN B12 PO) Comments:   Reason for Stopping:         CA/D3/MAG OX/ZINC//ARLETTE/BOR (CALCIUM 600-D3 PLUS PO) Comments:   Reason for Stopping:         B INFANTIS/B ANI/B CHESTER/B BIFID (PROBIOTIC 4X PO) Comments:   Reason for Stopping:               Instructions:     Cardiology as set up, BMP in 10 days, weight daily    Diet:  Low salt, low fat, Fluid <1.5 L/day. Activity: As tolerated. Condition: Stable    Follow-up with primary physician in 1-2 week. Time 35 min    Please send copy to primary physician.     Signed:  Magnolia Reece MD  10/26/2019  8:59 AM

## 2019-10-26 NOTE — PROGRESS NOTES
SBAR shift report received from [facility] [address], [address] [facility]. Pt stable. Assessment complete. Pt is sitting up in bed, watching tv. Resp even, unlabored. Pt is alert, orient X 4. Pt appears in no acute distress at this time. Pt is on RA. Pt voices no complaints or concerns at this time. Pt encouraged to call for assistance, call light in reach. Safety measures in place.  Will continue to monitor

## 2019-10-26 NOTE — ROUTINE PROCESS
CHF teaching started post introduction to pt.; aware of diagnosis. Planner/scale @ BS and will follow. Smoking/ ETOH/Illicit drug use cessation and maintain a healthy weight covered. Pt. aware that I can not prescribe nor adjust  medications: 15mins Palliative Care score:  ACP on file Refused ACP on admission Start1.5 L/D Fluid restriction/ cardiac diet CHF teaching completed, verbalize emphasis on monitoring self and report to MD: 
? If you gain 2 lbs in one day or 5 lbs in a week, and short of breath. ? If you can not lay flat without developing short of breath or rapid breathing at night; or if it wakes you up. Develop a cough or wheezing. ? If you notice swollen hands/feet/ankles or stomach with a bloated/ full feeling. ? If you are  more confused or mentally fuzzy or dizzy. ? If you notice a rapid or change in your heart rate. ? If you become more exhausted all the time and unable to do the same level of activity without stopping to catch your breath. Drink no more than 8 cups a day in 8 oz. cups. Limit Cola Drinks. Your Heart can not handle any more. Stay away from salt (limit anything with salt or sodium in it). Limit to 250mg per serving. Exercise needs to be started with your Doctors approval. 
Reduce stress; Call myself or Provider if assistance is needed. Pass post test via teach back, will make self available post DC ,if an questions arise. Diabetic teaching completed.  Planner/scale @ BS:  60 mins total

## 2019-10-26 NOTE — PROGRESS NOTES
[facility] Respirations regular rate & rhythm on room air. No s/sx of distress.

## 2019-10-26 NOTE — PROGRESS NOTES
Pt discharge home this day. No needs voiced at discharge.      Care Management Interventions  Transition of Care Consult (CM Consult): Discharge Planning  Discharge Location  Discharge Placement: Home

## 2019-10-28 ENCOUNTER — PATIENT OUTREACH (OUTPATIENT)
Dept: CASE MANAGEMENT | Age: 74
End: 2019-10-28

## 2019-10-28 NOTE — PROGRESS NOTES
This note will not be viewable in [address] Transition of Care Discharge Follow-up Questionnaire   Date/Time of Call:   10/28/19   322pm   What was the patient hospitalized for? Systolic CHF, acute on chronic     Does the patient understand his/her diagnosis and/or treatment and what happened during the hospitalization? Yes, spoke with patient, he states understanding of diagnosis and treatment; and is agreeable to call. Patient states he is doing well, taking it easy and resting this week   Did the patient receive discharge instructions? Yes    CM Assessed Risk for Readmission:       Patient stated Risk for Readmission:      moderate r/t diagnosis, comorbidities and/or complications of surgery     None stated   Review any discharge instructions (see discharge instructions/AVS in [facility]). Ask patient if they understand these. Do they have any questions? Reviewed, understanding is stated, no questions at this time       Were home services ordered (nursing, PT, OT, ST, etc.)? No           If so, has the first visit occurred? If not, why? (Assist with coordination of services if necessary.)   N/A   Was any DME ordered? No     If so, has it been received? If not, why?  (Assist patient in obtaining DME orders &/or equipment if necessary.) N/A   Complete a review of all medications (new, continued and discontinued meds per the D/C instructions and medication tab in [facility]). Completed  CHANGE how you take:  docusate sodium 100 mg capsule (COLACE)  furosemide 40 mg tablet (LASIX)  potassium chloride SR 20 mEq tablet (K-TAB)  STOP taking:  CALCIUM 600-D3 PLUS PO  co-enzyme Q-10 100 mg capsule (CO Q-10)  FISH OIL PO  multivitamin tablet (ONE A DAY)  PRESERVISION AREDS-2 PO  PROBIOTIC 4X PO  TURMERIC ROOT EXTRACT PO  VITAMIN B12 PO  Ask  melatonin 3 mg tablet   Were all new prescriptions filled?   If not, why?  (Assist patient in obtaining medications if necessary  escalate for CCM &/or SW if ongoing issues are verbalized by pt or anticipated)   No new medications at discharge   Does the patient understand the purpose and dosing instructions for all medications? (If patient has questions, provide explanation and education.)   Yes      Does the patient have any problems in performing ADLs? (If patient is unable to perform ADLs  what is the limiting factor(s)? Do they have a support system that can assist? If no support system is present, discuss possible assistance that they may be able to obtain. Escalate for CCM/SW if ongoing issues are verbalized by pt or anticipated)   Independent with ADLs       Does the patient have all follow-up appointments scheduled? 7 day f/up with PCP?   (f/up with PCP may be w/in 14 days if patient has a f/up with their specialist w/in 7 days)    7-14 day f/up with specialist?   (or per discharge instructions)    If f/up has not been made  what actions has the care coordinator made to accomplish this? Has transportation been arranged? Yes        Dr. Vane Ellison [facility])  aware of hospitalization, patient states he will call and schedule follow up    Dr. Crista Whitley 11/8/19              Yes, no transportation issues identified at this time    Any other questions or concerns expressed by the patient? No other needs or concerns identified. Patient states his gratitude for follow up. Contact information for Care Coordinator was given, instructed to call with new questions or concerns. Schedule next appointment with MARBELLA Fernandes or refer to RN Case Manager/ per the workflow guidelines. When is care coordinators next follow-up call scheduled? If referred for CCM  what RN care manager was the referral assigned? Patient will be followed by Health .              [facility] Call Completed By: Rhys Vang LPN  Care Coordinator

## 2019-10-29 ENCOUNTER — PATIENT OUTREACH (OUTPATIENT)
Dept: CASE MANAGEMENT | Age: 74
End: 2019-10-29

## 2019-10-30 ENCOUNTER — APPOINTMENT (OUTPATIENT)
Dept: CARDIAC REHAB | Age: 74
End: 2019-10-30
Payer: MEDICARE

## 2019-10-30 VITALS
BODY MASS INDEX: 27.67 KG/M2 | TEMPERATURE: 98.4 F | SYSTOLIC BLOOD PRESSURE: 134 MMHG | HEART RATE: 86 BPM | RESPIRATION RATE: 14 BRPM | OXYGEN SATURATION: 98 % | DIASTOLIC BLOOD PRESSURE: 66 MMHG | WEIGHT: 182 LBS

## 2019-10-31 NOTE — PROGRESS NOTES
This note will not be viewable in [address] Home Visit #  Health  arrived at [facility] find the patient alert and oriented per the patients norms, in no acute distress and without complaint. Patient states that he feels tired with little energy. Wishes to feel better soon. Denies SOB, has mild MALIK and nearly resolved LE edema. Safety and Physical Assessments done. Medicine reconciliation partially done. 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 He had some meds with him and others at home. The patient could tell me each medicine he takes and how often. Has not been following diet closely. I believed the weight gain was an improving sign since he lost a great deal in the hospital. Stressed that he had been carrying extra fluids. Patient will follow diet more closely. Will attempt to visit with the patient at home. Will visit again next week. Education:Explained in detail the CHF action plan to include low sodium diet and fluid restrictions, medicine compliance, daily weights and early interventions. Much emphasis given to low sodium diet, resisting the urge to over exert himself and early interventions. CHF Assessment:  Shortness of Breath 0  Edema   2  Abdomen  2  Stress   3  Daily activities  3  Sleeping  3  Energy    4  Weight trends  5  TOTAL= 22    BP: 134/66 RA Sitting    Weight: 182 lb    Medications: Patient advises that medicines are on hand and the patient is compliant. Some meds available at office for viewing, but others are at his residence. Patient very familiar with his regimen. Edema: Mild lower extremity edema (1+) to bilateral lower extremities and the abdomen is soft and non-tender. Lung Sounds:Clear and equal bilaterally. Follow Up Appointments:  10/31   0830   Dr Adam Mead   PCP  11/08   1445   Dr Lety Cornelius [facility]iology    Transportation: Self    DME: None    Safety Concerns: None at office. Hope to view residence at next visit.      Patient/Family Concerns: Lack of energy. Advised recovery was slow and steady not to rush and overexert. Tasks: Provided scale    Physical Assessment completed and noted on CHF assessment Form. Will follow up with pt in about one week. End of Visit.  Ezekiel Myrick, [facility]

## 2019-11-01 ENCOUNTER — APPOINTMENT (OUTPATIENT)
Dept: CARDIAC REHAB | Age: 74
End: 2019-11-01

## 2019-11-04 ENCOUNTER — APPOINTMENT (OUTPATIENT)
Dept: CARDIAC REHAB | Age: 74
End: 2019-11-04

## 2019-11-05 ENCOUNTER — PATIENT OUTREACH (OUTPATIENT)
Dept: CASE MANAGEMENT | Age: 74
End: 2019-11-05

## 2019-11-05 VITALS
SYSTOLIC BLOOD PRESSURE: 128 MMHG | DIASTOLIC BLOOD PRESSURE: 70 MMHG | BODY MASS INDEX: 26.76 KG/M2 | TEMPERATURE: 98.7 F | HEART RATE: 74 BPM | OXYGEN SATURATION: 97 % | RESPIRATION RATE: 14 BRPM | WEIGHT: 176 LBS

## 2019-11-05 NOTE — PROGRESS NOTES
This note will not be viewable in [address] Home Visit # 2  Health  arrived at residence to find the patient alert and oriented per the patients norms, in no acute distress and without complaint. States that his Trigeminy was acting up this morning but feels fine presently. There is no SOB mild MALIK and edema is improving. Attempting to adapt to diet but having some issues as he lives alone and eats out often. Discussed best choices at restaurants. Continues to improve. HC will see in home/office again next week. Education:Reviewed via teach back the CHF action plan to include low sodium diet and fluid restrictions, medicine compliance, daily weights and early interventions. Discussed healthy choices for dining out. CHF Assessment:  Shortness of Breath 0  Edema   1  Abdomen  1  Stress   3  Daily activities  2  Sleeping  1  Energy    3  Weight trends  2  TOTAL= 13    BP: 124/70 RA Sitting    Weight: 176 lb    Medications: All medicines are on hand and the patient remains compliant. Edema: No notable increase in edema to bilateral lower extremities and the abdomen is soft and non-tender. Lung Sounds:Clear and equal bilaterally. Follow Up Appointments:  11/08  1445   Dr Gayle [facility] Cardiology    Transportation: Self    DME: CPAP but does not use. Safety Concerns: None    Patient/Family Concerns: None    Tasks: None    Physical Assessment completed and noted on CHF assessment Form. Will follow up with pt in about one week. End of Visit.  Heather Shanks, Paramedic Health

## 2019-11-06 ENCOUNTER — APPOINTMENT (OUTPATIENT)
Dept: CARDIAC REHAB | Age: 74
End: 2019-11-06

## 2019-11-08 ENCOUNTER — APPOINTMENT (OUTPATIENT)
Dept: CARDIAC REHAB | Age: 74
End: 2019-11-08

## 2019-11-08 ENCOUNTER — HOSPITAL ENCOUNTER (OUTPATIENT)
Dept: LAB | Age: 74
Discharge: HOME | End: 2019-11-08
Payer: MEDICARE

## 2019-11-08 DIAGNOSIS — I50.23 SYSTOLIC CHF, ACUTE ON CHRONIC (HCC): Chronic | ICD-10-CM

## 2019-11-08 DIAGNOSIS — I50.22 CHRONIC SYSTOLIC CONGESTIVE HEART FAILURE (HCC): Chronic | ICD-10-CM

## 2019-11-08 DIAGNOSIS — I25.118 ATHEROSCLEROSIS OF NATIVE CORONARY ARTERY OF NATIVE HEART WITH STABLE ANGINA PECTORIS (HCC): ICD-10-CM

## 2019-11-08 LAB
ANION GAP SERPL CALC-SCNC: 7 MMOL/L (ref 7–16)
BASOPHILS # BLD: 0.1 K/UL (ref 0–0.2)
BASOPHILS NFR BLD: 1 % (ref 0–2)
BUN SERPL-MCNC: 20 MG/DL (ref 8–23)
CALCIUM SERPL-MCNC: 8.9 MG/DL (ref 8.3–10.4)
CHLORIDE SERPL-SCNC: 99 MMOL/L (ref 98–107)
CO2 SERPL-SCNC: 33 MMOL/L (ref 21–32)
CREAT SERPL-MCNC: 1 MG/DL (ref 0.8–1.5)
DIFFERENTIAL METHOD BLD: ABNORMAL
EOSINOPHIL # BLD: 0.2 K/UL (ref 0–0.8)
EOSINOPHIL NFR BLD: 3 % (ref 0.5–7.8)
ERYTHROCYTE [DISTWIDTH] IN BLOOD BY AUTOMATED COUNT: 15.9 % (ref 11.9–14.6)
GLUCOSE SERPL-MCNC: 91 MG/DL (ref 65–100)
HCT VFR BLD AUTO: 40.3 % (ref 41.1–50.3)
HGB BLD-MCNC: 13.5 G/DL (ref 13.6–17.2)
IMM GRANULOCYTES # BLD AUTO: 0 K/UL (ref 0–0.5)
IMM GRANULOCYTES NFR BLD AUTO: 0 % (ref 0–5)
LYMPHOCYTES # BLD: 1.5 K/UL (ref 0.5–4.6)
LYMPHOCYTES NFR BLD: 21 % (ref 13–44)
MAGNESIUM SERPL-MCNC: 1.9 MG/DL (ref 1.8–2.4)
MCH RBC QN AUTO: 31 PG (ref 26.1–32.9)
MCHC RBC AUTO-ENTMCNC: 33.5 G/DL (ref 31.4–35)
MCV RBC AUTO: 92.4 FL (ref 79.6–97.8)
MONOCYTES # BLD: 0.6 K/UL (ref 0.1–1.3)
MONOCYTES NFR BLD: 9 % (ref 4–12)
NEUTS SEG # BLD: 4.7 K/UL (ref 1.7–8.2)
NEUTS SEG NFR BLD: 66 % (ref 43–78)
NRBC # BLD: 0 K/UL (ref 0–0.2)
PLATELET # BLD AUTO: 219 K/UL (ref 150–450)
PMV BLD AUTO: 8.4 FL (ref 9.4–12.3)
POTASSIUM SERPL-SCNC: 3 MMOL/L (ref 3.5–5.1)
RBC # BLD AUTO: 4.36 M/UL (ref 4.23–5.6)
SODIUM SERPL-SCNC: 139 MMOL/L (ref 136–145)
WBC # BLD AUTO: 7.1 K/UL (ref 4.3–11.1)

## 2019-11-08 PROCEDURE — 85025 COMPLETE CBC W/AUTO DIFF WBC: CPT

## 2019-11-08 PROCEDURE — 36415 COLL VENOUS BLD VENIPUNCTURE: CPT

## 2019-11-08 PROCEDURE — 80048 BASIC METABOLIC PNL TOTAL CA: CPT

## 2019-11-08 PROCEDURE — 83735 ASSAY OF MAGNESIUM: CPT

## 2019-11-13 ENCOUNTER — APPOINTMENT (OUTPATIENT)
Dept: CARDIAC REHAB | Age: 74
End: 2019-11-13

## 2019-11-15 ENCOUNTER — HOSPITAL ENCOUNTER (OUTPATIENT)
Dept: CARDIAC REHAB | Age: 74
End: 2019-11-15

## 2019-11-15 ENCOUNTER — PATIENT OUTREACH (OUTPATIENT)
Dept: CASE MANAGEMENT | Age: 74
End: 2019-11-15

## 2019-11-15 VITALS
WEIGHT: 180 LBS | HEART RATE: 68 BPM | OXYGEN SATURATION: 98 % | TEMPERATURE: 98 F | RESPIRATION RATE: 16 BRPM | SYSTOLIC BLOOD PRESSURE: 124 MMHG | BODY MASS INDEX: 27.37 KG/M2 | DIASTOLIC BLOOD PRESSURE: 62 MMHG

## 2019-11-15 NOTE — PROGRESS NOTES
This note will not be viewable in [address] Home Visit # 3  Health  arrived at residence to find the patient alert and oriented per the patients norms, in no acute distress and without complaint. Patient has had some weight gain 1 or 2 days this week. Each can be traced back to working to fatigue each day and eating out. Discussed with patient the importance of not over exerting himself and being very careful with eating out. Patient voiced understanding. No SOB and mild MALIK those days. Will continue to visit in home next week. Education:Reviewed via teach back the CHF action plan to include low sodium diet and fluid restrictions, medicine compliance, daily weights and early interventions. CHF Assessment:  Shortness of Breath 0  Edema   1  Abdomen  1  Stress   3  Daily activities  1  Sleeping  1  Energy    2  Weight trends  5  TOTAL= 14    BP: 124/68 LA Sitting    Weight: 180 lb    Medications: All medicines are on hand and the patient remains compliant. Edema: No notable edema to bilateral lower extremities and the abdomen is soft and non-tender. Lung Sounds:Clear and equal bilaterally. Follow Up Appointments: None    Transportation: Self    DME: None    Safety Concerns: None    Patient/Family Concerns: None    Tasks: None    Physical Assessment completed and noted on CHF assessment Form. Will follow up with pt in about one week. End of Visit.  Silva Farias, Paramedic Health

## 2019-11-18 ENCOUNTER — APPOINTMENT (OUTPATIENT)
Dept: CARDIAC REHAB | Age: 74
End: 2019-11-18

## 2019-11-20 ENCOUNTER — APPOINTMENT (OUTPATIENT)
Dept: CARDIAC REHAB | Age: 74
End: 2019-11-20

## 2019-11-22 ENCOUNTER — PATIENT OUTREACH (OUTPATIENT)
Dept: CASE MANAGEMENT | Age: 74
End: 2019-11-22

## 2019-11-22 ENCOUNTER — APPOINTMENT (OUTPATIENT)
Dept: CARDIAC REHAB | Age: 74
End: 2019-11-22

## 2019-11-25 VITALS
WEIGHT: 180 LBS | TEMPERATURE: 97.6 F | DIASTOLIC BLOOD PRESSURE: 64 MMHG | BODY MASS INDEX: 27.37 KG/M2 | RESPIRATION RATE: 14 BRPM | HEART RATE: 70 BPM | SYSTOLIC BLOOD PRESSURE: 110 MMHG | OXYGEN SATURATION: 100 %

## 2019-11-25 NOTE — PROGRESS NOTES
This note will not be viewable in [address] Home Visit #4  Health  arrived at residence to find the patient alert and oriented per the patients norms, in no acute distress and without complaint. Noted days when weight was up but without other symptom and associated this to previous days when diet was non-compliant. No weight increase for the week. No SOB and only mild MALIK and edema resolving quickly with rest or edema overnight. Cautioned the patient against overexertion. He is back to nearly 100% of previous daily activities. Will visit by phone next week. Education:Reviewed via teach back the CHF action plan to include low sodium diet and fluid restrictions, medicine compliance, daily weights and early interventions. CHF Assessment:  Shortness of Breath 0  Edema   1  Abdomen  0  Stress   2  Daily activities  1  Sleeping  2  Energy    2  Weight trends  0  TOTAL= 8    BP:  110/64 LA Sitting    Weight: 180 lb    Medications: All medicines are on hand and the patient remains compliant. Edema: No notable edema to bilateral lower extremities and the abdomen is soft and non-tender. Lung Sounds:Clear and equal bilaterally. Follow Up Appointments: None    Transportation: Self    DME: None    Safety Concerns: None    Patient/Family Concerns: None    Tasks: None    Physical Assessment completed and noted on CHF assessment Form. Will follow up with pt in about one week. End of Visit.  Davis Berger, Paramedic Health

## 2019-11-30 ENCOUNTER — PATIENT OUTREACH (OUTPATIENT)
Dept: CASE MANAGEMENT | Age: 74
End: 2019-11-30

## 2019-12-02 NOTE — PROGRESS NOTES
This note will not be viewable in [address] Contacted patient for phone follow up. Patient denies SOB Edema or MALIK. Stated that he was carefull over Holiday but gained 1 lb. Will be strict on diet and fluids throughout the remainder of the weekend and will call ASAP for weight gain >2 lbs/night or increased SOB. Will visit patient in home next week.

## 2019-12-06 ENCOUNTER — PATIENT OUTREACH (OUTPATIENT)
Dept: CASE MANAGEMENT | Age: 74
End: 2019-12-06

## 2019-12-09 NOTE — PROGRESS NOTES
This note will not be viewable in [address] The patient reached out to advise that he would be unable to make our appointment today due to business obligations. He advised that he was maintaining diet and weights were steady. He denied any SOB or MALIK. Advised he would call for any problems or weight gains. Look to meet the patient in home again next week.

## 2019-12-12 ENCOUNTER — PATIENT OUTREACH (OUTPATIENT)
Dept: CASE MANAGEMENT | Age: 74
End: 2019-12-12

## 2019-12-12 VITALS
OXYGEN SATURATION: 99 % | SYSTOLIC BLOOD PRESSURE: 144 MMHG | TEMPERATURE: 98.4 F | WEIGHT: 181 LBS | RESPIRATION RATE: 14 BRPM | BODY MASS INDEX: 27.52 KG/M2 | HEART RATE: 68 BPM | DIASTOLIC BLOOD PRESSURE: 68 MMHG

## 2019-12-12 NOTE — PROGRESS NOTES
This note will not be viewable in [address] Home Visit #5  Health  arrived at residence to find the patient alert and oriented per the patients norms, in no acute distress and without complaint. Denies SOB MALIK or edema. Compliant with meds and diet. Will follow by phone next week. Education:Reviewed via teach back the CHF action plan to include low sodium diet and fluid restrictions, medicine compliance, daily weights and early interventions. CHF Assessment:  Shortness of Breath 0  Edema   1  Abdomen  0  Stress   2  Daily activities  1  Sleeping  1  Energy    1  Weight trends  0  TOTAL= 6    BP: 144/68 LA Sitting    Weight: 181 lb    Medications: All medicines are on hand and the patient remains compliant. Edema: No notable edema to bilateral lower extremities and the abdomen is soft and non-tender. Lung Sounds:Clear and equal bilaterally. Follow Up Appointments: None    Transportation:Self    DME: None    Safety Concerns: None    Patient/Family Concerns: None    Tasks:None    Physical Assessment completed and noted on CHF assessment Form. Will follow up with pt in about one week. End of Visit.  Mery Cartagena, Paramedic Health

## 2019-12-19 ENCOUNTER — PATIENT OUTREACH (OUTPATIENT)
Dept: CASE MANAGEMENT | Age: 74
End: 2019-12-19

## 2019-12-24 VITALS
HEART RATE: 66 BPM | BODY MASS INDEX: 27.67 KG/M2 | SYSTOLIC BLOOD PRESSURE: 140 MMHG | RESPIRATION RATE: 14 BRPM | DIASTOLIC BLOOD PRESSURE: 90 MMHG | WEIGHT: 182 LBS | OXYGEN SATURATION: 99 % | TEMPERATURE: 97.8 F

## 2019-12-24 NOTE — PROGRESS NOTES
This note will not be viewable in [address] Home Visit # 6  Health  arrived at residence to find the patient alert and oriented per the patients norms, in no acute distress. Has had cold and continues with a cough. Lung sounds are course, more so to left lower lobe. Denies fever and chills. Taking OTC cold and sinus med. Will PCP tomorrow. Will visit via phone next week. Education: Reviewed via teach back the CHF action plan to include low sodium diet and fluid restrictions, medicine compliance, daily weights and early interventions. CHF Assessment:  Shortness of Breath  0  Edema   1  Abdomen  0  Stress   2  Daily activities  1  Sleeping  1  Energy    1  Weight trends  0  TOTAL=   6    BP: 140/90 LA Sitting    Weight: 182 lb    Medications: All medicines are on hand and the patient remains compliant. Edema: No notable edema to bilateral lower extremities and the abdomen is soft and non-tender. Lung Sounds:Clear and equal bilaterally. Follow Up Appointments: [facility]    Transportation: Self    DME: None    Safety Concerns: None    Patient/Family Concerns: None    Tasks:None    Physical Assessment completed and noted on CHF assessment Form. Will follow up with pt in about one week. End of Visit.  Tristen Lion Paramedic Health

## 2019-12-27 ENCOUNTER — PATIENT OUTREACH (OUTPATIENT)
Dept: CASE MANAGEMENT | Age: 74
End: 2019-12-27

## 2019-12-27 NOTE — PROGRESS NOTES
This note will not be viewable in [address] Without complaint and without negative finding. Cold is better and he is working today. Will follow with face-to-face next week.

## 2020-01-10 ENCOUNTER — PATIENT OUTREACH (OUTPATIENT)
Dept: CASE MANAGEMENT | Age: 75
End: 2020-01-10

## 2020-01-13 NOTE — PROGRESS NOTES
This note will not be viewable in [address] Health  contacted the patient for follow up. The patient stated that he continued to do very well without edema weight gain SOB MALIK or other negative finding. Stated that he was back working to nearly 100%. Encouraged to continue but not overexert. He agreed. Will contact patient for home visit next week.

## 2020-01-24 ENCOUNTER — PATIENT OUTREACH (OUTPATIENT)
Dept: CASE MANAGEMENT | Age: 75
End: 2020-01-24

## 2020-01-24 NOTE — PROGRESS NOTES
This note will not be viewable in [address] Health  arrived at the patients residence for a final home visit to find the patient not at home. A call revealed the patient had forgotten and scheduled a work related appointment at the same time. He stated that we could have phone visit today. He advised that he was doing very well has recently seen his PCP who gave good report and is to be scheduled for routine colonoscopy in February. Encouraged the patient to follow the CHF action plan going forward and to call cardiology for any issues. Further advised the patient of his continued access to the health  line for any questions or concerns that he may have. The patient voiced his appreciation and understanding. This bundle is complete.

## 2020-03-02 ENCOUNTER — ANESTHESIA EVENT (OUTPATIENT)
Dept: ENDOSCOPY | Age: 75
End: 2020-03-02
Payer: MEDICARE

## 2020-03-02 ENCOUNTER — ANESTHESIA (OUTPATIENT)
Dept: ENDOSCOPY | Age: 75
End: 2020-03-02
Payer: MEDICARE

## 2020-03-02 ENCOUNTER — HOSPITAL ENCOUNTER (OUTPATIENT)
Age: 75
Setting detail: OUTPATIENT SURGERY
Discharge: HOME | End: 2020-03-02
Attending: INTERNAL MEDICINE | Admitting: INTERNAL MEDICINE
Payer: MEDICARE

## 2020-03-02 VITALS
OXYGEN SATURATION: 93 % | WEIGHT: 184 LBS | DIASTOLIC BLOOD PRESSURE: 75 MMHG | SYSTOLIC BLOOD PRESSURE: 127 MMHG | BODY MASS INDEX: 27.89 KG/M2 | HEIGHT: 68 IN | TEMPERATURE: 98.6 F | RESPIRATION RATE: 14 BRPM | HEART RATE: 66 BPM

## 2020-03-02 PROCEDURE — 74011250636 HC RX REV CODE- 250/636: Performed by: NURSE ANESTHETIST, CERTIFIED REGISTERED

## 2020-03-02 PROCEDURE — 74011250636 HC RX REV CODE- 250/636: Performed by: INTERNAL MEDICINE

## 2020-03-02 PROCEDURE — 74011000250 HC RX REV CODE- 250: Performed by: NURSE ANESTHETIST, CERTIFIED REGISTERED

## 2020-03-02 PROCEDURE — 76040000026: Performed by: INTERNAL MEDICINE

## 2020-03-02 PROCEDURE — 76060000032 HC ANESTHESIA 0.5 TO 1 HR: Performed by: INTERNAL MEDICINE

## 2020-03-02 RX ORDER — PROPOFOL 10 MG/ML
INJECTION, EMULSION INTRAVENOUS AS NEEDED
Status: DISCONTINUED | OUTPATIENT
Start: 2020-03-02 | End: 2020-03-02 | Stop reason: HOSPADM

## 2020-03-02 RX ORDER — LIDOCAINE HYDROCHLORIDE 20 MG/ML
INJECTION, SOLUTION EPIDURAL; INFILTRATION; INTRACAUDAL; PERINEURAL AS NEEDED
Status: DISCONTINUED | OUTPATIENT
Start: 2020-03-02 | End: 2020-03-02 | Stop reason: HOSPADM

## 2020-03-02 RX ORDER — EPHEDRINE SULFATE/0.9% NACL/PF 50 MG/5 ML
SYRINGE (ML) INTRAVENOUS AS NEEDED
Status: DISCONTINUED | OUTPATIENT
Start: 2020-03-02 | End: 2020-03-02 | Stop reason: HOSPADM

## 2020-03-02 RX ORDER — PROPOFOL 10 MG/ML
INJECTION, EMULSION INTRAVENOUS
Status: DISCONTINUED | OUTPATIENT
Start: 2020-03-02 | End: 2020-03-02 | Stop reason: HOSPADM

## 2020-03-02 RX ORDER — SODIUM CHLORIDE, SODIUM LACTATE, POTASSIUM CHLORIDE, CALCIUM CHLORIDE 600; 310; 30; 20 MG/100ML; MG/100ML; MG/100ML; MG/100ML
1000 INJECTION, SOLUTION INTRAVENOUS CONTINUOUS
Status: DISCONTINUED | OUTPATIENT
Start: 2020-03-02 | End: 2020-03-02 | Stop reason: HOSPADM

## 2020-03-02 RX ADMIN — PROPOFOL 50 MG: 10 INJECTION, EMULSION INTRAVENOUS at 12:12

## 2020-03-02 RX ADMIN — PROPOFOL 180 MCG/KG/MIN: 10 INJECTION, EMULSION INTRAVENOUS at 12:13

## 2020-03-02 RX ADMIN — Medication 10 MG: at 12:22

## 2020-03-02 RX ADMIN — Medication 10 MG: at 12:34

## 2020-03-02 RX ADMIN — LIDOCAINE HYDROCHLORIDE 40 MG: 20 INJECTION, SOLUTION EPIDURAL; INFILTRATION; INTRACAUDAL; PERINEURAL at 12:12

## 2020-03-02 RX ADMIN — SODIUM CHLORIDE, SODIUM LACTATE, POTASSIUM CHLORIDE, AND CALCIUM CHLORIDE 1000 ML: 600; 310; 30; 20 INJECTION, SOLUTION INTRAVENOUS at 11:42

## 2020-03-02 NOTE — PROCEDURES
Esophagogastroduodenoscopy (EGD) Procedure Note    Procedure: EGD    Pre-operative Diagnosis: dysphagia     Post-operative Diagnosis: Normal EGD   Empiric dilation Hoffmann 56 Fr    Recommendations:  F/u as needed with manometry if persists. Follow up:   Colonoscopy to follow    Anesthesia/Sedation:  MAC, (see separate report). Procedure Details:  Informed consent was obtained for the procedure, including sedation. Risks of perforation, hemorrhage, adverse drug reaction and aspiration were discussed. The patient was placed in the left lateral decubitus position. Based on the pre-procedure assessment, including review of the patient's medical history, medications, allergies, and review of systems, he had been deemed to be an appropriate candidate for anesthesia. The patient was monitored continuously with ECG tracing, pulse oximetry, blood pressure monitoring, and direct observations. Please refer to the anesthesia record for details and dosages of medications. The esophagus was intubated with direct visualization. The esophagus, stomach and duodenum were traversed to the full extent of the endoscope. Retroflexed views of the cardia and fundus were performed. The stomach was fully insufflated and deflated. Findings:   OROPHARYNX: The oropharynx was briefly viewed on entry and withdrawal with very brief evaluation of the cords, arytenoids and pyriform sinuses. No abnormalities found. ESOPHAGUS:  The esophagus was normal with an intact squamocolumnar z-line without erosions or evidence of Castaneda's intestinal metaplasia. The mucosa was normal.  There were no strictures, rings or noticeable narrowing of the lumen. The endoscope was easily passed into the gastric pouch and there was no apparent hiatal hernia. DILATION WITH Alicea Maia WITHOUT RESISTANCE OR BLOOD. REPEAT ENDO WITHOUT TRAMA TO THE MUCOSA. STOMACH: The gastric pouch inflated and deflated normally.   The mucosal surface and gastric rugae were normal without erosions, ulcerations, raised lesions, vascular ectasias or other anomalies. The pylorus was patent to passage of the endoscope without difficulty. DUODENUM:  The endoscope was easily passed into the third section of the duodenum. The villous mucosa was normal without blunting or scalloped folds. There were no mucosal erosions, ulcerations, raised lesions or vascular ectasias. EBL: 0 cc's. Pathology speciment:  None. Complications: No apparent complications and the patient tolerated sedation and the procedure well. Attending Attestation: I performed the procedure.     Susana Godinez MD

## 2020-03-02 NOTE — ANESTHESIA POSTPROCEDURE EVALUATION
Procedure(s):  COLONOSCOPY  ESOPHAGOGASTRODUODENOSCOPY (EGD)  ESOPHAGEAL DILATION.     total IV anesthesia    Anesthesia Post Evaluation      Multimodal analgesia: multimodal analgesia used between 6 hours prior to anesthesia start to PACU discharge  Patient location during evaluation: PACU  Patient participation: complete - patient participated  Level of consciousness: awake and awake and alert  Pain management: adequate  Airway patency: patent  Anesthetic complications: no  Cardiovascular status: acceptable  Respiratory status: acceptable  Hydration status: acceptable  Post anesthesia nausea and vomiting:  controlled      Vitals Value Taken Time   /75 3/2/2020  1:16 PM   Temp 37 °C (98.6 °F) 3/2/2020 12:45 PM   Pulse 66 3/2/2020  1:16 PM   Resp 14 3/2/2020  1:16 PM   SpO2 93 % 3/2/2020  1:16 PM

## 2020-03-02 NOTE — H&P
Chief complaint/HPI and PMH:  Please refer to outpatient note below or attached to the chart. Today's exam:  LUNGS:  Clear and equal.  COR:  RRR without murmurs heard. NEURO:  A and Oriented fully. I have thoroughly explained the preparation, procedure and sedation process to the patient as well as the risks and alternatives. They will sign informed consent prior to the procedure.         Vishal Granado MD

## 2020-03-02 NOTE — DISCHARGE INSTRUCTIONS
Gastrointestinal Esophagogastroduodenoscopy (EGD) - Upper Exam Discharge Instructions    1. Call Dr. Andrew Wright at [phone] for any problems or questions. 2. Contact the doctor's office for follow up appointment as directed. 3. Medication may cause drowsiness for several hours, therefore:  · Do not drive or operate machinery for remainder of the day. · No alcohol today. · Do not make any important or legal decisions for 24 hours. · Do not sign any legal documents for 24 hours. 5. Ordinarily, you may resume regular diet and activity after exam unless otherwise specified by your physician. 6. For mild soreness in your throat you may use Cepacol throat lozenges or warm  salt-water gargles as needed. Any additional instructions: Instructions given to Christy Kayser and other family members. Instructions given by:      Gastrointestinal Colonoscopy/Flexible Sigmoidoscopy - Lower Exam Discharge Instructions  1. Call Dr. Andrew Wright at [phone] for any problems or questions. 2. Contact the doctors office for follow up appointment as directed  3. Medication may cause drowsiness for several hours, therefore:  · Do not drive or operate machinery for reminder of the day. · No alcohol today. · Do not make any important or legal decisions for 24 hours. · Do not sign any legal documents for 24 hours. 4. Ordinarily, you may resume regular diet and activity after exam unless otherwise specified by your physician. 5. Because of air put into your colon during exam, you may experience some abdominal distension, relieved by the passage of gas, for several hours. 6. Contact your physician if you have any of the following:  · Excessive amount of bleeding - large amount when having a bowel movement. Occasional specks of blood with bowel movement would not be unusual.  · Severe abdominal pain  · Fever or Chills  Any additional instructions:         Instructions given to Christy Kayser and other family members.   Instructions given by:

## 2020-03-02 NOTE — PROCEDURES
Colonoscopy Procedure Note    Procedure: Colonoscopy    Pre-operative Diagnosis: Screening with hx of TA polyps, las colo 2011     Post-operative Diagnosis: Normal colonoscopy to the terminal ileum. Recommendations: The patient was advised not to drive today nor to make any important decisions. They may resume normal diet and medications unless otherwise instructed in recovery. Surveillance interval: 5 years    Anesthesia/Sedation:  MAC,, (see separate report). Procedure Details:  Informed consent was obtained for the procedure, including anesthesia. Risks of perforation, hemorrhage, adverse drug reaction and aspiration were discussed. The patient was placed in the left lateral decubitus position. Based on the pre-procedure assessment, including review of the patient's medical history, medications, allergies, and review of systems, he had been deemed to be an appropriate candidate for this procedure. A rectal examination was performed. The colonoscope was inserted into the rectum and advanced under direct vision to the terminal ileum. The quality of the colonic preparation was fair but with washing was adequate. A careful inspection was made as the colonoscope was withdrawn; findings and interventions are described below. Findings:   TERMINAL ILEUM: The terminal ileum was entered and appeared normal with a normal villous mucosa. COLON:  The colonic mucosa from the cecum to the rectum was carefully examined. The mucosa appeared normal with normal vascularity. There was no diverticulosis, inflammatory changes, mucosal polyps, raised lesions, vascular ectasias or abnormal pigmentation. ANUS/RECTUM: Anal exam reveals no masses or hemorrhoids, sphincter tone is normal. Rectal exam reveals no masses or hemorrhoids. Prostate exam was unremarkable for enlargement or nodules.   Direct and retroflexed views of the rectum were normal without significant hemorrhoidal engorgement or inflammation, polyps or masses. EBL: 0cc's. PATH:  None. Complications: None; patient tolerated the procedure well. Attending Attestation: I performed the procedure.     Gail Ontiveros MD

## 2020-03-02 NOTE — ANESTHESIA PREPROCEDURE EVALUATION
Relevant Problems   No relevant active problems       Anesthetic History               Review of Systems / Medical History  Patient summary reviewed, nursing notes reviewed and pertinent labs reviewed    Pulmonary        Sleep apnea: No treatment        Comments: Hemoptysis - bronch 6/9 showed diffuse alveolar bleeding   Neuro/Psych       CVA: no residual symptoms      Comments: Trigeminal neuralgia Cardiovascular    Hypertension: well controlled        Dysrhythmias : atrial fibrillation  CAD and cardiac stents    Exercise tolerance: >4 METS  Comments: Echo - EF 55%, mild MR/TR     GI/Hepatic/Renal  Within defined limits              Endo/Other  Within defined limits           Other Findings              Physical Exam    Airway  Mallampati: II  TM Distance: 4 - 6 cm  Neck ROM: normal range of motion   Mouth opening: Normal     Cardiovascular    Rhythm: irregular  Rate: normal         Dental    Dentition: Lower partial plate and Upper partial plate     Pulmonary  Breath sounds clear to auscultation               Abdominal         Other Findings            Anesthetic Plan    ASA: 3  Anesthesia type: total IV anesthesia          Induction: Intravenous  Anesthetic plan and risks discussed with: Patient and Son / Daughter

## 2021-02-18 PROBLEM — I21.4 NSTEMI (NON-ST ELEVATED MYOCARDIAL INFARCTION) (HCC): Status: RESOLVED | Noted: 2019-06-06 | Resolved: 2021-02-18

## 2021-02-18 PROBLEM — I25.10 CAD (CORONARY ARTERY DISEASE): Status: RESOLVED | Noted: 2019-06-10 | Resolved: 2021-02-18

## 2021-03-28 ENCOUNTER — APPOINTMENT (OUTPATIENT)
Dept: CT IMAGING | Age: 76
DRG: 194 | End: 2021-03-28
Attending: INTERNAL MEDICINE
Payer: MEDICARE

## 2021-03-28 ENCOUNTER — HOSPITAL ENCOUNTER (INPATIENT)
Age: 76
LOS: 4 days | Discharge: HOME | DRG: 194 | End: 2021-04-01
Attending: EMERGENCY MEDICINE | Admitting: INTERNAL MEDICINE
Payer: MEDICARE

## 2021-03-28 ENCOUNTER — APPOINTMENT (OUTPATIENT)
Dept: GENERAL RADIOLOGY | Age: 76
DRG: 194 | End: 2021-03-28
Attending: EMERGENCY MEDICINE
Payer: MEDICARE

## 2021-03-28 DIAGNOSIS — J18.9 COMMUNITY ACQUIRED PNEUMONIA OF RIGHT UPPER LOBE OF LUNG: ICD-10-CM

## 2021-03-28 DIAGNOSIS — I50.22 SYSTOLIC CHF, CHRONIC (HCC): ICD-10-CM

## 2021-03-28 DIAGNOSIS — Z95.1 S/P CABG X 3: ICD-10-CM

## 2021-03-28 DIAGNOSIS — J18.9 COMMUNITY ACQUIRED PNEUMONIA OF RIGHT MIDDLE LOBE OF LUNG: Primary | ICD-10-CM

## 2021-03-28 DIAGNOSIS — R04.2 HEMOPTYSIS: ICD-10-CM

## 2021-03-28 PROBLEM — D72.829 LEUKOCYTOSIS: Status: ACTIVE | Noted: 2021-03-28

## 2021-03-28 LAB
ALBUMIN SERPL-MCNC: 3.7 G/DL (ref 3.2–4.6)
ALBUMIN/GLOB SERPL: 1 {RATIO} (ref 1.2–3.5)
ALP SERPL-CCNC: 92 U/L (ref 50–136)
ALT SERPL-CCNC: 38 U/L (ref 12–65)
ANION GAP SERPL CALC-SCNC: 6 MMOL/L (ref 7–16)
AST SERPL-CCNC: 22 U/L (ref 15–37)
ATRIAL RATE: 92 BPM
BASOPHILS # BLD: 0 K/UL (ref 0–0.2)
BASOPHILS NFR BLD: 0 % (ref 0–2)
BILIRUB SERPL-MCNC: 0.9 MG/DL (ref 0.2–1.1)
BNP SERPL-MCNC: 1084 PG/ML
BUN SERPL-MCNC: 11 MG/DL (ref 8–23)
CALCIUM SERPL-MCNC: 8.8 MG/DL (ref 8.3–10.4)
CALCULATED P AXIS, ECG09: 74 DEGREES
CALCULATED R AXIS, ECG10: -38 DEGREES
CALCULATED T AXIS, ECG11: 109 DEGREES
CHLORIDE SERPL-SCNC: 101 MMOL/L (ref 98–107)
CO2 SERPL-SCNC: 29 MMOL/L (ref 21–32)
CREAT SERPL-MCNC: 0.84 MG/DL (ref 0.8–1.5)
DIAGNOSIS, 93000: NORMAL
DIFFERENTIAL METHOD BLD: ABNORMAL
EOSINOPHIL # BLD: 0 K/UL (ref 0–0.8)
EOSINOPHIL NFR BLD: 0 % (ref 0.5–7.8)
ERYTHROCYTE [DISTWIDTH] IN BLOOD BY AUTOMATED COUNT: 13.6 % (ref 11.9–14.6)
GLOBULIN SER CALC-MCNC: 3.6 G/DL (ref 2.3–3.5)
GLUCOSE SERPL-MCNC: 103 MG/DL (ref 65–100)
HCT VFR BLD AUTO: 42.9 % (ref 41.1–50.3)
HGB BLD-MCNC: 14.8 G/DL (ref 13.6–17.2)
IMM GRANULOCYTES # BLD AUTO: 0.1 K/UL (ref 0–0.5)
IMM GRANULOCYTES NFR BLD AUTO: 0 % (ref 0–5)
LACTATE SERPL-SCNC: 1.4 MMOL/L (ref 0.4–2)
LYMPHOCYTES # BLD: 0.4 K/UL (ref 0.5–4.6)
LYMPHOCYTES NFR BLD: 3 % (ref 13–44)
MAGNESIUM SERPL-MCNC: 1.8 MG/DL (ref 1.8–2.4)
MCH RBC QN AUTO: 35.3 PG (ref 26.1–32.9)
MCHC RBC AUTO-ENTMCNC: 34.5 G/DL (ref 31.4–35)
MCV RBC AUTO: 102.4 FL (ref 79.6–97.8)
MONOCYTES # BLD: 0.7 K/UL (ref 0.1–1.3)
MONOCYTES NFR BLD: 5 % (ref 4–12)
NEUTS SEG # BLD: 13.3 K/UL (ref 1.7–8.2)
NEUTS SEG NFR BLD: 92 % (ref 43–78)
NRBC # BLD: 0 K/UL (ref 0–0.2)
P-R INTERVAL, ECG05: 168 MS
PLATELET # BLD AUTO: 197 K/UL (ref 150–450)
PMV BLD AUTO: 8.6 FL (ref 9.4–12.3)
POTASSIUM SERPL-SCNC: 3.6 MMOL/L (ref 3.5–5.1)
PROT SERPL-MCNC: 7.3 G/DL (ref 6.3–8.2)
Q-T INTERVAL, ECG07: 362 MS
QRS DURATION, ECG06: 120 MS
QTC CALCULATION (BEZET), ECG08: 447 MS
RBC # BLD AUTO: 4.19 M/UL (ref 4.23–5.6)
SODIUM SERPL-SCNC: 136 MMOL/L (ref 136–145)
TROPONIN-HIGH SENSITIVITY: 13.4 PG/ML (ref 0–14)
TROPONIN-HIGH SENSITIVITY: 20.9 PG/ML (ref 0–14)
VENTRICULAR RATE, ECG03: 92 BPM
WBC # BLD AUTO: 14.5 K/UL (ref 4.3–11.1)

## 2021-03-28 PROCEDURE — 80053 COMPREHEN METABOLIC PANEL: CPT

## 2021-03-28 PROCEDURE — 74011250637 HC RX REV CODE- 250/637: Performed by: EMERGENCY MEDICINE

## 2021-03-28 PROCEDURE — 83735 ASSAY OF MAGNESIUM: CPT

## 2021-03-28 PROCEDURE — 99282 EMERGENCY DEPT VISIT SF MDM: CPT

## 2021-03-28 PROCEDURE — 71250 CT THORAX DX C-: CPT

## 2021-03-28 PROCEDURE — 93005 ELECTROCARDIOGRAM TRACING: CPT | Performed by: EMERGENCY MEDICINE

## 2021-03-28 PROCEDURE — 74011250637 HC RX REV CODE- 250/637: Performed by: INTERNAL MEDICINE

## 2021-03-28 PROCEDURE — 65660000000 HC RM CCU STEPDOWN

## 2021-03-28 PROCEDURE — 74011000258 HC RX REV CODE- 258: Performed by: EMERGENCY MEDICINE

## 2021-03-28 PROCEDURE — 74011250636 HC RX REV CODE- 250/636: Performed by: EMERGENCY MEDICINE

## 2021-03-28 PROCEDURE — 74011000250 HC RX REV CODE- 250: Performed by: EMERGENCY MEDICINE

## 2021-03-28 PROCEDURE — 74011250636 HC RX REV CODE- 250/636: Performed by: INTERNAL MEDICINE

## 2021-03-28 PROCEDURE — 85025 COMPLETE CBC W/AUTO DIFF WBC: CPT

## 2021-03-28 PROCEDURE — 84484 ASSAY OF TROPONIN QUANT: CPT

## 2021-03-28 PROCEDURE — 94640 AIRWAY INHALATION TREATMENT: CPT

## 2021-03-28 PROCEDURE — 83880 ASSAY OF NATRIURETIC PEPTIDE: CPT

## 2021-03-28 PROCEDURE — 83605 ASSAY OF LACTIC ACID: CPT

## 2021-03-28 PROCEDURE — 65270000029 HC RM PRIVATE

## 2021-03-28 PROCEDURE — 87040 BLOOD CULTURE FOR BACTERIA: CPT

## 2021-03-28 PROCEDURE — 71045 X-RAY EXAM CHEST 1 VIEW: CPT

## 2021-03-28 PROCEDURE — 96374 THER/PROPH/DIAG INJ IV PUSH: CPT

## 2021-03-28 RX ORDER — HYDROCHLOROTHIAZIDE 25 MG/1
25 TABLET ORAL DAILY
Status: DISCONTINUED | OUTPATIENT
Start: 2021-03-29 | End: 2021-03-31

## 2021-03-28 RX ORDER — PROMETHAZINE HYDROCHLORIDE 25 MG/1
12.5 TABLET ORAL
Status: DISCONTINUED | OUTPATIENT
Start: 2021-03-28 | End: 2021-04-01 | Stop reason: HOSPADM

## 2021-03-28 RX ORDER — CARBAMAZEPINE 200 MG/1
400 TABLET ORAL 2 TIMES DAILY
Status: DISCONTINUED | OUTPATIENT
Start: 2021-03-28 | End: 2021-04-01 | Stop reason: HOSPADM

## 2021-03-28 RX ORDER — SODIUM CHLORIDE 0.9 % (FLUSH) 0.9 %
5-40 SYRINGE (ML) INJECTION AS NEEDED
Status: DISCONTINUED | OUTPATIENT
Start: 2021-03-28 | End: 2021-04-01 | Stop reason: HOSPADM

## 2021-03-28 RX ORDER — ASPIRIN 81 MG/1
81 TABLET ORAL
Status: DISCONTINUED | OUTPATIENT
Start: 2021-03-28 | End: 2021-04-01 | Stop reason: HOSPADM

## 2021-03-28 RX ORDER — ALBUTEROL SULFATE 0.83 MG/ML
2.5 SOLUTION RESPIRATORY (INHALATION)
Status: DISCONTINUED | OUTPATIENT
Start: 2021-03-28 | End: 2021-04-01 | Stop reason: HOSPADM

## 2021-03-28 RX ORDER — SODIUM CHLORIDE 0.9 % (FLUSH) 0.9 %
5-40 SYRINGE (ML) INJECTION EVERY 8 HOURS
Status: DISCONTINUED | OUTPATIENT
Start: 2021-03-28 | End: 2021-04-01 | Stop reason: HOSPADM

## 2021-03-28 RX ORDER — ACETAMINOPHEN 325 MG/1
650 TABLET ORAL
Status: DISCONTINUED | OUTPATIENT
Start: 2021-03-28 | End: 2021-04-01 | Stop reason: HOSPADM

## 2021-03-28 RX ORDER — GUAIFENESIN 600 MG/1
1200 TABLET, EXTENDED RELEASE ORAL EVERY 12 HOURS
Status: DISCONTINUED | OUTPATIENT
Start: 2021-03-28 | End: 2021-04-01 | Stop reason: HOSPADM

## 2021-03-28 RX ORDER — ACETAMINOPHEN 650 MG/1
650 SUPPOSITORY RECTAL
Status: DISCONTINUED | OUTPATIENT
Start: 2021-03-28 | End: 2021-04-01 | Stop reason: HOSPADM

## 2021-03-28 RX ORDER — ONDANSETRON 2 MG/ML
4 INJECTION INTRAMUSCULAR; INTRAVENOUS
Status: DISCONTINUED | OUTPATIENT
Start: 2021-03-28 | End: 2021-04-01 | Stop reason: HOSPADM

## 2021-03-28 RX ORDER — HYDROCODONE BITARTRATE AND ACETAMINOPHEN 10; 325 MG/1; MG/1
1 TABLET ORAL
Status: DISCONTINUED | OUTPATIENT
Start: 2021-03-28 | End: 2021-04-01 | Stop reason: HOSPADM

## 2021-03-28 RX ORDER — AZITHROMYCIN 250 MG/1
500 TABLET, FILM COATED ORAL DAILY
Status: COMPLETED | OUTPATIENT
Start: 2021-03-29 | End: 2021-04-01

## 2021-03-28 RX ORDER — POLYETHYLENE GLYCOL 3350 17 G/17G
17 POWDER, FOR SOLUTION ORAL DAILY PRN
Status: DISCONTINUED | OUTPATIENT
Start: 2021-03-28 | End: 2021-04-01 | Stop reason: HOSPADM

## 2021-03-28 RX ORDER — LOSARTAN POTASSIUM 50 MG/1
100 TABLET ORAL DAILY
Status: DISCONTINUED | OUTPATIENT
Start: 2021-03-29 | End: 2021-03-31

## 2021-03-28 RX ORDER — IPRATROPIUM BROMIDE AND ALBUTEROL SULFATE 2.5; .5 MG/3ML; MG/3ML
3 SOLUTION RESPIRATORY (INHALATION)
Status: COMPLETED | OUTPATIENT
Start: 2021-03-28 | End: 2021-03-28

## 2021-03-28 RX ORDER — METOPROLOL SUCCINATE 100 MG/1
100 TABLET, EXTENDED RELEASE ORAL DAILY
Status: DISCONTINUED | OUTPATIENT
Start: 2021-03-28 | End: 2021-04-01 | Stop reason: HOSPADM

## 2021-03-28 RX ORDER — BENZONATATE 100 MG/1
100 CAPSULE ORAL
Status: COMPLETED | OUTPATIENT
Start: 2021-03-28 | End: 2021-03-28

## 2021-03-28 RX ORDER — FUROSEMIDE 10 MG/ML
40 INJECTION INTRAMUSCULAR; INTRAVENOUS 2 TIMES DAILY
Status: COMPLETED | OUTPATIENT
Start: 2021-03-28 | End: 2021-03-28

## 2021-03-28 RX ORDER — GABAPENTIN 400 MG/1
400 CAPSULE ORAL 3 TIMES DAILY
Status: DISCONTINUED | OUTPATIENT
Start: 2021-03-28 | End: 2021-04-01 | Stop reason: HOSPADM

## 2021-03-28 RX ADMIN — GUAIFENESIN 1200 MG: 600 TABLET ORAL at 23:04

## 2021-03-28 RX ADMIN — ASPIRIN 81 MG: 81 TABLET ORAL at 23:04

## 2021-03-28 RX ADMIN — FUROSEMIDE 40 MG: 10 INJECTION, SOLUTION INTRAMUSCULAR; INTRAVENOUS at 12:34

## 2021-03-28 RX ADMIN — GABAPENTIN 400 MG: 400 CAPSULE ORAL at 23:04

## 2021-03-28 RX ADMIN — GABAPENTIN 400 MG: 400 CAPSULE ORAL at 16:53

## 2021-03-28 RX ADMIN — BENZONATATE 100 MG: 100 CAPSULE ORAL at 09:24

## 2021-03-28 RX ADMIN — Medication 10 ML: at 23:04

## 2021-03-28 RX ADMIN — CARBAMAZEPINE 400 MG: 200 TABLET ORAL at 17:40

## 2021-03-28 RX ADMIN — IPRATROPIUM BROMIDE AND ALBUTEROL SULFATE 3 ML: .5; 3 SOLUTION RESPIRATORY (INHALATION) at 08:03

## 2021-03-28 RX ADMIN — CARBAMAZEPINE 400 MG: 200 TABLET ORAL at 12:34

## 2021-03-28 RX ADMIN — AZITHROMYCIN MONOHYDRATE 500 MG: 500 INJECTION, POWDER, LYOPHILIZED, FOR SOLUTION INTRAVENOUS at 09:15

## 2021-03-28 RX ADMIN — Medication 10 ML: at 14:45

## 2021-03-28 RX ADMIN — METOPROLOL SUCCINATE 100 MG: 100 TABLET, EXTENDED RELEASE ORAL at 12:34

## 2021-03-28 RX ADMIN — CEFTRIAXONE SODIUM 1 G: 1 INJECTION, POWDER, FOR SOLUTION INTRAMUSCULAR; INTRAVENOUS at 08:31

## 2021-03-28 RX ADMIN — GUAIFENESIN 1200 MG: 600 TABLET ORAL at 12:34

## 2021-03-28 RX ADMIN — FUROSEMIDE 40 MG: 10 INJECTION, SOLUTION INTRAMUSCULAR; INTRAVENOUS at 17:00

## 2021-03-28 NOTE — ED TRIAGE NOTES
Pt ambulatory to triage. Pt states he has SOB and hemoptysis. Pink tinged sputum noted in triage. Pt is on anticoags. Pt states he took a lasix because he thought it was FVO. Pt has pitting edema to BLE. Denies hx of DVT or PE. Pt states chest is tight.

## 2021-03-28 NOTE — ED NOTES
TRANSFER - OUT REPORT:    Verbal report given to Maritza Carty RN on Neena Hodgkin  being transferred to [facility] for routine progression of care       Report consisted of patients Situation, Background, Assessment and   Recommendations(SBAR). Information from the following report(s) SBAR, ED Summary, [facility] and Recent Results was reviewed with the receiving nurse. Lines:   Peripheral IV 03/28/21 Right Antecubital (Active)       Peripheral IV 03/28/21 Right Hand (Active)        Opportunity for questions and clarification was provided.       Patient transported with:   Mippin

## 2021-03-28 NOTE — PROGRESS NOTES
03/28/21 1444   Dual Skin Pressure Injury Assessment   Dual Skin Pressure Injury Assessment WDL   Second Care Provider (Based on [address] [place] [address]) Rajeev Serrato RN   Skin warm, dry, and intact. No pressure areas noted at this time.

## 2021-03-28 NOTE — ED PROVIDER NOTES
Presents emerge department with complaints of onset of cough and hemoptysis early this morning. He reports some mild chest tightness but no overt chest pain. Reports shortness of breath. Cough has been productive of red sputum. He denies any fever or chills and review of systems is otherwise negative. Patient does take Eliquis for history of A. fib. The history is provided by the patient and medical records. Hemoptysis   This is a new problem. The current episode started 3 to 5 hours ago. The problem occurs continuously. The problem has been gradually worsening. The emesis has an appearance of bright red blood. There has been no fever. Associated symptoms include cough. Pertinent negatives include no chills, no fever, no sweats, no abdominal pain, no diarrhea, no headaches, no arthralgias, no myalgias, no URI and no headaches. Risk factors: Eliquis. Past Medical History:   Diagnosis Date    Atrial fibrillation ([address]     Atrial flutter ([address] 4/7/2017    CAD (coronary artery disease) 2006    Cancer [facility]) 2011    prostate    GERD (gastroesophageal reflux disease)     takes omeprazole    Heart failure ([address]     Hypertension     Ill-defined condition     trigeminal neuralgia    Myocardial infarction ([facility] 06/06/2019    NSTEMI     Sleep apnea     does not wear CPAP    Stroke [facility]) 2018    TIA       Past Surgical History:   Procedure Laterality Date    COLONOSCOPY N/A 3/2/2020    COLONOSCOPY performed by Audelia Franco MD at [address] [facility]  06/10/2019    3 vessel CABG with LIMA-LAD,SVG-OM,and SVG-R posterior lateral branch with MAZE & LA appendage clipping.  HX CORONARY STENT PLACEMENT      HX HEART CATHETERIZATION  08/14/2006    HX HEART CATHETERIZATION  06/07/2019    Severe 3 vessel CAD with LV EF=40-45% and unsuccessful LCX PCI.     HX PROSTATE SURGERY           Family History:   Problem Relation Age of Onset    Other Other         cerebral aneurysm  Heart Disease Mother     Heart Disease Sister        Social History     Socioeconomic History    Marital status:      Spouse name: Not on file    Number of children: Not on file    Years of education: Not on file    Highest education level: Not on file   Occupational History    Not on file   Social Needs    Financial resource strain: Not on file    Food insecurity     Worry: Not on file     Inability: Not on file    Transportation needs     Medical: Not on file     Non-medical: Not on file   Tobacco Use    Smoking status: Former Smoker     Packs/day: 2.00     Years: 14.00     Pack years: 28.00     Types: Cigarettes    Smokeless tobacco: Never Used    Tobacco comment: quit at age 27   Substance and Sexual Activity    Alcohol use: No    Drug use: Not on file    Sexual activity: Not on file   Lifestyle    Physical activity     Days per week: Not on file     Minutes per session: Not on file    Stress: Not on file   Relationships    Social connections     Talks on phone: Not on file     Gets together: Not on file     Attends Alevism service: Not on file     Active member of club or organization: Not on file     Attends meetings of clubs or organizations: Not on file     Relationship status: Not on file    Intimate partner violence     Fear of current or ex partner: Not on file     Emotionally abused: Not on file     Physically abused: Not on file     Forced sexual activity: Not on file   Other Topics Concern     Service Not Asked    Blood Transfusions Not Asked    Caffeine Concern Not Asked    Occupational Exposure Not Asked   [employer] Hazards Not Asked    Sleep Concern Not Asked    Stress Concern Not Asked    Weight Concern Not Asked    Special Diet Not Asked    Back Care Not Asked    Exercise Not Asked    Bike Helmet Not Asked   [address] Not Asked    Self-Exams Not Asked   Social History Narrative    Not on file         ALLERGIES: Codeine and Iodinated contrast media    Review of Systems   Constitutional: Negative for chills and fever. Respiratory: Positive for cough and hemoptysis. Gastrointestinal: Negative for abdominal pain and diarrhea. Musculoskeletal: Negative for arthralgias and myalgias. Neurological: Negative for headaches. All other systems reviewed and are negative. Vitals:    03/28/21 0722   BP: (!) 183/92   Pulse: 97   Resp: 22   Temp: 99.1 °F (37.3 °C)   SpO2: 95%   Weight: 81.6 kg (180 lb)   Height: 5' 8\" (1.727 m)            Physical Exam  Vitals signs and nursing note reviewed. Constitutional:       General: He is not in acute distress. Appearance: Normal appearance. He is not ill-appearing, toxic-appearing or diaphoretic. HENT:      Head: Normocephalic and atraumatic. Nose: Nose normal.      Mouth/Throat:      Mouth: Mucous membranes are moist.      Pharynx: No oropharyngeal exudate. Eyes:      Extraocular Movements: Extraocular movements intact. Conjunctiva/sclera: Conjunctivae normal.      Pupils: Pupils are equal, round, and reactive to light. Neck:      Musculoskeletal: Normal range of motion and neck supple. No neck rigidity or muscular tenderness. Cardiovascular:      Rate and Rhythm: Normal rate and regular rhythm. Pulses: Normal pulses. Heart sounds: Normal heart sounds. Pulmonary:      Effort: Pulmonary effort is normal.      Breath sounds: Rhonchi present. Comments: Right midlung field  Abdominal:      Palpations: Abdomen is soft. Tenderness: There is no abdominal tenderness. There is no guarding or rebound. Musculoskeletal: Normal range of motion. Lymphadenopathy:      Cervical: No cervical adenopathy. Skin:     General: Skin is warm and dry. Capillary Refill: Capillary refill takes less than 2 seconds. Neurological:      General: No focal deficit present. Mental Status: He is alert and oriented to person, place, and time. Mental status is at baseline. Psychiatric:         Mood and Affect: Mood normal.         Behavior: Behavior normal.         Thought Content: Thought content normal.          MDM  Number of Diagnoses or Management Options     Amount and/or Complexity of Data Reviewed  Clinical lab tests: ordered and reviewed  Tests in the radiology section of CPT®: ordered and reviewed  Review and summarize past medical records: yes  Independent visualization of images, tracings, or specimens: yes (EKG at 0 732: Is a normal sinus rhythm, rate of 92 with left axis deviation and nonspecific interventricular conduction delay.   No acute ischemic changes.)    Risk of Complications, Morbidity, and/or Mortality  Presenting problems: moderate  Diagnostic procedures: moderate  Management options: moderate    Patient Progress  Patient progress: stable         Procedures

## 2021-03-28 NOTE — H&P
Hospitalist H&P Note     Admit Date:  3/28/2021  7:27 AM   Name:  Juanita Carnes   Age:  64 y.o.  :  1945   MRN:  [id number]   PCP:  William Starr MD  Treatment Team: Attending Provider: Estrada Chaney MD; Primary Nurse: Evin Luo RN    HPI/Subjective:   Mr. Nathan Cooley is a very nice 63 y/o WM with a h/o AFib on Eliquis, chronic sCHF (last LVEF 40-45% May 2020), HTN, HLD, CAD s/p CABG, prior tobacco abuse and trigeminal neuralgia who presents today with acute onset hemoptysis. He has felt well over the prior few days. He woke up this morning around 0300 to use the bathroom and began coughing up a small amount of bright red blood. Noticed some SOB as well so he came to the ER. Denies fevers, chills, night sweats, weight loss, orthopnea, sick contacts, chest pain, palpitations, headaches, dizziness or recent travel. He had COVID back in December. He does not regularly take his Lasix or Crestor. Last dose of Eliquis was last night. He brought in a ziploc bag of about 2oz of thin, bloody sputum. His sputum currently is more pink/blood tinged than frankly bloody and he did not have any active hemoptysis during my evaluation. Labs with WBCs 14.5 with neutrophilia, Hb 14.8. Highly sensitive trop 20.9 and 13.4 on repeat. BNP 1K. CXR with RML infiltrate. He has been given ceftriaxone and azithromycin. Hospitalist consulted for admission and further management. 10 systems reviewed and negative except as noted in HPI.   Assessment and Plan:     Hospital Problems as of 3/28/2021 Date Reviewed: 2019          Codes Class Noted - Resolved POA    Community acquired pneumonia ICD-10-CM: J18.9  ICD-9-CM: 486  3/28/2021 - Present Yes        Leukocytosis ICD-10-CM: D72.829  ICD-9-CM: 288.60  3/28/2021 - Present Yes        Systolic CHF, chronic (HCC) ICD-10-CM: I50.22  ICD-9-CM: 428.22, 428.0  3/28/2021 - Present Yes        History of smoking 25-50 pack years ICD-10-CM: Z87.891  ICD-9-CM: V15.82  6/13/2019 - Present Yes        S/P CABG x 3 ICD-10-CM: Z95.1  ICD-9-CM: V45.81  6/10/2019 - Present Yes        * (Principal) Hemoptysis ICD-10-CM: R04.2  ICD-9-CM: 786.30  6/9/2019 - Present Yes        Trigeminal neuralgia (Chronic) ICD-10-CM: G50.0  ICD-9-CM: 350.1  6/9/2019 - Present Yes        Hypertension (Chronic) ICD-10-CM: I10  ICD-9-CM: 401.9  4/7/2017 - Present Yes        Coronary atherosclerosis of native coronary vessel ICD-10-CM: I25.10  ICD-9-CM: 414.01  4/7/2017 - Present Yes    Overview Addendum 7/19/2019  4:28 PM by Adolfo Stone MD     2006:  PCI RCA - Liberte, PCI LAD Cypher, PCI LCx Cypher  01/2014:  Stress testing with fixed inferior defect - no ischemia  11/2014:  EF with EF 50-55% and inferior HK   07/2018:  EF 45-50%  01/2019:  EF 40-45%  05/2019:  CABG LIMA-LAD, SVG-OM, SVG-MARY ANNE                     Plan:  # Hemoptysis/RML infiltrate   - Hemoptysis already seems to be improved. Curbside to his Cardiologist and ok to hold Eliquis for now. Hb is 15. Will check CT chest (no contrast due to allergy). On RA. Pulm hygiene efforts. Prior smoker but quit nearly 50 years ago, no weight loss or B symptoms. Con't with CAP coverage. Will give IV Lasix today to see if it helps his SOB any. # AFib   - Con't BB, ok to hold Eliquis    # HTN   - Home meds    # Chronic sCHF   - Appears euvolemic, though is non-compliant with PO Lasix. # CAD   - ASA, non-compliant with Crestor due to myalgias. # Trigeminal neuralgia   - Home meds    Other listed chronic conditions stable; continue essential home meds. Discharge planning: Home when able. DVT ppx ordered: SCDs only. Code status:  Full code. He prefers son/daughter to be POA. Son'ts # listed on face sheet.    Estimated LOS:  Greater than 2 midnights  Risk:  high    Past Medical History:   Diagnosis Date    [facility]     Atrial flutter ([facility] 4/7/2017    CAD (coronary artery disease) 2006    Cancer [facility]) 2011 prostate    GERD (gastroesophageal reflux disease)     takes omeprazole    [facility]     Hypertension     Ill-defined condition     trigeminal neuralgia    Myocardial infarction ([facility] 06/06/2019    NSTEMI     Sleep apnea     does not wear CPAP    Stroke [facility]) 2018    TIA      Past Surgical History:   Procedure Laterality Date    COLONOSCOPY N/A 3/2/2020    COLONOSCOPY performed by Jamaica Galaviz MD at [address] [facility]  06/10/2019    3 vessel CABG with LIMA-LAD,SVG-OM,and SVG-R posterior lateral branch with MAZE & LA appendage clipping.  HX CORONARY STENT PLACEMENT      HX HEART CATHETERIZATION  08/14/2006    HX HEART CATHETERIZATION  06/07/2019    Severe 3 vessel CAD with LV EF=40-45% and unsuccessful LCX PCI.  HX PROSTATE SURGERY        Allergies   Allergen Reactions    Codeine Unknown (comments)    Iodinated Contrast Media Unknown (comments)      Social History     Tobacco Use    Smoking status: Former Smoker     Packs/day: 2.00     Years: 14.00     Pack years: 28.00     Types: Cigarettes    Smokeless tobacco: Never Used    Tobacco comment: quit at age 27   Substance Use Topics    Alcohol use: No      Family History   Problem Relation Age of Onset    Other Other         cerebral aneurysm     Heart Disease Mother     Heart Disease Sister       Family history reviewed and noncontributory to patient's acute condition.   Immunization History   Administered Date(s) Administered    Influenza High Dose Vaccine PF 10/12/2016, 10/30/2018    Influenza Vaccine 10/03/2013, 10/27/2014, 11/01/2016    Influenza Vaccine (Tri) Adjuvanted (>65 Yrs FLUAD TRI 98949) 11/02/2017, 10/30/2018, 10/17/2019    Influenza Vaccine PF 10/22/2015    Pneumococcal Conjugate (PCV-13) 01/25/2018    Pneumococcal Polysaccharide (PPSV-23) 12/07/2007    TB Skin Test (PPD) Intradermal 07/22/2018, 06/10/2019    Zoster Vaccine, Live 02/26/2010     PTA Medications:  Current Outpatient Medications   Medication Instructions    apixaban (ELIQUIS) 5 mg, Oral, 2 TIMES DAILY    aspirin delayed-release 81 mg, Oral, EVERY BEDTIME    carBAMazepine (TEGRETOL) 400 mg, Oral, 2 TIMES DAILY    docusate sodium (COLACE) 100 mg, Oral, 2 TIMES DAILY AS NEEDED    furosemide (LASIX) 40 mg, Oral, DAILY    gabapentin (NEURONTIN) 400 mg capsule Take 2 tablets po [id number]    HYDROcodone-acetaminophen (NORCO)  mg tablet 1 Tab, Oral, EVERY 8 HOURS AS NEEDED    losartan-hydroCHLOROthiazide (HYZAAR) 100-25 mg per tablet 1 Tab, Oral, EVERY BEDTIME    melatonin 5 mg, Oral, BEDTIME PRN    metoprolol succinate (TOPROL-XL) 100 mg, Oral, DAILY    nitroglycerin (NITROSTAT) 0.4 mg SL tablet TAKE AS DIRECTED.  omeprazole (PRILOSEC) 20 mg, Oral, DAILY    potassium chloride SR (K-TAB) 20 mEq tablet 20 mEq, Oral, 2 TIMES DAILY    rosuvastatin (CRESTOR) 40 mg, Oral, EVERY BEDTIME    sildenafil citrate (VIAGRA) 50 mg, Oral, AS NEEDED       Objective:     Patient Vitals for the past 24 hrs:   Temp Pulse Resp BP SpO2   03/28/21 0804     100 %   03/28/21 0722 99.1 °F (37.3 °C) 97 22 (!) 183/92 95 %     Oxygen Therapy  O2 Sat (%): 100 % (03/28/21 0804)  Pulse via Oximetry: 91 beats per minute (03/28/21 0804)  O2 Device: Room air (03/28/21 0804)    Estimated body mass index is 27.37 kg/m² as calculated from the following:    Height as of this encounter: 5' 8\" (1.727 m). Weight as of this encounter: 81.6 kg (180 lb). No intake or output data in the 24 hours ending 03/28/21 1055    *Note that automatically entered I/Os may not be accurate; dependent on patient compliance with collection and accurate  by assistants. Physical Exam:  General:    Well nourished. No overt distress. Pleasant. Eyes:   Normal sclerae. Extraocular movements intact. HENT:  Normocephalic, atraumatic. Moist mucous membranes  CV:   RRR. No m/r/g. Lungs:  Crackles mid and lower right lung fields. Clear on the left.  RA O2, current bedside sats high 90s at rest. Intermittent productve cough but no hemoptysis at the time of my evaluation. Abdomen: Soft, nontender, nondistended. Extremities: Warm and dry. No cyanosis or clubbing  Neurologic: CN II-XII grossly intact. Sensation intact. Skin:     No rashes. No jaundice. Normal coloration  Psych:  Normal mood and affect. I reviewed the labs, imaging, EKGs, telemetry, and other studies done this admission. Data Reviewed:   Recent Results (from the past 24 hour(s))   CBC WITH AUTOMATED DIFF    Collection Time: 03/28/21  7:27 AM   Result Value Ref Range    WBC 14.5 (H) 4.3 - 11.1 K/uL    RBC 4.19 (L) 4.23 - 5.6 M/uL    HGB 14.8 13.6 - 17.2 g/dL    HCT 42.9 41.1 - 50.3 %    .4 (H) 79.6 - 97.8 FL    MCH 35.3 (H) 26.1 - 32.9 PG    MCHC 34.5 31.4 - 35.0 g/dL    RDW 13.6 11.9 - 14.6 %    PLATELET 634 233 - 899 K/uL    MPV 8.6 (L) 9.4 - 12.3 FL    ABSOLUTE NRBC 0.00 0.0 - 0.2 K/uL    DF AUTOMATED      NEUTROPHILS 92 (H) 43 - 78 %    LYMPHOCYTES 3 (L) 13 - 44 %    MONOCYTES 5 4.0 - 12.0 %    EOSINOPHILS 0 (L) 0.5 - 7.8 %    BASOPHILS 0 0.0 - 2.0 %    IMMATURE GRANULOCYTES 0 0.0 - 5.0 %    ABS. NEUTROPHILS 13.3 (H) 1.7 - 8.2 K/UL    ABS. LYMPHOCYTES 0.4 (L) 0.5 - 4.6 K/UL    ABS. MONOCYTES 0.7 0.1 - 1.3 K/UL    ABS. EOSINOPHILS 0.0 0.0 - 0.8 K/UL    ABS. BASOPHILS 0.0 0.0 - 0.2 K/UL    ABS. IMM. GRANS. 0.1 0.0 - 0.5 K/UL   METABOLIC PANEL, COMPREHENSIVE    Collection Time: 03/28/21  7:27 AM   Result Value Ref Range    Sodium 136 136 - 145 mmol/L    Potassium 3.6 3.5 - 5.1 mmol/L    Chloride 101 98 - 107 mmol/L    CO2 29 21 - 32 mmol/L    Anion gap 6 (L) 7 - 16 mmol/L    Glucose 103 (H) 65 - 100 mg/dL    BUN 11 8 - 23 MG/DL    Creatinine 0.84 0.8 - 1.5 MG/DL    GFR est AA >60 >60 ml/min/1.73m2    GFR est non-AA >60 >60 ml/min/1.73m2    Calcium 8.8 8.3 - 10.4 MG/DL    Bilirubin, total 0.9 0.2 - 1.1 MG/DL    ALT (SGPT) 38 12 - 65 U/L    AST (SGOT) 22 15 - 37 U/L    Alk. phosphatase 92 50 - 136 U/L    Protein, total 7.3 6.3 - 8.2 g/dL    Albumin 3.7 3.2 - 4.6 g/dL    Globulin 3.6 (H) 2.3 - 3.5 g/dL    A-G Ratio 1.0 (L) 1.2 - 3.5     TROPONIN-HIGH SENSITIVITY    Collection Time: 03/28/21  7:27 AM   Result Value Ref Range    Troponin-High Sensitivity 20.9 (H) 0 - 14 pg/mL   MAGNESIUM    Collection Time: 03/28/21  7:27 AM   Result Value Ref Range    Magnesium 1.8 1.8 - 2.4 mg/dL   LACTIC ACID    Collection Time: 03/28/21  7:27 AM   Result Value Ref Range    Lactic acid 1.4 0.4 - 2.0 MMOL/L   NT-PRO BNP    Collection Time: 03/28/21  7:27 AM   Result Value Ref Range    NT pro-BNP 1,084 (H) <450 PG/ML   EKG, 12 LEAD, INITIAL    Collection Time: 03/28/21  7:32 AM   Result Value Ref Range    Ventricular Rate 92 BPM    Atrial Rate 92 BPM    P-R Interval 168 ms    QRS Duration 120 ms    Q-T Interval 362 ms    QTC Calculation (Bezet) 447 ms    Calculated P Axis 74 degrees    Calculated R Axis -38 degrees    Calculated T Axis 109 degrees    Diagnosis       !! AGE AND GENDER SPECIFIC ECG ANALYSIS !!   Normal sinus rhythm  Left axis deviation  Non-specific intra-ventricular conduction delay  ST & T wave abnormality, consider lateral ischemia  Abnormal ECG  When compared with ECG of 24-OCT-2019 18:15,  No significant change was found     CULTURE, BLOOD    Collection Time: 03/28/21  8:26 AM    Specimen: Blood   Result Value Ref Range    Special Requests: RIGHT  HAND        Culture result: PENDING    TROPONIN-HIGH SENSITIVITY    Collection Time: 03/28/21  9:57 AM   Result Value Ref Range    Troponin-High Sensitivity 13.4 0 - 14 pg/mL       All Micro Results     Procedure Component Value Units Date/Time    CULTURE, RESPIRATORY/SPUTUM/BRONCH Otis Gunner STAIN [[id number]]     Order Status: Sent Specimen: Sputum     CULTURE, BLOOD [[id number]] Collected: 03/28/21 0826    Order Status: Completed Specimen: Blood Updated: 03/28/21 0945     Special Requests: --        RIGHT  HAND       Culture result: PENDING Medications Administered     albuterol-ipratropium (DUO-NEB) 2.5 MG-0.5 MG/3 ML     Admin Date  03/28/2021 Action  Given Dose  3 mL Route  Nebulization Administered By  RT Shari          azithromycin (ZITHROMAX) 500 mg in 0.9% sodium chloride 250 mL (VIAL-MATE)     Admin Date  03/28/2021 Action  Given Dose  500 mg Rate  250 mL/hr Route  IntraVENous Administered By  Tania Lantigua RN          benzonatate (TESSALON) capsule 100 mg     Admin Date  03/28/2021 Action  Given Dose  100 mg Route  Oral Administered By  Tania Lantigua RN          cefTRIAXone (ROCEPHIN) 1 g in 0.9% sodium chloride (MBP/ADV) 50 mL MBP     Admin Date  03/28/2021 Action  New Bag Dose  1 g Rate  100 mL/hr Route  IntraVENous Administered By  Tania Lantigua RN                Other Studies:  No results found for this visit on 03/28/21. Xr Chest Port    Result Date: 3/28/2021  EXAM: Single view chest radiograph. INDICATION: Shortness of breath and hemoptysis. Chest tightness. COMPARISON: Chest radiograph dated October 24, 2019. FINDINGS: Diffuse right middle lobe consolidation. No pneumothorax or pleural effusion. Heart is normal in size. Left atrial appendage clip evident. Fractured inferior most sternal wire, unchanged. No acute osseous abnormality. 1. Diffuse right middle lobe consolidation, likely pneumonia. An obstructing lesion cannot be excluded. Recommend follow-up chest radiographs to ensure resolution.        SARS-CoV-2 Lab Results  \"Novel Coronavirus\" Test: No results found for: COV2NT   \"Emergent Disease\" Test: No results found for: EDPR  \"SARS-COV-2\" Test: No results found for: XGCOVT  Rapid Test: No results found for: COVR         Signed:  Sahara Guzman MD

## 2021-03-28 NOTE — PROGRESS NOTES
TRANSFER - IN REPORT:    Verbal report received from dionisio(name) on Loli Leung  being received from ED(unit) for routine progression of care      Report consisted of patients Situation, Background, Assessment and   Recommendations(SBAR). Information from the following report(s) SBAR was reviewed with the receiving nurse. Opportunity for questions and clarification was provided. Assessment completed upon patients arrival to unit and care assumed.

## 2021-03-28 NOTE — PROGRESS NOTES
Hourly rounds performed through shift, pt denies needs at this time. Bed in low position, locked and call light/personal items within reach. Will continue to monitor and report to night shift nurse.

## 2021-03-29 LAB
ANION GAP SERPL CALC-SCNC: 5 MMOL/L (ref 7–16)
BASOPHILS # BLD: 0 K/UL (ref 0–0.2)
BASOPHILS NFR BLD: 0 % (ref 0–2)
BUN SERPL-MCNC: 22 MG/DL (ref 8–23)
CALCIUM SERPL-MCNC: 8.4 MG/DL (ref 8.3–10.4)
CHLORIDE SERPL-SCNC: 100 MMOL/L (ref 98–107)
CO2 SERPL-SCNC: 30 MMOL/L (ref 21–32)
CREAT SERPL-MCNC: 0.94 MG/DL (ref 0.8–1.5)
DIFFERENTIAL METHOD BLD: ABNORMAL
EOSINOPHIL # BLD: 0.1 K/UL (ref 0–0.8)
EOSINOPHIL NFR BLD: 1 % (ref 0.5–7.8)
ERYTHROCYTE [DISTWIDTH] IN BLOOD BY AUTOMATED COUNT: 13.7 % (ref 11.9–14.6)
GLUCOSE SERPL-MCNC: 96 MG/DL (ref 65–100)
HCT VFR BLD AUTO: 37.7 % (ref 41.1–50.3)
HGB BLD-MCNC: 13 G/DL (ref 13.6–17.2)
IMM GRANULOCYTES # BLD AUTO: 0.1 K/UL (ref 0–0.5)
IMM GRANULOCYTES NFR BLD AUTO: 1 % (ref 0–5)
LYMPHOCYTES # BLD: 1 K/UL (ref 0.5–4.6)
LYMPHOCYTES NFR BLD: 9 % (ref 13–44)
MCH RBC QN AUTO: 35.3 PG (ref 26.1–32.9)
MCHC RBC AUTO-ENTMCNC: 34.5 G/DL (ref 31.4–35)
MCV RBC AUTO: 102.4 FL (ref 79.6–97.8)
MONOCYTES # BLD: 0.9 K/UL (ref 0.1–1.3)
MONOCYTES NFR BLD: 8 % (ref 4–12)
NEUTS SEG # BLD: 8.9 K/UL (ref 1.7–8.2)
NEUTS SEG NFR BLD: 81 % (ref 43–78)
NRBC # BLD: 0 K/UL (ref 0–0.2)
PLATELET # BLD AUTO: 151 K/UL (ref 150–450)
PMV BLD AUTO: 8.8 FL (ref 9.4–12.3)
POTASSIUM SERPL-SCNC: 3.4 MMOL/L (ref 3.5–5.1)
RBC # BLD AUTO: 3.68 M/UL (ref 4.23–5.6)
SODIUM SERPL-SCNC: 135 MMOL/L (ref 138–145)
WBC # BLD AUTO: 10.9 K/UL (ref 4.3–11.1)

## 2021-03-29 PROCEDURE — 74011250637 HC RX REV CODE- 250/637: Performed by: INTERNAL MEDICINE

## 2021-03-29 PROCEDURE — 2709999900 HC NON-CHARGEABLE SUPPLY

## 2021-03-29 PROCEDURE — 80048 BASIC METABOLIC PNL TOTAL CA: CPT

## 2021-03-29 PROCEDURE — 99223 1ST HOSP IP/OBS HIGH 75: CPT | Performed by: INTERNAL MEDICINE

## 2021-03-29 PROCEDURE — 36415 COLL VENOUS BLD VENIPUNCTURE: CPT

## 2021-03-29 PROCEDURE — 85025 COMPLETE CBC W/AUTO DIFF WBC: CPT

## 2021-03-29 PROCEDURE — 65660000000 HC RM CCU STEPDOWN

## 2021-03-29 PROCEDURE — 94760 N-INVAS EAR/PLS OXIMETRY 1: CPT

## 2021-03-29 PROCEDURE — 74011250636 HC RX REV CODE- 250/636: Performed by: INTERNAL MEDICINE

## 2021-03-29 PROCEDURE — 74011000258 HC RX REV CODE- 258: Performed by: INTERNAL MEDICINE

## 2021-03-29 RX ORDER — POTASSIUM CHLORIDE 20 MEQ/1
40 TABLET, EXTENDED RELEASE ORAL
Status: COMPLETED | OUTPATIENT
Start: 2021-03-29 | End: 2021-03-29

## 2021-03-29 RX ADMIN — GUAIFENESIN 1200 MG: 600 TABLET ORAL at 09:11

## 2021-03-29 RX ADMIN — AZITHROMYCIN MONOHYDRATE 500 MG: 250 TABLET ORAL at 09:11

## 2021-03-29 RX ADMIN — Medication 10 ML: at 13:38

## 2021-03-29 RX ADMIN — GABAPENTIN 400 MG: 400 CAPSULE ORAL at 09:11

## 2021-03-29 RX ADMIN — CEFTRIAXONE SODIUM 1 G: 1 INJECTION, POWDER, FOR SOLUTION INTRAMUSCULAR; INTRAVENOUS at 09:49

## 2021-03-29 RX ADMIN — GUAIFENESIN 1200 MG: 600 TABLET ORAL at 21:35

## 2021-03-29 RX ADMIN — CARBAMAZEPINE 400 MG: 200 TABLET ORAL at 09:10

## 2021-03-29 RX ADMIN — HYDROCHLOROTHIAZIDE 25 MG: 25 TABLET ORAL at 09:11

## 2021-03-29 RX ADMIN — GABAPENTIN 400 MG: 400 CAPSULE ORAL at 16:29

## 2021-03-29 RX ADMIN — LOSARTAN POTASSIUM 100 MG: 50 TABLET, FILM COATED ORAL at 09:10

## 2021-03-29 RX ADMIN — METOPROLOL SUCCINATE 100 MG: 100 TABLET, EXTENDED RELEASE ORAL at 09:11

## 2021-03-29 RX ADMIN — Medication 10 ML: at 21:36

## 2021-03-29 RX ADMIN — GABAPENTIN 400 MG: 400 CAPSULE ORAL at 21:35

## 2021-03-29 RX ADMIN — Medication 10 ML: at 06:18

## 2021-03-29 RX ADMIN — POTASSIUM CHLORIDE 40 MEQ: 20 TABLET, EXTENDED RELEASE ORAL at 09:11

## 2021-03-29 RX ADMIN — CARBAMAZEPINE 400 MG: 200 TABLET ORAL at 17:03

## 2021-03-29 NOTE — PROGRESS NOTES
Patient lying in bed talking family at bedside. No distress noted at this time. No complaints of pain. Hourly rounding completed. All needs met this shift. Bed in low position. Belongings and call light within reach. Will give report to night shift nurse.

## 2021-03-29 NOTE — CONSULTS
PULMONARY/CCM CONSULT :  3/29/2021    Date of Admission:  3/28/2021    The patient's chart has been reviewed and the chart has been discussed with nursing staff. Subjective: This patient has been seen and evaluated at the request of Dr. Kalani Owens for hemoptysis. Patient is a 64 y.o.  male presents with hemoptysis. He has underlying CAD with hx CABG, former tobacco abuse- smoked 1.5 ppd x 14 years before quitting 36 years ago. Bridgett He has never been dx with lung disease, does not require inhalers or home O2, H/O A fib on eliquis and HTN. He was seen by us in June 2019 and apparently had some hemoptysis at that time. After stopping his ASA, it would usually resolve. He underwent a diagnostic bronchoscopy with no bleeding or lesions identified at that time. He was noted to have dynamic airway collapse. He was admitted to the floor and eliquis was held. Since then, he was started on rocephin and zithromax for CAP. His hemoglobin is 13.0 and hemoptysis has improved since admission. Chest CT was done showing posterior RUL consolidation. He has been afebrile. He is on RA with oxygen saturation of 96%. WBC on admission was 14,500. We were asked to see him for hemoptysis. He reports coughing up bright red blood since yesterday which has continued but is a lesser amount. He denies having chills, weight loss or fever. He does has chest wall pain. Past Surgical History:   Procedure Laterality Date    COLONOSCOPY N/A 3/2/2020    COLON[facility] performed [facility] Anum Daniels [facility] at [facility] ENDOSCOPY    HX CORONARY ARTERY BYPASS GRAFT  06/10/2019    3 vessel CABG with LIMA-LAD,SVG-OM,and SVG-R posterior lateral branch with MAZE & LA appendage clipping.  HX CORONARY STENT PLACEMENT      HX HEART CATHETERIZATION  08/14/2006    HX HEART CATHETERIZATION  06/07/2019    Severe 3 vessel CAD with LV EF=40-45% and unsuccessful LCX PCI.     HX PROSTATE SURGERY        Social History     Tobacco Use    Smoking status: Former Smoker     Packs/day: 2.00     Years: 14.00     Pack years: 28.00     Types: Cigarettes    Smokeless tobacco: Never Used    Tobacco comment: quit at age 27   Substance Use Topics    Alcohol use: No      Family History   Problem Relation Age of Onset    Other Other         cerebral aneurysm     Heart Disease Mother     Heart Disease Sister       Allergies   Allergen Reactions    Codeine Unknown (comments)    Iodinated Contrast Media Unknown (comments)      Prior to Admission Medications   Prescriptions Last Dose Informant Patient Reported? Taking? HYDROcodone-acetaminophen (NORCO)  mg tablet 3/27/2021 at Unknown time  Yes Yes   Sig: Take 1 Tab by mouth every eight (8) hours as needed. apixaban (ELIQUIS) 5 mg tablet 3/27/2021 at Unknown time  No Yes   Sig: Take 1 Tab by mouth two (2) times a day. aspirin delayed-release 81 mg tablet 3/27/2021 at Unknown time  Yes Yes   Sig: Take 1 Tab by mouth nightly. carBAMazepine (TEGRETOL) 200 mg tablet 3/27/2021 at Unknown time  Yes Yes   Sig: Take 400 mg by mouth two (2) times a day. docusate sodium (COLACE) 100 mg capsule 3/27/2021 at Unknown time  No Yes   Sig: Take 1 Cap by mouth two (2) times daily as needed for Constipation. furosemide (LASIX) 40 mg tablet 2/28/2021 at Unknown time  No Yes   Sig: Take 1 Tab by mouth daily. Patient taking differently: Take 20 mg by mouth daily. gabapentin (NEURONTIN) 400 mg capsule 3/27/2021 at Unknown time  No Yes   Sig: Take 2 tablets po [id number]   Patient taking differently: 400 mg three (3) times daily. 1 tab in am, 2 tabs after lunch, and 1 tab at night   losartan-hydroCHLOROthiazide (HYZAAR) 100-25 mg per tablet 3/27/2021 at Unknown time  No Yes   Sig: Take 1 Tab by mouth nightly. melatonin 3 mg tablet   Yes No   Sig: Take 5 mg by mouth nightly as needed. metoprolol succinate (TOPROL-XL) 100 mg tablet 3/27/2021 at Unknown time  No Yes   Sig: Take 1 Tab by mouth daily.    nitroglycerin (NITROSTAT) 0.4 mg SL tablet   No No   Sig: TAKE AS DIRECTED.   omeprazole (PRILOSEC) 20 mg capsule 3/27/2021 at Unknown time  No Yes   Sig: Take 1 Cap by mouth daily. potassium chloride SR (K-TAB) 20 mEq tablet 3/27/2021 at Unknown time  No Yes   Sig: Take 1 Tab by mouth two (2) times a day. rosuvastatin (CRESTOR) 40 mg tablet Not Taking at Unknown time  No No   Sig: Take 1 Tab by mouth nightly. sildenafil citrate (Viagra) 50 mg tablet Unknown at Unknown time  No No   Sig: Take 1 Tab by mouth as needed for Erectile Dysfunction.       Facility-Administered Medications: None       MEDS SCHEDULED:    Current Facility-Administered Medications   Medication Dose Route Frequency    aspirin delayed-release tablet 81 mg  81 mg Oral QHS    carBAMazepine (TEGretol) tablet 400 mg  400 mg Oral BID    gabapentin (NEURONTIN) capsule 400 mg  400 mg Oral TID    HYDROcodone-acetaminophen (NORCO)  mg tablet 1 Tab  1 Tab Oral Q8H PRN    metoprolol succinate (TOPROL-XL) XL tablet 100 mg  100 mg Oral DAILY    sodium chloride (NS) flush 5-40 mL  5-40 mL IntraVENous Q8H    sodium chloride (NS) flush 5-40 mL  5-40 mL IntraVENous PRN    acetaminophen (TYLENOL) tablet 650 mg  650 mg Oral Q6H PRN    Or    acetaminophen (TYLENOL) suppository 650 mg  650 mg Rectal Q6H PRN    polyethylene glycol (MIRALAX) packet 17 g  17 g Oral DAILY PRN    promethazine (PHENERGAN) tablet 12.5 mg  12.5 mg Oral Q6H PRN    Or    ondansetron (ZOFRAN) injection 4 mg  4 mg IntraVENous Q6H PRN    cefTRIAXone (ROCEPHIN) 1 g in 0.9% sodium chloride (MBP/ADV) 50 mL MBP  1 g IntraVENous Q24H    azithromycin (ZITHROMAX) tablet 500 mg  500 mg Oral DAILY    guaiFENesin ER (MUCINEX) tablet 1,200 mg  1,200 mg Oral Q12H    albuterol (PROVENTIL VENTOLIN) nebulizer solution 2.5 mg  2.5 mg Nebulization Q4H PRN    losartan (COZAAR) tablet 100 mg  100 mg Oral DAILY    And    hydroCHLOROthiazide (HYDRODIURIL) tablet 25 mg  25 mg Oral DAILY         Review of Systems  Pertinent items are noted in HPI. Objective:     Vitals:    03/29/21 0709 03/29/21 0800 03/29/21 1047 03/29/21 1200   BP: 138/77  (!) 105/56    Pulse: 65 64 65 70   Resp: 18  18    Temp: 98 °F (36.7 °C)  97.5 °F (36.4 °C)    SpO2: 98%  96%    Weight:       Height:         No intake/output data recorded. 03/27 1901 - 03/29 0700  In: -   Out: 1225 [Urine:1225]      PHYSICAL EXAM     Physical Exam:   General:  Alert, cooperative, no acute distress, appears stated age. Eyes:  Conjunctivae/corneas clear. Nose: Nares patent and moist.    Mouth/Throat: Lips, mucosa, and tongue pink and intact. Neck: Supple, symmetrical.   Respiratory:   CTA to auscultation bilaterally on RA   Cardiovascular:  Regular rate and rhythm, S1, S2, no murmur, click, rub or gallop. GI:   Abdomen soft, non-tender. Bowel sounds active X 4 Q. Musculoskeletal: Extremities symmetrical, atraumatic, no cyanosis, no edema. SCDs   Skin: Skin color, texture, turgor normal. No rashes or lesions       Neurologic: Alert and oriented.        Activity:ambulates   Nutrition: Cardiac    CULTURES: sputum ordered    LABS    Recent Labs     03/29/21  0437 03/28/21  0727   WBC 10.9 14.5*   HGB 13.0* 14.8   HCT 37.7* 42.9    197     Recent Labs     03/29/21  0437 03/28/21  0727   * 136   K 3.4* 3.6    101   GLU 96 103*   CO2 30 29   BUN 22 11   CREA 0.94 0.84   MG  --  1.8         Assessment:     Hospital Problems  Date Reviewed: 8/20/2019          Codes Class Noted POA    Community acquired pneumonia ICD-10-CM: J18.9  ICD-9-CM: 551  3/28/2021 Yes        Leukocytosis ICD-10-CM: D72.829  ICD-9-CM: 288.60  3/28/2021 Yes        Systolic CHF, chronic (HCC) ICD-10-CM: I50.22  ICD-9-CM: 428.22, 428.0  3/28/2021 Yes        History of smoking 25-50 pack years ICD-10-CM: Z87.891  ICD-9-CM: V15.82  6/13/2019 Yes        S/P CABG x 3 ICD-10-CM: Z95.1  ICD-9-CM: V45.81  6/10/2019 Yes        * (Principal) Hemoptysis ICD-10-CM: R04.2  ICD-9-CM: 786.30  6/9/2019 Yes        Trigeminal neuralgia (Chronic) ICD-10-CM: G50.0  ICD-9-CM: 350.1  6/9/2019 Yes        Hypertension (Chronic) ICD-10-CM: I10  ICD-9-CM: 401.9  4/7/2017 Yes        Coronary atherosclerosis of native coronary vessel ICD-10-CM: I25.10  ICD-9-CM: 414.01  4/7/2017 Yes    Overview Addendum 7/19/2019  4:28 PM by Nathaly Ayala MD     2006:  PCI RCA - Liberte, PCI LAD Cypher, PCI LCx Cypher  01/2014:  Stress testing with fixed inferior defect - no ischemia  11/2014:  EF with EF 50-55% and inferior HK   07/2018:  EF 45-50%  01/2019:  EF 40-45%  05/2019:  CABG LIMA-LAD, SVG-OM, SVG-MARY ANNE                     Plan:     Here with hemoptysis and CAP. Currently, eliquis is on hold. He continues to cough up blood within the last hour (bright red). Will tentatively set him up for diagnostic bronch tomorrow. Continue abx; he is on rocephin and zithromax- culture is pending. Encouraged to mobilize. Bing Velasquez, NP-C  Lungs: No wheezing  Heart:  RRR with no Murmur/Rubs/Gallops    Additional Comments: Will schedule for inspection bronch in am and cont. IV Rocephin/Zithromax for now and keep Eliquis on hold    I have spoken with and examined the patient. I agree with the above assessment and plan as documented. Stef Delgado MD        More than 50% of time documented was spent in face-to-face contact with the patient and in the care of the patient on the floor/unit where the patient is located.

## 2021-03-29 NOTE — H&P (VIEW-ONLY)
PULMONARY/CCM CONSULT :  3/29/2021 Date of Admission:  3/28/2021 The patient's chart has been reviewed and the chart has been discussed with nursing staff. Subjective: This patient has been seen and evaluated at the request of Dr. Ave Powell for hemoptysis. Patient is a 64 y.o.  male presents with hemoptysis. He has underlying CAD with hx CABG, former tobacco abuse- smoked 1.5 ppd x 14 years before quitting 36 years ago. Becca Gaona has never been dx with lung disease, does not require inhalers or home O2, H/O A fib on eliquis and HTN. He was seen by us in June 2019 and apparently had some hemoptysis at that time. After stopping his ASA, it would usually resolve. He underwent a diagnostic bronchoscopy with no bleeding or lesions identified at that time. He was noted to have dynamic airway collapse. He was admitted to the floor and eliquis was held. Since then, he was started on rocephin and zithromax for CAP. His hemoglobin is 13.0 and hemoptysis has improved since admission. Chest CT was done showing posterior RUL consolidation. He has been afebrile. He is on RA with oxygen saturation of 96%. WBC on admission was 14,500. We were asked to see him for hemoptysis. He reports coughing up bright red blood since yesterday which has continued but is a lesser amount. He denies having chills, weight loss or fever. He does has chest wall pain. Past Surgical History:  
Procedure Laterality Date  COLONOSCOPY N/A 3/2/2020 COLONOSCOPY performed by Katia Hays MD at [address] [place] [address] Drive GRAFT  06/10/2019  
 3 vessel CABG with LIMA-LAD,SVG-OM,and SVG-R posterior lateral branch with MAZE & LA appendage clipping.  HX CORONARY STENT PLACEMENT    
 HX HEART CATHETERIZATION  08/14/2006  HX HEART CATHETERIZATION  06/07/2019 Severe 3 vessel CAD with LV EF=40-45% and unsuccessful LCX PCI.  HX PROSTATE SURGERY Social History Tobacco Use  Smoking status: Former Smoker Packs/day: 2.00 Years: 14.00 Pack years: 28.00 Types: Cigarettes  Smokeless tobacco: Never Used  Tobacco comment: quit at age 27 Substance Use Topics  Alcohol use: No  
  
Family History Problem Relation Age of Onset  Other Other   
     cerebral aneurysm  Heart Disease Mother  Heart Disease Sister Allergies Allergen Reactions  Codeine Unknown (comments)  Iodinated Contrast Media Unknown (comments) Prior to Admission Medications Prescriptions Last Dose Informant Patient Reported? Taking? HYDROcodone-acetaminophen (NORCO)  mg tablet 3/27/2021 at Unknown time  Yes Yes Sig: Take 1 Tab by mouth every eight (8) hours as needed. apixaban (ELIQUIS) 5 mg tablet 3/27/2021 at Unknown time  No Yes Sig: Take 1 Tab by mouth two (2) times a day. aspirin delayed-release 81 mg tablet 3/27/2021 at Unknown time  Yes Yes Sig: Take 1 Tab by mouth nightly. carBAMazepine (TEGRETOL) 200 mg tablet 3/27/2021 at Unknown time  Yes Yes Sig: Take 400 mg by mouth two (2) times a day. docusate sodium (COLACE) 100 mg capsule 3/27/2021 at Unknown time  No Yes Sig: Take 1 Cap by mouth two (2) times daily as needed for Constipation. furosemide (LASIX) 40 mg tablet 2/28/2021 at Unknown time  No Yes Sig: Take 1 Tab by mouth daily. Patient taking differently: Take 20 mg by mouth daily. gabapentin (NEURONTIN) 400 mg capsule 3/27/2021 at Unknown time  No Yes Sig: Take 2 tablets po [id number] Patient taking differently: 400 mg three (3) times daily. 1 tab in am, 2 tabs after lunch, and 1 tab at night  
losartan-hydroCHLOROthiazide (HYZAAR) 100-25 mg per tablet 3/27/2021 at Unknown time  No Yes Sig: Take 1 Tab by mouth nightly. melatonin 3 mg tablet   Yes No  
Sig: Take 5 mg by mouth nightly as needed. metoprolol succinate (TOPROL-XL) 100 mg tablet 3/27/2021 at Unknown time  No Yes Sig: Take 1 Tab by mouth daily.   
nitroglycerin (NITROSTAT) 0.4 mg SL tablet   No No  
Sig: TAKE AS DIRECTED.  
omeprazole (PRILOSEC) 20 mg capsule 3/27/2021 at Unknown time  No Yes Sig: Take 1 Cap by mouth daily. potassium chloride SR (K-TAB) 20 mEq tablet 3/27/2021 at Unknown time  No Yes Sig: Take 1 Tab by mouth two (2) times a day. rosuvastatin (CRESTOR) 40 mg tablet Not Taking at Unknown time  No No  
Sig: Take 1 Tab by mouth nightly. sildenafil citrate (Viagra) 50 mg tablet Unknown at Unknown time  No No  
Sig: Take 1 Tab by mouth as needed for Erectile Dysfunction. Facility-Administered Medications: None MEDS SCHEDULED:   
Current Facility-Administered Medications Medication Dose Route Frequency  aspirin delayed-release tablet 81 mg  81 mg Oral QHS  carBAMazepine (TEGretol) tablet 400 mg  400 mg Oral BID  
 gabapentin (NEURONTIN) capsule 400 mg  400 mg Oral TID  
 HYDROcodone-acetaminophen (NORCO)  mg tablet 1 Tab  1 Tab Oral Q8H PRN  
 metoprolol succinate (TOPROL-XL) XL tablet 100 mg  100 mg Oral DAILY  sodium chloride (NS) flush 5-40 mL  5-40 mL IntraVENous Q8H  
 sodium chloride (NS) flush 5-40 mL  5-40 mL IntraVENous PRN  
 acetaminophen (TYLENOL) tablet 650 mg  650 mg Oral Q6H PRN Or  
 acetaminophen (TYLENOL) suppository 650 mg  650 mg Rectal Q6H PRN  polyethylene glycol (MIRALAX) packet 17 g  17 g Oral DAILY PRN  promethazine (PHENERGAN) tablet 12.5 mg  12.5 mg Oral Q6H PRN Or  
 ondansetron (ZOFRAN) injection 4 mg  4 mg IntraVENous Q6H PRN  
 cefTRIAXone (ROCEPHIN) 1 g in 0.9% sodium chloride (MBP/ADV) 50 mL MBP  1 g IntraVENous Q24H  
 azithromycin (ZITHROMAX) tablet 500 mg  500 mg Oral DAILY  guaiFENesin ER (MUCINEX) tablet 1,200 mg  1,200 mg Oral Q12H  
 albuterol (PROVENTIL VENTOLIN) nebulizer solution 2.5 mg  2.5 mg Nebulization Q4H PRN  
 losartan (COZAAR) tablet 100 mg  100 mg Oral DAILY And  
 hydroCHLOROthiazide (HYDRODIURIL) tablet 25 mg  25 mg Oral DAILY Review of Systems Pertinent items are noted in HPI. Objective:  
 
Vitals:  
 03/29/21 0709 03/29/21 0800 03/29/21 1047 03/29/21 1200 BP: 138/77  (!) 105/56 Pulse: 65 64 65 70 Resp: 18  18 Temp: 98 °F (36.7 °C)  97.5 °F (36.4 °C) SpO2: 98%  96% Weight:      
Height:      
 
No intake/output data recorded. 03/27 1901 - 03/29 0700 In: -  
Out: 6770 [[id number] PHYSICAL EXAM  
 
Physical Exam:  
General:  Alert, cooperative, no acute distress, appears stated age. Eyes:  Conjunctivae/corneas clear. Nose: Nares patent and moist.   
Mouth/Throat: Lips, mucosa, and tongue pink and intact. Neck: Supple, symmetrical.  
Respiratory:   CTA to auscultation bilaterally on RA Cardiovascular:  Regular rate and rhythm, S1, S2, no murmur, click, rub or gallop. GI:   Abdomen soft, non-tender. Bowel sounds active X 4 Q. Musculoskeletal: Extremities symmetrical, atraumatic, no cyanosis, no edema. SCDs Skin: Skin color, texture, turgor normal. No rashes or lesions Neurologic: Alert and oriented. Activity:ambulates Nutrition: Cardiac CULTURES: sputum ordered LABS Recent Labs  
  03/29/21 
0437 03/28/21 
2750 WBC 10.9 14.5* HGB 13.0* 14.8 HCT 37.7* 42.9  197 Recent Labs  
  03/29/21 
0437 03/28/21 
4808 * 136  
K 3.4* 3.6  101 GLU 96 103* CO2 30 29 BUN 22 11 CREA 0.94 0.84  
MG  --  1.8 Assessment:  
 
Hospital Problems  Date Reviewed: 8/20/2019 Codes Class Noted POA Community acquired pneumonia ICD-10-CM: J18.9 ICD-9-CM: 927  3/28/2021 Yes Leukocytosis ICD-10-CM: U25.687 ICD-9-CM: 288.60  3/28/2021 Yes Systolic CHF, chronic (HCC) ICD-10-CM: I50.22 ICD-9-CM: 428.22, 428.0  3/28/2021 Yes History of smoking 25-50 pack years ICD-10-CM: Z87.891 ICD-9-CM: V15.82  6/13/2019 Yes S/P CABG x 3 ICD-10-CM: Z95.1 ICD-9-CM: V45.81  6/10/2019 Yes  * (Principal) Hemoptysis ICD-10-CM: R04.2 ICD-9-CM: 786.30  6/9/2019 Yes Trigeminal neuralgia (Chronic) ICD-10-CM: G50.0 ICD-9-CM: 350.1  6/9/2019 Yes Hypertension (Chronic) ICD-10-CM: I10 
ICD-9-CM: 401.9  4/7/2017 Yes Coronary atherosclerosis of native coronary vessel ICD-10-CM: I25.10 ICD-9-CM: 414.01  4/7/2017 Yes Overview Addendum 7/19/2019  4:28 PM by Emiliano Reilly MD  
  2006:  PCI RCA - Liberte, PCI LAD Cypher, PCI LCx Cypher 
01/2014:  Stress testing with fixed inferior defect - no ischemia 
11/2014:  EF with EF 50-55% and inferior HK  
07/2018:  EF 45-50% 01/2019:  EF 40-45% 05/2019:  CABG LIMA-LAD, SVG-OM, SVG-MARY ANNE Plan:  
 
Here with hemoptysis and CAP. Currently, eliquis is on hold. He continues to cough up blood within the last hour (bright red). Will tentatively set him up for diagnostic bronch tomorrow. Continue abx; he is on rocephin and zithromax- culture is pending. Encouraged to mobilize. Saleem Shoemaker, NP-C Lungs: No wheezing Heart:  RRR with no Murmur/Rubs/Gallops Additional Comments: Will schedule for inspection bronch in am and cont. IV Rocephin/Zithromax for now and keep Eliquis on hold I have spoken with and examined the patient. I agree with the above assessment and plan as documented. Nilsa Severe, MD 
 
 
 
More than 50% of time documented was spent in face-to-face contact with the patient and in the care of the patient on the floor/unit where the patient is located.

## 2021-03-29 NOTE — PROGRESS NOTES
[facility]ist Progress Note     Name:  Yuli Boogie  Age:76 y.o. Sex:male   :  1945    MRN:  [id number]     Admit Date:  3/28/2021    Reason for Admission:  Hemoptysis [R04.2]  Community acquired pneumonia [J18.9]    Hospital Course/Interval history:     65 y/o WM with a h/o AFib on Eliquis, chronic sCHF (last LVEF 40-45% May 2020), HTN, HLD, CAD s/p CABG, prior tobacco abuse and trigeminal neuralgia who presents today with acute onset hemoptysis, SOB. Had COVID . In ED, WBC 14.5 with neutrophilia, Hb 14.8. CXR with RML infiltrate. He was started on ceftriaxone and azithromycin, Eliquis held. 3/29 - pt reports feeling good today, no further hemoptysis. CT showing possible pulm hemorrhage. Assessment & Plan     Hemoptysis  ? PNA  3/29  - CT chest showing possible pulm hemorrhage  - cont Rocephin, Azithro  - pulm consulted  - holding Eliquis    Afib  - cont BB  - holding Eliquis    HTN  - cont home meds    Chronic systolic heart failure  EF 40-45% on ECHO 2020.  3/29  - appears euvolemic  - does not take Lasix at home  - monitor volume status  - cont Toprol, Losartan    Hypokalemia  3/29  - replace    CAD  - home meds    Trigeminal neuralgia  - cont home meds    Diet:  DIET CARDIAC  DVT PPx: SCDs given hemoptysis  Code status: Full Code  Disposition/Expected LOS: likely home in 1-2 days pending pulm recs      Review of Systems: 14 point review of systems is otherwise negative with the exception of the elements mentioned above.     Objective:     Patient Vitals for the past 24 hrs:   Temp Pulse Resp BP SpO2   21 1200  70      21 1047 97.5 °F (36.4 °C) 65 18 (!) 105/56 96 %   21 0800  64      21 0709 98 °F (36.7 °C) 65 18 138/77 98 %   21 0506 97.7 °F (36.5 °C) 64 18 135/70 96 %   21 2332 97.5 °F (36.4 °C) 64 18 (!) 113/57 94 %   21 1926 97.9 °F (36.6 °C) 74 18 (!) 111/56 92 %   21 1653  71      21 1526 98.5 °F (36.9 °C) 75 17 (!) 112/58 93 %   03/28/21 1444  79 17 114/68 93 %     Oxygen Therapy  O2 Sat (%): 96 % (03/29/21 1047)  Pulse via Oximetry: 75 beats per minute (03/28/21 1416)  O2 Device: Room air (03/28/21 0804)    Body mass index is 27.37 kg/m². Physical Exam:   General:  No acute distress, speaking in full sentences, no use of accessory muscles   Lungs:  Crackles R side, L CTA  CV:  Regular rate and rhythm with normal S1 and S2   Abdomen:  Soft, nontender, nondistended, normoactive bowel sounds   Extremities:  No cyanosis clubbing or edema   Neuro:  Nonfocal, A&O x3   Psych:  Normal affect     Data Review:  I have reviewed all labs, meds, and studies from the last 24 hours:    Labs:    Recent Results (from the past 24 hour(s))   METABOLIC PANEL, BASIC    Collection Time: 03/29/21  4:37 AM   Result Value Ref Range    Sodium 135 (L) 138 - 145 mmol/L    Potassium 3.4 (L) 3.5 - 5.1 mmol/L    Chloride 100 98 - 107 mmol/L    CO2 30 21 - 32 mmol/L    Anion gap 5 (L) 7 - 16 mmol/L    Glucose 96 65 - 100 mg/dL    BUN 22 8 - 23 MG/DL    Creatinine 0.94 0.8 - 1.5 MG/DL    GFR est AA >60 >60 ml/min/1.73m2    GFR est non-AA >60 >60 ml/min/1.73m2    Calcium 8.4 8.3 - 10.4 MG/DL   CBC WITH AUTOMATED DIFF    Collection Time: 03/29/21  4:37 AM   Result Value Ref Range    WBC 10.9 4.3 - 11.1 K/uL    RBC 3.68 (L) 4.23 - 5.6 M/uL    HGB 13.0 (L) 13.6 - 17.2 g/dL    HCT 37.7 (L) 41.1 - 50.3 %    .4 (H) 79.6 - 97.8 FL    MCH 35.3 (H) 26.1 - 32.9 PG    MCHC 34.5 31.4 - 35.0 g/dL    RDW 13.7 11.9 - 14.6 %    PLATELET 203 223 - 714 K/uL    MPV 8.8 (L) 9.4 - 12.3 FL    ABSOLUTE NRBC 0.00 0.0 - 0.2 K/uL    DF AUTOMATED      NEUTROPHILS 81 (H) 43 - 78 %    LYMPHOCYTES 9 (L) 13 - 44 %    MONOCYTES 8 4.0 - 12.0 %    EOSINOPHILS 1 0.5 - 7.8 %    BASOPHILS 0 0.0 - 2.0 %    IMMATURE GRANULOCYTES 1 0.0 - 5.0 %    ABS. NEUTROPHILS 8.9 (H) 1.7 - 8.2 K/UL    ABS. LYMPHOCYTES 1.0 0.5 - 4.6 K/UL    ABS. MONOCYTES 0.9 0.1 - 1.3 K/UL    ABS. EOSINOPHILS 0.1 0.0 - 0.8 K/UL    ABS. BASOPHILS 0.0 0.0 - 0.2 K/UL    ABS. IMM. GRANS. 0.1 0.0 - 0.5 K/UL       All Micro Results     Procedure Component Value Units Date/Time    CULTURE, BLOOD [[id number]] Collected: 03/28/21 0826    Order Status: Completed Specimen: Blood Updated: 03/29/21 1134     Special Requests: --        RIGHT  HAND       Culture result: NO GROWTH 1 DAY       CULTURE, RESPIRATORY/SPUTUM/BRONCH Presybeterian Chung STAIN [[id number]]     Order Status: Sent Specimen: Sputum           EKG Results     Procedure 720 Value Units Date/Time    EKG 12 LEAD INITIAL [[id number]] Collected: 03/28/21 0732    Order Status: Completed Updated: 03/28/21 1709     Ventricular Rate 92 BPM      Atrial Rate 92 BPM      P-R Interval 168 ms      QRS Duration 120 ms      Q-T Interval 362 ms      QTC Calculation (Bezet) 447 ms      Calculated P Axis 74 degrees      Calculated R Axis -38 degrees      Calculated T Axis 109 degrees      Diagnosis --     !! AGE AND GENDER SPECIFIC ECG ANALYSIS !! Normal sinus rhythm  Left axis deviation  Non-specific intra-ventricular conduction delay  ST & T wave abnormality, consider lateral ischemia  Abnormal ECG  When compared with ECG of 24-OCT-2019 18:15,  No significant change was found  Confirmed by Radha Duran MD (), FIDE YOU ([id number]) on 3/28/2021 5:09:12 PM            Other Studies:  No results found.     Current Meds:   Current Facility-Administered Medications   Medication Dose Route Frequency    aspirin delayed-release tablet 81 mg  81 mg Oral QHS    carBAMazepine (TEGretol) tablet 400 mg  400 mg Oral BID    gabapentin (NEURONTIN) capsule 400 mg  400 mg Oral TID    HYDROcodone-acetaminophen (NORCO)  mg tablet 1 Tab  1 Tab Oral Q8H PRN    metoprolol succinate (TOPROL-XL) XL tablet 100 mg  100 mg Oral DAILY    sodium chloride (NS) flush 5-40 mL  5-40 mL IntraVENous Q8H    sodium chloride (NS) flush 5-40 mL  5-40 mL IntraVENous PRN    acetaminophen (TYLENOL) tablet 650 mg  650 mg Oral Q6H PRN    Or    acetaminophen (TYLENOL) suppository 650 mg  650 mg Rectal Q6H PRN    polyethylene glycol (MIRALAX) packet 17 g  17 g Oral DAILY PRN    promethazine (PHENERGAN) tablet 12.5 mg  12.5 mg Oral Q6H PRN    Or    ondansetron (ZOFRAN) injection 4 mg  4 mg IntraVENous Q6H PRN    cefTRIAXone (ROCEPHIN) 1 g in 0.9% sodium chloride (MBP/ADV) 50 mL MBP  1 g IntraVENous Q24H    azithromycin (ZITHROMAX) tablet 500 mg  500 mg Oral DAILY    guaiFENesin ER (MUCINEX) tablet 1,200 mg  1,200 mg Oral Q12H    albuterol (PROVENTIL VENTOLIN) nebulizer solution 2.5 mg  2.5 mg Nebulization Q4H PRN    losartan (COZAAR) tablet 100 mg  100 mg Oral DAILY    And    hydroCHLOROthiazide (HYDRODIURIL) tablet 25 mg  25 mg Oral DAILY       Problem List:  Hospital Problems as of 3/29/2021 Date Reviewed: 8/20/2019          Codes Class Noted - Resolved POA    Community acquired pneumonia ICD-10-CM: J18.9  ICD-9-CM: 817  3/28/2021 - Present Yes        Leukocytosis ICD-10-CM: D72.829  ICD-9-CM: 288.60  3/28/2021 - Present Yes        Systolic CHF, chronic (HCC) ICD-10-CM: I50.22  ICD-9-CM: 428.22, 428.0  3/28/2021 - Present Yes        History of smoking 25-50 pack years ICD-10-CM: Z87.891  ICD-9-CM: V15.82  6/13/2019 - Present Yes        S/P CABG x 3 ICD-10-CM: Z95.1  ICD-9-CM: V45.81  6/10/2019 - Present Yes        * (Principal) Hemoptysis ICD-10-CM: R04.2  ICD-9-CM: 786.30  6/9/2019 - Present Yes        Trigeminal neuralgia (Chronic) ICD-10-CM: G50.0  ICD-9-CM: 350.1  6/9/2019 - Present Yes        Hypertension (Chronic) ICD-10-CM: I10  ICD-9-CM: 401.9  4/7/2017 - Present Yes        Coronary atherosclerosis of native coronary vessel ICD-10-CM: I25.10  ICD-9-CM: 414.01  4/7/2017 - Present Yes    Overview Addendum 7/19/2019  4:28 PM by Dalila Macario MD     2006:  PCI RCA - Liberte, PCI LAD Cypher, PCI LCx Cypher  01/2014:  Stress testing with fixed inferior defect - no ischemia  11/2014:  EF with EF 50-55% and inferior HK 07/2018:  EF 45-50%  01/2019:  EF 40-45%  05/2019:  CABG LIMA-LAD, SVG-OM, SVG-MARY ANNE                          Part of this note was written by using a voice dictation software and the note has been proof read but may still contain some grammatical/other typographical errors.     Signed By: Jared Christianson MD   [facility] Hospitalist Service    March 29, 2021  5:15 PM

## 2021-03-29 NOTE — PROGRESS NOTES
Met with patient in room - ppe + social distance maintained. Confirmed demographics. PCP- [facility] [place], [address]    Patient stated he is independent, lives alone, he drives and works. He stated he plans to go home @ d/c - no needs at this time. Patient's son, Joe Robbins lives nearby. CM will continue to follow for d/c planning. Care Management Interventions  PCP Verified by CM:  Yes  Mode of Transport at Discharge: Self  Transition of Care Consult (CM Consult): Discharge Planning  Discharge Durable Medical Equipment: No  Physical Therapy Consult: No  Occupational Therapy Consult: No  Speech Therapy Consult: No  Current Support Network: Own Home  Confirm Follow Up Transport: Self  The Patient and/or Patient Representative was Provided with a Choice of Provider and Agrees with the Discharge Plan?: Yes  Freedom of Choice List was Provided with Basic Dialogue that Supports the Patient's Individualized Plan of Care/Goals, Treatment Preferences and Shares the Quality Data Associated with the Providers?: Yes   Resource Information Provided?: No  Discharge Location  Discharge Placement: Home

## 2021-03-29 NOTE — PROGRESS NOTES
Problem: Falls - Risk of  Goal: *Absence of Falls  Description: Document Alicia Casillas Fall Risk and appropriate interventions in the flowsheet.   Outcome: Progressing Towards Goal  Note: Fall Risk Interventions:  Mobility Interventions: Communicate number of staff needed for ambulation/transfer, Patient to call before getting OOB         Medication Interventions: Evaluate medications/consider consulting pharmacy, Bed/chair exit alarm, Patient to call before getting OOB, Teach patient to arise slowly    Elimination Interventions: Call light in reach, Stay With Me (per policy), Patient to call for help with toileting needs    History of Falls Interventions: Evaluate medications/consider consulting pharmacy         Problem: Patient Education: Go to Patient Education Activity  Goal: Patient/Family Education  Outcome: Progressing Towards Goal

## 2021-03-30 LAB
BRONCH. LAVAGE DIFF.,BR: NORMAL
EOSINOPHIL NFR BRONCH MANUAL: 0 %
LYMPHOCYTES NFR BRONCH MANUAL: 3 %
MACROPHAGES NFR BRONCH MANUAL: 12 %
NEUTROPHILS NFR BRONCH MANUAL: 85 %

## 2021-03-30 PROCEDURE — 77030012699 HC VLV SUC CNTRL OCOA -A: Performed by: INTERNAL MEDICINE

## 2021-03-30 PROCEDURE — 74011000250 HC RX REV CODE- 250: Performed by: INTERNAL MEDICINE

## 2021-03-30 PROCEDURE — 99232 SBSQ HOSP IP/OBS MODERATE 35: CPT | Performed by: INTERNAL MEDICINE

## 2021-03-30 PROCEDURE — 2709999900 HC NON-CHARGEABLE SUPPLY: Performed by: INTERNAL MEDICINE

## 2021-03-30 PROCEDURE — 94760 N-INVAS EAR/PLS OXIMETRY 1: CPT

## 2021-03-30 PROCEDURE — 87070 CULTURE OTHR SPECIMN AEROBIC: CPT

## 2021-03-30 PROCEDURE — 74011000258 HC RX REV CODE- 258: Performed by: INTERNAL MEDICINE

## 2021-03-30 PROCEDURE — 76040000025: Performed by: INTERNAL MEDICINE

## 2021-03-30 PROCEDURE — 74011250636 HC RX REV CODE- 250/636: Performed by: INTERNAL MEDICINE

## 2021-03-30 PROCEDURE — 88112 CYTOPATH CELL ENHANCE TECH: CPT

## 2021-03-30 PROCEDURE — 31624 DX BRONCHOSCOPE/LAVAGE: CPT | Performed by: INTERNAL MEDICINE

## 2021-03-30 PROCEDURE — 65660000000 HC RM CCU STEPDOWN

## 2021-03-30 PROCEDURE — 87205 SMEAR GRAM STAIN: CPT

## 2021-03-30 PROCEDURE — 74011250637 HC RX REV CODE- 250/637: Performed by: INTERNAL MEDICINE

## 2021-03-30 PROCEDURE — 99152 MOD SED SAME PHYS/QHP 5/>YRS: CPT | Performed by: INTERNAL MEDICINE

## 2021-03-30 PROCEDURE — 89051 BODY FLUID CELL COUNT: CPT

## 2021-03-30 RX ORDER — LIDOCAINE HYDROCHLORIDE 20 MG/ML
JELLY TOPICAL ONCE
Status: COMPLETED | OUTPATIENT
Start: 2021-03-30 | End: 2021-03-30

## 2021-03-30 RX ORDER — FENTANYL CITRATE 50 UG/ML
25-100 INJECTION, SOLUTION INTRAMUSCULAR; INTRAVENOUS
Status: DISCONTINUED | OUTPATIENT
Start: 2021-03-30 | End: 2021-03-30

## 2021-03-30 RX ORDER — SODIUM CHLORIDE 9 MG/ML
1000 INJECTION, SOLUTION INTRAVENOUS CONTINUOUS
Status: DISCONTINUED | OUTPATIENT
Start: 2021-03-30 | End: 2021-03-30

## 2021-03-30 RX ORDER — NALOXONE HYDROCHLORIDE 0.4 MG/ML
0.4 INJECTION, SOLUTION INTRAMUSCULAR; INTRAVENOUS; SUBCUTANEOUS
Status: DISCONTINUED | OUTPATIENT
Start: 2021-03-30 | End: 2021-03-30

## 2021-03-30 RX ORDER — LIDOCAINE HYDROCHLORIDE 40 MG/ML
SOLUTION TOPICAL ONCE
Status: COMPLETED | OUTPATIENT
Start: 2021-03-30 | End: 2021-03-30

## 2021-03-30 RX ORDER — MIDAZOLAM HYDROCHLORIDE 1 MG/ML
.25-5 INJECTION, SOLUTION INTRAMUSCULAR; INTRAVENOUS
Status: DISCONTINUED | OUTPATIENT
Start: 2021-03-30 | End: 2021-03-30

## 2021-03-30 RX ORDER — FLUMAZENIL 0.1 MG/ML
0.2 INJECTION INTRAVENOUS
Status: DISCONTINUED | OUTPATIENT
Start: 2021-03-30 | End: 2021-03-30

## 2021-03-30 RX ADMIN — FENTANYL CITRATE 50 MCG: 50 INJECTION, SOLUTION INTRAMUSCULAR; INTRAVENOUS at 09:03

## 2021-03-30 RX ADMIN — GABAPENTIN 400 MG: 400 CAPSULE ORAL at 16:05

## 2021-03-30 RX ADMIN — HYDROCHLOROTHIAZIDE 25 MG: 25 TABLET ORAL at 08:08

## 2021-03-30 RX ADMIN — Medication 10 ML: at 05:01

## 2021-03-30 RX ADMIN — GABAPENTIN 400 MG: 400 CAPSULE ORAL at 21:03

## 2021-03-30 RX ADMIN — FENTANYL CITRATE 50 MCG: 50 INJECTION, SOLUTION INTRAMUSCULAR; INTRAVENOUS at 09:09

## 2021-03-30 RX ADMIN — LIDOCAINE HYDROCHLORIDE: 20 JELLY TOPICAL at 08:55

## 2021-03-30 RX ADMIN — CEFTRIAXONE SODIUM 1 G: 1 INJECTION, POWDER, FOR SOLUTION INTRAMUSCULAR; INTRAVENOUS at 10:21

## 2021-03-30 RX ADMIN — FENTANYL CITRATE 50 MCG: 50 INJECTION, SOLUTION INTRAMUSCULAR; INTRAVENOUS at 09:07

## 2021-03-30 RX ADMIN — AZITHROMYCIN MONOHYDRATE 500 MG: 250 TABLET ORAL at 10:21

## 2021-03-30 RX ADMIN — GUAIFENESIN 1200 MG: 600 TABLET ORAL at 21:03

## 2021-03-30 RX ADMIN — LOSARTAN POTASSIUM 100 MG: 50 TABLET, FILM COATED ORAL at 08:08

## 2021-03-30 RX ADMIN — LIDOCAINE HYDROCHLORIDE: 40 SOLUTION TOPICAL at 08:55

## 2021-03-30 RX ADMIN — Medication 5 ML: at 14:00

## 2021-03-30 RX ADMIN — CARBAMAZEPINE 400 MG: 200 TABLET ORAL at 16:05

## 2021-03-30 RX ADMIN — SODIUM CHLORIDE 1000 ML: 900 INJECTION, SOLUTION INTRAVENOUS at 08:29

## 2021-03-30 RX ADMIN — ASPIRIN 81 MG: 81 TABLET ORAL at 21:03

## 2021-03-30 RX ADMIN — METOPROLOL SUCCINATE 100 MG: 100 TABLET, EXTENDED RELEASE ORAL at 08:08

## 2021-03-30 RX ADMIN — MIDAZOLAM 2 MG: 1 INJECTION INTRAMUSCULAR; INTRAVENOUS at 09:03

## 2021-03-30 NOTE — PROGRESS NOTES
[facility] Progress Note     Name:  Nu Rader  Age:76 y.o. Sex:male   :  1945    MRN:  [id number]     Admit Date:  3/28/2021    Reason for Admission:  Hemoptysis [R04.2]  Community acquired pneumonia [J18.9]    Hospital Course/Interval history:     63 y/o WM with a h/o AFib on Eliquis, chronic sCHF (last LVEF 40-45% May 2020), HTN, HLD, CAD s/p CABG, prior tobacco abuse and trigeminal neuralgia who presents today with acute onset hemoptysis, SOB. Had COVID . In ED, WBC 14.5 with neutrophilia, Hb 14.8. CXR with RML infiltrate. He was started on ceftriaxone and azithromycin, Eliquis held. 3/29 - pt reports feeling good today, no further hemoptysis. CT showing possible pulm hemorrhage. 3/30 Pt was seen after Bronchoscopy this morning. No fever. No chills. No chest pain. No shortness of breath. Still has some cough. Assessment & Plan     Hemoptysis  ? PNA  3/29  - CT chest showing possible pulm hemorrhage  - cont Rocephin, Azithro  - pulm consulted  - holding Eliquis  3/30 Pt had Bronchoscopy this morning with suspected bleeding vessel around right upper lobe bronchus due to coughing trauma and Eliquis. Recommendations to continue Rocephin, azithromycin. Plan to hold Eliquis for approximately 5 days and then resume and observe for recurrence of hemoptysis. Afib  - cont BB  - holding Eliquis    HTN  - cont home meds    Chronic systolic heart failure  EF 40-45% on ECHO 2020.  3/30  - appears euvolemic  - does not take Lasix at home  - monitor volume status  - continue Toprol, Losartan    Hypokalemia  Repleted.  Repeat BMP in am.     CAD  - home meds    Trigeminal neuralgia  - cont home meds    Diet:  DIET CARDIAC  DVT PPx: SCDs given hemoptysis  Code status: Full Code  Disposition/Expected LOS: likely home in 1-2 days pending pulm recs      Review of Systems: 14 point review of systems is otherwise negative with the exception of the elements mentioned above.    Objective:     Patient Vitals for the past 24 hrs:   Temp Pulse Resp BP SpO2   03/30/21 1034 97.6 °F (36.4 °C) 71 17 135/78 97 %   03/30/21 0944  70 14 (!) 153/75 99 %   03/30/21 0935  73 14 (!) 166/78 99 %   03/30/21 0925  74 16 (!) 186/87 99 %   03/30/21 0917  78 18 (!) 163/98 100 %   03/30/21 0912  78 18 (!) 164/84 94 %   03/30/21 0907  77 18 (!) 166/85 95 %   03/30/21 0842  69 18 (!) 174/92 100 %   03/30/21 0825 97.9 °F (36.6 °C) 75 18 (!) 185/91 95 %   03/30/21 0756 97.9 °F (36.6 °C) 69 17 (!) 153/86 99 %   03/30/21 0538 97.6 °F (36.4 °C) 72 18 (!) 160/86 98 %   03/30/21 0000 98.3 °F (36.8 °C) 68 18 111/67 97 %   03/29/21 2000 97.8 °F (36.6 °C) 71 19 (!) 153/84 97 %   03/29/21 1532 98.4 °F (36.9 °C) 69 18 138/86 96 %   03/29/21 1447     97 %     Oxygen Therapy  O2 Sat (%): 97 % (03/30/21 1034)  Pulse via Oximetry: 71 beats per minute (03/30/21 0944)  O2 Device: Nasal cannula (03/30/21 0944)  O2 Flow Rate (L/min): 3 l/min (03/30/21 0917)    Body mass index is 28.05 kg/m². Physical Exam:   General:  No acute distress, speaking in full sentences, no use of accessory muscles   Lungs:  Crackles R side, clear on left side, no wheezing, no crackles,   CV:  Regular rate and rhythm with normal S1 and S2, no murmurs,   Abdomen:  Soft, nontender, nondistended, normoactive bowel sounds   Extremities:  No cyanosis clubbing or edema   Neuro:  Nonfocal, A&O x3   Psych:  Normal affect     Data Review:  I have reviewed all labs, meds, and studies from the last 24 hours:    Labs:    No results found for this or any previous visit (from the past 24 hour(s)).     All Micro Results     Procedure Component Value Units Date/Time    CULTURE, RESPIRATORY/SPUTUM/BRONCH Brandon Advent [[id number]]     Order Status: Sent Specimen: Sputum from Bronchial lavage     CULTURE, BLOOD [[id number]] Collected: 03/28/21 0826    Order Status: Completed Specimen: Blood Updated: 03/30/21 0948     Special Requests: -- RIGHT  HAND       Culture result: NO GROWTH 2 DAYS       CULTURE, RESPIRATORY/SPUTUM/BRONCH Juana Pate [[id number]] Collected: 03/30/21 0452    Order Status: Completed Specimen: Sputum Updated: 03/30/21 0537          EKG Results     Procedure 720 Value Units Date/Time    EKG 12 LEAD INITIAL [[id number]] Collected: 03/28/21 0732    Order Status: Completed Updated: 03/28/21 1709     Ventricular Rate 92 BPM      Atrial Rate 92 BPM      P-R Interval 168 ms      QRS Duration 120 ms      Q-T Interval 362 ms      QTC Calculation (Bezet) 447 ms      Calculated P Axis 74 degrees      Calculated R Axis -38 degrees      Calculated T Axis 109 degrees      Diagnosis --     !! AGE AND GENDER SPECIFIC ECG ANALYSIS !! Normal sinus rhythm  Left axis deviation  Non-specific intra-ventricular conduction delay  ST & T wave abnormality, consider lateral ischemia  Abnormal ECG  When compared with ECG of 24-OCT-2019 18:15,  No significant change was found  Confirmed by Bao Forrest MD (), FIDE YOU ([id number]) on 3/28/2021 5:09:12 PM            Other Studies:  No results found.     Current Meds:   Current Facility-Administered Medications   Medication Dose Route Frequency    aspirin delayed-release tablet 81 mg  81 mg Oral QHS    carBAMazepine (TEGretol) tablet 400 mg  400 mg Oral BID    gabapentin (NEURONTIN) capsule 400 mg  400 mg Oral TID    HYDROcodone-acetaminophen (NORCO)  mg tablet 1 Tab  1 Tab Oral Q8H PRN    metoprolol succinate (TOPROL-XL) XL tablet 100 mg  100 mg Oral DAILY    sodium chloride (NS) flush 5-40 mL  5-40 mL IntraVENous Q8H    sodium chloride (NS) flush 5-40 mL  5-40 mL IntraVENous PRN    acetaminophen (TYLENOL) tablet 650 mg  650 mg Oral Q6H PRN    Or    acetaminophen (TYLENOL) suppository 650 mg  650 mg Rectal Q6H PRN    polyethylene glycol (MIRALAX) packet 17 g  17 g Oral DAILY PRN    promethazine (PHENERGAN) tablet 12.5 mg  12.5 mg Oral Q6H PRN    Or    ondansetron (ZOFRAN) injection 4 mg  4 mg IntraVENous Q6H PRN    cefTRIAXone (ROCEPHIN) 1 g in 0.9% sodium chloride (MBP/ADV) 50 mL MBP  1 g IntraVENous Q24H    azithromycin (ZITHROMAX) tablet 500 mg  500 mg Oral DAILY    guaiFENesin ER (MUCINEX) tablet 1,200 mg  1,200 mg Oral Q12H    albuterol (PROVENTIL VENTOLIN) nebulizer solution 2.5 mg  2.5 mg Nebulization Q4H PRN    losartan (COZAAR) tablet 100 mg  100 mg Oral DAILY    And    hydroCHLOROthiazide (HYDRODIURIL) tablet 25 mg  25 mg Oral DAILY       Problem List:  Hospital Problems as of 3/30/2021 Date Reviewed: 8/20/2019          Codes Class Noted - Resolved POA    Community acquired pneumonia ICD-10-CM: J18.9  ICD-9-CM: 307  3/28/2021 - Present Yes        Leukocytosis ICD-10-CM: D72.829  ICD-9-CM: 288.60  3/28/2021 - Present Yes        Systolic CHF, chronic (HCC) ICD-10-CM: I50.22  ICD-9-CM: 428.22, 428.0  3/28/2021 - Present Yes        History of smoking 25-50 pack years ICD-10-CM: Z87.891  ICD-9-CM: V15.82  6/13/2019 - Present Yes        S/P CABG x 3 ICD-10-CM: Z95.1  ICD-9-CM: V45.81  6/10/2019 - Present Yes        * (Principal) Hemoptysis ICD-10-CM: R04.2  ICD-9-CM: 786.30  6/9/2019 - Present Yes        Trigeminal neuralgia (Chronic) ICD-10-CM: G50.0  ICD-9-CM: 350.1  6/9/2019 - Present Yes        Hypertension (Chronic) ICD-10-CM: I10  ICD-9-CM: 401.9  4/7/2017 - Present Yes        Coronary atherosclerosis of native coronary vessel ICD-10-CM: I25.10  ICD-9-CM: 414.01  4/7/2017 - Present Yes    Overview Addendum 7/19/2019  4:28 PM by Fortino Restrepo MD     2006:  PCI RCA - Liberte, PCI LAD Cypher, PCI LCx Cypher  01/2014:  Stress testing with fixed inferior defect - no ischemia  11/2014:  EF with EF 50-55% and inferior HK   07/2018:  EF 45-50%  01/2019:  EF 40-45%  05/2019:  CABG LIMA-LAD, SVG-OM, SVG-MARY ANNE                          Part of this note was written by using a voice dictation software and the note has been proof read but may still contain some grammatical/other typographical errors.     Signed By: Fanny Hannon MD   [facility] - [facility] Hospitalist Service    March 30, 2021  5:15 PM

## 2021-03-30 NOTE — PROGRESS NOTES
PULMONARY/CCM PROGRESS NOTE :  3/30/2021    Date of Admission:  3/28/2021    The patient's chart has been reviewed and the chart has been discussed with nursing staff. This patient has been seen and evaluated at the request of Dr. Ramón Wilson for hemoptysis. Patient is a 64 y.o.  male presents with hemoptysis. He has underlying CAD with hx CABG, former tobacco abuse- smoked 1.5 ppd x 14 years before quitting 36 years ago. David Pulido has never been dx with lung disease, does not require inhalers or home O2, H/O A fib on eliquis and HTN. He was seen by us in June 2019 and apparently had some hemoptysis at that time. After stopping his ASA, it would usually resolve. He underwent a diagnostic bronchoscopy with no bleeding or lesions identified at that time. He was noted to have dynamic airway collapse. He was admitted to the floor and eliquis was held. Since then, he was started on rocephin and zithromax for CAP. His hemoglobin is 13.0 and hemoptysis has improved since admission. Chest CT was done showing posterior RUL consolidation. He has been afebrile. He is on RA with oxygen saturation of 96%. WBC on admission was 14,500. We were asked to see him for hemoptysis. He reports coughing up bright red blood since yesterday which has continued but is a lesser amount. He denies having chills, weight loss or fever. He does has chest wall pain    Subjective:   Seen prior to bronchoscopy still coughing up BRB albeit less than yesterday    Past Surgical History:   Procedure Laterality Date    COLONOSCOPY N/A 3/2/2020    [facility]  [facility]/2019    3 vessel CABG with LIMA-LAD,SVG-OM,and SVG-R posterior lateral branch with MAZE & LA appendage clipping.     HX CORONARY STENT PLACEMENT      HX HEART CATHETERIZATION  08/14/2006    HX HEART CATHETERIZATION  06/07/2019    Severe 3 vessel CAD with LV EF=40-45% and unsuccessful LCX PCI.  HX PROSTATE SURGERY        Social History     Tobacco Use    Smoking status: Former Smoker     Packs/day: 2.00     Years: 14.00     Pack years: 28.00     Types: Cigarettes    Smokeless tobacco: Never Used    Tobacco comment: quit at age 27   Substance Use Topics    Alcohol use: No      Family History   Problem Relation Age of Onset    Other Other         cerebral aneurysm     Heart Disease Mother     Heart Disease Sister       Allergies   Allergen Reactions    Iodinated Contrast Media Unknown (comments)      Prior to Admission Medications   Prescriptions Last Dose Informant Patient Reported? Taking? HYDROcodone-acetaminophen (NORCO)  mg tablet 3/27/2021 at Unknown time  Yes Yes   Sig: Take 1 Tab by mouth every eight (8) hours as needed. apixaban (ELIQUIS) 5 mg tablet 3/27/2021 at Unknown time  No Yes   Sig: Take 1 Tab by mouth two (2) times a day. aspirin delayed-release 81 mg tablet 3/27/2021 at Unknown time  Yes Yes   Sig: Take 1 Tab by mouth nightly. carBAMazepine (TEGRETOL) 200 mg tablet 3/27/2021 at Unknown time  Yes Yes   Sig: Take 400 mg by mouth two (2) times a day. docusate sodium (COLACE) 100 mg capsule 3/27/2021 at Unknown time  No Yes   Sig: Take 1 Cap by mouth two (2) times daily as needed for Constipation. furosemide (LASIX) 40 mg tablet 2/28/2021 at Unknown time  No Yes   Sig: Take 1 Tab by mouth daily. Patient taking differently: Take 20 mg by mouth daily. gabapentin (NEURONTIN) 400 mg capsule 3/27/2021 at Unknown time  No Yes   Sig: Take 2 tablets po [id number]   Patient taking differently: 400 mg three (3) times daily. 1 tab in am, 2 tabs after lunch, and 1 tab at night   losartan-hydroCHLOROthiazide (HYZAAR) 100-25 mg per tablet 3/27/2021 at Unknown time  No Yes   Sig: Take 1 Tab by mouth nightly. melatonin 3 mg tablet   Yes No   Sig: Take 5 mg by mouth nightly as needed.    metoprolol succinate (TOPROL-XL) 100 mg tablet 3/27/2021 at Unknown time  No Yes Sig: Take 1 Tab by mouth daily. nitroglycerin (NITROSTAT) 0.4 mg SL tablet   No No   Sig: TAKE AS DIRECTED.   omeprazole (PRILOSEC) 20 mg capsule 3/27/2021 at Unknown time  No Yes   Sig: Take 1 Cap by mouth daily. potassium chloride SR (K-TAB) 20 mEq tablet 3/27/2021 at Unknown time  No Yes   Sig: Take 1 Tab by mouth two (2) times a day. rosuvastatin (CRESTOR) 40 mg tablet Not Taking at Unknown time  No No   Sig: Take 1 Tab by mouth nightly. sildenafil citrate (Viagra) 50 mg tablet Unknown at Unknown time  No No   Sig: Take 1 Tab by mouth as needed for Erectile Dysfunction.       Facility-Administered Medications: None       MEDS SCHEDULED:    Current Facility-Administered Medications   Medication Dose Route Frequency    midazolam (VERSED) injection 0.25-5 mg  0.25-5 mg IntraVENous Multiple    fentaNYL citrate (PF) injection  mcg   mcg IntraVENous Multiple    naloxone (NARCAN) injection 0.4 mg  0.4 mg IntraVENous Multiple    flumazeniL (ROMAZICON) 0.1 mg/mL injection 0.2 mg  0.2 mg IntraVENous Multiple    0.9% sodium chloride infusion 1,000 mL  1,000 mL IntraVENous CONTINUOUS    promethazine (PHENERGAN) with saline injection 12.5 mg  12.5 mg IntraVENous ENDOSCOPY PRN    aspirin delayed-release tablet 81 mg  81 mg Oral QHS    carBAMazepine (TEGretol) tablet 400 mg  400 mg Oral BID    gabapentin (NEURONTIN) capsule 400 mg  400 mg Oral TID    HYDROcodone-acetaminophen (NORCO)  mg tablet 1 Tab  1 Tab Oral Q8H PRN    metoprolol succinate (TOPROL-XL) XL tablet 100 mg  100 mg Oral DAILY    sodium chloride (NS) flush 5-40 mL  5-40 mL IntraVENous Q8H    sodium chloride (NS) flush 5-40 mL  5-40 mL IntraVENous PRN    acetaminophen (TYLENOL) tablet 650 mg  650 mg Oral Q6H PRN    Or    acetaminophen (TYLENOL) suppository 650 mg  650 mg Rectal Q6H PRN    polyethylene glycol (MIRALAX) packet 17 g  17 g Oral DAILY PRN    promethazine (PHENERGAN) tablet 12.5 mg  12.5 mg Oral Q6H PRN    Or  ondansetron (ZOFRAN) injection 4 mg  4 mg IntraVENous Q6H PRN    cefTRIAXone (ROCEPHIN) 1 g in 0.9% sodium chloride (MBP/ADV) 50 mL MBP  1 g IntraVENous Q24H    azithromycin (ZITHROMAX) tablet 500 mg  500 mg Oral DAILY    guaiFENesin ER (MUCINEX) tablet 1,200 mg  1,200 mg Oral Q12H    albuterol (PROVENTIL VENTOLIN) nebulizer solution 2.5 mg  2.5 mg Nebulization Q4H PRN    losartan (COZAAR) tablet 100 mg  100 mg Oral DAILY    And    hydroCHLOROthiazide (HYDRODIURIL) tablet 25 mg  25 mg Oral DAILY         Review of Systems  Pertinent items are noted in HPI. Objective:     Vitals:    03/30/21 0538 03/30/21 0756 03/30/21 0825 03/30/21 0842   BP: (!) 160/86 (!) 153/86 (!) 185/91 (!) 174/92   Pulse: 72 69 75 69   Resp: 18 17 18 18   Temp: 97.6 °F (36.4 °C) 97.9 °F (36.6 °C) 97.9 °F (36.6 °C)    SpO2: 98% 99% 95% 100%   Weight:       Height:         No intake/output data recorded. 03/28 1901 - 03/30 0700  In: -   Out: 1025 [Urine:1025]      PHYSICAL EXAM     Physical Exam:   General:  Alert, cooperative, no acute distress, appears stated age. Eyes:  Conjunctivae/corneas clear. Nose: Nares patent and moist.    Mouth/Throat: Lips, mucosa, and tongue pink and intact. Neck: Supple, symmetrical.   Respiratory:   CTA to auscultation bilaterally on RA   Cardiovascular:  Regular rate and rhythm, S1, S2, no murmur, click, rub or gallop. GI:   Abdomen soft, non-tender. Bowel sounds active X 4 Q. Musculoskeletal: Extremities symmetrical, atraumatic, no cyanosis, no edema. SCDs   Skin: Skin color, texture, turgor normal. No rashes or lesions       Neurologic: Alert and oriented.        Activity:ambulates   Nutrition: Cardiac    CULTURES: sputum ordered    LABS    Recent Labs     03/29/21  0437 03/28/21  0727   WBC 10.9 14.5*   HGB 13.0* 14.8   HCT 37.7* 42.9    197     Recent Labs     03/29/21  0437 03/28/21  0727   * 136   K 3.4* 3.6    101   GLU 96 103*   CO2 30 29   BUN 22 11   CREA 0. 94 0.84   MG  --  1.8         Assessment:     Hospital Problems  Date Reviewed: 8/20/2019          Codes Class Noted POA    Community acquired pneumonia ICD-10-CM: J18.9  ICD-9-CM: 486  3/28/2021 Yes        Leukocytosis ICD-10-CM: D72.829  ICD-9-CM: 288.60  3/28/2021 Yes        Systolic CHF, chronic (HCC) ICD-10-CM: I50.22  ICD-9-CM: 428.22, 428.0  3/28/2021 Yes        History of smoking 25-50 pack years ICD-10-CM: Z87.891  ICD-9-CM: V15.82  6/13/2019 Yes        S/P CABG x 3 ICD-10-CM: Z95.1  ICD-9-CM: V45.81  6/10/2019 Yes        * (Principal) Hemoptysis ICD-10-CM: R04.2  ICD-9-CM: 786.30  6/9/2019 Yes        Trigeminal neuralgia (Chronic) ICD-10-CM: G50.0  ICD-9-CM: 350.1  6/9/2019 Yes        Hypertension (Chronic) ICD-10-CM: I10  ICD-9-CM: 401.9  4/7/2017 Yes        Coronary atherosclerosis of native coronary vessel ICD-10-CM: I25.10  ICD-9-CM: 414.01  4/7/2017 Yes    Overview Addendum 7/19/2019  4:28 PM by Abelino Harley MD     2006:  PCI RCA - Liberte, PCI LAD Cypher, PCI LCx Cypher  01/2014:  Stress testing with fixed inferior defect - no ischemia  11/2014:  EF with EF 50-55% and inferior HK   07/2018:  EF 45-50%  01/2019:  EF 40-45%  05/2019:  CABG LIMA-LAD, SVG-OM, SVG-MARY ANNE                     Plan:     Here with hemoptysis and CAP. Currently, eliquis is on hold. He continues to cough up blood within the last hour (bright red). Likely due to PNA with eliquis Rx  Proceed with diagnostic bronchocopy as planned. See procedure note for findings and recommendations    Jessa Virk MD        More than 50% of time documented was spent in face-to-face contact with the patient and in the care of the patient on the floor/unit where the patient is located.

## 2021-03-30 NOTE — INTERVAL H&P NOTE
Update History & Physical 
 
The Patient's History and Physical of March 30, Procedure was reviewed with the patient and I examined the patient. There was no change. The surgical site was confirmed by the patient and me. Plan:  The risk, benefits, expected outcome, and alternative to the recommended procedure have been discussed with the patient. Patient understands and wants to proceed with the procedure.  
 
Electronically signed by Darrion Gross MD on 3/30/2021 at 9:01 AM

## 2021-03-30 NOTE — ROUTINE PROCESS
TRANSFER - OUT REPORT: 
 
Verbal report given to Fany LILLY(name) on Loli Leung  being transferred to 609(unit) for routine post - op Report consisted of patients Situation, Background, Assessment and  
Recommendations(SBAR). Information from the following report(s) Procedure Summary was reviewed with the receiving nurse. Lines:  
Peripheral IV 03/28/21 Right Antecubital (Active) Site Assessment Clean, dry, & intact 03/30/21 4182 Phlebitis Assessment 0 03/30/21 0819 Infiltration Assessment 0 03/30/21 0819 Dressing Status Intact 03/30/21 0819 Dressing Type Transparent 03/30/21 0819 Hub Color/Line Status Flushed 03/30/21 1822 Alcohol Cap Used No 03/30/21 0819 Peripheral IV 03/28/21 Right Hand (Active) Site Assessment Clean, dry, & intact 03/30/21 0252 Phlebitis Assessment 0 03/30/21 0252 Infiltration Assessment 0 03/30/21 0252 Dressing Status Clean, dry, & intact 03/30/21 0252 Dressing Type Transparent;Tape 03/30/21 0252 Hub Color/Line Status Flushed 03/30/21 0252 Alcohol Cap Used No 03/29/21 0900 Opportunity for questions and clarification was provided. Restart Eliquis in 5 days. Treat for pneumonia Patient transported with: 
 Cooliris

## 2021-03-30 NOTE — PROCEDURES
PROCEDURE    Bronchoscopy with airway inspection and BAL    INDICATION   Hemoptysis    EQUIPMENT:  Olympus H 190 Bronchhoscope. ANESTHESIA    Concious sedation with: Fentanyl 150 mcg IV; Versed 2mg IV; Lidocaine 180 mg to tracheo-bronchial tree and vocal cords    AIRWAY INSPECTION    After obtaining informed consent, using a bite block, an Olympus H 190 video bronchoscope was  introduced into the trachea through the vocal chords, without complication. RIGHT    LOCATION NORM/ABNORM DESCRIPTION   VOCAL CORDS NL    TRACHEA NL    KATHARINA NL    RMSB NL Very tortuous and dilated submucosal blood vessels(see images), mucosa was frail and bled easily with scope trauma, bleeding stopped spontaneously after 2 min with minimal blood loss, BAL lavageate was progressively less bloody with each subsequent aliquot suggesting a central airway blood source rather than pulmonary hemorrhage. RUL NL    BI NL    RML NL    SUP SEGM RLL NL    MED BASAL NL    ANTERIOR BASAL NL    LATERAL BASAL NL    POSTERIOR BASAL NL                                              LEFT    LOCATION NORMAL/ABNORMAL TYPE   LMSB NL    ANGEL NL    LINGULA NL    SUPERIOR DIVISION NL    SUPERIOR SEG LLL NL    SUYAPA-MEDIAL LLL NL    LATERAL LLL NL    POSTERIOR LLL NL      The following samples were obtained:    BAL: posterior segm RUL  Bronchial Wash:    Samples were sent for:  Cell ct with diff  Routine culture  Cytology    The procedure was completed  without complication and the patient tolerated the procedure well. EBL: minimal    Recommendations:  Suspect bleeding vessel around RUL bronchus due to coughing trauma and eliquis Rx, there was no active bleeding or blood clots seen during the procedure with the exception of scope trauma area described above. Continue Rx for PNA with zithromax and rocephin, hold eliquis till PNA better ~ 5 days then resume and observe for hemoptysis recurrence.     Steff Kyle MD

## 2021-03-30 NOTE — PROGRESS NOTES
Hourly rounds done. Pt denies pain, nausea, vomiting. Sputum sample obtained; bloody/brown, thick. NPO @ MN. All needs met at this time.

## 2021-03-31 LAB
ANION GAP SERPL CALC-SCNC: 6 MMOL/L (ref 7–16)
BASOPHILS # BLD: 0 K/UL (ref 0–0.2)
BASOPHILS NFR BLD: 0 % (ref 0–2)
BUN SERPL-MCNC: 13 MG/DL (ref 8–23)
CALCIUM SERPL-MCNC: 8.5 MG/DL (ref 8.3–10.4)
CHLORIDE SERPL-SCNC: 96 MMOL/L (ref 98–107)
CO2 SERPL-SCNC: 29 MMOL/L (ref 21–32)
CREAT SERPL-MCNC: 0.73 MG/DL (ref 0.8–1.5)
DIFFERENTIAL METHOD BLD: ABNORMAL
EOSINOPHIL # BLD: 0.1 K/UL (ref 0–0.8)
EOSINOPHIL NFR BLD: 1 % (ref 0.5–7.8)
ERYTHROCYTE [DISTWIDTH] IN BLOOD BY AUTOMATED COUNT: 13.1 % (ref 11.9–14.6)
GLUCOSE SERPL-MCNC: 84 MG/DL (ref 65–100)
HCT VFR BLD AUTO: 38.1 % (ref 41.1–50.3)
HGB BLD-MCNC: 13.3 G/DL (ref 13.6–17.2)
IMM GRANULOCYTES # BLD AUTO: 0.1 K/UL (ref 0–0.5)
IMM GRANULOCYTES NFR BLD AUTO: 1 % (ref 0–5)
LYMPHOCYTES # BLD: 0.9 K/UL (ref 0.5–4.6)
LYMPHOCYTES NFR BLD: 12 % (ref 13–44)
MCH RBC QN AUTO: 35.5 PG (ref 26.1–32.9)
MCHC RBC AUTO-ENTMCNC: 34.9 G/DL (ref 31.4–35)
MCV RBC AUTO: 101.6 FL (ref 79.6–97.8)
MONOCYTES # BLD: 0.7 K/UL (ref 0.1–1.3)
MONOCYTES NFR BLD: 9 % (ref 4–12)
NEUTS SEG # BLD: 6 K/UL (ref 1.7–8.2)
NEUTS SEG NFR BLD: 77 % (ref 43–78)
NRBC # BLD: 0 K/UL (ref 0–0.2)
PLATELET # BLD AUTO: 181 K/UL (ref 150–450)
PMV BLD AUTO: 9 FL (ref 9.4–12.3)
POTASSIUM SERPL-SCNC: 3.3 MMOL/L (ref 3.5–5.1)
RBC # BLD AUTO: 3.75 M/UL (ref 4.23–5.6)
SODIUM SERPL-SCNC: 131 MMOL/L (ref 136–145)
WBC # BLD AUTO: 7.8 K/UL (ref 4.3–11.1)

## 2021-03-31 PROCEDURE — 74011000258 HC RX REV CODE- 258: Performed by: INTERNAL MEDICINE

## 2021-03-31 PROCEDURE — 99232 SBSQ HOSP IP/OBS MODERATE 35: CPT | Performed by: INTERNAL MEDICINE

## 2021-03-31 PROCEDURE — 85025 COMPLETE CBC W/AUTO DIFF WBC: CPT

## 2021-03-31 PROCEDURE — 65270000029 HC RM PRIVATE

## 2021-03-31 PROCEDURE — 74011250637 HC RX REV CODE- 250/637: Performed by: INTERNAL MEDICINE

## 2021-03-31 PROCEDURE — 74011250637 HC RX REV CODE- 250/637: Performed by: HOSPITALIST

## 2021-03-31 PROCEDURE — 0B9C8ZX DRAINAGE OF RIGHT UPPER LUNG LOBE, VIA NATURAL OR ARTIFICIAL OPENING ENDOSCOPIC, DIAGNOSTIC: ICD-10-PCS | Performed by: INTERNAL MEDICINE

## 2021-03-31 PROCEDURE — 2709999900 HC NON-CHARGEABLE SUPPLY

## 2021-03-31 PROCEDURE — 36415 COLL VENOUS BLD VENIPUNCTURE: CPT

## 2021-03-31 PROCEDURE — 80048 BASIC METABOLIC PNL TOTAL CA: CPT

## 2021-03-31 PROCEDURE — 74011250636 HC RX REV CODE- 250/636: Performed by: INTERNAL MEDICINE

## 2021-03-31 RX ORDER — POTASSIUM CHLORIDE 20 MEQ/1
40 TABLET, EXTENDED RELEASE ORAL
Status: COMPLETED | OUTPATIENT
Start: 2021-03-31 | End: 2021-03-31

## 2021-03-31 RX ORDER — CALCIUM CARBONATE 200(500)MG
200 TABLET,CHEWABLE ORAL
Status: DISCONTINUED | OUTPATIENT
Start: 2021-03-31 | End: 2021-04-01 | Stop reason: HOSPADM

## 2021-03-31 RX ORDER — CEFPODOXIME PROXETIL 200 MG/1
200 TABLET, FILM COATED ORAL EVERY 12 HOURS
Status: DISCONTINUED | OUTPATIENT
Start: 2021-04-01 | End: 2021-04-01 | Stop reason: HOSPADM

## 2021-03-31 RX ORDER — PANTOPRAZOLE SODIUM 40 MG/1
40 TABLET, DELAYED RELEASE ORAL
Status: DISCONTINUED | OUTPATIENT
Start: 2021-03-31 | End: 2021-04-01 | Stop reason: HOSPADM

## 2021-03-31 RX ORDER — LOSARTAN POTASSIUM 50 MG/1
100 TABLET ORAL EVERY 24 HOURS
Status: DISCONTINUED | OUTPATIENT
Start: 2021-03-31 | End: 2021-04-01 | Stop reason: HOSPADM

## 2021-03-31 RX ADMIN — Medication 10 ML: at 14:01

## 2021-03-31 RX ADMIN — CARBAMAZEPINE 400 MG: 200 TABLET ORAL at 09:20

## 2021-03-31 RX ADMIN — GUAIFENESIN 1200 MG: 600 TABLET ORAL at 21:56

## 2021-03-31 RX ADMIN — AZITHROMYCIN MONOHYDRATE 500 MG: 250 TABLET ORAL at 09:20

## 2021-03-31 RX ADMIN — METOPROLOL SUCCINATE 100 MG: 100 TABLET, EXTENDED RELEASE ORAL at 09:20

## 2021-03-31 RX ADMIN — POTASSIUM CHLORIDE 40 MEQ: 1500 TABLET, EXTENDED RELEASE ORAL at 09:20

## 2021-03-31 RX ADMIN — ASPIRIN 81 MG: 81 TABLET ORAL at 21:56

## 2021-03-31 RX ADMIN — GABAPENTIN 400 MG: 400 CAPSULE ORAL at 21:56

## 2021-03-31 RX ADMIN — POTASSIUM CHLORIDE 40 MEQ: 1500 TABLET, EXTENDED RELEASE ORAL at 17:25

## 2021-03-31 RX ADMIN — GABAPENTIN 400 MG: 400 CAPSULE ORAL at 16:53

## 2021-03-31 RX ADMIN — PANTOPRAZOLE SODIUM 40 MG: 40 TABLET, DELAYED RELEASE ORAL at 18:01

## 2021-03-31 RX ADMIN — GABAPENTIN 400 MG: 400 CAPSULE ORAL at 09:20

## 2021-03-31 RX ADMIN — Medication 10 ML: at 01:24

## 2021-03-31 RX ADMIN — CEFTRIAXONE SODIUM 1 G: 1 INJECTION, POWDER, FOR SOLUTION INTRAMUSCULAR; INTRAVENOUS at 09:19

## 2021-03-31 RX ADMIN — GUAIFENESIN 1200 MG: 600 TABLET ORAL at 09:20

## 2021-03-31 RX ADMIN — CARBAMAZEPINE 400 MG: 200 TABLET ORAL at 17:25

## 2021-03-31 NOTE — INTERDISCIPLINARY ROUNDS
Interdisciplinary Rounds completed. Nursing, Case Management, PT/OT  and Physician  present. Plan of care reviewed and updated. Discontinue remote tele. Pulmonary following.

## 2021-03-31 NOTE — PROGRESS NOTES
Juanita Carnes  Admission Date: 3/28/2021             Daily Progress Note: 3/31/2021    The patient's chart is reviewed and the patient is discussed with the staff. Patient is a 64 y.o.  male presents with hemoptysis.     He has underlying CAD with hx CABG, former tobacco abuse- smoked 1.5 ppd x 14 years before quitting 36 years ago. Juan Pandya has never been dx with lung disease, does not require inhalers or home O2, H/O A fib on eliquis and HTN. He was seen by us in June 2019 and apparently had some hemoptysis at that time. After stopping his ASA, it would usually resolve. He underwent a diagnostic bronchoscopy with no bleeding or lesions identified at that time. He was noted to have dynamic airway collapse.      He was admitted to the floor and eliquis was held. Since then, he was started on rocephin and zithromax for CAP. His hemoglobin is 13.0 and hemoptysis has improved since admission. Chest CT was done showing posterior RUL consolidation. He has been afebrile. He is on RA with oxygen saturation of 96%. WBC on admission was 14,500. We were asked to see him for hemoptysis.      He reports coughing up bright red blood since yesterday which has continued but is a lesser amount. He denies having chills, weight loss or fever. He does has chest wall pain      Subjective:     Patient states he is feeling well today. He denies any SOB or MALIK. He states his cough is improving and that his sputum is now pink tinged.     Current Facility-Administered Medications   Medication Dose Route Frequency    aspirin delayed-release tablet 81 mg  81 mg Oral QHS    carBAMazepine (TEGretol) tablet 400 mg  400 mg Oral BID    gabapentin (NEURONTIN) capsule 400 mg  400 mg Oral TID    HYDROcodone-acetaminophen (NORCO)  mg tablet 1 Tab  1 Tab Oral Q8H PRN    metoprolol succinate (TOPROL-XL) XL tablet 100 mg  100 mg Oral DAILY    sodium chloride (NS) flush 5-40 mL  5-40 mL IntraVENous Q8H    sodium chloride (NS) flush 5-40 mL  5-40 mL IntraVENous PRN    acetaminophen (TYLENOL) tablet 650 mg  650 mg Oral Q6H PRN    Or    acetaminophen (TYLENOL) suppository 650 mg  650 mg Rectal Q6H PRN    polyethylene glycol (MIRALAX) packet 17 g  17 g Oral DAILY PRN    promethazine (PHENERGAN) tablet 12.5 mg  12.5 mg Oral Q6H PRN    Or    ondansetron (ZOFRAN) injection 4 mg  4 mg IntraVENous Q6H PRN    cefTRIAXone (ROCEPHIN) 1 g in 0.9% sodium chloride (MBP/ADV) 50 mL MBP  1 g IntraVENous Q24H    azithromycin (ZITHROMAX) tablet 500 mg  500 mg Oral DAILY    guaiFENesin ER (MUCINEX) tablet 1,200 mg  1,200 mg Oral Q12H    albuterol (PROVENTIL VENTOLIN) nebulizer solution 2.5 mg  2.5 mg Nebulization Q4H PRN    losartan (COZAAR) tablet 100 mg  100 mg Oral DAILY       Review of Systems  +hemoptysis  Constitutional: negative for fever, chills, sweats  Cardiovascular: negative for chest pain, palpitations, syncope, edema  Gastrointestinal:  negative for dysphagia, reflux, vomiting, diarrhea, abdominal pain, or melena  Neurologic:  negative for focal weakness, numbness, headache    Objective:     Vitals:    03/31/21 0520 03/31/21 0521 03/31/21 0711 03/31/21 1235   BP: (!) 147/96  (!) 173/86 (!) 151/76   Pulse: 75  98 73   Resp: 18  20 16   Temp: 97.4 °F (36.3 °C)  97.5 °F (36.4 °C) 98.1 °F (36.7 °C)   SpO2: 97%  98% 99%   Weight:  177 lb 9.6 oz (80.6 kg)     Height:           No intake or output data in the 24 hours ending 03/31/21 1406    Physical Exam:   Constitution:  the patient is well developed and in no acute distress  EENMT:  Sclera clear, pupils equal, oral mucosa moist  Respiratory: clear bilaterally, on room air  Cardiovascular:  RRR without M,G,R  Gastrointestinal: soft and non-tender; with positive bowel sounds. Musculoskeletal: warm without cyanosis. There is no lower extremity edema.   Skin:  no jaundice or rashes, no wounds   Neurologic: no gross neuro deficits     Psychiatric:  alert and oriented x 3    CXR 3/28:     CT Chest 3/28      LAB  No results for input(s): GLUCPOC in the last 72 hours. No lab exists for component: [place]   Recent Labs     03/31/21 0438 03/29/21 0437   WBC 7.8 10.9   HGB 13.3* 13.0*   HCT 38.1* 37.7*    151     Recent Labs     03/31/21  0438 03/29/21 0437   * 135*   K 3.3* 3.4*   CL 96* 100   CO2 29 30   GLU 84 96   BUN 13 22   CREA 0.73* 0.94   CA 8.5 8.4     No results for input(s): PH, PCO2, PO2, HCO3, PHI, PCO2I, PO2I, HCO3I in the last 72 hours. No results for input(s): LCAD, LAC in the last 72 hours. Assessment:  (Medical Decision Making)     Hospital Problems  Date Reviewed: 8/20/2019          Codes Class Noted POA    Community acquired pneumonia ICD-10-CM: J18.9  ICD-9-CM: 486  3/28/2021 Yes        Leukocytosis ICD-10-CM: D72.829  ICD-9-CM: 288.60  3/28/2021 Yes        Systolic CHF, chronic (HCC) ICD-10-CM: I50.22  ICD-9-CM: 428.22, 428.0  3/28/2021 Yes        History of smoking 25-50 pack years ICD-10-CM: Z87.891  ICD-9-CM: V15.82  6/13/2019 Yes        S/P CABG x 3 ICD-10-CM: Z95.1  ICD-9-CM: V45.81  6/10/2019 Yes        * (Principal) Hemoptysis ICD-10-CM: R04.2  ICD-9-CM: 786.30  6/9/2019 Yes        Trigeminal neuralgia (Chronic) ICD-10-CM: G50.0  ICD-9-CM: 350.1  6/9/2019 Yes        Hypertension (Chronic) ICD-10-CM: I10  ICD-9-CM: 401.9  4/7/2017 Yes        Coronary atherosclerosis of native coronary vessel ICD-10-CM: I25.10  ICD-9-CM: 414.01  4/7/2017 Yes    Overview Addendum 7/19/2019  4:28 PM by Kathy Rodriguez MD     2006:  PCI RCA - Liberte, PCI LAD Cypher, PCI LCx Cypher  01/2014:  Stress testing with fixed inferior defect - no ischemia  11/2014:  EF with EF 50-55% and inferior HK   07/2018:  EF 45-50%  01/2019:  EF 40-45%  05/2019:  CABG LIMA-LAD, SVG-OM, SVG-MARY ANNE                   77yo presented with hemoptysis on eliquis. Was started on abx for CAP; CT chest showed posterior RUL consolidation. Underwent diagnostic bronch yesterday.  Per procedure report- Suspect bleeding vessel around RUL bronchus due to coughing trauma and eliquis Rx, there was no active bleeding or blood clots seen during the procedure with the exception of scope trauma area. Plan:  (Medical Decision Making)     -- Bronch yesterday, sputum cultures NGTD  -- Continue ABX for PNA  -- continue holding eliquis until PNA better per Dr. Jacky Wells recommendations after bronch    More than 50% of the time documented was spent in face-to-face contact with the patient and in the care of the patient on the floor/unit where the patient is located. Robyn Montalvo, IVETT      Lungs:  CTA B, no w/r/r  Heart:  RRR with no Murmur/Rubs/Gallops    Additional Comments:    Patient recovering from PNA. On room air. Degree of hemoptysis lessening as well. Cont to hold eliquis. Complete a course of abx for pna. This can likely be conerted to oral agents and he can complete this at home. Will need to have repeat imaging in 6-8 weeks to document clearance. Ok to discharge from pulmonary standpoint with office follow up in about 2 weeks. No additional pulmonary input at this time. We will sign off but please let us know if new issues arise. I have spoken with and examined the patient. I agree with the above assessment and plan as documented.     Aundrea Thornton MD

## 2021-03-31 NOTE — PROGRESS NOTES
Hourly rounds completed. All needs met. No complaints stated. Pt rested throughout the shift. Will give report to the oncoming day shift nurse.

## 2021-03-31 NOTE — PROGRESS NOTES
[facility]ist Progress Note     Name:  Velvet Mejia  Age:76 y.o. Sex:male   :  1945    MRN:  [id number]     Admit Date:  3/28/2021    Reason for Admission:  Hemoptysis [R04.2]  Community acquired pneumonia [J18.9]    Hospital Course/Interval history:     65 y/o WM with a h/o AFib on Eliquis, chronic sCHF (last LVEF 40-45% May 2020), HTN, HLD, CAD s/p CABG, prior tobacco abuse and trigeminal neuralgia who presents today with acute onset hemoptysis, SOB. Had COVID . In ED, WBC 14.5 with neutrophilia, Hb 14.8. CXR with RML infiltrate. He was started on ceftriaxone and azithromycin, Eliquis held. 3/29 - pt reports feeling good today, no further hemoptysis. CT showing possible pulm hemorrhage. 3/30 Pt was seen after Bronchoscopy this morning. No fever. No chills. No chest pain. No shortness of breath. Still has some cough. 3/31 Pt is doing well. Hb stable. No fever. Cough with pink sputum. No chest pain. No palpitations. No shortness of breath. Eliquis on hold. No nausea, no vomiting. Assessment & Plan     Hemoptysis  Community acquired PNA  3/29  - CT chest showing possible pulm hemorrhage  - cont Rocephin, Azithro  - pulm consulted  - holding Eliquis  3/30 Pt had Bronchoscopy this morning with suspected bleeding vessel around right upper lobe bronchus due to coughing trauma and Eliquis. Recommendations to continue Rocephin, azithromycin. Plan to hold Eliquis for approximately 5 days and then resume and observe for recurrence of hemoptysis. 3/31 Hb stable. Eliquis on hold. Switch Ceftriaxone to Cefpodoxime tomorrow. Afib  - cont BB  - holding Eliquis due to hemoptysis. HTN  - cont home meds    Chronic systolic heart failure  EF 40-45% on ECHO 2020.  3/30  - appears euvolemic  - does not take Lasix at home  - monitor volume status  - continue Toprol, Losartan    Hypokalemia  Repleted. Repeat BMP in am.   Hydrochlorothiazide dced.     Hyponatremia:  Sodium 131 this am.   Hydrochlorothiazide dced. Recheck Bmp in am.     CAD  - home meds    Trigeminal neuralgia  - cont home meds    Diet:  DIET CARDIAC  DVT PPx: SCDs given hemoptysis  Code status: Full Code  Disposition/Expected LOS: Home tomorrow      Review of Systems: 14 point review of systems is otherwise negative with the exception of the elements mentioned above. Objective:     Patient Vitals for the past 24 hrs:   Temp Pulse Resp BP SpO2   03/31/21 1551 97.8 °F (36.6 °C) 65 16 137/78 98 %   03/31/21 1235 98.1 °F (36.7 °C) 73 16 (!) 151/76 99 %   03/31/21 0711 97.5 °F (36.4 °C) 98 20 (!) 173/86 98 %   03/31/21 0520 97.4 °F (36.3 °C) 75 18 (!) 147/96 97 %   03/30/21 2355 97.8 °F (36.6 °C) 71 18 126/72 98 %   03/30/21 1934 97.9 °F (36.6 °C) 78 18 (!) 160/79 96 %     Oxygen Therapy  O2 Sat (%): 98 % (03/31/21 1551)  Pulse via Oximetry: 71 beats per minute (03/30/21 0944)  O2 Device: Room air (03/31/21 0711)  O2 Flow Rate (L/min): 3 l/min (03/30/21 0917)    Body mass index is 27 kg/m².     Physical Exam:   General:  No acute distress, speaking in full sentences, no use of accessory muscles   Lungs:  Crackles R side, clear on left side, no wheezing, no crackles,   CV:  Regular rate and rhythm with normal S1 and S2, no murmurs,   Abdomen:  Soft, nontender, nondistended, normoactive bowel sounds   Extremities:  No cyanosis clubbing or edema   Neuro:  Nonfocal, A&O x3   Psych:  Normal affect     Data Review:  I have reviewed all labs, meds, and studies from the last 24 hours:    Labs:    Recent Results (from the past 24 hour(s))   CBC WITH AUTOMATED DIFF    Collection Time: 03/31/21  4:38 AM   Result Value Ref Range    WBC 7.8 4.3 - 11.1 K/uL    RBC 3.75 (L) 4.23 - 5.6 M/uL    HGB 13.3 (L) 13.6 - 17.2 g/dL    HCT 38.1 (L) 41.1 - 50.3 %    .6 (H) 79.6 - 97.8 FL    MCH 35.5 (H) 26.1 - 32.9 PG    MCHC 34.9 31.4 - 35.0 g/dL    RDW 13.1 11.9 - 14.6 %    PLATELET 677 590 - 795 K/uL    MPV 9.0 (L) 9.4 - 12.3 FL    ABSOLUTE NRBC 0.00 0.0 - 0.2 K/uL    DF AUTOMATED      NEUTROPHILS 77 43 - 78 %    LYMPHOCYTES 12 (L) 13 - 44 %    MONOCYTES 9 4.0 - 12.0 %    EOSINOPHILS 1 0.5 - 7.8 %    BASOPHILS 0 0.0 - 2.0 %    IMMATURE GRANULOCYTES 1 0.0 - 5.0 %    ABS. NEUTROPHILS 6.0 1.7 - 8.2 K/UL    ABS. LYMPHOCYTES 0.9 0.5 - 4.6 K/UL    ABS. MONOCYTES 0.7 0.1 - 1.3 K/UL    ABS. EOSINOPHILS 0.1 0.0 - 0.8 K/UL    ABS. BASOPHILS 0.0 0.0 - 0.2 K/UL    ABS. IMM.  GRANS. 0.1 0.0 - 0.5 K/UL   METABOLIC PANEL, BASIC    Collection Time: 03/31/21  4:38 AM   Result Value Ref Range    Sodium 131 (L) 136 - 145 mmol/L    Potassium 3.3 (L) 3.5 - 5.1 mmol/L    Chloride 96 (L) 98 - 107 mmol/L    CO2 29 21 - 32 mmol/L    Anion gap 6 (L) 7 - 16 mmol/L    Glucose 84 65 - 100 mg/dL    BUN 13 8 - 23 MG/DL    Creatinine 0.73 (L) 0.8 - 1.5 MG/DL    GFR est AA >60 >60 ml/min/1.73m2    GFR est non-AA >60 >60 ml/min/1.73m2    Calcium 8.5 8.3 - 10.4 MG/DL       All Micro Results     Procedure Component Value Units Date/Time    CULTURE, RESPIRATORY/SPUTUM/BRONCH Anum Craze STAIN [[id number]] Collected: 03/30/21 0913    Order Status: Completed Specimen: Sputum from Bronchial lavage Updated: 03/31/21 7624     Special Requests: NO SPECIAL REQUESTS        GRAM STAIN 1 TO 20 WBCS SEEN PER OIF      0 TO 3 EPITHELIAL CELLS SEEN PER OIF      RARE GRAM POSITIVE COCCI        Culture result:       SCANT NORMAL RESPIRATORY PHYLLIS                  CULTURE IN PROGRESS,FURTHER UPDATES TO FOLLOW          CULTURE, BLOOD [[id number]] Collected: 03/28/21 0826    Order Status: Completed Specimen: Blood Updated: 03/31/21 1222     Special Requests: --        RIGHT  HAND       Culture result: NO GROWTH 3 DAYS       CULTURE, RESPIRATORY/SPUTUM/BRONCH Anum Craze STAIN [[id number]] Collected: 03/30/21 0452    Order Status: Completed Specimen: Sputum Updated: 03/31/21 0928     Special Requests: NO SPECIAL REQUESTS        GRAM STAIN 15 TO 35 WBC'S/OIF      0 TO 1 EPITHELIAL CELLS SEEN /OIF      FEW GRAM POSITIVE COCCI         4+ MUCUS PRESENT        Culture result:       MODERATE NORMAL RESPIRATORY PHYLLIS                EKG Results     Procedure 720 Value Units Date/Time    EKG 12 LEAD INITIAL [[id number]] Collected: 03/28/21 0732    Order Status: Completed Updated: 03/28/21 1709     Ventricular Rate 92 BPM      Atrial Rate 92 BPM      P-R Interval 168 ms      QRS Duration 120 ms      Q-T Interval 362 ms      QTC Calculation (Bezet) 447 ms      Calculated P Axis 74 degrees      Calculated R Axis -38 degrees      Calculated T Axis 109 degrees      Diagnosis --     !! AGE AND GENDER SPECIFIC ECG ANALYSIS !! Normal sinus rhythm  Left axis deviation  Non-specific intra-ventricular conduction delay  ST & T wave abnormality, consider lateral ischemia  Abnormal ECG  When compared with ECG of 24-OCT-2019 18:15,  No significant change was found  Confirmed by Mana Quach MD (), FIDE YOU ([id number]) on 3/28/2021 5:09:12 PM            Other Studies:  No results found.     Current Meds:   Current Facility-Administered Medications   Medication Dose Route Frequency    [START ON 4/1/2021] cefpodoxime (VANTIN) tablet 200 mg  200 mg Oral Q12H    aspirin delayed-release tablet 81 mg  81 mg Oral QHS    carBAMazepine (TEGretol) tablet 400 mg  400 mg Oral BID    gabapentin (NEURONTIN) capsule 400 mg  400 mg Oral TID    HYDROcodone-acetaminophen (NORCO)  mg tablet 1 Tab  1 Tab Oral Q8H PRN    metoprolol succinate (TOPROL-XL) XL tablet 100 mg  100 mg Oral DAILY    sodium chloride (NS) flush 5-40 mL  5-40 mL IntraVENous Q8H    sodium chloride (NS) flush 5-40 mL  5-40 mL IntraVENous PRN    acetaminophen (TYLENOL) tablet 650 mg  650 mg Oral Q6H PRN    Or    acetaminophen (TYLENOL) suppository 650 mg  650 mg Rectal Q6H PRN    polyethylene glycol (MIRALAX) packet 17 g  17 g Oral DAILY PRN    promethazine (PHENERGAN) tablet 12.5 mg  12.5 mg Oral Q6H PRN    Or    ondansetron (ZOFRAN) injection 4 mg  4 mg IntraVENous Q6H PRN    azithromycin [facility]) tablet 500 mg  500 mg Oral DAILY    guaiFENesin ER (MUCINEX) tablet 1,200 mg  1,200 mg Oral Q12H    albuterol (PROVENTIL VENTOLIN) nebulizer solution 2.5 mg  2.5 mg Nebulization Q4H PRN    losartan (COZAAR) tablet 100 mg  100 mg Oral DAILY       Problem List:  Hospital Problems as of 3/31/2021 Date Reviewed: 8/20/2019          Codes Class Noted - Resolved POA    Community acquired pneumonia ICD-10-CM: J18.9  ICD-9-CM: 486  3/28/2021 - Present Yes        Leukocytosis ICD-10-CM: D72.829  ICD-9-CM: 288.60  3/28/2021 - Present Yes        Systolic [facility], chronic ([facility] ICD-10-CM: I50.22  ICD-9-CM: 428.22, 428.0  3/28/2021 - Present Yes        History of smoking 25-50 pack years ICD-10-CM: Z87.891  ICD-9-CM: V15.82  6/13/2019 - Present Yes        S/P CABG x 3 ICD-10-CM: Z95.1  ICD-9-CM: V45.81  6/10/2019 - Present Yes        * (Principal) Hemoptysis ICD-10-CM: R04.2  ICD-9-CM: 786.30  6/9/2019 - Present Yes        Trigeminal neuralgia (Chronic) ICD-10-CM: G50.0  ICD-9-CM: 350.1  6/9/2019 - Present Yes        Hypertension (Chronic) ICD-10-CM: I10  ICD-9-CM: 401.9  4/7/2017 - Present Yes        Coronary atherosclerosis of native coronary vessel ICD-10-CM: I25.10  ICD-9-CM: 414.01  4/7/2017 - Present Yes    Overview Addendum 7/19/2019  4:28 PM by Sven Arzola MD     2006:  PCI RCA - Liberte, PCI LAD Cypher, PCI LCx Cypher  01/2014:  Stress testing with fixed inferior defect - no ischemia  11/2014:  EF with EF 50-55% and inferior HK   07/2018:  EF 45-50%  01/2019:  EF 40-45%  05/2019:  CABG LIMA-LAD, SVG-OM, SVG-MARY ANNE                          Part of this note was written by using a voice dictation software and the note has been proof read but may still contain some grammatical/other typographical errors.     Signed By: Jim Lubin MD   [address] [place] [address] Service    March 31, 2021  5:15 PM

## 2021-03-31 NOTE — PROGRESS NOTES
met with patient at bedside, offered support, empathetic presence, assurance of spiritual care staff's prayers.     Ashley Anand

## 2021-03-31 NOTE — PROGRESS NOTES
CM following for d/c planning - current d/c plan: home independently. No known needs at this time - cm will continue to follow.

## 2021-04-01 VITALS
RESPIRATION RATE: 18 BRPM | WEIGHT: 169.1 LBS | BODY MASS INDEX: 25.63 KG/M2 | HEART RATE: 68 BPM | DIASTOLIC BLOOD PRESSURE: 75 MMHG | OXYGEN SATURATION: 98 % | TEMPERATURE: 97.7 F | HEIGHT: 68 IN | SYSTOLIC BLOOD PRESSURE: 138 MMHG

## 2021-04-01 LAB
ANION GAP SERPL CALC-SCNC: 7 MMOL/L (ref 7–16)
BACTERIA SPEC CULT: NORMAL
BACTERIA SPEC CULT: NORMAL
BUN SERPL-MCNC: 14 MG/DL (ref 8–23)
CALCIUM SERPL-MCNC: 8.6 MG/DL (ref 8.3–10.4)
CHLORIDE SERPL-SCNC: 99 MMOL/L (ref 98–107)
CO2 SERPL-SCNC: 26 MMOL/L (ref 21–32)
CREAT SERPL-MCNC: 0.69 MG/DL (ref 0.8–1.5)
ERYTHROCYTE [DISTWIDTH] IN BLOOD BY AUTOMATED COUNT: 12.9 % (ref 11.9–14.6)
GLUCOSE SERPL-MCNC: 90 MG/DL (ref 65–100)
GRAM STN SPEC: NORMAL
HCT VFR BLD AUTO: 38.3 % (ref 41.1–50.3)
HGB BLD-MCNC: 13.7 G/DL (ref 13.6–17.2)
MCH RBC QN AUTO: 35.4 PG (ref 26.1–32.9)
MCHC RBC AUTO-ENTMCNC: 35.8 G/DL (ref 31.4–35)
MCV RBC AUTO: 99 FL (ref 79.6–97.8)
NRBC # BLD: 0 K/UL (ref 0–0.2)
PLATELET # BLD AUTO: 195 K/UL (ref 150–450)
PMV BLD AUTO: 8.9 FL (ref 9.4–12.3)
POTASSIUM SERPL-SCNC: 3.8 MMOL/L (ref 3.5–5.1)
RBC # BLD AUTO: 3.87 M/UL (ref 4.23–5.6)
SERVICE CMNT-IMP: NORMAL
SERVICE CMNT-IMP: NORMAL
SODIUM SERPL-SCNC: 132 MMOL/L (ref 136–145)
WBC # BLD AUTO: 7.3 K/UL (ref 4.3–11.1)

## 2021-04-01 PROCEDURE — 74011250637 HC RX REV CODE- 250/637: Performed by: HOSPITALIST

## 2021-04-01 PROCEDURE — 36415 COLL VENOUS BLD VENIPUNCTURE: CPT

## 2021-04-01 PROCEDURE — 2709999900 HC NON-CHARGEABLE SUPPLY

## 2021-04-01 PROCEDURE — 94761 N-INVAS EAR/PLS OXIMETRY MLT: CPT

## 2021-04-01 PROCEDURE — 74011250637 HC RX REV CODE- 250/637: Performed by: INTERNAL MEDICINE

## 2021-04-01 PROCEDURE — 85027 COMPLETE CBC AUTOMATED: CPT

## 2021-04-01 PROCEDURE — 80048 BASIC METABOLIC PNL TOTAL CA: CPT

## 2021-04-01 RX ORDER — LOSARTAN POTASSIUM 100 MG/1
100 TABLET ORAL EVERY 24 HOURS
Qty: 30 TAB | Refills: 0 | Status: SHIPPED | OUTPATIENT
Start: 2021-04-01 | End: 2021-05-01

## 2021-04-01 RX ORDER — LOSARTAN POTASSIUM 100 MG/1
100 TABLET ORAL EVERY 24 HOURS
Qty: 30 TAB | Refills: 0 | Status: SHIPPED | OUTPATIENT
Start: 2021-04-01 | End: 2021-04-01

## 2021-04-01 RX ADMIN — CARBAMAZEPINE 400 MG: 200 TABLET ORAL at 09:12

## 2021-04-01 RX ADMIN — CEFPODOXIME PROXETIL 200 MG: 200 TABLET, FILM COATED ORAL at 09:11

## 2021-04-01 RX ADMIN — AZITHROMYCIN MONOHYDRATE 500 MG: 250 TABLET ORAL at 09:11

## 2021-04-01 RX ADMIN — METOPROLOL SUCCINATE 100 MG: 100 TABLET, EXTENDED RELEASE ORAL at 09:12

## 2021-04-01 RX ADMIN — GUAIFENESIN 1200 MG: 600 TABLET ORAL at 09:11

## 2021-04-01 RX ADMIN — Medication 10 ML: at 05:55

## 2021-04-01 RX ADMIN — GABAPENTIN 400 MG: 400 CAPSULE ORAL at 09:11

## 2021-04-01 NOTE — PROGRESS NOTES
Hourly rounds completed. All needs met. No complaints stated this shift. Will give report to the oncoming day shift nurse.

## 2021-04-01 NOTE — PROGRESS NOTES
Pt discharged at 1250. All IVs removed. All discharge instructions, medications, and follow up appointments discussed with patient, patient verbalizes understanding. No complaints voiced by pt. Pt shows no s/sx of distress, active bleeding, or pain. Discharge paperwork placed in chart.

## 2021-04-01 NOTE — DISCHARGE INSTRUCTIONS
Please hold Eliquis until 4/2/21. Resume Eliquis from 4/3/21 and monitor for signs of bleeding. Please return to ER or conbtact PCP if you have bleeding. Hydrochlorothiazide was discontinued due to low Sodium. F/u with PCP in 3-5 days. F/u with Pulmonary in 2 weeks. Call on 4/2/2021 to make appt.

## 2021-04-01 NOTE — DISCHARGE SUMMARY
Hospitalist Discharge Summary     Admit Date:  3/28/2021  7:27 AM   Name:  Nu Rader   Age:  64 y.o.  :  1945   MRN:  [id number]   PCP:  Karen Jimenez MD  Treatment Team: Attending Provider: Piter Kilpatrick MD; : Donna Beard; Primary Nurse: Evgeny Thomas RN    Problem List for this Hospitalization:  Hospital Problems as of 2021 Date Reviewed: 2019          Codes Class Noted - Resolved POA    Community acquired pneumonia ICD-10-CM: J18.9  ICD-9-CM: 313  3/28/2021 - Present Yes        Leukocytosis ICD-10-CM: D72.829  ICD-9-CM: 288.60  3/28/2021 - Present Yes        Systolic CHF, chronic ([facility] [address] ICD-10-CM: I50.22  ICD-9-CM: 428.22, 428.0  3/28/2021 - Present Yes        History of smoking 25-50 pack years ICD-10-CM: Z87.891  ICD-9-CM: V15.82  2019 - Present Yes        S/P CABG x 3 ICD-10-CM: Z95.1  ICD-9-CM: V45.81  6/10/2019 - Present Yes        * (Principal) Hemoptysis ICD-10-CM: R04.2  ICD-9-CM: 786.30  2019 - Present Yes        Trigeminal neuralgia (Chronic) ICD-10-CM: G50.0  ICD-9-CM: 350.1  2019 - Present Yes        Hypertension (Chronic) ICD-10-CM: I10  ICD-9-CM: 401.9  2017 - Present Yes        Coronary atherosclerosis of native coronary vessel ICD-10-CM: I25.10  ICD-9-CM: 414.01  2017 - Present Yes    Overview Addendum 2019  4:28 PM by Breanne Marte MD     2006:  PCI RCA - Liberte, PCI LAD Cypher, PCI LCx Cypher  2014:  Stress testing with fixed inferior defect - no ischemia  2014:  EF with EF 50-55% and inferior HK   2018:  EF 45-50%  2019:  EF 40-45%  2019:  CABG LIMA-LAD, SVG-OM, SVG-MARY ANNE                       Admission HPI from 3/28/2021: This is a very nice 63 y/o WM with a h/o AFib on Eliquis, chronic sCHF (last LVEF 40-45% May 2020), HTN, HLD, CAD s/p CABG, prior tobacco abuse and trigeminal neuralgia who presents today with acute onset hemoptysis. He has felt well over the prior few days.  He woke up this morning around 0300 to use the bathroom and began coughing up a small amount of bright red blood. Noticed some SOB as well so he came to the ER. Denies fevers, chills, night sweats, weight loss, orthopnea, sick contacts, chest pain, palpitations, headaches, dizziness or recent travel. He had COVID back in December. He does not regularly take his Lasix or Crestor. Last dose of Eliquis was last night. He brought in a ziploc bag of about 2oz of thin, bloody sputum. His sputum currently is more pink/blood tinged than frankly bloody and he did not have any active hemoptysis during my evaluation. Labs with WBCs 14.5 with neutrophilia, Hb 14.8. Highly sensitive trop 20.9 and 13.4 on repeat. BNP 1K. CXR with RML infiltrate. He has been given ceftriaxone and azithromycin. Hospitalist consulted for admission and further management.      10 systems reviewed and negative except as noted in HPI. Hospital Course:    Community-acquired pneumonia POA  Hemoptysis  Chronic atrial fibrillation  Hypertension  Chronic systolic congestive heart failure present on admission not in acute exacerbation  Hypokalemia    65 y/o WM with a h/o AFib on Eliquis, chronic sCHF (last LVEF 40-45% May 2020), HTN, HLD, CAD s/p CABG, prior tobacco abuse and trigeminal neuralgia who presents today with acute onset hemoptysis, SOB. Had COVID 12/20. In ED, WBC 14.5 with neutrophilia, Hb 14.8. CXR with RML infiltrate. He was started on ceftriaxone and azithromycin, Eliquis held. Patient had bronchoscopy on 3/30 which showed suspected bleeding was not around right upper lobe bronchus due to coughing trauma and Eliquis. Recommendations to continue antibiotics and hold Eliquis for approximately until 4/2. Patient was advised to resume Eliquis on 4/3/2021 and monitor for signs of bleeding. He has paroxysmal atrial fibrillation but Eliquis is on hold due to hemoptysis. Hyponatremia likely secondary to hydrochlorothiazide which was held.   Other chronic medical problems which are stable include chronic systolic heart failure and hypertension, trigeminal neuralgia, coronary artery disease. Home oxygen screen was done prior to discharge and patient did not qualify for any oxygen. He is discharged home today in a stable condition to follow-up with primary care physician in 3 to 5 days and pulmonary follow-up in 2 weeks. Disposition: Home or Self Care  Activity: as tolerated  Diet: DIET CARDIAC Regular    Follow up instructions, discharge meds at bottom of this note. Plan was discussed with the pt. All questions answered. Patient was stable at time of discharge. Patient will call a physician or return if any concerns. Diagnostic Imaging/Tests:   Ct Chest Wo Cont    Result Date: 3/28/2021  CT CHEST WITHOUT CONTRAST DATED 3/28/2021. History: Shortness of breath and hemoptysis. Chest tightness. Comparison: CT chest without contrast 6/9/2019 Technique:   Multiple contiguous helical CT images reconstructed at 5 mm were obtained from the neck base to the mid abdomen without the administration of contrast. All CT scans performed at this facility use one or all of the following: Automated exposure control, adjustment of the mA and/or kVp according to patient's size, iterative reconstruction. Findings: CT Chest: The base of the neck is unremarkable in appearance. No clear involving lymphadenopathy is seen. Nonspecific mediastinal lymph nodes are seen which do not appear significantly changed from the prior study. The thoracic aorta is normal in caliber. Advanced atherosclerotic calcification is seen in the coronary arteries with evidence of prior coronary artery bypass surgery. Evaluation with lung windows demonstrates focal consolidation involving the posterior segment of the right upper lobe best appreciated on axial image 26. Airways extending to this portion of lung are patent. No pleural effusion is seen.   Lungs are expanded without evidence for pneumothorax. Limited evaluation of the upper abdomen demonstrates no acute abnormality. 1.  Nonspecific airspace changes in the posterior segment of the right upper lobe with focal consolidation at this level. Given the clinical history provided, this is most concerning for pulmonary hemorrhage. This report was made using voice transcription. Despite my best efforts to avoid any, transcription errors may persist. If there is any question about the accuracy of the report or need for clarification, then please call [phone], or text me through perfectserv for clarification or correction. Xr Chest Port    Result Date: 3/28/2021  EXAM: Single view chest radiograph. INDICATION: Shortness of breath and hemoptysis. Chest tightness. COMPARISON: Chest radiograph dated October 24, 2019. FINDINGS: Diffuse right middle lobe consolidation. No pneumothorax or pleural effusion. Heart is normal in size. Left atrial appendage clip evident. Fractured inferior most sternal wire, unchanged. No acute osseous abnormality. 1. Diffuse right middle lobe consolidation, likely pneumonia. An obstructing lesion cannot be excluded. Recommend follow-up chest radiographs to ensure resolution. Echocardiogram results:  No results found for this visit on 03/28/21.     Procedures done this admission:  Procedure(s):  BRONCHOSCOPY  BRONCHIAL ALVEOLAR LAVAGE    All Micro Results     Procedure Component Value Units Date/Time    CULTURE, RESPIRATORY/SPUTUM/BRONCH Jacki Victor [[id number]] Collected: 03/30/21 0913    Order Status: Completed Specimen: Sputum from Bronchial lavage Updated: 04/01/21 0955     Special Requests: NO SPECIAL REQUESTS        GRAM STAIN 1 TO 20 WBCS SEEN PER OIF      0 TO 3 EPITHELIAL CELLS SEEN PER OIF      RARE GRAM POSITIVE COCCI        Culture result:       MODERATE NORMAL RESPIRATORY PHYLLIS          CULTURE, RESPIRATORY/SPUTUM/BRONCH Jacki Victor [[id number]] Collected: 03/30/21 0452    Order Status: Completed Specimen: Sputum Updated: 04/01/21 0905     Special Requests: NO SPECIAL REQUESTS        GRAM STAIN 15 TO 35 WBC'S/OIF      0 TO 1 EPITHELIAL CELLS SEEN /OIF      FEW GRAM POSITIVE COCCI         4+ MUCUS PRESENT        Culture result:       MODERATE NORMAL RESPIRATORY PHYLLIS          CULTURE, BLOOD [[id number]] Collected: 03/28/21 0826    Order Status: Completed Specimen: Blood Updated: 04/01/21 0752     Special Requests: --        RIGHT  HAND       Culture result: NO GROWTH 4 DAYS             Labs: Results:       BMP, Mg, Phos Recent Labs     04/01/21  0500 03/31/21 0438   * 131*   K 3.8 3.3*   CL 99 96*   CO2 26 29   AGAP 7 6*   BUN 14 13   CREA 0.69* 0.73*   CA 8.6 8.5   GLU 90 84      CBC Recent Labs     04/01/21 0500 03/31/21 0438   WBC 7.3 7.8   RBC 3.87* 3.75*   HGB 13.7 13.3*   HCT 38.3* 38.1*    181   GRANS  --  77   LYMPH  --  12*   EOS  --  1   MONOS  --  9   BASOS  --  0   IG  --  1   ANEU  --  6.0   ABL  --  0.9   MARCE  --  0.1   ABM  --  0.7   ABB  --  0.0   AIG  --  0.1      LFT No results for input(s): ALT, TBIL, AP, TP, ALB, GLOB, AGRAT in the last 72 hours.     No lab exists for component: SGOT, GPT   Cardiac Testing Lab Results   Component Value Date/Time     (H) 10/24/2019 06:00 PM     (H) 08/02/2019 01:38 PM     (H) 07/16/2019 04:07 PM    Troponin-I, Qt. 0.02 10/24/2019 06:00 PM    Troponin-I, Qt. 0.04 07/16/2019 04:07 PM    Troponin-I, Qt. 3.26 (HH) 06/09/2019 08:10 AM      Coagulation Tests Lab Results   Component Value Date/Time    Prothrombin time 16.5 (H) 06/10/2019 12:52 PM    Prothrombin time 15.4 (H) 06/08/2019 02:48 AM    INR 1.4 06/10/2019 12:52 PM    INR 1.2 06/08/2019 02:48 AM    INR (POC) 1.0 07/20/2018 06:59 PM    aPTT 33.7 06/10/2019 12:52 PM    aPTT 54.9 (H) 06/09/2019 11:38 PM    aPTT 82.4 (H) 06/09/2019 08:10 AM      A1c Lab Results   Component Value Date/Time    Hemoglobin A1c 5.0 06/08/2019 02:48 AM    Hemoglobin A1c 5.0 07/21/2018 05:07 AM      Lipid Panel Lab Results   Component Value Date/Time    Cholesterol, total 178 06/07/2019 03:59 AM    HDL Cholesterol 37 (L) 06/07/2019 03:59 AM    LDL, calculated 126.2 (H) 06/07/2019 03:59 AM    VLDL, calculated 14.8 06/07/2019 03:59 AM    Triglyceride 74 06/07/2019 03:59 AM    CHOL/HDL Ratio 4.8 06/07/2019 03:59 AM      Thyroid Panel Lab Results   Component Value Date/Time    TSH 3.760 (H) 04/07/2017 02:15 PM    T4, Total 7.3 04/07/2017 02:15 PM        Most Recent UA Lab Results   Component Value Date/Time    Color VANESA 06/07/2019 11:20 PM    Appearance CLEAR 06/07/2019 11:20 PM    Specific gravity 1.060 (H) 06/07/2019 11:20 PM    pH (UA) 6.0 06/07/2019 11:20 PM    Protein NEGATIVE  06/07/2019 11:20 PM    Glucose NEGATIVE  06/07/2019 11:20 PM    Ketone TRACE (A) 06/07/2019 11:20 PM    Bilirubin NEGATIVE  06/07/2019 11:20 PM    Blood NEGATIVE  06/07/2019 11:20 PM    Urobilinogen 0.2 06/07/2019 11:20 PM    Nitrites NEGATIVE  06/07/2019 11:20 PM    Leukocyte Esterase NEGATIVE  06/07/2019 11:20 PM        Allergies   Allergen Reactions    Iodinated Contrast Media Unknown (comments)     Immunization History   Administered Date(s) Administered    Influenza High Dose Vaccine PF 10/12/2016, 10/30/2018    Influenza Vaccine 10/03/2013, 10/27/2014, 11/01/2016    Influenza Vaccine (Tri) Adjuvanted (>65 Yrs FLUAD TRI 55904) 11/02/2017, 10/30/2018, 10/17/2019    Influenza Vaccine PF 10/22/2015    Pneumococcal Conjugate (PCV-13) 01/25/2018    Pneumococcal Polysaccharide (PPSV-23) 12/07/2007    TB Skin Test (PPD) Intradermal 07/22/2018, 06/10/2019    Zoster Vaccine, Live 02/26/2010       All Labs from Last 24 Hrs:  Recent Results (from the past 24 hour(s))   CBC W/O DIFF    Collection Time: 04/01/21  5:00 AM   Result Value Ref Range    WBC 7.3 4.3 - 11.1 K/uL    RBC 3.87 (L) 4.23 - 5.6 M/uL    HGB 13.7 13.6 - 17.2 g/dL    HCT 38.3 (L) 41.1 - 50.3 %    MCV 99.0 (H) 79.6 - 97.8 FL    MCH 35.4 (H) 26.1 - 32.9 PG    MCHC 35.8 (H) 31.4 - 35.0 g/dL    RDW 12.9 11.9 - 14.6 %    PLATELET 232 595 - 394 K/uL    MPV 8.9 (L) 9.4 - 12.3 FL    ABSOLUTE NRBC 0.00 0.0 - 0.2 K/uL   METABOLIC PANEL, BASIC    Collection Time: 04/01/21  5:00 AM   Result Value Ref Range    Sodium 132 (L) 136 - 145 mmol/L    Potassium 3.8 3.5 - 5.1 mmol/L    Chloride 99 98 - 107 mmol/L    CO2 26 21 - 32 mmol/L    Anion gap 7 7 - 16 mmol/L    Glucose 90 65 - 100 mg/dL    BUN 14 8 - 23 MG/DL    Creatinine 0.69 (L) 0.8 - 1.5 MG/DL    GFR est AA >60 >60 ml/min/1.73m2    GFR est non-AA >60 >60 ml/min/1.73m2    Calcium 8.6 8.3 - 10.4 MG/DL       Current Med List in Hospital:   Current Facility-Administered Medications   Medication Dose Route Frequency    cefpodoxime (VANTIN) tablet 200 mg  200 mg Oral Q12H    losartan (COZAAR) tablet 100 mg  100 mg Oral Q24H    pantoprazole (PROTONIX) tablet 40 mg  40 mg Oral ACD    calcium carbonate (TUMS) chewable tablet 200 mg [elemental]  200 mg Oral Q4H PRN    aspirin delayed-release tablet 81 mg  81 mg Oral QHS    carBAMazepine (TEGretol) tablet 400 mg  400 mg Oral BID    gabapentin (NEURONTIN) capsule 400 mg  400 mg Oral TID    HYDROcodone-acetaminophen (NORCO)  mg tablet 1 Tab  1 Tab Oral Q8H PRN    metoprolol succinate (TOPROL-XL) XL tablet 100 mg  100 mg Oral DAILY    sodium chloride (NS) flush 5-40 mL  5-40 mL IntraVENous Q8H    sodium chloride (NS) flush 5-40 mL  5-40 mL IntraVENous PRN    acetaminophen (TYLENOL) tablet 650 mg  650 mg Oral Q6H PRN    Or    acetaminophen (TYLENOL) suppository 650 mg  650 mg Rectal Q6H PRN    polyethylene glycol (MIRALAX) packet 17 g  17 g Oral DAILY PRN    promethazine (PHENERGAN) tablet 12.5 mg  12.5 mg Oral Q6H PRN    Or    ondansetron (ZOFRAN) injection 4 mg  4 mg IntraVENous Q6H PRN    guaiFENesin ER (MUCINEX) tablet 1,200 mg  1,200 mg Oral Q12H    albuterol (PROVENTIL VENTOLIN) nebulizer solution 2.5 mg  2.5 mg Nebulization Q4H PRN       Discharge Exam:  Patient Vitals for the past 24 hrs:   Temp Pulse Resp BP SpO2   04/01/21 0741 (!) 96.7 °F (35.9 °C) 80 17 (!) 172/85 98 %   04/01/21 0411 97.7 °F (36.5 °C) 73 16 (!) 166/93 98 %   03/31/21 2317 97.6 °F (36.4 °C) 69 16 128/83 99 %   03/31/21 2152    122/68    03/31/21 1947 97.4 °F (36.3 °C) 65 16 (!) 112/55 97 %   03/31/21 1551 97.8 °F (36.6 °C) 65 16 137/78 98 %   03/31/21 1235 98.1 °F (36.7 °C) 73 16 (!) 151/76 99 %     Oxygen Therapy  O2 Sat (%): 98 % (04/01/21 0741)  Pulse via Oximetry: 71 beats per minute (03/30/21 0944)  O2 Device: Room air (03/31/21 0711)  O2 Flow Rate (L/min): 3 l/min (03/30/21 0917)    Intake/Output Summary (Last 24 hours) at 4/1/2021 1118  Last data filed at 3/31/2021 1551  Gross per 24 hour   Intake    Output 500 ml   Net -500 ml       *Note that automatically entered I/Os may not be accurate; dependent on patient compliance with collection and accurate  by assistants. General:    Well nourished. Alert. Eyes:   Normal sclerae. Extraocular movements intact. ENT:  Normocephalic, atraumatic. Moist mucous membranes  CV:   Regular rate and rhythm. No murmur, rub, or gallop. Lungs:  Clear to auscultation bilaterally. No wheezing, rhonchi, or rales. Abdomen: Soft, nontender, nondistended. Bowel sounds normal.   Extremities: Warm and dry. No cyanosis or edema. Neurologic: CN II-XII grossly intact. Sensation intact. Skin:     No rashes or jaundice. Psych:  Normal mood and affect. Discharge Info:   Current Discharge Medication List      START taking these medications    Details   losartan (COZAAR) 100 mg tablet Take 1 Tab by mouth every twenty-four (24) hours for 30 days. Qty: 30 Tab, Refills: 0         CONTINUE these medications which have NOT CHANGED    Details   metoprolol succinate (TOPROL-XL) 100 mg tablet Take 1 Tab by mouth daily.   Qty: 90 Tab, Refills: 3      potassium chloride SR (K-TAB) 20 mEq tablet Take 1 Tab by mouth two (2) times a day.  Qty: 60 Tab, Refills: 5      furosemide (LASIX) 40 mg tablet Take 1 Tab by mouth daily. Qty: 90 Tab, Refills: 1    Associated Diagnoses: S/P CABG x 3; Localized edema      docusate sodium (COLACE) 100 mg capsule Take 1 Cap by mouth two (2) times daily as needed for Constipation. Qty: 60 Cap, Refills: 0      omeprazole (PRILOSEC) 20 mg capsule Take 1 Cap by mouth daily. Qty: 90 Cap, Refills: 3      HYDROcodone-acetaminophen (NORCO)  mg tablet Take 1 Tab by mouth every eight (8) hours as needed. gabapentin (NEURONTIN) 400 mg capsule Take 2 tablets po [id number]  Qty: 180 Cap, Refills: 6    Associated Diagnoses: Trigeminal neuralgia      aspirin delayed-release 81 mg tablet Take 1 Tab by mouth nightly. Qty: 1 Tab, Refills: 0      carBAMazepine (TEGRETOL) 200 mg tablet Take 400 mg by mouth two (2) times a day. sildenafil citrate (Viagra) 50 mg tablet Take 1 Tab by mouth as needed for Erectile Dysfunction. Qty: 5 Tab, Refills: 2      rosuvastatin (CRESTOR) 40 mg tablet Take 1 Tab by mouth nightly. Qty: 90 Tab, Refills: 4      melatonin 3 mg tablet Take 5 mg by mouth nightly as needed. nitroglycerin (NITROSTAT) 0.4 mg SL tablet TAKE AS DIRECTED. Qty: 25 Tab, Refills: 11         STOP taking these medications       apixaban (ELIQUIS) 5 mg tablet Comments:   Reason for Stopping:         losartan-hydroCHLOROthiazide (HYZAAR) 100-25 mg per tablet Comments:   Reason for Stopping: Follow Up Orders:   Follow-up Appointments   Procedures    FOLLOW UP VISIT Appointment in: 3 - 5 Days F/U with PCP in 3-5 days F/u with Pulmonary in 2 weeks     F/U with PCP in 3-5 days  F/u with Pulmonary in 2 weeks     Standing Status:   Standing     Number of Occurrences:   1     Order Specific Question:   Appointment in     Answer:   3 - 5 Days       Follow-up Information     Follow up With Specialties Details Why Vivienne Ag MD Internal Medicine   [facility] [address]  [phone]            Time spent in patient discharge planning and coordination 41 minutes.     Signed:  Jairo Condon MD

## 2021-04-01 NOTE — PROGRESS NOTES
Patient to be discharged home today. No CM needs identified. CM will remain available should new needs arise. Care Management Interventions  PCP Verified by CM:  Yes  Mode of Transport at Discharge: Self  Transition of Care Consult (CM Consult): Discharge Planning  Discharge Durable Medical Equipment: No  Physical Therapy Consult: No  Occupational Therapy Consult: No  Speech Therapy Consult: No  Current Support Network: Own Home  Confirm Follow Up Transport: Self  The Patient and/or Patient Representative was Provided with a Choice of Provider and Agrees with the Discharge Plan?: Yes  Freedom of Choice List was Provided with Basic Dialogue that Supports the Patient's Individualized Plan of Care/Goals, Treatment Preferences and Shares the Quality Data Associated with the Providers?: Yes   Resource Information Provided?: No  Discharge Location  Discharge Placement: Home

## 2021-04-01 NOTE — PROGRESS NOTES
Oxygen Qualifier       Room air: SpO2 with O2 and liter flow   Resting SpO2  98%  98% on room air   Ambulating SpO2  99%  99% on room air      no oxygen required    Completed by:    Mik Moore

## 2021-04-02 ENCOUNTER — PATIENT OUTREACH (OUTPATIENT)
Dept: CASE MANAGEMENT | Age: 76
End: 2021-04-02

## 2021-04-02 LAB
BACTERIA SPEC CULT: NORMAL
SERVICE CMNT-IMP: NORMAL

## 2021-04-02 NOTE — PROGRESS NOTES
Care Transitions Initial Follow Up Call    Call within 2 business days of discharge: Yes     Patient: Dana Phillip Patient : 1945 MRN: [id number]    Last Discharge [address]       Complaint Diagnosis Description Type Department Provider    3/28/21 Hemoptysis Community acquired pneumonia of right middle lobe of lung . .. ED to Hosp-Admission (Discharged) (ADMIT) [id number] Zulema Wagner MD; Giovany Jonas,... Was this an external facility discharge? No Discharge Facility: [facility]    Challenges to be reviewed by the provider   Additional needs identified to be addressed with provider no  none         Method of communication with provider : face to face, chart routing, staff message, phone, none    Discussed COVID-19 related testing which was not done at this time. Test results were not done. Patient informed of results, if available? na     Advance Care Planning:   Does patient have an Advance Directive: not on file     Inpatient Readmission Risk score: Unplanned Readmit Risk Score: 6    Was this a readmission? yes   Patient stated reason for the admission: cough up blood shortness of breath    Patients top risk factors for readmission: medical condition and medication management heart failure, pneumonia and acute respiratory failure  Interventions to address risk factors: Scheduled appointment with PCP-    Care Transition Nurse (CTN) contacted the patient by telephone to perform post hospital discharge assessment. Verified name and  with patient as identifiers. Provided introduction to self, and explanation of the CTN role. CTN reviewed discharge instructions, medical action plan and red flags with patient who verbalized understanding. Were discharge instructions available to patient? yes. Reviewed appropriate site of care based on symptoms and resources available to patient including: PCP.  Patient given an opportunity to ask questions and does not have any further questions or concerns at this time. The patient agrees to contact the PCP office for questions related to their healthcare. Medication reconciliation was performed with patient, who verbalizes understanding of administration of home medications. Advised obtaining a 90-day supply of all daily and as-needed medications. Referral to Pharm D needed: patient declined, reviewed medicine changes and questions answered, refer with any further questions/concerns      Home Health/Outpatient orders at discharge: [address]: na  Date of initial visit: [address] ordered at discharge: None  [address] [address] received: na    Covid Risk Education    Patient has following risk factors of: heart failure, pneumonia and acute respiratory failure. Education provided regarding infection prevention, and signs and symptoms of COVID-19 and when to seek medical attention with patient who verbalized understanding. Discussed exposure protocols and quarantine From CDC: Are you at higher risk for severe illness?  and given an opportunity for questions and concerns. The patient agrees to contact the COVID-19 hotline [phone] or PCP office for questions related to COVID-19. For more information on steps you can take to protect yourself, see CDC's How to Protect Yourself     Was patient discharged with a pulse oximeter? no Discussed and confirmed pulse oximeter discharge instructions and when to notify provider or seek emergency care. Patient/family/caregiver given information for [employer] and agrees to enroll no  Patient's preferred e-mail: declines  Patient's preferred phone number: declines    Discussed follow-up appointments. If no appointment was previously scheduled, appointment scheduling offered: yes Is follow up appointment scheduled within 7 days of discharge?  yes   [facility] follow up appointment(s):   Future Appointments   Date Time Provider Alize Jeffers   8/4/2021  7:30 AM ECHO [facility]   8/19/2021  8:15 AM Shantelle Vazquez MD [facility]     [facility] follow up appointment(s): PCP 4/8    Plan for follow-up call in 10-14 days based on severity of symptoms and risk factors. Plan for next call: follow up appointment-follow up with [facility] after PCP visit  CTN provided contact information for future needs.     Goals Addressed    None

## 2021-04-16 ENCOUNTER — PATIENT OUTREACH (OUTPATIENT)
Dept: CASE MANAGEMENT | Age: 76
End: 2021-04-16

## 2021-04-16 NOTE — PROGRESS NOTES
CTN attempted follow up after recent [facility] call. Patient enrolled in PNA bundle. Patient with recent PCP visit. CTN to attempt [facility] follow up call again at a later time.

## 2021-06-07 ENCOUNTER — HOSPITAL ENCOUNTER (EMERGENCY)
Age: 76
Discharge: HOME | End: 2021-06-07
Attending: EMERGENCY MEDICINE
Payer: MEDICARE

## 2021-06-07 ENCOUNTER — APPOINTMENT (OUTPATIENT)
Dept: GENERAL RADIOLOGY | Age: 76
End: 2021-06-07
Attending: EMERGENCY MEDICINE
Payer: MEDICARE

## 2021-06-07 VITALS
WEIGHT: 175 LBS | HEART RATE: 68 BPM | BODY MASS INDEX: 25.92 KG/M2 | RESPIRATION RATE: 19 BRPM | SYSTOLIC BLOOD PRESSURE: 179 MMHG | TEMPERATURE: 97.7 F | DIASTOLIC BLOOD PRESSURE: 92 MMHG | OXYGEN SATURATION: 96 % | HEIGHT: 69 IN

## 2021-06-07 DIAGNOSIS — I50.9 ACUTE ON CHRONIC CONGESTIVE HEART FAILURE, UNSPECIFIED HEART FAILURE TYPE (HCC): ICD-10-CM

## 2021-06-07 DIAGNOSIS — R06.02 SOB (SHORTNESS OF BREATH): Primary | ICD-10-CM

## 2021-06-07 LAB
ALBUMIN SERPL-MCNC: 3.3 G/DL (ref 3.2–4.6)
ALBUMIN/GLOB SERPL: 0.9 {RATIO} (ref 1.2–3.5)
ALP SERPL-CCNC: 104 U/L (ref 50–136)
ALT SERPL-CCNC: 32 U/L (ref 12–65)
ANION GAP SERPL CALC-SCNC: 6 MMOL/L (ref 7–16)
AST SERPL-CCNC: 41 U/L (ref 15–37)
ATRIAL RATE: 81 BPM
BASOPHILS # BLD: 0 K/UL (ref 0–0.2)
BASOPHILS NFR BLD: 0 % (ref 0–2)
BILIRUB SERPL-MCNC: 1.1 MG/DL (ref 0.2–1.1)
BNP SERPL-MCNC: 1219 PG/ML
BUN SERPL-MCNC: 13 MG/DL (ref 8–23)
CALCIUM SERPL-MCNC: 7.8 MG/DL (ref 8.3–10.4)
CALCULATED P AXIS, ECG09: 86 DEGREES
CALCULATED R AXIS, ECG10: 9 DEGREES
CALCULATED T AXIS, ECG11: 122 DEGREES
CHLORIDE SERPL-SCNC: 99 MMOL/L (ref 98–107)
CO2 SERPL-SCNC: 25 MMOL/L (ref 21–32)
CREAT SERPL-MCNC: 0.63 MG/DL (ref 0.8–1.5)
DIAGNOSIS, 93000: NORMAL
DIFFERENTIAL METHOD BLD: ABNORMAL
EOSINOPHIL # BLD: 0 K/UL (ref 0–0.8)
EOSINOPHIL NFR BLD: 0 % (ref 0.5–7.8)
ERYTHROCYTE [DISTWIDTH] IN BLOOD BY AUTOMATED COUNT: 13 % (ref 11.9–14.6)
GLOBULIN SER CALC-MCNC: 3.5 G/DL (ref 2.3–3.5)
GLUCOSE SERPL-MCNC: 102 MG/DL (ref 65–100)
HCT VFR BLD AUTO: 36.7 % (ref 41.1–50.3)
HGB BLD-MCNC: 13.1 G/DL (ref 13.6–17.2)
IMM GRANULOCYTES # BLD AUTO: 0 K/UL (ref 0–0.5)
IMM GRANULOCYTES NFR BLD AUTO: 0 % (ref 0–5)
LYMPHOCYTES # BLD: 0.6 K/UL (ref 0.5–4.6)
LYMPHOCYTES NFR BLD: 8 % (ref 13–44)
MCH RBC QN AUTO: 34.9 PG (ref 26.1–32.9)
MCHC RBC AUTO-ENTMCNC: 35.7 G/DL (ref 31.4–35)
MCV RBC AUTO: 97.9 FL (ref 79.6–97.8)
MONOCYTES # BLD: 0.5 K/UL (ref 0.1–1.3)
MONOCYTES NFR BLD: 7 % (ref 4–12)
NEUTS SEG # BLD: 6.1 K/UL (ref 1.7–8.2)
NEUTS SEG NFR BLD: 83 % (ref 43–78)
NRBC # BLD: 0 K/UL (ref 0–0.2)
P-R INTERVAL, ECG05: 194 MS
PLATELET # BLD AUTO: 212 K/UL (ref 150–450)
PMV BLD AUTO: 8.9 FL (ref 9.4–12.3)
POTASSIUM SERPL-SCNC: 4.2 MMOL/L (ref 3.5–5.1)
PROT SERPL-MCNC: 6.8 G/DL (ref 6.3–8.2)
Q-T INTERVAL, ECG07: 388 MS
QRS DURATION, ECG06: 128 MS
QTC CALCULATION (BEZET), ECG08: 450 MS
RBC # BLD AUTO: 3.75 M/UL (ref 4.23–5.6)
SODIUM SERPL-SCNC: 130 MMOL/L (ref 138–145)
TROPONIN-HIGH SENSITIVITY: 15.8 PG/ML (ref 0–14)
TROPONIN-HIGH SENSITIVITY: 17.9 PG/ML (ref 0–14)
VENTRICULAR RATE, ECG03: 81 BPM
WBC # BLD AUTO: 7.4 K/UL (ref 4.3–11.1)

## 2021-06-07 PROCEDURE — 99285 EMERGENCY DEPT VISIT HI MDM: CPT

## 2021-06-07 PROCEDURE — 93005 ELECTROCARDIOGRAM TRACING: CPT | Performed by: EMERGENCY MEDICINE

## 2021-06-07 PROCEDURE — 74011250637 HC RX REV CODE- 250/637: Performed by: EMERGENCY MEDICINE

## 2021-06-07 PROCEDURE — 85025 COMPLETE CBC W/AUTO DIFF WBC: CPT

## 2021-06-07 PROCEDURE — 71045 X-RAY EXAM CHEST 1 VIEW: CPT

## 2021-06-07 PROCEDURE — 74011250636 HC RX REV CODE- 250/636: Performed by: EMERGENCY MEDICINE

## 2021-06-07 PROCEDURE — 96374 THER/PROPH/DIAG INJ IV PUSH: CPT

## 2021-06-07 PROCEDURE — 84484 ASSAY OF TROPONIN QUANT: CPT

## 2021-06-07 PROCEDURE — 80053 COMPREHEN METABOLIC PANEL: CPT

## 2021-06-07 PROCEDURE — 83880 ASSAY OF NATRIURETIC PEPTIDE: CPT

## 2021-06-07 RX ORDER — FUROSEMIDE 10 MG/ML
40 INJECTION INTRAMUSCULAR; INTRAVENOUS
Status: COMPLETED | OUTPATIENT
Start: 2021-06-07 | End: 2021-06-07

## 2021-06-07 RX ADMIN — NITROGLYCERIN 1 INCH: 20 OINTMENT TOPICAL at 06:12

## 2021-06-07 RX ADMIN — FUROSEMIDE 40 MG: 10 INJECTION, SOLUTION INTRAMUSCULAR; INTRAVENOUS at 08:06

## 2021-06-07 NOTE — ED TRIAGE NOTES
Pt. Arrives triage with mask on. Pt. C/o SOB x several weeks , worse today, denies fever. States had covid last December. Pt. Hx CABG. States feels like when he had PNA.

## 2021-06-07 NOTE — ED PROVIDER NOTES
Patient is a 59-year-old male with a history of hypertension, heart disease, prior bypass surgery who comes to the ER early this morning complaining of worsening shortness of breath and some episodes of chest tightness over the past 2 weeks. This becomes much worse with exertion. He awoke at 4 AM today and felt very short of breath. He did take 1 nitroglycerin with some relief. No pain currently. There was no radiation of the pain. No fever. He has had a bit of a cough productive of some dark sputum. The history is provided by the patient. Shortness of Breath  This is a new problem. The average episode lasts 2 weeks. The current episode started more than 1 week ago. The problem has been gradually worsening. Associated symptoms include cough and chest pain. Pertinent negatives include no fever, no rhinorrhea, no sore throat, no neck pain, no sputum production, no wheezing, no syncope, no vomiting, no abdominal pain, no rash and no leg swelling. He has had prior hospitalizations. He has had prior ED visits. He has had no prior ICU admissions. Associated medical issues include CAD and heart failure. Associated medical issues do not include asthma or COPD.         Past Medical History:   Diagnosis Date    Atrial fibrillation ([facility]     Atrial flutter ([facility] 4/7/2017    CAD (coronary artery disease) 2006    Cancer [facility]) 2011    prostate    GERD (gastroesophageal reflux disease)     takes omeprazole    Heart failure ([facility]     Hypertension     Ill-defined condition     trigeminal neuralgia    Myocardial infarction ([facility] 06/06/2019    NSTEMI     Sleep apnea     does not wear CPAP    Stroke [facility]) 2018    TIA       Past Surgical History:   Procedure Laterality Date    COLONOSCOPY N/A 3/2/2020    COLONOSCOPY performed by Denice Collins MD at [address] [facility] GRAFT  06/10/2019    3 vessel CABG with LIMA-LAD,SVG-OM,and SVG-R posterior lateral branch with MAZE & LA appendage clipping.  HX CORONARY STENT PLACEMENT      HX HEART CATHETERIZATION  08/14/2006    HX HEART CATHETERIZATION  06/07/2019    Severe 3 vessel CAD with LV EF=40-45% and unsuccessful LCX PCI.  HX PROSTATE SURGERY           Family History:   Problem Relation Age of Onset    Other Other         cerebral aneurysm     Heart Disease Mother     Heart Disease Sister        Social History     Socioeconomic History    Marital status:      Spouse name: Not on file    Number of children: Not on file    Years of education: Not on file    Highest education level: Not on file   Occupational History    Not on file   Tobacco Use    Smoking status: Former Smoker     Packs/day: 2.00     Years: 14.00     Pack years: 28.00     Types: Cigarettes    Smokeless tobacco: Never Used    Tobacco comment: quit at age 27   Substance and Sexual Activity    Alcohol use: No    Drug use: Not on file    Sexual activity: Not on file   Other Topics Concern   [address] Service Not Asked    Blood Transfusions Not Asked    Caffeine Concern Not Asked    Occupational Exposure Not Asked    Hobby Hazards Not Asked    Sleep Concern Not Asked    Stress Concern Not Asked    Weight Concern Not Asked    Special Diet Not Asked    Back Care Not Asked    Exercise Not Asked    Bike Helmet Not Asked   [address] Not Asked    Self-Exams Not Asked   Social History Narrative    Not on file     Social Determinants of Health     Financial Resource Strain:     Difficulty of Paying Living Expenses:    Food Insecurity:     Worried About Running Out of Food in the Last Year:     Ran Out of Food in the Last Year:    Transportation Needs:     Lack of Transportation (Medical):      Lack of Transportation (Non-Medical):    Physical Activity:     Days of Exercise per Week:     Minutes of Exercise per Session:    Stress:     Feeling of Stress :    Social Connections:     Frequency of Communication with Friends and Family:     Frequency of Social Gatherings with Friends and Family:     Attends Yarsani Services:     Active Member of Clubs or Organizations:     Attends Club or Organization Meetings:     Marital Status:    Intimate Partner Violence:     Fear of Current or Ex-Partner:     Emotionally Abused:     Physically Abused:     Sexually Abused: ALLERGIES: Iodinated contrast media    Review of Systems   Constitutional: Negative for chills, fatigue and fever. HENT: Negative for congestion, rhinorrhea and sore throat. Eyes: Negative for pain, discharge and visual disturbance. Respiratory: Positive for cough and shortness of breath. Negative for sputum production and wheezing. Cardiovascular: Positive for chest pain. Negative for palpitations, leg swelling and syncope. Gastrointestinal: Negative for abdominal pain, diarrhea, nausea and vomiting. Endocrine: Negative for polydipsia and polyuria. Genitourinary: Negative for dysuria, frequency and urgency. Musculoskeletal: Negative for back pain and neck pain. Skin: Negative for rash. Neurological: Negative for seizures, syncope and weakness. Hematological: Negative. Vitals:    06/07/21 0555   BP: (!) 201/108   Pulse: 85   Resp: 20   Temp: 97.7 °F (36.5 °C)   SpO2: 95%   Weight: 79.4 kg (175 lb)   Height: 5' 9\" (1.753 m)            Physical Exam  Vitals and nursing note reviewed. Constitutional:       Appearance: Normal appearance. He is well-developed. HENT:      Head: Normocephalic and atraumatic. Nose: Nose normal.   Eyes:      Extraocular Movements: Extraocular movements intact. Conjunctiva/sclera: Conjunctivae normal.      Pupils: Pupils are equal, round, and reactive to light. Cardiovascular:      Rate and Rhythm: Normal rate and regular rhythm. Heart sounds: Normal heart sounds. Pulmonary:      Effort: Pulmonary effort is normal.      Breath sounds: Normal breath sounds. No rales.    Chest:      Chest wall: No mass, tenderness or crepitus. Abdominal:      Palpations: Abdomen is soft. Tenderness: There is no abdominal tenderness. There is no guarding or rebound. Musculoskeletal:         General: No tenderness. Normal range of motion. Cervical back: Normal range of motion and neck supple. Right lower leg: No tenderness. No edema. Left lower leg: No tenderness. No edema. Lymphadenopathy:      Cervical: No cervical adenopathy. Skin:     General: Skin is warm and dry. Capillary Refill: Capillary refill takes less than 2 seconds. Findings: No rash. Neurological:      General: No focal deficit present. Mental Status: He is alert and oriented to person, place, and time. GCS: GCS eye subscore is 4. GCS verbal subscore is 5. GCS motor subscore is 6. Cranial Nerves: No cranial nerve deficit. Sensory: No sensory deficit. Motor: Motor function is intact. Psychiatric:         Mood and Affect: Mood is anxious. MDM  Number of Diagnoses or Management Options  Diagnosis management comments: I wore appropriate PPE throughout this patient's ED visit. [address] MD, 6:15 AM    ===============================================  ED EKG Interpretation  EKG was interpreted in the absence of a cardiologist.    EKG rhythm: normal sinus rhythm  Rate: 81    ST Segments: Nonspecific ST segments - NO STEMI  No change compared to previous    Prior records were reviewed he does see [facility] cardiology had bypass surgery 2 years ago. Differential diagnosis at this time includes myocardial infarction, NSTEMI, angina, congestive heart failure, pneumonia. Nitroglycerin paste is ordered for the dyspnea and the elevated blood pressure will check basic labs and x-ray. Voice dictation software was used during the making of this note. This software is not perfect and grammatical and other typographical errors may be present.   This note has been proofread, but may still contain errors.   Robin Martinez MD; 6/7/2021 @6:16 AM   ===================================================================        Delta MD Michelle; 6/7/2021 @6:15 AM================           Amount and/or Complexity of Data Reviewed  Clinical lab tests: ordered and reviewed  Tests in the radiology section of CPT®: ordered and reviewed  Tests in the medicine section of CPT®: reviewed and ordered  Review and summarize past medical records: yes  Independent visualization of images, tracings, or specimens: yes    Risk of Complications, Morbidity, and/or Mortality  Presenting problems: moderate  Diagnostic procedures: low  Management options: low    Patient Progress  Patient progress: stable         Procedures

## 2021-06-07 NOTE — ED NOTES
I have reviewed discharge instructions with the patient. The patient verbalized understanding. Patient left ED via Discharge Method: ambulatory to Home with self    Opportunity for questions and clarification provided. Patient given 0   scripts. To continue your aftercare when you leave the hospital, you may receive an automated call from our care team to check in on how you are doing. This is a free service and part of our promise to provide the best care and service to meet your aftercare needs.  If you have questions, or wish to unsubscribe from this service please call [phone]. Thank you for Choosing our [facility] Emergency Department.

## 2022-02-21 PROBLEM — I50.22 SYSTOLIC CHF, CHRONIC (HCC): Status: RESOLVED | Noted: 2021-03-28 | Resolved: 2022-02-21

## 2022-03-18 PROBLEM — R79.89 ELEVATED BRAIN NATRIURETIC PEPTIDE (BNP) LEVEL: Status: ACTIVE | Noted: 2019-06-14

## 2022-03-18 PROBLEM — J39.8 TRACHEOBRONCHOMALACIA: Status: ACTIVE | Noted: 2019-06-10

## 2022-03-18 PROBLEM — G45.9 TIA (TRANSIENT ISCHEMIC ATTACK): Status: ACTIVE | Noted: 2018-07-20

## 2022-03-19 PROBLEM — I50.22 SYSTOLIC CHF, CHRONIC (HCC): Status: ACTIVE | Noted: 2019-01-31

## 2022-03-19 PROBLEM — R04.2 HEMOPTYSIS: Status: ACTIVE | Noted: 2019-06-09

## 2022-03-19 PROBLEM — I10 HYPERTENSION: Status: ACTIVE | Noted: 2017-04-07

## 2022-03-19 PROBLEM — E78.5 DYSLIPIDEMIA: Status: ACTIVE | Noted: 2017-04-07

## 2022-03-19 PROBLEM — G50.0 TRIGEMINAL NEURALGIA: Status: ACTIVE | Noted: 2019-06-09

## 2022-03-19 PROBLEM — J18.9 COMMUNITY ACQUIRED PNEUMONIA: Status: ACTIVE | Noted: 2021-03-28

## 2022-03-19 PROBLEM — Z87.891 HISTORY OF SMOKING 25-50 PACK YEARS: Status: ACTIVE | Noted: 2019-06-13

## 2022-03-19 PROBLEM — Z95.1 S/P CABG X 3: Status: ACTIVE | Noted: 2019-06-10

## 2022-03-19 PROBLEM — I25.10 CORONARY ATHEROSCLEROSIS OF NATIVE CORONARY VESSEL: Status: ACTIVE | Noted: 2017-04-07

## 2022-03-19 PROBLEM — R60.0 LOCALIZED EDEMA: Status: ACTIVE | Noted: 2019-06-24

## 2022-03-20 PROBLEM — D72.829 LEUKOCYTOSIS: Status: ACTIVE | Noted: 2021-03-28

## 2022-03-20 PROBLEM — R06.02 SOB (SHORTNESS OF BREATH): Status: ACTIVE | Noted: 2019-10-24

## 2022-03-20 PROBLEM — I48.91 ATRIAL FIBRILLATION (HCC): Status: ACTIVE | Noted: 2017-04-07

## 2022-03-31 NOTE — PROGRESS NOTES
Pt would like a RX for tessalon pearls on discharge in case he gets into coughing. Pt also a little anxious about when to restart eliquis. Explained that he would be informed on discharge. Will verbalize to night shift to pass on. Report given to Cisco Severance, RN    Coughing improved this shift. Education provided about dx and tx as Pt had questions. stated

## 2022-07-07 ENCOUNTER — HOSPITAL ENCOUNTER (EMERGENCY)
Dept: GENERAL RADIOLOGY | Age: 77
Discharge: HOME | DRG: 261 | End: 2022-07-10
Payer: MEDICARE

## 2022-07-07 ENCOUNTER — APPOINTMENT (OUTPATIENT)
Dept: GENERAL RADIOLOGY | Age: 77
DRG: 261 | End: 2022-07-07
Payer: MEDICARE

## 2022-07-07 ENCOUNTER — HOSPITAL ENCOUNTER (INPATIENT)
Age: 77
LOS: 4 days | Discharge: HOME HEALTH | DRG: 261 | End: 2022-07-11
Attending: EMERGENCY MEDICINE | Admitting: FAMILY MEDICINE
Payer: MEDICARE

## 2022-07-07 ENCOUNTER — HOSPITAL ENCOUNTER (EMERGENCY)
Dept: CT IMAGING | Age: 77
Discharge: HOME | DRG: 261 | End: 2022-07-10
Payer: MEDICARE

## 2022-07-07 DIAGNOSIS — R55 SYNCOPE AND COLLAPSE: ICD-10-CM

## 2022-07-07 DIAGNOSIS — R55 SYNCOPE, UNSPECIFIED SYNCOPE TYPE: Primary | ICD-10-CM

## 2022-07-07 DIAGNOSIS — R55 SYNCOPE: ICD-10-CM

## 2022-07-07 DIAGNOSIS — S00.03XA CONTUSION OF SCALP, INITIAL ENCOUNTER: ICD-10-CM

## 2022-07-07 PROBLEM — G50.0 TRIGEMINAL NEURALGIA: Status: ACTIVE | Noted: 2019-06-09

## 2022-07-07 PROBLEM — Z66 DNR (DO NOT RESUSCITATE): Chronic | Status: ACTIVE | Noted: 2022-07-07

## 2022-07-07 PROBLEM — I48.91 ATRIAL FIBRILLATION (HCC): Status: ACTIVE | Noted: 2017-04-07

## 2022-07-07 PROBLEM — E78.5 DYSLIPIDEMIA: Status: ACTIVE | Noted: 2017-04-07

## 2022-07-07 PROBLEM — E87.1 HYPONATREMIA: Status: ACTIVE | Noted: 2022-07-07

## 2022-07-07 PROBLEM — D64.9 ANEMIA: Chronic | Status: ACTIVE | Noted: 2022-07-07

## 2022-07-07 PROBLEM — M79.643 HAND PAIN: Status: ACTIVE | Noted: 2022-07-07

## 2022-07-07 PROBLEM — J39.8 TRACHEOBRONCHOMALACIA: Status: ACTIVE | Noted: 2019-06-10

## 2022-07-07 PROBLEM — I25.10 CORONARY ATHEROSCLEROSIS OF NATIVE CORONARY VESSEL: Status: ACTIVE | Noted: 2017-04-07

## 2022-07-07 PROBLEM — I50.22 SYSTOLIC CHF, CHRONIC (HCC): Status: ACTIVE | Noted: 2019-01-31

## 2022-07-07 PROBLEM — I10 HYPERTENSION: Status: ACTIVE | Noted: 2017-04-07

## 2022-07-07 LAB
ALBUMIN SERPL-MCNC: 3.4 G/DL (ref 3.2–4.6)
ALBUMIN/GLOB SERPL: 0.8 {RATIO} (ref 1.2–3.5)
ALP SERPL-CCNC: 134 U/L (ref 50–136)
ALT SERPL-CCNC: 25 U/L (ref 12–65)
ANION GAP SERPL CALC-SCNC: 2 MMOL/L (ref 7–16)
AST SERPL-CCNC: 19 U/L (ref 15–37)
BASOPHILS # BLD: 0.1 K/UL (ref 0–0.2)
BASOPHILS NFR BLD: 1 % (ref 0–2)
BILIRUB SERPL-MCNC: 1.3 MG/DL (ref 0.2–1.1)
BUN SERPL-MCNC: 17 MG/DL (ref 8–23)
CALCIUM SERPL-MCNC: 9 MG/DL (ref 8.3–10.4)
CHLORIDE SERPL-SCNC: 103 MMOL/L (ref 98–107)
CO2 SERPL-SCNC: 27 MMOL/L (ref 21–32)
CREAT SERPL-MCNC: 0.9 MG/DL (ref 0.8–1.5)
DIFFERENTIAL METHOD BLD: ABNORMAL
EKG ATRIAL RATE: 61 BPM
EKG DIAGNOSIS: NORMAL
EKG P AXIS: 67 DEGREES
EKG P-R INTERVAL: 210 MS
EKG Q-T INTERVAL: 468 MS
EKG QRS DURATION: 128 MS
EKG QTC CALCULATION (BAZETT): 471 MS
EKG R AXIS: -42 DEGREES
EKG T AXIS: 124 DEGREES
EKG VENTRICULAR RATE: 61 BPM
EOSINOPHIL # BLD: 0.1 K/UL (ref 0–0.8)
EOSINOPHIL NFR BLD: 1 % (ref 0.5–7.8)
ERYTHROCYTE [DISTWIDTH] IN BLOOD BY AUTOMATED COUNT: 14.7 % (ref 11.9–14.6)
GLOBULIN SER CALC-MCNC: 4.1 G/DL (ref 2.3–3.5)
GLUCOSE SERPL-MCNC: 96 MG/DL (ref 65–100)
HCT VFR BLD AUTO: 38.8 % (ref 41.1–50.3)
HGB BLD-MCNC: 13.1 G/DL (ref 13.6–17.2)
IMM GRANULOCYTES # BLD AUTO: 0 K/UL (ref 0–0.5)
IMM GRANULOCYTES NFR BLD AUTO: 1 % (ref 0–5)
LYMPHOCYTES # BLD: 0.8 K/UL (ref 0.5–4.6)
LYMPHOCYTES NFR BLD: 10 % (ref 13–44)
MCH RBC QN AUTO: 34.7 PG (ref 26.1–32.9)
MCHC RBC AUTO-ENTMCNC: 33.8 G/DL (ref 31.4–35)
MCV RBC AUTO: 102.9 FL (ref 79.6–97.8)
MONOCYTES # BLD: 0.5 K/UL (ref 0.1–1.3)
MONOCYTES NFR BLD: 6 % (ref 4–12)
NEUTS SEG # BLD: 6.4 K/UL (ref 1.7–8.2)
NEUTS SEG NFR BLD: 81 % (ref 43–78)
NRBC # BLD: 0 K/UL (ref 0–0.2)
PLATELET # BLD AUTO: 179 K/UL (ref 150–450)
PMV BLD AUTO: 8.8 FL (ref 9.4–12.3)
POTASSIUM SERPL-SCNC: 4.6 MMOL/L (ref 3.5–5.1)
PROT SERPL-MCNC: 7.5 G/DL (ref 6.3–8.2)
RBC # BLD AUTO: 3.77 M/UL (ref 4.23–5.6)
SODIUM SERPL-SCNC: 132 MMOL/L (ref 138–145)
TROPONIN I SERPL HS-MCNC: 14.2 PG/ML (ref 0–14)
WBC # BLD AUTO: 7.9 K/UL (ref 4.3–11.1)

## 2022-07-07 PROCEDURE — 2580000003 HC RX 258: Performed by: INTERNAL MEDICINE

## 2022-07-07 PROCEDURE — 36415 COLL VENOUS BLD VENIPUNCTURE: CPT

## 2022-07-07 PROCEDURE — 1100000003 HC PRIVATE W/ TELEMETRY

## 2022-07-07 PROCEDURE — 84484 ASSAY OF TROPONIN QUANT: CPT

## 2022-07-07 PROCEDURE — 1100000000 HC RM PRIVATE

## 2022-07-07 PROCEDURE — 70450 CT HEAD/BRAIN W/O DYE: CPT

## 2022-07-07 PROCEDURE — 93005 ELECTROCARDIOGRAM TRACING: CPT

## 2022-07-07 PROCEDURE — 85025 COMPLETE CBC W/AUTO DIFF WBC: CPT

## 2022-07-07 PROCEDURE — 6370000000 HC RX 637 (ALT 250 FOR IP): Performed by: INTERNAL MEDICINE

## 2022-07-07 PROCEDURE — 99285 EMERGENCY DEPT VISIT HI MDM: CPT

## 2022-07-07 PROCEDURE — 94761 N-INVAS EAR/PLS OXIMETRY MLT: CPT

## 2022-07-07 PROCEDURE — 71045 X-RAY EXAM CHEST 1 VIEW: CPT

## 2022-07-07 PROCEDURE — 72125 CT NECK SPINE W/O DYE: CPT

## 2022-07-07 PROCEDURE — 80053 COMPREHEN METABOLIC PANEL: CPT

## 2022-07-07 PROCEDURE — 73130 X-RAY EXAM OF HAND: CPT

## 2022-07-07 RX ORDER — ASPIRIN 81 MG/1
81 TABLET ORAL DAILY
Status: DISCONTINUED | OUTPATIENT
Start: 2022-07-08 | End: 2022-07-11 | Stop reason: HOSPADM

## 2022-07-07 RX ORDER — ACETAMINOPHEN 325 MG/1
650 TABLET ORAL EVERY 6 HOURS PRN
Status: DISCONTINUED | OUTPATIENT
Start: 2022-07-07 | End: 2022-07-11 | Stop reason: HOSPADM

## 2022-07-07 RX ORDER — METOPROLOL SUCCINATE 100 MG/1
100 TABLET, EXTENDED RELEASE ORAL DAILY
Status: DISCONTINUED | OUTPATIENT
Start: 2022-07-08 | End: 2022-07-11 | Stop reason: HOSPADM

## 2022-07-07 RX ORDER — ACETAMINOPHEN 650 MG/1
650 SUPPOSITORY RECTAL EVERY 6 HOURS PRN
Status: DISCONTINUED | OUTPATIENT
Start: 2022-07-07 | End: 2022-07-11 | Stop reason: HOSPADM

## 2022-07-07 RX ORDER — GABAPENTIN 400 MG/1
800 CAPSULE ORAL 3 TIMES DAILY
Status: DISCONTINUED | OUTPATIENT
Start: 2022-07-07 | End: 2022-07-07 | Stop reason: DRUGHIGH

## 2022-07-07 RX ORDER — HYDROCODONE BITARTRATE AND ACETAMINOPHEN 10; 325 MG/1; MG/1
1 TABLET ORAL EVERY 6 HOURS PRN
Status: DISCONTINUED | OUTPATIENT
Start: 2022-07-07 | End: 2022-07-11 | Stop reason: HOSPADM

## 2022-07-07 RX ORDER — FUROSEMIDE 40 MG/1
40 TABLET ORAL
Status: DISCONTINUED | OUTPATIENT
Start: 2022-07-08 | End: 2022-07-11 | Stop reason: HOSPADM

## 2022-07-07 RX ORDER — FUROSEMIDE 40 MG/1
40 TABLET ORAL EVERY OTHER DAY
Status: ON HOLD | COMMUNITY
End: 2022-10-26 | Stop reason: HOSPADM

## 2022-07-07 RX ORDER — POLYETHYLENE GLYCOL 3350 17 G/17G
17 POWDER, FOR SOLUTION ORAL DAILY PRN
Status: DISCONTINUED | OUTPATIENT
Start: 2022-07-07 | End: 2022-07-11 | Stop reason: HOSPADM

## 2022-07-07 RX ORDER — PANTOPRAZOLE SODIUM 40 MG/1
40 TABLET, DELAYED RELEASE ORAL
Status: DISCONTINUED | OUTPATIENT
Start: 2022-07-08 | End: 2022-07-11 | Stop reason: HOSPADM

## 2022-07-07 RX ORDER — GABAPENTIN 400 MG/1
400 CAPSULE ORAL 3 TIMES DAILY
Status: DISCONTINUED | OUTPATIENT
Start: 2022-07-07 | End: 2022-07-11 | Stop reason: HOSPADM

## 2022-07-07 RX ORDER — LOSARTAN POTASSIUM 50 MG/1
100 TABLET ORAL DAILY
Status: DISCONTINUED | OUTPATIENT
Start: 2022-07-08 | End: 2022-07-09

## 2022-07-07 RX ORDER — SODIUM CHLORIDE 9 MG/ML
INJECTION, SOLUTION INTRAVENOUS PRN
Status: DISCONTINUED | OUTPATIENT
Start: 2022-07-07 | End: 2022-07-11 | Stop reason: HOSPADM

## 2022-07-07 RX ORDER — CARBAMAZEPINE 100 MG/1
400 TABLET, CHEWABLE ORAL 2 TIMES DAILY
Status: DISCONTINUED | OUTPATIENT
Start: 2022-07-07 | End: 2022-07-11 | Stop reason: HOSPADM

## 2022-07-07 RX ORDER — ONDANSETRON 4 MG/1
4 TABLET, ORALLY DISINTEGRATING ORAL EVERY 8 HOURS PRN
Status: DISCONTINUED | OUTPATIENT
Start: 2022-07-07 | End: 2022-07-11 | Stop reason: HOSPADM

## 2022-07-07 RX ORDER — SODIUM CHLORIDE 0.9 % (FLUSH) 0.9 %
5-40 SYRINGE (ML) INJECTION PRN
Status: DISCONTINUED | OUTPATIENT
Start: 2022-07-07 | End: 2022-07-11 | Stop reason: HOSPADM

## 2022-07-07 RX ORDER — FUROSEMIDE 20 MG/1
20 TABLET ORAL
Status: DISCONTINUED | OUTPATIENT
Start: 2022-07-09 | End: 2022-07-11 | Stop reason: HOSPADM

## 2022-07-07 RX ORDER — SODIUM CHLORIDE 0.9 % (FLUSH) 0.9 %
5-40 SYRINGE (ML) INJECTION EVERY 12 HOURS SCHEDULED
Status: DISCONTINUED | OUTPATIENT
Start: 2022-07-07 | End: 2022-07-11 | Stop reason: HOSPADM

## 2022-07-07 RX ORDER — ONDANSETRON 2 MG/ML
4 INJECTION INTRAMUSCULAR; INTRAVENOUS EVERY 6 HOURS PRN
Status: DISCONTINUED | OUTPATIENT
Start: 2022-07-07 | End: 2022-07-11 | Stop reason: HOSPADM

## 2022-07-07 RX ADMIN — HYDROCODONE BITARTRATE AND ACETAMINOPHEN 1 TABLET: 10; 325 TABLET ORAL at 21:10

## 2022-07-07 RX ADMIN — APIXABAN 5 MG: 5 TABLET, FILM COATED ORAL at 21:06

## 2022-07-07 RX ADMIN — SODIUM CHLORIDE, PRESERVATIVE FREE 5 ML: 5 INJECTION INTRAVENOUS at 21:13

## 2022-07-07 RX ADMIN — CARBAMAZEPINE 400 MG: 100 TABLET, CHEWABLE ORAL at 21:07

## 2022-07-07 RX ADMIN — GABAPENTIN 400 MG: 400 CAPSULE ORAL at 21:06

## 2022-07-07 ASSESSMENT — PAIN SCALES - GENERAL
PAINLEVEL_OUTOF10: 0
PAINLEVEL_OUTOF10: 0
PAINLEVEL_OUTOF10: 7
PAINLEVEL_OUTOF10: 5

## 2022-07-07 ASSESSMENT — PAIN DESCRIPTION - LOCATION: LOCATION: JAW

## 2022-07-07 ASSESSMENT — PAIN DESCRIPTION - DESCRIPTORS: DESCRIPTORS: ACHING

## 2022-07-07 ASSESSMENT — ENCOUNTER SYMPTOMS
ABDOMINAL PAIN: 0
SHORTNESS OF BREATH: 1
COUGH: 0
BACK PAIN: 0

## 2022-07-07 NOTE — H&P
troponin  · Check ECHO  · Cardiology consult   · Resume home meds excluding imdur  · Check orthostats  · I have advised NO DRIVING due to unexplained syncope and he voiced understanding           Active Problems:    Hyponatremia  Plan:   ·   · Trend lab          Anemia  Plan:  · HGB 13.1  · Trend lab           DNR (do not resuscitate)  Plan:   · Discussed and this is his wish with daughter tessa present           Hand pain  Plan:   · Xray hand s/p fall with pain and trauma left 3rd digit            Atrial fibrillation ([facility]  Plan:     Dyslipidemia  Plan:     Hypertension  Plan:    Systolic CHF, chronic ([facility] [facility]  Plan    Coronary atherosclerosis of native coronary vessel  Plan:   · Resume home meds as tolerant  · EKG tracing personally reviewed as NSR with first degree AV block and PVC            Trigeminal neuralgia  Plan:   · Resume tegretol              Dispo/Discharge Planning:    pending     Diet: cardiac   VTE ppx: eliquis  Code status: DNR    Hospital Problems:  Principal Problem:    Syncope and collapse  Active Problems:    Hyponatremia    Anemia    DNR (do not resuscitate)    Hand pain    Tracheobronchomalacia    Dyslipidemia    Hypertension    Systolic CHF, chronic (HCC)    Coronary atherosclerosis of native coronary vessel    Trigeminal neuralgia    Atrial fibrillation ([facility]  Resolved Problems:    * No resolved hospital problems.  *       Past History:     Past Medical History:   Diagnosis Date    Atrial fibrillation ([facility]     Atrial flutter ([facility] 4/7/2017    CAD (coronary artery disease) 2006    Cancer [facility]) 2011    prostate    GERD (gastroesophageal reflux disease)     takes omeprazole    Heart failure ([facility]     Hypertension     Ill-defined condition     trigeminal neuralgia    Myocardial infarction ([facility] 06/06/2019    NSTEMI     Sleep apnea     does not wear CPAP    Stroke [facility]) 2018    TIA     Past Surgical History:   Procedure Laterality Date    CARDIAC CATHETERIZATION  08/14/2006    CARDIAC CATHETERIZATION  06/07/2019    Severe 3 vessel CAD with LV EF=40-45% and unsuccessful LCX PCI.  COLONOSCOPY N/A 3/2/2020    COLONOSCOPY performed by Ilana Link MD at [address] [facility] CORONARY ARTERY BYPASS GRAFT  06/10/2019    3 vessel CABG with LIMA-LAD,SVG-OM,and SVG-R posterior lateral branch with MAZE & LA appendage clipping.  PROSTATE SURGERY        Social History     Tobacco Use    Smoking status: Former Smoker     Packs/day: 2.00    Smokeless tobacco: Never Used    Tobacco comment: Quit smoking: quit at age 27   Substance Use Topics    Alcohol use: No      Social History     Substance and Sexual Activity   Drug Use Not on file     Family History   Problem Relation Age of Onset    Heart Disease Sister     Other Other         cerebral aneurysm     Heart Disease Mother       Family history reviewed and negative except as noted above.       Immunization History   Administered Date(s) Administered    Influenza Trivalent 10/03/2013, 10/27/2014    Influenza Virus Vaccine 11/01/2016    Influenza, High Dose (Fluzone 65 yrs and older) 10/12/2016, 10/30/2018    Influenza, Triv, inactivated, subunit, adjuvanted, IM (Fluad 65 yrs and older) 11/02/2017, 10/30/2018, 10/17/2019    Influenza, Trivalent Pf 10/22/2015    PPD Test 07/22/2018, 06/10/2019    Pneumococcal Conjugate 13-valent ([id number]) 01/25/2018    Pneumococcal Polysaccharide ([id number]) 12/07/2007    Zoster Live (Zostavax) 02/26/2010     No Known Allergies  Prior to Admit Medications:  Current Outpatient Medications   Medication Instructions    apixaban (ELIQUIS) 5 mg, Oral, 2 TIMES DAILY    aspirin 81 mg, Oral    carBAMazepine (TEGRETOL) 400 mg, Oral, 2 TIMES DAILY    docusate (COLACE, DULCOLAX) 100 mg, Oral, 2 TIMES DAILY PRN    furosemide (LASIX) 40 mg, Oral, DAILY    gabapentin (NEURONTIN) 400 MG capsule Take 2 tablets po [id number]    HYDROcodone-acetaminophen (NORCO)  MG per tablet 1 tablet, Oral, EVERY 8 HOURS PRN    losartan (COZAAR) 100 mg, Oral, DAILY    melatonin 5 mg, Oral    metoprolol succinate (TOPROL XL) 100 mg, Oral, DAILY    nitroGLYCERIN (NITROSTAT) 0.4 MG SL tablet TAKE AS DIRECTED.  omeprazole (PRILOSEC) 20 mg, Oral, DAILY    potassium chloride (KLOR-CON M) 20 MEQ extended release tablet 20 mEq, Oral, 2 TIMES DAILY    rosuvastatin (CRESTOR) 40 mg, Oral    sildenafil (VIAGRA) 50 mg, Oral, PRN         Objective:     Patient Vitals for the past 24 hrs:   Temp Pulse Resp BP SpO2   07/07/22 1315 97.6 °F (36.4 °C) 60 16 (!) 148/86 97 %       Oxygen Therapy  SpO2: 97 %    Estimated body mass index is 25.84 kg/m² as calculated from the following:    Height as of this encounter: 5' 9\" (1.753 m). Weight as of this encounter: 175 lb (79.4 kg). No intake or output data in the 24 hours ending 07/07/22 1809      Physical Exam:    Blood pressure (!) 148/86, pulse 60, temperature 97.6 °F (36.4 °C), temperature source Oral, resp. rate 16, height 5' 9\" (1.753 m), weight 175 lb (79.4 kg), SpO2 97 %. General:    Well nourished. Elderly, pleasant   Head:  Normocephalic, atraumatic  Eyes:  Sclerae appear normal.  Pupils equally round. ENT:  Nares appear normal, no drainage. Moist oral mucosa  Neck:  No restricted ROM. Trachea midline   CV:   RRR. No m/r/g. No jugular venous distension. No edema   Lungs:   Bilateral crackles  Abdomen: Bowel sounds present. Soft, nontender, nondistended. Extremities: Left hand with 3rd digit deformity and scrapes  Skin:     Hematoma left lateral frontal scalp    Neuro:  grossly intact. Mild upper extremity tremors  Psych:  Normal mood and affect.       I have reviewed ordered lab tests and independently visualized imaging below:    Last 24hr Labs:  Recent Results (from the past 24 hour(s))   EKG 12 Lead    Collection Time: 07/07/22  1:24 PM   Result Value Ref Range    Ventricular Rate 61 BPM    Atrial Rate 61 BPM    P-R Interval 210 ms    QRS Duration 128 ms    Q-T Interval 468 ms    QTc Calculation (Bazett) 471 ms    P Axis 67 degrees    R Axis -42 degrees    T Axis 124 degrees    Diagnosis       Sinus rhythm with 1st degree A-V block with occasional Premature ventricular   complexes     CBC with Auto Differential    Collection Time: 07/07/22  1:30 PM   Result Value Ref Range    WBC 7.9 4.3 - 11.1 K/uL    RBC 3.77 (L) 4.23 - 5.6 M/uL    Hemoglobin 13.1 (L) 13.6 - 17.2 g/dL    Hematocrit 38.8 (L) 41.1 - 50.3 %    .9 (H) 79.6 - 97.8 FL    MCH 34.7 (H) 26.1 - 32.9 PG    MCHC 33.8 31.4 - 35.0 g/dL    RDW 14.7 (H) 11.9 - 14.6 %    Platelets 073 442 - 520 K/uL    MPV 8.8 (L) 9.4 - 12.3 FL    nRBC 0.00 0.0 - 0.2 K/uL    Differential Type AUTOMATED      Seg Neutrophils 81 (H) 43 - 78 %    Lymphocytes 10 (L) 13 - 44 %    Monocytes 6 4.0 - 12.0 %    Eosinophils % 1 0.5 - 7.8 %    Basophils 1 0.0 - 2.0 %    Immature Granulocytes 1 0.0 - 5.0 %    Segs Absolute 6.4 1.7 - 8.2 K/UL    Absolute Lymph # 0.8 0.5 - 4.6 K/UL    Absolute Mono # 0.5 0.1 - 1.3 K/UL    Absolute Eos # 0.1 0.0 - 0.8 K/UL    Basophils Absolute 0.1 0.0 - 0.2 K/UL    Absolute Immature Granulocyte 0.0 0.0 - 0.5 K/UL   Comprehensive Metabolic Panel    Collection Time: 07/07/22  1:30 PM   Result Value Ref Range    Sodium 132 (L) 138 - 145 mmol/L    Potassium 4.6 3.5 - 5.1 mmol/L    Chloride 103 98 - 107 mmol/L    CO2 27 21 - 32 mmol/L    Anion Gap 2 (L) 7 - 16 mmol/L    Glucose 96 65 - 100 mg/dL    BUN 17 8 - 23 MG/DL    CREATININE 0.90 0.8 - 1.5 MG/DL    GFR African American >60 >60 ml/min/1.73m2    GFR Non- >60 >60 ml/min/1.73m2    Calcium 9.0 8.3 - 10.4 MG/DL    Total Bilirubin 1.3 (H) 0.2 - 1.1 MG/DL    ALT 25 12 - 65 U/L    AST 19 15 - 37 U/L    Alk Phosphatase 134 50 - 136 U/L    Total Protein 7.5 6.3 - 8.2 g/dL    Albumin 3.4 3.2 - 4.6 g/dL    Globulin 4.1 (H) 2.3 - 3.5 g/dL    Albumin/Globulin Ratio 0.8 (L) 1.2 - 3.5         Other Studies:  CT HEAD spine without contrast HISTORY: Trauma TECHNIQUE: 5 mm axial images were obtained from the skull base to the vertex without intravenous contrast. Helically acquired images were obtained spine reconstructed at 2.5 mm thickness. Sagittal and coronal reformatted images were submitted. All CT scans at this facility are performed using dose optimization technique as appropriate to a performed exam, to include on automated exposure control, adjustment of the mA and/or KV according to patient's size (including appropriate matching for site-specific examinations), or use of iterative reconstruction technique. COMPARISON: CT head 07/20/2018 CT head without contrast: Findings: The ventricles and sulci are appropriate for age. There are no extra-axial fluid collections. No evidence of acute intracranial hemorrhage or mass effect is identified. There is no evidence to suggest an acute major territorial infarct. There is a small area of encephalomalacia within the left occipital lobe. The bony calvarium is intact. The visualized mastoid air cells and paranasal sinuses are well pneumatized and aerated. CT cervical spine without contrast: Findings: The vertebral body height and alignment are well maintained. The disc space heights are well-preserved. There is no evidence of acute fracture or subluxation. The prevertebral soft tissues are unremarkable. Focal lucency within the left lateral recess at C5-6 most likely represents gas within extruded disc material. Bulky anterior osteophytes are present throughout the cervical spine. There is multilevel facet arthropathy with ankylosis at C3-4. There are advanced degenerative changes at the atlantodental joint. 1. No evidence of acute intracranial hemorrhage. 2. No evidence of acute cervical spine fracture. 3. Advanced cervical spondylosis. XR CHEST PORTABLE    Result Date: 7/7/2022  Chest X-ray INDICATION: Blake Rack out of truck A portable AP view of the chest was obtained. FINDINGS: The lungs are clear. There are no infiltrates or effusions. The heart size is normal.  There are sternotomy changes. No acute findings in the chest       Echocardiogram:  08/04/21    TRANSTHORACIC ECHOCARDIOGRAM (TTE) COMPLETE (CONTRAST/BUBBLE/3D PRN) 08/05/2021  7:10 AM (Final)    Narrative  This is a summary report. The complete report is available in the patient's medical record. If you cannot access the medical record, please contact the sending organization for a detailed fax or copy. · LA: Dilated left atrium. Left Atrium volume index is 46.06 mL/m2. · TV: Mild to moderate tricuspid valve regurgitation is present. · LV: Calculated LVEF is 41%. Normal wall thickness. Dilated left ventricle. Left ventricular diastolic dysfunction. · AV: Mild to moderate aortic valve regurgitation is present. · MV: Mitral valve non-specific thickening. Mild mitral valve regurgitation is present. · RA: Dilated right atrium. Signed by: Pamela Gonzalez MD on 8/5/2021  7:10 AM      Meds previously ordered:  No orders of the defined types were placed in this encounter. Signed:  Elyse Osorio MD    Part of this note may have been written by using a voice dictation software. The note has been proof read but may still contain some grammatical/other typographical errors.

## 2022-07-07 NOTE — ED PROVIDER NOTES
[facility] Emergency Department Provider Note                   PCP:                Josy Floyd MD               Age: 76 y.o. Sex: male     No diagnosis found. DISPOSITION          MDM  Number of Diagnoses or Management Options  Diagnosis management comments: EKG shows sinus rhythm with PVCs. Slight ST depression lateral leads. Blood work is unremarkable. CT head shows no intracranial hemorrhage and CT C-spine no acute fracture. Chest x-ray shows no acute abnormalities. Last echo approximately a year ago shows aortic, mitral and tricuspid regurgitation. EF of 55%, systolic dysfunction. No definite explanation for syncopal episode. Considering patient has now had 3 within the past 3 weeks and 2 today he should probably be admitted for further observation and work-up. Amount and/or Complexity of Data Reviewed  Clinical lab tests: ordered and reviewed  Tests in the radiology section of CPT®: ordered and reviewed  Discuss the patient with other providers: yes  Independent visualization of images, tracings, or specimens: yes    Risk of Complications, Morbidity, and/or Mortality  Presenting problems: high  Diagnostic procedures: high  Management options: high         Orders Placed This Encounter   Procedures    CT HEAD WO CONTRAST    CT CERVICAL SPINE WO CONTRAST    XR CHEST PORTABLE    CBC with Auto Differential    Comprehensive Metabolic Panel    Cardiac Monitor - ED Only    Continuous Pulse Oximetry    Orthostatic blood pressure and pulse    EKG 12 Lead        Kacey Joe is a 76 y.o. male who presents to the Emergency Department with chief complaint of    Chief Complaint   Patient presents with    Fall      57-year-old white male history of coronary artery disease with bypass and stenting as well as history of A. fib on Eliquis presents after 2 syncopal episodes today. Episodes occurred roughly 20 minutes apart. He had no presyncope symptoms.   States he just woke on the ground. Has contusion to left side of head but no other injuries. Has noted some shortness of breath with activity but states this has been present for years. Has been told by cardiology that there is nothing else that can be done. Had a previous syncopal episode roughly 3 weeks ago but did not seek medical attention at that time. The history is provided by the patient and the spouse. Review of Systems   Constitutional: Negative for fever. HENT: Negative for congestion. Respiratory: Positive for shortness of breath. Negative for cough. Cardiovascular: Negative for chest pain and leg swelling. Gastrointestinal: Negative for abdominal pain. Genitourinary: Negative for dysuria. Musculoskeletal: Negative for back pain. Skin: Negative for rash. Neurological: Positive for syncope. All other systems reviewed and are negative. Past Medical History:   Diagnosis Date    [facility]     Atrial flutter ([facility] 4/7/2017    CAD (coronary artery disease) 2006    Cancer [facility]) 2011    prostate    GERD (gastroesophageal reflux disease)     takes omeprazole    Heart failure ([facility]     Hypertension     Ill-defined condition     trigeminal neuralgia    Myocardial infarction ([facility] 06/06/2019    NSTEMI     Sleep apnea     does not wear CPAP    Stroke [facility]) 2018    TIA        Past Surgical History:   Procedure Laterality Date    CARDIAC CATHETERIZATION  08/14/2006    CARDIAC CATHETERIZATION  06/07/2019    Severe 3 vessel CAD with LV EF=40-45% and unsuccessful LCX PCI.  COLONOSCOPY N/A 3/2/2020    COLONOSCOPY performed by Tee Fuchs MD at [facility] CORONARY ARTERY BYPASS GRAFT  06/10/2019    3 vessel CABG with LIMA-LAD,SVG-OM,and SVG-R posterior lateral branch with MAZE & LA appendage clipping.     PROSTATE SURGERY          Family History   Problem Relation Age of Onset    Heart Disease Sister    [facility] Other Other cerebral aneurysm     Heart Disease Mother            Social Connections:     Frequency of Communication with Friends and Family: Not on file    Frequency of Social Gatherings with Friends and Family: Not on file    Attends Mandaen Services: Not on file    Active Member of Clubs or Organizations: Not on file    Attends Club or Organization Meetings: Not on file    Marital Status: Not on file        No Known Allergies     Vitals signs and nursing note reviewed. Patient Vitals for the past 4 hrs:   Temp Pulse Resp BP SpO2   07/07/22 1315 97.6 °F (36.4 °C) 60 16 (!) 148/86 97 %          Physical Exam  Vitals and nursing note reviewed. Constitutional:       General: He is not in acute distress. Appearance: Normal appearance. He is not toxic-appearing. HENT:      Head: Normocephalic. Comments: Contusion left parietal area. Nose: Nose normal.      Mouth/Throat:      Mouth: Mucous membranes are moist.      Pharynx: Oropharynx is clear. Eyes:      Extraocular Movements: Extraocular movements intact. Pupils: Pupils are equal, round, and reactive to light. Cardiovascular:      Rate and Rhythm: Normal rate and regular rhythm. Pulmonary:      Effort: Pulmonary effort is normal.      Breath sounds: Normal breath sounds. Abdominal:      General: There is no distension. Palpations: Abdomen is soft. Tenderness: There is no abdominal tenderness. Musculoskeletal:         General: No swelling or tenderness. Normal range of motion. Cervical back: Normal range of motion and neck supple. No rigidity or tenderness. Skin:     General: Skin is warm and dry. Neurological:      General: No focal deficit present. Mental Status: He is alert and oriented to person, place, and time.    Psychiatric:         Mood and Affect: Mood normal.         Behavior: Behavior normal.          EKG 12 Lead    Date/Time: 7/7/2022 3:43 PM  Performed by: Sariah Delacruz MD  Authorized by: Michelle Chang MD     ECG reviewed by ED Physician in the absence of a cardiologist: yes    Interpretation:     Interpretation: abnormal    Rate:     ECG rate assessment: normal    Rhythm:     Rhythm: sinus rhythm    Ectopy:     Ectopy: PVCs    QRS:     QRS axis:  Left  Conduction:     Conduction: abnormal      Abnormal conduction: non-specific intraventricular conduction delay    ST segments:     ST segments:  Non-specific          Labs Reviewed   CBC WITH AUTO DIFFERENTIAL - Abnormal; Notable for the following components:       Result Value    RBC 3.77 (*)     Hemoglobin 13.1 (*)     Hematocrit 38.8 (*)     .9 (*)     MCH 34.7 (*)     RDW 14.7 (*)     MPV 8.8 (*)     Seg Neutrophils 81 (*)     Lymphocytes 10 (*)     All other components within normal limits   COMPREHENSIVE METABOLIC PANEL - Abnormal; Notable for the following components:    Sodium 132 (*)     Anion Gap 2 (*)     Total Bilirubin 1.3 (*)     Globulin 4.1 (*)     Albumin/Globulin Ratio 0.8 (*)     All other components within normal limits        CT HEAD WO CONTRAST   Final Result   1. No evidence of acute intracranial hemorrhage. 2. No evidence of acute cervical spine fracture. 3. Advanced cervical spondylosis. CT CERVICAL SPINE WO CONTRAST   Final Result   1. No evidence of acute intracranial hemorrhage. 2. No evidence of acute cervical spine fracture. 3. Advanced cervical spondylosis. XR CHEST PORTABLE   Final Result   No acute findings in the chest                                Voice dictation software was used during the making of this note. This software is not perfect and grammatical and other typographical errors may be present. This note has not been completely proofread for errors.      Michelle Chang MD  07/07/22 8960

## 2022-07-07 NOTE — Clinical Note
TRANSFER - OUT REPORT:     Verbal report given to: . Report consisted of patient's Situation, Background, Assessment and   Recommendations(SBAR). Opportunity for questions and clarification was provided. Patient transported with a Registered Nurse and [address] [place] [address] / [facility] [employer].

## 2022-07-07 NOTE — Clinical Note
TRANSFER - OUT REPORT:     Verbal report given to: RN. Report consisted of patient's Situation, Background, Assessment and   Recommendations(SBAR). Opportunity for questions and clarification was provided. Patient transported with a Registered Nurse. Patient transported to: 5. TRANSFER - OUT REPORT:    Verbal report given to RN on Bushra Keita being transferred to 726 for routine progression of patient care       Report consisted of patient's Situation, Background, Assessment and   Recommendations(SBAR). Information from the following report(s) Nurse Handoff Report was reviewed with the receiving nurse.     Lines: @LDA(4,5,7,14,12,24,25,26)@     Opportunity for questions and clarification was provided.      Patient transported with:  Monitor and Tech    S/p LOOP implant Dr Zeb Gold  Left chest incision - primaseal dressing  [facility] only

## 2022-07-07 NOTE — Clinical Note
HOSPITALIST PROGRESS NOTE       Patient: Danilo Aj Date: 2018   : 1935    82 year old male Date of admit:   2018     Consultant(s):  IP Consult Orders     Start     Ordered    18 1045  Inpatient consult to Infectious Diseases  ONE TIME     Provider:  Kadie Solis MD    18 1045    18 0622  Inpatient consult to Pulmonology  ONE TIME     Provider:  Brooks Sánchez MD    18 06        CHIEF COMPLAINT: Follow up for acute hypoxic respiratory failure    SUBJECTIVE     No acute events overnight. He has no complaints except that he wants his hand mittens off. Per RN pt gets waxing/waning delirium and pulls out IV lines and BIPAP. He currently denies fever, chills, pain, chest pain, nausea, vomiting, or diarrhea.     Current medications      Scheduled:   • levofloxacin  500 mg Intravenous Daily   • furosemide  40 mg Intravenous 3 times per day   • ceFEPime (MAXIPIME) IVPB for Most Indications  2,000 mg Intravenous 3 times per day   • albuterol-ipratropium 2.5 mg/0.5 mg  3 mL Nebulization Q4H Resp   • pantoprazole  40 mg Oral Nightly   • budesonide  0.5 mg Nebulization BID Resp   • methylPREDNISolone  60 mg Intravenous 4 times per day   • sodium chloride (PF)  2 mL Injection 2 times per day   • sodium chloride (PF)  2 mL Injection Once   • enoxaparin (LOVENOX) injection  40 mg Subcutaneous Q24H   • amiodarone  200 mg Oral Daily   • aspirin  81 mg Oral Daily   • clopidogrel  75 mg Oral Daily   • DULoxetine  30 mg Oral Daily   • simvastatin  5 mg Oral Nightly        Continuous Infusions:   • sodium chloride 0.9% infusion 20 mL/hr at 18 1200         PHYSICAL EXAM        Vital signs Last recorded value   Temperature  98.4 °F (36.9 °C) (18 1107)       Pulse  88 (18 1107)   Respiratory  21 (18 1107)   Blood Pressure  164/78 (18 1107)   Pulse Oximetry  95 % (18 1107)       Weight  110.7 kg (18 0600)  (upon admit:  Weight: 99.8 kg  TRANSFER - IN REPORT:     Verbal report received from: . Report consisted of patient's Situation, Background, Assessment and   Recommendations(SBAR). Opportunity for questions and clarification was provided. Assessment completed upon patient's arrival to unit and care assumed. Patient transported with a Registered Nurse and [address] [place] [address] / [facility] [employer]. (05/06/18 1640)  )     Intake/Output:    Intake/Output Summary (Last 24 hours) at 05/09/18 1210  Last data filed at 05/09/18 0900   Gross per 24 hour   Intake              903 ml   Output             2875 ml   Net            -1972 ml         GENERAL APPEARANCE:  No acute distress, awake and oriented x2  EYES: Extraocular movements intact, no conjunctival injection.   MOUTH/THROAT: Oropharynx appears normal. Moist mucosa.   NECK: Neck is supple,  LUNGS: nonlabored respiratory effort. Course expiratory wheeze/rhonchi. Decreased at the bases  HEART:  Regular rate and rhythm, no murmurs  ABDOMEN: Soft, nontender, non-distended. Normal bowel sounds. No hepatosplenomegaly. No masses  EXTREMITIES: warm, well perfused, no edema.    DATA REVIEWED         Recent Labs  Lab 05/09/18  0429 05/08/18  0507 05/07/18  1523 05/07/18  0500 05/06/18  1725   WBC 5.0 12.2*  --  37.0* 22.8*   HCT 27.8* 27.6*  --  30.0* 34.3*   HGB 8.5* 8.3*  --  9.0* 10.5*    138*  --  154 241   INR  --   --  1.1  --   --    SODIUM 142  --   --  139 136   POTASSIUM 3.7  --   --  4.8 5.2*   CHLORIDE 105  --   --  107 101   CO2 27  --   --  23 26   GLUCOSE 126*  --   --  195* 185*   BUN 26*  --   --  17 14   CREATININE 0.82  --   --  1.00 1.18*   CALCIUM 7.3*  --   --  7.4* 8.4   ALBUMIN  --   --   --   --  2.3*   MG  --   --   --   --  2.0   BILIRUBIN  --   --   --   --  0.6   ALKPT  --   --   --   --  90   AST  --   --   --   --  45*   GPT  --   --   --   --  17       Xr Chest Ap Or Pa    Result Date: 5/8/2018  XR CHEST AP OR PA HISTORY: As ordered: hypoxia .    COMPARISON: Yesterday, Sunday . TECHNIQUE: The AP Chest image was performed at  5/8/2018 7:49 AM FINDINGS: Lines and catheters: None. Lungs and pleura:  Patchy mid right lung field and bibasilar moderate infiltrates are similar to yesterday's study, increased from study 5/6/2018. . Bibasilar small effusions appear unchanged. Heart: Cardiomegaly is stable Pulmonary vessels: Congested, also  increased from 5/6/2018.         IMPRESSION: Persistent bilateral infiltrates and small effusions without clear change from prior day study.     Xr Chest Ap Or Pa    Result Date: 5/7/2018  PROCEDURE: XR chest AP TECHNIQUE:  AP view of the chest was performed. HISTORY:  Status post left thoracentesis COMPARISON:  Chest radiograph(s), 5/6/2018.     FINDINGS/IMPRESSION: Since yesterday, interval decrease in size of small left pleural effusion. Trace right pleural effusion. Otherwise no change. Bilateral lower lung, right greater than left, consolidation. Aortic calcification. No pneumothorax. Degenerative change of the spine and both shoulders.     Ir Thoracentesis    Result Date: 5/7/2018  CLINICAL HISTORY:  82 years -old  Male  with presents with left pleural effusion. PROCEDURE PERFORMED: Ultrasound-guided thoracentesis. STAFF:  Dr. Stiven Skaggs CONSENT:    The risks, benefits and alternatives to the procedure were explained to the patient and informed written consent was obtained. SEDATION:  None   PROCEDURE/FINDINGS: A  5 -[place] Onestep  catheter was introduced into the largest pocket of left sided pleural fluid using ultrasound guidance, after infiltration of the skin and deep tissues with local anesthetic.  A total of 400 ml of serosanguinous  fluid was removed .The catheter was removed, and manual pressure was applied to the puncture site, followed by application of a sterile dressing. A sample of the fluid was sent for requested lab work The patient tolerated the procedure well with no immediate complications. This procedure was performed using Ultrasound .     IMPRESSION:  Successful thoracentesis as discussed above.     ASSESSMENT AND PLAN     Danilo Aj is a 82 year old male with significant past medical history of anemia, CAD, CKD stage 3, CHF, COPD, pAfib, who was admitted on 5/6/2018 presenting with    Acute hypoxic respiratory failure S/p thoracentesis on 5/7 which did not suggest an empyema.  Viral panel negative. CXR on 5/9 showed large areas of parenchymal infiltrate redemonstrated within the inferior right upper lobe and bilateral lower lobe. Differential includes hospital acquired pneumonia, acute COPD, acute CHF. Leukocytosis and procalcitonin downtrending. Currently on BIPAP  ---continue scheduled Duonebs q4h, Pulmicort, IV steroids  ---Continue Maxipime, Levaquin. Vancomycin d/c'd  ---wean off BIPAP as tolerated  ---f/u fluid culture, BCx  ---ID and Pulmonology consulted, appreciate recs    Hx of chronic systolic CHF: Echo on 5/7 showed EF of 50% and severe pulm HTN  ---decreased IVFs, currently net negative 2.2L in the past day  ---continue IV Lasix 40mg TID  ---monitor I/O's closely    Hx of paroxysmal Afib: on amiodarone 200mg qday    Hx of CAD: on ASA, Plavix    Hx of CKD stage 3, stable         Fluids: NS @ 20  Electrolytes: Replete PRN  Nutrition:Regular Diet  Prophylaxis:         Deep Venous Thrombosis: Lovenox        Gastrointestinal:  Protonix     DISPOSITION  Hospital Day Number: Hospital Day: 4  Tentative Discharge Disposition: [place]  Code Status: Prior  Primary Care Physician:MD Corey Be MD  Hospitalist  Board Certified, Family Medicine

## 2022-07-07 NOTE — ED TRIAGE NOTES
Patient ambulatory to triage with mask in place. Patient reports he fell while getting out of his truck. Pt reports he fell and hit his head. Pt reports he fell earlier in the day. Pt is unsure if he passed out or LOC.

## 2022-07-08 ENCOUNTER — APPOINTMENT (OUTPATIENT)
Dept: NON INVASIVE DIAGNOSTICS | Age: 77
DRG: 261 | End: 2022-07-08
Payer: MEDICARE

## 2022-07-08 ENCOUNTER — APPOINTMENT (OUTPATIENT)
Dept: ULTRASOUND IMAGING | Age: 77
DRG: 261 | End: 2022-07-08
Payer: MEDICARE

## 2022-07-08 LAB
ALBUMIN SERPL-MCNC: 3 G/DL (ref 3.2–4.6)
ALBUMIN/GLOB SERPL: 0.8 {RATIO} (ref 1.2–3.5)
ALP SERPL-CCNC: 131 U/L (ref 50–136)
ALT SERPL-CCNC: 21 U/L (ref 12–65)
ANION GAP SERPL CALC-SCNC: 6 MMOL/L (ref 7–16)
AST SERPL-CCNC: 16 U/L (ref 15–37)
BASOPHILS # BLD: 0.1 K/UL (ref 0–0.2)
BASOPHILS NFR BLD: 1 % (ref 0–2)
BILIRUB DIRECT SERPL-MCNC: 0.5 MG/DL
BILIRUB SERPL-MCNC: 1.1 MG/DL (ref 0.2–1.1)
BUN SERPL-MCNC: 15 MG/DL (ref 8–23)
CALCIUM SERPL-MCNC: 8.2 MG/DL (ref 8.3–10.4)
CHLORIDE SERPL-SCNC: 104 MMOL/L (ref 98–107)
CO2 SERPL-SCNC: 25 MMOL/L (ref 21–32)
CORTIS BS SERPL-MCNC: 8.9 UG/DL
CREAT SERPL-MCNC: 0.9 MG/DL (ref 0.8–1.5)
DIFFERENTIAL METHOD BLD: ABNORMAL
ECHO AO ASC DIAM: 3.6 CM
ECHO AO ASCENDING AORTA INDEX: 1.85 CM/M2
ECHO AO ROOT DIAM: 3.8 CM
ECHO AO ROOT INDEX: 1.95 CM/M2
ECHO AV AREA PEAK VELOCITY: 2 CM2
ECHO AV AREA VTI: 2.1 CM2
ECHO AV AREA/BSA PEAK VELOCITY: 1 CM2/M2
ECHO AV AREA/BSA VTI: 1.1 CM2/M2
ECHO AV MEAN GRADIENT: 3 MMHG
ECHO AV MEAN VELOCITY: 0.8 M/S
ECHO AV PEAK GRADIENT: 7 MMHG
ECHO AV PEAK VELOCITY: 1.3 M/S
ECHO AV VELOCITY RATIO: 0.54
ECHO AV VTI: 27.5 CM
ECHO BSA: 1.97 M2
ECHO EST RA PRESSURE: 15 MMHG
ECHO IVC PROX: 2.9 CM
ECHO LA AREA 2C: 22.9 CM2
ECHO LA AREA 4C: 22.4 CM2
ECHO LA MAJOR AXIS: 6.3 CM
ECHO LA MINOR AXIS: 6 CM
ECHO LA VOL BP: 69 ML (ref 18–58)
ECHO LA VOL/BSA BIPLANE: 35 ML/M2 (ref 16–34)
ECHO LV EDV A2C: 147 ML
ECHO LV EDV A4C: 128 ML
ECHO LV EDV INDEX A4C: 66 ML/M2
ECHO LV EDV NDEX A2C: 75 ML/M2
ECHO LV EJECTION FRACTION A2C: 42 %
ECHO LV EJECTION FRACTION A4C: 38 %
ECHO LV EJECTION FRACTION BIPLANE: 40 % (ref 55–100)
ECHO LV ESV A2C: 85 ML
ECHO LV ESV A4C: 79 ML
ECHO LV ESV INDEX A2C: 44 ML/M2
ECHO LV ESV INDEX A4C: 41 ML/M2
ECHO LV FRACTIONAL SHORTENING: 22 % (ref 28–44)
ECHO LV INTERNAL DIMENSION DIASTOLE INDEX: 2.56 CM/M2
ECHO LV INTERNAL DIMENSION DIASTOLIC: 5 CM (ref 4.2–5.9)
ECHO LV INTERNAL DIMENSION SYSTOLIC INDEX: 2 CM/M2
ECHO LV INTERNAL DIMENSION SYSTOLIC: 3.9 CM
ECHO LV IVSD: 1 CM (ref 0.6–1)
ECHO LV MASS 2D: 194.4 G (ref 88–224)
ECHO LV MASS INDEX 2D: 99.7 G/M2 (ref 49–115)
ECHO LV POSTERIOR WALL DIASTOLIC: 1.1 CM (ref 0.6–1)
ECHO LV RELATIVE WALL THICKNESS RATIO: 0.44
ECHO LVOT AREA: 3.8 CM2
ECHO LVOT AV VTI INDEX: 0.54
ECHO LVOT DIAM: 2.2 CM
ECHO LVOT MEAN GRADIENT: 1 MMHG
ECHO LVOT PEAK GRADIENT: 2 MMHG
ECHO LVOT PEAK VELOCITY: 0.7 M/S
ECHO LVOT STROKE VOLUME INDEX: 28.8 ML/M2
ECHO LVOT SV: 56.2 ML
ECHO LVOT VTI: 14.8 CM
ECHO MV AREA VTI: 2 CM2
ECHO MV LVOT VTI INDEX: 1.94
ECHO MV MAX VELOCITY: 0.9 M/S
ECHO MV MEAN GRADIENT: 1 MMHG
ECHO MV MEAN VELOCITY: 0.4 M/S
ECHO MV PEAK GRADIENT: 3 MMHG
ECHO MV REGURGITANT PEAK GRADIENT: 88 MMHG
ECHO MV REGURGITANT PEAK VELOCITY: 4.7 M/S
ECHO MV REGURGITANT VTIA: 168 CM
ECHO MV VTI: 28.7 CM
ECHO RIGHT VENTRICULAR SYSTOLIC PRESSURE (RVSP): 59 MMHG
ECHO RV BASAL DIMENSION: 4.3 CM
ECHO RV MID DIMENSION: 3.6 CM
ECHO RV TAPSE: 1.5 CM (ref 1.7–?)
ECHO TV REGURGITANT MAX VELOCITY: 3.33 M/S
ECHO TV REGURGITANT PEAK GRADIENT: 44 MMHG
EOSINOPHIL # BLD: 0.2 K/UL (ref 0–0.8)
EOSINOPHIL NFR BLD: 3 % (ref 0.5–7.8)
ERYTHROCYTE [DISTWIDTH] IN BLOOD BY AUTOMATED COUNT: 14.8 % (ref 11.9–14.6)
GLOBULIN SER CALC-MCNC: 4 G/DL (ref 2.3–3.5)
GLUCOSE SERPL-MCNC: 105 MG/DL (ref 65–100)
HCT VFR BLD AUTO: 35.6 % (ref 41.1–50.3)
HGB BLD-MCNC: 11.9 G/DL (ref 13.6–17.2)
IMM GRANULOCYTES # BLD AUTO: 0 K/UL (ref 0–0.5)
IMM GRANULOCYTES NFR BLD AUTO: 1 % (ref 0–5)
LYMPHOCYTES # BLD: 1.1 K/UL (ref 0.5–4.6)
LYMPHOCYTES NFR BLD: 20 % (ref 13–44)
MCH RBC QN AUTO: 34.3 PG (ref 26.1–32.9)
MCHC RBC AUTO-ENTMCNC: 33.4 G/DL (ref 31.4–35)
MCV RBC AUTO: 102.6 FL (ref 79.6–97.8)
MONOCYTES # BLD: 0.5 K/UL (ref 0.1–1.3)
MONOCYTES NFR BLD: 9 % (ref 4–12)
NEUTS SEG # BLD: 3.6 K/UL (ref 1.7–8.2)
NEUTS SEG NFR BLD: 66 % (ref 43–78)
NRBC # BLD: 0 K/UL (ref 0–0.2)
PLATELET # BLD AUTO: 155 K/UL (ref 150–450)
PMV BLD AUTO: 8.9 FL (ref 9.4–12.3)
POTASSIUM SERPL-SCNC: 4 MMOL/L (ref 3.5–5.1)
PROT SERPL-MCNC: 7 G/DL (ref 6.3–8.2)
RBC # BLD AUTO: 3.47 M/UL (ref 4.23–5.6)
SODIUM SERPL-SCNC: 135 MMOL/L (ref 138–145)
TROPONIN I SERPL HS-MCNC: 11.8 PG/ML (ref 0–14)
TROPONIN I SERPL HS-MCNC: 12.4 PG/ML (ref 0–14)
WBC # BLD AUTO: 5.4 K/UL (ref 4.3–11.1)

## 2022-07-08 PROCEDURE — 93880 EXTRACRANIAL BILAT STUDY: CPT

## 2022-07-08 PROCEDURE — 97116 GAIT TRAINING THERAPY: CPT

## 2022-07-08 PROCEDURE — 82533 TOTAL CORTISOL: CPT

## 2022-07-08 PROCEDURE — 2580000003 HC RX 258: Performed by: STUDENT IN AN ORGANIZED HEALTH CARE EDUCATION/TRAINING PROGRAM

## 2022-07-08 PROCEDURE — 97161 PT EVAL LOW COMPLEX 20 MIN: CPT

## 2022-07-08 PROCEDURE — 97535 SELF CARE MNGMENT TRAINING: CPT

## 2022-07-08 PROCEDURE — 6370000000 HC RX 637 (ALT 250 FOR IP): Performed by: INTERNAL MEDICINE

## 2022-07-08 PROCEDURE — 6360000004 HC RX CONTRAST MEDICATION: Performed by: STUDENT IN AN ORGANIZED HEALTH CARE EDUCATION/TRAINING PROGRAM

## 2022-07-08 PROCEDURE — 99222 1ST HOSP IP/OBS MODERATE 55: CPT | Performed by: INTERNAL MEDICINE

## 2022-07-08 PROCEDURE — 2580000003 HC RX 258: Performed by: INTERNAL MEDICINE

## 2022-07-08 PROCEDURE — 97165 OT EVAL LOW COMPLEX 30 MIN: CPT

## 2022-07-08 PROCEDURE — 93321 DOPPLER ECHO F-UP/LMTD STD: CPT | Performed by: INTERNAL MEDICINE

## 2022-07-08 PROCEDURE — 85025 COMPLETE CBC W/AUTO DIFF WBC: CPT

## 2022-07-08 PROCEDURE — 80076 HEPATIC FUNCTION PANEL: CPT

## 2022-07-08 PROCEDURE — 97112 NEUROMUSCULAR REEDUCATION: CPT

## 2022-07-08 PROCEDURE — 93308 TTE F-UP OR LMTD: CPT | Performed by: INTERNAL MEDICINE

## 2022-07-08 PROCEDURE — 80048 BASIC METABOLIC PNL TOTAL CA: CPT

## 2022-07-08 PROCEDURE — 93325 DOPPLER ECHO COLOR FLOW MAPG: CPT | Performed by: INTERNAL MEDICINE

## 2022-07-08 PROCEDURE — 84484 ASSAY OF TROPONIN QUANT: CPT

## 2022-07-08 PROCEDURE — C8929 TTE W OR WO FOL WCON,DOPPLER: HCPCS

## 2022-07-08 PROCEDURE — 1100000003 HC PRIVATE W/ TELEMETRY

## 2022-07-08 PROCEDURE — 36415 COLL VENOUS BLD VENIPUNCTURE: CPT

## 2022-07-08 RX ORDER — METOPROLOL SUCCINATE 25 MG/1
25 TABLET, EXTENDED RELEASE ORAL DAILY
Status: CANCELLED | OUTPATIENT
Start: 2022-07-09

## 2022-07-08 RX ORDER — LOSARTAN POTASSIUM 50 MG/1
50 TABLET ORAL DAILY
Status: CANCELLED | OUTPATIENT
Start: 2022-07-09

## 2022-07-08 RX ADMIN — LOSARTAN POTASSIUM 100 MG: 50 TABLET, FILM COATED ORAL at 08:58

## 2022-07-08 RX ADMIN — FUROSEMIDE 40 MG: 40 TABLET ORAL at 08:57

## 2022-07-08 RX ADMIN — SODIUM CHLORIDE, PRESERVATIVE FREE 10 ML: 5 INJECTION INTRAVENOUS at 09:00

## 2022-07-08 RX ADMIN — PANTOPRAZOLE SODIUM 40 MG: 40 TABLET, DELAYED RELEASE ORAL at 06:05

## 2022-07-08 RX ADMIN — CARBAMAZEPINE 400 MG: 100 TABLET, CHEWABLE ORAL at 08:57

## 2022-07-08 RX ADMIN — GABAPENTIN 400 MG: 400 CAPSULE ORAL at 21:00

## 2022-07-08 RX ADMIN — GABAPENTIN 400 MG: 400 CAPSULE ORAL at 08:58

## 2022-07-08 RX ADMIN — APIXABAN 5 MG: 5 TABLET, FILM COATED ORAL at 21:00

## 2022-07-08 RX ADMIN — METOPROLOL SUCCINATE 100 MG: 100 TABLET, EXTENDED RELEASE ORAL at 08:57

## 2022-07-08 RX ADMIN — APIXABAN 5 MG: 5 TABLET, FILM COATED ORAL at 08:57

## 2022-07-08 RX ADMIN — SODIUM CHLORIDE, PRESERVATIVE FREE 10 ML: 5 INJECTION INTRAVENOUS at 21:00

## 2022-07-08 RX ADMIN — ASPIRIN 81 MG: 81 TABLET ORAL at 08:57

## 2022-07-08 RX ADMIN — HYDROCODONE BITARTRATE AND ACETAMINOPHEN 1 TABLET: 10; 325 TABLET ORAL at 18:18

## 2022-07-08 RX ADMIN — PERFLUTREN 2 ML: 6.52 INJECTION, SUSPENSION INTRAVENOUS at 08:20

## 2022-07-08 RX ADMIN — GABAPENTIN 400 MG: 400 CAPSULE ORAL at 14:44

## 2022-07-08 ASSESSMENT — PAIN SCALES - GENERAL
PAINLEVEL_OUTOF10: 0
PAINLEVEL_OUTOF10: 0
PAINLEVEL_OUTOF10: 6
PAINLEVEL_OUTOF10: 0

## 2022-07-08 ASSESSMENT — PAIN DESCRIPTION - DESCRIPTORS: DESCRIPTORS: ACHING

## 2022-07-08 ASSESSMENT — PAIN DESCRIPTION - ORIENTATION: ORIENTATION: RIGHT

## 2022-07-08 ASSESSMENT — PAIN - FUNCTIONAL ASSESSMENT: PAIN_FUNCTIONAL_ASSESSMENT: PREVENTS OR INTERFERES SOME ACTIVE ACTIVITIES AND ADLS

## 2022-07-08 ASSESSMENT — PAIN DESCRIPTION - LOCATION: LOCATION: FACE

## 2022-07-08 NOTE — PROGRESS NOTES
Hospitalist Progress Note   Admit Date:  2022  2:45 PM   Name:  Nancy Sweet   Age:  76 y.o. Sex:  male  :  1945   MRN:  [id number]   Room:  [facility]/    Presenting Complaint: Fall     Reason(s) for Admission: Syncope and collapse [R55]  Contusion of scalp, initial encounter [S00.03XA]  Syncope, unspecified syncope type [R55]     Hospital Course & Interval History:   Nancy Sweet is a 76 y.o. male with medical history of AFIB on eliquis, trigeminal neuralgia, HTN, HFrEF, CAD s/p CABG 2019 who has been having increased syncope episodes the past 2-3 days. Daughter Ilana Cabrera present and gives some history. 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 He did hit his head and has a hematoma left scalp. He drove to his office where his daughter thought he needed medical attention and he declined. She did call EMS and then after that evaluation he agreed for her to drive him to the ED. Pt reports he was seen by PCP off 2 mths ago c/o decreased energy and easy fatigability so PCP started him on Imdur; this is his only recent medication adjustment. In ER, CT head and Cspine negative. CXR no acute issues. Troponin mildly elevated; hospitalist consulted by ER staff for inpatient admission.     Subjective/24hr Events (22): \"I don't know why I keep passing out; maybe it's my time. \"  Very pleasant and talkative 77y.o. WM sitting up in bed without any complaints    Currently denies dyspnea, CP, N/V, dizziness, vertigo symptoms.       Assessment & Plan:     Principal Problem:    Syncope and collapse  - suspect cardiogenic in etiology; echo pending, cardiology evaluation pending  - check cortisol level / orthostatic VS for completeness  - carotid doppler pending; PT/OT eval pending    Active Problems:    Left hand pain  - trauma post fall; xray reviewed, negative for fx      Hyponatremia  - resolved; 132 ~> 135 this AM      Atrial fibrillation  - rate ~controlled  - cont metoprolol / apixaban      Dyspnea on exertion  - does not appear in volume overload  - check ambulatory saturations  - may need PFTs given hx of tobacco abuse and known tracheobronchomalacia      HTN  - BP controlled  - cont oral agents      Discharge Planning:    - anticipate at least 2 midnight hospitalization; likely home on dc    Diet:  ADULT DIET; Regular; Low Fat/Low Chol/High Fiber/POLLO  DVT PPx: apixaban  Code status: DNR    Hospital Problems:  Principal Problem:    Syncope and collapse  Active Problems:    Hyponatremia    Anemia    DNR (do not resuscitate)    Hand pain    Tracheobronchomalacia    Dyslipidemia    Hypertension    Systolic CHF, chronic (HCC)    Coronary atherosclerosis of native coronary vessel    Trigeminal neuralgia    Atrial fibrillation (HCC)  Resolved Problems:    * No resolved hospital problems.  *      Objective:     Patient Vitals for the past 24 hrs:   Temp Pulse Resp BP SpO2   07/08/22 0746 98.1 °F (36.7 °C) 59 16 (!) 141/87 96 %   07/08/22 0317 97.7 °F (36.5 °C) 58 18 120/75 96 %   07/07/22 2305 97.9 °F (36.6 °C) 82 16 132/70 93 %   07/07/22 2140 -- -- 18 -- --   07/07/22 2110 -- -- 18 -- --   07/07/22 2030 97.5 °F (36.4 °C) 68 18 (!) 140/92 93 %   07/07/22 1929 97.5 °F (36.4 °C) 61 20 (!) 178/99 97 %   07/07/22 1845 -- 63 19 (!) 119/94 93 %   07/07/22 1831 -- 61 15 (!) 160/83 94 %   07/07/22 1816 -- 74 21 (!) 164/79 93 %   07/07/22 1801 -- 61 18 (!) 159/113 98 %   07/07/22 1746 -- 65 15 (!) 150/104 95 %   07/07/22 1731 -- 64 15 (!) 169/95 95 %   07/07/22 1716 -- 62 14 (!) 158/95 98 %   07/07/22 1701 -- 64 19 (!) 167/89 97 %   07/07/22 1646 -- 64 19 (!) 163/89 97 %   07/07/22 1531 -- 64 19 137/89 97 %   07/07/22 1516 -- 61 16 (!) 156/91 95 %   07/07/22 1446 -- -- -- (!) 146/93 --   07/07/22 1315 97.6 °F (36.4 °C) 60 16 (!) 148/86 97 %       Oxygen Therapy  SpO2: 96 %  O2 Device: None (Room air)    Estimated body mass index is 25.84 kg/m² as calculated from the following:    Height as of this encounter: 5' 9\" (1.753 m). Weight as of this encounter: 175 lb (79.4 kg). Intake/Output Summary (Last 24 hours) at 7/8/2022 1105  Last data filed at 7/8/2022 0933  Gross per 24 hour   Intake 240 ml   Output --   Net 240 ml         Physical Exam:     Blood pressure (!) 141/87, pulse 59, temperature 98.1 °F (36.7 °C), temperature source Oral, resp. rate 16, height 5' 9\" (1.753 m), weight 175 lb (79.4 kg), SpO2 96 %. General:    Well nourished. Head:  Normocephalic, atraumatic  Eyes:  Sclerae appear normal.  Pupils equally round. ENT:  Nares appear normal, no drainage. Moist oral mucosa  Neck:  No restricted ROM. Trachea midline   CV:   Irreg rhythm, rate ~controlled  Lungs:   CTAB. No wheezing, rhonchi, or rales. Symmetric expansion. Abdomen: Bowel sounds present. Soft, nontender, nondistended. Extremities: No cyanosis or clubbing. No edema  Skin:     No rashes and normal coloration. Warm and dry. Neuro:  CN II-XII grossly intact. Sensation intact. A&Ox3  Psych:  Normal mood and affect.       I have reviewed ordered lab tests and independently visualized imaging below:    Recent Labs:  Recent Results (from the past 48 hour(s))   EKG 12 Lead    Collection Time: 07/07/22  1:24 PM   Result Value Ref Range    Ventricular Rate 61 BPM    Atrial Rate 61 BPM    P-R Interval 210 ms    QRS Duration 128 ms    Q-T Interval 468 ms    QTc Calculation (Bazett) 471 ms    P Axis 67 degrees    R Axis -42 degrees    T Axis 124 degrees    Diagnosis       Sinus rhythm with 1st degree A-V block with occasional Premature ventricular   complexes     CBC with Auto Differential    Collection Time: 07/07/22  1:30 PM   Result Value Ref Range    WBC 7.9 4.3 - 11.1 K/uL    RBC 3.77 (L) 4.23 - 5.6 M/uL    Hemoglobin 13.1 (L) 13.6 - 17.2 g/dL    Hematocrit 38.8 (L) 41.1 - 50.3 %    .9 (H) 79.6 - 97.8 FL    MCH 34.7 (H) 26.1 - 32.9 PG MCHC 33.8 31.4 - 35.0 g/dL    RDW 14.7 (H) 11.9 - 14.6 %    Platelets 751 330 - 191 K/uL    MPV 8.8 (L) 9.4 - 12.3 FL    nRBC 0.00 0.0 - 0.2 K/uL    Differential Type AUTOMATED      Seg Neutrophils 81 (H) 43 - 78 %    Lymphocytes 10 (L) 13 - 44 %    Monocytes 6 4.0 - 12.0 %    Eosinophils % 1 0.5 - 7.8 %    Basophils 1 0.0 - 2.0 %    Immature Granulocytes 1 0.0 - 5.0 %    Segs Absolute 6.4 1.7 - 8.2 K/UL    Absolute Lymph # 0.8 0.5 - 4.6 K/UL    Absolute Mono # 0.5 0.1 - 1.3 K/UL    Absolute Eos # 0.1 0.0 - 0.8 K/UL    Basophils Absolute 0.1 0.0 - 0.2 K/UL    Absolute Immature Granulocyte 0.0 0.0 - 0.5 K/UL   Comprehensive Metabolic Panel    Collection Time: 07/07/22  1:30 PM   Result Value Ref Range    Sodium 132 (L) 138 - 145 mmol/L    Potassium 4.6 3.5 - 5.1 mmol/L    Chloride 103 98 - 107 mmol/L    CO2 27 21 - 32 mmol/L    Anion Gap 2 (L) 7 - 16 mmol/L    Glucose 96 65 - 100 mg/dL    BUN 17 8 - 23 MG/DL    CREATININE 0.90 0.8 - 1.5 MG/DL    GFR African American >60 >60 ml/min/1.73m2    GFR Non- >60 >60 ml/min/1.73m2    Calcium 9.0 8.3 - 10.4 MG/DL    Total Bilirubin 1.3 (H) 0.2 - 1.1 MG/DL    ALT 25 12 - 65 U/L    AST 19 15 - 37 U/L    Alk Phosphatase 134 50 - 136 U/L    Total Protein 7.5 6.3 - 8.2 g/dL    Albumin 3.4 3.2 - 4.6 g/dL    Globulin 4.1 (H) 2.3 - 3.5 g/dL    Albumin/Globulin Ratio 0.8 (L) 1.2 - 3.5     Troponin    Collection Time: 07/07/22  8:07 PM   Result Value Ref Range    Troponin, High Sensitivity 14.2 (H) 0 - 14 pg/mL   Basic Metabolic Panel w/ Reflex to MG    Collection Time: 07/08/22  5:14 AM   Result Value Ref Range    Sodium 135 (L) 138 - 145 mmol/L    Potassium 4.0 3.5 - 5.1 mmol/L    Chloride 104 98 - 107 mmol/L    CO2 25 21 - 32 mmol/L    Anion Gap 6 (L) 7 - 16 mmol/L    Glucose 105 (H) 65 - 100 mg/dL    BUN 15 8 - 23 MG/DL    CREATININE 0.90 0.8 - 1.5 MG/DL    GFR African American >60 >60 ml/min/1.73m2    GFR Non- >60 >60 ml/min/1.73m2    Calcium 8.2 (L) 8.3 - 10.4 MG/DL   CBC with Auto Differential    Collection Time: 07/08/22  5:14 AM   Result Value Ref Range    WBC 5.4 4.3 - 11.1 K/uL    RBC 3.47 (L) 4.23 - 5.6 M/uL    Hemoglobin 11.9 (L) 13.6 - 17.2 g/dL    Hematocrit 35.6 (L) 41.1 - 50.3 %    .6 (H) 79.6 - 97.8 FL    MCH 34.3 (H) 26.1 - 32.9 PG    MCHC 33.4 31.4 - 35.0 g/dL    RDW 14.8 (H) 11.9 - 14.6 %    Platelets 366 346 - 400 K/uL    MPV 8.9 (L) 9.4 - 12.3 FL    nRBC 0.00 0.0 - 0.2 K/uL    Differential Type AUTOMATED      Seg Neutrophils 66 43 - 78 %    Lymphocytes 20 13 - 44 %    Monocytes 9 4.0 - 12.0 %    Eosinophils % 3 0.5 - 7.8 %    Basophils 1 0.0 - 2.0 %    Immature Granulocytes 1 0.0 - 5.0 %    Segs Absolute 3.6 1.7 - 8.2 K/UL    Absolute Lymph # 1.1 0.5 - 4.6 K/UL    Absolute Mono # 0.5 0.1 - 1.3 K/UL    Absolute Eos # 0.2 0.0 - 0.8 K/UL    Basophils Absolute 0.1 0.0 - 0.2 K/UL    Absolute Immature Granulocyte 0.0 0.0 - 0.5 K/UL   Hepatic Function Panel    Collection Time: 07/08/22  5:14 AM   Result Value Ref Range    Total Protein 7.0 6.3 - 8.2 g/dL    Albumin 3.0 (L) 3.2 - 4.6 g/dL    Globulin 4.0 (H) 2.3 - 3.5 g/dL    Albumin/Globulin Ratio 0.8 (L) 1.2 - 3.5      Total Bilirubin 1.1 0.2 - 1.1 MG/DL    Bilirubin, Direct 0.5 (H) <0.4 MG/DL    Alk Phosphatase 131 50 - 136 U/L    AST 16 15 - 37 U/L    ALT 21 12 - 65 U/L   Troponin    Collection Time: 07/08/22  5:14 AM   Result Value Ref Range    Troponin, High Sensitivity 12.4 0 - 14 pg/mL   Transthoracic echocardiogram (TTE) complete with contrast, bubble, strain, and 3D PRN    Collection Time: 07/08/22  8:30 AM   Result Value Ref Range    LV EDV A2C 147 mL    LV EDV A4C 128 mL    LV ESV A2C 85 mL    LV ESV A4C 79 mL    IVSd 1.0 0.6 - 1.0 cm    LVIDd 5.0 4.2 - 5.9 cm    LVIDs 3.9 cm    LVOT Diameter 2.2 cm    LVOT Mean Gradient 1 mmHg    LVOT VTI 14.8 cm    LVOT Peak Velocity 0.7 m/s    LVOT Peak Gradient 2 mmHg    LVPWd 1.1 (A) 0.6 - 1.0 cm    LV Ejection Fraction A2C 42 %    LV Ejection Fraction A4C 38 %    EF BP 40 (A) 55 - 100 %    LVOT Area 3.8 cm2    LVOT SV 56.2 ml    LA Minor Axis 6.0 cm    LA Major Axis 6.3 cm    LA Area 2C 22.9 cm2    LA Area 4C 22.4 cm2    LA Volume BP 69 (A) 18 - 58 mL    AV Mean Velocity 0.8 m/s    AV Mean Gradient 3 mmHg    AV VTI 27.5 cm    AV Peak Velocity 1.3 m/s    AV Peak Gradient 7 mmHg    AV Area by VTI 2.1 cm2    AV Area by Peak Velocity 2.0 cm2    Aortic Root 3.8 cm    Ascending Aorta 3.6 cm    IVC Proxmal 2.9 cm    MR .0 cm    MV Mean Gradient 1 mmHg    MV VTI 28.7 cm    MV Mean Velocity 0.4 m/s    MR Peak Velocity 4.7 m/s    MR Peak Gradient 88 mmHg    MV Max Velocity 0.9 m/s    MV Peak Gradient 3 mmHg    MV Area by VTI 2.0 cm2    RV Basal Dimension 4.3 cm    RV Mid Dimension 3.6 cm    TAPSE 1.5 (A) 1.7 cm    TR Max Velocity 3.33 m/s    TR Peak Gradient 44 mmHg    Body Surface Area 1.97 m2    Fractional Shortening 2D 22 28 - 44 %    LV ESV Index A4C 41 mL/m2    LV EDV Index A4C 66 mL/m2    LV ESV Index A2C 44 mL/m2    LV EDV Index A2C 75 mL/m2    LVIDd Index 2.56 cm/m2    LVIDs Index 2.00 cm/m2    LV RWT Ratio 0.44     LV Mass 2D 194.4 88 - 224 g    LV Mass 2D Index 99.7 49 - 115 g/m2    LA Volume Index BP 35 (A) 16 - 34 ml/m2    LVOT Stroke Volume Index 28.8 mL/m2    Ao Root Index 1.95 cm/m2    Ascending Aorta Index 1.85 cm/m2    AV Velocity Ratio 0.54     LVOT:AV VTI Index 0.54     ENA/BSA VTI 1.1 cm2/m2    ENA/BSA Peak Velocity 1.0 cm2/m2    MV:LVOT VTI Index 1.94     Est. RA Pressure 15 mmHg    RVSP 59 mmHg       Other Studies:  XR HAND LEFT (MIN 3 VIEWS)   Final Result      1. No acute fracture or dislocation. 2. Osteoarthritis. CT HEAD WO CONTRAST   Final Result   1. No evidence of acute intracranial hemorrhage. 2. No evidence of acute cervical spine fracture. 3. Advanced cervical spondylosis. CT CERVICAL SPINE WO CONTRAST   Final Result   1. No evidence of acute intracranial hemorrhage.    2. No evidence of acute cervical spine fracture. 3. Advanced cervical spondylosis. XR CHEST PORTABLE   Final Result   No acute findings in the chest             Current Meds:  Current Facility-Administered Medications   Medication Dose Route Frequency    apixaban (ELIQUIS) tablet 5 mg  5 mg Oral BID    aspirin EC tablet 81 mg  81 mg Oral Daily    carBAMazepine (TEGRETOL) chewable tablet 400 mg  400 mg Oral BID    furosemide (LASIX) tablet 40 mg  40 mg Oral Q48H    HYDROcodone-acetaminophen (NORCO)  MG per tablet 1 tablet  1 tablet Oral Q6H PRN    losartan (COZAAR) tablet 100 mg  100 mg Oral Daily    metoprolol succinate (TOPROL XL) extended release tablet 100 mg  100 mg Oral Daily    pantoprazole (PROTONIX) tablet 40 mg  40 mg Oral QAM AC    sodium chloride flush 0.9 % injection 5-40 mL  5-40 mL IntraVENous 2 times per day    sodium chloride flush 0.9 % injection 5-40 mL  5-40 mL IntraVENous PRN    0.9 % sodium chloride infusion   IntraVENous PRN    ondansetron (ZOFRAN-ODT) disintegrating tablet 4 mg  4 mg Oral Q8H PRN    Or    ondansetron (ZOFRAN) injection 4 mg  4 mg IntraVENous Q6H PRN    polyethylene glycol (GLYCOLAX) packet 17 g  17 g Oral Daily PRN    acetaminophen (TYLENOL) tablet 650 mg  650 mg Oral Q6H PRN    Or    acetaminophen (TYLENOL) suppository 650 mg  650 mg Rectal Q6H PRN    tuberculin injection 5 Units  5 Units IntraDERmal Once    [START ON 7/9/2022] furosemide (LASIX) tablet 20 mg  20 mg Oral Q48H    gabapentin (NEURONTIN) capsule 400 mg  400 mg Oral TID       Signed:  Lisa Valle    Part of this note may have been written by using a voice dictation software. The note has been proof read but may still contain some grammatical/other typographical errors.

## 2022-07-08 NOTE — H&P
[facility] Cardiology Initial Cardiac Evaluation      Date of  Admission: 7/7/2022  2:45 PM     Primary Care Physician: Haim Perez MD  Primary Cardiologist: Dr Panda Malagon  Referring Physician: Dr Everlyn Cabot  Supervising Physician: Dr Obdulio Goldman    CC/Reason for evaluation: syncope     HPI:  Zina Rowe is a 76 y.o. male with PMH of paroxysmal atrial fibrillation on Eliquis, admitted 05/2019 with NSTEMI  and severe 3VCAD complicated by AF/RVR. He had CABG/MAZE and JENNIFER clip. He has struggled  significantly post CABG with pleural effusions and fluid retention. LV systolic function was severely reduced postoperatively. Echocardiogram 8/2020 demonstrated stable moderate LV systolic dysfunction with ejection fraction of 40 to 45% no significant  valvular heart disease. He had COVID-19 12/2020 and severe GI illnesses. He had Covid again 01/2022. He has recovered. Remains dyspneic. Echo 08/2021 with stable EF 40-45%. Patient presented to [facility] ED on 7/7 with complaint of falling and hitting his head. 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 Patient denied any dizziness, lightheadedness, palpitations, or chest discomfort. States he went and took a nap in his truck. When he woke up and went to get out of truck he had another episode of passing out. States he woke up on ground. Again, denies any other symptoms. Patient presented to ED for further evaluation and care. Upon evaluation in ED, /86. Labs showed WBC 7.9, H/H 13/28, Ptl 179, Na 132, K 4.6, BUN/Cr 17/0.90, hs trop 14- 12. CT head negative for acute intracranial abnormality. Patient was admitted to medicine team for syncope and collapse. Cardiology consulted for further evaluation. Patient reports another episode of passing out about 3 weeks ago while trying to run into bank while raining. At that time did not seek medical evaluation. Describes episode similar to yesterdays episodes.         Past Medical History:   Diagnosis Date    Atrial fibrillation ([facility]     Atrial flutter ([facility] 4/7/2017    CAD (coronary artery disease) 2006    Cancer [facility]) 2011    prostate    GERD (gastroesophageal reflux disease)     takes omeprazole    Heart failure ([facility]     Hypertension     Ill-defined condition     trigeminal neuralgia    Myocardial infarction ([facility] 06/06/2019    NSTEMI     Sleep apnea     does not wear CPAP    Stroke [facility]) 2018    TIA      Past Surgical History:   Procedure Laterality Date    CARDIAC CATHETERIZATION  08/14/2006    CARDIAC CATHETERIZATION  06/07/2019    Severe 3 vessel CAD with LV EF=40-45% and unsuccessful LCX PCI.  COLONOSCOPY N/A 3/2/2020    COLONOSCOPY performed by Ilana Link MD at [facility] CORONARY ARTERY BYPASS GRAFT  06/10/2019    3 vessel CABG with LIMA-LAD,SVG-OM,and SVG-R posterior lateral branch with MAZE & LA appendage clipping.  PROSTATE SURGERY         No Known Allergies   Social History     Socioeconomic History    Marital status:      Spouse name: Not on file    Number of children: Not on file    Years of education: Not on file    Highest education level: Not on file   Occupational History    Not on file   Tobacco Use    Smoking status: Former Smoker     Packs/day: 2.00    Smokeless tobacco: Never Used    Tobacco comment: Quit smoking: quit at age 27   Substance and Sexual Activity    Alcohol use: No    Drug use: Not on file    Sexual activity: Not on file   Other Topics Concern    Not on file   Social History Narrative    Not on file     Social Determinants of Health     Financial Resource Strain:     Difficulty of Paying Living Expenses: Not on file   Food Insecurity:     Worried About [address] in the Last Year: Not on file    [address] in the Last Year: Not on file   Transportation Needs:     Lack of Transportation (Medical):  Not on file    Lack of bruising or bleeding  Endocrine: No thyroid problems, heat or cold intolerance, excessive sweating, polyuria, polydipsia, diabetes. Subjective:     Physical Exam:    Vitals:    07/07/22 2305 07/08/22 0317 07/08/22 0746 07/08/22 0930   BP: 132/70 120/75 (!) 141/87    Pulse: 82 58 59    Resp: 16 18 16    Temp: 97.9 °F (36.6 °C) 97.7 °F (36.5 °C) 98.1 °F (36.7 °C)    TempSrc: Oral Axillary Oral    SpO2: 93% 96% 96%    Weight:    175 lb (79.4 kg)   Height:    5' 9\" (1.753 m)     General: Well Developed, Well Nourished, No Acute Distress  HEENT: pupils equal and round, no abnormalities noted  Neck: supple, no JVD, no carotid bruits  Heart: S1S2 with RRR without murmurs or gallops  Lungs: Clear throughout auscultation bilaterally without adventitious sounds  Abd: soft, nontender, nondistended, with good bowel sounds  Ext: warm, no edema, calves supple/nontender, pulses 2+ bilaterally  Skin: warm and dry  Psychiatric: Normal mood and affect  Neurologic: Alert and oriented X 3    Cardiographics    Telemetry: normal sinus rhythm  ECG: normal sinus rhythm  Echocardiogram: 08/04/21    TRANSTHORACIC ECHOCARDIOGRAM (TTE) COMPLETE (CONTRAST/BUBBLE/3D PRN) 08/05/2021  7:10 AM (Final)    Narrative  This is a summary report. The complete report is available in the patient's medical record. If you cannot access the medical record, please contact the sending organization for a detailed fax or copy. · LA: Dilated left atrium. Left Atrium volume index is 46.06 mL/m2. · TV: Mild to moderate tricuspid valve regurgitation is present. · LV: Calculated LVEF is 41%. Normal wall thickness. Dilated left ventricle. Left ventricular diastolic dysfunction. · AV: Mild to moderate aortic valve regurgitation is present. · MV: Mitral valve non-specific thickening. Mild mitral valve regurgitation is present. · RA: Dilated right atrium.     Signed by: Jean Pierre Sams MD on 8/5/2021  7:10 AM    Cath: No results found for this or any previous visit. EP: No results found for this or any previous visit. Labs:     Recent Labs     07/08/22  0514 07/07/22  1330   WBC 5.4 7.9   HGB 11.9* 13.1*   HCT 35.6* 38.8*   .6* 102.9*    179     Lab Results   Component Value Date    WBC 5.4 07/08/2022    HGB 11.9 (L) 07/08/2022    HCT 35.6 (L) 07/08/2022     07/08/2022    CHOL 178 06/07/2019    TRIG 74 06/07/2019    HDL 37 (L) 06/07/2019    ALT 21 07/08/2022    AST 16 07/08/2022     (L) 07/08/2022    K 4.0 07/08/2022     07/08/2022    CREATININE 0.90 07/08/2022    BUN 15 07/08/2022    CO2 25 07/08/2022    INR 1.4 06/10/2019    LABA1C 5.0 06/08/2019      Pt has been seen and examined by Dr. Briseyda Cox. He agrees with the following assessment and plan. Assessment/Plan:       Principal Problem:    Syncope and collapse  - recurrent  - CT head- no evidence of acute intracranial hemorrhage   - ECHO pending   - US carotids pending   - continue to monitor on telemetry for arrhythmia, will consider monitor vs loop     Active Problems:    Paroxysmal atrial fibrillation  - currently sinus rhythm   - continue Eliquis for CVA protection   - continue metoprolol succinate       Chronic systolic heart failure  - continue losartan, metoprolol succinate  - on lasix      Hyponatremia  - per primary       Anemia  - per primary       DNR (do not resuscitate)      Hand pain  - xray negative for acute fracture  - per primary         Tracheobronchomalacia  - per primary         Hypertension  - on losartan, metoprolol succinate   - monitor and titrate medications as needed      Coronary atherosclerosis of native coronary vessel  - hx CABG x 3- LIMA-LAD, SVG-OM, SVG-SUSAN  - on ASA, BB, statin  - no angina       Trigeminal neuralgia  - per primary       Thank you for requesting cardiac evaluation and allowing us to participate in the care of this patient. We will continue to follow along with you.       Jeevan Lieberman PA-C  Supervising Physician:  [place]

## 2022-07-08 NOTE — PROGRESS NOTES
PHYSICAL THERAPY Initial Assessment  (Link to Caseload Tracking: PT Visit Days : 1  Acknowledge Orders  Time In/Out  PT Charge Capture  Rehab Caseload Tracker    Easton Badillo is a 76 y.o. male   PRIMARY DIAGNOSIS: Syncope and collapse  Syncope and collapse [R55]  Contusion of scalp, initial encounter [S00.03XA]  Syncope, unspecified syncope type [R55]       Reason for Referral: Unsteadiness on feet (R26.81)  Inpatient: Payor: MEDICARE / Plan: MEDICARE PART A AND B / Product Type: *No Product type* /     ASSESSMENT:     REHAB RECOMMENDATIONS:  Cardiac rehab or OPPT for cardiac and balance therapy   Recommendation to date pending progress:  Setting:   Outpatient Therapy    Equipment:     None     ASSESSMENT:  Mr. Kevin Winston is awake in his chair. Conversant, oriented and appropriate. I felt his speech had a slight slur to it but he felt like it was normal. He lives alone with children nearby and his wife (whom his  from but they have an amicable relationship). 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 He states he does not do anymore high/ladder work. Pt complains that one of his biggest trouble is any kind of ascending activity like steps (13 to get in his house) or walking up hills gets him out of breath quickly. He states he had cardiac rehab after his heart surgery and did pretty well. In addition his balance is impaired with higher level walking activities such as head turns or lifts and multitasking. Otherwise no focal weakness or deficit. Pt will benefit from skilled physical therapy to maximize balance and gait ability to decrease risk for falls with possible injury or hospitalization. In addition he may benefit from cardiac rehab or OPPT with cardiac and balance retraining RUP[facility] HERNANDEZ [facility]).        [address] [place] [address] [address] AM-PAC 6 Clicks Basic Mobility Inpatient Short Form  AM-PAC Mobility Inpatient   How much difficulty turning over in bed?: None  How much help from another person needed to walk in hospital room?: A Little  How much help from another person for climbing 3-5 steps with a railing?: A Little    SUBJECTIVE:   Mr. Jeanette Richardson states, [place] Abdi gets out of breath and has to stop 1/2 way up the 13 steps to his ground level from the back \"     Social/Functional Lives With: Alone  Type of Home: [address]: One level  Home Access: Stairs to enter with rails  Entrance Stairs - Number of Steps: 13    OBJECTIVE:     PAIN: Gaylyn Nam / O2: Valencia Abdiel / Ton Adame / Jena Riling:   Pre Treatment:   Pain Assessment: None - Denies Pain  Pain Level: 0      Post Treatment: 0 Vitals        Oxygen      None    RESTRICTIONS/PRECAUTIONS:  Restrictions/Precautions: Fall Risk                 GROSS EVALUATION: Intact Impaired (Comments):   AROM []  WFL   PROM [] WFL   Strength []  WFL   Balance []  POOR dynamic walking   Posture [] Forward Head   Sensation []  WNL   Coordination []   FAIR   Tone []  NORMAL   Edema [] NONE   Activity Tolerance []  FAIR    []      COGNITION/  PERCEPTION: Intact Impaired (Comments):   Orientation [x]     Vision [x]     Hearing [x]     Cognition  [x]       MOBILITY: I Mod I S SBA CGA Min Mod Max Total  NT x2 Comments:   Bed Mobility    Rolling [x] [] [] [] [] [] [] [] [] [] []    Supine to Sit [x] [] [] [] [] [] [] [] [] [] []    Scooting [x] [] [] [] [] [] [] [] [] [] []    Sit to Supine [x] [] [] [] [] [] [] [] [] [] []    Transfers    Sit to Stand [x] [] [] [] [] [] [] [] [] [] []    Bed to Chair [x] [] [] [] [] [] [] [] [] [] []    Stand to Sit [x] [] [] [] [] [] [] [] [] [] []     [x] [] [] [] [] [] [] [] [] [] []    I=Independent, Mod I=Modified Independent, S=Supervision, SBA=Standby Assistance, CGA=Contact Guard Assistance,   Min=Minimal Assistance, Mod=Moderate Assistance, Max=Maximal Assistance, Total=Total Assistance, NT=Not Tested    GAIT: I Mod I S SBA CGA Min Mod Max Total  NT x2 Comments:   Level of Assistance [] [] [x] [] [] [] [] [] [] [] []    Distance 550 (WITH ONE STANDING REST BREAK) feet    DME None    Gait Quality Feet in ER and LOB with head turns. Weightbearing Status      Stairs      I=Independent, Mod I=Modified Independent, S=Supervision, SBA=Standby Assistance, CGA=Contact Guard Assistance,   Min=Minimal Assistance, Mod=Moderate Assistance, Max=Maximal Assistance, Total=Total Assistance, NT=Not Tested    PLAN:   ACUTE PHYSICAL THERAPY GOALS:   (Developed with and agreed upon by patient and/or caregiver. )  LT - 7 days  1. Pt will ambulate 1000' with head turns independent and safe. 4.  Pt will tolerate strength and balance exercises x 20 reps with verbal cues    FREQUENCY AND DURATION: 3 times/week for duration of hospital stay or until stated goals are met, whichever comes first.    THERAPY PROGNOSIS: Good    PROBLEM LIST:   (Skilled intervention is medically necessary to address:)  Decreased Activity Tolerance  Decreased Balance  Decreased Coordination  Decreased Gait Ability  Decreased Strength  Vestibular Impairment INTERVENTIONS PLANNED:   (Benefits and precautions of physical therapy have been discussed with the patient.)  Therapeutic Activity  Therapeutic Exercise/HEP  Neuromuscular Re-education  Gait Training       TREATMENT:   EVALUATION: LOW COMPLEXITY: (Untimed Charge)    TREATMENT:   Gait Training (10 Minutes): Gait training for 550 feet utilizing None. Patient required Tactile cueing to improve Dynamic Standing Balance.      TREATMENT GRID:    AFTER TREATMENT PRECAUTIONS: Alarm Activated and Bed/Chair Locked    INTERDISCIPLINARY COLLABORATION:  RN/ PCT    EDUCATION:      TIME IN/OUT:  Time In: 1411  Time Out: 203 SBenita Zabala  Minutes: Jessica 200, PT

## 2022-07-09 LAB — CORTIS AM PEAK SERPL-MCNC: 18.1 UG/DL (ref 7–25)

## 2022-07-09 PROCEDURE — 1100000003 HC PRIVATE W/ TELEMETRY

## 2022-07-09 PROCEDURE — 6370000000 HC RX 637 (ALT 250 FOR IP): Performed by: STUDENT IN AN ORGANIZED HEALTH CARE EDUCATION/TRAINING PROGRAM

## 2022-07-09 PROCEDURE — 99232 SBSQ HOSP IP/OBS MODERATE 35: CPT | Performed by: INTERNAL MEDICINE

## 2022-07-09 PROCEDURE — 36415 COLL VENOUS BLD VENIPUNCTURE: CPT

## 2022-07-09 PROCEDURE — 6370000000 HC RX 637 (ALT 250 FOR IP): Performed by: INTERNAL MEDICINE

## 2022-07-09 PROCEDURE — 2580000003 HC RX 258: Performed by: INTERNAL MEDICINE

## 2022-07-09 PROCEDURE — 82533 TOTAL CORTISOL: CPT

## 2022-07-09 RX ORDER — LOSARTAN POTASSIUM 50 MG/1
100 TABLET ORAL DAILY
Status: DISCONTINUED | OUTPATIENT
Start: 2022-07-09 | End: 2022-07-11 | Stop reason: HOSPADM

## 2022-07-09 RX ADMIN — CARBAMAZEPINE 400 MG: 100 TABLET, CHEWABLE ORAL at 08:37

## 2022-07-09 RX ADMIN — HYDROCODONE BITARTRATE AND ACETAMINOPHEN 1 TABLET: 10; 325 TABLET ORAL at 00:54

## 2022-07-09 RX ADMIN — GABAPENTIN 400 MG: 400 CAPSULE ORAL at 14:21

## 2022-07-09 RX ADMIN — PANTOPRAZOLE SODIUM 40 MG: 40 TABLET, DELAYED RELEASE ORAL at 06:08

## 2022-07-09 RX ADMIN — METOPROLOL SUCCINATE 100 MG: 100 TABLET, EXTENDED RELEASE ORAL at 08:37

## 2022-07-09 RX ADMIN — LOSARTAN POTASSIUM 100 MG: 50 TABLET, FILM COATED ORAL at 17:04

## 2022-07-09 RX ADMIN — HYDROCODONE BITARTRATE AND ACETAMINOPHEN 1 TABLET: 10; 325 TABLET ORAL at 21:31

## 2022-07-09 RX ADMIN — SODIUM CHLORIDE, PRESERVATIVE FREE 10 ML: 5 INJECTION INTRAVENOUS at 08:40

## 2022-07-09 RX ADMIN — GABAPENTIN 400 MG: 400 CAPSULE ORAL at 21:13

## 2022-07-09 RX ADMIN — APIXABAN 5 MG: 5 TABLET, FILM COATED ORAL at 08:38

## 2022-07-09 RX ADMIN — GABAPENTIN 400 MG: 400 CAPSULE ORAL at 08:40

## 2022-07-09 RX ADMIN — HYDROCODONE BITARTRATE AND ACETAMINOPHEN 1 TABLET: 10; 325 TABLET ORAL at 15:20

## 2022-07-09 RX ADMIN — APIXABAN 5 MG: 5 TABLET, FILM COATED ORAL at 21:13

## 2022-07-09 RX ADMIN — SODIUM CHLORIDE, PRESERVATIVE FREE 10 ML: 5 INJECTION INTRAVENOUS at 22:52

## 2022-07-09 RX ADMIN — FUROSEMIDE 20 MG: 20 TABLET ORAL at 08:39

## 2022-07-09 RX ADMIN — CARBAMAZEPINE 400 MG: 100 TABLET, CHEWABLE ORAL at 21:13

## 2022-07-09 RX ADMIN — ASPIRIN 81 MG: 81 TABLET ORAL at 08:37

## 2022-07-09 ASSESSMENT — PAIN DESCRIPTION - DESCRIPTORS
DESCRIPTORS: ACHING

## 2022-07-09 ASSESSMENT — PAIN DESCRIPTION - LOCATION
LOCATION: BACK
LOCATION: FACE
LOCATION: FACE

## 2022-07-09 ASSESSMENT — PAIN DESCRIPTION - ORIENTATION
ORIENTATION: RIGHT
ORIENTATION: RIGHT

## 2022-07-09 ASSESSMENT — PAIN SCALES - GENERAL
PAINLEVEL_OUTOF10: 5
PAINLEVEL_OUTOF10: 3
PAINLEVEL_OUTOF10: 3
PAINLEVEL_OUTOF10: 6
PAINLEVEL_OUTOF10: 6

## 2022-07-09 ASSESSMENT — PAIN - FUNCTIONAL ASSESSMENT
PAIN_FUNCTIONAL_ASSESSMENT: ACTIVITIES ARE NOT PREVENTED
PAIN_FUNCTIONAL_ASSESSMENT: ACTIVITIES ARE NOT PREVENTED

## 2022-07-09 ASSESSMENT — PAIN DESCRIPTION - PAIN TYPE: TYPE: CHRONIC PAIN

## 2022-07-09 ASSESSMENT — PAIN DESCRIPTION - FREQUENCY: FREQUENCY: INTERMITTENT

## 2022-07-09 NOTE — PROGRESS NOTES
am  7/9/2022 7:09 AM    Admit Date: 7/7/2022    Admit Diagnosis: Syncope and collapse [R55]  Contusion of scalp, initial encounter [S00.03XA]  Syncope, unspecified syncope type [R55]      Subjective:    Patient : Patient admitted with multiple episodes of syncope. Work-up is currently underway which will include CT head which showed no evidence of acute intracranial hemorrhage carotid ultrasound and echo. Continue telemetry monitoring today. If the echo carotid and monitor are unrevealing today patient will be offered an implantable loop recorder tomorrow    Objective:    BP (!) 139/98 Comment: nurse notifed  Pulse 57   Temp 97.3 °F (36.3 °C) (Axillary)   Resp 20   Ht 5' 9\" (1.753 m)   Wt 175 lb (79.4 kg)   SpO2 94%   BMI 25.84 kg/m²     ROS:  General ROS: negative for - chills  Hematological and Lymphatic ROS: negative for - blood clots or jaundice  Respiratory ROS: no cough, shortness of breath, or wheezing  Cardiovascular ROS: no chest pain or dyspnea on exertion  Gastrointestinal ROS: no abdominal pain, change in bowel habits, or black or bloody stools  Neurological ROS: no TIA or stroke symptoms    Physical Exam:      Physical Examination: General appearance - Appearance: alert, well appearing, and in no distress.    Neck/lymph - supple, no significant adenopathy  Chest/CV - clear to auscultation, no wheezes, rales or rhonchi, symmetric air entry  Heart - normal rate, regular rhythm, normal S1, S2, no murmurs, rubs, clicks or gallops  Abdomen/GI - soft, nontender, nondistended, no masses or organomegaly   Musculoskeletal - no joint tenderness, deformity or swelling  Extremities - peripheral pulses normal, no pedal edema, no clubbing or cyanosis  Skin - normal coloration and turgor, no rashes, no suspicious skin lesions noted    Current Facility-Administered Medications   Medication Dose Route Frequency    apixaban (ELIQUIS) tablet 5 mg  5 mg Oral BID    aspirin EC tablet 81 mg  81 mg Oral Daily    carBAMazepine (TEGRETOL) chewable tablet 400 mg  400 mg Oral BID    furosemide (LASIX) tablet 40 mg  40 mg Oral Q48H    HYDROcodone-acetaminophen (NORCO)  MG per tablet 1 tablet  1 tablet Oral Q6H PRN    losartan (COZAAR) tablet 100 mg  100 mg Oral Daily    metoprolol succinate (TOPROL XL) extended release tablet 100 mg  100 mg Oral Daily    pantoprazole (PROTONIX) tablet 40 mg  40 mg Oral QAM AC    sodium chloride flush 0.9 % injection 5-40 mL  5-40 mL IntraVENous 2 times per day    sodium chloride flush 0.9 % injection 5-40 mL  5-40 mL IntraVENous PRN    0.9 % sodium chloride infusion   IntraVENous PRN    ondansetron (ZOFRAN-ODT) disintegrating tablet 4 mg  4 mg Oral Q8H PRN    Or    ondansetron (ZOFRAN) injection 4 mg  4 mg IntraVENous Q6H PRN    polyethylene glycol (GLYCOLAX) packet 17 g  17 g Oral Daily PRN    acetaminophen (TYLENOL) tablet 650 mg  650 mg Oral Q6H PRN    Or    acetaminophen (TYLENOL) suppository 650 mg  650 mg Rectal Q6H PRN    tuberculin injection 5 Units  5 Units IntraDERmal Once    furosemide (LASIX) tablet 20 mg  20 mg Oral Q48H    gabapentin (NEURONTIN) capsule 400 mg  400 mg Oral TID       Data Review: data included in this note has been independently reviewed by the author     TELEMETRY: Sinus rhythm    Assessment/Plan:     Patient Active Problem List   Diagnosis    TIA (transient ischemic attack)    Tracheobronchomalacia    Elevated brain natriuretic peptide (BNP) level    S/P CABG x 3    Community acquired pneumonia    Dyslipidemia    Hemoptysis    History of smoking 25-50 pack years    Hypertension    Systolic CHF, chronic (HCC)    Localized edema    Coronary atherosclerosis of native coronary vessel    Trigeminal neuralgia    SOB (shortness of breath)    Atrial fibrillation (HCC)    Leukocytosis    Syncope    Hyponatremia    Anemia    DNR (do not resuscitate)    Hand pain     PLAN  Syncope  Work-up underway including echo and carotids.   If today's testing is unrevealing then consider implantable loop recorder tomorrow    Patient has reported either syncope or ICD therapies for ventricular arrhythmias. Since these conditions then invoke some [facility] guidelines concerning driving patient's are requested and informed of the appropriate presence of these guidelines concerning adjustment and/or cessation of their driving until issues are either resolved or appropriate time. His past.  Patients are referred to [facility] office and/or website for further questions or information concerning restrictions on their driving.         Trever Altamirano MD

## 2022-07-09 NOTE — PROGRESS NOTES
07/09/22 1102   Resting (Room Air)   SpO2 96   HR 71   During Walk (Room Air)   SpO2 91   HR 82   Walk/Assistance Device Ambulation   Rate of Dyspnea 1   After Walk   SpO2 94   HR 76   FIO2 (%) 21   Does the Patient Qualify for Home O2 No

## 2022-07-09 NOTE — PROGRESS NOTES
Hospitalist Progress Note   Admit Date:  2022  2:45 PM   Name:  Svitlana Whyte   Age:  76 y.o. Sex:  male  :  1945   MRN:  [id number]   Room:  [facility]/    Presenting Complaint: Fall     Reason(s) for Admission: Syncope and collapse [R55]  Contusion of scalp, initial encounter [S00.03XA]  Syncope, unspecified syncope type [R55]     Hospital Course & Interval History:   Svitlana Whyte is a 76 y.o. male with medical history of AFIB on eliquis, trigeminal neuralgia, HTN, HFrEF, CAD s/p CABG 2019 who has been having increased syncope episodes the past 2-3 days. Daughter Jorge Brooks present and gives some history. 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 He did hit his head and has a hematoma left scalp. He drove to his office where his daughter thought he needed medical attention and he declined. She did call EMS and then after that evaluation he agreed for her to drive him to the ED. Pt reports he was seen by PCP off 2 mths ago c/o decreased energy and easy fatigability so PCP started him on Imdur; this is his only recent medication adjustment. In ER, CT head and Cspine negative. CXR no acute issues. Troponin mildly elevated; hospitalist consulted by ER staff for inpatient admission.     Subjective/24hr Events (22): \"I feel fine until I get up and move; I get short of breath when I walk up just a couple of steps. \"  Very pleasant 77y.o. WM sitting up in chair without complaints    Currently denies dyspnea, CP, N/V, dizziness, vertigo symptoms.       Assessment & Plan:     Principal Problem:    Syncope and collapse  - suspect cardiogenic in etiology; echo with dilated LA, RA and EF 41%; carotid doppler results pending  - appreciate cardiology assistance, consider loop recorder implantation if other studies negative  - random cortisol levels wnl; f/u AM cortisol level  - orthostatic VS pending    Active Problems:    Dyspnea on exertion  - clinically appears euvolemic without pulm edema on cxray  - ambulatory saturations pending  - may need PFTs given hx of tobacco abuse and known tracheobronchomalacia      SEBASTIAN non-compliance  - pt reports to noncompliance with CPAP \"many many years\" and no longer has machine at home  - will need repeat sleep study if pt willing to wear CPAP; will order for hospital use  - at higher risk for CVA / MI / afib       Left hand pain  - trauma post fall; xray reviewed, negative for fx      Hyponatremia  - resolved; f/u BMP in AM      Atrial fibrillation  - rate ~controlled  - cont metoprolol / apixaban      HTN  - BP controlled  - cont oral agents      Discharge Planning:    - anticipate at least 2 midnight hospitalization; likely home on dc    Diet:  ADULT DIET; Regular; Low Fat/Low Chol/High Fiber/POLLO  DVT PPx: apixaban  Code status: DNR    Hospital Problems:  Principal Problem:    Syncope  Active Problems:    Hyponatremia    Anemia    DNR (do not resuscitate)    Hand pain    Tracheobronchomalacia    Dyslipidemia    Hypertension    Systolic CHF, chronic (HCC)    Coronary atherosclerosis of native coronary vessel    Trigeminal neuralgia    Atrial fibrillation (HCC)  Resolved Problems:    * No resolved hospital problems. *      Objective:     Patient Vitals for the past 24 hrs:   Temp Pulse Resp BP SpO2   07/09/22 0755 97.5 °F (36.4 °C) 60 18 135/75 95 %   07/09/22 0453 97.3 °F (36.3 °C) 57 20 (!) 139/98 94 %   07/09/22 0124 -- -- 18 -- --   07/08/22 2215 97.6 °F (36.4 °C) 63 18 (!) 153/83 --   07/08/22 1848 -- -- 18 -- --   07/08/22 1602 97.5 °F (36.4 °C) 58 17 (!) 142/69 98 %   07/08/22 1134 97.5 °F (36.4 °C) 54 18 138/78 95 %       Oxygen Therapy  SpO2: 95 %  O2 Device: None (Room air)    Estimated body mass index is 25.84 kg/m² as calculated from the following:    Height as of this encounter: 5' 9\" (1.753 m).     Weight as of this encounter: 175 lb (79.4 kg).    Intake/Output Summary (Last 24 hours) at 7/9/2022 1040  Last data filed at 7/9/2022 0755  Gross per 24 hour   Intake 840 ml   Output --   Net 840 ml         Physical Exam:     Blood pressure 135/75, pulse 60, temperature 97.5 °F (36.4 °C), temperature source Oral, resp. rate 18, height 5' 9\" (1.753 m), weight 175 lb (79.4 kg), SpO2 95 %. General:    Well nourished. Head:  Normocephalic, atraumatic  Eyes:  Sclerae appear normal.  Pupils equally round. ENT:  Nares appear normal, no drainage. Moist oral mucosa  Neck:  No restricted ROM. Trachea midline   CV:   Irreg rhythm, rate ~controlled  Lungs:   CTAB. No wheezing, rhonchi, or rales. Symmetric expansion. Abdomen: Bowel sounds present. Soft, nontender, nondistended. Extremities: No cyanosis or clubbing. No edema  Skin:     No rashes and normal coloration. Warm and dry. Neuro:  CN II-XII grossly intact. Sensation intact. A&Ox3  Psych:  Normal mood and affect.       I have reviewed ordered lab tests and independently visualized imaging below:    Recent Labs:  Recent Results (from the past 48 hour(s))   EKG 12 Lead    Collection Time: 07/07/22  1:24 PM   Result Value Ref Range    Ventricular Rate 61 BPM    Atrial Rate 61 BPM    P-R Interval 210 ms    QRS Duration 128 ms    Q-T Interval 468 ms    QTc Calculation (Bazett) 471 ms    P Axis 67 degrees    R Axis -42 degrees    T Axis 124 degrees    Diagnosis       Sinus rhythm with 1st degree A-V block with occasional Premature ventricular   complexes     CBC with Auto Differential    Collection Time: 07/07/22  1:30 PM   Result Value Ref Range    WBC 7.9 4.3 - 11.1 K/uL    RBC 3.77 (L) 4.23 - 5.6 M/uL    Hemoglobin 13.1 (L) 13.6 - 17.2 g/dL    Hematocrit 38.8 (L) 41.1 - 50.3 %    .9 (H) 79.6 - 97.8 FL    MCH 34.7 (H) 26.1 - 32.9 PG    MCHC 33.8 31.4 - 35.0 g/dL    RDW 14.7 (H) 11.9 - 14.6 %    Platelets 631 239 - 776 K/uL    MPV 8.8 (L) 9.4 - 12.3 FL    nRBC 0.00 0.0 - 0.2 K/uL Differential Type AUTOMATED      Seg Neutrophils 81 (H) 43 - 78 %    Lymphocytes 10 (L) 13 - 44 %    Monocytes 6 4.0 - 12.0 %    Eosinophils % 1 0.5 - 7.8 %    Basophils 1 0.0 - 2.0 %    Immature Granulocytes 1 0.0 - 5.0 %    Segs Absolute 6.4 1.7 - 8.2 K/UL    Absolute Lymph # 0.8 0.5 - 4.6 K/UL    Absolute Mono # 0.5 0.1 - 1.3 K/UL    Absolute Eos # 0.1 0.0 - 0.8 K/UL    Basophils Absolute 0.1 0.0 - 0.2 K/UL    Absolute Immature Granulocyte 0.0 0.0 - 0.5 K/UL   Comprehensive Metabolic Panel    Collection Time: 07/07/22  1:30 PM   Result Value Ref Range    Sodium 132 (L) 138 - 145 mmol/L    Potassium 4.6 3.5 - 5.1 mmol/L    Chloride 103 98 - 107 mmol/L    CO2 27 21 - 32 mmol/L    Anion Gap 2 (L) 7 - 16 mmol/L    Glucose 96 65 - 100 mg/dL    BUN 17 8 - 23 MG/DL    CREATININE 0.90 0.8 - 1.5 MG/DL    GFR African American >60 >60 ml/min/1.73m2    GFR Non- >60 >60 ml/min/1.73m2    Calcium 9.0 8.3 - 10.4 MG/DL    Total Bilirubin 1.3 (H) 0.2 - 1.1 MG/DL    ALT 25 12 - 65 U/L    AST 19 15 - 37 U/L    Alk Phosphatase 134 50 - 136 U/L    Total Protein 7.5 6.3 - 8.2 g/dL    Albumin 3.4 3.2 - 4.6 g/dL    Globulin 4.1 (H) 2.3 - 3.5 g/dL    Albumin/Globulin Ratio 0.8 (L) 1.2 - 3.5     Troponin    Collection Time: 07/07/22  8:07 PM   Result Value Ref Range    Troponin, High Sensitivity 14.2 (H) 0 - 14 pg/mL   Basic Metabolic Panel w/ Reflex to MG    Collection Time: 07/08/22  5:14 AM   Result Value Ref Range    Sodium 135 (L) 138 - 145 mmol/L    Potassium 4.0 3.5 - 5.1 mmol/L    Chloride 104 98 - 107 mmol/L    CO2 25 21 - 32 mmol/L    Anion Gap 6 (L) 7 - 16 mmol/L    Glucose 105 (H) 65 - 100 mg/dL    BUN 15 8 - 23 MG/DL    CREATININE 0.90 0.8 - 1.5 MG/DL    GFR African American >60 >60 ml/min/1.73m2    GFR Non- >60 >60 ml/min/1.73m2    Calcium 8.2 (L) 8.3 - 10.4 MG/DL   CBC with Auto Differential    Collection Time: 07/08/22  5:14 AM   Result Value Ref Range    WBC 5.4 4.3 - 11.1 K/uL    RBC 3.47 (L) 4.23 - 5.6 M/uL    Hemoglobin 11.9 (L) 13.6 - 17.2 g/dL    Hematocrit 35.6 (L) 41.1 - 50.3 %    .6 (H) 79.6 - 97.8 FL    MCH 34.3 (H) 26.1 - 32.9 PG    MCHC 33.4 31.4 - 35.0 g/dL    RDW 14.8 (H) 11.9 - 14.6 %    Platelets 708 391 - 477 K/uL    MPV 8.9 (L) 9.4 - 12.3 FL    nRBC 0.00 0.0 - 0.2 K/uL    Differential Type AUTOMATED      Seg Neutrophils 66 43 - 78 %    Lymphocytes 20 13 - 44 %    Monocytes 9 4.0 - 12.0 %    Eosinophils % 3 0.5 - 7.8 %    Basophils 1 0.0 - 2.0 %    Immature Granulocytes 1 0.0 - 5.0 %    Segs Absolute 3.6 1.7 - 8.2 K/UL    Absolute Lymph # 1.1 0.5 - 4.6 K/UL    Absolute Mono # 0.5 0.1 - 1.3 K/UL    Absolute Eos # 0.2 0.0 - 0.8 K/UL    Basophils Absolute 0.1 0.0 - 0.2 K/UL    Absolute Immature Granulocyte 0.0 0.0 - 0.5 K/UL   Hepatic Function Panel    Collection Time: 07/08/22  5:14 AM   Result Value Ref Range    Total Protein 7.0 6.3 - 8.2 g/dL    Albumin 3.0 (L) 3.2 - 4.6 g/dL    Globulin 4.0 (H) 2.3 - 3.5 g/dL    Albumin/Globulin Ratio 0.8 (L) 1.2 - 3.5      Total Bilirubin 1.1 0.2 - 1.1 MG/DL    Bilirubin, Direct 0.5 (H) <0.4 MG/DL    Alk Phosphatase 131 50 - 136 U/L    AST 16 15 - 37 U/L    ALT 21 12 - 65 U/L   Troponin    Collection Time: 07/08/22  5:14 AM   Result Value Ref Range    Troponin, High Sensitivity 12.4 0 - 14 pg/mL   Transthoracic echocardiogram (TTE) complete with contrast, bubble, strain, and 3D PRN    Collection Time: 07/08/22  8:30 AM   Result Value Ref Range    LV EDV A2C 147 mL    LV EDV A4C 128 mL    LV ESV A2C 85 mL    LV ESV A4C 79 mL    IVSd 1.0 0.6 - 1.0 cm    LVIDd 5.0 4.2 - 5.9 cm    LVIDs 3.9 cm    LVOT Diameter 2.2 cm    LVOT Mean Gradient 1 mmHg    LVOT VTI 14.8 cm    LVOT Peak Velocity 0.7 m/s    LVOT Peak Gradient 2 mmHg    LVPWd 1.1 (A) 0.6 - 1.0 cm    LV Ejection Fraction A2C 42 %    LV Ejection Fraction A4C 38 %    EF BP 40 (A) 55 - 100 %    LVOT Area 3.8 cm2    LVOT SV 56.2 ml    LA Minor Axis 6.0 cm    LA Major Axis 6.3 cm    LA Area 2C 22.9 cm2    LA Area 4C 22.4 cm2    LA Volume BP 69 (A) 18 - 58 mL    AV Mean Velocity 0.8 m/s    AV Mean Gradient 3 mmHg    AV VTI 27.5 cm    AV Peak Velocity 1.3 m/s    AV Peak Gradient 7 mmHg    AV Area by VTI 2.1 cm2    AV Area by Peak Velocity 2.0 cm2    Aortic Root 3.8 cm    Ascending Aorta 3.6 cm    IVC Proxmal 2.9 cm    MR .0 cm    MV Mean Gradient 1 mmHg    MV VTI 28.7 cm    MV Mean Velocity 0.4 m/s    MR Peak Velocity 4.7 m/s    MR Peak Gradient 88 mmHg    MV Max Velocity 0.9 m/s    MV Peak Gradient 3 mmHg    MV Area by VTI 2.0 cm2    RV Basal Dimension 4.3 cm    RV Mid Dimension 3.6 cm    TAPSE 1.5 (A) 1.7 cm    TR Max Velocity 3.33 m/s    TR Peak Gradient 44 mmHg    Body Surface Area 1.97 m2    Fractional Shortening 2D 22 28 - 44 %    LV ESV Index A4C 41 mL/m2    LV EDV Index A4C 66 mL/m2    LV ESV Index A2C 44 mL/m2    LV EDV Index A2C 75 mL/m2    LVIDd Index 2.56 cm/m2    LVIDs Index 2.00 cm/m2    LV RWT Ratio 0.44     LV Mass 2D 194.4 88 - 224 g    LV Mass 2D Index 99.7 49 - 115 g/m2    LA Volume Index BP 35 (A) 16 - 34 ml/m2    LVOT Stroke Volume Index 28.8 mL/m2    Ao Root Index 1.95 cm/m2    Ascending Aorta Index 1.85 cm/m2    AV Velocity Ratio 0.54     LVOT:AV VTI Index 0.54     ENA/BSA VTI 1.1 cm2/m2    ENA/BSA Peak Velocity 1.0 cm2/m2    MV:LVOT VTI Index 1.94     Est. RA Pressure 15 mmHg    RVSP 59 mmHg   Troponin    Collection Time: 07/08/22 12:14 PM   Result Value Ref Range    Troponin, High Sensitivity 11.8 0 - 14 pg/mL   Cortisol, Baseline    Collection Time: 07/08/22  1:10 PM   Result Value Ref Range    Cortisol 8.9 ug/dL       Other Studies:  Vascular duplex carotid bilateral   Final Result   SUSNA:  No significant stenosis. LICA:  No significant stenosis. XR HAND LEFT (MIN 3 VIEWS)   Final Result      1. No acute fracture or dislocation. 2. Osteoarthritis. CT HEAD WO CONTRAST   Final Result   1. No evidence of acute intracranial hemorrhage.    2. No evidence of acute cervical spine fracture. 3. Advanced cervical spondylosis. CT CERVICAL SPINE WO CONTRAST   Final Result   1. No evidence of acute intracranial hemorrhage. 2. No evidence of acute cervical spine fracture. 3. Advanced cervical spondylosis. XR CHEST PORTABLE   Final Result   No acute findings in the chest             Current Meds:  Current Facility-Administered Medications   Medication Dose Route Frequency    losartan (COZAAR) tablet 100 mg  100 mg Oral Daily    apixaban (ELIQUIS) tablet 5 mg  5 mg Oral BID    aspirin EC tablet 81 mg  81 mg Oral Daily    carBAMazepine (TEGRETOL) chewable tablet 400 mg  400 mg Oral BID    furosemide (LASIX) tablet 40 mg  40 mg Oral Q48H    HYDROcodone-acetaminophen (NORCO)  MG per tablet 1 tablet  1 tablet Oral Q6H PRN    metoprolol succinate (TOPROL XL) extended release tablet 100 mg  100 mg Oral Daily    pantoprazole (PROTONIX) tablet 40 mg  40 mg Oral QAM AC    sodium chloride flush 0.9 % injection 5-40 mL  5-40 mL IntraVENous 2 times per day    sodium chloride flush 0.9 % injection 5-40 mL  5-40 mL IntraVENous PRN    0.9 % sodium chloride infusion   IntraVENous PRN    ondansetron (ZOFRAN-ODT) disintegrating tablet 4 mg  4 mg Oral Q8H PRN    Or    ondansetron (ZOFRAN) injection 4 mg  4 mg IntraVENous Q6H PRN    polyethylene glycol (GLYCOLAX) packet 17 g  17 g Oral Daily PRN    acetaminophen (TYLENOL) tablet 650 mg  650 mg Oral Q6H PRN    Or    acetaminophen (TYLENOL) suppository 650 mg  650 mg Rectal Q6H PRN    tuberculin injection 5 Units  5 Units IntraDERmal Once    furosemide (LASIX) tablet 20 mg  20 mg Oral Q48H    gabapentin (NEURONTIN) capsule 400 mg  400 mg Oral TID       Signed:  Lisa Valle    Part of this note may have been written by using a voice dictation software. The note has been proof read but may still contain some grammatical/other typographical errors.

## 2022-07-10 LAB
25(OH)D3 SERPL-MCNC: 34.2 NG/ML (ref 30–100)
ANION GAP SERPL CALC-SCNC: 9 MMOL/L (ref 7–16)
BUN SERPL-MCNC: 14 MG/DL (ref 8–23)
CALCIUM SERPL-MCNC: 8.4 MG/DL (ref 8.3–10.4)
CHLORIDE SERPL-SCNC: 98 MMOL/L (ref 98–107)
CHOLEST SERPL-MCNC: 123 MG/DL
CO2 SERPL-SCNC: 25 MMOL/L (ref 21–32)
CREAT SERPL-MCNC: 0.8 MG/DL (ref 0.8–1.5)
ECHO BSA: 1.97 M2
ERYTHROCYTE [DISTWIDTH] IN BLOOD BY AUTOMATED COUNT: 14.4 % (ref 11.9–14.6)
EST. AVERAGE GLUCOSE BLD GHB EST-MCNC: 105 MG/DL
GLUCOSE SERPL-MCNC: 101 MG/DL (ref 65–100)
HBA1C MFR BLD: 5.3 % (ref 4.8–5.6)
HCT VFR BLD AUTO: 38.4 % (ref 41.1–50.3)
HDLC SERPL-MCNC: 27 MG/DL (ref 40–60)
HDLC SERPL: 4.6 {RATIO}
HGB BLD-MCNC: 13 G/DL (ref 13.6–17.2)
LDLC SERPL CALC-MCNC: 81.6 MG/DL
MCH RBC QN AUTO: 34.4 PG (ref 26.1–32.9)
MCHC RBC AUTO-ENTMCNC: 33.9 G/DL (ref 31.4–35)
MCV RBC AUTO: 101.6 FL (ref 79.6–97.8)
NRBC # BLD: 0 K/UL (ref 0–0.2)
PLATELET # BLD AUTO: 142 K/UL (ref 150–450)
PMV BLD AUTO: 8.6 FL (ref 9.4–12.3)
POTASSIUM SERPL-SCNC: 4.3 MMOL/L (ref 3.5–5.1)
RBC # BLD AUTO: 3.78 M/UL (ref 4.23–5.6)
SODIUM SERPL-SCNC: 132 MMOL/L (ref 136–145)
T4 FREE SERPL-MCNC: 0.9 NG/DL (ref 0.78–1.46)
TRIGL SERPL-MCNC: 72 MG/DL (ref 35–150)
TSH, 3RD GENERATION: 4.99 UIU/ML (ref 0.36–3.74)
VLDLC SERPL CALC-MCNC: 14.4 MG/DL (ref 6–23)
WBC # BLD AUTO: 7.4 K/UL (ref 4.3–11.1)

## 2022-07-10 PROCEDURE — 33285 INSJ SUBQ CAR RHYTHM MNTR: CPT | Performed by: INTERNAL MEDICINE

## 2022-07-10 PROCEDURE — 6370000000 HC RX 637 (ALT 250 FOR IP): Performed by: INTERNAL MEDICINE

## 2022-07-10 PROCEDURE — 80048 BASIC METABOLIC PNL TOTAL CA: CPT

## 2022-07-10 PROCEDURE — 84439 ASSAY OF FREE THYROXINE: CPT

## 2022-07-10 PROCEDURE — 80061 LIPID PANEL: CPT

## 2022-07-10 PROCEDURE — 0JH632Z INSERTION OF MONITORING DEVICE INTO CHEST SUBCUTANEOUS TISSUE AND FASCIA, PERCUTANEOUS APPROACH: ICD-10-PCS | Performed by: INTERNAL MEDICINE

## 2022-07-10 PROCEDURE — 6370000000 HC RX 637 (ALT 250 FOR IP): Performed by: STUDENT IN AN ORGANIZED HEALTH CARE EDUCATION/TRAINING PROGRAM

## 2022-07-10 PROCEDURE — 2580000003 HC RX 258: Performed by: INTERNAL MEDICINE

## 2022-07-10 PROCEDURE — 36415 COLL VENOUS BLD VENIPUNCTURE: CPT

## 2022-07-10 PROCEDURE — 84443 ASSAY THYROID STIM HORMONE: CPT

## 2022-07-10 PROCEDURE — 2709999900 HC NON-CHARGEABLE SUPPLY: Performed by: INTERNAL MEDICINE

## 2022-07-10 PROCEDURE — C1764 EVENT RECORDER, CARDIAC: HCPCS | Performed by: INTERNAL MEDICINE

## 2022-07-10 PROCEDURE — 2500000003 HC RX 250 WO HCPCS: Performed by: INTERNAL MEDICINE

## 2022-07-10 PROCEDURE — 1100000003 HC PRIVATE W/ TELEMETRY

## 2022-07-10 PROCEDURE — 82306 VITAMIN D 25 HYDROXY: CPT

## 2022-07-10 PROCEDURE — 85027 COMPLETE CBC AUTOMATED: CPT

## 2022-07-10 PROCEDURE — 83036 HEMOGLOBIN GLYCOSYLATED A1C: CPT

## 2022-07-10 PROCEDURE — 99024 POSTOP FOLLOW-UP VISIT: CPT | Performed by: INTERNAL MEDICINE

## 2022-07-10 RX ORDER — LIDOCAINE HYDROCHLORIDE 10 MG/ML
INJECTION, SOLUTION INFILTRATION; PERINEURAL PRN
Status: DISCONTINUED | OUTPATIENT
Start: 2022-07-10 | End: 2022-07-10 | Stop reason: HOSPADM

## 2022-07-10 RX ORDER — SPIRONOLACTONE 25 MG/1
25 TABLET ORAL DAILY
Status: DISCONTINUED | OUTPATIENT
Start: 2022-07-10 | End: 2022-07-11 | Stop reason: HOSPADM

## 2022-07-10 RX ADMIN — GABAPENTIN 400 MG: 400 CAPSULE ORAL at 21:44

## 2022-07-10 RX ADMIN — HYDROCODONE BITARTRATE AND ACETAMINOPHEN 1 TABLET: 10; 325 TABLET ORAL at 21:41

## 2022-07-10 RX ADMIN — APIXABAN 5 MG: 5 TABLET, FILM COATED ORAL at 09:42

## 2022-07-10 RX ADMIN — SODIUM CHLORIDE, PRESERVATIVE FREE 10 ML: 5 INJECTION INTRAVENOUS at 09:43

## 2022-07-10 RX ADMIN — PANTOPRAZOLE SODIUM 40 MG: 40 TABLET, DELAYED RELEASE ORAL at 05:31

## 2022-07-10 RX ADMIN — LOSARTAN POTASSIUM 100 MG: 50 TABLET, FILM COATED ORAL at 17:55

## 2022-07-10 RX ADMIN — HYDROCODONE BITARTRATE AND ACETAMINOPHEN 1 TABLET: 10; 325 TABLET ORAL at 05:31

## 2022-07-10 RX ADMIN — SPIRONOLACTONE 25 MG: 25 TABLET ORAL at 09:42

## 2022-07-10 RX ADMIN — GABAPENTIN 400 MG: 400 CAPSULE ORAL at 09:37

## 2022-07-10 RX ADMIN — METOPROLOL SUCCINATE 100 MG: 100 TABLET, EXTENDED RELEASE ORAL at 09:41

## 2022-07-10 RX ADMIN — CARBAMAZEPINE 400 MG: 100 TABLET, CHEWABLE ORAL at 09:43

## 2022-07-10 RX ADMIN — ASPIRIN 81 MG: 81 TABLET ORAL at 09:43

## 2022-07-10 RX ADMIN — EMPAGLIFLOZIN 10 MG: 10 TABLET, FILM COATED ORAL at 09:47

## 2022-07-10 RX ADMIN — APIXABAN 5 MG: 5 TABLET, FILM COATED ORAL at 21:41

## 2022-07-10 RX ADMIN — CARBAMAZEPINE 400 MG: 100 TABLET, CHEWABLE ORAL at 21:41

## 2022-07-10 RX ADMIN — SODIUM CHLORIDE, PRESERVATIVE FREE 10 ML: 5 INJECTION INTRAVENOUS at 21:45

## 2022-07-10 RX ADMIN — GABAPENTIN 400 MG: 400 CAPSULE ORAL at 14:57

## 2022-07-10 RX ADMIN — FUROSEMIDE 40 MG: 40 TABLET ORAL at 09:42

## 2022-07-10 RX ADMIN — HYDROCODONE BITARTRATE AND ACETAMINOPHEN 1 TABLET: 10; 325 TABLET ORAL at 11:29

## 2022-07-10 ASSESSMENT — PAIN SCALES - GENERAL
PAINLEVEL_OUTOF10: 6
PAINLEVEL_OUTOF10: 3
PAINLEVEL_OUTOF10: 0
PAINLEVEL_OUTOF10: 6

## 2022-07-10 ASSESSMENT — PAIN DESCRIPTION - PAIN TYPE: TYPE: CHRONIC PAIN

## 2022-07-10 ASSESSMENT — PAIN DESCRIPTION - ORIENTATION
ORIENTATION: RIGHT
ORIENTATION: RIGHT

## 2022-07-10 ASSESSMENT — PAIN DESCRIPTION - ONSET: ONSET: ON-GOING

## 2022-07-10 ASSESSMENT — PAIN DESCRIPTION - LOCATION
LOCATION: FACE
LOCATION: FACE

## 2022-07-10 ASSESSMENT — PAIN DESCRIPTION - DESCRIPTORS
DESCRIPTORS: ACHING
DESCRIPTORS: ACHING

## 2022-07-10 ASSESSMENT — PAIN DESCRIPTION - FREQUENCY: FREQUENCY: CONTINUOUS

## 2022-07-10 NOTE — PROGRESS NOTES
Implantable loop recorder placed without incident. From cardiac standpoint patient is able to be discharged home anytime. The implants procedure report has been forwarded to our device department.   He can follow-up in the next 10 to 14 days for Aquacel bandage removal

## 2022-07-10 NOTE — PROGRESS NOTES
Hospitalist Progress Note   Admit Date:  2022  2:45 PM   Name:  Clifton Rincon   Age:  76 y.o. Sex:  male  :  1945   MRN:  [id number]   Room:  [facility]/    Presenting Complaint: Fall     Reason(s) for Admission: Syncope and collapse [R55]  Contusion of scalp, initial encounter [S00.03XA]  Syncope, unspecified syncope type [R55]     Hospital Course & Interval History:   Clifton Rincon is a 76 y.o. male with medical history of AFIB on eliquis, trigeminal neuralgia, HTN, HFrEF, CAD s/p CABG 2019 who has been having increased syncope episodes the past 2-3 days. Daughter Sofía Zimmer present and gives some history. 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 He did hit his head and has a hematoma left scalp. He drove to his office where his daughter thought he needed medical attention and he declined. She did call EMS and then after that evaluation he agreed for her to drive him to the ED. Pt reports he was seen by PCP off 2 mths ago c/o decreased energy and easy fatigability so PCP started him on Imdur; this is his only recent medication adjustment. In ER, CT head and Cspine negative. CXR no acute issues. Troponin mildly elevated; hospitalist consulted by ER staff for inpatient admission.     Subjective/24hr Events (07/10/22): Patient is seen at the bedside. Status post loop recorder placement. Tolerated procedure well. Complaining of dizziness. Denies chest pain, palpitation or shortness of breath.       Assessment & Plan:       Syncope and collapse  - suspect cardiogenic in etiology; echo with dilated LA, RA and EF 41%; carotid doppler results pending  - appreciate cardiology assistance, consider loop recorder implantation if other studies negative  - random cortisol levels wnl; f/u AM cortisol level  -Status post implantable loop recorder placement today      Dyspnea on exertion  - clinically appears euvolemic without pulm edema on cxray  - ambulatory saturations okay  - may need PFTs given hx of tobacco abuse and known tracheobronchomalacia      SEBASTIAN non-compliance  - pt reports to noncompliance with CPAP \"many many years\" and no longer has machine at home  - will need repeat sleep study if pt willing to wear CPAP; will order for hospital use  - at higher risk for CVA / MI / afib       Left hand pain  - trauma post fall; xray reviewed, negative for fx      Hyponatremia  - resolved; f/u BMP in AM      Atrial fibrillation  - rate ~controlled  - cont metoprolol / apixaban      HTN  - BP controlled  - cont oral agents      Discharge Planning:    - anticipate discharge tomorrow    Diet:  ADULT DIET; Regular; Low Fat/Low Chol/High Fiber/POLLO  DVT PPx: apixaban  Code status: DNR    Hospital Problems:  Principal Problem:    Syncope  Active Problems:    Hyponatremia    Anemia    DNR (do not resuscitate)    Hand pain    Tracheobronchomalacia    Dyslipidemia    Hypertension    Systolic CHF, chronic (HCC)    Coronary atherosclerosis of native coronary vessel    Trigeminal neuralgia    Atrial fibrillation (HCC)  Resolved Problems:    * No resolved hospital problems.  *      Objective:     Patient Vitals for the past 24 hrs:   Temp Pulse Resp BP SpO2   07/10/22 1201 97.7 °F (36.5 °C) 58 19 (!) 145/86 94 %   07/10/22 0838 -- -- -- -- 99 %   07/10/22 0800 97.9 °F (36.6 °C) 60 19 (!) 155/96 --   07/10/22 0725 -- -- -- -- 95 %   07/10/22 0601 -- -- 19 -- --   07/10/22 0400 98 °F (36.7 °C) 62 16 (!) 150/80 100 %   07/09/22 2245 97.8 °F (36.6 °C) 60 18 133/85 95 %   07/09/22 2201 -- -- 18 -- --   07/09/22 1945 97.4 °F (36.3 °C) 56 -- (!) 159/92 94 %   07/09/22 1527 -- 57 -- (!) 150/83 --   07/09/22 1526 -- 50 -- (!) 142/78 --   07/09/22 1525 97.5 °F (36.4 °C) 52 18 127/79 97 %       Oxygen Therapy  SpO2: 94 %  Pulse Oximetry Type: Continuous  O2 Device: None (Room air)    Estimated body mass index is 25.84 kg/m² as calculated from the following:    Height as of this encounter: 5' 9\" (1.753 m). Weight as of this encounter: 175 lb (79.4 kg). Intake/Output Summary (Last 24 hours) at 7/10/2022 1205  Last data filed at 7/10/2022 0848  Gross per 24 hour   Intake 720 ml   Output 452 ml   Net 268 ml         Physical Exam:     Blood pressure (!) 145/86, pulse 58, temperature 97.7 °F (36.5 °C), temperature source Oral, resp. rate 19, height 5' 9\" (1.753 m), weight 175 lb (79.4 kg), SpO2 94 %. General:    Well nourished. Head:  Normocephalic, atraumatic  Eyes:  Sclerae appear normal.  Pupils equally round. ENT:  Nares appear normal, no drainage. Moist oral mucosa  Neck:  No restricted ROM. Trachea midline   CV:   Irreg rhythm, rate ~controlled  Lungs:   CTAB. No wheezing, rhonchi, or rales. Symmetric expansion. Abdomen: Bowel sounds present. Soft, nontender, nondistended. Extremities: No cyanosis or clubbing. No edema  Skin:     No rashes and normal coloration. Warm and dry. Neuro:  CN II-XII grossly intact. Sensation intact. A&Ox3  Psych:  Normal mood and affect.       I have reviewed ordered lab tests and independently visualized imaging below:    Recent Labs:  Recent Results (from the past 48 hour(s))   Troponin    Collection Time: 07/08/22 12:14 PM   Result Value Ref Range    Troponin, High Sensitivity 11.8 0 - 14 pg/mL   Cortisol, Baseline    Collection Time: 07/08/22  1:10 PM   Result Value Ref Range    Cortisol 8.9 ug/dL   Cortisol AM, Total    Collection Time: 07/09/22  3:56 PM   Result Value Ref Range    Cortisol - AM 18.1 7 - 25 ug/dL   CBC    Collection Time: 07/10/22  5:02 AM   Result Value Ref Range    WBC 7.4 4.3 - 11.1 K/uL    RBC 3.78 (L) 4.23 - 5.6 M/uL    Hemoglobin 13.0 (L) 13.6 - 17.2 g/dL    Hematocrit 38.4 (L) 41.1 - 50.3 %    .6 (H) 79.6 - 97.8 FL    MCH 34.4 (H) 26.1 - 32.9 PG    MCHC 33.9 31.4 - 35.0 g/dL    RDW 14.4 11.9 - 14.6 %    Platelets 912 (L) 558 - 450 K/uL    MPV 8.6 (L) 9.4 - 12.3 FL    nRBC 0.00 0.0 - 0.2 K/uL   Basic Metabolic Panel    Collection Time: 07/10/22  5:02 AM   Result Value Ref Range    Sodium 132 (L) 136 - 145 mmol/L    Potassium 4.3 3.5 - 5.1 mmol/L    Chloride 98 98 - 107 mmol/L    CO2 25 21 - 32 mmol/L    Anion Gap 9 7 - 16 mmol/L    Glucose 101 (H) 65 - 100 mg/dL    BUN 14 8 - 23 MG/DL    CREATININE 0.80 0.8 - 1.5 MG/DL    GFR African American >60 >60 ml/min/1.73m2    GFR Non- >60 >60 ml/min/1.73m2    Calcium 8.4 8.3 - 10.4 MG/DL   Lipid Panel    Collection Time: 07/10/22  7:47 AM   Result Value Ref Range    Cholesterol, Total 123 <200 MG/DL    Triglycerides 72 35 - 150 MG/DL    HDL 27 (L) 40 - 60 MG/DL    LDL Calculated 81.6 <100 MG/DL    VLDL Cholesterol Calculated 14.4 6.0 - 23.0 MG/DL    Chol/HDL Ratio 4.6     Hemoglobin A1C    Collection Time: 07/10/22  7:47 AM   Result Value Ref Range    Hemoglobin A1C 5.3 4.8 - 5.6 %    eAG 105 mg/dL   Vitamin D 25 Hydroxy    Collection Time: 07/10/22  7:47 AM   Result Value Ref Range    Vit D, 25-Hydroxy 34.2 30.0 - 100.0 ng/mL   TSH    Collection Time: 07/10/22  7:47 AM   Result Value Ref Range    TSH, 3RD GENERATION 4.990 (H) 0.358 - 3.740 uIU/mL   T4, Free    Collection Time: 07/10/22  7:47 AM   Result Value Ref Range    T4 Free 0.9 0.78 - 1.46 NG/DL   Electrophysiology procedure    Collection Time: 07/10/22  8:51 AM   Result Value Ref Range    Body Surface Area 1.97 m2       Other Studies:  Vascular duplex carotid bilateral   Final Result   SUSAN:  No significant stenosis. LICA:  No significant stenosis. XR HAND LEFT (MIN 3 VIEWS)   Final Result      1. No acute fracture or dislocation. 2. Osteoarthritis. CT HEAD WO CONTRAST   Final Result   1. No evidence of acute intracranial hemorrhage. 2. No evidence of acute cervical spine fracture. 3. Advanced cervical spondylosis. CT CERVICAL SPINE WO CONTRAST   Final Result   1. No evidence of acute intracranial hemorrhage. 2. No evidence of acute cervical spine fracture. 3. Advanced cervical spondylosis. XR CHEST PORTABLE   Final Result   No acute findings in the chest             Current Meds:  Current Facility-Administered Medications   Medication Dose Route Frequency    empagliflozin (JARDIANCE) tablet 10 mg  10 mg Oral Daily    spironolactone (ALDACTONE) tablet 25 mg  25 mg Oral Daily    losartan (COZAAR) tablet 100 mg  100 mg Oral Daily    apixaban (ELIQUIS) tablet 5 mg  5 mg Oral BID    aspirin EC tablet 81 mg  81 mg Oral Daily    carBAMazepine (TEGRETOL) chewable tablet 400 mg  400 mg Oral BID    furosemide (LASIX) tablet 40 mg  40 mg Oral Q48H    HYDROcodone-acetaminophen (NORCO)  MG per tablet 1 tablet  1 tablet Oral Q6H PRN    metoprolol succinate (TOPROL XL) extended release tablet 100 mg  100 mg Oral Daily    pantoprazole (PROTONIX) tablet 40 mg  40 mg Oral QAM AC    sodium chloride flush 0.9 % injection 5-40 mL  5-40 mL IntraVENous 2 times per day    sodium chloride flush 0.9 % injection 5-40 mL  5-40 mL IntraVENous PRN    0.9 % sodium chloride infusion   IntraVENous PRN    ondansetron (ZOFRAN-ODT) disintegrating tablet 4 mg  4 mg Oral Q8H PRN    Or    ondansetron (ZOFRAN) injection 4 mg  4 mg IntraVENous Q6H PRN    polyethylene glycol (GLYCOLAX) packet 17 g  17 g Oral Daily PRN    acetaminophen (TYLENOL) tablet 650 mg  650 mg Oral Q6H PRN    Or    acetaminophen (TYLENOL) suppository 650 mg  650 mg Rectal Q6H PRN    furosemide (LASIX) tablet 20 mg  20 mg Oral Q48H    gabapentin (NEURONTIN) capsule 400 mg  400 mg Oral TID       Signed:  Madina Skelton MD    Part of this note may have been written by using a voice dictation software. The note has been proof read but may still contain some grammatical/other typographical errors.

## 2022-07-10 NOTE — PROGRESS NOTES
Musculoskeletal - no joint tenderness, deformity or swelling  Extremities - peripheral pulses normal, no pedal edema, no clubbing or cyanosis  Skin - normal coloration and turgor, no rashes, no suspicious skin lesions noted    Current Facility-Administered Medications   Medication Dose Route Frequency    losartan (COZAAR) tablet 100 mg  100 mg Oral Daily    apixaban (ELIQUIS) tablet 5 mg  5 mg Oral BID    aspirin EC tablet 81 mg  81 mg Oral Daily    carBAMazepine (TEGRETOL) chewable tablet 400 mg  400 mg Oral BID    furosemide (LASIX) tablet 40 mg  40 mg Oral Q48H    HYDROcodone-acetaminophen (NORCO)  MG per tablet 1 tablet  1 tablet Oral Q6H PRN    metoprolol succinate (TOPROL XL) extended release tablet 100 mg  100 mg Oral Daily    pantoprazole (PROTONIX) tablet 40 mg  40 mg Oral QAM AC    sodium chloride flush 0.9 % injection 5-40 mL  5-40 mL IntraVENous 2 times per day    sodium chloride flush 0.9 % injection 5-40 mL  5-40 mL IntraVENous PRN    0.9 % sodium chloride infusion   IntraVENous PRN    ondansetron (ZOFRAN-ODT) disintegrating tablet 4 mg  4 mg Oral Q8H PRN    Or    ondansetron (ZOFRAN) injection 4 mg  4 mg IntraVENous Q6H PRN    polyethylene glycol (GLYCOLAX) packet 17 g  17 g Oral Daily PRN    acetaminophen (TYLENOL) tablet 650 mg  650 mg Oral Q6H PRN    Or    acetaminophen (TYLENOL) suppository 650 mg  650 mg Rectal Q6H PRN    furosemide (LASIX) tablet 20 mg  20 mg Oral Q48H    gabapentin (NEURONTIN) capsule 400 mg  400 mg Oral TID       Data Review: data included in this note has been independently reviewed by the author     TELEMETRY: Sinus rhythm    Assessment/Plan:     Patient Active Problem List   Diagnosis    TIA (transient ischemic attack)    Tracheobronchomalacia    Elevated brain natriuretic peptide (BNP) level    S/P CABG x 3    Community acquired pneumonia    Dyslipidemia    Hemoptysis    History of smoking 25-50 pack years    Hypertension    Systolic

## 2022-07-11 VITALS
RESPIRATION RATE: 15 BRPM | BODY MASS INDEX: 25.92 KG/M2 | HEIGHT: 69 IN | WEIGHT: 175 LBS | HEART RATE: 62 BPM | OXYGEN SATURATION: 92 % | DIASTOLIC BLOOD PRESSURE: 83 MMHG | SYSTOLIC BLOOD PRESSURE: 158 MMHG | TEMPERATURE: 97.5 F

## 2022-07-11 LAB
ANION GAP SERPL CALC-SCNC: 5 MMOL/L (ref 7–16)
BUN SERPL-MCNC: 16 MG/DL (ref 8–23)
CALCIUM SERPL-MCNC: 8.4 MG/DL (ref 8.3–10.4)
CHLORIDE SERPL-SCNC: 97 MMOL/L (ref 98–107)
CO2 SERPL-SCNC: 27 MMOL/L (ref 21–32)
CREAT SERPL-MCNC: 0.9 MG/DL (ref 0.8–1.5)
ERYTHROCYTE [DISTWIDTH] IN BLOOD BY AUTOMATED COUNT: 14.5 % (ref 11.9–14.6)
GLUCOSE SERPL-MCNC: 85 MG/DL (ref 65–100)
HCT VFR BLD AUTO: 38 % (ref 41.1–50.3)
HGB BLD-MCNC: 13 G/DL (ref 13.6–17.2)
MCH RBC QN AUTO: 34.6 PG (ref 26.1–32.9)
MCHC RBC AUTO-ENTMCNC: 34.2 G/DL (ref 31.4–35)
MCV RBC AUTO: 101.1 FL (ref 79.6–97.8)
NRBC # BLD: 0 K/UL (ref 0–0.2)
PLATELET # BLD AUTO: 144 K/UL (ref 150–450)
PMV BLD AUTO: 9.1 FL (ref 9.4–12.3)
POTASSIUM SERPL-SCNC: 4.1 MMOL/L (ref 3.5–5.1)
RBC # BLD AUTO: 3.76 M/UL (ref 4.23–5.6)
SODIUM SERPL-SCNC: 129 MMOL/L (ref 138–145)
WBC # BLD AUTO: 6.4 K/UL (ref 4.3–11.1)

## 2022-07-11 PROCEDURE — 2580000003 HC RX 258: Performed by: INTERNAL MEDICINE

## 2022-07-11 PROCEDURE — 36415 COLL VENOUS BLD VENIPUNCTURE: CPT

## 2022-07-11 PROCEDURE — 6370000000 HC RX 637 (ALT 250 FOR IP): Performed by: INTERNAL MEDICINE

## 2022-07-11 PROCEDURE — 80048 BASIC METABOLIC PNL TOTAL CA: CPT

## 2022-07-11 PROCEDURE — 85027 COMPLETE CBC AUTOMATED: CPT

## 2022-07-11 RX ORDER — SODIUM CHLORIDE 1000 MG
1 TABLET, SOLUBLE MISCELLANEOUS ONCE
Status: DISCONTINUED | OUTPATIENT
Start: 2022-07-11 | End: 2022-07-11 | Stop reason: HOSPADM

## 2022-07-11 RX ORDER — SPIRONOLACTONE 25 MG/1
25 TABLET ORAL DAILY
Qty: 30 TABLET | Refills: 0 | Status: ON HOLD | OUTPATIENT
Start: 2022-07-12 | End: 2022-10-26 | Stop reason: HOSPADM

## 2022-07-11 RX ADMIN — APIXABAN 5 MG: 5 TABLET, FILM COATED ORAL at 08:03

## 2022-07-11 RX ADMIN — ASPIRIN 81 MG: 81 TABLET ORAL at 08:03

## 2022-07-11 RX ADMIN — HYDROCODONE BITARTRATE AND ACETAMINOPHEN 1 TABLET: 10; 325 TABLET ORAL at 08:03

## 2022-07-11 RX ADMIN — METOPROLOL SUCCINATE 100 MG: 100 TABLET, EXTENDED RELEASE ORAL at 08:03

## 2022-07-11 RX ADMIN — CARBAMAZEPINE 400 MG: 100 TABLET, CHEWABLE ORAL at 08:03

## 2022-07-11 RX ADMIN — EMPAGLIFLOZIN 10 MG: 10 TABLET, FILM COATED ORAL at 08:07

## 2022-07-11 RX ADMIN — SODIUM CHLORIDE, PRESERVATIVE FREE 5 ML: 5 INJECTION INTRAVENOUS at 08:05

## 2022-07-11 RX ADMIN — PANTOPRAZOLE SODIUM 40 MG: 40 TABLET, DELAYED RELEASE ORAL at 06:05

## 2022-07-11 RX ADMIN — FUROSEMIDE 20 MG: 20 TABLET ORAL at 08:01

## 2022-07-11 RX ADMIN — GABAPENTIN 400 MG: 400 CAPSULE ORAL at 08:01

## 2022-07-11 RX ADMIN — SPIRONOLACTONE 25 MG: 25 TABLET ORAL at 08:03

## 2022-07-11 ASSESSMENT — PAIN SCALES - GENERAL
PAINLEVEL_OUTOF10: 0
PAINLEVEL_OUTOF10: 6

## 2022-07-11 ASSESSMENT — PAIN DESCRIPTION - FREQUENCY: FREQUENCY: CONTINUOUS

## 2022-07-11 ASSESSMENT — PAIN DESCRIPTION - ORIENTATION: ORIENTATION: ANTERIOR

## 2022-07-11 ASSESSMENT — PAIN - FUNCTIONAL ASSESSMENT: PAIN_FUNCTIONAL_ASSESSMENT: ACTIVITIES ARE NOT PREVENTED

## 2022-07-11 ASSESSMENT — PAIN DESCRIPTION - LOCATION: LOCATION: HEAD

## 2022-07-11 ASSESSMENT — PAIN DESCRIPTION - ONSET: ONSET: ON-GOING

## 2022-07-11 ASSESSMENT — PAIN DESCRIPTION - DESCRIPTORS: DESCRIPTORS: ACHING

## 2022-07-11 ASSESSMENT — PAIN DESCRIPTION - PAIN TYPE: TYPE: CHRONIC PAIN

## 2022-07-11 NOTE — CARE COORDINATION
Patient is medically ready for discharge per attending. CM screened chart for potential discharge needs. No CM consult received. PT recommending outpatient PT at discharge. [facility] is insured [facility] [address]. Order placed and referral faxed to [facility] OP therapy services. 07/11/22 3937   Discharge Planning   Patient expects to be discharged to: [facility]  Discharge   Services At/After Discharge PT;Outpatient   Condition of Participation: Discharge Planning   The Plan for Transition of Care is related to the following treatment goals: Patient will participate in outpatient PT in order to maintain safe baseline independence in ADLs.

## 2022-07-11 NOTE — DISCHARGE SUMMARY
Hospitalist Discharge Summary   Admit Date:  2022  2:45 PM   DC Note date: 2022  Name:  Bud Mandel   Age:  76 y.o. Sex:  male  :  1945   MRN:  [id number]   Room:  [facility]  PCP:  James Castaneda MD    Presenting Complaint: Fall     Initial Admission Diagnosis: Syncope and collapse [R55]  Contusion of scalp, initial encounter [S00.03XA]  Syncope, unspecified syncope type [R55]     Problem List for this Hospitalization (present on admission):    Principal Problem:    Syncope  Active Problems:    Hyponatremia    Anemia    DNR (do not resuscitate)    Hand pain    Tracheobronchomalacia    Dyslipidemia    Hypertension    Systolic CHF, chronic (HCC)    Coronary atherosclerosis of native coronary vessel    Trigeminal neuralgia    Atrial fibrillation ([facility] [address]  Resolved Problems:    * No resolved hospital problems. *    HPI:  Bud Mandel is a 76 y.o. male with medical history of AFIB on eliquis, trigeminal neuralgia, HTN, HFrEF, CAD, who is evaluated with several episodes of passing out. Daughter Darek Diamond present and gives some history. [phone]. He had an episode when going into the bank a few weeks ago while rushing to get out of the rain. He states that his legs give way and he does not feel like he is going to pass out. No diziness or chest pain. He went home and did not seek medical attention. 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 He had trouble getting up and got on his hands and knees. He then got into his truck and drove down the street and took a nap inside his truck with the air conditioning running. Someone woke him up and he tried to get out of the truck and his legs collapsed. He did hit his head and has a hematoma left scalp. He drove to his office where his daughter thought he needed medical attention and he declined. She did call EMS and then after that evaluation he agreed for her to drive him to the ED. imdur added in April 2022 by PCP and he is curious if this is a contributor. Hospital Course:  Patient admitted with worsening syncopal episodes. CT head and CT spine negative. Suspected cardiologenic in etiology. Echocardiogram with dilated LA, RA and EF 41%. Carotid Doppler negative for significant stenosis. Cardiology consulted and HFrEF treatment optimized with addition of Aldactone and Jardiance. He is on beta-blocker and ARB already. Patient is status post implantable loop recorder. Cardiology okay to discharge patient from cardiology standpoint and follow-up outpatient in 2 weeks. Patient also noted to have mild hyponatremia which improved with gentle hydration. Encouraged to increase salt intake in his diet. Patient to repeat BMP in 1 week. All other chronic conditions stable and we continued essential home meds. Recommend cessation of driving until follow-up outpatient. Patient also need to follow-up with [facility] office for further information on restriction on his driving until issues resolved or improved. No new complaint on the day of discharge. Patient is stable to discharge home with home health. Patient to follow-up with PCP and cardiology outpatient. Disposition: Home health  Diet: ADULT DIET; Regular; Low Fat/Low Chol/High Fiber/POLLO  Code Status: DNR    Follow Ups:   Jeremy Shultz MD On 7/19/2022. Specialty: Internal Medicine  Why: hospital follow-up at 12 PM  Contact information:  [address] [employer] [address] [address] [phone]                       Follow up labs/diagnostics (ultimately defer to outpatient provider): BMP 1 week  Recommend cessation of driving, until improvement in his medical condition      Time spent in patient discharge and coordination 38 minutes. Plan was discussed with patient. All questions answered. Patient was stable at time of discharge.   Instructions given to call a COMPLETE (CONTRAST/BUBBLE/3D PRN) 07/08/2022 11:18 AM (Final)    Interpretation Summary  This is a summary report. The complete report is available in the patient's medical record. If you cannot access the medical record, please contact the sending organization for a detailed fax or copy. · LA: Dilated left atrium. Left Atrium volume index is 46.06 mL/m2. · TV: Mild to moderate tricuspid valve regurgitation is present. · LV: Calculated LVEF is 41%. Normal wall thickness. Dilated left ventricle. Left ventricular diastolic dysfunction. · AV: Mild to moderate aortic valve regurgitation is present. · MV: Mitral valve non-specific thickening. Mild mitral valve regurgitation is present. · RA: Dilated right atrium. No changes noted from previous echo    Signed by: Nixon Bustamante MD on 7/8/2022 11:18 AM      Diagnostic Imaging/Tests:   XR HAND LEFT (MIN 3 VIEWS)    Result Date: 7/7/2022  Left hand INDICATION: Left hand pain after fall COMPARISON: None TECHNIQUE: PA, lateral, oblique views of the left hand were obtained. FINDINGS: Bones are osteopenic. There is no acute fracture identified. There is no dislocation. There is joint space narrowing at the interphalangeal joints. No radiopaque foreign body in the soft tissue. 1. No acute fracture or dislocation. 2. Osteoarthritis. CT HEAD WO CONTRAST    Result Date: 7/7/2022  CT head and cervical spine without contrast HISTORY: Trauma TECHNIQUE: 5 mm axial images were obtained from the skull base to the vertex without intravenous contrast. Helically acquired images were obtained spine reconstructed at 2.5 mm thickness. Sagittal and coronal reformatted images were submitted.  All CT scans at this facility are performed using dose optimization technique as appropriate to a performed exam, to include on automated exposure control, adjustment of the mA and/or KV according to patient's size (including appropriate matching for site-specific examinations), or use of iterative reconstruction technique. COMPARISON: CT head 07/20/2018 CT head without contrast: Findings: The ventricles and sulci are appropriate for age. There are no extra-axial fluid collections. No evidence of acute intracranial hemorrhage or mass effect is identified. There is no evidence to suggest an acute major territorial infarct. There is a small area of encephalomalacia within the left occipital lobe. The bony calvarium is intact. The visualized mastoid air cells and paranasal sinuses are well pneumatized and aerated. CT cervical spine without contrast: Findings: The vertebral body height and alignment are well maintained. The disc space heights are well-preserved. There is no evidence of acute fracture or subluxation. The prevertebral soft tissues are unremarkable. Focal lucency within the left lateral recess at C5-6 most likely represents gas within extruded disc material. Bulky anterior osteophytes are present throughout the cervical spine. There is multilevel facet arthropathy with ankylosis at C3-4. There are advanced degenerative changes at the atlantodental joint. 1. No evidence of acute intracranial hemorrhage. 2. No evidence of acute cervical spine fracture. 3. Advanced cervical spondylosis. CT CERVICAL SPINE WO CONTRAST    Result Date: 7/7/2022  CT head and cervical spine without contrast HISTORY: Trauma TECHNIQUE: 5 mm axial images were obtained from the skull base to the vertex without intravenous contrast. Helically acquired images were obtained spine reconstructed at 2.5 mm thickness. Sagittal and coronal reformatted images were submitted. All CT scans at this facility are performed using dose optimization technique as appropriate to a performed exam, to include on automated exposure control, adjustment of the mA and/or KV according to patient's size (including appropriate matching for site-specific examinations), or use of iterative reconstruction technique.  COMPARISON: CT head is 41%. Normal wall thickness. Dilated left ventricle. Left ventricular diastolic dysfunction. · AV: Mild to moderate aortic valve regurgitation is present. · MV: Mitral valve non-specific thickening. Mild mitral valve regurgitation is present. · RA: Dilated right atrium. No changes noted from previous echo     Vascular duplex carotid bilateral    Result Date: 7/9/2022   No evidence of hemodynamically significant stenosis on either side. Preliminary report was provided by Dr. Gladys Shannon at 5:37 AM. Final report to follow. END PRELIMINARY REPORTTITLE:  Carotid and Vertebral Ultrasound Examination. INDICATION:  Syncope. TECHNIQUE: Grayscale, Color Doppler, and Spectral Doppler Ultrasound interrogation performed. Peak Systolic Velocity, ICA-to-CCA ratio, and End-Diastolic Velocity are recorded. The estimate of stenosis is inferred from velocity measurements cross referenced to published correlations as defined by the Society of Radiologists in [address] [place] [address] Drive Radiology 2003; 229; 340-346. COMPARISON: None. RIGHT NECK:  The peak systolic velocity in the Common Carotid Artery = 65 cm/sec; Internal Carotid Artery = 69 cm/sec. Ratio = 1.1. Antegrade flow in the vertebral artery. LEFT  NECK:  The peak systolic velocity in the Common Carotid Artery = 83 cm/sec; Internal Carotid Artery = 92 cm/sec. Ratio = 1.1. Antegrade flow in the vertebral artery. SUSAN:  No significant stenosis. LICA:  No significant stenosis.         Labs: Results:       BMP, Mg, Phos Recent Labs     07/10/22  0502 07/11/22 0458   * 129*   K 4.3 4.1   CL 98 97*   CO2 25 27   ANIONGAP 9 5*   BUN 14 16   CREATININE 0.80 0.90   LABGLOM >60 >60   GFRAA >60 >60   CALCIUM 8.4 8.4   GLUCOSE 101* 85      CBC Recent Labs     07/10/22  0502 07/11/22  0458   WBC 7.4 6.4   RBC 3.78* 3.76*   HGB 13.0* 13.0*   HCT 38.4* 38.0*   .6* 101.1*   MCH 34.4* 34.6*   MCHC 33.9 34.2   RDW 14.4 14.5   * 144*   MPV 8.6* 9.1*   NRBC 0.00 0.00      LFT No results for input(s): BILITOT, BILIDIR, ALKPHOS, AST, ALT, PROT, LABALBU, GLOB in the last 72 hours.    Cardiac  Lab Results   Component Value Date/Time    TROPHS 11.8 07/08/2022 12:14 PM    TROPHS 12.4 07/08/2022 05:14 AM    TROPHS 14.2 07/07/2022 08:07 PM      Coags Lab Results   Component Value Date/Time    PROTIME 16.5 06/10/2019 12:52 PM    PROTIME 15.4 06/08/2019 02:48 AM    INR 1.4 06/10/2019 12:52 PM    INR 1.2 06/08/2019 02:48 AM    APTT 33.7 06/10/2019 12:52 PM    APTT 54.9 06/09/2019 11:38 PM    APTT 82.4 06/09/2019 08:10 AM      A1c Lab Results   Component Value Date/Time    LABA1C 5.3 07/10/2022 07:47 AM    LABA1C 5.0 06/08/2019 02:48 AM     07/10/2022 07:47 AM    EAG 97 06/08/2019 02:48 AM      Lipids Lab Results   Component Value Date/Time    CHOL 123 07/10/2022 07:47 AM    LDLCALC 81.6 07/10/2022 07:47 AM    LABVLDL 14.4 07/10/2022 07:47 AM    HDL 27 07/10/2022 07:47 AM    CHOLHDLRATIO 4.6 07/10/2022 07:47 AM    TRIG 72 07/10/2022 07:47 AM      Thyroid  Lab Results   Component Value Date/Time    MJS4LQS 4.990 07/10/2022 07:47 AM        Most Recent UA Lab Results   Component Value Date/Time    COLORU MARCELINO 06/07/2019 11:20 PM    SPECGRAV 1.060 06/07/2019 11:20 PM    PROTEINU NEGATIVE  06/07/2019 11:20 PM    GLUCOSEU NEGATIVE  06/07/2019 11:20 PM    KETUA TRACE 06/07/2019 11:20 PM    BILIRUBINUR NEGATIVE  06/07/2019 11:20 PM    BLOODU NEGATIVE  06/07/2019 11:20 PM    NITRU NEGATIVE  06/07/2019 11:20 PM    LEUKOCYTESUR NEGATIVE  06/07/2019 11:20 PM          All Labs from Last 24 Hrs:  Recent Results (from the past 24 hour(s))   CBC    Collection Time: 07/11/22  4:58 AM   Result Value Ref Range    WBC 6.4 4.3 - 11.1 K/uL    RBC 3.76 (L) 4.23 - 5.6 M/uL    Hemoglobin 13.0 (L) 13.6 - 17.2 g/dL    Hematocrit 38.0 (L) 41.1 - 50.3 %    .1 (H) 79.6 - 97.8 FL    MCH 34.6 (H) 26.1 - 32.9 PG    MCHC 34.2 31.4 - 35.0 g/dL    RDW 14.5 11.9 - 14.6 %    Platelets 544 (L) 449 - 450 K/uL MPV 9.1 (L) 9.4 - 12.3 FL    nRBC 0.00 0.0 - 0.2 K/uL   Basic Metabolic Panel    Collection Time: 07/11/22  4:58 AM   Result Value Ref Range    Sodium 129 (L) 138 - 145 mmol/L    Potassium 4.1 3.5 - 5.1 mmol/L    Chloride 97 (L) 98 - 107 mmol/L    CO2 27 21 - 32 mmol/L    Anion Gap 5 (L) 7 - 16 mmol/L    Glucose 85 65 - 100 mg/dL    BUN 16 8 - 23 MG/DL    CREATININE 0.90 0.8 - 1.5 MG/DL    GFR African American >60 >60 ml/min/1.73m2    GFR Non- >60 >60 ml/min/1.73m2    Calcium 8.4 8.3 - 10.4 MG/DL       No Known Allergies  Immunization History   Administered Date(s) Administered    Influenza Trivalent 10/03/2013, 10/27/2014    Influenza Virus Vaccine 11/01/2016    Influenza, High Dose (Fluzone 65 yrs and older) 10/12/2016, 10/30/2018    Influenza, Triv, inactivated, subunit, adjuvanted, IM (Fluad 65 yrs and older) 11/02/2017, 10/30/2018, 10/17/2019    Influenza, Trivalent Pf 10/22/2015    PPD Test 07/22/2018, 06/10/2019    Pneumococcal Conjugate 13-valent (Rihocmi32) 01/25/2018    Pneumococcal Polysaccharide (Nkisgsvcu01) 12/07/2007    Zoster Live (Zostavax) 02/26/2010       Recent Vital Data:  Patient Vitals for the past 24 hrs:   Temp Pulse Resp BP SpO2   07/11/22 0833 -- -- 15 -- --   07/11/22 0708 97.5 °F (36.4 °C) 62 19 (!) 158/83 92 %   07/11/22 0429 97.5 °F (36.4 °C) 61 20 (!) 147/89 94 %   07/11/22 0108 97.7 °F (36.5 °C) 57 18 (!) 146/80 92 %   07/10/22 2211 -- -- 20 -- --   07/10/22 1914 97.5 °F (36.4 °C) 60 20 (!) 146/83 98 %   07/10/22 1621 97.3 °F (36.3 °C) 63 19 (!) 156/85 93 %   07/10/22 1201 97.7 °F (36.5 °C) 58 19 (!) 145/86 94 %       Oxygen Therapy  SpO2: 92 %  Pulse Oximetry Type: Continuous  O2 Device: None (Room air)    Estimated body mass index is 25.84 kg/m² as calculated from the following:    Height as of this encounter: 5' 9\" (1.753 m). Weight as of this encounter: 175 lb (79.4 kg).     Intake/Output Summary (Last 24 hours) at 7/11/2022 1101  Last data filed at 7/10/2022 2211  Gross per 24 hour   Intake 240 ml   Output --   Net 240 ml         Physical Exam:    General:    No overt distress  Head:  Normocephalic, atraumatic  Eyes:  Sclerae appear normal.  Pupils equally round. HENT:  Nares appear normal, no drainage. Moist mucous membranes  Neck:  No restricted ROM. Trachea midline  CV:   RRR. No m/r/g. No JVD  Lungs/chest:   CTAB. Respirations even, unlabored, dressing dry intact  Abdomen:   Soft, nontender, nondistended. Extremities: Warm and dry. No cyanosis or clubbing. No edema. Skin:     No rashes. Normal coloration  Neuro:  CN II-XII grossly intact. Psych:  Normal mood and affect. Signed:  Bernadene Claude, MD    Part of this note may have been written by using a voice dictation software. The note has been proof read but may still contain some grammatical/other typographical errors.

## 2022-07-11 NOTE — PROGRESS NOTES
Pt's D/C instructions completed. Verbalized understanding of all instructions. Discharge medications reviewed with the patient. Peripheral IV removed.

## 2022-07-13 ENCOUNTER — PATIENT MESSAGE (OUTPATIENT)
Dept: CARDIOLOGY CLINIC | Age: 77
End: 2022-07-13

## 2022-07-18 NOTE — TELEPHONE ENCOUNTER
MrBenita Papi Tate should be taking the Spironolactone, Lasix and Jardiance per the hospital.    Thank you,  [place]

## 2022-07-20 ENCOUNTER — OFFICE VISIT (OUTPATIENT)
Dept: CARDIOLOGY CLINIC | Age: 77
End: 2022-07-20
Payer: MEDICARE

## 2022-07-20 ENCOUNTER — TELEPHONE (OUTPATIENT)
Dept: CARDIOLOGY CLINIC | Age: 77
End: 2022-07-20

## 2022-07-20 VITALS
HEIGHT: 69 IN | HEART RATE: 68 BPM | SYSTOLIC BLOOD PRESSURE: 132 MMHG | WEIGHT: 175 LBS | DIASTOLIC BLOOD PRESSURE: 70 MMHG | BODY MASS INDEX: 25.92 KG/M2

## 2022-07-20 DIAGNOSIS — I25.118 ATHEROSCLEROSIS OF NATIVE CORONARY ARTERY OF NATIVE HEART WITH STABLE ANGINA PECTORIS (HCC): ICD-10-CM

## 2022-07-20 DIAGNOSIS — I50.22 SYSTOLIC CHF, CHRONIC (HCC): ICD-10-CM

## 2022-07-20 DIAGNOSIS — R55 SYNCOPE, UNSPECIFIED SYNCOPE TYPE: Primary | ICD-10-CM

## 2022-07-20 DIAGNOSIS — I10 PRIMARY HYPERTENSION: ICD-10-CM

## 2022-07-20 PROCEDURE — 99214 OFFICE O/P EST MOD 30 MIN: CPT | Performed by: INTERNAL MEDICINE

## 2022-07-20 PROCEDURE — 1123F ACP DISCUSS/DSCN MKR DOCD: CPT | Performed by: INTERNAL MEDICINE

## 2022-07-20 NOTE — PROGRESS NOTES
[facility] CARDIOLOGY  [address] [facility], [address]  PHONE: [phone]    NAME: Paula Darby  : 1945     HPI  Paula Darby is a 76 y.o. male seen for a follow up visit regarding   Loss of Consciousness    He is here as a work in for a syncopal episode yesterday . He was getting out of his truck yesterday and passed out . He has passed out a total of 4 times . He passed out twice getting up out of is truck . Past Medical History, Past Surgical History, Family history, Social History, and Medications were all reviewed with the patient today and updated as necessary. [unfilled]  Allergies   Allergen Reactions    Codeine      Other reaction(s): Other- (not listed) - Allergy  constipation    Iodides      Other reaction(s): Unknown (comments)     Past Medical History:   Diagnosis Date    Atrial fibrillation ([address]     Atrial flutter ([facility] [address] 2017    CAD (coronary artery disease) 2006    Cancer ([address]     prostate    GERD (gastroesophageal reflux disease)     takes omeprazole    Heart failure (HCC)     Hypertension     Ill-defined condition     trigeminal neuralgia    Myocardial infarction ([address] 2019    NSTEMI     Sleep apnea     does not wear CPAP    Stroke ([address] 2018    TIA     Past Surgical History:   Procedure Laterality Date    CARDIAC CATHETERIZATION  2006    CARDIAC CATHETERIZATION  2019    Severe 3 vessel CAD with LV EF=40-45% and unsuccessful LCX PCI. COLONOSCOPY N/A 3/2/2020    COLONOSCOPY performed by Evy Laureano MD at [address] In :[address] GRAFT  06/10/2019    3 vessel CABG with LIMA-LAD,SVG-OM,and SVG-R posterior lateral branch with MAZE & LA appendage clipping.     EP DEVICE PROCEDURE N/A 7/10/2022    LOOP RECORDER INSERT performed by Bud Ritchie MD at [facility] CARDIAC CATH LAB    PROSTATE SURGERY       Family History   Problem Relation Age of Onset    Heart Disease Sister     Other Other         cerebral aneurysm     Heart Disease Mother       Social History     Tobacco Use    Smoking status: Former     Packs/day: 2.00     Types: Cigarettes    Smokeless tobacco: Never    Tobacco comments:     Quit smoking: quit at age 27   Substance Use Topics    Alcohol use: No     Prior to Admission medications    Medication Sig Start Date End Date Taking? Authorizing Provider   empagliflozin (JARDIANCE) 10 MG tablet Take 1 tablet by mouth daily 7/12/22  Yes Liset Jacobsen MD   spironolactone (ALDACTONE) 25 MG tablet Take 1 tablet by mouth daily 7/12/22  Yes Liset Jacobsen MD   furosemide (LASIX) 40 MG tablet Take 40 mg by mouth every other day   Yes Historical Provider, MD   apixaban (ELIQUIS) 5 MG TABS tablet Take 5 mg by mouth 2 times daily 1/20/22  Yes Ar Automatic Reconciliation   aspirin 81 MG EC tablet Take 81 mg by mouth 6/17/19  Yes Ar Automatic Reconciliation   carBAMazepine (TEGRETOL) 200 MG tablet Take 400 mg by mouth 2 times daily   Yes Ar Automatic Reconciliation   docusate (COLACE, DULCOLAX) 100 MG CAPS Take 100 mg by mouth 2 times daily as needed 10/26/19  Yes Ar Automatic Reconciliation   furosemide (LASIX) 40 MG tablet Take 20 mg by mouth every other day  10/26/19  Yes Ar Automatic Reconciliation   gabapentin (NEURONTIN) 400 MG capsule 400 mg 3 times daily. Take 2 tablets po [id number] 6/24/19  Yes Ar Automatic Reconciliation   HYDROcodone-acetaminophen (NORCO)  MG per tablet Take 1 tablet by mouth every 8 hours as needed.    Yes Ar Automatic Reconciliation   losartan (COZAAR) 100 MG tablet Take 100 mg by mouth daily 8/27/21  Yes Ar Automatic Reconciliation   metoprolol succinate (TOPROL XL) 100 MG extended release tablet Take 100 mg by mouth daily 11/8/19  Yes Ar Automatic Reconciliation   omeprazole (PRILOSEC) 20 MG delayed release capsule Take 20 mg by mouth daily 10/10/19  Yes Ar Automatic Reconciliation   potassium chloride (KLOR-CON M) 20 MEQ

## 2022-07-20 NOTE — TELEPHONE ENCOUNTER
I spoke w/Marie she reports pt.was hospitalized w/syncope. He was dc'd home on the 11th from [facility]. Pt.had another syncopal episode last night.  insist  pt.be seen today. Meghan Kasper ask if we are not monitoring his loop recorder. Per Karol Money in Device we have not gotten any transmissions from pt.loop since 7/11/22. Pt.has to sleep by the loop monitor for it to transmit daily. He can however force a transmission. I told this to Meghan Kasper and she seemed upset by the fact that we have not rec'd transmissions. I tried to explain to her how the device works. I told her the only physician w/opening today is . I have called over to [facility] to see if Boubacar Sibley will work pt. in today in 15min slot. I was told he was in a room and they will call me back. I relayed this info. to Meghan Kasper and she said  will call Marlon Brice himself.

## 2022-07-20 NOTE — TELEPHONE ENCOUNTER
Sherri Lorenz from [facility] per Dr Ruben Duvall called stating the patient has had 3 syncopal episodes with the last occurring 7/20 at 4:00 PM (event happened while exiting his vehicle). Dr Ruben Duvall insist on patient being seen today at the office as opposed to going to the ED.

## 2022-08-18 ENCOUNTER — OFFICE VISIT (OUTPATIENT)
Dept: CARDIOLOGY CLINIC | Age: 77
End: 2022-08-18
Payer: MEDICARE

## 2022-08-18 VITALS
SYSTOLIC BLOOD PRESSURE: 144 MMHG | DIASTOLIC BLOOD PRESSURE: 74 MMHG | WEIGHT: 182 LBS | HEART RATE: 80 BPM | BODY MASS INDEX: 26.96 KG/M2 | HEIGHT: 69 IN

## 2022-08-18 DIAGNOSIS — I25.118 ATHEROSCLEROSIS OF NATIVE CORONARY ARTERY OF NATIVE HEART WITH STABLE ANGINA PECTORIS (HCC): ICD-10-CM

## 2022-08-18 DIAGNOSIS — I50.22 SYSTOLIC CHF, CHRONIC (HCC): ICD-10-CM

## 2022-08-18 DIAGNOSIS — I10 PRIMARY HYPERTENSION: Primary | ICD-10-CM

## 2022-08-18 DIAGNOSIS — I48.0 PAROXYSMAL ATRIAL FIBRILLATION (HCC): ICD-10-CM

## 2022-08-18 PROCEDURE — G8427 DOCREV CUR MEDS BY ELIG CLIN: HCPCS | Performed by: INTERNAL MEDICINE

## 2022-08-18 PROCEDURE — 99214 OFFICE O/P EST MOD 30 MIN: CPT | Performed by: INTERNAL MEDICINE

## 2022-08-18 PROCEDURE — 1123F ACP DISCUSS/DSCN MKR DOCD: CPT | Performed by: INTERNAL MEDICINE

## 2022-08-18 PROCEDURE — 1036F TOBACCO NON-USER: CPT | Performed by: INTERNAL MEDICINE

## 2022-08-18 PROCEDURE — G8417 CALC BMI ABV UP PARAM F/U: HCPCS | Performed by: INTERNAL MEDICINE

## 2022-08-18 ASSESSMENT — ENCOUNTER SYMPTOMS
BACK PAIN: 0
ABDOMINAL PAIN: 0
COUGH: 0
SHORTNESS OF BREATH: 0

## 2022-08-18 NOTE — PROGRESS NOTES
[facility] CARDIOLOGY  [address], [address]      22      NAME:  Lauren Jeffers  : 1945  MRN: [id number]      SUBJECTIVE:   Lauren Jeffers is a 76 y.o. male seen for a follow up visit regarding the following:     Chief Complaint   Patient presents with    Coronary Artery Disease    Hypertension    Congestive Heart Failure       HPI:   Patient was admitted 2019 with NSTEMI and severe 3VCAD complicated by AF/RVR. He had CABG/MAZE and JENNIFER clip. He has struggled  significantly post CABG with pleural effusions and fluid retention. LV systolic function was severely reduced postoperatively. Echocardiogram 2020 demonstrated stable moderate LV systolic dysfunction with ejection fraction of 40 to 45% no significant  valvular heart disease. He had COVID-19 2020 and severe GI illnesses. He had Covid again 2022. He has recovered. Remains dyspneic. Echo 2022 with stable EF 40-45%. Past Medical History, Past Surgical History, Family history, Social History, and Medications were all reviewed with the patient today and updated as necessary. Current Outpatient Medications   Medication Sig Dispense Refill    furosemide (LASIX) 40 MG tablet Take 40 mg by mouth every other day      apixaban (ELIQUIS) 5 MG TABS tablet Take 5 mg by mouth 2 times daily      aspirin 81 MG EC tablet Take 81 mg by mouth      carBAMazepine (TEGRETOL) 200 MG tablet Take 400 mg by mouth 2 times daily      docusate (COLACE, DULCOLAX) 100 MG CAPS Take 100 mg by mouth 2 times daily as needed      furosemide (LASIX) 40 MG tablet Take 40 mg by mouth every other day      HYDROcodone-acetaminophen (NORCO)  MG per tablet Take 1 tablet by mouth every 8 hours as needed.       melatonin 3 MG TABS tablet Take 5 mg by mouth      metoprolol succinate (TOPROL XL) 100 MG extended release tablet Take 100 mg by mouth daily      nitroGLYCERIN (NITROSTAT) 0.4 MG SL tablet TAKE AS DIRECTED.      omeprazole (PRILOSEC) 20 MG delayed release capsule Take 20 mg by mouth daily      potassium chloride (KLOR-CON M) 20 MEQ extended release tablet Take 20 mEq by mouth 2 times daily      empagliflozin (JARDIANCE) 10 MG tablet Take 1 tablet by mouth daily (Patient not taking: Reported on 8/18/2022) 30 tablet 0    spironolactone (ALDACTONE) 25 MG tablet Take 1 tablet by mouth daily (Patient not taking: Reported on 8/18/2022) 30 tablet 0    gabapentin (NEURONTIN) 400 MG capsule 400 mg 3 times daily. Take 2 tablets po [id number] (Patient not taking: Reported on 8/18/2022)      rosuvastatin (CRESTOR) 40 MG tablet Take 40 mg by mouth (Patient not taking: No sig reported)      sildenafil (VIAGRA) 50 MG tablet Take 50 mg by mouth as needed (Patient not taking: No sig reported)       No current facility-administered medications for this visit.      Patient Active Problem List    Diagnosis Date Noted    Syncope 07/07/2022     Priority: Medium    Hyponatremia 07/07/2022     Priority: Medium    Anemia 07/07/2022     Priority: Medium    DNR (do not resuscitate) 07/07/2022     Priority: Medium    Hand pain 07/07/2022     Priority: Medium    Community acquired pneumonia 03/28/2021    Leukocytosis 03/28/2021    SOB (shortness of breath) 10/24/2019    Localized edema 06/24/2019    Elevated brain natriuretic peptide (BNP) level 06/14/2019    History of smoking 25-50 pack years 06/13/2019    Tracheobronchomalacia 06/10/2019    S/P CABG x 3 06/10/2019    Hemoptysis 06/09/2019    Trigeminal neuralgia 10/98/9058    Systolic CHF, chronic ([facility] 01/31/2019     2014:  EF 55%  07/2018:  EF 45-50%  01/2019:  EF 40-45%  06/2019:  EF 50%  07/2019:  EF 35%  09/2019:  EF 40-45%  05/2020:  EF 40-45%  08/2021:  EF 40-45%  07/2022:  EF 40-45%        TIA (transient ischemic attack) 07/20/2018    Dyslipidemia 04/07/2017    Hypertension 04/07/2017    Coronary atherosclerosis of native coronary vessel 04/07/2017     2006:  PCI RCA - Liberte, PCI LAD Cypher, PCI LCx Cypher  01/2014:  Stress testing with fixed inferior defect - no ischemia  11/2014:  EF with EF 50-55% and inferior HK   07/2018:  EF 45-50%  01/2019:  EF 40-45%  05/2019:  CABG LIMA-LAD, SVG-OM, SVG-SUSAN  08/2021:  EF 40-45%        Atrial fibrillation ([address] 04/07/2017      Family History   Problem Relation Age of Onset    Heart Disease Sister     Other Other         cerebral aneurysm     Heart Disease Mother      Social History     Tobacco Use    Smoking status: Former     Packs/day: 2.00     Types: Cigarettes    Smokeless tobacco: Never    Tobacco comments:     Quit smoking: quit at age 27   Substance Use Topics    Alcohol use: No       Review Of Symptoms    Review of Systems   Constitutional: Positive for malaise/fatigue. Negative for fever. HENT:  Negative for nosebleeds. Cardiovascular:  Positive for dyspnea on exertion. Negative for chest pain and palpitations. Respiratory:  Negative for cough and shortness of breath. Musculoskeletal:  Negative for back pain, muscle cramps, muscle weakness and myalgias. Gastrointestinal:  Negative for abdominal pain. Neurological:  Negative for dizziness. Physical Exam  Blood pressure (!) 144/74, pulse 80, height 5' 9\" (1.753 m), weight 182 lb (82.6 kg). Physical Exam  HENT:      Head: Normocephalic and atraumatic. Eyes:      Extraocular Movements: Extraocular movements intact. Cardiovascular:      Rate and Rhythm: Normal rate and regular rhythm. Heart sounds: No murmur heard. Pulmonary:      Effort: Pulmonary effort is normal.      Breath sounds: Normal breath sounds. Abdominal:      Palpations: Abdomen is soft. Musculoskeletal:         General: Normal range of motion. Skin:     General: Skin is warm and dry. Neurological:      General: No focal deficit present. Mental Status: He is oriented to person, place, and time.          Medical problems, medical history and test results were reviewed with the patient today. Lab Results   Component Value Date    CHOL 123 07/10/2022    CHOL 178 06/07/2019     Lab Results   Component Value Date    TRIG 72 07/10/2022    TRIG 74 06/07/2019     Lab Results   Component Value Date    HDL 27 (L) 07/10/2022    HDL 37 (L) 06/07/2019     Lab Results   Component Value Date    LDLCALC 81.6 07/10/2022    LDLCALC 126.2 (H) 06/07/2019     Lab Results   Component Value Date    LABVLDL 14.4 07/10/2022    LABVLDL 14.8 06/07/2019     Lab Results   Component Value Date    CHOLHDLRATIO 4.6 07/10/2022    CHOLHDLRATIO 4.8 06/07/2019        Lab Results   Component Value Date    LABA1C 5.3 07/10/2022     Lab Results   Component Value Date     07/10/2022        Lab Results   Component Value Date     (L) 07/11/2022    K 4.1 07/11/2022    CL 97 (L) 07/11/2022    CO2 27 07/11/2022    BUN 16 07/11/2022    CREATININE 0.90 07/11/2022    GLUCOSE 85 07/11/2022    CALCIUM 8.4 07/11/2022    PROT 7.0 07/08/2022    LABALBU 3.0 (L) 07/08/2022    BILITOT 1.1 07/08/2022    ALKPHOS 131 07/08/2022    AST 16 07/08/2022    ALT 21 07/08/2022    LABGLOM >60 07/11/2022    GFRAA >60 07/11/2022    AGRATIO 0.9 (L) 06/07/2021    GLOB 4.0 (H) 07/08/2022       Lab Results   Component Value Date/Time     07/11/2022 04:58 AM    K 4.1 07/11/2022 04:58 AM    CL 97 07/11/2022 04:58 AM    CO2 27 07/11/2022 04:58 AM    BUN 16 07/11/2022 04:58 AM    CREATININE 0.90 07/11/2022 04:58 AM    GLUCOSE 85 07/11/2022 04:58 AM    CALCIUM 8.4 07/11/2022 04:58 AM        Lab Results   Component Value Date    WBC 6.4 07/11/2022    HGB 13.0 (L) 07/11/2022    HCT 38.0 (L) 07/11/2022    .1 (H) 07/11/2022     (L) 07/11/2022             ASSESSMENT:    Diagnoses and all orders for this visit:      Atherosclerosis of native coronary artery of native heart with stable angina pectoris ([facility] - No angina. Chronic systolic congestive heart failure (HCC) - EF 45% at CABG and remains stable by echo 07/2022.   Volume status better. Review of recent labs by primary care  physician demonstrates stable creatinine electrolytes. Essential hypertension - Patient with elevated BP in ER and office. Now with hypotension and syncope. Better off of losartan. Feels better with mildly elevated BP. Dyslipidemia  -review of lipid labs performed at [facility] demonstrate LDL of 81 07/2022. Patient now more compliant with rosuvastatin       Paroxysmal atrial fibrillation (HCC) - In SR and Eliquis 5 mg twice daily. Monitor on loop recorder. Problem List Items Addressed This Visit          Circulatory    Hypertension - Primary    Systolic CHF, chronic (HCC)    Coronary atherosclerosis of native coronary vessel    Atrial fibrillation (HCC)       Medications Discontinued During This Encounter   Medication Reason    losartan (COZAAR) 100 MG tablet Therapy completed                   Patient has been instructed and agrees to call our office with any issues or other concerns related to their cardiac condition(s) and/or complaint(s). Return in about 6 months (around 2/18/2023).        Krishan Griffiths MD  8/18/2022

## 2022-08-24 PROCEDURE — G2066 INTER DEVC REMOTE 30D: HCPCS | Performed by: INTERNAL MEDICINE

## 2022-08-24 PROCEDURE — 93298 REM INTERROG DEV EVAL SCRMS: CPT | Performed by: INTERNAL MEDICINE

## 2022-09-21 DIAGNOSIS — R55 SYNCOPE: ICD-10-CM

## 2022-09-21 DIAGNOSIS — I48.91 ATRIAL FIBRILLATION (HCC): Primary | ICD-10-CM

## 2022-09-21 DIAGNOSIS — Z95.818 STATUS POST PLACEMENT OF IMPLANTABLE LOOP RECORDER: ICD-10-CM

## 2022-09-21 DIAGNOSIS — I48.91 ATRIAL FIBRILLATION (HCC): ICD-10-CM

## 2022-09-21 PROCEDURE — 93298 REM INTERROG DEV EVAL SCRMS: CPT | Performed by: INTERNAL MEDICINE

## 2022-09-21 PROCEDURE — G2066 INTER DEVC REMOTE 30D: HCPCS | Performed by: INTERNAL MEDICINE

## 2022-10-12 DIAGNOSIS — Z95.818 STATUS POST PLACEMENT OF IMPLANTABLE LOOP RECORDER: ICD-10-CM

## 2022-10-12 DIAGNOSIS — I48.91 ATRIAL FIBRILLATION (HCC): ICD-10-CM

## 2022-10-12 DIAGNOSIS — R55 SYNCOPE: ICD-10-CM

## 2022-10-20 ENCOUNTER — TELEPHONE (OUTPATIENT)
Dept: CARDIOLOGY CLINIC | Age: 77
End: 2022-10-20

## 2022-10-20 DIAGNOSIS — R06.09 DOE (DYSPNEA ON EXERTION): ICD-10-CM

## 2022-10-20 DIAGNOSIS — D64.9 ANEMIA: ICD-10-CM

## 2022-10-20 DIAGNOSIS — R07.2 CHEST PAIN, PRECORDIAL: ICD-10-CM

## 2022-10-20 DIAGNOSIS — I10 HYPERTENSION: Primary | ICD-10-CM

## 2022-10-20 DIAGNOSIS — I50.22 SYSTOLIC CHF, CHRONIC (HCC): ICD-10-CM

## 2022-10-20 NOTE — TELEPHONE ENCOUNTER
No patient for cardiac catheterization with echocardiogram prior. He will need BMP and CBC prior. Confirm whether patient is on anticoagulation and if it is we will need to hold 48 hours prior to procedure.

## 2022-10-20 NOTE — TELEPHONE ENCOUNTER
Patients daughter called stating he is very SOB. Can barely walk 20ft. Spoke with Dr. Fco Horowitz at a relatives appt and he asked if she wanted him to come in and be seen but at that point he was okay. He has gotten worse since.

## 2022-10-24 PROBLEM — R07.2 CHEST PAIN, PRECORDIAL: Status: ACTIVE | Noted: 2022-10-24

## 2022-10-25 NOTE — PROGRESS NOTES
Called to pre-assess for [employer] , Scheduled for [address] [facility]. Patient instructed to take  mg in the morning and to hold all diuretics, Eliquis.

## 2022-10-26 ENCOUNTER — HOSPITAL ENCOUNTER (OUTPATIENT)
Age: 77
Setting detail: OUTPATIENT SURGERY
Discharge: HOME | End: 2022-10-26
Attending: INTERNAL MEDICINE | Admitting: INTERNAL MEDICINE
Payer: MEDICARE

## 2022-10-26 ENCOUNTER — APPOINTMENT (OUTPATIENT)
Dept: NON INVASIVE DIAGNOSTICS | Age: 77
End: 2022-10-26
Attending: INTERNAL MEDICINE
Payer: MEDICARE

## 2022-10-26 VITALS
WEIGHT: 182 LBS | HEIGHT: 69 IN | OXYGEN SATURATION: 97 % | DIASTOLIC BLOOD PRESSURE: 74 MMHG | BODY MASS INDEX: 26.96 KG/M2 | TEMPERATURE: 98.4 F | SYSTOLIC BLOOD PRESSURE: 121 MMHG | HEART RATE: 58 BPM

## 2022-10-26 DIAGNOSIS — I25.10 CORONARY ATHEROSCLEROSIS OF NATIVE CORONARY VESSEL: Primary | ICD-10-CM

## 2022-10-26 DIAGNOSIS — R07.2 CHEST PAIN, PRECORDIAL: ICD-10-CM

## 2022-10-26 DIAGNOSIS — R06.09 DOE (DYSPNEA ON EXERTION): ICD-10-CM

## 2022-10-26 LAB
ANION GAP SERPL CALC-SCNC: 6 MMOL/L (ref 2–11)
BASOPHILS # BLD: 0.1 K/UL (ref 0–0.2)
BASOPHILS NFR BLD: 1 % (ref 0–2)
BUN SERPL-MCNC: 15 MG/DL (ref 8–23)
CALCIUM SERPL-MCNC: 8.9 MG/DL (ref 8.3–10.4)
CHLORIDE SERPL-SCNC: 102 MMOL/L (ref 101–110)
CO2 SERPL-SCNC: 26 MMOL/L (ref 21–32)
CREAT SERPL-MCNC: 1 MG/DL (ref 0.8–1.5)
DIFFERENTIAL METHOD BLD: ABNORMAL
ECHO AO ASC DIAM: 3.9 CM
ECHO AO ASCENDING AORTA INDEX: 1.97 CM/M2
ECHO AO ROOT DIAM: 3.7 CM
ECHO AO ROOT INDEX: 1.87 CM/M2
ECHO AR MAX VEL PISA: 4.1 M/S
ECHO AV AREA PEAK VELOCITY: 2.6 CM2
ECHO AV AREA VTI: 2.8 CM2
ECHO AV AREA/BSA PEAK VELOCITY: 1.3 CM2/M2
ECHO AV AREA/BSA VTI: 1.4 CM2/M2
ECHO AV MEAN GRADIENT: 3 MMHG
ECHO AV MEAN VELOCITY: 0.8 M/S
ECHO AV PEAK GRADIENT: 5 MMHG
ECHO AV PEAK VELOCITY: 1.1 M/S
ECHO AV REGURGITANT PHT: 625.6 MILLISECOND
ECHO AV VELOCITY RATIO: 0.73
ECHO AV VTI: 22.9 CM
ECHO BSA: 2 M2
ECHO BSA: 2 M2
ECHO EST RA PRESSURE: 15 MMHG
ECHO LV E' LATERAL VELOCITY: 5 CM/S
ECHO LV E' SEPTAL VELOCITY: 4 CM/S
ECHO LV EDV A2C: 137 ML
ECHO LV EDV A4C: 126 ML
ECHO LV EDV BP: 132 ML (ref 67–155)
ECHO LV EDV INDEX A4C: 64 ML/M2
ECHO LV EDV INDEX BP: 67 ML/M2
ECHO LV EDV NDEX A2C: 69 ML/M2
ECHO LV EJECTION FRACTION A2C: 36 %
ECHO LV EJECTION FRACTION A4C: 42 %
ECHO LV EJECTION FRACTION BIPLANE: 38 % (ref 55–100)
ECHO LV ESV A2C: 87 ML
ECHO LV ESV A4C: 73 ML
ECHO LV ESV BP: 81 ML (ref 22–58)
ECHO LV ESV INDEX A2C: 44 ML/M2
ECHO LV ESV INDEX A4C: 37 ML/M2
ECHO LV ESV INDEX BP: 41 ML/M2
ECHO LV FRACTIONAL SHORTENING: 19 % (ref 28–44)
ECHO LV INTERNAL DIMENSION DIASTOLE INDEX: 2.42 CM/M2
ECHO LV INTERNAL DIMENSION DIASTOLIC: 4.8 CM (ref 4.2–5.9)
ECHO LV INTERNAL DIMENSION SYSTOLIC INDEX: 1.97 CM/M2
ECHO LV INTERNAL DIMENSION SYSTOLIC: 3.9 CM
ECHO LV IVSD: 1.2 CM (ref 0.6–1)
ECHO LV MASS 2D: 219.1 G (ref 88–224)
ECHO LV MASS INDEX 2D: 110.7 G/M2 (ref 49–115)
ECHO LV POSTERIOR WALL DIASTOLIC: 1.2 CM (ref 0.6–1)
ECHO LV RELATIVE WALL THICKNESS RATIO: 0.5
ECHO LVOT AREA: 3.8 CM2
ECHO LVOT AV VTI INDEX: 0.71
ECHO LVOT DIAM: 2.2 CM
ECHO LVOT MEAN GRADIENT: 1 MMHG
ECHO LVOT PEAK GRADIENT: 2 MMHG
ECHO LVOT PEAK VELOCITY: 0.8 M/S
ECHO LVOT STROKE VOLUME INDEX: 31.1 ML/M2
ECHO LVOT SV: 61.6 ML
ECHO LVOT VTI: 16.2 CM
ECHO MV A VELOCITY: 0.24 M/S
ECHO MV AREA PHT: 5.5 CM2
ECHO MV E DECELERATION TIME (DT): 138.8 MS
ECHO MV E VELOCITY: 0.72 M/S
ECHO MV E/A RATIO: 3
ECHO MV E/E' LATERAL: 14.4
ECHO MV E/E' RATIO (AVERAGED): 16.2
ECHO MV E/E' SEPTAL: 18
ECHO MV PRESSURE HALF TIME (PHT): 40.2 MS
ECHO MV REGURGITANT PEAK GRADIENT: 135 MMHG
ECHO MV REGURGITANT PEAK VELOCITY: 5.8 M/S
ECHO MV REGURGITANT VTIA: 211 CM
ECHO RIGHT VENTRICULAR SYSTOLIC PRESSURE (RVSP): 63 MMHG
ECHO RV FREE WALL PEAK S': 8 CM/S
ECHO RV TAPSE: 1.3 CM (ref 1.7–?)
ECHO TV REGURGITANT MAX VELOCITY: 3.45 M/S
ECHO TV REGURGITANT PEAK GRADIENT: 48 MMHG
EOSINOPHIL # BLD: 0.2 K/UL (ref 0–0.8)
EOSINOPHIL NFR BLD: 3 % (ref 0.5–7.8)
ERYTHROCYTE [DISTWIDTH] IN BLOOD BY AUTOMATED COUNT: 14.6 % (ref 11.9–14.6)
GLUCOSE SERPL-MCNC: 101 MG/DL (ref 65–100)
HCT VFR BLD AUTO: 43 % (ref 41.1–50.3)
HGB BLD-MCNC: 14.3 G/DL (ref 13.6–17.2)
IMM GRANULOCYTES # BLD AUTO: 0 K/UL (ref 0–0.5)
IMM GRANULOCYTES NFR BLD AUTO: 0 % (ref 0–5)
LV EF: 33 %
LVEF MODALITY: ABNORMAL
LYMPHOCYTES # BLD: 1.3 K/UL (ref 0.5–4.6)
LYMPHOCYTES NFR BLD: 17 % (ref 13–44)
MAGNESIUM SERPL-MCNC: 2 MG/DL (ref 1.8–2.4)
MCH RBC QN AUTO: 35.5 PG (ref 26.1–32.9)
MCHC RBC AUTO-ENTMCNC: 33.3 G/DL (ref 31.4–35)
MCV RBC AUTO: 106.7 FL (ref 82–102)
MONOCYTES # BLD: 0.6 K/UL (ref 0.1–1.3)
MONOCYTES NFR BLD: 8 % (ref 4–12)
NEUTS SEG # BLD: 5.4 K/UL (ref 1.7–8.2)
NEUTS SEG NFR BLD: 71 % (ref 43–78)
NRBC # BLD: 0 K/UL (ref 0–0.2)
PLATELET # BLD AUTO: 155 K/UL (ref 150–450)
PMV BLD AUTO: 8.8 FL (ref 9.4–12.3)
POTASSIUM SERPL-SCNC: 4 MMOL/L (ref 3.5–5.1)
RBC # BLD AUTO: 4.03 M/UL (ref 4.23–5.6)
SODIUM SERPL-SCNC: 134 MMOL/L (ref 133–143)
WBC # BLD AUTO: 7.7 K/UL (ref 4.3–11.1)

## 2022-10-26 PROCEDURE — 6360000004 HC RX CONTRAST MEDICATION: Performed by: INTERNAL MEDICINE

## 2022-10-26 PROCEDURE — 99153 MOD SED SAME PHYS/QHP EA: CPT | Performed by: INTERNAL MEDICINE

## 2022-10-26 PROCEDURE — C1894 INTRO/SHEATH, NON-LASER: HCPCS | Performed by: INTERNAL MEDICINE

## 2022-10-26 PROCEDURE — 83735 ASSAY OF MAGNESIUM: CPT

## 2022-10-26 PROCEDURE — 93459 L HRT ART/GRFT ANGIO: CPT | Performed by: INTERNAL MEDICINE

## 2022-10-26 PROCEDURE — 99152 MOD SED SAME PHYS/QHP 5/>YRS: CPT | Performed by: INTERNAL MEDICINE

## 2022-10-26 PROCEDURE — 2709999900 HC NON-CHARGEABLE SUPPLY: Performed by: INTERNAL MEDICINE

## 2022-10-26 PROCEDURE — 80048 BASIC METABOLIC PNL TOTAL CA: CPT

## 2022-10-26 PROCEDURE — 85025 COMPLETE CBC W/AUTO DIFF WBC: CPT

## 2022-10-26 PROCEDURE — 93306 TTE W/DOPPLER COMPLETE: CPT | Performed by: INTERNAL MEDICINE

## 2022-10-26 PROCEDURE — 2580000003 HC RX 258: Performed by: INTERNAL MEDICINE

## 2022-10-26 PROCEDURE — C8929 TTE W OR WO FOL WCON,DOPPLER: HCPCS

## 2022-10-26 PROCEDURE — 6360000002 HC RX W HCPCS: Performed by: INTERNAL MEDICINE

## 2022-10-26 PROCEDURE — C1769 GUIDE WIRE: HCPCS | Performed by: INTERNAL MEDICINE

## 2022-10-26 PROCEDURE — 2500000003 HC RX 250 WO HCPCS: Performed by: INTERNAL MEDICINE

## 2022-10-26 RX ORDER — MIDAZOLAM HYDROCHLORIDE 1 MG/ML
INJECTION INTRAMUSCULAR; INTRAVENOUS PRN
Status: DISCONTINUED | OUTPATIENT
Start: 2022-10-26 | End: 2022-10-26 | Stop reason: HOSPADM

## 2022-10-26 RX ORDER — ASPIRIN 81 MG/1
324 TABLET, CHEWABLE ORAL DAILY
Status: DISCONTINUED | OUTPATIENT
Start: 2022-10-26 | End: 2022-10-26 | Stop reason: HOSPADM

## 2022-10-26 RX ORDER — LIDOCAINE HYDROCHLORIDE 10 MG/ML
INJECTION, SOLUTION INFILTRATION; PERINEURAL PRN
Status: DISCONTINUED | OUTPATIENT
Start: 2022-10-26 | End: 2022-10-26 | Stop reason: HOSPADM

## 2022-10-26 RX ORDER — VALSARTAN 40 MG/1
80 TABLET ORAL DAILY
Qty: 30 TABLET | Refills: 3 | Status: SHIPPED | OUTPATIENT
Start: 2022-10-26

## 2022-10-26 RX ORDER — SODIUM CHLORIDE 0.9 % (FLUSH) 0.9 %
5-40 SYRINGE (ML) INJECTION PRN
Status: DISCONTINUED | OUTPATIENT
Start: 2022-10-26 | End: 2022-10-26 | Stop reason: HOSPADM

## 2022-10-26 RX ORDER — SODIUM CHLORIDE 0.9 % (FLUSH) 0.9 %
5-40 SYRINGE (ML) INJECTION EVERY 12 HOURS SCHEDULED
Status: DISCONTINUED | OUTPATIENT
Start: 2022-10-26 | End: 2022-10-26 | Stop reason: HOSPADM

## 2022-10-26 RX ORDER — SODIUM CHLORIDE 9 MG/ML
INJECTION, SOLUTION INTRAVENOUS CONTINUOUS
Status: DISCONTINUED | OUTPATIENT
Start: 2022-10-26 | End: 2022-10-26 | Stop reason: HOSPADM

## 2022-10-26 RX ORDER — ACETAMINOPHEN 325 MG/1
650 TABLET ORAL EVERY 4 HOURS PRN
Status: DISCONTINUED | OUTPATIENT
Start: 2022-10-26 | End: 2022-10-26 | Stop reason: HOSPADM

## 2022-10-26 RX ORDER — HEPARIN SODIUM 200 [USP'U]/100ML
INJECTION, SOLUTION INTRAVENOUS CONTINUOUS PRN
Status: DISCONTINUED | OUTPATIENT
Start: 2022-10-26 | End: 2022-10-26 | Stop reason: HOSPADM

## 2022-10-26 RX ADMIN — PERFLUTREN 0.45 ML: 6.52 INJECTION, SUSPENSION INTRAVENOUS at 11:23

## 2022-10-26 NOTE — DISCHARGE INSTRUCTIONS
HEART CATHETERIZATION/ANGIOGRAPHY DISCHARGE INSTRUCTIONS    Check puncture site frequently for swelling or bleeding. If there is any bleeding, apply pressure over the area with a clean towel or washcloth and call 911. Notify your doctor for any redness, swelling, drainage, or oozing from the puncture site. Notify your doctor for any fever or chills. If the extremity becomes cold, numb, or painful call Dr. Santos Diehl at [phone]. Activity should be limited for the next 48 hours. No heavy lifting, pushing, pulling  or strenuous activity for 48 hours. No heavy lifting (anything over 10 pounds) for 3 days. You may resume your usual diet. Drink more fluids than usual.  Have a responsible person drive you home and stay with you for at least 24 hours after your heart catheterization/angiography. You may remove bandage from your left wrist in 24 hours. You may shower in 24 hours. No tub baths, hot tubs, or swimming for 1 week. Do not place any lotions, creams, powders, or ointments over puncture site for 1 week. You may place a clean band-aid over the puncture site each day for 5 days. Change daily. Sedation for a Medical Procedure: Care Instructions     You were given a sedative medication during your visit. While many of the effects will have worn   off before you leave; you may continue to feel some effects for several hours. Common side effects from sedation include:  Feeling sleepy. (Your doctors and nurses will make sure you are not too sleepy to go home.)  Nausea and vomiting. This usually does not last long. Feeling tired. How can you care for yourself at home? Activity    Don't do anything for 24 hours that requires attention to detail. It takes time for the medicine effects to completely wear off. Do not make important legal decisions for 24 hours. Do not sign any legal documents for 24 hours.      Do not drink alcohol today     For your safety, you should not drive or operate heavy machinery for the remainder of the day     Rest when you feel tired. Getting enough sleep will help you recover. Diet    You can eat your normal diet, unless your doctor gives you other instructions. If your stomach is upset, try clear liquids and bland, low-fat foods like plain toast or rice. Drink plenty of fluids (unless your doctor tells you not to). Don't drink alcohol for 24 hours. Medicines    Be safe with medicines. Read and follow all instructions on the label. If the doctor gave you a prescription medicine for pain, take it as prescribed. If you are not taking a prescription pain medicine, ask your doctor if you can take an over-the-counter medicine. If you think your pain medicine is making you sick to your stomach: Take your medicine after meals (unless your doctor has told you not to). Ask your doctor for a different pain medicine. I have read the above instructions and have had the opportunity to ask questions.       Patient: ________________________   Date: _____________    Witness: _______________________   Date: _____________

## 2022-10-26 NOTE — Clinical Note
Catheter inserted. THORACIC SURGERY HOSPITAL DISCHARGE SUMMARY  [facility] ONCOLOGY - THORACIC SURGERY  [address], [address]  [place] [address]  Phone [phone]  [email or web address]    4/22/2019     Jeremy Broussard [facility] MANAGEMENT [address]  [address] / [facility]   Phone: [phone]   Fax: [phone]        Re: Jonathan Lawson             1956             [id number]              Dates of Hospitalization: 4/16/2019 - 4/17/2019   Date of Service (when I saw the patient): 4/17/19    Dear Dr. Broussard:     As you are aware, we had the pleasure of caring for your patient,  Jonathan Lawson here at [facility].  He is a 62-year-old gentleman who was found to have a new nodule which is spiculated at the base of the right lower lobe lung.  This nodule was not amenable to CT-guided biopsy.  The PET scan did not show significant activity.  Options of diagnostic procedure and treatment were discussed and the patient elected to proceed with right video-assisted thoracoscopy with wedge resection for diagnosis and treatment with the plan to proceed with a right lower lobectomy if malignant.         On 4/16/2019, Dr. Felix Wagner performed the following:    Procedure/Surgery Information   Procedure:  Right video-assisted thoracoscopy and wedge resection of right lower lobe lung nodule.      Surgeon(s): Surgeon(s) and Role:     * Felix Wagner MD - Primary     * Lidia Sinclair PA-C - Assisting   Specimens: ID Type Source Tests Collected by Time Destination   1 : RIGHT LOWER LOBE LUNG NODULE Tissue Lung, Right Lower Lobe AFB CULTURE NON BLOOD, AFB STAIN NON BLOOD, FUNGUS CULTURE, JEANETTE PREP Felix Wagner MD 4/16/2019  2:24 PM    A : RIGHT LOWER LOBE LUNG NODULE Tissue Lung, Right Lower Lobe SURGICAL PATHOLOGY EXAM Felix Wagner MD 4/16/2019  2:22 PM             Final Surgical Pathology Revealed:  Benign right lower  lobe lung nodule with necrotizing granulomatous inflammation. GMS stains show abundant fungal yeast forms suggestive of Histoplasma.    His post-operative course was unremarkable.      Consultations This Hospital Stay   None    Jonathan Lawson has otherwise recovered sufficiently to be discharged to home today, 4/17/2019, on post-operative day number one for further convalescence.  His incisions are healing well with no signs or symptoms of infection.  His bowels have moved sufficiently and he is tolerating diet and activity, ambulating and transferring independently.  He is currently afebrile with stable vital signs.     Below, you will find a full discharge medication list and instructions.  We have arranged for Jonathan Lawson to follow-up with us in our [facility] in 7-10 days with a Chest X-ray prior to that appointment.  We thank you for allowing us to participate in the care of Jonathan Lawson here at [facility].  Please feel free to contact our office at [phone] with any questions or concerns or if we can be of any further assistance in the care of this patient.    Sincerely,    Dr. Felix Wagner MD    D/C Summary Prepared by: Lidia Sinclair PA-C    Discharge Medications:  Discharge Medication List as of 4/17/2019  1:34 PM      START taking these medications    Details   HYDROmorphone (DILAUDID) 2 MG tablet Take 1 tablet (2 mg) by mouth every 3 hours as needed, Disp-20 tablet, R-0, Local Print         CONTINUE these medications which have NOT CHANGED    Details   ALPRAZolam (XANAX) 0.5 MG tablet Take 0.5 mg by mouth 3 times daily as needed for anxiety, Historical      aspirin 81 MG EC tablet Take 81 mg by mouth daily, Historical      carbidopa-levodopa (SINEMET)  MG tablet Take 1 tablet by mouth 3 times daily, Historical      Carboxymethylcellulose Sodium (REFRESH TEARS OP) Apply 1 drop to eye every 6 hours as needed, Historical      cholecalciferol (VITAMIN  D3) 5000 units TABS tablet Take 5,000 Units by mouth daily, Historical      Coenzyme Q10 (CO Q 10 PO) Take 200 mg by mouth daily , Historical      cyclobenzaprine (FLEXERIL) 10 MG tablet Take 10 mg by mouth 3 times daily as needed for muscle spasms, Historical      diclofenac (VOLTAREN) 1 % topical gel Place onto the skin 2 times daily as needed (knee pain) , Historical      fish oil-omega-3 fatty acids 1000 MG capsule Take 1 g by mouth daily, Historical      HYDROcodone-acetaminophen (NORCO) 5-325 MG tablet Take 1 tablet by mouth every 4 hours as needed for severe pain, Historical      metroNIDAZOLE (METROGEL) 0.75 % external gel Apply topically 2 times dailyHistorical      rosuvastatin (CRESTOR) 40 MG tablet Take 40 mg by mouth At Bedtime, Historical               Discharge Instructions:  1) Remove chest tube dressing on 4/19/19 and then it is Ok to shower.  Please wash both incision and chest tube site daily with soap and water.  You may cover the chest tube site daily with a clean band-aid or dry gauze if it continues to drain.  Once it stops draining, leave the site open to air and it will form a scab.  2) Steri-strips can be removed in 1 week or they will fall off when they are ready.  3) Continue daily use of your Incentive Spirometer, set of 10x in a row, every 1-2 hours while you are awake during the day. Also use your flutter valve if you received one during your stay.   4)  No driving while on narcotic pain medications.    Follow-Up Care:  1) Follow up with Lidia Sinclair PA-C/Dr. Wagner at the [place] [facility] in [place] ([address] [place] [address], [address], [place] [address]).  Call Jessica at [phone] to schedule the appointment.  2) Follow up with Primary Care Provider, Jeremy Broussard within 1 month of discharge for routine post-surgical care, wound check and follow up.  Please call [phone] to arrange this appointment.       CC  Patient Care Team:  Jeremy Broussard MD as PCP - General  (Family Practice)

## 2022-10-26 NOTE — PROGRESS NOTES
Unable to reach office for change in appointment for two weeks. Message sent to [facility]. Supervisor aware. Discharge instructions including post cath care.  INT removed

## 2022-10-26 NOTE — PROGRESS NOTES
Prescriptions called into [facility] ([phone]).   Rosuvastatin 40 mg daily, 30 tabs, 3 refills  Valsartan 40 mg two tabs daily, 60 tabs, 3 refills  Jardiance 10 mg daily, 30 tabs, 3 refills

## 2022-10-26 NOTE — Clinical Note
Contrast Dose Calculator:   Patient's age: 76.   Patient's sex: Male. Patient weight (kg) = 82.6. Creatinine level (mg/dL) = 1. Creatinine clearance (mL/min): 70.   Contrast concentration (mg/mL) = 370. MACD = 300 mL. Max Contrast dose per Creatinine Cl calculator = 157.5 mL.

## 2022-10-26 NOTE — PROGRESS NOTES
Patient received to [address] [place] [address] room # 11  Ambulatory from [facility]. Patient scheduled for LHC/ ECHO today with Dr Bell Espinoza. Procedure reviewed & questions answered, voiced good understanding consent obtained & placed on chart. All medications and medical history reviewed. Will prep patient per orders. Patient & family updated on plan of care. The patient has a fraility score of 3-MANAGING WELL, based on ability to perform ADLs by self.

## 2022-10-26 NOTE — Clinical Note
TRANSFER - OUT REPORT:     Verbal report given to: cpru rn. Report consisted of patient's Situation, Background, Assessment and   Recommendations(SBAR). Opportunity for questions and clarification was provided. Patient transported with a Registered Nurse and [address] [place] [address] / [facility]. Patient transported to: [facility].

## 2022-10-26 NOTE — PROGRESS NOTES
TRANSFER - OUT REPORT:    Verbal report given to RN on Jerardo Lizama  being transferred to [facility] for routine progression of patient care       Report consisted of patient's Situation, Background, Assessment and   Recommendations(SBAR). Information from the following report(s) Nurse Handoff Report was reviewed with the receiving nurse. Lines:   Peripheral IV 10/26/22 Right Antecubital (Active)        Opportunity for questions and clarification was provided.       Patient transported with:  Registered Nurse    MICHELE Kumar  Diagnostic  LRA - 12 mls    3 mg Versed  75 mcg Fentanyl  5000 units Heparin

## 2022-10-26 NOTE — H&P
[facility] CARDIOLOGY  [address] [facility], [address]        10/26/22        NAME:  Parker Farnco  : 1945  MRN: [id number]        SUBJECTIVE:   Parker Franco is a 76 y.o. male seen for a follow up visit regarding the following:          Chief Complaint   Patient presents with    Coronary Artery Disease    Hypertension    Congestive Heart Failure         HPI:   Patient was admitted 2019 with NSTEMI and severe 3VCAD complicated by AF/RVR. He had CABG/MAZE and JENNIFER clip. He has struggled  significantly post CABG with pleural effusions and fluid retention. LV systolic function was severely reduced postoperatively. Echocardiogram 2020 demonstrated stable moderate LV systolic dysfunction with ejection fraction of 40 to 45% no significant  valvular heart disease. He had COVID-19 2020 and severe GI illnesses. He had Covid again 2022. He has recovered. Remains dyspneic. Echo 2022 with stable EF 40-45%. Patient is now struggling with worsening fatigue weakness shortness of breath. Past Medical History, Past Surgical History, Family history, Social History, and Medications were all reviewed with the patient today and updated as necessary.       Current Facility-Administered Medications          Current Outpatient Medications   Medication Sig Dispense Refill    furosemide (LASIX) 40 MG tablet Take 40 mg by mouth every other day        apixaban (ELIQUIS) 5 MG TABS tablet Take 5 mg by mouth 2 times daily        aspirin 81 MG EC tablet Take 81 mg by mouth        carBAMazepine (TEGRETOL) 200 MG tablet Take 400 mg by mouth 2 times daily        docusate (COLACE, DULCOLAX) 100 MG CAPS Take 100 mg by mouth 2 times daily as needed        furosemide (LASIX) 40 MG tablet Take 40 mg by mouth every other day        HYDROcodone-acetaminophen (NORCO)  MG per tablet Take 1 tablet by mouth every 8 hours as needed. melatonin 3 MG TABS tablet Take 5 mg by mouth        metoprolol succinate (TOPROL XL) 100 MG extended release tablet Take 100 mg by mouth daily        nitroGLYCERIN (NITROSTAT) 0.4 MG SL tablet TAKE AS DIRECTED.        omeprazole (PRILOSEC) 20 MG delayed release capsule Take 20 mg by mouth daily        potassium chloride (KLOR-CON M) 20 MEQ extended release tablet Take 20 mEq by mouth 2 times daily        empagliflozin (JARDIANCE) 10 MG tablet Take 1 tablet by mouth daily (Patient not taking: Reported on 8/18/2022) 30 tablet 0    spironolactone (ALDACTONE) 25 MG tablet Take 1 tablet by mouth daily (Patient not taking: Reported on 8/18/2022) 30 tablet 0    gabapentin (NEURONTIN) 400 MG capsule 400 mg 3 times daily. Take 2 tablets po [id number] (Patient not taking: Reported on 8/18/2022)        rosuvastatin (CRESTOR) 40 MG tablet Take 40 mg by mouth (Patient not taking: No sig reported)        sildenafil (VIAGRA) 50 MG tablet Take 50 mg by mouth as needed (Patient not taking: No sig reported)          No current facility-administered medications for this visit.                Patient Active Problem List     Diagnosis Date Noted    Syncope 07/07/2022       Priority: Medium    Hyponatremia 07/07/2022       Priority: Medium    Anemia 07/07/2022       Priority: Medium    DNR (do not resuscitate) 07/07/2022       Priority: Medium    Hand pain 07/07/2022       Priority: Medium    Community acquired pneumonia 03/28/2021    Leukocytosis 03/28/2021    SOB (shortness of breath) 10/24/2019    Localized edema 06/24/2019    Elevated brain natriuretic peptide (BNP) level 06/14/2019    History of smoking 25-50 pack years 06/13/2019    Tracheobronchomalacia 06/10/2019    S/P CABG x 3 06/10/2019    Hemoptysis 06/09/2019    Trigeminal neuralgia 34/39/0497    Systolic [facility], chronic [facility] 01/31/2019       2014:  EF 55%  07/2018:  EF 45-50%  01/2019:  EF 40-45%  06/2019:  EF 50%  07/2019:  EF 35%  09/2019:  EF 40-45%  05/2020:  EF 40-45%  08/2021:  EF 40-45%  07/2022:  EF 40-45%          TIA (transient ischemic attack) 07/20/2018    Dyslipidemia 04/07/2017    Hypertension 04/07/2017    Coronary atherosclerosis of native coronary vessel 04/07/2017       2006:  PCI RCA - Liberte, PCI LAD Cypher, PCI LCx Cypher  01/2014:  Stress testing with fixed inferior defect - no ischemia  11/2014:  EF with EF 50-55% and inferior HK   07/2018:  EF 45-50%  01/2019:  EF 40-45%  05/2019:  CABG LIMA-LAD, SVG-OM, SVG-SUSAN  08/2021:  EF 40-45%          Atrial fibrillation ([facility] 04/07/2017      Family History         Family History   Problem Relation Age of Onset    Heart Disease Sister      Other Other           cerebral aneurysm     Heart Disease Mother           Social History            Tobacco Use    Smoking status: Former       Packs/day: 2.00       Types: Cigarettes    Smokeless tobacco: Never    Tobacco comments:       Quit smoking: quit at age 27   Substance Use Topics    Alcohol use: No         Review Of Symptoms     Review of Systems   Constitutional: Positive for malaise/fatigue. Negative for fever. HENT:  Negative for nosebleeds. Cardiovascular:  Positive for dyspnea on exertion. Negative for chest pain and palpitations. Respiratory:  Negative for cough and shortness of breath. Musculoskeletal:  Negative for back pain, muscle cramps, muscle weakness and myalgias. Gastrointestinal:  Negative for abdominal pain. Neurological:  Negative for dizziness.       Physical Exam  Blood pressure (!) 144/74, pulse 80, height 5' 9\" (1.753 m), weight 182 lb (82.6 kg). Physical Exam  HENT:      Head: Normocephalic and atraumatic. Eyes:      Extraocular Movements: Extraocular movements intact. Cardiovascular:      Rate and Rhythm: Normal rate and regular rhythm. Heart sounds: No murmur heard. Pulmonary:      Effort: Pulmonary effort is normal.      Breath sounds: Normal breath sounds. Abdominal:      Palpations: Abdomen is soft. Musculoskeletal:         General: Normal range of motion. Skin:     General: Skin is warm and dry. Neurological:      General: No focal deficit present. Mental Status: He is oriented to person, place, and time. Medical problems, medical history and test results were reviewed with the patient today.               Lab Results   Component Value Date     CHOL 123 07/10/2022     CHOL 178 06/07/2019            Lab Results   Component Value Date     TRIG 72 07/10/2022     TRIG 74 06/07/2019            Lab Results   Component Value Date     HDL 27 (L) 07/10/2022     HDL 37 (L) 06/07/2019            Lab Results   Component Value Date     LDLCALC 81.6 07/10/2022     LDLCALC 126.2 (H) 06/07/2019            Lab Results   Component Value Date     LABVLDL 14.4 07/10/2022     LABVLDL 14.8 06/07/2019            Lab Results   Component Value Date     CHOLHDLRATIO 4.6 07/10/2022     CHOLHDLRATIO 4.8 06/07/2019               Lab Results   Component Value Date     LABA1C 5.3 07/10/2022            Lab Results   Component Value Date      07/10/2022               Lab Results   Component Value Date      (L) 07/11/2022     K 4.1 07/11/2022     CL 97 (L) 07/11/2022     CO2 27 07/11/2022     BUN 16 07/11/2022     CREATININE 0.90 07/11/2022     GLUCOSE 85 07/11/2022     CALCIUM 8.4 07/11/2022     PROT 7.0 07/08/2022     LABALBU 3.0 (L) 07/08/2022     BILITOT 1.1 07/08/2022     ALKPHOS 131 07/08/2022     AST 16 07/08/2022     ALT 21 07/08/2022     LABGLOM >60 07/11/2022     GFRAA >60 07/11/2022 AGRATIO 0.9 (L) 06/07/2021     GLOB 4.0 (H) 07/08/2022               Lab Results   Component Value Date/Time      07/11/2022 04:58 AM     K 4.1 07/11/2022 04:58 AM     CL 97 07/11/2022 04:58 AM     CO2 27 07/11/2022 04:58 AM     BUN 16 07/11/2022 04:58 AM     CREATININE 0.90 07/11/2022 04:58 AM     GLUCOSE 85 07/11/2022 04:58 AM     CALCIUM 8.4 07/11/2022 04:58 AM               Lab Results   Component Value Date     WBC 6.4 07/11/2022     HGB 13.0 (L) 07/11/2022     HCT 38.0 (L) 07/11/2022     .1 (H) 07/11/2022      (L) 07/11/2022                  ASSESSMENT:     Diagnoses and all orders for this visit:      Atherosclerosis of native coronary artery of native heart with stable angina pectoris ([address] -patient with history of coronary bypass grafting. He had echo earlier this summer that demonstrated stable mild LV systolic dysfunction. He struggles with worsening fatigue weakness shortness of breath \"\" feels like death. He is referred back for repeat echocardiogram and cardiac catheterization today. Risk benefits procedure discussed patient is willing to proceed. Chronic systolic congestive heart failure (HCC) - EF 45% at CABG and remains stable by echo 07/2022. Volume status better. Review of recent labs by primary care  physician demonstrates stable creatinine electrolytes. Essential hypertension - Patient with elevated BP in ER and office. Now with hypotension and syncope. Better off of losartan. Feels better with mildly elevated BP. Dyslipidemia  -review of lipid labs performed at [facility] demonstrate LDL of 81 07/2022. Patient now more compliant with rosuvastatin       Paroxysmal atrial fibrillation (HCC) - In SR and Eliquis 5 mg twice daily. Monitor on loop recorder.       Tien Felix MD

## 2022-11-03 ENCOUNTER — TELEPHONE (OUTPATIENT)
Dept: CARDIOLOGY CLINIC | Age: 77
End: 2022-11-03

## 2022-11-03 DIAGNOSIS — R06.09 DOE (DYSPNEA ON EXERTION): Primary | ICD-10-CM

## 2022-11-03 DIAGNOSIS — I25.10 CORONARY ATHEROSCLEROSIS OF NATIVE CORONARY VESSEL: ICD-10-CM

## 2022-11-03 DIAGNOSIS — I50.23 ACUTE ON CHRONIC SYSTOLIC (CONGESTIVE) HEART FAILURE (HCC): ICD-10-CM

## 2022-11-03 NOTE — TELEPHONE ENCOUNTER
Dr Maya Chen office said patient told him we are sending to cardiac Rehab and also consulting pulmonary .  Please call Anil who is DR Maya Chen nurse

## 2022-11-03 NOTE — TELEPHONE ENCOUNTER
Called Dr. Annemarie Knight office and informed David Lopez that referral for cardiac rehab and Pulmonologist has been entered into [facility] today.

## 2022-11-03 NOTE — TELEPHONE ENCOUNTER
Refer patient to cardiac rehab for chronic stable angina. Please refer patient to [facility] pulmonary for shortness of breath.

## 2022-11-10 DIAGNOSIS — J18.9 COMMUNITY ACQUIRED PNEUMONIA, UNSPECIFIED LATERALITY: Primary | ICD-10-CM

## 2022-11-11 ENCOUNTER — OFFICE VISIT (OUTPATIENT)
Dept: CARDIOLOGY CLINIC | Age: 77
End: 2022-11-11
Payer: MEDICARE

## 2022-11-11 VITALS
WEIGHT: 180 LBS | HEART RATE: 80 BPM | BODY MASS INDEX: 26.66 KG/M2 | SYSTOLIC BLOOD PRESSURE: 136 MMHG | HEIGHT: 69 IN | DIASTOLIC BLOOD PRESSURE: 88 MMHG

## 2022-11-11 DIAGNOSIS — I25.118 ATHEROSCLEROSIS OF NATIVE CORONARY ARTERY OF NATIVE HEART WITH STABLE ANGINA PECTORIS (HCC): Primary | ICD-10-CM

## 2022-11-11 DIAGNOSIS — Z95.818 STATUS POST PLACEMENT OF IMPLANTABLE LOOP RECORDER: ICD-10-CM

## 2022-11-11 DIAGNOSIS — I50.22 SYSTOLIC CHF, CHRONIC (HCC): ICD-10-CM

## 2022-11-11 DIAGNOSIS — R55 SYNCOPE: ICD-10-CM

## 2022-11-11 DIAGNOSIS — I48.0 PAROXYSMAL ATRIAL FIBRILLATION (HCC): ICD-10-CM

## 2022-11-11 DIAGNOSIS — I48.91 ATRIAL FIBRILLATION (HCC): ICD-10-CM

## 2022-11-11 DIAGNOSIS — I27.20 PULMONARY HYPERTENSION (HCC): Chronic | ICD-10-CM

## 2022-11-11 DIAGNOSIS — E78.5 DYSLIPIDEMIA: ICD-10-CM

## 2022-11-11 DIAGNOSIS — I10 PRIMARY HYPERTENSION: ICD-10-CM

## 2022-11-11 PROCEDURE — 1036F TOBACCO NON-USER: CPT | Performed by: INTERNAL MEDICINE

## 2022-11-11 PROCEDURE — 99214 OFFICE O/P EST MOD 30 MIN: CPT | Performed by: INTERNAL MEDICINE

## 2022-11-11 PROCEDURE — 3078F DIAST BP <80 MM HG: CPT | Performed by: INTERNAL MEDICINE

## 2022-11-11 PROCEDURE — G8417 CALC BMI ABV UP PARAM F/U: HCPCS | Performed by: INTERNAL MEDICINE

## 2022-11-11 PROCEDURE — 1123F ACP DISCUSS/DSCN MKR DOCD: CPT | Performed by: INTERNAL MEDICINE

## 2022-11-11 PROCEDURE — G8427 DOCREV CUR MEDS BY ELIG CLIN: HCPCS | Performed by: INTERNAL MEDICINE

## 2022-11-11 PROCEDURE — 3074F SYST BP LT 130 MM HG: CPT | Performed by: INTERNAL MEDICINE

## 2022-11-11 PROCEDURE — G8484 FLU IMMUNIZE NO ADMIN: HCPCS | Performed by: INTERNAL MEDICINE

## 2022-11-11 ASSESSMENT — ENCOUNTER SYMPTOMS
BACK PAIN: 0
SHORTNESS OF BREATH: 0
COUGH: 0
ABDOMINAL PAIN: 0

## 2022-11-11 NOTE — PROGRESS NOTES
[facility] CARDIOLOGY  [address], [address]      22      NAME:  Beverly Tello  : 1945  MRN: [id number]      SUBJECTIVE:   Beverly Tello is a 76 y.o. male seen for a follow up visit regarding the following:     Chief Complaint   Patient presents with    Coronary Artery Disease       HPI:   Patient was admitted 2019 with NSTEMI and severe 3VCAD complicated by AF/RVR. He had CABG/MAZE and JENNIFER clip. He has struggled  significantly post CABG with pleural effusions and fluid retention. LV systolic function was severely reduced postoperatively. Echocardiogram 2020 demonstrated stable moderate LV systolic dysfunction with ejection fraction of 40 to 45% no significant  valvular heart disease. He had COVID-19 2020 and severe GI illnesses. He had Covid again 2022. He has recovered. Remains dyspneic. Echo 10/2022 with stable EF 40-45%. RVSP 63mmHg. C with stable graft anatomy. Past Medical History, Past Surgical History, Family history, Social History, and Medications were all reviewed with the patient today and updated as necessary. Current Outpatient Medications   Medication Sig Dispense Refill    valsartan (DIOVAN) 40 MG tablet Take 2 tablets by mouth daily 30 tablet 3    empagliflozin (JARDIANCE) 10 MG tablet Take 1 tablet by mouth daily 30 tablet 0    apixaban (ELIQUIS) 5 MG TABS tablet Take 5 mg by mouth 2 times daily      aspirin 81 MG EC tablet Take 81 mg by mouth      carBAMazepine (TEGRETOL) 200 MG tablet Take 400 mg by mouth 2 times daily      docusate (COLACE, DULCOLAX) 100 MG CAPS Take 100 mg by mouth 2 times daily as needed      furosemide (LASIX) 40 MG tablet Take 40 mg by mouth every other day      gabapentin (NEURONTIN) 400 MG capsule 400 mg 3 times daily. Take 2 tablets po [id number]      HYDROcodone-acetaminophen (NORCO)  MG per tablet Take 1 tablet by mouth every 8 hours as needed.       melatonin 3 MG TABS tablet Take 5 mg by mouth      metoprolol succinate (TOPROL XL) 100 MG extended release tablet Take 100 mg by mouth daily      nitroGLYCERIN (NITROSTAT) 0.4 MG SL tablet TAKE AS DIRECTED.      omeprazole (PRILOSEC) 20 MG delayed release capsule Take 20 mg by mouth daily      potassium chloride (KLOR-CON M) 20 MEQ extended release tablet Take 20 mEq by mouth 2 times daily      rosuvastatin (CRESTOR) 40 MG tablet Take 40 mg by mouth (Patient not taking: Reported on 11/11/2022)       No current facility-administered medications for this visit.      Patient Active Problem List    Diagnosis Date Noted    Pulmonary hypertension ([facility] 11/11/2022     Priority: High    Syncope 07/07/2022     Priority: Medium    Hyponatremia 07/07/2022     Priority: Medium    Anemia 07/07/2022     Priority: Medium    DNR (do not resuscitate) 07/07/2022     Priority: Medium    Hand pain 07/07/2022     Priority: Medium    Chest pain, precordial 10/24/2022     Priority: Low     Added automatically from request for surgery [id number]      Community acquired pneumonia 03/28/2021     Priority: Low    Leukocytosis 03/28/2021     Priority: Low    SOB (shortness of breath) 10/24/2019     Priority: Low    Localized edema 06/24/2019     Priority: Low    Elevated brain natriuretic peptide (BNP) level 06/14/2019     Priority: Low    History of smoking 25-50 pack years 06/13/2019     Priority: Low    Tracheobronchomalacia 06/10/2019     Priority: Low    S/P CABG x 3 06/10/2019     Priority: Low    Hemoptysis 06/09/2019     Priority: Low    Trigeminal neuralgia 06/09/2019     Priority: Low    TIA (transient ischemic attack) 07/20/2018     Priority: Low    Dyslipidemia 04/07/2017     Priority: Low    Hypertension 04/07/2017     Priority: Low    Coronary atherosclerosis of native coronary vessel 04/07/2017     Priority: Low     2006:  PCI RCA - Liberte, PCI LAD Cypher, PCI LCx Cypher  01/2014:  Stress testing with fixed inferior defect - no ischemia  11/2014:  EF with EF 50-55% and inferior HK   07/2018:  EF 45-50%  01/2019:  EF 40-45%  05/2019:  CABG LIMA-LAD, SVG-OM, SVG-SUSAN  08/2021:  EF 40-45%  10/2022:  Stable CAD/Grafts      [facility] ([facility] 04/07/2017     Priority: Low    Systolic CHF, chronic ([facility] 01/31/2019     2014:  EF 55%  07/2018:  EF 45-50%  01/2019:  EF 40-45%  06/2019:  EF 50%  07/2019:  EF 35%  09/2019:  EF 40-45%  05/2020:  EF 40-45%  08/2021:  EF 40-45%  07/2022:  EF 40-45%  10/2022:  EF 40-45%          Family History   Problem Relation Age of Onset    Heart Disease Sister     Other Other         cerebral aneurysm     Heart Disease Mother      Social History     Tobacco Use    Smoking status: Former     Packs/day: 2.00     Types: Cigarettes    Smokeless tobacco: Never    Tobacco comments:     Quit smoking: quit at age 27   Substance Use Topics    Alcohol use: No       Review Of Symptoms    Review of Systems   Constitutional: Positive for malaise/fatigue. Negative for fever. HENT:  Negative for nosebleeds. Cardiovascular:  Positive for dyspnea on exertion. Negative for chest pain and palpitations. Respiratory:  Negative for cough and shortness of breath. Musculoskeletal:  Negative for back pain, muscle cramps, muscle weakness and myalgias. Gastrointestinal:  Negative for abdominal pain. Neurological:  Negative for dizziness. Physical Exam  Blood pressure 136/88, pulse 80, height 5' 9\" (1.753 m), weight 180 lb (81.6 kg). Physical Exam  HENT:      Head: Normocephalic and atraumatic. Eyes:      Extraocular Movements: Extraocular movements intact. Cardiovascular:      Rate and Rhythm: Normal rate and regular rhythm. Heart sounds: No murmur heard. Pulmonary:      Effort: Pulmonary effort is normal.      Breath sounds: Normal breath sounds. Abdominal:      Palpations: Abdomen is soft. Musculoskeletal:         General: Normal range of motion. Skin:     General: Skin is warm and dry.    Neurological:      General: No focal deficit present. Mental Status: He is oriented to person, place, and time. Medical problems, medical history and test results were reviewed with the patient today.       Lab Results   Component Value Date    CHOL 123 07/10/2022    CHOL 178 06/07/2019     Lab Results   Component Value Date    TRIG 72 07/10/2022    TRIG 74 06/07/2019     Lab Results   Component Value Date    HDL 27 (L) 07/10/2022    HDL 37 (L) 06/07/2019     Lab Results   Component Value Date    LDLCALC 81.6 07/10/2022    LDLCALC 126.2 (H) 06/07/2019     Lab Results   Component Value Date    LABVLDL 14.4 07/10/2022    LABVLDL 14.8 06/07/2019     Lab Results   Component Value Date    CHOLHDLRATIO 4.6 07/10/2022    CHOLHDLRATIO 4.8 06/07/2019        Lab Results   Component Value Date    LABA1C 5.3 07/10/2022     Lab Results   Component Value Date     07/10/2022        Lab Results   Component Value Date     10/26/2022    K 4.0 10/26/2022     10/26/2022    CO2 26 10/26/2022    BUN 15 10/26/2022    CREATININE 1.00 10/26/2022    GLUCOSE 101 (H) 10/26/2022    CALCIUM 8.9 10/26/2022    PROT 7.0 07/08/2022    LABALBU 3.0 (L) 07/08/2022    BILITOT 1.1 07/08/2022    ALKPHOS 131 07/08/2022    AST 16 07/08/2022    ALT 21 07/08/2022    LABGLOM >60 10/26/2022    GFRAA >60 07/11/2022    AGRATIO 0.9 (L) 06/07/2021    GLOB 4.0 (H) 07/08/2022       Lab Results   Component Value Date/Time     10/26/2022 09:50 AM    K 4.0 10/26/2022 09:50 AM     10/26/2022 09:50 AM    CO2 26 10/26/2022 09:50 AM    BUN 15 10/26/2022 09:50 AM    CREATININE 1.00 10/26/2022 09:50 AM    GLUCOSE 101 10/26/2022 09:50 AM    CALCIUM 8.9 10/26/2022 09:50 AM        Lab Results   Component Value Date    WBC 7.7 10/26/2022    HGB 14.3 10/26/2022    HCT 43.0 10/26/2022    .7 (H) 10/26/2022     10/26/2022             ASSESSMENT:    Diagnoses and all orders for this visit:      Atherosclerosis of native coronary artery of native heart with stable angina pectoris ([address] - Stable CAD/Grafts by cardiac catheterization 10/2022. Medications appropriate. Chronic systolic congestive heart failure (HCC) - EF 45% at CABG and remains stable by echo 10/2022. Volume status better. Review of recent labs by primary care  physician demonstrates stable creatinine electrolytes. Essential hypertension - Patient with elevated BP in ER and office. Now with hypotension and syncope. Better off of losartan. Feels better with mildly elevated BP. Dyslipidemia  -review of lipid labs performed at [address] [facility] demonstrate LDL of 81 07/2022. Patient now more compliant with rosuvastatin       Paroxysmal atrial fibrillation (HCC) - In SR and Eliquis 5 mg twice daily. Monitor on loop recorder. Pulmonary Hypertension - worse after COVID - now with severe dyspnea. Cardiac work up stable and seeing pulmonary soon. Problem List Items Addressed This Visit          Circulatory    Pulmonary hypertension (HCC) (Chronic)    Hypertension    Coronary atherosclerosis of native coronary vessel - Primary    Atrial fibrillation (HCC)    Systolic CHF, chronic ([address]       Other    Dyslipidemia     There are no discontinued medications. Patient has been instructed and agrees to call our office with any issues or other concerns related to their cardiac condition(s) and/or complaint(s). Return in about 6 months (around 5/11/2023).        Casi Rosario MD  11/11/2022

## 2022-11-14 ENCOUNTER — HOSPITAL ENCOUNTER (OUTPATIENT)
Dept: GENERAL RADIOLOGY | Age: 77
Discharge: HOME | End: 2022-11-17
Payer: MEDICARE

## 2022-11-14 ENCOUNTER — OFFICE VISIT (OUTPATIENT)
Dept: PULMONOLOGY | Age: 77
End: 2022-11-14
Payer: MEDICARE

## 2022-11-14 VITALS
SYSTOLIC BLOOD PRESSURE: 165 MMHG | DIASTOLIC BLOOD PRESSURE: 83 MMHG | BODY MASS INDEX: 27.43 KG/M2 | HEART RATE: 63 BPM | RESPIRATION RATE: 18 BRPM | TEMPERATURE: 97.4 F | HEIGHT: 68 IN | WEIGHT: 181 LBS | OXYGEN SATURATION: 99 %

## 2022-11-14 DIAGNOSIS — R06.09 DYSPNEA ON EXERTION: Primary | ICD-10-CM

## 2022-11-14 DIAGNOSIS — I27.20 PULMONARY HYPERTENSION (HCC): ICD-10-CM

## 2022-11-14 DIAGNOSIS — R06.02 SOB (SHORTNESS OF BREATH): Primary | ICD-10-CM

## 2022-11-14 DIAGNOSIS — G47.19 DAYTIME HYPERSOMNOLENCE: ICD-10-CM

## 2022-11-14 DIAGNOSIS — R05.9 COUGH, UNSPECIFIED TYPE: ICD-10-CM

## 2022-11-14 DIAGNOSIS — J18.9 COMMUNITY ACQUIRED PNEUMONIA, UNSPECIFIED LATERALITY: ICD-10-CM

## 2022-11-14 PROCEDURE — 1036F TOBACCO NON-USER: CPT | Performed by: INTERNAL MEDICINE

## 2022-11-14 PROCEDURE — G8484 FLU IMMUNIZE NO ADMIN: HCPCS | Performed by: INTERNAL MEDICINE

## 2022-11-14 PROCEDURE — 1123F ACP DISCUSS/DSCN MKR DOCD: CPT | Performed by: INTERNAL MEDICINE

## 2022-11-14 PROCEDURE — G8417 CALC BMI ABV UP PARAM F/U: HCPCS | Performed by: INTERNAL MEDICINE

## 2022-11-14 PROCEDURE — 3074F SYST BP LT 130 MM HG: CPT | Performed by: INTERNAL MEDICINE

## 2022-11-14 PROCEDURE — 99204 OFFICE O/P NEW MOD 45 MIN: CPT | Performed by: INTERNAL MEDICINE

## 2022-11-14 PROCEDURE — 3078F DIAST BP <80 MM HG: CPT | Performed by: INTERNAL MEDICINE

## 2022-11-14 PROCEDURE — G8427 DOCREV CUR MEDS BY ELIG CLIN: HCPCS | Performed by: INTERNAL MEDICINE

## 2022-11-14 PROCEDURE — 71046 X-RAY EXAM CHEST 2 VIEWS: CPT

## 2022-11-14 RX ORDER — ALBUTEROL SULFATE 90 UG/1
2 AEROSOL, METERED RESPIRATORY (INHALATION) EVERY 6 HOURS PRN
Qty: 1 EACH | Refills: 11 | Status: SHIPPED | OUTPATIENT
Start: 2022-11-14

## 2022-11-14 ASSESSMENT — PATIENT HEALTH QUESTIONNAIRE - PHQ9
SUM OF ALL RESPONSES TO PHQ QUESTIONS 1-9: 0
SUM OF ALL RESPONSES TO PHQ QUESTIONS 1-9: 0
2. FEELING DOWN, DEPRESSED OR HOPELESS: 0
1. LITTLE INTEREST OR PLEASURE IN DOING THINGS: 0
SUM OF ALL RESPONSES TO PHQ QUESTIONS 1-9: 0
SUM OF ALL RESPONSES TO PHQ QUESTIONS 1-9: 0
SUM OF ALL RESPONSES TO PHQ9 QUESTIONS 1 & 2: 0

## 2022-11-14 NOTE — PROGRESS NOTES
[facility]: Patient Office Visit Note  [address] [facility]. [address], [address] [facility]  [phone]    Patient Name:  Ricardo Galdamez  YOB: 1945            Date of Service:  11/14/2022    Chief Complaint   Patient presents with    Breathing Problem       History of Present Illness:  Darryle Keener is a 58-year-old white male with a history of CABG 2019 with MAC, atrial fibs, LV systolic heart failure, COVID in 2020 and 2022, hypertension who was noted increasing shortness of breath over the past 6 months. Patient notices that he can only walk 30 feet before he has to stop and rest.  If he is going up steps he can only go up 6 steps before he has to stop. He has a cough productive of some clear sputum and at times dark sputum. There is been no chest pain. Because these complaints he recently was evaluated by Dr. Michelle Campuzano and echo revealed EF of 40 to 45% with pulmonary hypertension noted and right ventricular systolic pressure of 63. Left heart cath was done and patient was noted to have stable grafts. Patient does state that years ago he was diagnosed with sleep apnea and was on CPAP but eventually stopped it. He now notices that he is has significant daytime hypersomnolence. There is a history of snoring.     Past Medical History:   Diagnosis Date    Atrial fibrillation [facility])     Atrial flutter ([facility] 4/7/2017    CAD (coronary artery disease) 2006    Cancer [facility]) 2011    prostate    GERD (gastroesophageal reflux disease)     takes omeprazole    Heart failure (HCC)     Hypertension     Ill-defined condition     trigeminal neuralgia    Myocardial infarction ([address] 06/06/2019    NSTEMI     Sleep apnea     does not wear CPAP    Stroke ([address] 2018    TIA       Patient Active Problem List   Diagnosis    TIA (transient ischemic attack)    Tracheobronchomalacia    Elevated brain natriuretic peptide (BNP) level    S/P CABG x 3    Community acquired pneumonia Dyslipidemia    Hemoptysis    History of smoking 25-50 pack years    Hypertension    Systolic CHF, chronic (HCC)    Localized edema    Coronary atherosclerosis of native coronary vessel    Trigeminal neuralgia    SOB (shortness of breath)    Atrial fibrillation (HCC)    Leukocytosis    Syncope    Hyponatremia    Anemia    DNR (do not resuscitate)    Hand pain    Chest pain, precordial    Pulmonary hypertension [facility])       Past Surgical History:   Procedure Laterality Date    CARDIAC CATHETERIZATION  2006    CARDIAC CATHETERIZATION  2019    Severe 3 vessel CAD with LV EF=40-45% and unsuccessful LCX PCI. CARDIAC PROCEDURE N/A 10/26/2022    LEFT HEART CATH / CORONARY ANGIOGRAPHY W GRAFTS performed by Kareem Shah MD at [address] LAB    COLONOSCOPY N/A 3/2/2020    COLONOSCOPY performed by Chinyere Upton MD at [address] GRAFT  06/10/2019    3 vessel CABG with LIMA-LAD,SVG-OM,and SVG-R posterior lateral branch with MAZE & LA appendage clipping.     EP DEVICE PROCEDURE N/A 7/10/2022    LOOP RECORDER INSERT performed by Sandra Contreras MD at [facility] CARDIAC CATH LAB    PROSTATE SURGERY         Social History     Socioeconomic History    Marital status:      Spouse name: Not on file    Number of children: Not on file    Years of education: Not on file    Highest education level: Not on file   Occupational History    Not on file   Tobacco Use    Smoking status: Former     Packs/day: 1.00     Years: 14.00     Pack years: 14.00     Types: Cigarettes     Start date: 1957     Quit date: 1971     Years since quittin.0    Smokeless tobacco: Never    Tobacco comments:     Quit smoking: quit at age 27   Vaping Use    Vaping Use: Never used   Substance and Sexual Activity    Alcohol use: No    Drug use: Not on file    Sexual activity: Not on file   Other Topics Concern    Not on file   Social History Narrative Not on file     Social Determinants of Health     Financial Resource Strain: Not on file   Food Insecurity: Not on file   Transportation Needs: Not on file   Physical Activity: Not on file   Stress: Not on file   Social Connections: Not on file   Intimate Partner Violence: Not on file   Housing Stability: Not on file       Family History   Problem Relation Age of Onset    Heart Disease Mother     Heart Disease Sister     Other Other         cerebral aneurysm        Allergies   Allergen Reactions    Codeine      Other reaction(s): Other- (not listed) - Allergy  constipation    Iodides      Other reaction(s): Unknown (comments)           REVIEW OF SYSTEMS:    CONSTITUTIONAL:   There is no history of fever, chills, night sweats, weight loss, weight gain,   persistent fatigue, or lethargy/hypersomnolence. EYES:   Denies problems with eye pain, erythema, blurred vision, or visual field loss. ENTM:   Denies history of tinnitus, epistaxis, sore throat, hoarseness, or dysphonia. LYMPH:   Denies swollen glands. CARDIAC:   No chest pain, pressure, discomfort, palpitations, orthopnea, murmurs, or edema. GI:   No dysphagia, heartburn reflux, nausea/vomiting, diarrhea, abdominal pain, or bleeding. :   Denies history of dysuria, hematuria, polyuria, or decreased urine output. MS:   No history of myalgias, arthralgias, bone pain, or muscle cramps. SKIN:   No history of rashes, jaundice, cyanosis, nodules, or ulcers. ENDO:   Negative for heat or cold intolerance. No history of DM. PSYCH:   Negative for anxiety, depression, insomnia, hallucinations. NEURO:   There is no history of AMS, persistent headache, decreased level of consciousness, seizures, or motor or sensory deficits.     OBJECTIVE:  Physical Exam:  Vitals:    11/14/22 0754   BP: (!) 165/83   Pulse: 63   Resp: 18   Temp: 97.4 °F (36.3 °C)   SpO2: 99%        GENERAL APPEARANCE:   The patient is normal weight and in no respiratory distress. HEENT:   PERRL. Conjunctivae unremarkable. Nasal mucosa is without epistaxis, exudate, or polyps. Gums and dentition are unremarkable. There is no oropharyngeal narrowing. TMs are clear. NECK/LYMPHATIC:   Symmetrical with no elevation of jugular venous pulsation. Trachea midline. No thyroid enlargement. No cervical adenopathy. LUNGS:   Normal respiratory effort with symmetrical lung expansion. Breath sounds clear. HEART:   There is a regular rate and rhythm. No murmur, rub, or gallop. There is no edema in the lower extremities. ABDOMEN:   Soft and non-tender. No hepatosplenomegaly. Bowel sounds are normal.       SKIN:   There are no rashes, cyanosis, jaundice, or ecchymosis present. EXTREMITIES:   The extremities are unremarkable without clubbing, cyanosis, joint inflammation, degenerative, or ischemic change. MUSCULOSKELETAL:   There is no abnormal tone, muscle atrophy, or abnormal movement present. NEURO:   The patient is alert and oriented to person, place, and time. Memory appears intact and mood is normal.  No gross sensorimotor deficits are present.     Current Outpatient Medications   Medication Instructions    apixaban (ELIQUIS) 5 mg, Oral, 2 TIMES DAILY    aspirin 81 mg, Oral    carBAMazepine (TEGRETOL) 400 mg, Oral, 2 TIMES DAILY    docusate (COLACE, DULCOLAX) 100 mg, Oral, 2 TIMES DAILY PRN    empagliflozin (JARDIANCE) 10 mg, Oral, DAILY    furosemide (LASIX) 40 mg, Oral, EVERY OTHER DAY    gabapentin (NEURONTIN) 400 mg, 3 TIMES DAILY, Take 2 tablets po [id number]    HYDROcodone-acetaminophen (NORCO)  MG per tablet 1 tablet, Oral, EVERY 8 HOURS PRN    melatonin 5 mg    metoprolol succinate (TOPROL XL) 100 mg, Oral, DAILY    nitroGLYCERIN (NITROSTAT) 0.4 MG SL tablet TAKE AS DIRECTED.    omeprazole (PRILOSEC) 20 mg, Oral, DAILY    potassium chloride (KLOR-CON M) 20 MEQ extended release tablet 20 mEq, Oral, 2 TIMES DAILY    rosuvastatin (CRESTOR) 40 mg    valsartan (DIOVAN) 80 mg, Oral, DAILY        DIAGNOSTIC TESTS:   CT of chest:     CXR:         Spirometry:   No flowsheet data found. Exercise oximetry:      PCXR: No valid procedures specified. CXR PA and lateral:  No results found for this or any previous visit. PET/CT: No valid procedures specified. CT of chest w contrast:  No valid procedures specified. Screening chest CT: No results found for this or any previous visit. CT of chest w/out contrast:   No results found for this or any previous visit. ASSESSMENT:   Diagnosis Orders   1. Dyspnea on exertion        2. Daytime hypersomnolence -probable cristian       3. Pulmonary hypertension ([address]        4. Cough, unspecified type            PLAN:  Proceed with PSG  Complete PFTs  Follow-up in 3 months  Albuterol prn    No orders of the defined types were placed in this encounter. No orders of the defined types were placed in this encounter.         Electronically signed by  Joaquin Latham MD

## 2022-11-15 ENCOUNTER — HOSPITAL ENCOUNTER (OUTPATIENT)
Dept: PULMONOLOGY | Age: 77
Discharge: HOME | End: 2022-11-18
Payer: MEDICARE

## 2022-11-15 DIAGNOSIS — R06.02 SOB (SHORTNESS OF BREATH): ICD-10-CM

## 2022-11-15 PROCEDURE — 94729 DIFFUSING CAPACITY: CPT

## 2022-11-15 PROCEDURE — 94726 PLETHYSMOGRAPHY LUNG VOLUMES: CPT

## 2022-11-15 PROCEDURE — 94060 EVALUATION OF WHEEZING: CPT

## 2022-12-09 PROCEDURE — G2066 INTER DEVC REMOTE 30D: HCPCS | Performed by: INTERNAL MEDICINE

## 2022-12-09 PROCEDURE — 93298 REM INTERROG DEV EVAL SCRMS: CPT | Performed by: INTERNAL MEDICINE

## 2022-12-20 DIAGNOSIS — Z95.818 STATUS POST PLACEMENT OF IMPLANTABLE LOOP RECORDER: ICD-10-CM

## 2022-12-20 DIAGNOSIS — I48.91 ATRIAL FIBRILLATION (HCC): ICD-10-CM

## 2022-12-20 DIAGNOSIS — R55 SYNCOPE: ICD-10-CM

## 2023-01-02 ENCOUNTER — HOSPITAL ENCOUNTER (OUTPATIENT)
Dept: SLEEP CENTER | Age: 78
Discharge: HOME | End: 2023-01-05
Payer: MEDICARE

## 2023-01-02 PROCEDURE — 95811 POLYSOM 6/>YRS CPAP 4/> PARM: CPT

## 2023-01-10 NOTE — PROGRESS NOTES
[address] [place] [address] Court, [address] [facility]  [phone]    Patient Name:  Gregoria Francois  YOB: 1945      Office Visit 1/13/2023    CHIEF COMPLAINT:    Chief Complaint   Patient presents with    New Patient    Sleep Apnea    Results       HISTORY OF PRESENT ILLNESS:      The patient presents in outpatient consultation at the request of Dr. Lillie Miles for management of obstructive sleep apnea. PMH includes pulmonary HTN, TIA, HTN, sCHF, CAD s/p CABG, Afib, tracheobronchomalacia, trigeminal neuralgia and SEBASTIAN. The diagnostic polysomnography was notable for an apnea hypopnea index of 40.9 including 63 obstructive apneas and 26 hypopneas. No REM observed. Oxygen desaturations are low as 82% were noted with SpO2 less than 89% for a total of 21.7 minutes of the test.  Significant cardiac arrhythmias were not evident. The patient was noted to have 78.2 limb movements with a limb movement arousal index being about 0. A subsequent CPAP titration study was conducted and required BiPAP. BiPAP levels as high as 17/13 were performed. BiPAP was  effective in eliminating disordered breathing. BiPAP was tolerated  by the patient. The patient reports feeling better the day following. Patient endorses snoring, witnessed apneas as well as startling awake. He denies any bruxism, vivid dreams or parasomnias. He does state that his legs hurt and he endorses frequent leg movements. He has difficulty falling asleep in bed but will fall asleep easily in his recliner, does have some nocturnal awakenings. In bed he will typically sleep in the lateral and supine position. He does not feel rested in the morning, does have daytime fatigue and is napping. He endorses rare morning headaches as well as frequent dry mouth. He does currently complain of shortness of breath with exertion. This has been evaluated by both cardiology and pulmonary.   Vital signs are stable today.    Sleep study results discussed with patient and he is eager to start BiPAP therapy.The pathophysiology of obstructive sleep apnea was reviewed with the patient.  It's potential to promote severe neurologic, cardiac, pulmonary, and gastrointestinal problems was discussed. Specifically, the increased incidence of hypertension, coronary artery disease, congestive heart failure, pulmonary hypertension, gastroesophageal reflux, pathologic hypersomnolence, memory loss, and glucose intolerance was related to the consequences of hypoxemia, hypercapnia, airway obstruction, and sympathetic overdrive.  We also discussed the ability of nasal CPAP to correct these abnormalities through maintenance of a patent airway.  Therapeutic options including surgery, oral appliances, and weight loss were also reviewed.    PSG 1/2/23        Sleepiness Scale:   No flowsheet data found.     Past Medical History:   Diagnosis Date    Atrial fibrillation ([facility])     Atrial flutter ([facility]) 4/7/2017    CAD (coronary artery disease) 2006    Cancer ([facility]) 2011    prostate    GERD (gastroesophageal reflux disease)     takes omeprazole    Heart failure ([facility])     Hypertension     Ill-defined condition     trigeminal neuralgia    Myocardial infarction ([facility]) 06/06/2019    NSTEMI     Sleep apnea     does not wear CPAP    Stroke ([facility]) 2018    TIA         Patient Active Problem List   Diagnosis    TIA (transient ischemic attack)    Tracheobronchomalacia    Elevated brain natriuretic peptide (BNP) level    S/P CABG x 3    Community acquired pneumonia    Dyslipidemia    Hemoptysis    History of smoking 25-50 pack years    Hypertension    Systolic CHF, chronic ([facility])    Localized edema    Coronary atherosclerosis of native coronary vessel    Trigeminal neuralgia    SOB (shortness of breath)    Atrial fibrillation ([facility])    Leukocytosis    Syncope    Hyponatremia    Anemia    DNR (do not resuscitate)    Hand pain    Chest pain, precordial    Pulmonary  hypertension [facility])           Past Surgical History:   Procedure Laterality Date    CARDIAC CATHETERIZATION  2006    CARDIAC CATHETERIZATION  2019    Severe 3 vessel CAD with LV EF=40-45% and unsuccessful LCX PCI. CARDIAC PROCEDURE N/A 10/26/2022    LEFT HEART CATH / CORONARY ANGIOGRAPHY W GRAFTS performed by Beba Dejesus MD at [address] LAB    COLONOSCOPY N/A 3/2/2020    COLONOSCOPY performed by Fidel Moore MD at [address] GRAFT  06/10/2019    3 vessel CABG with LIMA-LAD,SVG-OM,and SVG-R posterior lateral branch with MAZE & LA appendage clipping.     EP DEVICE PROCEDURE N/A 7/10/2022    LOOP RECORDER INSERT performed by Rashida Strickland MD at [facility] CARDIAC CATH LAB    PROSTATE SURGERY           Social History     Socioeconomic History    Marital status:      Spouse name: Not on file    Number of children: Not on file    Years of education: Not on file    Highest education level: Not on file   Occupational History    Not on file   Tobacco Use    Smoking status: Former     Packs/day: 1.00     Years: 14.00     Pack years: 14.00     Types: Cigarettes     Start date: 1957     Quit date: 1971     Years since quittin.2    Smokeless tobacco: Never    Tobacco comments:     Quit smoking: quit at age 27   Vaping Use    Vaping Use: Never used   Substance and Sexual Activity    Alcohol use: No    Drug use: Not on file    Sexual activity: Not on file   Other Topics Concern    Not on file   Social History Narrative    Not on file     Social Determinants of Health     Financial Resource Strain: Not on file   Food Insecurity: Not on file   Transportation Needs: Not on file   Physical Activity: Not on file   Stress: Not on file   Social Connections: Not on file   Intimate Partner Violence: Not on file   Housing Stability: Not on file         Family History   Problem Relation Age of Onset    Heart Disease Mother     Heart Disease Sister     Other Other         cerebral aneurysm          Allergies   Allergen Reactions    Codeine      Other reaction(s): Other- (not listed) - Allergy  constipation    Iodides      Other reaction(s): Unknown (comments)         Current Outpatient Medications   Medication Sig    albuterol sulfate HFA (PROAIR HFA) 108 (90 Base) MCG/ACT inhaler Inhale 2 puffs into the lungs every 6 hours as needed for Wheezing    empagliflozin (JARDIANCE) 10 MG tablet Take 1 tablet by mouth daily    apixaban (ELIQUIS) 5 MG TABS tablet Take 5 mg by mouth 2 times daily    aspirin 81 MG EC tablet Take 81 mg by mouth    carBAMazepine (TEGRETOL) 200 MG tablet Take 400 mg by mouth 2 times daily    docusate (COLACE, DULCOLAX) 100 MG CAPS Take 100 mg by mouth 2 times daily as needed    furosemide (LASIX) 40 MG tablet Take 40 mg by mouth every other day    gabapentin (NEURONTIN) 400 MG capsule 400 mg 3 times daily. Take 2 tablets po [id number]    HYDROcodone-acetaminophen (NORCO)  MG per tablet Take 1 tablet by mouth every 8 hours as needed. metoprolol succinate (TOPROL XL) 100 MG extended release tablet Take 100 mg by mouth daily    nitroGLYCERIN (NITROSTAT) 0.4 MG SL tablet TAKE AS DIRECTED.    omeprazole (PRILOSEC) 20 MG delayed release capsule Take 20 mg by mouth daily    potassium chloride (KLOR-CON M) 20 MEQ extended release tablet Take 20 mEq by mouth 2 times daily    valsartan (DIOVAN) 40 MG tablet Take 2 tablets by mouth daily (Patient not taking: No sig reported)    melatonin 3 MG TABS tablet Take 5 mg by mouth (Patient not taking: No sig reported)    rosuvastatin (CRESTOR) 40 MG tablet Take 40 mg by mouth (Patient not taking: No sig reported)     No current facility-administered medications for this visit.            REVIEW OF SYSTEMS:   CONSTITUTIONAL: Fatigue   There is no history of fever, chills, night sweats, weight loss, weight gain, persistent fatigue, or lethargy/hypersomnolence. EYES:   Denies problems with eye pain, erythema, blurred vision, or visual field loss. ENTM:   Denies history of tinnitus, epistaxis, sore throat, hoarseness, or dysphonia. LYMPH:   Denies swollen glands. CARDIAC: Pulmonary hypertension, HTN, heart failure, A. fib   No chest pain, pressure, discomfort, palpitations, orthopnea, murmurs, or edema. GI:   No dysphagia, heartburn reflux, nausea/vomiting, diarrhea, abdominal pain, or bleeding. :   Denies history of dysuria, hematuria, polyuria, or decreased urine output. MS:   No history of myalgias, arthralgias, bone pain, or muscle cramps. SKIN:   No history of rashes, jaundice, cyanosis, nodules, or ulcers. ENDO:   Negative for heat or cold intolerance. No history of DM. PSYCH:   Negative for anxiety, depression, insomnia, hallucinations. NEURO: TIA   There is no history of AMS, persistent headache, decreased level of consciousness, seizures, or motor or sensory deficits. PHYSICAL EXAM:    Vitals:    01/13/23 1446   BP: 128/75   Pulse: 69   Temp: 97.2 °F (36.2 °C)   TempSrc: Skin   SpO2: 98%   Weight: 189 lb (85.7 kg)   Height: 5' 8\" (1.727 m)     Body mass index is 28.74 kg/m². GENERAL APPEARANCE:   The patient is normal weight and in no respiratory distress. HEENT:   PERRL. Conjunctivae unremarkable. Nasal mucosa is without epistaxis, exudate, or polyps. Gums and dentition are unremarkable. There is oropharyngeal narrowing. TMs are clear. NECK/LYMPHATIC:   Symmetrical with no elevation of jugular venous pulsation. Trachea midline. No thyroid enlargement. No cervical adenopathy. LUNGS:   Normal respiratory effort with symmetrical lung expansion. Breath sounds clear. HEART:   There is a regular rate and rhythm. No murmur, rub, or gallop. There is no edema in the lower extremities. ABDOMEN:   Soft and non-tender. No hepatosplenomegaly.   Bowel sounds are normal.     SKIN:   There are no rashes, cyanosis, jaundice, or ecchymosis present. EXTREMITIES:   The extremities are unremarkable without clubbing, cyanosis, joint inflammation, degenerative, or ischemic change. MUSCULOSKELETAL:   There is no abnormal tone, muscle atrophy, or abnormal movement present. NEURO:   The patient is alert and oriented to person, place, and time. Memory appears intact and mood is normal.  No gross sensorimotor deficits are present. ASSESSMENT:  (Medical Decision Making)        Diagnosis Orders   1. SEBASTIAN (obstructive sleep apnea)  DME - DURABLE MEDICAL EQUIPMENT   Patient with severe sleep apnea. He states he did feel better after sleep study and being on BiPAP. Sleep study results discussed with patient and he is eager to start BiPAP. Medicare compliance discussed with patient. Minimally 4 hours nightly, every night, 70% of the time, which is 21/30 days over 4 hours. Our goal is for you to wear the CPAP the entire night's sleep. 2. Anemia, unspecified type  Ferritin    DME - [address]   Patient has history of anemia and low iron level. He does endorse frequent leg movements as well as leg pain. We will check ferritin levels today. 3. Non-restorative sleep  DME - DURABLE MEDICAL EQUIPMENT   Likely related to severe untreated sleep apnea. Patient did feel better the day after sleep study. 4. Nocturnal hypoxemia  DME - DURABLE MEDICAL EQUIPMENT   This was resolved with BiPAP   5. RLS (restless legs syndrome)  DME - DURABLE MEDICAL EQUIPMENT   Patient endorses frequent leg movements and frequent leg movements were seen on sleep study. We will check ferritin levels today. He is also already on gabapentin which should help calm leg movements down as well. PLAN:  Start BiPAP 17/13 centimeters H2O with nightly compliance. Patient is to practice positional therapy and should avoid supine sleep. Check ferritin levels today.   Follow-up in 3 months or sooner for Medicare compliance. Orders Placed This Encounter   Procedures    Ferritin     Standing Status:   Future     Number of Occurrences:   1     Standing Expiration Date:   2024    DME - [address] [facility]  Phone: [address] [address]  Dept: [phone]      Patient Name: Ricardo Galdamez  : 1945  Gender: male  Address: [address] [place] [id number]  Patient phone number: [phone] (home)       Primary Insurance: Payor: [address] [place] [address] / Plan: MEDICARE PART A AND B / Product Type: *No Product type* /   Subscriber ID: [id number] - (Medicare)      AMB Supply Order  Order Details    Mask per patient choice      DME Location: [facility]   Order Date: 2023   The primary encounter diagnosis was SEBASTIAN (obstructive sleep apnea). Diagnoses of Anemia, unspecified type, Non-restorative sleep, Nocturnal hypoxemia, and RLS (restless legs syndrome) were also pertinent to this visit.           (  X   )New Set-Up     autoBiPAP machine   (     ) CPAP Unit  (     ) Auto CPAP Unit  (     ) BiLevel Unit  ( x    ) Auto BiLevel Unit  (     ) ASV   (     ) Bilevel ST    (     ) Oxygen Concentrator         Length of need: 12 months    Pressure: 17/13  cmH20  EPR:      Starting Ramp Pressure:   cm H20  Ramp Time: min      Patient had a diagnostic Apnea Hypopnea Index (AHI) of :  40.9    *SUPPLIES* Replace all as needed, or per coverage guidelines     Masks Type:    (  x   ) -Full Face Mask (1 per 3 mon)  ( x    ) -Full Mask (1 per month) Interface/Cushion      (     ) -Nasal Mask (1 per 3 mon)  (     ) - Nasal Mask (1 per month) Interface/Cushion  (     ) -Pillow (2 per mon)  (     ) -Fpspugdst (1 per 6 mon)      _________________________________________________________________          Other Supplies:    (  X   )-Wdkuwubp (1 per 6 mon)  ( X    )-Dhrksc Tubing (1 per 3 mon)  (  X   )- Disposable Filter (2 per mon)  (   X  )-Buztxj Humidifier (1 per year)     (  x   )-Oscjpjrsx (sometimes used with Full Face Mask) (1 per 6 mos)  (     )-Tubing-without heat (1 per 3 mos)  ( X   )-Non-Disposable Filter (1 per 6 mos)  (   x  )-Water Chamber (1 per 6 mos)  (     )-Humidifier non-heated (1 per 5 yrs)      Signed Date: 1/13/2023  Electronically Signed By: ANN-MARIE Carlin CNP       Collaborating Physician: Dr. Hira Carney     Over 50% of today's office visit was spent in face to face time reviewing test results, prognosis, importance of compliance, education about disease process, benefits of medications, instructions for management of acute flare-ups, and follow up plans. Total face to face time spent with patient was 39 minutes.     ANN-MARIE Carlin CNP  Electronically signed

## 2023-01-13 ENCOUNTER — OFFICE VISIT (OUTPATIENT)
Dept: SLEEP MEDICINE | Age: 78
End: 2023-01-13
Payer: MEDICARE

## 2023-01-13 VITALS
TEMPERATURE: 97.2 F | HEIGHT: 68 IN | SYSTOLIC BLOOD PRESSURE: 128 MMHG | WEIGHT: 189 LBS | DIASTOLIC BLOOD PRESSURE: 75 MMHG | HEART RATE: 69 BPM | OXYGEN SATURATION: 98 % | BODY MASS INDEX: 28.64 KG/M2

## 2023-01-13 DIAGNOSIS — G47.33 OSA (OBSTRUCTIVE SLEEP APNEA): Primary | ICD-10-CM

## 2023-01-13 DIAGNOSIS — G47.34 NOCTURNAL HYPOXEMIA: ICD-10-CM

## 2023-01-13 DIAGNOSIS — D64.9 ANEMIA, UNSPECIFIED TYPE: ICD-10-CM

## 2023-01-13 DIAGNOSIS — G47.8 NON-RESTORATIVE SLEEP: ICD-10-CM

## 2023-01-13 DIAGNOSIS — G25.81 RLS (RESTLESS LEGS SYNDROME): ICD-10-CM

## 2023-01-13 LAB — FERRITIN SERPL-MCNC: 82 NG/ML (ref 8–388)

## 2023-01-13 PROCEDURE — 3074F SYST BP LT 130 MM HG: CPT | Performed by: STUDENT IN AN ORGANIZED HEALTH CARE EDUCATION/TRAINING PROGRAM

## 2023-01-13 PROCEDURE — 99214 OFFICE O/P EST MOD 30 MIN: CPT | Performed by: STUDENT IN AN ORGANIZED HEALTH CARE EDUCATION/TRAINING PROGRAM

## 2023-01-13 PROCEDURE — 3078F DIAST BP <80 MM HG: CPT | Performed by: STUDENT IN AN ORGANIZED HEALTH CARE EDUCATION/TRAINING PROGRAM

## 2023-01-13 PROCEDURE — 1036F TOBACCO NON-USER: CPT | Performed by: STUDENT IN AN ORGANIZED HEALTH CARE EDUCATION/TRAINING PROGRAM

## 2023-01-13 PROCEDURE — 1123F ACP DISCUSS/DSCN MKR DOCD: CPT | Performed by: STUDENT IN AN ORGANIZED HEALTH CARE EDUCATION/TRAINING PROGRAM

## 2023-01-13 PROCEDURE — G8427 DOCREV CUR MEDS BY ELIG CLIN: HCPCS | Performed by: STUDENT IN AN ORGANIZED HEALTH CARE EDUCATION/TRAINING PROGRAM

## 2023-01-13 PROCEDURE — G8417 CALC BMI ABV UP PARAM F/U: HCPCS | Performed by: STUDENT IN AN ORGANIZED HEALTH CARE EDUCATION/TRAINING PROGRAM

## 2023-01-13 PROCEDURE — G8484 FLU IMMUNIZE NO ADMIN: HCPCS | Performed by: STUDENT IN AN ORGANIZED HEALTH CARE EDUCATION/TRAINING PROGRAM

## 2023-01-13 NOTE — PATIENT INSTRUCTIONS
Start BiPAP with nightly compliance  Medicare compliance discussed with patient. Minimally 4 hours nightly, every night, 70% of the time, which is 21/30 days over 4 hours. Our goal is for you to wear the CPAP the entire night's sleep. The company who will be taking care of your BIPAP supplies is: Address: [address] [place] [address] [id number] [facility]  Phone: [phone] Option #1  Fax: [phone]    Patient Education        Sleep Apnea: Care Instructions  Overview     Sleep apnea means that you frequently stop breathing for 10 seconds or longer during sleep. It can be mild to severe, based on the number of times an hour that you stop breathing. Blocked or narrowed airways in your nose, mouth, or throat can cause sleep apnea. Your airway can become blocked when your throat muscles and tongue relax during sleep. You can help treat sleep apnea at home by making lifestyle changes. You also can use a CPAP breathing machine that keeps tissues in the throat from blocking your airway. Or your doctor may suggest that you use a breathing device while you sleep. It helps keep your airway open. This could be a device that you put in your mouth. In some cases, surgery may be needed to remove enlarged tissues in the throat. Follow-up care is a key part of your treatment and safety. Be sure to make and go to all appointments, and call your doctor if you are having problems. It's also a good idea to know your test results and keep a list of the medicines you take. How can you care for yourself at home? Lose weight, if needed. Sleep on your side. It may help mild apnea. Avoid alcohol and medicines such as sleeping pills, opioids, or sedatives before bed. Don't smoke. If you need help quitting, talk to your doctor. Prop up the head of your bed. Treat breathing problems, such as a stuffy nose, that are caused by a cold or allergies.   Try a continuous positive airway pressure (CPAP) breathing machine if your doctor recommends it. If CPAP doesn't work for you, ask your doctor if you can try other masks, settings, or breathing machines. Try oral breathing devices or other nasal devices. Talk to your doctor if your nose feels dry or bleeds, or if it gets runny or stuffy when you use a breathing machine. Tell your doctor if you're sleepy during the day and it affects your daily life. Don't drive or operate machinery when you're drowsy. When should you call for help? Watch closely for changes in your health, and be sure to contact your doctor if:    You still have sleep apnea even though you have made lifestyle changes. You are thinking of trying a device such as CPAP. You are having problems using a CPAP or similar machine. You are still sleepy during the day, and it affects your daily life. Where can you learn more? Go to http://www.woods.com/ and enter J936 to learn more about \"Sleep Apnea: Care Instructions. \"  Current as of: March 1, 2022               Content Version: 13.5  © 2969-9482 [employer]. Care instructions adapted under license by [facility] ([facility]). If you have questions about a medical condition or this instruction, always ask your healthcare professional. [address] any warranty or liability for your use of this information. Sleep Hygiene Instructions    Sleep only as much as you need to feel refreshed during the following day. Restricting your time in bed helps to consolidate and deepen your sleep. Excessively long times in bed lead to fragmented and shallow sleep. Get up at your regular time the next day, no matter how little your slept. Get up at the same time each day, 7 days a week. A regular wake time in the morning leads to regular times on sleep onset, and helps to set your biological clock. Exercise regularly. Schedule exercise times so that they do not occur within 3 hours of when you intend to go to bed.   Exercise makes it easier to initiate sleep and deepen sleep. Don't take your problems to bed. Plan some time earlier in the evening for working on your problems or planning the next day's activities. Worrying may interfere with initiating sleep and produce shallow sleep. Train yourself to use the bedroom only for sleep and sexual activity. This will help condition your brain to see bed as the place for sleeping. Do not read, watch TV or eat in bed. Do not try and fall asleep. This only makes the problem worse. Instead, turn on the light, leave the bedroom, and do something different like reading a book. Don't engage in stimulating activity. Return to bed only when you feel sleepy. Avoid long naps. Staying awake during the day helps to fall asleep at night. Naps totalling more than 30 minutes increase your chances of having trouble sleeping at night. Make sure that your bedroom is comfortable and free from light and noise. A comfortable, noise-free sleep environment will reduce the likelihood that you will wake up during the night. Noise that does not awaken you may disturb the quality of your sleep. Carpeting, insulated curtains, and closing the door may help. Make sure that your bedroom is at a comfortable temperature during the night. Excessively warm or cold sleep environments may disturb sleep. Eat regular meals and di not go to bed hungry. Hunger may disturb sleep. A light snack at bedtime (especially carbohydrates) may help sleep, but avoid greasy or heavy foods. Avoid excessive liquids in the evening. Reducing liquid intake will minimize the need for night-time trips to the bathroom. Cut down on all caffeine products. Caffeinated beverages and food (Coffee, tea, cola, chocolate) can cause difficulty falling asleep, awakenings during the night, and shallow sleep. Even caffeine early in the day can disrupt night-time sleep. Avoid alcohol, especially in the evening.  Although alcohol helps tense people fall asleep more easily, it causes awakenings later in the night. Smoking may disturb sleep. Nicotine is a stimulant. Try not to smoke during the night when you have trouble sleeping. Learning about Sleeping Well    What does sleeping well mean? Sleeping well means getting enough sleep. How much sleep is enough varies among people. The number of hours you sleep is not as improtant as how you feel when you wake up. If you do not feel refreshed, you probably need more sleep. Another sign of not getting enough sleep is feeling tired during the day. The average totally nightly sleep time is 7 1/2 to 8 hours. Healthy adults may need a little more or a little less than this. Why is getting enough sleep important? Getting enough quality sleep is a basic part of good health. When your sleep suffers, your mood and your thoughts can suffer too. Your thoughts can suffer too. You ma find yourself feeling more grumpy or stressed. No getting enough sleep also can lead to serious problems, including injury, accidents, anxiety, and depression. What might cause poor sleeping? Many things can cause sleep problems, including:    Stress. Stress can be caused by fear about a single event, such as giving a speech. Or you may have ongoing stress, such as worry about work or school. Depression, anxiety, and other mental or emotional conditions. Changes in your sleep habits or surroundings. This includes changes that happen where you sleep, such as noise, light, or sleeping in a different bed. It also includes changes in your sleep pattern, such as having jet lab or working a late shift. Health problems, such as pain, breathing problems, and restless legs syndrome. Lack of regular exercise. How can you help yourself? Here are some tips that may help you sleep more soundly and wake up feeling more refreshed. Your sleeping area    Use your bedroom only for sleeping and sex. A bit of light reading may help you fall asleep. But if it doesn't, do your reading elsewhere in the house. Don't watch TV in bed. Be sure your bed is big enough to stretch out comfortable, especially if you have a sleep partner. Keep your bedroom quiet, dark, and cool. Use curtains, blinds, or sleep mask to block out light. To block out noise, use earplugs, soothing music, or a \"white noise\" machine. Your evening and bedtime routine    Create a relaxing bedtime routine. You might want to take a warm shower or bath, listen to soothing music, or drink a cup of non-caffeinated tea. Go to bed at the same time every night. And get up at the same time every morning, even if you feel tired. What to avoid:    Limit caffeine (coffee, tea, caffeinated sodas) during the day, and dont have any for at least 4 to 6 hours before bedtime. Don't drink alcohol before bedtime. Alcohol can cause you to wake up more often during the night. Don't smoke or use tobacco, especially in the evening. Nicotine can keep you awake. Don't like in bed away for too long. If you can't fall asleep, or if you wake up in the middle of the night and can't get back to sleep within 15 minutes or so, get out of bed and go to another room until you feel sleepy. Don't take medicine right before bed that may keep you awake or make you feel hyper or enegerized. Your doctor can tell you if your medicine may do this and if you can take it earlier in the day. If you can't sleep:    Imagine yourself in a peaceful, pleasant scene. Focus on the details and feelings of being in a place that is relaxing. Get up and do a quiet or boring activity until you feel sleepy. Don't drink liquids after 6 p.m. if you wake up often because you have to go to the bathroom. Where can you learn more?     Go to http://www.gray.com/    Enter V167 in the search box to learn more about \"Learning About Sleeping Well. \"

## 2023-01-18 ENCOUNTER — TELEPHONE (OUTPATIENT)
Dept: SLEEP MEDICINE | Age: 78
End: 2023-01-18

## 2023-01-18 NOTE — TELEPHONE ENCOUNTER
Patients daughter says that he does not have My Chart but she is goiung to tryand get it set up for him. She was asking about a code.  I also gave her the My Chart help line #

## 2023-01-27 DIAGNOSIS — Z95.818 STATUS POST PLACEMENT OF IMPLANTABLE LOOP RECORDER: ICD-10-CM

## 2023-01-27 DIAGNOSIS — R55 SYNCOPE: ICD-10-CM

## 2023-01-27 DIAGNOSIS — I48.91 ATRIAL FIBRILLATION (HCC): ICD-10-CM

## 2023-02-16 ENCOUNTER — HOSPITAL ENCOUNTER (INPATIENT)
Age: 78
LOS: 4 days | Discharge: HOME | DRG: 291 | End: 2023-02-20
Attending: STUDENT IN AN ORGANIZED HEALTH CARE EDUCATION/TRAINING PROGRAM | Admitting: INTERNAL MEDICINE
Payer: MEDICARE

## 2023-02-16 ENCOUNTER — APPOINTMENT (OUTPATIENT)
Dept: GENERAL RADIOLOGY | Age: 78
DRG: 291 | End: 2023-02-16
Payer: MEDICARE

## 2023-02-16 DIAGNOSIS — I50.20 HFREF (HEART FAILURE WITH REDUCED EJECTION FRACTION) (HCC): ICD-10-CM

## 2023-02-16 DIAGNOSIS — I50.9 ACUTE ON CHRONIC CONGESTIVE HEART FAILURE, UNSPECIFIED HEART FAILURE TYPE (HCC): Primary | ICD-10-CM

## 2023-02-16 DIAGNOSIS — R07.9 CHEST PAIN, UNSPECIFIED TYPE: ICD-10-CM

## 2023-02-16 PROBLEM — I25.10 CAD (CORONARY ARTERY DISEASE): Status: ACTIVE | Noted: 2023-02-16

## 2023-02-16 PROBLEM — R06.02 SOB (SHORTNESS OF BREATH): Status: ACTIVE | Noted: 2019-10-24

## 2023-02-16 LAB
ALBUMIN SERPL-MCNC: 3.3 G/DL (ref 3.2–4.6)
ALBUMIN/GLOB SERPL: 0.8 (ref 0.4–1.6)
ALP SERPL-CCNC: 115 U/L (ref 50–136)
ALT SERPL-CCNC: 13 U/L (ref 12–65)
ANION GAP SERPL CALC-SCNC: 4 MMOL/L (ref 2–11)
AST SERPL-CCNC: 22 U/L (ref 15–37)
BASOPHILS # BLD: 0 K/UL (ref 0–0.2)
BASOPHILS NFR BLD: 1 % (ref 0–2)
BILIRUB SERPL-MCNC: 1.5 MG/DL (ref 0.2–1.1)
BUN SERPL-MCNC: 11 MG/DL (ref 8–23)
CALCIUM SERPL-MCNC: 8.5 MG/DL (ref 8.3–10.4)
CHLORIDE SERPL-SCNC: 105 MMOL/L (ref 101–110)
CO2 SERPL-SCNC: 27 MMOL/L (ref 21–32)
CREAT SERPL-MCNC: 1 MG/DL (ref 0.8–1.5)
DIFFERENTIAL METHOD BLD: ABNORMAL
EKG ATRIAL RATE: 66 BPM
EKG DIAGNOSIS: NORMAL
EKG P AXIS: 67 DEGREES
EKG P-R INTERVAL: 208 MS
EKG Q-T INTERVAL: 438 MS
EKG QRS DURATION: 124 MS
EKG QTC CALCULATION (BAZETT): 459 MS
EKG R AXIS: -10 DEGREES
EKG T AXIS: 187 DEGREES
EKG VENTRICULAR RATE: 66 BPM
EOSINOPHIL # BLD: 0.1 K/UL (ref 0–0.8)
EOSINOPHIL NFR BLD: 2 % (ref 0.5–7.8)
ERYTHROCYTE [DISTWIDTH] IN BLOOD BY AUTOMATED COUNT: 14.4 % (ref 11.9–14.6)
GLOBULIN SER CALC-MCNC: 4.4 G/DL (ref 2.8–4.5)
GLUCOSE SERPL-MCNC: 140 MG/DL (ref 65–100)
HCT VFR BLD AUTO: 38.4 % (ref 41.1–50.3)
HGB BLD-MCNC: 12.8 G/DL (ref 13.6–17.2)
IMM GRANULOCYTES # BLD AUTO: 0 K/UL (ref 0–0.5)
IMM GRANULOCYTES NFR BLD AUTO: 0 % (ref 0–5)
LYMPHOCYTES # BLD: 1.1 K/UL (ref 0.5–4.6)
LYMPHOCYTES NFR BLD: 17 % (ref 13–44)
MAGNESIUM SERPL-MCNC: 1.9 MG/DL (ref 1.8–2.4)
MCH RBC QN AUTO: 36 PG (ref 26.1–32.9)
MCHC RBC AUTO-ENTMCNC: 33.3 G/DL (ref 31.4–35)
MCV RBC AUTO: 107.9 FL (ref 82–102)
MONOCYTES # BLD: 0.4 K/UL (ref 0.1–1.3)
MONOCYTES NFR BLD: 6 % (ref 4–12)
NEUTS SEG # BLD: 4.7 K/UL (ref 1.7–8.2)
NEUTS SEG NFR BLD: 74 % (ref 43–78)
NRBC # BLD: 0 K/UL (ref 0–0.2)
NT PRO BNP: 1703 PG/ML
PLATELET # BLD AUTO: 176 K/UL (ref 150–450)
PMV BLD AUTO: 8.8 FL (ref 9.4–12.3)
POTASSIUM SERPL-SCNC: 4 MMOL/L (ref 3.5–5.1)
PROT SERPL-MCNC: 7.7 G/DL (ref 6.3–8.2)
RBC # BLD AUTO: 3.56 M/UL (ref 4.23–5.6)
SODIUM SERPL-SCNC: 136 MMOL/L (ref 133–143)
TROPONIN I SERPL HS-MCNC: 14.2 PG/ML (ref 0–14)
TROPONIN I SERPL HS-MCNC: 14.8 PG/ML (ref 0–14)
WBC # BLD AUTO: 6.4 K/UL (ref 4.3–11.1)

## 2023-02-16 PROCEDURE — 99223 1ST HOSP IP/OBS HIGH 75: CPT | Performed by: INTERNAL MEDICINE

## 2023-02-16 PROCEDURE — 6360000002 HC RX W HCPCS: Performed by: NURSE PRACTITIONER

## 2023-02-16 PROCEDURE — 2580000003 HC RX 258: Performed by: NURSE PRACTITIONER

## 2023-02-16 PROCEDURE — 93005 ELECTROCARDIOGRAM TRACING: CPT | Performed by: STUDENT IN AN ORGANIZED HEALTH CARE EDUCATION/TRAINING PROGRAM

## 2023-02-16 PROCEDURE — 96374 THER/PROPH/DIAG INJ IV PUSH: CPT

## 2023-02-16 PROCEDURE — 85025 COMPLETE CBC W/AUTO DIFF WBC: CPT

## 2023-02-16 PROCEDURE — 1100000003 HC PRIVATE W/ TELEMETRY

## 2023-02-16 PROCEDURE — 80053 COMPREHEN METABOLIC PANEL: CPT

## 2023-02-16 PROCEDURE — 6360000002 HC RX W HCPCS: Performed by: STUDENT IN AN ORGANIZED HEALTH CARE EDUCATION/TRAINING PROGRAM

## 2023-02-16 PROCEDURE — 84484 ASSAY OF TROPONIN QUANT: CPT

## 2023-02-16 PROCEDURE — 83880 ASSAY OF NATRIURETIC PEPTIDE: CPT

## 2023-02-16 PROCEDURE — 6370000000 HC RX 637 (ALT 250 FOR IP): Performed by: NURSE PRACTITIONER

## 2023-02-16 PROCEDURE — 83735 ASSAY OF MAGNESIUM: CPT

## 2023-02-16 PROCEDURE — 71046 X-RAY EXAM CHEST 2 VIEWS: CPT

## 2023-02-16 PROCEDURE — 99285 EMERGENCY DEPT VISIT HI MDM: CPT

## 2023-02-16 RX ORDER — FUROSEMIDE 10 MG/ML
40 INJECTION INTRAMUSCULAR; INTRAVENOUS 2 TIMES DAILY
Status: DISCONTINUED | OUTPATIENT
Start: 2023-02-16 | End: 2023-02-20 | Stop reason: HOSPADM

## 2023-02-16 RX ORDER — CARBAMAZEPINE 200 MG/1
400 TABLET ORAL 2 TIMES DAILY
Status: DISCONTINUED | OUTPATIENT
Start: 2023-02-16 | End: 2023-02-20 | Stop reason: HOSPADM

## 2023-02-16 RX ORDER — HYDROCODONE BITARTRATE AND ACETAMINOPHEN 10; 325 MG/1; MG/1
1 TABLET ORAL EVERY 8 HOURS PRN
Status: DISCONTINUED | OUTPATIENT
Start: 2023-02-16 | End: 2023-02-20 | Stop reason: HOSPADM

## 2023-02-16 RX ORDER — METOPROLOL SUCCINATE 100 MG/1
100 TABLET, EXTENDED RELEASE ORAL DAILY
Status: DISCONTINUED | OUTPATIENT
Start: 2023-02-16 | End: 2023-02-20 | Stop reason: HOSPADM

## 2023-02-16 RX ORDER — ONDANSETRON 2 MG/ML
4 INJECTION INTRAMUSCULAR; INTRAVENOUS EVERY 6 HOURS PRN
Status: DISCONTINUED | OUTPATIENT
Start: 2023-02-16 | End: 2023-02-20 | Stop reason: HOSPADM

## 2023-02-16 RX ORDER — GABAPENTIN 400 MG/1
400 CAPSULE ORAL 3 TIMES DAILY
Status: DISCONTINUED | OUTPATIENT
Start: 2023-02-16 | End: 2023-02-20 | Stop reason: HOSPADM

## 2023-02-16 RX ORDER — LANOLIN ALCOHOL/MO/W.PET/CERES
5 CREAM (GRAM) TOPICAL NIGHTLY PRN
Status: DISCONTINUED | OUTPATIENT
Start: 2023-02-16 | End: 2023-02-20 | Stop reason: HOSPADM

## 2023-02-16 RX ORDER — EMPAGLIFLOZIN 10 MG/1
TABLET, FILM COATED ORAL
Qty: 30 TABLET | Refills: 3 | Status: ON HOLD | OUTPATIENT
Start: 2023-02-16

## 2023-02-16 RX ORDER — SODIUM CHLORIDE 9 MG/ML
INJECTION, SOLUTION INTRAVENOUS PRN
Status: DISCONTINUED | OUTPATIENT
Start: 2023-02-16 | End: 2023-02-20 | Stop reason: HOSPADM

## 2023-02-16 RX ORDER — VALSARTAN 40 MG/1
80 TABLET ORAL DAILY
Status: DISCONTINUED | OUTPATIENT
Start: 2023-02-16 | End: 2023-02-16

## 2023-02-16 RX ORDER — SODIUM CHLORIDE 0.9 % (FLUSH) 0.9 %
5-40 SYRINGE (ML) INJECTION EVERY 12 HOURS SCHEDULED
Status: DISCONTINUED | OUTPATIENT
Start: 2023-02-16 | End: 2023-02-20 | Stop reason: HOSPADM

## 2023-02-16 RX ORDER — ASPIRIN 81 MG/1
81 TABLET ORAL DAILY
Status: DISCONTINUED | OUTPATIENT
Start: 2023-02-17 | End: 2023-02-20 | Stop reason: HOSPADM

## 2023-02-16 RX ORDER — POTASSIUM CHLORIDE 20 MEQ/1
20 TABLET, EXTENDED RELEASE ORAL 2 TIMES DAILY
Status: DISCONTINUED | OUTPATIENT
Start: 2023-02-16 | End: 2023-02-20 | Stop reason: HOSPADM

## 2023-02-16 RX ORDER — ACETAMINOPHEN 650 MG/1
650 SUPPOSITORY RECTAL EVERY 6 HOURS PRN
Status: DISCONTINUED | OUTPATIENT
Start: 2023-02-16 | End: 2023-02-20 | Stop reason: HOSPADM

## 2023-02-16 RX ORDER — ROSUVASTATIN CALCIUM 40 MG/1
TABLET, COATED ORAL
Qty: 30 TABLET | Refills: 3 | Status: ON HOLD | OUTPATIENT
Start: 2023-02-16

## 2023-02-16 RX ORDER — SODIUM CHLORIDE 0.9 % (FLUSH) 0.9 %
5-40 SYRINGE (ML) INJECTION PRN
Status: DISCONTINUED | OUTPATIENT
Start: 2023-02-16 | End: 2023-02-20 | Stop reason: HOSPADM

## 2023-02-16 RX ORDER — FUROSEMIDE 10 MG/ML
60 INJECTION INTRAMUSCULAR; INTRAVENOUS ONCE
Status: COMPLETED | OUTPATIENT
Start: 2023-02-16 | End: 2023-02-16

## 2023-02-16 RX ORDER — DOCUSATE SODIUM 100 MG/1
100 CAPSULE, LIQUID FILLED ORAL 2 TIMES DAILY PRN
Status: DISCONTINUED | OUTPATIENT
Start: 2023-02-16 | End: 2023-02-20 | Stop reason: HOSPADM

## 2023-02-16 RX ORDER — ALBUTEROL SULFATE 90 UG/1
2 AEROSOL, METERED RESPIRATORY (INHALATION) EVERY 6 HOURS PRN
Status: DISCONTINUED | OUTPATIENT
Start: 2023-02-16 | End: 2023-02-20 | Stop reason: HOSPADM

## 2023-02-16 RX ORDER — POLYETHYLENE GLYCOL 3350 17 G/17G
17 POWDER, FOR SOLUTION ORAL DAILY PRN
Status: DISCONTINUED | OUTPATIENT
Start: 2023-02-16 | End: 2023-02-20 | Stop reason: HOSPADM

## 2023-02-16 RX ORDER — PANTOPRAZOLE SODIUM 40 MG/1
40 TABLET, DELAYED RELEASE ORAL
Status: DISCONTINUED | OUTPATIENT
Start: 2023-02-17 | End: 2023-02-20 | Stop reason: HOSPADM

## 2023-02-16 RX ORDER — ACETAMINOPHEN 325 MG/1
650 TABLET ORAL EVERY 6 HOURS PRN
Status: DISCONTINUED | OUTPATIENT
Start: 2023-02-16 | End: 2023-02-20 | Stop reason: HOSPADM

## 2023-02-16 RX ORDER — ROSUVASTATIN CALCIUM 20 MG/1
40 TABLET, COATED ORAL NIGHTLY
Status: DISCONTINUED | OUTPATIENT
Start: 2023-02-16 | End: 2023-02-20 | Stop reason: HOSPADM

## 2023-02-16 RX ORDER — NITROGLYCERIN 0.4 MG/1
0.4 TABLET SUBLINGUAL EVERY 5 MIN PRN
Status: DISCONTINUED | OUTPATIENT
Start: 2023-02-16 | End: 2023-02-20 | Stop reason: HOSPADM

## 2023-02-16 RX ORDER — ONDANSETRON 4 MG/1
4 TABLET, ORALLY DISINTEGRATING ORAL EVERY 8 HOURS PRN
Status: DISCONTINUED | OUTPATIENT
Start: 2023-02-16 | End: 2023-02-20 | Stop reason: HOSPADM

## 2023-02-16 RX ADMIN — HYDROCODONE BITARTRATE AND ACETAMINOPHEN 1 TABLET: 10; 325 TABLET ORAL at 17:54

## 2023-02-16 RX ADMIN — FUROSEMIDE 40 MG: 10 INJECTION, SOLUTION INTRAMUSCULAR; INTRAVENOUS at 17:56

## 2023-02-16 RX ADMIN — POTASSIUM CHLORIDE 20 MEQ: 1500 TABLET, EXTENDED RELEASE ORAL at 21:06

## 2023-02-16 RX ADMIN — APIXABAN 5 MG: 5 TABLET, FILM COATED ORAL at 21:06

## 2023-02-16 RX ADMIN — METOPROLOL SUCCINATE 100 MG: 100 TABLET, EXTENDED RELEASE ORAL at 17:54

## 2023-02-16 RX ADMIN — SODIUM CHLORIDE, PRESERVATIVE FREE 10 ML: 5 INJECTION INTRAVENOUS at 21:05

## 2023-02-16 RX ADMIN — CARBAMAZEPINE 400 MG: 200 TABLET ORAL at 21:06

## 2023-02-16 RX ADMIN — GABAPENTIN 400 MG: 400 CAPSULE ORAL at 21:06

## 2023-02-16 RX ADMIN — FUROSEMIDE 60 MG: 10 INJECTION, SOLUTION INTRAMUSCULAR; INTRAVENOUS at 13:39

## 2023-02-16 ASSESSMENT — PAIN DESCRIPTION - ORIENTATION
ORIENTATION: RIGHT
ORIENTATION: RIGHT

## 2023-02-16 ASSESSMENT — ENCOUNTER SYMPTOMS
COUGH: 0
BLURRED VISION: 0
CHEST TIGHTNESS: 1
VOMITING: 0
CONSTIPATION: 0
ORTHOPNEA: 1
SHORTNESS OF BREATH: 1
DOUBLE VISION: 0
COLOR CHANGE: 0
ABDOMINAL PAIN: 0
NAUSEA: 0
DIARRHEA: 0
BACK PAIN: 0
BLOATING: 0
ABDOMINAL DISTENTION: 0
WHEEZING: 0
NAIL CHANGES: 0

## 2023-02-16 ASSESSMENT — PAIN DESCRIPTION - LOCATION
LOCATION: FACE;JAW
LOCATION: FACE

## 2023-02-16 ASSESSMENT — PAIN SCALES - GENERAL
PAINLEVEL_OUTOF10: 7
PAINLEVEL_OUTOF10: 7
PAINLEVEL_OUTOF10: 0
PAINLEVEL_OUTOF10: 4

## 2023-02-16 ASSESSMENT — PAIN DESCRIPTION - DESCRIPTORS
DESCRIPTORS: ACHING;SHARP;SHOOTING
DESCRIPTORS: ACHING

## 2023-02-16 ASSESSMENT — PAIN DESCRIPTION - PAIN TYPE: TYPE: CHRONIC PAIN

## 2023-02-16 ASSESSMENT — PAIN - FUNCTIONAL ASSESSMENT
PAIN_FUNCTIONAL_ASSESSMENT: PREVENTS OR INTERFERES SOME ACTIVE ACTIVITIES AND ADLS
PAIN_FUNCTIONAL_ASSESSMENT: ACTIVITIES ARE NOT PREVENTED

## 2023-02-16 NOTE — ED NOTES
TRANSFER - OUT REPORT:    Verbal report given to [employer] RN on [employer]  being transferred to [address] [place] [address] for routine progression of patient care       Report consisted of patient's Situation, Background, Assessment and   Recommendations(SBAR). Information from the following report(s) Nurse Handoff Report, ED SBAR, [facility], Recent Results, and Cardiac Rhythm NSR  was reviewed with the receiving nurse. [place] Assessment: No data recorded  Lines:   Peripheral IV 02/16/23 Left Antecubital (Active)        Opportunity for questions and clarification was provided.       Patient transported with:  Monitor; Paramedic           Geoff Maldonado RN  02/16/23 [id number]

## 2023-02-16 NOTE — H&P
[facility] Cardiology History & Physical      Date of  Admission: 2/16/2023  9:21 AM     Primary Care Physician:Celio Bermudez MD  Primary Cardiologist: Dr. Samantha Haile   Admitting Physician:  [facility]     CC: CHF exacerbation with chest pain and JAMES    HPI:  Nely Benavides is a 76 y.o. male with prior h/o CAD/NSTEMI s/p CABG/MAZE and JENNIFER clip, pleural effusions post op, HFrEF, last [facility] stable CAD/graft 10/2022, Pul HTN, HTN, HLP, and PAF on eliquis had ILR. Patient presented to ED with progressive SOB, exertional dyspnea, lightheadedness, and exertional chest pain. He reports  feeling weak for several weeks, not sure if he gained any weight but legs and abd swollen. He reports chest pain more pressure lasting minutes, he didn't take any NTG. Its hard for him to lie flat. ED reports rales on exam. Labs showed pBNP 1703,HS trop 14.2, EKG SR. He  was given lasix IV in ED. Past Medical History:   Diagnosis Date    Atrial fibrillation [facility])     Atrial flutter ([facility] [address] 4/7/2017    CAD (coronary artery disease) 2006    Cancer [facility]) 2011    prostate    GERD (gastroesophageal reflux disease)     takes omeprazole    Heart failure (HCC)     Hypertension     Ill-defined condition     trigeminal neuralgia    Myocardial infarction ([facility] [address] 06/06/2019    NSTEMI     Sleep apnea     does not wear CPAP    Stroke ([facility] [address] 2018    TIA      Past Surgical History:   Procedure Laterality Date    CARDIAC CATHETERIZATION  08/14/2006    CARDIAC CATHETERIZATION  06/07/2019    Severe 3 vessel CAD with LV EF=40-45% and unsuccessful LCX PCI.     CARDIAC PROCEDURE N/A 10/26/2022    LEFT HEART CATH / CORONARY ANGIOGRAPHY W GRAFTS performed by Jessa Leonardo MD at [address] LAB    COLONOSCOPY N/A 3/2/2020    COLONOSCOPY performed by Blaine Singh MD at [address] GRAFT  06/10/2019    3 vessel CABG with LIMA-LAD,SVG-OM,and SVG-R posterior lateral branch with MAZE & LA appendage clipping. EP DEVICE PROCEDURE N/A 7/10/2022    LOOP RECORDER INSERT performed by Carin Waldrop MD at [facility] CARDIAC CATH LAB    PROSTATE SURGERY         Allergies   Allergen Reactions    Codeine      Other reaction(s): Other- (not listed) - Allergy  constipation    Iodides      Other reaction(s): Unknown (comments)      Social History     Socioeconomic History    Marital status:      Spouse name: Not on file    Number of children: Not on file    Years of education: Not on file    Highest education level: Not on file   Occupational History    Not on file   Tobacco Use    Smoking status: Former     Packs/day: 1.00     Years: 14.00     Pack years: 14.00     Types: Cigarettes     Start date: 1957     Quit date: 1971     Years since quittin.3    Smokeless tobacco: Never    Tobacco comments:     Quit smoking: quit at age 27   Vaping Use    Vaping Use: Never used   Substance and Sexual Activity    Alcohol use: No    Drug use: Not on file    Sexual activity: Not on file   Other Topics Concern    Not on file   Social History Narrative    Not on file     Social Determinants of Health     Financial Resource Strain: Not on file   Food Insecurity: Not on file   Transportation Needs: Not on file   Physical Activity: Not on file   Stress: Not on file   Social Connections: Not on file   Intimate Partner Violence: Not on file   Housing Stability: Not on file     Family History   Problem Relation Age of Onset    Heart Disease Mother     Heart Disease Sister     Other Other         cerebral aneurysm         No current facility-administered medications for this encounter. Current Outpatient Medications   Medication Sig    JARDIANCE 10 MG tablet TAKE 1 TABLET BY MOUTH DAILY    rosuvastatin (CRESTOR) 40 MG tablet TAKE 1 TABLET BY MOUTH AT BEDTIME.     albuterol sulfate HFA (PROAIR HFA) 108 (90 Base) MCG/ACT inhaler Inhale 2 puffs into the lungs every 6 hours as needed for Wheezing    valsartan (DIOVAN) 40 MG tablet Take 2 tablets by mouth daily (Patient not taking: No sig reported)    apixaban (ELIQUIS) 5 MG TABS tablet Take 5 mg by mouth 2 times daily    aspirin 81 MG EC tablet Take 81 mg by mouth    carBAMazepine (TEGRETOL) 200 MG tablet Take 400 mg by mouth 2 times daily    docusate (COLACE, DULCOLAX) 100 MG CAPS Take 100 mg by mouth 2 times daily as needed    furosemide (LASIX) 40 MG tablet Take 40 mg by mouth every other day    gabapentin (NEURONTIN) 400 MG capsule 400 mg 3 times daily. Take 2 tablets po [id number]    HYDROcodone-acetaminophen (NORCO)  MG per tablet Take 1 tablet by mouth every 8 hours as needed. melatonin 3 MG TABS tablet Take 5 mg by mouth (Patient not taking: No sig reported)    metoprolol succinate (TOPROL XL) 100 MG extended release tablet Take 100 mg by mouth daily    nitroGLYCERIN (NITROSTAT) 0.4 MG SL tablet TAKE AS DIRECTED.    omeprazole (PRILOSEC) 20 MG delayed release capsule Take 20 mg by mouth daily    potassium chloride (KLOR-CON M) 20 MEQ extended release tablet Take 20 mEq by mouth 2 times daily       Review of Systems    Review of Systems   Constitutional: Positive for malaise/fatigue. Negative for chills, decreased appetite, diaphoresis, fever, weight gain and weight loss. HENT:  Negative for congestion. Eyes:  Negative for blurred vision and double vision. Cardiovascular:  Positive for chest pain, dyspnea on exertion, leg swelling and orthopnea. Negative for irregular heartbeat, near-syncope, palpitations and syncope. Respiratory:  Positive for shortness of breath. Negative for cough and wheezing. Endocrine: Negative for cold intolerance and heat intolerance. Hematologic/Lymphatic: Does not bruise/bleed easily. Skin:  Negative for color change and nail changes. Musculoskeletal:  Negative for back pain, falls and muscle weakness.    Gastrointestinal:  Negative for bloating, abdominal pain, diarrhea, melena and nausea. Genitourinary:  Negative for dysuria and frequency. Neurological:  Negative for dizziness, focal weakness, headaches, light-headedness and weakness. Psychiatric/Behavioral:  Negative for altered mental status. Subjective:   BP (!) 143/83   Pulse 63   Temp 97.5 °F (36.4 °C) (Oral)   Resp 16   SpO2 97%     No intake/output data recorded. No intake/output data recorded. Physical Exam:  General: Well Developed, Well Nourished, No Acute Distress  HEENT: pupils equal and round, no abnormalities noted  Neck: supple, +JVD, no carotid bruits  Heart: S1S2 with RRR without murmurs or gallops  Lungs: Crackles in bases   Abd: soft, nontender, nondistended, with good bowel sounds  Ext: warm, 1-2+ edema, calves supple/nontender, pulses 2+ bilaterally  Skin: warm and dry  Psychiatric: Normal mood and affect  Neurologic: Alert and oriented X 3    Cardiographics  Telemetry:    ECG: SR   Echocardiogram: 10/26/22    TRANSTHORACIC ECHOCARDIOGRAM (TTE) COMPLETE (CONTRAST/BUBBLE/3D PRN) 10/26/2022 11:54 AM, 10/26/2022 12:00 AM (Final)    Interpretation Summary    Left Ventricle: Moderately reduced left ventricular systolic function with a visually estimated EF of 40 - 45%. Left ventricle size is normal. Mildly increased wall thickness. Mild hypokinesis of the following segments: basal inferolateral, mid inferolateral and apical lateral. Severe hypokinesis of the following segments: basal inferior, mid inferior and apical inferior. Indeterminate diastolic function. Aortic Valve: Mild regurgitation. Mitral Valve: Mild regurgitation. Tricuspid Valve: Moderate regurgitation. Severely elevated RVSP. The estimated RVSP is 63 mmHg. Left Atrium: Left atrium is mildly dilated. Right Atrium: Right atrium is mildly dilated. Technical qualifiers: Color flow Doppler was performed and pulse wave and/or continuous wave Doppler was performed. Contrast used: Definity.   Addendum by: Alondra Rangel MD on 10/26/2022 12:21 PM    Signed by: Aretha Solo MD on 10/26/2022 11:54 AM, Signed by: Unknown Provider Result on 10/26/2022 12:00 AM     Labs:   Recent Results (from the past 24 hour(s))   CBC with Auto Differential    Collection Time: 02/16/23  9:24 AM   Result Value Ref Range    WBC 6.4 4.3 - 11.1 K/uL    RBC 3.56 (L) 4.23 - 5.6 M/uL    Hemoglobin 12.8 (L) 13.6 - 17.2 g/dL    Hematocrit 38.4 (L) 41.1 - 50.3 %    .9 (H) 82 - 102 FL    MCH 36.0 (H) 26.1 - 32.9 PG    MCHC 33.3 31.4 - 35.0 g/dL    RDW 14.4 11.9 - 14.6 %    Platelets 845 453 - 434 K/uL    MPV 8.8 (L) 9.4 - 12.3 FL    nRBC 0.00 0.0 - 0.2 K/uL    Differential Type AUTOMATED      Seg Neutrophils 74 43 - 78 %    Lymphocytes 17 13 - 44 %    Monocytes 6 4.0 - 12.0 %    Eosinophils % 2 0.5 - 7.8 %    Basophils 1 0.0 - 2.0 %    Immature Granulocytes 0 0.0 - 5.0 %    Segs Absolute 4.7 1.7 - 8.2 K/UL    Absolute Lymph # 1.1 0.5 - 4.6 K/UL    Absolute Mono # 0.4 0.1 - 1.3 K/UL    Absolute Eos # 0.1 0.0 - 0.8 K/UL    Basophils Absolute 0.0 0.0 - 0.2 K/UL    Absolute Immature Granulocyte 0.0 0.0 - 0.5 K/UL   Comprehensive Metabolic Panel    Collection Time: 02/16/23  9:24 AM   Result Value Ref Range    Sodium 136 133 - 143 mmol/L    Potassium 4.0 3.5 - 5.1 mmol/L    Chloride 105 101 - 110 mmol/L    CO2 27 21 - 32 mmol/L    Anion Gap 4 2 - 11 mmol/L    Glucose 140 (H) 65 - 100 mg/dL    BUN 11 8 - 23 MG/DL    Creatinine 1.00 0.8 - 1.5 MG/DL    Est, Glom Filt Rate >60 >60 ml/min/1.73m2    Calcium 8.5 8.3 - 10.4 MG/DL    Total Bilirubin 1.5 (H) 0.2 - 1.1 MG/DL    ALT 13 12 - 65 U/L    AST 22 15 - 37 U/L    Alk Phosphatase 115 50 - 136 U/L    Total Protein 7.7 6.3 - 8.2 g/dL    Albumin 3.3 3.2 - 4.6 g/dL    Globulin 4.4 2.8 - 4.5 g/dL    Albumin/Globulin Ratio 0.8 0.4 - 1.6     Troponin    Collection Time: 02/16/23  9:24 AM   Result Value Ref Range    Troponin, High Sensitivity 14.2 (H) 0 - 14 pg/mL   Magnesium    Collection Time: 02/16/23  9:24 AM Result Value Ref Range    Magnesium 1.9 1.8 - 2.4 mg/dL   Brain Natriuretic Peptide    Collection Time: 23  9:24 AM   Result Value Ref Range    NT Pro-BNP 1,703 (H) <450 PG/ML   EKG 12 Lead    Collection Time: 23  9:31 AM   Result Value Ref Range    Ventricular Rate 66 BPM    Atrial Rate 66 BPM    P-R Interval 208 ms    QRS Duration 124 ms    Q-T Interval 438 ms    QTc Calculation (Bazett) 459 ms    P Axis 67 degrees    R Axis -10 degrees    T Axis 187 degrees    Diagnosis Normal sinus rhythm    EKG 12 Lead    Collection Time: 23  2:55 PM   Result Value Ref Range    Ventricular Rate 64 BPM    Atrial Rate 64 BPM    P-R Interval 198 ms    QRS Duration 130 ms    Q-T Interval 462 ms    QTc Calculation (Bazett) 476 ms    P Axis 72 degrees    R Axis -22 degrees    T Axis 114 degrees    Diagnosis Normal sinus rhythm with sinus arrhythmia        Patient has been seen and examined by Dr. Harrison Terrell and he agrees with the following assessment and plan:     Assessment/Plan:       Principal Problem:    HFrEF (heart failure with reduced ejection fraction) ([facility] [address]- will admit to tele; IV lasix, daily labs. Continue toprol and diovan home meds. Active Problems:    Pulmonary hypertension ([facility] [address]- last echo showed RVSP 63; likely a component of his SOB      Chest pain- lasted few minutes today; Continue ASA and statin. CAD (coronary artery disease)- see above. Dyslipidemia- home statin       Hypertension- controlled; home  meds      SOB (shortness of breath)- likely multifactorial; see above.         ANN-MARIE Reed - CNP  2023 3:28 PM               [address], [address]  PHONE: [phone]        23      NAME:  Hank Covarrubias  : 1945  MRN: [id number]      SUBJECTIVE:   Hank Covarrubias is a 76 y.o. male seen for a consultation visit regarding the following:     Chief Complaint   Patient presents with Shortness of Breath            HPI:    75-year-old gentleman presents with 2 to 3-week history of progressively worsening shortness of breath with exertion. He can now walk only 10 feet at a time before having to stop. He also has had worsening orthopnea and PND. He said no palpitations or syncope. He has had some occasional short-lived chest discomfort as well. Cath in October 2022 showed stable graft anatomy. He has had increased abdominal girth and lower extremity edema. Hospital Problems             Last Modified POA    * (Principal) HFrEF (heart failure with reduced ejection fraction) ([address] 2/16/2023 Yes    Pulmonary hypertension (HCC) (Chronic) 2/16/2023 Yes    Chest pain 2/16/2023 Yes    CAD (coronary artery disease) 2/16/2023 Yes    Heart failure ([address] 2/16/2023 Yes    Dyslipidemia 2/16/2023 Yes    Hypertension 2/16/2023 Yes    SOB (shortness of breath) 2/16/2023 Yes     Allergies   Allergen Reactions    Codeine      Other reaction(s): Other- (not listed) - Allergy  constipation    Iodides      Other reaction(s): Unknown (comments)     Past Medical History:   Diagnosis Date    Atrial fibrillation ([address]     Atrial flutter ([address] 4/7/2017    CAD (coronary artery disease) 2006    Cancer ([address] 2011    prostate    GERD (gastroesophageal reflux disease)     takes omeprazole    Heart failure (HCC)     Hypertension     Ill-defined condition     trigeminal neuralgia    Myocardial infarction ([address] 06/06/2019    NSTEMI     Sleep apnea     does not wear CPAP    Stroke ([address] 2018    TIA     Past Surgical History:   Procedure Laterality Date    CARDIAC CATHETERIZATION  08/14/2006    CARDIAC CATHETERIZATION  06/07/2019    Severe 3 vessel CAD with LV EF=40-45% and unsuccessful LCX PCI.     CARDIAC PROCEDURE N/A 10/26/2022    LEFT HEART CATH / CORONARY ANGIOGRAPHY W GRAFTS performed by Gregg Kanner, MD at [facility]    COLONOSCOPY N/A 3/2/2020    COLONOSCOPY performed by Tayler Little MD at [facility] ENDOSCOPY    CORONARY ANGIOPLASTY WITH STENT PLACEMENT      CORONARY ARTERY BYPASS GRAFT  06/10/2019    3 vessel CABG with LIMA-LAD,SVG-OM,and SVG-R posterior lateral branch with MAZE & LA appendage clipping. EP DEVICE PROCEDURE N/A 7/10/2022    LOOP RECORDER INSERT performed by Martell Duane, MD at [facility] CARDIAC CATH LAB    PROSTATE SURGERY       Family History   Problem Relation Age of Onset    Heart Disease Mother     Heart Disease Sister     Other Other         cerebral aneurysm      Social History     Tobacco Use    Smoking status: Former     Packs/day: 1.00     Years: 14.00     Pack years: 14.00     Types: Cigarettes     Start date: 1957     Quit date: 1971     Years since quittin.3    Smokeless tobacco: Never    Tobacco comments:     Quit smoking: quit at age 27   Substance Use Topics    Alcohol use: No           ROS:    Constitution: Negative for fever. Eyes: Negative for blurred vision. Respiratory: Negative for cough. Endocrine: Negative for cold intolerance and heat intolerance. Skin: Negative for rash. Musculoskeletal: Negative for myalgias. Gastrointestinal: Negative for diarrhea, nausea and vomiting. Genitourinary: Negative for dysuria. Neurological: Negative for headaches and numbness. PHYSICAL EXAM:     BP (!) 143/83   Pulse 63   Temp 97.5 °F (36.4 °C) (Oral)   Resp 16   SpO2 97%    Constitutional: Oriented to person, place, and time. Appears well-developed and well-nourished. Head: Normocephalic and atraumatic. Neck: Neck supple. Cardiovascular: Normal rate and regular rhythm with no murmur -No JVP  Pulmonary/Chest: Breath sounds normal.   Abdominal: Soft. Musculoskeletal: No edema. Neurological: Alert and oriented to person, place, and time. Skin: Skin is warm and dry. Psychiatric: Normal mood and affect. Vitals reviewed        Medical problems and test results were reviewed with the patient today.      Wt Readings from Last 3 Encounters:   01/13/23 189 lb (85.7 kg)   11/14/22 181 lb (82.1 kg)   11/11/22 180 lb (81.6 kg)          Recent Results (from the past 672 hour(s))   CBC with Auto Differential    Collection Time: 02/16/23  9:24 AM   Result Value Ref Range    WBC 6.4 4.3 - 11.1 K/uL    RBC 3.56 (L) 4.23 - 5.6 M/uL    Hemoglobin 12.8 (L) 13.6 - 17.2 g/dL    Hematocrit 38.4 (L) 41.1 - 50.3 %    .9 (H) 82 - 102 FL    MCH 36.0 (H) 26.1 - 32.9 PG    MCHC 33.3 31.4 - 35.0 g/dL    RDW 14.4 11.9 - 14.6 %    Platelets 782 847 - 923 K/uL    MPV 8.8 (L) 9.4 - 12.3 FL    nRBC 0.00 0.0 - 0.2 K/uL    Differential Type AUTOMATED      Seg Neutrophils 74 43 - 78 %    Lymphocytes 17 13 - 44 %    Monocytes 6 4.0 - 12.0 %    Eosinophils % 2 0.5 - 7.8 %    Basophils 1 0.0 - 2.0 %    Immature Granulocytes 0 0.0 - 5.0 %    Segs Absolute 4.7 1.7 - 8.2 K/UL    Absolute Lymph # 1.1 0.5 - 4.6 K/UL    Absolute Mono # 0.4 0.1 - 1.3 K/UL    Absolute Eos # 0.1 0.0 - 0.8 K/UL    Basophils Absolute 0.0 0.0 - 0.2 K/UL    Absolute Immature Granulocyte 0.0 0.0 - 0.5 K/UL   Comprehensive Metabolic Panel    Collection Time: 02/16/23  9:24 AM   Result Value Ref Range    Sodium 136 133 - 143 mmol/L    Potassium 4.0 3.5 - 5.1 mmol/L    Chloride 105 101 - 110 mmol/L    CO2 27 21 - 32 mmol/L    Anion Gap 4 2 - 11 mmol/L    Glucose 140 (H) 65 - 100 mg/dL    BUN 11 8 - 23 MG/DL    Creatinine 1.00 0.8 - 1.5 MG/DL    Est, Glom Filt Rate >60 >60 ml/min/1.73m2    Calcium 8.5 8.3 - 10.4 MG/DL    Total Bilirubin 1.5 (H) 0.2 - 1.1 MG/DL    ALT 13 12 - 65 U/L    AST 22 15 - 37 U/L    Alk Phosphatase 115 50 - 136 U/L    Total Protein 7.7 6.3 - 8.2 g/dL    Albumin 3.3 3.2 - 4.6 g/dL    Globulin 4.4 2.8 - 4.5 g/dL    Albumin/Globulin Ratio 0.8 0.4 - 1.6     Troponin    Collection Time: 02/16/23  9:24 AM   Result Value Ref Range    Troponin, High Sensitivity 14.2 (H) 0 - 14 pg/mL   Magnesium    Collection Time: 02/16/23  9:24 AM   Result Value Ref Range    Magnesium 1.9 1.8 - 2.4 mg/dL   Brain Natriuretic Peptide    Collection Time: 02/16/23  9:24 AM   Result Value Ref Range    NT Pro-BNP 1,703 (H) <450 PG/ML   EKG 12 Lead    Collection Time: 02/16/23  9:31 AM   Result Value Ref Range    Ventricular Rate 66 BPM    Atrial Rate 66 BPM    P-R Interval 208 ms    QRS Duration 124 ms    Q-T Interval 438 ms    QTc Calculation (Bazett) 459 ms    P Axis 67 degrees    R Axis -10 degrees    T Axis 187 degrees    Diagnosis Normal sinus rhythm    Troponin    Collection Time: 02/16/23  2:52 PM   Result Value Ref Range    Troponin, High Sensitivity 14.8 (H) 0 - 14 pg/mL   EKG 12 Lead    Collection Time: 02/16/23  2:55 PM   Result Value Ref Range    Ventricular Rate 64 BPM    Atrial Rate 64 BPM    P-R Interval 198 ms    QRS Duration 130 ms    Q-T Interval 462 ms    QTc Calculation (Bazett) 476 ms    P Axis 72 degrees    R Axis -22 degrees    T Axis 114 degrees    Diagnosis Normal sinus rhythm with sinus arrhythmia      Lab Results   Component Value Date/Time    CHOL 123 07/10/2022 07:47 AM    HDL 27 07/10/2022 07:47 AM     Ekg- nsr ivcd nsst changes    ASSESSMENT and PLAN    78-year-old gentleman with acute systolic congestive heart failure. We will admit to monitored bed treat with IV Lasix monitor electrolytes closely      Thank you for allowing me to participate in this patient's care. Please call or contact me if there are any questions or concerns regarding the above.       David Aguayo MD  02/16/23  4:30 PM

## 2023-02-16 NOTE — ED PROVIDER NOTES
Emergency Department Provider Note                   PCP:                Carrillo Dorado MD               Age: 76 y.o. Sex: male     No diagnosis found. DISPOSITION          Medical Decision Making  This is a 68-year-old male patient with history of heart disease, A-fib presenting to this department with progressive shortness of breath, exertional dyspnea, lightheadedness and exertional chest discomfort. Concern for fluid overload as patient has rales on lung auscultation, bilateral lower extremity edema. Orders placed for labs to include troponin, BNP, EKG and x-ray. Patient denies active chest pain at this time. Problems Addressed:  Acute on chronic congestive heart failure, unspecified heart failure type [facility]): acute illness or injury  Chest pain, unspecified type: acute illness or injury    Amount and/or Complexity of Data Reviewed  Independent Historian:      Details: relative at bedside  External Data Reviewed:      Details:   Left Ventricle: Moderately reduced left ventricular systolic function with a visually estimated EF of 40 - 45%. Left ventricle size is normal. Mildly increased wall thickness. Mild hypokinesis of the following segments: basal inferolateral, mid inferolateral and apical lateral. Severe hypokinesis of the following segments: basal inferior, mid inferior and apical inferior. Indeterminate diastolic function. Aortic Valve: Mild regurgitation. Mitral Valve: Mild regurgitation. Tricuspid Valve: Moderate regurgitation. Severely elevated RVSP. The estimated RVSP is 63 mmHg. Left Atrium: Left atrium is mildly dilated. Right Atrium: Right atrium is mildly dilated. Technical qualifiers: Color flow Doppler was performed and pulse wave and/or continuous wave Doppler was performed. Contrast used: Definity. Labs: ordered. Radiology: ordered. ECG/medicine tests: ordered. Risk  Prescription drug management. Decision regarding hospitalization. ED Course as of 02/16/23 1431   Thu Feb 16, 2023   0950 EKG interpretation: Regular, narrow complex rhythm, appears to be sinus, rate of 66, significant underlying artifact present. Leftward axis, no acute ischemic change. [BR]      ED Course User Index  [BR] Mazin Serrato DO Ralf        No orders of the defined types were placed in this encounter. Medications - No data to display    New Prescriptions    No medications on file        Cristiana  is a 76 y.o. male who presents to the Emergency Department with chief complaint of    Chief Complaint   Patient presents with    Shortness of Breath      70-year-old male patient with history of atrial fibrillation, heart disease presents to this department with reports of progressive shortness of breath and exertional dyspnea. Patient states symptoms have been present and progressive over the past several weeks despite his scheduled Lasix. He takes 20 mg 1 day and 40 mg the next alternating back-and-forth. He denies skip doses of this medication. He states his symptoms occur when he exerts himself even for short distances. He becomes very short of breath, lightheaded and feels his heart racing. Ports some exertional chest discomfort that resolves with rest as well. There is no associated fever, chills, cough or congestion. Patient denies full syncope or falls or trauma. He is established with Dr. Fadumo Petersen of [facility] cardiology. The history is provided by the patient. No  was used. Review of Systems   Constitutional:  Positive for fatigue. Negative for chills. HENT:  Negative for congestion. Eyes:  Negative for visual disturbance. Respiratory:  Positive for chest tightness and shortness of breath. Negative for cough and wheezing. Cardiovascular:  Negative for chest pain and leg swelling. Gastrointestinal:  Negative for abdominal distention, abdominal pain, constipation, nausea and vomiting. Genitourinary:  Negative for difficulty urinating, dysuria, flank pain and hematuria. Musculoskeletal:  Negative for back pain, neck pain and neck stiffness. Skin:  Negative for color change. Neurological:  Positive for weakness and light-headedness. Negative for dizziness, numbness and headaches. All other systems reviewed and are negative. Past Medical History:   Diagnosis Date    Atrial fibrillation [facility])     Atrial flutter ([facility] 4/7/2017    CAD (coronary artery disease) 2006    Cancer [facility]) 2011    prostate    GERD (gastroesophageal reflux disease)     takes omeprazole    Heart failure (HCC)     Hypertension     Ill-defined condition     trigeminal neuralgia    Myocardial infarction ([facility] 06/06/2019    NSTEMI     Sleep apnea     does not wear CPAP    Stroke ([facility] 2018    TIA        Past Surgical History:   Procedure Laterality Date    CARDIAC CATHETERIZATION  08/14/2006    CARDIAC CATHETERIZATION  06/07/2019    Severe 3 vessel CAD with LV EF=40-45% and unsuccessful LCX PCI. CARDIAC PROCEDURE N/A 10/26/2022    LEFT HEART CATH / CORONARY ANGIOGRAPHY W GRAFTS performed by [employer] MD at [address] LAB    COLONOSCOPY N/A 3/2/2020    COLONOSCOPY performed by Julio Vargas MD at [address] GRAFT  06/10/2019    3 vessel CABG with LIMA-LAD,SVG-OM,and SVG-R posterior lateral branch with MAZE & LA appendage clipping.     EP DEVICE PROCEDURE N/A 7/10/2022    LOOP RECORDER INSERT performed by Sumit Mello MD at [facility] CARDIAC CATH LAB    PROSTATE SURGERY          Family History   Problem Relation Age of Onset    Heart Disease Mother     Heart Disease Sister     Other Other         cerebral aneurysm         Social History     Socioeconomic History    Marital status:    Tobacco Use    Smoking status: Former     Packs/day: 1.00     Years: 14.00     Pack years: 14.00     Types: Cigarettes     Start date: 1957     Quit date: 1971     Years since quittin.3    Smokeless tobacco: Never    Tobacco comments:     Quit smoking: quit at age 27   Vaping Use    Vaping Use: Never used   Substance and Sexual Activity    Alcohol use: No         Codeine and Iodides     Previous Medications    ALBUTEROL SULFATE HFA (PROAIR HFA) 108 (90 BASE) MCG/ACT INHALER    Inhale 2 puffs into the lungs every 6 hours as needed for Wheezing    APIXABAN (ELIQUIS) 5 MG TABS TABLET    Take 5 mg by mouth 2 times daily    ASPIRIN 81 MG EC TABLET    Take 81 mg by mouth    CARBAMAZEPINE (TEGRETOL) 200 MG TABLET    Take 400 mg by mouth 2 times daily    DOCUSATE (COLACE, DULCOLAX) 100 MG CAPS    Take 100 mg by mouth 2 times daily as needed    FUROSEMIDE (LASIX) 40 MG TABLET    Take 40 mg by mouth every other day    GABAPENTIN (NEURONTIN) 400 MG CAPSULE    400 mg 3 times daily. Take 2 tablets po [id number]    HYDROCODONE-ACETAMINOPHEN (NORCO)  MG PER TABLET    Take 1 tablet by mouth every 8 hours as needed. JARDIANCE 10 MG TABLET    TAKE 1 TABLET BY MOUTH DAILY    MELATONIN 3 MG TABS TABLET    Take 5 mg by mouth    METOPROLOL SUCCINATE (TOPROL XL) 100 MG EXTENDED RELEASE TABLET    Take 100 mg by mouth daily    NITROGLYCERIN (NITROSTAT) 0.4 MG SL TABLET    TAKE AS DIRECTED. OMEPRAZOLE (PRILOSEC) 20 MG DELAYED RELEASE CAPSULE    Take 20 mg by mouth daily    POTASSIUM CHLORIDE (KLOR-CON M) 20 MEQ EXTENDED RELEASE TABLET    Take 20 mEq by mouth 2 times daily    ROSUVASTATIN (CRESTOR) 40 MG TABLET    TAKE 1 TABLET BY MOUTH AT BEDTIME. VALSARTAN (DIOVAN) 40 MG TABLET    Take 2 tablets by mouth daily        Vitals signs and nursing note reviewed. Patient Vitals for the past 4 hrs:   Temp Pulse Resp BP SpO2   23 0911 97.5 °F (36.4 °C) 68 17 136/73 96 %          Physical Exam  Vitals and nursing note reviewed. Constitutional:       General: He is not in acute distress. Appearance: Normal appearance. He is normal weight.  He is not ill-appearing or toxic-appearing. Comments: Generally well-appearing, alert and oriented x4. No acute distress, speaks in clear, fluid sentences. HENT:      Head: Normocephalic and atraumatic. Right Ear: External ear normal.      Left Ear: External ear normal.      Nose: Nose normal.      Mouth/Throat:      Mouth: Mucous membranes are moist.   Eyes:      General: No scleral icterus. Right eye: No discharge. Left eye: No discharge. Extraocular Movements: Extraocular movements intact. Cardiovascular:      Rate and Rhythm: Normal rate and regular rhythm. Pulses: Normal pulses. Heart sounds: Normal heart sounds. Pulmonary:      Effort: Pulmonary effort is normal. No tachypnea, bradypnea, accessory muscle usage, prolonged expiration or respiratory distress. Breath sounds: Normal air entry. No stridor. Examination of the right-lower field reveals rales. Examination of the left-lower field reveals rales. Decreased breath sounds and rales present. No wheezing or rhonchi. Abdominal:      General: Abdomen is flat. There is no distension. Palpations: There is no mass. Tenderness: There is no abdominal tenderness. There is no right CVA tenderness, left CVA tenderness, guarding or rebound. Negative signs include Salas's sign and McBurney's sign. Hernia: No hernia is present. Musculoskeletal:         General: No swelling, tenderness or deformity. Normal range of motion. Cervical back: Normal range of motion. Comments: Bilateral lower extremity edema, 2+ pitting in nature. Skin:     General: Skin is warm. Capillary Refill: Capillary refill takes less than 2 seconds. Neurological:      General: No focal deficit present. Mental Status: He is alert. Psychiatric:         Mood and Affect: Mood normal.        Procedures    No results found for any visits on 02/16/23.      No orders to display                       Voice dictation software was used during the making of this note. This software is not perfect and grammatical and other typographical errors may be present. This note has not been completely proofread for errors.         Robert Zhou, DO  02/16/23 [place],   02/16/23 1434

## 2023-02-16 NOTE — ED TRIAGE NOTES
Pt arrives to triage in a wheelchair. Pt reports when he walks short distances he feels very out of breath and feels like he could faint when he stands up. Pt states this problem has been ongoing for \"awhile. \" Pt has PMH of HTN, CHF, CAD, CABG, Afib. Pt is on Eliquis. Pt has bilat leg swelling, \"I take my fluid pill but it doesn't do much good. \" Pt denies missing any doses of his diuretic. Pt denies fevers, no chest pain, no NVD Pt reports he feels like his abdomen feels swollen.

## 2023-02-16 NOTE — PROGRESS NOTES
TRANSFER - IN REPORT:    Verbal report received from [place], RN on Reynaldo Anaya being received from ED for routine progression of patient care      Report consisted of patients Situation, Background, Assessment and Recommendations(SBAR). Information from the following report(s) SBAR, ED Summary, Intake/Output, and Cardiac Rhythm NSR  was reviewed with the receiving nurse. Opportunity for questions and clarification was provided. Assessment completed upon patients arrival to unit and care assumed.

## 2023-02-17 LAB
ANION GAP SERPL CALC-SCNC: 5 MMOL/L (ref 2–11)
BUN SERPL-MCNC: 14 MG/DL (ref 8–23)
CALCIUM SERPL-MCNC: 8.3 MG/DL (ref 8.3–10.4)
CHLORIDE SERPL-SCNC: 104 MMOL/L (ref 101–110)
CO2 SERPL-SCNC: 29 MMOL/L (ref 21–32)
CREAT SERPL-MCNC: 0.9 MG/DL (ref 0.8–1.5)
EKG ATRIAL RATE: 64 BPM
EKG DIAGNOSIS: NORMAL
EKG P AXIS: 72 DEGREES
EKG P-R INTERVAL: 198 MS
EKG Q-T INTERVAL: 462 MS
EKG QRS DURATION: 130 MS
EKG QTC CALCULATION (BAZETT): 476 MS
EKG R AXIS: -22 DEGREES
EKG T AXIS: 114 DEGREES
EKG VENTRICULAR RATE: 64 BPM
GLUCOSE SERPL-MCNC: 79 MG/DL (ref 65–100)
POTASSIUM SERPL-SCNC: 4.2 MMOL/L (ref 3.5–5.1)
SODIUM SERPL-SCNC: 138 MMOL/L (ref 133–143)

## 2023-02-17 PROCEDURE — 80048 BASIC METABOLIC PNL TOTAL CA: CPT

## 2023-02-17 PROCEDURE — 97112 NEUROMUSCULAR REEDUCATION: CPT

## 2023-02-17 PROCEDURE — 97530 THERAPEUTIC ACTIVITIES: CPT

## 2023-02-17 PROCEDURE — 2580000003 HC RX 258: Performed by: NURSE PRACTITIONER

## 2023-02-17 PROCEDURE — 1100000003 HC PRIVATE W/ TELEMETRY

## 2023-02-17 PROCEDURE — 36415 COLL VENOUS BLD VENIPUNCTURE: CPT

## 2023-02-17 PROCEDURE — 6370000000 HC RX 637 (ALT 250 FOR IP): Performed by: NURSE PRACTITIONER

## 2023-02-17 PROCEDURE — 97161 PT EVAL LOW COMPLEX 20 MIN: CPT

## 2023-02-17 PROCEDURE — 97165 OT EVAL LOW COMPLEX 30 MIN: CPT

## 2023-02-17 PROCEDURE — 6360000002 HC RX W HCPCS: Performed by: NURSE PRACTITIONER

## 2023-02-17 RX ADMIN — GABAPENTIN 400 MG: 400 CAPSULE ORAL at 14:23

## 2023-02-17 RX ADMIN — EMPAGLIFLOZIN 10 MG: 10 TABLET, FILM COATED ORAL at 08:55

## 2023-02-17 RX ADMIN — SODIUM CHLORIDE, PRESERVATIVE FREE 10 ML: 5 INJECTION INTRAVENOUS at 21:07

## 2023-02-17 RX ADMIN — METOPROLOL SUCCINATE 100 MG: 100 TABLET, EXTENDED RELEASE ORAL at 08:56

## 2023-02-17 RX ADMIN — POTASSIUM CHLORIDE 20 MEQ: 1500 TABLET, EXTENDED RELEASE ORAL at 08:56

## 2023-02-17 RX ADMIN — FUROSEMIDE 40 MG: 10 INJECTION, SOLUTION INTRAMUSCULAR; INTRAVENOUS at 17:08

## 2023-02-17 RX ADMIN — CARBAMAZEPINE 400 MG: 200 TABLET ORAL at 21:06

## 2023-02-17 RX ADMIN — HYDROCODONE BITARTRATE AND ACETAMINOPHEN 1 TABLET: 10; 325 TABLET ORAL at 23:03

## 2023-02-17 RX ADMIN — CARBAMAZEPINE 400 MG: 200 TABLET ORAL at 08:55

## 2023-02-17 RX ADMIN — PANTOPRAZOLE SODIUM 40 MG: 40 TABLET, DELAYED RELEASE ORAL at 05:21

## 2023-02-17 RX ADMIN — SODIUM CHLORIDE, PRESERVATIVE FREE 10 ML: 5 INJECTION INTRAVENOUS at 08:54

## 2023-02-17 RX ADMIN — APIXABAN 5 MG: 5 TABLET, FILM COATED ORAL at 21:07

## 2023-02-17 RX ADMIN — ROSUVASTATIN CALCIUM 40 MG: 20 TABLET, COATED ORAL at 21:06

## 2023-02-17 RX ADMIN — GABAPENTIN 400 MG: 400 CAPSULE ORAL at 21:07

## 2023-02-17 RX ADMIN — HYDROCODONE BITARTRATE AND ACETAMINOPHEN 1 TABLET: 10; 325 TABLET ORAL at 14:23

## 2023-02-17 RX ADMIN — APIXABAN 5 MG: 5 TABLET, FILM COATED ORAL at 08:55

## 2023-02-17 RX ADMIN — ASPIRIN 81 MG: 81 TABLET ORAL at 08:55

## 2023-02-17 RX ADMIN — GABAPENTIN 400 MG: 400 CAPSULE ORAL at 08:55

## 2023-02-17 RX ADMIN — FUROSEMIDE 40 MG: 10 INJECTION, SOLUTION INTRAMUSCULAR; INTRAVENOUS at 08:56

## 2023-02-17 RX ADMIN — POTASSIUM CHLORIDE 20 MEQ: 1500 TABLET, EXTENDED RELEASE ORAL at 21:07

## 2023-02-17 ASSESSMENT — PAIN SCALES - GENERAL
PAINLEVEL_OUTOF10: 4
PAINLEVEL_OUTOF10: 0
PAINLEVEL_OUTOF10: 0
PAINLEVEL_OUTOF10: 6
PAINLEVEL_OUTOF10: 0

## 2023-02-17 ASSESSMENT — PAIN DESCRIPTION - FREQUENCY
FREQUENCY: INTERMITTENT
FREQUENCY: INTERMITTENT

## 2023-02-17 ASSESSMENT — PAIN DESCRIPTION - ORIENTATION
ORIENTATION: MID;LOWER
ORIENTATION: MID;POSTERIOR

## 2023-02-17 ASSESSMENT — PAIN DESCRIPTION - LOCATION
LOCATION: BACK
LOCATION: BACK

## 2023-02-17 ASSESSMENT — PAIN DESCRIPTION - DESCRIPTORS
DESCRIPTORS: ACHING
DESCRIPTORS: ACHING;DISCOMFORT

## 2023-02-17 ASSESSMENT — PAIN DESCRIPTION - ONSET
ONSET: GRADUAL
ONSET: GRADUAL

## 2023-02-17 ASSESSMENT — PAIN DESCRIPTION - PAIN TYPE
TYPE: CHRONIC PAIN
TYPE: CHRONIC PAIN

## 2023-02-17 NOTE — PLAN OF CARE
Problem: Pain  Goal: Verbalizes/displays adequate comfort level or baseline comfort level  2/16/2023 2154 by Bubba Barrera RN  Outcome: Progressing

## 2023-02-17 NOTE — CARE COORDINATION
Patient admitted with HFrEF. Patient currently on room air receiving IV diuretics. CM met with patient for assessment of discharge needs. Data documented. PT recommending OP therapy. Patient informed and wishes to think about it before approving referral. Patient is actively working in various jobs. CM will follow for consideration of OP therapy and any new discharge needs identified. 02/17/23 7915   Service Assessment   Patient Orientation Alert and Oriented   Cognition Alert   History Provided By Patient   Primary [place] is:   (ACP not scanned in chart.)   PCP Verified by CM Yes  (Dr Eliecer Vaughn)   Last Visit to PCP Within last 6 months   Prior Functional Level Independent in ADLs/IADLs   Current Functional Level Independent in ADLs/IADLs   Can patient return to prior living arrangement Yes   Ability to make needs known: Good   Family able to assist with home care needs: Yes   Would you like for me to discuss the discharge plan with any other family members/significant others, and if so, who? No   Financial Resources Medicare   Social/Functional History   Lives With Alone   Type of Home House   Home Access Stairs to enter with rails   Entrance Stairs - Number of Steps 2 front entrance. [address] from basement. 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  No   Potential Assistance Purchasing Medications No   Type of Home Care Services None   Patient expects to be discharged to: [facility] Dis[facility]   Transition of Care Consult (CM Consult) N/A   Services At/After Discharge None   [employer] Information Provided?  No   Mode of Transport at Discharge   (Car)   Confirm Follow Up Transport Self

## 2023-02-17 NOTE — ACP (ADVANCE CARE PLANNING)
Advance Care Planning     General Advance Care Planning (ACP) Conversation    Date of Conversation: 2/16/2023  Conducted with: Patient with Decision Making Capacity    Healthcare Decision Maker:  No healthcare decision makers have been documented. Click here to complete [address] including selection of the Healthcare Decision Maker Relationship (ie \"Primary\")  Today we documented Decision Maker(s). The patient will provide ACP documents. Content/Action Overview:   Has ACP document(s) NOT on file - requested patient to provide  Reviewed DNR/DNI and patient elects Full Code (Attempt Resuscitation)        Length of Voluntary ACP Conversation in minutes:  <16 minutes (Non-Billable)    Hailey Patel RN

## 2023-02-17 NOTE — THERAPY EVALUATION
ACUTE PHYSICAL THERAPY GOALS:   (Developed with and agreed upon by patient and/or caregiver.)  (1.) Landon Nelson  will move from supine to sit and sit to supine , scoot up and down, and roll side to side with INDEPENDENCE within 7 treatment day(s). (2.) Landon Nelson will transfer from bed to chair and chair to bed with INDEPENDENCE using the least restrictive device within 7 treatment day(s). (3.) Landon Nelson will ambulate with INDEPENDENCE for 500+ feet with the least restrictive device within 7 treatment day(s). (4.) Shaquille Jarrett will perform standing static and dynamic balance activities x 25 minutes with INDEPENDENCE to improve safety and activity tolerance within 7 treatment day(s). (5.) Shaquille Jarrett will ascend and descend 13 stairs using one hand rail(s) with MODIFIED INDEPENDENCE to improve functional mobility and safety within 7 treatment day(s). (6.) Landon Nelson will perform therapeutic exercises x 20 min for HEP with INDEPENDENCE to improve strength, endurance, and functional mobility within 7 treatment day(s).      PHYSICAL THERAPY Initial Assessment, Daily Note, and AM  (Link to Caseload Tracking: PT Visit Days : 1  Acknowledge Orders  Time In/Out  PT Charge Capture  Rehab Caseload Tracker    Landon Nelson is a 76 y.o. male   PRIMARY DIAGNOSIS: HFrEF (heart failure with reduced ejection fraction) (HCC)  Heart failure ([facility] [I50.9]  Chest pain, unspecified type [R07.9]  Acute on chronic congestive heart failure, unspecified heart failure type ([facility] [I50.9]       Reason for Referral: Generalized Muscle Weakness (M62.81)  Difficulty in walking, Not elsewhere classified (R26.2)  Other abnormalities of gait and mobility (R26.89)  Inpatient: Payor: MEDICARE / Plan: MEDICARE PART A AND B / Product Type: *No Product type* /     ASSESSMENT:     REHAB RECOMMENDATIONS:   Recommendation to date pending progress:  Setting:  Outpatient Therapy    Equipment:    To Be Determined     ASSESSMENT:  Mr. Ovidio Courtney is a 76year old M who presents to hospital with SOB, exertion dyspnea, lightheadedness, exertional chest pain. Admitted with heart failure. This date pt performs mobility including bed mobility, sitting balance activities, sit <> stand transfers, standing balance activities, and ambulation in room and hallway with CGA. Pt did experience several losses of balance requiring assist/guarding from therapist to correct. Pt did experience some shortness of breath with increased exertion, but reports he is feeling better than he was. SpO2 96% on room air. Pt presents as functioning below his baseline, with deficits in mobility including transfers, gait, balance, and activity tolerance. Pt will benefit from skilled therapy services to address stated deficits to promote return to highest level of function, independence, and safety. Will continue to follow.      [employer] [facility] 6 Clicks Basic Mobility Inpatient Short Form  AM-PAC Basic Mobility - Inpatient   How much help is needed turning from your back to your side while in a flat bed without using bedrails?: A Little  How much help is needed moving from lying on your back to sitting on the side of a flat bed without using bedrails?: A Little  How much help is needed moving to and from a bed to a chair?: A Little  How much help is needed standing up from a chair using your arms?: A Little  How much help is needed walking in hospital room?: A Little  How much help is needed climbing 3-5 steps with a railing?: A Little  [facility] Inpatient Mobility Raw Score : 18  AM-PAC Inpatient T-Scale Score : 43.63  Mobility Inpatient CMS 0-100% Score: 46.58  Mobility Inpatient CMS G-Code Modifier : CK    SUBJECTIVE:   Mr. Ovidio Courtney states, \"That is the most I've walked in 2 months\"     Social/Functional Lives With: Alone  Type of Home: House  Home Layout: Two level, Able to Live on Main level with bedroom/bathroom  Home Access: Stairs to enter with rails  Entrance Stairs - Number of Steps: 13  Home Equipment: None  Has the patient had two or more falls in the past year or any fall with injury in the past year?: No  Occupation: Part time employment    OBJECTIVE:     PAIN: VITALS / O2: PRECAUTION / Rosiland Lázaro / Dave Hammond:   Pre Treatment: 0/10         Post Treatment: 0/10 Vitals        Oxygen      Telemetry     RESTRICTIONS/PRECAUTIONS:  Restrictions/Precautions: Fall Risk                 GROSS EVALUATION: Intact Impaired (Comments):   AROM [x]     PROM [x]    Strength []   Generalized weakness, but functional   Balance [] Sitting - Static: Good  Sitting - Dynamic: Good  Standing - Static: Good, -  Standing - Dynamic: Fair, +   Posture [] N/A   Sensation [x]     Coordination [x]      Tone []     Edema []    Activity Tolerance []  SOB with exertion, below baseline    []      COGNITION/  PERCEPTION: Intact Impaired (Comments):   Orientation [x]     Vision [x]     Hearing [x]     Cognition  [x]       MOBILITY: I Mod I S SBA CGA Min Mod Max Total  NT x2 Comments:   Bed Mobility    Rolling [] [] [] [x] [] [] [] [] [] [] []    Supine to Sit [] [] [] [x] [] [] [] [] [] [] []    Scooting [] [] [] [x] [] [] [] [] [] [] []    Sit to Supine [] [] [] [x] [] [] [] [] [] [] []    Transfers    Sit to Stand [] [] [] [] [x] [] [] [] [] [] []    Bed to Chair [] [] [] [] [x] [] [] [] [] [] []    Stand to Sit [] [] [] [] [x] [] [] [] [] [] []     [] [] [] [] [] [] [] [] [] [] []    I=Independent, Mod I=Modified Independent, S=Supervision, SBA=Standby Assistance, CGA=Contact Guard Assistance,   Min=Minimal Assistance, Mod=Moderate Assistance, Max=Maximal Assistance, Total=Total Assistance, NT=Not Tested    GAIT: I Mod I S SBA CGA Min Mod Max Total  NT x2 Comments:   Level of Assistance [] [] [] [] [x] [] [] [] [] [] []  With Min A to assist with correcting losses of balance   Distance 240 feet    DME None    Gait Quality Path deviations Weightbearing Status Restrictions/Precautions  Restrictions/Precautions: Fall Risk    Stairs      I=Independent, Mod I=Modified Independent, S=Supervision, SBA=Standby Assistance, CGA=Contact Guard Assistance,   Min=Minimal Assistance, Mod=Moderate Assistance, Max=Maximal Assistance, Total=Total Assistance, NT=Not Tested    PLAN:   FREQUENCY AND DURATION: 3 times/week for duration of hospital stay or until stated goals are met, whichever comes first.    THERAPY PROGNOSIS: Good    PROBLEM LIST:   (Skilled intervention is medically necessary to address:)  Decreased Activity Tolerance  Decreased Balance  Decreased Coordination  Decreased Gait Ability  Decreased Safety Awareness  Decreased Strength  Decreased Transfer Abilities INTERVENTIONS PLANNED:   (Benefits and precautions of physical therapy have been discussed with the patient.)  Therapeutic Activity  Therapeutic Exercise/HEP  Neuromuscular Re-education  Gait Training  Education       TREATMENT:   EVALUATION: LOW COMPLEXITY: (Untimed Charge)    TREATMENT:   Therapeutic Activity (8 Minutes): Therapeutic activity included Supine to Sit, Transfer Training, Ambulation on level ground, Sitting balance , and Standing balance to improve functional Activity tolerance, Mobility, and Strength.     TREATMENT GRID:  N/A    AFTER TREATMENT PRECAUTIONS: Bed/Chair Locked, Call light within reach, Chair, Needs within reach, and RN notified    INTERDISCIPLINARY COLLABORATION:  RN/ PCT, PT/ PTA, and OT/ MELENDREZ    EDUCATION: Education Given To: Patient  Education Provided: Role of Therapy;Plan of Care  Education Outcome: Continued education needed    TIME IN/OUT:  Time In: [address]  Time Out: [phone]  Minutes: 1500 [address] Yee PT

## 2023-02-17 NOTE — NURSE NAVIGATOR
CHF teaching completed to Pt's family @ BS. CHF background completed. Teaching emphasis on Call Cardiology STAT ,if any of the following are noted:  Short of Breath; with activity or without/ while reclined, Generalize weakness, edema are noted individually or grouped. Cardiac Diet  and salt/fluid restrictions covered. Daily WTs emphasized. Planner with summarization sheet provided with cardiology service and phone number and bathroom scale offered. Total time @ BS is 1 hour and pt verbalize understanding/past post test.  Pt instructed to call myself, if any questions occur. Patient instructed to have family call for any questions.

## 2023-02-17 NOTE — PROGRESS NOTES
Bedside and Verbal shift change report received from [address]. Report included the following information Nurse Handoff Report, Intake/Output, MAR, and Recent Results.

## 2023-02-17 NOTE — PROGRESS NOTES
Bedside and Verbal shift change report to be given to Jordis Soulier, RN (oncoming nurse) by self Ty heck). Report included the following information Nurse Handoff Report, Intake/Output, MAR, and Recent Results.

## 2023-02-18 LAB
ANION GAP SERPL CALC-SCNC: 6 MMOL/L (ref 2–11)
BUN SERPL-MCNC: 19 MG/DL (ref 8–23)
CALCIUM SERPL-MCNC: 8.5 MG/DL (ref 8.3–10.4)
CHLORIDE SERPL-SCNC: 104 MMOL/L (ref 101–110)
CO2 SERPL-SCNC: 28 MMOL/L (ref 21–32)
CREAT SERPL-MCNC: 1 MG/DL (ref 0.8–1.5)
GLUCOSE SERPL-MCNC: 82 MG/DL (ref 65–100)
POTASSIUM SERPL-SCNC: 3.9 MMOL/L (ref 3.5–5.1)
SODIUM SERPL-SCNC: 138 MMOL/L (ref 133–143)

## 2023-02-18 PROCEDURE — 1100000003 HC PRIVATE W/ TELEMETRY

## 2023-02-18 PROCEDURE — 6360000002 HC RX W HCPCS: Performed by: NURSE PRACTITIONER

## 2023-02-18 PROCEDURE — 6370000000 HC RX 637 (ALT 250 FOR IP): Performed by: NURSE PRACTITIONER

## 2023-02-18 PROCEDURE — 80048 BASIC METABOLIC PNL TOTAL CA: CPT

## 2023-02-18 PROCEDURE — 36415 COLL VENOUS BLD VENIPUNCTURE: CPT

## 2023-02-18 PROCEDURE — 99232 SBSQ HOSP IP/OBS MODERATE 35: CPT | Performed by: INTERNAL MEDICINE

## 2023-02-18 PROCEDURE — 2580000003 HC RX 258: Performed by: NURSE PRACTITIONER

## 2023-02-18 RX ORDER — TRAZODONE HYDROCHLORIDE 50 MG/1
50 TABLET ORAL NIGHTLY PRN
Status: DISCONTINUED | OUTPATIENT
Start: 2023-02-18 | End: 2023-02-20 | Stop reason: HOSPADM

## 2023-02-18 RX ORDER — VALSARTAN 40 MG/1
40 TABLET ORAL DAILY
Status: DISCONTINUED | OUTPATIENT
Start: 2023-02-18 | End: 2023-02-20 | Stop reason: HOSPADM

## 2023-02-18 RX ADMIN — POTASSIUM CHLORIDE 20 MEQ: 1500 TABLET, EXTENDED RELEASE ORAL at 08:48

## 2023-02-18 RX ADMIN — GABAPENTIN 400 MG: 400 CAPSULE ORAL at 08:48

## 2023-02-18 RX ADMIN — GABAPENTIN 400 MG: 400 CAPSULE ORAL at 14:04

## 2023-02-18 RX ADMIN — GABAPENTIN 400 MG: 400 CAPSULE ORAL at 21:13

## 2023-02-18 RX ADMIN — SODIUM CHLORIDE, PRESERVATIVE FREE 10 ML: 5 INJECTION INTRAVENOUS at 08:48

## 2023-02-18 RX ADMIN — CARBAMAZEPINE 400 MG: 200 TABLET ORAL at 21:18

## 2023-02-18 RX ADMIN — FUROSEMIDE 40 MG: 10 INJECTION, SOLUTION INTRAMUSCULAR; INTRAVENOUS at 08:47

## 2023-02-18 RX ADMIN — ASPIRIN 81 MG: 81 TABLET ORAL at 08:48

## 2023-02-18 RX ADMIN — EMPAGLIFLOZIN 10 MG: 10 TABLET, FILM COATED ORAL at 08:48

## 2023-02-18 RX ADMIN — HYDROCODONE BITARTRATE AND ACETAMINOPHEN 1 TABLET: 10; 325 TABLET ORAL at 17:54

## 2023-02-18 RX ADMIN — METOPROLOL SUCCINATE 100 MG: 100 TABLET, EXTENDED RELEASE ORAL at 08:48

## 2023-02-18 RX ADMIN — APIXABAN 5 MG: 5 TABLET, FILM COATED ORAL at 08:48

## 2023-02-18 RX ADMIN — APIXABAN 5 MG: 5 TABLET, FILM COATED ORAL at 21:13

## 2023-02-18 RX ADMIN — TRAZODONE HYDROCHLORIDE 50 MG: 50 TABLET ORAL at 21:30

## 2023-02-18 RX ADMIN — CARBAMAZEPINE 400 MG: 200 TABLET ORAL at 08:48

## 2023-02-18 RX ADMIN — POTASSIUM CHLORIDE 20 MEQ: 1500 TABLET, EXTENDED RELEASE ORAL at 21:13

## 2023-02-18 RX ADMIN — FUROSEMIDE 40 MG: 10 INJECTION, SOLUTION INTRAMUSCULAR; INTRAVENOUS at 17:54

## 2023-02-18 RX ADMIN — SODIUM CHLORIDE, PRESERVATIVE FREE 10 ML: 5 INJECTION INTRAVENOUS at 21:20

## 2023-02-18 RX ADMIN — PANTOPRAZOLE SODIUM 40 MG: 40 TABLET, DELAYED RELEASE ORAL at 05:39

## 2023-02-18 RX ADMIN — DOCUSATE SODIUM 100 MG: 100 CAPSULE, LIQUID FILLED ORAL at 21:30

## 2023-02-18 ASSESSMENT — PAIN DESCRIPTION - DESCRIPTORS: DESCRIPTORS: ACHING

## 2023-02-18 ASSESSMENT — PAIN SCALES - GENERAL
PAINLEVEL_OUTOF10: 0
PAINLEVEL_OUTOF10: 0
PAINLEVEL_OUTOF10: 4
PAINLEVEL_OUTOF10: 0

## 2023-02-18 ASSESSMENT — PAIN DESCRIPTION - ONSET: ONSET: GRADUAL

## 2023-02-18 ASSESSMENT — PAIN DESCRIPTION - FREQUENCY: FREQUENCY: INTERMITTENT

## 2023-02-18 ASSESSMENT — PAIN - FUNCTIONAL ASSESSMENT: PAIN_FUNCTIONAL_ASSESSMENT: ACTIVITIES ARE NOT PREVENTED

## 2023-02-18 ASSESSMENT — PAIN DESCRIPTION - LOCATION: LOCATION: BACK

## 2023-02-18 ASSESSMENT — PAIN DESCRIPTION - ORIENTATION: ORIENTATION: MID;LOWER

## 2023-02-18 ASSESSMENT — PAIN DESCRIPTION - PAIN TYPE: TYPE: CHRONIC PAIN

## 2023-02-18 NOTE — PROGRESS NOTES
[facility] CARDIOLOGY PROGRESS NOTE           2/18/2023 10:45 AM    Admit Date: 2/16/2023      Subjective:   He reports feeling better with less SOB and peripheral edema and SOB. ROS:  GEN:  No fever or chills  Cardiovascular:  As noted above:no CP or palpitations. Pulmonary:  As noted above:SOB improved. Neuro:  No new focal motor or sensory loss    Objective:      Vitals:    02/18/23 0412 02/18/23 0501 02/18/23 0825 02/18/23 0848   BP:  132/77 136/70    Pulse:  58 56 64   Resp:  22 20    Temp:  97.5 °F (36.4 °C) 97.7 °F (36.5 °C)    TempSrc:  Oral Oral    SpO2:  93% 97%    Weight: 180 lb 6.4 oz (81.8 kg)      Height:           Physical Exam:  General-NAD  Neck- supple, no JVD  CV- regular rate and rhythm no MRG  Lung- coarse bs  bilaterally  Abd- soft, nontender, nondistended  Ext- 1+ edema bilaterally. Skin- warm and dry  Psychiatric:  Normal mood and affect. Neurologic:  Alert and oriented X 3      Data Review:   Recent Labs     02/16/23  0924 02/17/23  0538 02/18/23  0443    138 138   K 4.0 4.2 3.9   MG 1.9  --   --    BUN 11 14 19   WBC 6.4  --   --    HGB 12.8*  --   --    HCT 38.4*  --   --      --   --        TELEMETRY:  NSR    Assessment/Plan:     Principal Problem:    HFrEF (heart failure with reduced ejection fraction) (HCC):Congestive symptoms and peripheral edema are improving on iv Lasix. Renal function remains normal.Continue iv Lasix today. Check CXR in am.Continue Toprol. Restart Valsartan ( home medication) given LV EF was reported to 40-45%. Active Problems:    Pulmonary hypertension ([address]    Chest pain:resolved. CAD (coronary artery disease):Continue conservative medical therapy. Cath in 10/2022 reported stable coronary anatomy. Dyslipidemia:Continue Crestor. Hypertension:Stable  and at goal.Continue Toprol and Lasix. Try to restart Valsartan with reduced EF.    SOB (shortness of breath):improved with iv lasix. Continue iv Lasix.              BEN HEALY MD  2/18/2023 10:45 AM

## 2023-02-18 NOTE — PLAN OF CARE
Problem: Discharge Planning  Goal: Discharge to home or other facility with appropriate resources  Outcome: Progressing  Flowsheets (Taken 2/17/2023 2055)  Discharge to home or other facility with appropriate resources:   Identify barriers to discharge with patient and caregiver   Arrange for needed discharge resources and transportation as appropriate   Identify discharge learning needs (meds, wound care, etc)   Refer to discharge planning if patient needs post-hospital services based on physician order or complex needs related to functional status, cognitive ability or social support system     Problem: Pain  Goal: Verbalizes/displays adequate comfort level or baseline comfort level  Outcome: Progressing  Flowsheets (Taken 2/18/2023 0141)  Verbalizes/displays adequate comfort level or baseline comfort level:   Encourage patient to monitor pain and request assistance   Administer analgesics based on type and severity of pain and evaluate response   Consider cultural and social influences on pain and pain management   Assess pain using appropriate pain scale   Implement non-pharmacological measures as appropriate and evaluate response   Notify Licensed Independent Practitioner if interventions unsuccessful or patient reports new pain     Problem: Safety - Adult  Goal: Free from fall injury  Outcome: Progressing  Flowsheets (Taken 2/18/2023 0141)  Free From Fall Injury: Instruct family/caregiver on patient safety     Problem: ABCDS Injury Assessment  Goal: Absence of physical injury  Outcome: Progressing  Flowsheets (Taken 2/18/2023 0141)  Absence of Physical Injury: Implement safety measures based on patient assessment

## 2023-02-18 NOTE — PLAN OF CARE
Problem: Pain  Goal: Verbalizes/displays adequate comfort level or baseline comfort level  2/18/2023 0734 by Jaycob Cross RN  Outcome: Progressing  2/18/2023 0141 by Ara Rodriguez RN  Outcome: Progressing  Flowsheets (Taken 2/18/2023 0141)  Verbalizes/displays adequate comfort level or baseline comfort level:   Encourage patient to monitor pain and request assistance   Administer analgesics based on type and severity of pain and evaluate response   Consider cultural and social influences on pain and pain management   Assess pain using appropriate pain scale   Implement non-pharmacological measures as appropriate and evaluate response   Notify Licensed Independent Practitioner if interventions unsuccessful or patient reports new pain

## 2023-02-18 NOTE — PROGRESS NOTES
Bedside and Verbal shift change report to be given to Hernan Stovall RN (oncoming nurse) by self (offgoing nurse). Report included the following information Nurse Handoff Report, Intake/Output, MAR, and Recent Results.

## 2023-02-18 NOTE — PROGRESS NOTES
Bedside and Verbal shift change report received from [facility]. Report included the following information Nurse Handoff Report, Intake/Output, MAR, and Recent Results.

## 2023-02-19 ENCOUNTER — APPOINTMENT (OUTPATIENT)
Dept: GENERAL RADIOLOGY | Age: 78
DRG: 291 | End: 2023-02-19
Payer: MEDICARE

## 2023-02-19 LAB
ANION GAP SERPL CALC-SCNC: 5 MMOL/L (ref 2–11)
BUN SERPL-MCNC: 22 MG/DL (ref 8–23)
CALCIUM SERPL-MCNC: 8 MG/DL (ref 8.3–10.4)
CHLORIDE SERPL-SCNC: 102 MMOL/L (ref 101–110)
CO2 SERPL-SCNC: 29 MMOL/L (ref 21–32)
CREAT SERPL-MCNC: 1 MG/DL (ref 0.8–1.5)
GLUCOSE SERPL-MCNC: 87 MG/DL (ref 65–100)
POTASSIUM SERPL-SCNC: 4.1 MMOL/L (ref 3.5–5.1)
SODIUM SERPL-SCNC: 136 MMOL/L (ref 133–143)

## 2023-02-19 PROCEDURE — 1100000003 HC PRIVATE W/ TELEMETRY

## 2023-02-19 PROCEDURE — 99232 SBSQ HOSP IP/OBS MODERATE 35: CPT | Performed by: INTERNAL MEDICINE

## 2023-02-19 PROCEDURE — 80048 BASIC METABOLIC PNL TOTAL CA: CPT

## 2023-02-19 PROCEDURE — 71046 X-RAY EXAM CHEST 2 VIEWS: CPT

## 2023-02-19 PROCEDURE — 2580000003 HC RX 258: Performed by: NURSE PRACTITIONER

## 2023-02-19 PROCEDURE — 6360000002 HC RX W HCPCS: Performed by: NURSE PRACTITIONER

## 2023-02-19 PROCEDURE — 36415 COLL VENOUS BLD VENIPUNCTURE: CPT

## 2023-02-19 PROCEDURE — 6370000000 HC RX 637 (ALT 250 FOR IP): Performed by: INTERNAL MEDICINE

## 2023-02-19 PROCEDURE — 6370000000 HC RX 637 (ALT 250 FOR IP): Performed by: NURSE PRACTITIONER

## 2023-02-19 RX ADMIN — POTASSIUM CHLORIDE 20 MEQ: 1500 TABLET, EXTENDED RELEASE ORAL at 21:34

## 2023-02-19 RX ADMIN — EMPAGLIFLOZIN 10 MG: 10 TABLET, FILM COATED ORAL at 08:40

## 2023-02-19 RX ADMIN — DOCUSATE SODIUM 100 MG: 100 CAPSULE, LIQUID FILLED ORAL at 16:26

## 2023-02-19 RX ADMIN — SODIUM CHLORIDE, PRESERVATIVE FREE 10 ML: 5 INJECTION INTRAVENOUS at 08:40

## 2023-02-19 RX ADMIN — METOPROLOL SUCCINATE 100 MG: 100 TABLET, EXTENDED RELEASE ORAL at 08:43

## 2023-02-19 RX ADMIN — CARBAMAZEPINE 400 MG: 200 TABLET ORAL at 21:34

## 2023-02-19 RX ADMIN — PANTOPRAZOLE SODIUM 40 MG: 40 TABLET, DELAYED RELEASE ORAL at 05:53

## 2023-02-19 RX ADMIN — ASPIRIN 81 MG: 81 TABLET ORAL at 08:40

## 2023-02-19 RX ADMIN — APIXABAN 5 MG: 5 TABLET, FILM COATED ORAL at 08:41

## 2023-02-19 RX ADMIN — SODIUM CHLORIDE, PRESERVATIVE FREE 10 ML: 5 INJECTION INTRAVENOUS at 21:39

## 2023-02-19 RX ADMIN — FUROSEMIDE 40 MG: 10 INJECTION, SOLUTION INTRAMUSCULAR; INTRAVENOUS at 08:43

## 2023-02-19 RX ADMIN — APIXABAN 5 MG: 5 TABLET, FILM COATED ORAL at 21:34

## 2023-02-19 RX ADMIN — VALSARTAN 40 MG: 40 TABLET, FILM COATED ORAL at 08:46

## 2023-02-19 RX ADMIN — ROSUVASTATIN CALCIUM 40 MG: 20 TABLET, COATED ORAL at 21:34

## 2023-02-19 RX ADMIN — CARBAMAZEPINE 400 MG: 200 TABLET ORAL at 08:40

## 2023-02-19 RX ADMIN — GABAPENTIN 400 MG: 400 CAPSULE ORAL at 08:41

## 2023-02-19 RX ADMIN — GABAPENTIN 400 MG: 400 CAPSULE ORAL at 13:31

## 2023-02-19 RX ADMIN — POTASSIUM CHLORIDE 20 MEQ: 1500 TABLET, EXTENDED RELEASE ORAL at 08:41

## 2023-02-19 RX ADMIN — FUROSEMIDE 40 MG: 10 INJECTION, SOLUTION INTRAMUSCULAR; INTRAVENOUS at 16:30

## 2023-02-19 RX ADMIN — GABAPENTIN 400 MG: 400 CAPSULE ORAL at 21:34

## 2023-02-19 ASSESSMENT — PAIN SCALES - GENERAL
PAINLEVEL_OUTOF10: 0

## 2023-02-19 NOTE — PROGRESS NOTES
[facility] CARDIOLOGY PROGRESS NOTE           2/19/2023 11:38 AM    Admit Date: 2/16/2023      Subjective:   Feels better. ROS:  GEN:  No fever or chills  Cardiovascular:  As noted above:no CP. Pulmonary:  As noted above:SOB improved  Neuro:  No new focal motor or sensory loss    Objective:      Vitals:    02/19/23 0522 02/19/23 0545 02/19/23 0755 02/19/23 0843   BP: 135/81  (!) 150/92 (!) 150/92   Pulse: 55  61 61   Resp: 18  18    Temp: 97.9 °F (36.6 °C)  97.5 °F (36.4 °C)    TempSrc: Oral  Oral    SpO2: 92%  93%    Weight:  178 lb 14.4 oz (81.1 kg)     Height:           Physical Exam:  General-NAD  Neck- supple, no JVD  CV- regular rate and rhythm no MRG  Lung- coarse  bilaterally  Abd- soft, nontender, nondistended  Ext- trace to ! +edema bilaterally. Skin- warm and dry  Psychiatric:  Normal mood and affect. Neurologic:  Alert and oriented X 3      Data Review:   Recent Labs     02/18/23  0443 02/19/23  0441    136   K 3.9 4.1   BUN 19 22       TELEMETRY:  NSR    Assessment/Plan:     Principal Problem:    HFrEF (heart failure with reduced ejection fraction) ([facility]):Improving. Continue iv lasix today. Transition to po Lasix in am.Hopefully home on 2/20/23. CXR shows resolving HF. Continue Toprol and Valsartan. Active Problems:    CAD (coronary artery disease): Stable per cath in 10/2022. Dyslipidemia:Continue Crestor. Hypertension:Stable and at goal.Continue Toprol,Valsartan,and Lasix.               Pedro Pablo Chacko MD  2/19/2023 11:38 AM

## 2023-02-19 NOTE — PROGRESS NOTES
Bedside and Verbal shift change report received from [facility]. Report included the following information Nurse Handoff Report, Intake/Output, MAR, and Recent Results.

## 2023-02-19 NOTE — PROGRESS NOTES
Bedside and Verbal shift change report to be given to [facility] (oncoming nurse) by self (offgoing nurse). Report included the following information Nurse Handoff Report, Intake/Output, MAR, and Recent Results.

## 2023-02-20 VITALS
DIASTOLIC BLOOD PRESSURE: 70 MMHG | TEMPERATURE: 97.5 F | WEIGHT: 176.4 LBS | HEART RATE: 56 BPM | OXYGEN SATURATION: 98 % | RESPIRATION RATE: 17 BRPM | SYSTOLIC BLOOD PRESSURE: 128 MMHG | HEIGHT: 68 IN | BODY MASS INDEX: 26.73 KG/M2

## 2023-02-20 LAB
ANION GAP SERPL CALC-SCNC: 4 MMOL/L (ref 2–11)
BUN SERPL-MCNC: 25 MG/DL (ref 8–23)
CALCIUM SERPL-MCNC: 8.5 MG/DL (ref 8.3–10.4)
CHLORIDE SERPL-SCNC: 100 MMOL/L (ref 101–110)
CO2 SERPL-SCNC: 31 MMOL/L (ref 21–32)
CREAT SERPL-MCNC: 1.1 MG/DL (ref 0.8–1.5)
GLUCOSE SERPL-MCNC: 94 MG/DL (ref 65–100)
POTASSIUM SERPL-SCNC: 3.7 MMOL/L (ref 3.5–5.1)
SODIUM SERPL-SCNC: 135 MMOL/L (ref 133–143)

## 2023-02-20 PROCEDURE — 99238 HOSP IP/OBS DSCHRG MGMT 30/<: CPT | Performed by: INTERNAL MEDICINE

## 2023-02-20 PROCEDURE — 6360000002 HC RX W HCPCS: Performed by: NURSE PRACTITIONER

## 2023-02-20 PROCEDURE — 97530 THERAPEUTIC ACTIVITIES: CPT

## 2023-02-20 PROCEDURE — 80048 BASIC METABOLIC PNL TOTAL CA: CPT

## 2023-02-20 PROCEDURE — 97535 SELF CARE MNGMENT TRAINING: CPT

## 2023-02-20 PROCEDURE — 6370000000 HC RX 637 (ALT 250 FOR IP): Performed by: NURSE PRACTITIONER

## 2023-02-20 PROCEDURE — 36415 COLL VENOUS BLD VENIPUNCTURE: CPT

## 2023-02-20 RX ORDER — FUROSEMIDE 40 MG/1
40 TABLET ORAL 2 TIMES DAILY
Qty: 60 TABLET | Refills: 3 | Status: SHIPPED | OUTPATIENT
Start: 2023-02-20

## 2023-02-20 RX ADMIN — POTASSIUM CHLORIDE 20 MEQ: 1500 TABLET, EXTENDED RELEASE ORAL at 08:39

## 2023-02-20 RX ADMIN — CARBAMAZEPINE 400 MG: 200 TABLET ORAL at 08:39

## 2023-02-20 RX ADMIN — FUROSEMIDE 40 MG: 10 INJECTION, SOLUTION INTRAMUSCULAR; INTRAVENOUS at 08:47

## 2023-02-20 RX ADMIN — EMPAGLIFLOZIN 10 MG: 10 TABLET, FILM COATED ORAL at 08:39

## 2023-02-20 RX ADMIN — GABAPENTIN 400 MG: 400 CAPSULE ORAL at 08:39

## 2023-02-20 RX ADMIN — METOPROLOL SUCCINATE 100 MG: 100 TABLET, EXTENDED RELEASE ORAL at 08:39

## 2023-02-20 RX ADMIN — APIXABAN 5 MG: 5 TABLET, FILM COATED ORAL at 08:39

## 2023-02-20 RX ADMIN — PANTOPRAZOLE SODIUM 40 MG: 40 TABLET, DELAYED RELEASE ORAL at 04:22

## 2023-02-20 RX ADMIN — ASPIRIN 81 MG: 81 TABLET ORAL at 08:39

## 2023-02-20 ASSESSMENT — PAIN SCALES - GENERAL
PAINLEVEL_OUTOF10: 0
PAINLEVEL_OUTOF10: 0

## 2023-02-20 NOTE — PROGRESS NOTES
Physician Progress Note      Kalpesh Sanchez  CSN #:                  [id number]  :                       1945  ADMIT DATE:       2023 9:21 AM  DISCH DATE:  RESPONDING  PROVIDER #:        Nicolas Barry MD          QUERY TEXT:    Patient admitted with exacerbation of his CHF. Noted to have atrial   fibrillation and is maintained on Eliquis. If possible, please document in   progress notes and discharge summary if you are evaluating and/or treating any   of the following: The medical record reflects the following:  Risk Factors: Afib, HTN, CHF, Pulm HTN  Clinical Indicators: Afib maintained on Eliquis  Treatment: Continuation of Eliquis, labs, tele, iv lasix, cxray, essential   home meds. Options provided:  -- Secondary hypercoagulable state in a patient with atrial fibrillation  -- Other - I will add my own diagnosis  -- Disagree - Not applicable / Not valid  -- Disagree - Clinically unable to determine / Unknown  -- Refer to Clinical Documentation Reviewer    PROVIDER RESPONSE TEXT:    Provider disagreed with this query.     Query created by: Nicole Jaeger on 2023 7:24 AM      Electronically signed by:  Nicolas Barry MD 2023 12:23 PM
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
## 2023-02-20 NOTE — DISCHARGE SUMMARY
[facility] Cardiology Discharge Summary     Patient ID:  Jordana Peña  [id number]  75 y.o.  1945    Admit date: 2/16/2023    Discharge date:  02/20/2023    Admitting Physician: Charito Short MD     Discharge Physician: Amadeo Cuellar, ANN-MARIE - CNP/Dr. Luba Ro    Admission Diagnoses: Heart failure [facility]) [I50.9]  Chest pain, unspecified type [R07.9]  Acute on chronic congestive heart failure, unspecified heart failure type [facility]) [I50.9]    Discharge Diagnoses:   Patient Active Problem List    Diagnosis    HFrEF (heart failure with reduced ejection fraction) ([facility] [address]    Chest pain    CAD (coronary artery disease)    Acute on chronic congestive heart failure ([facility] [address]    Pulmonary hypertension ([facility] [address] [address]    DNR (do not resuscitate)    SOB (shortness of breath)    Localized edema    Elevated brain natriuretic peptide (BNP) level    S/P CABG x 3    Hemoptysis    Trigeminal neuralgia    Systolic CHF, chronic (HCC)    TIA (transient ischemic attack)    Dyslipidemia    Hypertension    Coronary atherosclerosis of native coronary vessel    Atrial fibrillation [facility])       Cardiology Procedures this admission:  EchoCardiogram  Consults: none    Hospital Course: Patient presented to the emergency department of [facility] with complaints of progressive shortness of breath, exertional dyspnea, lightheadedness, and exertional chest pain. He reports  feeling weak for several weeks, not sure if he gained any weight but legs and abd swollen. and lower extremity edema. Patient was evaluated and subsequently admitted for further cardiac evaluation and treatment. He was treated with IVP lasix with good UOP. He was -6.3L at discharge. Lasix was increased to 40 mg BID at discharge. At time of discharge, patient was up feeling well without any complaints shortness of breath. LE edema was much improved. Patient's labs were stable with creatinine of 1.00.  Patient was seen and examined by Dr. Luba Ro and determined stable and ready for discharge. Patient was instructed on the importance of medication compliance, low sodium diet, 2 liter per day fluid restriction and daily weights. For maximized medical therapy of congestive heart failure, patient will continue use of BB. No ACE/ARB due to dizziness/syncope when taking. The patient has been scheduled for 7 day transitional care follow up with [facility] Cardiology -- Dr. Collette Brocks on 2/23/2023. DISPOSITION: The patient is being discharged home in stable condition on a low saturated fat, low cholesterol and low salt diet. The patient is instructed to advance activities as tolerated to the limit of fatigue or shortness of breath. The patient is informed to monitor daily weights and maintain a 2 liter per day fluid restriction. The patient is instructed to call the office for any shortness of breath, weight gain, or increased peripheral edema. Discharge Exam: /69   Pulse 64   Temp 98.8 °F (37.1 °C) (Oral)   Resp 17   Ht 5' 8\" (1.727 m)   Wt 176 lb 6.4 oz (80 kg)   SpO2 94%   BMI 26.82 kg/m²   Patient has been seen by Dr. Libby Johnson: see his progress note for exam details. No results found for this or any previous visit (from the past 24 hour(s)). Patient Instructions:   Current Discharge Medication List        CONTINUE these medications which have CHANGED    Details   furosemide (LASIX) 40 MG tablet Take 1 tablet by mouth 2 times daily  Qty: 60 tablet, Refills: 3           CONTINUE these medications which have NOT CHANGED    Details   JARDIANCE 10 MG tablet TAKE 1 TABLET BY MOUTH DAILY  Qty: 30 tablet, Refills: 3      rosuvastatin (CRESTOR) 40 MG tablet TAKE 1 TABLET BY MOUTH AT BEDTIME.   Qty: 30 tablet, Refills: 3      apixaban (ELIQUIS) 5 MG TABS tablet Take 5 mg by mouth 2 times daily      aspirin 81 MG EC tablet Take 81 mg by mouth      carBAMazepine (TEGRETOL) 200 MG tablet Take 400 mg by mouth 2 times daily      docusate (COLACE, DULCOLAX) 100 MG CAPS Take 100 mg by mouth 2 times daily as needed      gabapentin (NEURONTIN) 400 MG capsule 400 mg 3 times daily. HYDROcodone-acetaminophen (NORCO)  MG per tablet Take 1 tablet by mouth every 8 hours as needed. metoprolol succinate (TOPROL XL) 100 MG extended release tablet Take 100 mg by mouth daily      nitroGLYCERIN (NITROSTAT) 0.4 MG SL tablet TAKE AS DIRECTED.      omeprazole (PRILOSEC) 20 MG delayed release capsule Take 20 mg by mouth daily      potassium chloride (KLOR-CON M) 20 MEQ extended release tablet Take 20 mEq by mouth 2 times daily           Was not taking prior to admission        albuterol sulfate HFA (PROAIR HFA) 108 (90 Base) MCG/ACT inhaler Comments:   Reason for Stopping:         valsartan (DIOVAN) 40 MG tablet Comments:   Reason for Stopping:         melatonin 3 MG TABS tablet Comments:   Reason for Stopping:                  Signed:  ANN-MARIE Lange CNP  2/20/2023 11:18 AM    Patient seen and examined by me. Agree with above note by physician extender.   Key findings are:  No CP or JAMES  CV- RRR without murmur  Lungs- Clear bilaterally  Ext- no edema    Plan: chf- improved home on bid lasix

## 2023-02-20 NOTE — PROGRESS NOTES
ACUTE PHYSICAL THERAPY GOALS:   (Developed with and agreed upon by patient and/or caregiver.)  (1.) Jordana Peña  will move from supine to sit and sit to supine , scoot up and down, and roll side to side with INDEPENDENCE within 7 treatment day(s). (2.) Jordana Peña will transfer from bed to chair and chair to bed with INDEPENDENCE using the least restrictive device within 7 treatment day(s). (3.) Jordana Peña will ambulate with INDEPENDENCE for 500+ feet with the least restrictive device within 7 treatment day(s). (4.) Shaquille Goldsmith will perform standing static and dynamic balance activities x 25 minutes with INDEPENDENCE to improve safety and activity tolerance within 7 treatment day(s). (5.) Shaquille Goldsmith will ascend and descend 13 stairs using one hand rail(s) with MODIFIED INDEPENDENCE to improve functional mobility and safety within 7 treatment day(s). (6.) Jordana Peña will perform therapeutic exercises x 20 min for HEP with INDEPENDENCE to improve strength, endurance, and functional mobility within 7 treatment day(s). PHYSICAL THERAPY: Daily Note AM   (Link to Caseload Tracking: PT Visit Days : 2  Time In/Out PT Charge Capture  Rehab Caseload Tracker  Orders    Jordana Peña is a 76 y.o. male   PRIMARY DIAGNOSIS: HFrEF (heart failure with reduced ejection fraction) (HCC)  Heart failure ([facility] [I50.9]  Chest pain, unspecified type [R07.9]  Acute on chronic congestive heart failure, unspecified heart failure type [facility] [I50.9]       Inpatient: Payor: Julita Newmanr / Plan: MEDICARE PART A AND B / Product Type: *No Product type* /     ASSESSMENT:     REHAB RECOMMENDATIONS:   Recommendation to date pending progress:  Setting:  Outpatient Therapy    Equipment:    None     ASSESSMENT:  Mr. Mohit Adame presents having just worked with OT, agreeable to PT. He reports feeling much better.   This date pt performs mobility including sitting balance activities, sit <> stand transfers, standing balance activities, and ambulation in room and hallway with overall SBA, CGA for a few path deviations/increased trunk sway. Pt with improved balance control and activity tolerance. SpO2 98% on room air following exertion, pt does not report increased SOB, which is an improvement compared to previous session. Good progress towards stated goals which continue to be appropriate. Pt will continue to benefit from skilled therapy services to promote return to highest level of function, independence, and safety. Will continue therapy efforts. SUBJECTIVE:   Mr. Jerrica Conner states, \"I am doing even better today, they say I may be able to go! \"     Social/Functional Lives With: Alone  Type of Home: House  Home Layout: Two level, Able to Live on Main level with bedroom/bathroom  Home Access: Stairs to enter with rails  Entrance Stairs - Number of Steps: 2 front entrance. 13 CHINTAN from basement.   Home Equipment: [employer]-IT  Has the patient had two or more falls in the past year or any fall with injury in the past year?: No  Receives Help From: Family  ADL Assistance: Independent  Homemaking Assistance: Independent  Ambulation Assistance: Independent  Transfer Assistance: Independent  Active : Yes  Mode of Transportation: Car  Occupation: Part time employment  Type of Occupation: Works as a [employer]- [employer] jobs  OBJECTIVE:     PAIN: VITALS / O2: PRECAUTION / Jessa Pencil / Arthurine Idalia:   Pre Treatment: 0/10         Post Treatment: 0/10 Vitals   Vitals  Heart Rate: 60    Oxygen  O2 Therapy: Room air  Heart Rate: 60  SpO2: 98 % None    RESTRICTIONS/PRECAUTIONS:  Restrictions/Precautions  Restrictions/Precautions: Fall Risk  Restrictions/Precautions: Fall Risk     MOBILITY: I Mod I S SBA CGA Min Mod Max Total  NT x2 Comments:   Bed Mobility    Rolling [] [] [] [] [] [] [] [] [] [x] []    Supine to Sit [] [] [] [] [] [] [] [] [] [x] []    Scooting [] [] [] [] [] [] [] [] [] [x] []    Sit to Supine [] [] [] [] [] [] [] [] [] [x] []    Transfers    Sit to Stand [] [] [] [x] [x] [] [] [] [] [] []    Bed to Chair [] [] [] [x] [] [] [] [] [] [] []    Stand to Sit [] [] [] [x] [] [] [] [] [] [] []     [] [] [] [] [] [] [] [] [] [] []    I=Independent, Mod I=Modified Independent, S=Supervision, SBA=Standby Assistance, CGA=Contact Guard Assistance,   Min=Minimal Assistance, Mod=Moderate Assistance, Max=Maximal Assistance, Total=Total Assistance, NT=Not Tested    BALANCE: Good Fair+ Fair Fair- Poor NT Comments   Sitting Static [x] [] [] [] [] []    Sitting Dynamic [x] [] [] [] [] []              Standing Static [] [x] [] [] [] []    Standing Dynamic [] [x] [] [] [] []  A few path deviations/trunk sway increase resulting in mild LOB     GAIT: I Mod I S SBA CGA Min Mod Max Total  NT x2 Comments:   Level of Assistance [] [] [] [x] [x] [] [] [] [] [] []    Distance 500 feet    DME None    Gait Quality Path deviations  and Trunk sway increased    Weightbearing Status      Stairs      I=Independent, Mod I=Modified Independent, S=Supervision, SBA=Standby Assistance, CGA=Contact Guard Assistance,   Min=Minimal Assistance, Mod=Moderate Assistance, Max=Maximal Assistance, Total=Total Assistance, NT=Not Tested    PLAN:   FREQUENCY AND DURATION: 3 times/week for duration of hospital stay or until stated goals are met, whichever comes first.    TREATMENT:   TREATMENT:   Therapeutic Activity (8 Minutes): Therapeutic activity included Transfer Training, Ambulation on level ground, and Standing balance to improve functional Activity tolerance, Balance, and Mobility.     TREATMENT GRID:  N/A    AFTER TREATMENT PRECAUTIONS: Alarm Activated, Bed/Chair Locked, Call light within reach, Chair, Needs within reach, and RN notified    INTERDISCIPLINARY COLLABORATION:  RN/ PCT and PT/ PTA    EDUCATION:      TIME IN/OUT:  Time In: 1054  Time Out: [facility]  Minutes: Buzz 24, PT

## 2023-02-20 NOTE — CARE COORDINATION
CM met with patient prior to discharge to discuss PT recommendation for OP PT. Patient verbalized agreement. CM sent order for OP PT to  OP therapy. Updated address for patient is [address] [place] [address]. Patient scheduled his own transportation home.

## 2023-02-21 ENCOUNTER — FOLLOWUP TELEPHONE ENCOUNTER (OUTPATIENT)
Dept: ICU | Age: 78
End: 2023-02-21

## 2023-02-23 ENCOUNTER — OFFICE VISIT (OUTPATIENT)
Dept: CARDIOLOGY CLINIC | Age: 78
End: 2023-02-23

## 2023-02-23 VITALS
DIASTOLIC BLOOD PRESSURE: 60 MMHG | HEART RATE: 64 BPM | HEIGHT: 68 IN | WEIGHT: 182.2 LBS | BODY MASS INDEX: 27.61 KG/M2 | SYSTOLIC BLOOD PRESSURE: 122 MMHG

## 2023-02-23 DIAGNOSIS — I10 PRIMARY HYPERTENSION: ICD-10-CM

## 2023-02-23 DIAGNOSIS — I50.22 SYSTOLIC CHF, CHRONIC (HCC): ICD-10-CM

## 2023-02-23 DIAGNOSIS — I27.20 PULMONARY HYPERTENSION (HCC): Chronic | ICD-10-CM

## 2023-02-23 DIAGNOSIS — I48.0 PAROXYSMAL ATRIAL FIBRILLATION (HCC): ICD-10-CM

## 2023-02-23 DIAGNOSIS — I50.20 HFREF (HEART FAILURE WITH REDUCED EJECTION FRACTION) (HCC): ICD-10-CM

## 2023-02-23 DIAGNOSIS — I25.118 ATHEROSCLEROSIS OF NATIVE CORONARY ARTERY OF NATIVE HEART WITH STABLE ANGINA PECTORIS (HCC): Primary | ICD-10-CM

## 2023-02-23 ASSESSMENT — ENCOUNTER SYMPTOMS
SHORTNESS OF BREATH: 0
BACK PAIN: 0
ABDOMINAL PAIN: 0
COUGH: 0

## 2023-02-23 NOTE — PROGRESS NOTES
[facility] CARDIOLOGY  [address]      [address]      NAME:  Maria L Sandoval  : 1945  MRN: [id number]      SUBJECTIVE:   Maria L Sandoval is a 76 y.o. male seen for a follow up visit regarding the following:     Chief Complaint   Patient presents with    Follow-Up from Hospital    Congestive Heart Failure       HPI:   Patient was admitted 2019 with NSTEMI and severe 3VCAD complicated by AF/RVR. He had CABG/MAZE and JENNIFER clip. He has struggled  significantly post CABG with pleural effusions and fluid retention. LV systolic function was severely reduced postoperatively. Echocardiogram 2020 demonstrated stable moderate LV systolic dysfunction with ejection fraction of 40 to 45% no significant  valvular heart disease. He had COVID-19 2020 and severe GI illnesses. He had Covid again 2022. He has recovered. Remains dyspneic. Echo 10/2022 with stable EF 40-45%. RVSP 63mmHg. LHC with stable graft anatomy. Patient was admitted to the hospital 2023 secondary to acute volume overload. He responded well to IV diuretics. Oral furosemide was increased to 40 mg twice daily. He presents today for TC 7 follow-up. Please review nursing notes for appropriate documentation. Past Medical History, Past Surgical History, Family history, Social History, and Medications were all reviewed with the patient today and updated as necessary. Current Outpatient Medications   Medication Sig Dispense Refill    furosemide (LASIX) 40 MG tablet Take 1 tablet by mouth 2 times daily 60 tablet 3    JARDIANCE 10 MG tablet TAKE 1 TABLET BY MOUTH DAILY 30 tablet 3    rosuvastatin (CRESTOR) 40 MG tablet TAKE 1 TABLET BY MOUTH AT BEDTIME.  30 tablet 3    apixaban (ELIQUIS) 5 MG TABS tablet Take 5 mg by mouth 2 times daily      aspirin 81 MG EC tablet Take 81 mg by mouth      carBAMazepine (TEGRETOL) 200 MG tablet Take 400 mg by mouth 2 times daily docusate (COLACE, DULCOLAX) 100 MG CAPS Take 100 mg by mouth 2 times daily as needed      gabapentin (NEURONTIN) 400 MG capsule 400 mg 3 times daily. HYDROcodone-acetaminophen (NORCO)  MG per tablet Take 1 tablet by mouth every 8 hours as needed. metoprolol succinate (TOPROL XL) 100 MG extended release tablet Take 100 mg by mouth daily      nitroGLYCERIN (NITROSTAT) 0.4 MG SL tablet TAKE AS DIRECTED.      omeprazole (PRILOSEC) 20 MG delayed release capsule Take 20 mg by mouth daily      potassium chloride (KLOR-CON M) 20 MEQ extended release tablet Take 20 mEq by mouth 2 times daily       No current facility-administered medications for this visit.      Patient Active Problem List    Diagnosis Date Noted    HFrEF (heart failure with reduced ejection fraction) ([facility] 02/16/2023     Priority: High    Chest pain 02/16/2023     Priority: High    CAD (coronary artery disease) 02/16/2023     Priority: High    Acute on chronic congestive heart failure ([facility] 02/16/2023     Priority: High    Pulmonary hypertension ([facility] 11/11/2022     Priority: High    Syncope 07/07/2022     Priority: Medium    Hyponatremia 07/07/2022     Priority: Medium    Anemia 07/07/2022     Priority: Medium    DNR (do not resuscitate) 07/07/2022     Priority: Medium    Hand pain 07/07/2022     Priority: Medium    Chest pain, precordial 10/24/2022     Priority: Low     Added automatically from request for surgery [id number]      Community acquired pneumonia 03/28/2021     Priority: Low    Leukocytosis 03/28/2021     Priority: Low    SOB (shortness of breath) 10/24/2019     Priority: Low    Localized edema 06/24/2019     Priority: Low    Elevated brain natriuretic peptide (BNP) level 06/14/2019     Priority: Low    History of smoking 25-50 pack years 06/13/2019     Priority: Low    Tracheobronchomalacia 06/10/2019     Priority: Low    S/P CABG x 3 06/10/2019     Priority: Low    Hemoptysis 06/09/2019     Priority: Low    Trigeminal neuralgia 2019     Priority: Low    TIA (transient ischemic attack) 2018     Priority: Low    Dyslipidemia 2017     Priority: Low    Hypertension 2017     Priority: Low    Coronary atherosclerosis of native coronary vessel 2017     Priority: Low     2006:  PCI RCA - Liberte, PCI LAD Cypher, PCI LCx Cypher  2014:  Stress testing with fixed inferior defect - no ischemia  2014:  EF with EF 50-55% and inferior HK   2018:  EF 45-50%  2019:  EF 40-45%  2019:  CABG LIMA-LAD, SVG-OM, SVG-SUSAN  2021:  EF 40-45%  10/2022:  Stable CAD/Grafts      Atrial fibrillation ([facility] 2017     Priority: Low    Systolic CHF, chronic ([facility] 2019     2014:  EF 55%  2018:  EF 45-50%  2019:  EF 40-45%  2019:  EF 50%  2019:  EF 35%  2019:  EF 40-45%  2020:  EF 40-45%  2021:  EF 40-45%  2022:  EF 40-45%  10/2022:  EF 40-45%          Family History   Problem Relation Age of Onset    Heart Disease Mother     Heart Disease Sister     Other Other         cerebral aneurysm      Social History     Tobacco Use    Smoking status: Former     Packs/day: 1.00     Years: 14.00     Pack years: 14.00     Types: Cigarettes     Start date: 1957     Quit date: 1971     Years since quittin.3    Smokeless tobacco: Never    Tobacco comments:     Quit smoking: quit at age 27   Substance Use Topics    Alcohol use: No       Review Of Symptoms    Review of Systems   Constitutional: Positive for malaise/fatigue. Negative for fever. HENT:  Negative for nosebleeds. Cardiovascular:  Positive for dyspnea on exertion. Negative for chest pain and palpitations. Respiratory:  Negative for cough and shortness of breath. Musculoskeletal:  Negative for back pain, muscle cramps, muscle weakness and myalgias. Gastrointestinal:  Negative for abdominal pain. Neurological:  Negative for dizziness.       Physical Exam  Blood pressure 122/60, pulse 64, height 5' 8\" (1.727 m), weight 182 lb 3.2 oz (82.6 kg). Physical Exam  HENT:      Head: Normocephalic and atraumatic. Eyes:      Extraocular Movements: Extraocular movements intact. Cardiovascular:      Rate and Rhythm: Normal rate and regular rhythm. Heart sounds: No murmur heard. Pulmonary:      Effort: Pulmonary effort is normal.      Breath sounds: Normal breath sounds. Abdominal:      Palpations: Abdomen is soft. Musculoskeletal:         General: Normal range of motion. Skin:     General: Skin is warm and dry. Neurological:      General: No focal deficit present. Mental Status: He is oriented to person, place, and time. Medical problems, medical history and test results were reviewed with the patient today.       Lab Results   Component Value Date    CHOL 123 07/10/2022    CHOL 178 06/07/2019     Lab Results   Component Value Date    TRIG 72 07/10/2022    TRIG 74 06/07/2019     Lab Results   Component Value Date    HDL 27 (L) 07/10/2022    HDL 37 (L) 06/07/2019     Lab Results   Component Value Date    LDLCALC 81.6 07/10/2022    LDLCALC 126.2 (H) 06/07/2019     Lab Results   Component Value Date    LABVLDL 14.4 07/10/2022    LABVLDL 14.8 06/07/2019     Lab Results   Component Value Date    CHOLHDLRATIO 4.6 07/10/2022    CHOLHDLRATIO 4.8 06/07/2019        Lab Results   Component Value Date    LABA1C 5.3 07/10/2022     Lab Results   Component Value Date     07/10/2022        Lab Results   Component Value Date     02/20/2023    K 3.7 02/20/2023     (L) 02/20/2023    CO2 31 02/20/2023    BUN 25 (H) 02/20/2023    CREATININE 1.10 02/20/2023    GLUCOSE 94 02/20/2023    CALCIUM 8.5 02/20/2023    PROT 7.7 02/16/2023    LABALBU 3.3 02/16/2023    BILITOT 1.5 (H) 02/16/2023    ALKPHOS 115 02/16/2023    AST 22 02/16/2023    ALT 13 02/16/2023    LABGLOM >60 02/20/2023    GFRAA >60 07/11/2022    AGRATIO 0.9 (L) 06/07/2021    GLOB 4.4 02/16/2023       Lab Results   Component Value Date/Time     02/20/2023 11:47 AM    K 3.7 02/20/2023 11:47 AM     02/20/2023 11:47 AM    CO2 31 02/20/2023 11:47 AM    BUN 25 02/20/2023 11:47 AM    CREATININE 1.10 02/20/2023 11:47 AM    GLUCOSE 94 02/20/2023 11:47 AM    CALCIUM 8.5 02/20/2023 11:47 AM        Lab Results   Component Value Date    WBC 6.4 02/16/2023    HGB 12.8 (L) 02/16/2023    HCT 38.4 (L) 02/16/2023    .9 (H) 02/16/2023     02/16/2023             ASSESSMENT:    Diagnoses and all orders for this visit:      Atherosclerosis of native coronary artery of native heart with stable angina pectoris ([address] - Stable CAD/Grafts by cardiac catheterization 10/2022. Medications appropriate - metoprolol succinate 100mg daily, rosuvastatin 40mg daily. Chronic systolic congestive heart failure (HCC) - EF 45% at CABG and remains stable by echo 10/2022. Volume status better with increase furosemide to 40mg BID. Review of recent labs with stable renal function. On metoprolol succinate 100mg daily, furosemide 40mg BID, Jardiance 10mg daily. He had severe hypotension on losartan and this was stopped. He is here for TC-7. With seen back in 6 weeks with labs. Essential hypertension - Patient with elevated BP in ER and office. Now with hypotension and syncope. Better off of losartan. Feels better with mildly elevated BP. Dyslipidemia  -review of lipid labs performed at [facility] demonstrate LDL of 81 07/2022. Patient now more compliant with rosuvastatin       Paroxysmal atrial fibrillation (HCC) - In SR and Eliquis 5 mg twice daily. Monitor on loop recorder. Pulmonary Hypertension - worse after COVID - now with severe dyspnea. Cardiac work up stable and seeing pulmonary soon.    Recheck  echo in 6 months             Problem List Items Addressed This Visit          Circulatory    Pulmonary hypertension ([address] (Chronic)    HFrEF (heart failure with reduced ejection fraction) (HCC)    Relevant Orders    Magnesium    Basic Metabolic Panel Hypertension    Coronary atherosclerosis of native coronary vessel - Primary    Atrial fibrillation (HCC)    Systolic CHF, chronic (HCC)   There are no discontinued medications. Patient has been instructed and agrees to call our office with any issues or other concerns related to their cardiac condition(s) and/or complaint(s). Return in about 6 weeks (around 4/6/2023).        Shanna Dong MD  2/23/2023

## 2023-03-03 ENCOUNTER — APPOINTMENT (OUTPATIENT)
Dept: GENERAL RADIOLOGY | Age: 78
DRG: 453 | End: 2023-03-03
Attending: PHYSICAL MEDICINE & REHABILITATION
Payer: MEDICARE

## 2023-03-03 ENCOUNTER — APPOINTMENT (OUTPATIENT)
Dept: CT IMAGING | Age: 78
DRG: 453 | End: 2023-03-03
Attending: PHYSICAL MEDICINE & REHABILITATION
Payer: MEDICARE

## 2023-03-03 ENCOUNTER — HOSPITAL ENCOUNTER (INPATIENT)
Age: 78
LOS: 27 days | Discharge: NURSING, REHAB OR LONG-TERM CARE | DRG: 453 | End: 2023-03-30
Attending: EMERGENCY MEDICINE | Admitting: FAMILY MEDICINE
Payer: MEDICARE

## 2023-03-03 ENCOUNTER — APPOINTMENT (OUTPATIENT)
Dept: MRI IMAGING | Age: 78
DRG: 453 | End: 2023-03-03
Attending: PHYSICAL MEDICINE & REHABILITATION
Payer: MEDICARE

## 2023-03-03 DIAGNOSIS — S09.90XA CLOSED HEAD INJURY, INITIAL ENCOUNTER: ICD-10-CM

## 2023-03-03 DIAGNOSIS — E87.70 HYPERVOLEMIA, UNSPECIFIED HYPERVOLEMIA TYPE: ICD-10-CM

## 2023-03-03 DIAGNOSIS — R53.1 GENERALIZED WEAKNESS: ICD-10-CM

## 2023-03-03 DIAGNOSIS — R55 SYNCOPE, UNSPECIFIED SYNCOPE TYPE: Primary | ICD-10-CM

## 2023-03-03 DIAGNOSIS — I95.9 HYPOTENSION, UNSPECIFIED HYPOTENSION TYPE: ICD-10-CM

## 2023-03-03 DIAGNOSIS — R00.1 BRADYCARDIA: ICD-10-CM

## 2023-03-03 PROBLEM — Z95.1 S/P CABG X 3: Status: ACTIVE | Noted: 2019-06-10

## 2023-03-03 PROBLEM — T68.XXXA HYPOTHERMIA: Status: ACTIVE | Noted: 2023-03-03

## 2023-03-03 LAB
ALBUMIN SERPL-MCNC: 2.7 G/DL (ref 3.2–4.6)
ALBUMIN/GLOB SERPL: 0.7 (ref 0.4–1.6)
ALP SERPL-CCNC: 92 U/L (ref 50–136)
ALT SERPL-CCNC: 15 U/L (ref 12–65)
ANION GAP SERPL CALC-SCNC: 4 MMOL/L (ref 2–11)
AST SERPL-CCNC: 14 U/L (ref 15–37)
BASOPHILS # BLD: 0 K/UL (ref 0–0.2)
BASOPHILS NFR BLD: 1 % (ref 0–2)
BILIRUB SERPL-MCNC: 0.8 MG/DL (ref 0.2–1.1)
BUN SERPL-MCNC: 13 MG/DL (ref 8–23)
CALCIUM SERPL-MCNC: 8 MG/DL (ref 8.3–10.4)
CHLORIDE SERPL-SCNC: 109 MMOL/L (ref 101–110)
CO2 SERPL-SCNC: 23 MMOL/L (ref 21–32)
CREAT SERPL-MCNC: 0.8 MG/DL (ref 0.8–1.5)
DIFFERENTIAL METHOD BLD: ABNORMAL
EOSINOPHIL # BLD: 0.1 K/UL (ref 0–0.8)
EOSINOPHIL NFR BLD: 2 % (ref 0.5–7.8)
ERYTHROCYTE [DISTWIDTH] IN BLOOD BY AUTOMATED COUNT: 14.2 % (ref 11.9–14.6)
ETHANOL SERPL-MCNC: <3 MG/DL (ref 0–0.08)
GLOBULIN SER CALC-MCNC: 3.8 G/DL (ref 2.8–4.5)
GLUCOSE BLD STRIP.AUTO-MCNC: 92 MG/DL (ref 65–100)
GLUCOSE SERPL-MCNC: 101 MG/DL (ref 65–100)
HCT VFR BLD AUTO: 30.1 % (ref 41.1–50.3)
HGB BLD-MCNC: 10.3 G/DL (ref 13.6–17.2)
IMM GRANULOCYTES # BLD AUTO: 0 K/UL (ref 0–0.5)
IMM GRANULOCYTES NFR BLD AUTO: 0 % (ref 0–5)
INR BLD: 1.6 (ref 0.9–1.2)
LACTATE SERPL-SCNC: 1.3 MMOL/L (ref 0.4–2)
LYMPHOCYTES # BLD: 0.8 K/UL (ref 0.5–4.6)
LYMPHOCYTES NFR BLD: 14 % (ref 13–44)
MAGNESIUM SERPL-MCNC: 2 MG/DL (ref 1.8–2.4)
MCH RBC QN AUTO: 36.3 PG (ref 26.1–32.9)
MCHC RBC AUTO-ENTMCNC: 34.2 G/DL (ref 31.4–35)
MCV RBC AUTO: 106 FL (ref 82–102)
MONOCYTES # BLD: 0.6 K/UL (ref 0.1–1.3)
MONOCYTES NFR BLD: 11 % (ref 4–12)
NEUTS SEG # BLD: 4.1 K/UL (ref 1.7–8.2)
NEUTS SEG NFR BLD: 72 % (ref 43–78)
NRBC # BLD: 0 K/UL (ref 0–0.2)
NT PRO BNP: 1646 PG/ML
PLATELET # BLD AUTO: 134 K/UL (ref 150–450)
PMV BLD AUTO: 8.8 FL (ref 9.4–12.3)
POTASSIUM SERPL-SCNC: 3.5 MMOL/L (ref 3.5–5.1)
PROCALCITONIN SERPL-MCNC: <0.05 NG/ML (ref 0–0.49)
PROT SERPL-MCNC: 6.5 G/DL (ref 6.3–8.2)
PT BLD: 18.8 SECS (ref 9.6–11.6)
RBC # BLD AUTO: 2.84 M/UL (ref 4.23–5.6)
SERVICE CMNT-IMP: NORMAL
SODIUM SERPL-SCNC: 136 MMOL/L (ref 133–143)
TROPONIN I SERPL HS-MCNC: 12.5 PG/ML (ref 0–14)
WBC # BLD AUTO: 5.7 K/UL (ref 4.3–11.1)

## 2023-03-03 PROCEDURE — 2580000003 HC RX 258: Performed by: FAMILY MEDICINE

## 2023-03-03 PROCEDURE — 70486 CT MAXILLOFACIAL W/O DYE: CPT

## 2023-03-03 PROCEDURE — 82077 ASSAY SPEC XCP UR&BREATH IA: CPT

## 2023-03-03 PROCEDURE — 00NW0ZZ RELEASE CERVICAL SPINAL CORD, OPEN APPROACH: ICD-10-PCS | Performed by: NEUROLOGICAL SURGERY

## 2023-03-03 PROCEDURE — 0RG2071 FUSION OF 2 OR MORE CERVICAL VERTEBRAL JOINTS WITH AUTOLOGOUS TISSUE SUBSTITUTE, POSTERIOR APPROACH, POSTERIOR COLUMN, OPEN APPROACH: ICD-10-PCS | Performed by: NEUROLOGICAL SURGERY

## 2023-03-03 PROCEDURE — 83880 ASSAY OF NATRIURETIC PEPTIDE: CPT

## 2023-03-03 PROCEDURE — 2580000003 HC RX 258: Performed by: EMERGENCY MEDICINE

## 2023-03-03 PROCEDURE — 6370000000 HC RX 637 (ALT 250 FOR IP): Performed by: EMERGENCY MEDICINE

## 2023-03-03 PROCEDURE — 73130 X-RAY EXAM OF HAND: CPT

## 2023-03-03 PROCEDURE — 82962 GLUCOSE BLOOD TEST: CPT

## 2023-03-03 PROCEDURE — 99285 EMERGENCY DEPT VISIT HI MDM: CPT

## 2023-03-03 PROCEDURE — 01N10ZZ RELEASE CERVICAL NERVE, OPEN APPROACH: ICD-10-PCS | Performed by: NEUROLOGICAL SURGERY

## 2023-03-03 PROCEDURE — 83735 ASSAY OF MAGNESIUM: CPT

## 2023-03-03 PROCEDURE — 70450 CT HEAD/BRAIN W/O DYE: CPT

## 2023-03-03 PROCEDURE — 83605 ASSAY OF LACTIC ACID: CPT

## 2023-03-03 PROCEDURE — 36415 COLL VENOUS BLD VENIPUNCTURE: CPT

## 2023-03-03 PROCEDURE — 84484 ASSAY OF TROPONIN QUANT: CPT

## 2023-03-03 PROCEDURE — 6360000002 HC RX W HCPCS: Performed by: FAMILY MEDICINE

## 2023-03-03 PROCEDURE — 87040 BLOOD CULTURE FOR BACTERIA: CPT

## 2023-03-03 PROCEDURE — 0RG20A0 FUSION OF 2 OR MORE CERVICAL VERTEBRAL JOINTS WITH INTERBODY FUSION DEVICE, ANTERIOR APPROACH, ANTERIOR COLUMN, OPEN APPROACH: ICD-10-PCS | Performed by: NEUROLOGICAL SURGERY

## 2023-03-03 PROCEDURE — 85025 COMPLETE CBC W/AUTO DIFF WBC: CPT

## 2023-03-03 PROCEDURE — 72125 CT NECK SPINE W/O DYE: CPT

## 2023-03-03 PROCEDURE — 0RT30ZZ RESECTION OF CERVICAL VERTEBRAL DISC, OPEN APPROACH: ICD-10-PCS | Performed by: NEUROLOGICAL SURGERY

## 2023-03-03 PROCEDURE — 1100000003 HC PRIVATE W/ TELEMETRY

## 2023-03-03 PROCEDURE — 84145 PROCALCITONIN (PCT): CPT

## 2023-03-03 PROCEDURE — 93005 ELECTROCARDIOGRAM TRACING: CPT | Performed by: EMERGENCY MEDICINE

## 2023-03-03 PROCEDURE — 80053 COMPREHEN METABOLIC PANEL: CPT

## 2023-03-03 PROCEDURE — 6370000000 HC RX 637 (ALT 250 FOR IP): Performed by: FAMILY MEDICINE

## 2023-03-03 PROCEDURE — 99231 SBSQ HOSP IP/OBS SF/LOW 25: CPT | Performed by: PSYCHIATRY & NEUROLOGY

## 2023-03-03 PROCEDURE — 85610 PROTHROMBIN TIME: CPT

## 2023-03-03 PROCEDURE — 72141 MRI NECK SPINE W/O DYE: CPT

## 2023-03-03 PROCEDURE — 71045 X-RAY EXAM CHEST 1 VIEW: CPT

## 2023-03-03 RX ORDER — SODIUM CHLORIDE 0.9 % (FLUSH) 0.9 %
5-40 SYRINGE (ML) INJECTION EVERY 12 HOURS SCHEDULED
Status: DISCONTINUED | OUTPATIENT
Start: 2023-03-03 | End: 2023-03-30 | Stop reason: HOSPADM

## 2023-03-03 RX ORDER — 0.9 % SODIUM CHLORIDE 0.9 %
250 INTRAVENOUS SOLUTION INTRAVENOUS ONCE
Status: COMPLETED | OUTPATIENT
Start: 2023-03-03 | End: 2023-03-03

## 2023-03-03 RX ORDER — CARBAMAZEPINE 200 MG/1
400 TABLET ORAL 2 TIMES DAILY
Status: DISCONTINUED | OUTPATIENT
Start: 2023-03-03 | End: 2023-03-30 | Stop reason: HOSPADM

## 2023-03-03 RX ORDER — SODIUM CHLORIDE 0.9 % (FLUSH) 0.9 %
5-40 SYRINGE (ML) INJECTION PRN
Status: DISCONTINUED | OUTPATIENT
Start: 2023-03-03 | End: 2023-03-30 | Stop reason: HOSPADM

## 2023-03-03 RX ORDER — FUROSEMIDE 10 MG/ML
20 INJECTION INTRAMUSCULAR; INTRAVENOUS ONCE
Status: DISCONTINUED | OUTPATIENT
Start: 2023-03-03 | End: 2023-03-03

## 2023-03-03 RX ORDER — DOCUSATE SODIUM 100 MG/1
100 CAPSULE, LIQUID FILLED ORAL 2 TIMES DAILY PRN
Status: DISCONTINUED | OUTPATIENT
Start: 2023-03-03 | End: 2023-03-30 | Stop reason: HOSPADM

## 2023-03-03 RX ORDER — HYDROCODONE BITARTRATE AND ACETAMINOPHEN 10; 325 MG/1; MG/1
1 TABLET ORAL EVERY 6 HOURS PRN
Status: DISCONTINUED | OUTPATIENT
Start: 2023-03-03 | End: 2023-03-14

## 2023-03-03 RX ORDER — POLYETHYLENE GLYCOL 3350 17 G/17G
17 POWDER, FOR SOLUTION ORAL DAILY PRN
Status: DISCONTINUED | OUTPATIENT
Start: 2023-03-03 | End: 2023-03-04

## 2023-03-03 RX ORDER — ONDANSETRON 4 MG/1
4 TABLET, ORALLY DISINTEGRATING ORAL EVERY 8 HOURS PRN
Status: DISCONTINUED | OUTPATIENT
Start: 2023-03-03 | End: 2023-03-30 | Stop reason: HOSPADM

## 2023-03-03 RX ORDER — ACETAMINOPHEN 650 MG/1
650 SUPPOSITORY RECTAL EVERY 6 HOURS PRN
Status: DISCONTINUED | OUTPATIENT
Start: 2023-03-03 | End: 2023-03-30 | Stop reason: HOSPADM

## 2023-03-03 RX ORDER — 0.9 % SODIUM CHLORIDE 0.9 %
1000 INTRAVENOUS SOLUTION INTRAVENOUS ONCE
Status: DISCONTINUED | OUTPATIENT
Start: 2023-03-03 | End: 2023-03-03

## 2023-03-03 RX ORDER — ROSUVASTATIN CALCIUM 20 MG/1
40 TABLET, COATED ORAL NIGHTLY
Status: DISCONTINUED | OUTPATIENT
Start: 2023-03-03 | End: 2023-03-30 | Stop reason: HOSPADM

## 2023-03-03 RX ORDER — GINSENG 100 MG
CAPSULE ORAL 3 TIMES DAILY
Status: DISCONTINUED | OUTPATIENT
Start: 2023-03-03 | End: 2023-03-30 | Stop reason: HOSPADM

## 2023-03-03 RX ORDER — DEXAMETHASONE SODIUM PHOSPHATE 10 MG/ML
10 INJECTION INTRAMUSCULAR; INTRAVENOUS ONCE
Status: COMPLETED | OUTPATIENT
Start: 2023-03-03 | End: 2023-03-03

## 2023-03-03 RX ORDER — ONDANSETRON 2 MG/ML
4 INJECTION INTRAMUSCULAR; INTRAVENOUS EVERY 6 HOURS PRN
Status: DISCONTINUED | OUTPATIENT
Start: 2023-03-03 | End: 2023-03-30 | Stop reason: HOSPADM

## 2023-03-03 RX ORDER — ACETAMINOPHEN 325 MG/1
650 TABLET ORAL EVERY 6 HOURS PRN
Status: DISCONTINUED | OUTPATIENT
Start: 2023-03-03 | End: 2023-03-30 | Stop reason: HOSPADM

## 2023-03-03 RX ORDER — DOXYCYCLINE HYCLATE 100 MG/1
100 CAPSULE ORAL EVERY 12 HOURS SCHEDULED
Status: DISCONTINUED | OUTPATIENT
Start: 2023-03-03 | End: 2023-03-05

## 2023-03-03 RX ORDER — 0.9 % SODIUM CHLORIDE 0.9 %
250 INTRAVENOUS SOLUTION INTRAVENOUS ONCE
Status: DISCONTINUED | OUTPATIENT
Start: 2023-03-03 | End: 2023-03-03

## 2023-03-03 RX ORDER — DEXAMETHASONE SODIUM PHOSPHATE 4 MG/ML
4 INJECTION, SOLUTION INTRA-ARTICULAR; INTRALESIONAL; INTRAMUSCULAR; INTRAVENOUS; SOFT TISSUE EVERY 6 HOURS
Status: COMPLETED | OUTPATIENT
Start: 2023-03-04 | End: 2023-03-06

## 2023-03-03 RX ORDER — LORAZEPAM 1 MG/1
1 TABLET ORAL ONCE
Status: COMPLETED | OUTPATIENT
Start: 2023-03-03 | End: 2023-03-03

## 2023-03-03 RX ORDER — PANTOPRAZOLE SODIUM 40 MG/1
40 TABLET, DELAYED RELEASE ORAL
Status: DISCONTINUED | OUTPATIENT
Start: 2023-03-04 | End: 2023-03-24

## 2023-03-03 RX ORDER — SODIUM CHLORIDE 9 MG/ML
INJECTION, SOLUTION INTRAVENOUS PRN
Status: DISCONTINUED | OUTPATIENT
Start: 2023-03-03 | End: 2023-03-30 | Stop reason: HOSPADM

## 2023-03-03 RX ADMIN — ROSUVASTATIN CALCIUM 40 MG: 20 TABLET, FILM COATED ORAL at 21:24

## 2023-03-03 RX ADMIN — SODIUM CHLORIDE 1000 ML: 9 INJECTION, SOLUTION INTRAVENOUS at 16:28

## 2023-03-03 RX ADMIN — BACITRACIN: 500 OINTMENT TOPICAL at 21:30

## 2023-03-03 RX ADMIN — LORAZEPAM 1 MG: 1 TABLET ORAL at 18:43

## 2023-03-03 RX ADMIN — GABAPENTIN 400 MG: 300 CAPSULE ORAL at 21:24

## 2023-03-03 RX ADMIN — CARBAMAZEPINE 400 MG: 200 TABLET ORAL at 21:24

## 2023-03-03 RX ADMIN — SODIUM CHLORIDE 250 ML: 9 INJECTION, SOLUTION INTRAVENOUS at 18:18

## 2023-03-03 RX ADMIN — ACETAMINOPHEN 650 MG: 325 TABLET ORAL at 21:24

## 2023-03-03 RX ADMIN — SODIUM CHLORIDE, PRESERVATIVE FREE 10 ML: 5 INJECTION INTRAVENOUS at 21:25

## 2023-03-03 RX ADMIN — DEXAMETHASONE SODIUM PHOSPHATE 10 MG: 10 INJECTION, SOLUTION INTRAMUSCULAR; INTRAVENOUS at 18:17

## 2023-03-03 ASSESSMENT — ENCOUNTER SYMPTOMS
COLOR CHANGE: 0
VOMITING: 0
DIARRHEA: 0
ABDOMINAL PAIN: 0
SHORTNESS OF BREATH: 0
BLOOD IN STOOL: 0
COUGH: 0
NAUSEA: 0

## 2023-03-03 ASSESSMENT — PAIN - FUNCTIONAL ASSESSMENT: PAIN_FUNCTIONAL_ASSESSMENT: NONE - DENIES PAIN

## 2023-03-03 ASSESSMENT — PAIN SCALES - GENERAL: PAINLEVEL_OUTOF10: 3
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
## 2023-03-04 LAB
ANION GAP SERPL CALC-SCNC: 3 MMOL/L (ref 2–11)
BASOPHILS # BLD: 0 K/UL (ref 0–0.2)
BASOPHILS NFR BLD: 0 % (ref 0–2)
BUN SERPL-MCNC: 15 MG/DL (ref 8–23)
CALCIUM SERPL-MCNC: 8.4 MG/DL (ref 8.3–10.4)
CARBAMAZEPINE SERPL-MCNC: 9.2 UG/ML (ref 4–12)
CHLORIDE SERPL-SCNC: 109 MMOL/L (ref 101–110)
CO2 SERPL-SCNC: 23 MMOL/L (ref 21–32)
CREAT SERPL-MCNC: 0.9 MG/DL (ref 0.8–1.5)
DIFFERENTIAL METHOD BLD: ABNORMAL
EKG ATRIAL RATE: 48 BPM
EKG DIAGNOSIS: NORMAL
EKG P AXIS: 62 DEGREES
EKG P-R INTERVAL: 206 MS
EKG Q-T INTERVAL: 535 MS
EKG QRS DURATION: 140 MS
EKG QTC CALCULATION (BAZETT): 483 MS
EKG R AXIS: -45 DEGREES
EKG T AXIS: 208 DEGREES
EKG VENTRICULAR RATE: 49 BPM
EOSINOPHIL # BLD: 0 K/UL (ref 0–0.8)
EOSINOPHIL NFR BLD: 0 % (ref 0.5–7.8)
ERYTHROCYTE [DISTWIDTH] IN BLOOD BY AUTOMATED COUNT: 14.3 % (ref 11.9–14.6)
FERRITIN SERPL-MCNC: 74 NG/ML (ref 8–388)
GLUCOSE SERPL-MCNC: 145 MG/DL (ref 65–100)
HCT VFR BLD AUTO: 32 % (ref 41.1–50.3)
HGB BLD-MCNC: 10.8 G/DL (ref 13.6–17.2)
IMM GRANULOCYTES # BLD AUTO: 0 K/UL (ref 0–0.5)
IMM GRANULOCYTES NFR BLD AUTO: 0 % (ref 0–5)
IRON SATN MFR SERPL: 14 %
IRON SERPL-MCNC: 46 UG/DL (ref 35–150)
LYMPHOCYTES # BLD: 0.4 K/UL (ref 0.5–4.6)
LYMPHOCYTES NFR BLD: 9 % (ref 13–44)
MCH RBC QN AUTO: 36 PG (ref 26.1–32.9)
MCHC RBC AUTO-ENTMCNC: 33.8 G/DL (ref 31.4–35)
MCV RBC AUTO: 106.7 FL (ref 82–102)
MONOCYTES # BLD: 0.2 K/UL (ref 0.1–1.3)
MONOCYTES NFR BLD: 5 % (ref 4–12)
NEUTS SEG # BLD: 3.7 K/UL (ref 1.7–8.2)
NEUTS SEG NFR BLD: 86 % (ref 43–78)
NRBC # BLD: 0 K/UL (ref 0–0.2)
PLATELET # BLD AUTO: 120 K/UL (ref 150–450)
PMV BLD AUTO: 8.7 FL (ref 9.4–12.3)
POTASSIUM SERPL-SCNC: 4.2 MMOL/L (ref 3.5–5.1)
RBC # BLD AUTO: 3 M/UL (ref 4.23–5.6)
SODIUM SERPL-SCNC: 135 MMOL/L (ref 133–143)
TIBC SERPL-MCNC: 320 UG/DL (ref 250–450)
TSH W FREE THYROID IF ABNORMAL: 2.09 UIU/ML (ref 0.36–3.74)
VIT B12 SERPL-MCNC: 786 PG/ML (ref 193–986)
WBC # BLD AUTO: 4.3 K/UL (ref 4.3–11.1)

## 2023-03-04 PROCEDURE — 82607 VITAMIN B-12: CPT

## 2023-03-04 PROCEDURE — 90471 IMMUNIZATION ADMIN: CPT

## 2023-03-04 PROCEDURE — 2580000003 HC RX 258: Performed by: FAMILY MEDICINE

## 2023-03-04 PROCEDURE — 99232 SBSQ HOSP IP/OBS MODERATE 35: CPT | Performed by: PSYCHIATRY & NEUROLOGY

## 2023-03-04 PROCEDURE — 97112 NEUROMUSCULAR REEDUCATION: CPT

## 2023-03-04 PROCEDURE — 84443 ASSAY THYROID STIM HORMONE: CPT

## 2023-03-04 PROCEDURE — 85025 COMPLETE CBC W/AUTO DIFF WBC: CPT

## 2023-03-04 PROCEDURE — 99222 1ST HOSP IP/OBS MODERATE 55: CPT | Performed by: NEUROLOGICAL SURGERY

## 2023-03-04 PROCEDURE — 83540 ASSAY OF IRON: CPT

## 2023-03-04 PROCEDURE — 80048 BASIC METABOLIC PNL TOTAL CA: CPT

## 2023-03-04 PROCEDURE — 6370000000 HC RX 637 (ALT 250 FOR IP): Performed by: FAMILY MEDICINE

## 2023-03-04 PROCEDURE — 97167 OT EVAL HIGH COMPLEX 60 MIN: CPT

## 2023-03-04 PROCEDURE — 80156 ASSAY CARBAMAZEPINE TOTAL: CPT

## 2023-03-04 PROCEDURE — 6360000002 HC RX W HCPCS: Performed by: FAMILY MEDICINE

## 2023-03-04 PROCEDURE — 97163 PT EVAL HIGH COMPLEX 45 MIN: CPT

## 2023-03-04 PROCEDURE — 97535 SELF CARE MNGMENT TRAINING: CPT

## 2023-03-04 PROCEDURE — 6360000002 HC RX W HCPCS

## 2023-03-04 PROCEDURE — 1100000003 HC PRIVATE W/ TELEMETRY

## 2023-03-04 PROCEDURE — 82728 ASSAY OF FERRITIN: CPT

## 2023-03-04 PROCEDURE — 97530 THERAPEUTIC ACTIVITIES: CPT

## 2023-03-04 PROCEDURE — 90714 TD VACC NO PRESV 7 YRS+ IM: CPT

## 2023-03-04 PROCEDURE — 6370000000 HC RX 637 (ALT 250 FOR IP): Performed by: INTERNAL MEDICINE

## 2023-03-04 PROCEDURE — 36415 COLL VENOUS BLD VENIPUNCTURE: CPT

## 2023-03-04 RX ORDER — MINERAL OIL AND WHITE PETROLATUM 150; 830 MG/G; MG/G
OINTMENT OPHTHALMIC PRN
Status: DISCONTINUED | OUTPATIENT
Start: 2023-03-04 | End: 2023-03-04 | Stop reason: SDUPTHER

## 2023-03-04 RX ORDER — CARBOXYMETHYLCELLULOSE SODIUM 10 MG/ML
1 GEL OPHTHALMIC PRN
Status: DISCONTINUED | OUTPATIENT
Start: 2023-03-04 | End: 2023-03-30 | Stop reason: HOSPADM

## 2023-03-04 RX ORDER — FUROSEMIDE 40 MG/1
40 TABLET ORAL 2 TIMES DAILY
Status: DISCONTINUED | OUTPATIENT
Start: 2023-03-04 | End: 2023-03-13

## 2023-03-04 RX ORDER — FOLIC ACID 1 MG/1
1 TABLET ORAL DAILY
Status: DISCONTINUED | OUTPATIENT
Start: 2023-03-04 | End: 2023-03-30 | Stop reason: HOSPADM

## 2023-03-04 RX ORDER — POLYETHYLENE GLYCOL 3350 17 G/17G
17 POWDER, FOR SOLUTION ORAL DAILY
Status: DISCONTINUED | OUTPATIENT
Start: 2023-03-04 | End: 2023-03-23

## 2023-03-04 RX ADMIN — BACITRACIN: 500 OINTMENT TOPICAL at 08:49

## 2023-03-04 RX ADMIN — CARBAMAZEPINE 400 MG: 200 TABLET ORAL at 20:30

## 2023-03-04 RX ADMIN — DEXAMETHASONE SODIUM PHOSPHATE 4 MG: 4 INJECTION, SOLUTION INTRAMUSCULAR; INTRAVENOUS at 02:59

## 2023-03-04 RX ADMIN — CARBOXYMETHYLCELLULOSE SODIUM 1 DROP: 10 GEL OPHTHALMIC at 21:40

## 2023-03-04 RX ADMIN — CARBAMAZEPINE 400 MG: 200 TABLET ORAL at 08:46

## 2023-03-04 RX ADMIN — EMPAGLIFLOZIN 10 MG: 10 TABLET, FILM COATED ORAL at 08:46

## 2023-03-04 RX ADMIN — FUROSEMIDE 40 MG: 40 TABLET ORAL at 18:01

## 2023-03-04 RX ADMIN — DEXAMETHASONE SODIUM PHOSPHATE 4 MG: 4 INJECTION, SOLUTION INTRAMUSCULAR; INTRAVENOUS at 15:12

## 2023-03-04 RX ADMIN — GABAPENTIN 400 MG: 300 CAPSULE ORAL at 20:30

## 2023-03-04 RX ADMIN — FOLIC ACID 1 MG: 1 TABLET ORAL at 18:01

## 2023-03-04 RX ADMIN — SODIUM CHLORIDE, PRESERVATIVE FREE 10 ML: 5 INJECTION INTRAVENOUS at 20:30

## 2023-03-04 RX ADMIN — SODIUM CHLORIDE, PRESERVATIVE FREE 5 ML: 5 INJECTION INTRAVENOUS at 08:47

## 2023-03-04 RX ADMIN — POLYETHYLENE GLYCOL 3350 17 G: 17 POWDER, FOR SOLUTION ORAL at 18:00

## 2023-03-04 RX ADMIN — ROSUVASTATIN CALCIUM 40 MG: 20 TABLET, FILM COATED ORAL at 20:30

## 2023-03-04 RX ADMIN — DEXAMETHASONE SODIUM PHOSPHATE 4 MG: 4 INJECTION, SOLUTION INTRAMUSCULAR; INTRAVENOUS at 08:46

## 2023-03-04 RX ADMIN — DEXAMETHASONE SODIUM PHOSPHATE 4 MG: 4 INJECTION, SOLUTION INTRAMUSCULAR; INTRAVENOUS at 20:30

## 2023-03-04 RX ADMIN — CEFTRIAXONE 1000 MG: 1 INJECTION, POWDER, FOR SOLUTION INTRAMUSCULAR; INTRAVENOUS at 23:36

## 2023-03-04 RX ADMIN — DOXYCYCLINE HYCLATE 100 MG: 100 CAPSULE ORAL at 08:46

## 2023-03-04 RX ADMIN — CLOSTRIDIUM TETANI TOXOID ANTIGEN (FORMALDEHYDE INACTIVATED) AND CORYNEBACTERIUM DIPHTHERIAE TOXOID ANTIGEN (FORMALDEHYDE INACTIVATED) 0.5 ML: 5; 2 INJECTION, SUSPENSION INTRAMUSCULAR at 09:01

## 2023-03-04 RX ADMIN — GABAPENTIN 400 MG: 300 CAPSULE ORAL at 15:10

## 2023-03-04 RX ADMIN — PANTOPRAZOLE SODIUM 40 MG: 40 TABLET, DELAYED RELEASE ORAL at 15:10

## 2023-03-04 RX ADMIN — PANTOPRAZOLE SODIUM 40 MG: 40 TABLET, DELAYED RELEASE ORAL at 06:31

## 2023-03-04 RX ADMIN — BACITRACIN: 500 OINTMENT TOPICAL at 16:31

## 2023-03-04 RX ADMIN — GABAPENTIN 400 MG: 300 CAPSULE ORAL at 08:46

## 2023-03-04 RX ADMIN — BACITRACIN: 500 OINTMENT TOPICAL at 21:40

## 2023-03-04 RX ADMIN — CEFTRIAXONE 1000 MG: 1 INJECTION, POWDER, FOR SOLUTION INTRAMUSCULAR; INTRAVENOUS at 00:32

## 2023-03-04 RX ADMIN — DOXYCYCLINE HYCLATE 100 MG: 100 CAPSULE ORAL at 20:30

## 2023-03-04 ASSESSMENT — PAIN SCALES - GENERAL
PAINLEVEL_OUTOF10: 0

## 2023-03-05 LAB
ANION GAP SERPL CALC-SCNC: 5 MMOL/L (ref 2–11)
BASOPHILS # BLD: 0 K/UL (ref 0–0.2)
BASOPHILS NFR BLD: 0 % (ref 0–2)
BUN SERPL-MCNC: 22 MG/DL (ref 8–23)
CALCIUM SERPL-MCNC: 8.6 MG/DL (ref 8.3–10.4)
CHLORIDE SERPL-SCNC: 106 MMOL/L (ref 101–110)
CO2 SERPL-SCNC: 24 MMOL/L (ref 21–32)
CORTIS BS SERPL-MCNC: 1.5 UG/DL
CREAT SERPL-MCNC: 1 MG/DL (ref 0.8–1.5)
DIFFERENTIAL METHOD BLD: ABNORMAL
EOSINOPHIL # BLD: 0 K/UL (ref 0–0.8)
EOSINOPHIL NFR BLD: 0 % (ref 0.5–7.8)
ERYTHROCYTE [DISTWIDTH] IN BLOOD BY AUTOMATED COUNT: 14.2 % (ref 11.9–14.6)
FOLATE SERPL-MCNC: 19.9 NG/ML (ref 3.1–17.5)
GLUCOSE SERPL-MCNC: 136 MG/DL (ref 65–100)
HCT VFR BLD AUTO: 33.8 % (ref 41.1–50.3)
HGB BLD-MCNC: 11.4 G/DL (ref 13.6–17.2)
IMM GRANULOCYTES # BLD AUTO: 0.1 K/UL (ref 0–0.5)
IMM GRANULOCYTES NFR BLD AUTO: 1 % (ref 0–5)
LYMPHOCYTES # BLD: 0.5 K/UL (ref 0.5–4.6)
LYMPHOCYTES NFR BLD: 6 % (ref 13–44)
MCH RBC QN AUTO: 36.3 PG (ref 26.1–32.9)
MCHC RBC AUTO-ENTMCNC: 33.7 G/DL (ref 31.4–35)
MCV RBC AUTO: 107.6 FL (ref 82–102)
MONOCYTES # BLD: 0.5 K/UL (ref 0.1–1.3)
MONOCYTES NFR BLD: 6 % (ref 4–12)
NEUTS SEG # BLD: 6.8 K/UL (ref 1.7–8.2)
NEUTS SEG NFR BLD: 87 % (ref 43–78)
NRBC # BLD: 0 K/UL (ref 0–0.2)
PLATELET # BLD AUTO: 129 K/UL (ref 150–450)
PMV BLD AUTO: 9.1 FL (ref 9.4–12.3)
POTASSIUM SERPL-SCNC: 4.3 MMOL/L (ref 3.5–5.1)
RBC # BLD AUTO: 3.14 M/UL (ref 4.23–5.6)
SODIUM SERPL-SCNC: 135 MMOL/L (ref 133–143)
WBC # BLD AUTO: 7.8 K/UL (ref 4.3–11.1)

## 2023-03-05 PROCEDURE — 2580000003 HC RX 258: Performed by: FAMILY MEDICINE

## 2023-03-05 PROCEDURE — 82533 TOTAL CORTISOL: CPT

## 2023-03-05 PROCEDURE — 6360000002 HC RX W HCPCS: Performed by: FAMILY MEDICINE

## 2023-03-05 PROCEDURE — 36415 COLL VENOUS BLD VENIPUNCTURE: CPT

## 2023-03-05 PROCEDURE — 6370000000 HC RX 637 (ALT 250 FOR IP): Performed by: INTERNAL MEDICINE

## 2023-03-05 PROCEDURE — 80048 BASIC METABOLIC PNL TOTAL CA: CPT

## 2023-03-05 PROCEDURE — 51702 INSERT TEMP BLADDER CATH: CPT

## 2023-03-05 PROCEDURE — 85025 COMPLETE CBC W/AUTO DIFF WBC: CPT

## 2023-03-05 PROCEDURE — 6370000000 HC RX 637 (ALT 250 FOR IP): Performed by: FAMILY MEDICINE

## 2023-03-05 PROCEDURE — 82746 ASSAY OF FOLIC ACID SERUM: CPT

## 2023-03-05 PROCEDURE — 1100000003 HC PRIVATE W/ TELEMETRY

## 2023-03-05 RX ADMIN — DEXAMETHASONE SODIUM PHOSPHATE 4 MG: 4 INJECTION, SOLUTION INTRAMUSCULAR; INTRAVENOUS at 14:48

## 2023-03-05 RX ADMIN — GABAPENTIN 400 MG: 300 CAPSULE ORAL at 21:05

## 2023-03-05 RX ADMIN — POLYETHYLENE GLYCOL 3350 17 G: 17 POWDER, FOR SOLUTION ORAL at 08:45

## 2023-03-05 RX ADMIN — SODIUM CHLORIDE, PRESERVATIVE FREE 10 ML: 5 INJECTION INTRAVENOUS at 21:05

## 2023-03-05 RX ADMIN — BACITRACIN: 500 OINTMENT TOPICAL at 08:43

## 2023-03-05 RX ADMIN — GABAPENTIN 400 MG: 300 CAPSULE ORAL at 14:48

## 2023-03-05 RX ADMIN — BACITRACIN: 500 OINTMENT TOPICAL at 21:01

## 2023-03-05 RX ADMIN — FOLIC ACID 1 MG: 1 TABLET ORAL at 08:44

## 2023-03-05 RX ADMIN — DEXAMETHASONE SODIUM PHOSPHATE 4 MG: 4 INJECTION, SOLUTION INTRAMUSCULAR; INTRAVENOUS at 08:46

## 2023-03-05 RX ADMIN — DOXYCYCLINE HYCLATE 100 MG: 100 CAPSULE ORAL at 08:44

## 2023-03-05 RX ADMIN — BACITRACIN: 500 OINTMENT TOPICAL at 14:53

## 2023-03-05 RX ADMIN — DEXAMETHASONE SODIUM PHOSPHATE 4 MG: 4 INJECTION, SOLUTION INTRAMUSCULAR; INTRAVENOUS at 21:05

## 2023-03-05 RX ADMIN — PANTOPRAZOLE SODIUM 40 MG: 40 TABLET, DELAYED RELEASE ORAL at 06:28

## 2023-03-05 RX ADMIN — ROSUVASTATIN CALCIUM 40 MG: 20 TABLET, FILM COATED ORAL at 21:05

## 2023-03-05 RX ADMIN — EMPAGLIFLOZIN 10 MG: 10 TABLET, FILM COATED ORAL at 08:44

## 2023-03-05 RX ADMIN — FUROSEMIDE 40 MG: 40 TABLET ORAL at 08:45

## 2023-03-05 RX ADMIN — FUROSEMIDE 40 MG: 40 TABLET ORAL at 16:53

## 2023-03-05 RX ADMIN — GABAPENTIN 400 MG: 300 CAPSULE ORAL at 08:45

## 2023-03-05 RX ADMIN — CARBAMAZEPINE 400 MG: 200 TABLET ORAL at 21:05

## 2023-03-05 RX ADMIN — SODIUM CHLORIDE, PRESERVATIVE FREE 10 ML: 5 INJECTION INTRAVENOUS at 08:46

## 2023-03-05 RX ADMIN — PANTOPRAZOLE SODIUM 40 MG: 40 TABLET, DELAYED RELEASE ORAL at 16:53

## 2023-03-05 RX ADMIN — CARBAMAZEPINE 400 MG: 200 TABLET ORAL at 08:44

## 2023-03-05 RX ADMIN — DEXAMETHASONE SODIUM PHOSPHATE 4 MG: 4 INJECTION, SOLUTION INTRAMUSCULAR; INTRAVENOUS at 02:44

## 2023-03-05 ASSESSMENT — PAIN SCALES - GENERAL
PAINLEVEL_OUTOF10: 0

## 2023-03-06 ENCOUNTER — PREP FOR PROCEDURE (OUTPATIENT)
Dept: NEUROSURGERY | Age: 78
End: 2023-03-06

## 2023-03-06 PROBLEM — G95.20 CERVICAL SPINAL CORD COMPRESSION (HCC): Status: ACTIVE | Noted: 2023-03-06

## 2023-03-06 LAB
ANION GAP SERPL CALC-SCNC: 6 MMOL/L (ref 2–11)
BASOPHILS # BLD: 0 K/UL (ref 0–0.2)
BASOPHILS NFR BLD: 0 % (ref 0–2)
BUN SERPL-MCNC: 26 MG/DL (ref 8–23)
CALCIUM SERPL-MCNC: 8.5 MG/DL (ref 8.3–10.4)
CHLORIDE SERPL-SCNC: 102 MMOL/L (ref 101–110)
CO2 SERPL-SCNC: 24 MMOL/L (ref 21–32)
CREAT SERPL-MCNC: 1 MG/DL (ref 0.8–1.5)
DIFFERENTIAL METHOD BLD: ABNORMAL
EOSINOPHIL # BLD: 0 K/UL (ref 0–0.8)
EOSINOPHIL NFR BLD: 0 % (ref 0.5–7.8)
ERYTHROCYTE [DISTWIDTH] IN BLOOD BY AUTOMATED COUNT: 14 % (ref 11.9–14.6)
GLUCOSE SERPL-MCNC: 105 MG/DL (ref 65–100)
HCT VFR BLD AUTO: 35.6 % (ref 41.1–50.3)
HGB BLD-MCNC: 12.2 G/DL (ref 13.6–17.2)
IMM GRANULOCYTES # BLD AUTO: 0 K/UL (ref 0–0.5)
IMM GRANULOCYTES NFR BLD AUTO: 0 % (ref 0–5)
LYMPHOCYTES # BLD: 0.6 K/UL (ref 0.5–4.6)
LYMPHOCYTES NFR BLD: 6 % (ref 13–44)
MCH RBC QN AUTO: 36.1 PG (ref 26.1–32.9)
MCHC RBC AUTO-ENTMCNC: 34.3 G/DL (ref 31.4–35)
MCV RBC AUTO: 105.3 FL (ref 82–102)
MONOCYTES # BLD: 0.5 K/UL (ref 0.1–1.3)
MONOCYTES NFR BLD: 5 % (ref 4–12)
NEUTS SEG # BLD: 7.7 K/UL (ref 1.7–8.2)
NEUTS SEG NFR BLD: 89 % (ref 43–78)
NRBC # BLD: 0 K/UL (ref 0–0.2)
PLATELET # BLD AUTO: 137 K/UL (ref 150–450)
PMV BLD AUTO: 9.3 FL (ref 9.4–12.3)
POTASSIUM SERPL-SCNC: 4.3 MMOL/L (ref 3.5–5.1)
RBC # BLD AUTO: 3.38 M/UL (ref 4.23–5.6)
SODIUM SERPL-SCNC: 132 MMOL/L (ref 133–143)
WBC # BLD AUTO: 8.8 K/UL (ref 4.3–11.1)

## 2023-03-06 PROCEDURE — 97112 NEUROMUSCULAR REEDUCATION: CPT

## 2023-03-06 PROCEDURE — 97164 PT RE-EVAL EST PLAN CARE: CPT

## 2023-03-06 PROCEDURE — 97530 THERAPEUTIC ACTIVITIES: CPT

## 2023-03-06 PROCEDURE — 85025 COMPLETE CBC W/AUTO DIFF WBC: CPT

## 2023-03-06 PROCEDURE — 6370000000 HC RX 637 (ALT 250 FOR IP): Performed by: FAMILY MEDICINE

## 2023-03-06 PROCEDURE — 1100000003 HC PRIVATE W/ TELEMETRY

## 2023-03-06 PROCEDURE — 6360000002 HC RX W HCPCS: Performed by: FAMILY MEDICINE

## 2023-03-06 PROCEDURE — 2580000003 HC RX 258: Performed by: FAMILY MEDICINE

## 2023-03-06 PROCEDURE — 6370000000 HC RX 637 (ALT 250 FOR IP): Performed by: INTERNAL MEDICINE

## 2023-03-06 PROCEDURE — 80048 BASIC METABOLIC PNL TOTAL CA: CPT

## 2023-03-06 PROCEDURE — 97535 SELF CARE MNGMENT TRAINING: CPT

## 2023-03-06 PROCEDURE — 36415 COLL VENOUS BLD VENIPUNCTURE: CPT

## 2023-03-06 RX ORDER — BISACODYL 10 MG
10 SUPPOSITORY, RECTAL RECTAL DAILY PRN
Status: DISCONTINUED | OUTPATIENT
Start: 2023-03-06 | End: 2023-03-30 | Stop reason: HOSPADM

## 2023-03-06 RX ADMIN — BACITRACIN: 500 OINTMENT TOPICAL at 20:45

## 2023-03-06 RX ADMIN — ACETAMINOPHEN 650 MG: 325 TABLET ORAL at 20:34

## 2023-03-06 RX ADMIN — GABAPENTIN 400 MG: 300 CAPSULE ORAL at 09:05

## 2023-03-06 RX ADMIN — ROSUVASTATIN CALCIUM 40 MG: 20 TABLET, FILM COATED ORAL at 20:39

## 2023-03-06 RX ADMIN — DEXAMETHASONE SODIUM PHOSPHATE 4 MG: 4 INJECTION, SOLUTION INTRAMUSCULAR; INTRAVENOUS at 02:36

## 2023-03-06 RX ADMIN — CARBAMAZEPINE 400 MG: 200 TABLET ORAL at 09:05

## 2023-03-06 RX ADMIN — GABAPENTIN 400 MG: 300 CAPSULE ORAL at 14:38

## 2023-03-06 RX ADMIN — BACITRACIN: 500 OINTMENT TOPICAL at 09:07

## 2023-03-06 RX ADMIN — DEXAMETHASONE SODIUM PHOSPHATE 4 MG: 4 INJECTION, SOLUTION INTRAMUSCULAR; INTRAVENOUS at 20:33

## 2023-03-06 RX ADMIN — DOCUSATE SODIUM 100 MG: 100 CAPSULE, LIQUID FILLED ORAL at 16:28

## 2023-03-06 RX ADMIN — CARBAMAZEPINE 400 MG: 200 TABLET ORAL at 20:33

## 2023-03-06 RX ADMIN — EMPAGLIFLOZIN 10 MG: 10 TABLET, FILM COATED ORAL at 09:06

## 2023-03-06 RX ADMIN — SODIUM CHLORIDE, PRESERVATIVE FREE 10 ML: 5 INJECTION INTRAVENOUS at 20:34

## 2023-03-06 RX ADMIN — PANTOPRAZOLE SODIUM 40 MG: 40 TABLET, DELAYED RELEASE ORAL at 06:24

## 2023-03-06 RX ADMIN — POLYETHYLENE GLYCOL 3350 17 G: 17 POWDER, FOR SOLUTION ORAL at 09:06

## 2023-03-06 RX ADMIN — FUROSEMIDE 40 MG: 40 TABLET ORAL at 09:06

## 2023-03-06 RX ADMIN — FOLIC ACID 1 MG: 1 TABLET ORAL at 09:06

## 2023-03-06 RX ADMIN — DEXAMETHASONE SODIUM PHOSPHATE 4 MG: 4 INJECTION, SOLUTION INTRAMUSCULAR; INTRAVENOUS at 09:14

## 2023-03-06 RX ADMIN — PANTOPRAZOLE SODIUM 40 MG: 40 TABLET, DELAYED RELEASE ORAL at 14:38

## 2023-03-06 RX ADMIN — BISACODYL 10 MG: 10 SUPPOSITORY RECTAL at 16:28

## 2023-03-06 RX ADMIN — DEXAMETHASONE SODIUM PHOSPHATE 4 MG: 4 INJECTION, SOLUTION INTRAMUSCULAR; INTRAVENOUS at 14:38

## 2023-03-06 RX ADMIN — GABAPENTIN 400 MG: 300 CAPSULE ORAL at 20:34

## 2023-03-06 RX ADMIN — BACITRACIN: 500 OINTMENT TOPICAL at 14:44

## 2023-03-06 RX ADMIN — FUROSEMIDE 40 MG: 40 TABLET ORAL at 16:29

## 2023-03-06 RX ADMIN — SODIUM CHLORIDE, PRESERVATIVE FREE 5 ML: 5 INJECTION INTRAVENOUS at 09:13

## 2023-03-06 ASSESSMENT — PAIN SCALES - GENERAL
PAINLEVEL_OUTOF10: 0
PAINLEVEL_OUTOF10: 3

## 2023-03-06 ASSESSMENT — PAIN DESCRIPTION - DESCRIPTORS: DESCRIPTORS: ACHING;TENDER;SORE

## 2023-03-06 ASSESSMENT — PAIN DESCRIPTION - LOCATION: LOCATION: NECK

## 2023-03-06 ASSESSMENT — PAIN DESCRIPTION - ORIENTATION: ORIENTATION: RIGHT;LEFT;MID

## 2023-03-07 ENCOUNTER — APPOINTMENT (OUTPATIENT)
Dept: GENERAL RADIOLOGY | Age: 78
DRG: 453 | End: 2023-03-07
Attending: PHYSICAL MEDICINE & REHABILITATION
Payer: MEDICARE

## 2023-03-07 LAB
ALBUMIN SERPL-MCNC: 2.9 G/DL (ref 3.2–4.6)
ALBUMIN/GLOB SERPL: 0.6 (ref 0.4–1.6)
ALP SERPL-CCNC: 96 U/L (ref 50–136)
ALT SERPL-CCNC: 17 U/L (ref 12–65)
ANION GAP SERPL CALC-SCNC: 7 MMOL/L (ref 2–11)
AST SERPL-CCNC: 22 U/L (ref 15–37)
BASOPHILS # BLD: 0 K/UL (ref 0–0.2)
BASOPHILS NFR BLD: 0 % (ref 0–2)
BILIRUB SERPL-MCNC: 0.8 MG/DL (ref 0.2–1.1)
BUN SERPL-MCNC: 29 MG/DL (ref 8–23)
CALCIUM SERPL-MCNC: 8.6 MG/DL (ref 8.3–10.4)
CHLORIDE SERPL-SCNC: 100 MMOL/L (ref 101–110)
CO2 SERPL-SCNC: 25 MMOL/L (ref 21–32)
CREAT SERPL-MCNC: 1 MG/DL (ref 0.8–1.5)
DIFFERENTIAL METHOD BLD: ABNORMAL
EOSINOPHIL # BLD: 0 K/UL (ref 0–0.8)
EOSINOPHIL NFR BLD: 0 % (ref 0.5–7.8)
ERYTHROCYTE [DISTWIDTH] IN BLOOD BY AUTOMATED COUNT: 13.8 % (ref 11.9–14.6)
GLOBULIN SER CALC-MCNC: 4.6 G/DL (ref 2.8–4.5)
GLUCOSE SERPL-MCNC: 102 MG/DL (ref 65–100)
HCT VFR BLD AUTO: 35.3 % (ref 41.1–50.3)
HGB BLD-MCNC: 12.1 G/DL (ref 13.6–17.2)
IMM GRANULOCYTES # BLD AUTO: 0.1 K/UL (ref 0–0.5)
IMM GRANULOCYTES NFR BLD AUTO: 1 % (ref 0–5)
LYMPHOCYTES # BLD: 0.7 K/UL (ref 0.5–4.6)
LYMPHOCYTES NFR BLD: 8 % (ref 13–44)
MCH RBC QN AUTO: 36.1 PG (ref 26.1–32.9)
MCHC RBC AUTO-ENTMCNC: 34.3 G/DL (ref 31.4–35)
MCV RBC AUTO: 105.4 FL (ref 82–102)
MONOCYTES # BLD: 0.6 K/UL (ref 0.1–1.3)
MONOCYTES NFR BLD: 7 % (ref 4–12)
NEUTS SEG # BLD: 7.7 K/UL (ref 1.7–8.2)
NEUTS SEG NFR BLD: 84 % (ref 43–78)
NRBC # BLD: 0 K/UL (ref 0–0.2)
PLATELET # BLD AUTO: 148 K/UL (ref 150–450)
PMV BLD AUTO: 9 FL (ref 9.4–12.3)
POTASSIUM SERPL-SCNC: 4.2 MMOL/L (ref 3.5–5.1)
PROT SERPL-MCNC: 7.5 G/DL (ref 6.3–8.2)
RBC # BLD AUTO: 3.35 M/UL (ref 4.23–5.6)
SODIUM SERPL-SCNC: 132 MMOL/L (ref 133–143)
WBC # BLD AUTO: 9.1 K/UL (ref 4.3–11.1)

## 2023-03-07 PROCEDURE — 36415 COLL VENOUS BLD VENIPUNCTURE: CPT

## 2023-03-07 PROCEDURE — 85025 COMPLETE CBC W/AUTO DIFF WBC: CPT

## 2023-03-07 PROCEDURE — 6370000000 HC RX 637 (ALT 250 FOR IP): Performed by: FAMILY MEDICINE

## 2023-03-07 PROCEDURE — 80053 COMPREHEN METABOLIC PANEL: CPT

## 2023-03-07 PROCEDURE — 6370000000 HC RX 637 (ALT 250 FOR IP): Performed by: INTERNAL MEDICINE

## 2023-03-07 PROCEDURE — 1100000003 HC PRIVATE W/ TELEMETRY

## 2023-03-07 PROCEDURE — 2580000003 HC RX 258: Performed by: FAMILY MEDICINE

## 2023-03-07 PROCEDURE — 97112 NEUROMUSCULAR REEDUCATION: CPT

## 2023-03-07 PROCEDURE — 74018 RADEX ABDOMEN 1 VIEW: CPT

## 2023-03-07 PROCEDURE — 97535 SELF CARE MNGMENT TRAINING: CPT

## 2023-03-07 PROCEDURE — 2580000003 HC RX 258: Performed by: INTERNAL MEDICINE

## 2023-03-07 RX ORDER — SODIUM CHLORIDE 9 MG/ML
INJECTION, SOLUTION INTRAVENOUS CONTINUOUS
Status: DISCONTINUED | OUTPATIENT
Start: 2023-03-07 | End: 2023-03-09

## 2023-03-07 RX ADMIN — PANTOPRAZOLE SODIUM 40 MG: 40 TABLET, DELAYED RELEASE ORAL at 16:51

## 2023-03-07 RX ADMIN — ROSUVASTATIN CALCIUM 40 MG: 20 TABLET, FILM COATED ORAL at 21:06

## 2023-03-07 RX ADMIN — FUROSEMIDE 40 MG: 40 TABLET ORAL at 08:52

## 2023-03-07 RX ADMIN — HYDROCODONE BITARTRATE AND ACETAMINOPHEN 1 TABLET: 10; 325 TABLET ORAL at 23:49

## 2023-03-07 RX ADMIN — FOLIC ACID 1 MG: 1 TABLET ORAL at 08:52

## 2023-03-07 RX ADMIN — CARBOXYMETHYLCELLULOSE SODIUM 1 DROP: 10 GEL OPHTHALMIC at 12:40

## 2023-03-07 RX ADMIN — BACITRACIN: 500 OINTMENT TOPICAL at 13:46

## 2023-03-07 RX ADMIN — BACITRACIN: 500 OINTMENT TOPICAL at 21:11

## 2023-03-07 RX ADMIN — BACITRACIN: 500 OINTMENT TOPICAL at 08:53

## 2023-03-07 RX ADMIN — CARBAMAZEPINE 400 MG: 200 TABLET ORAL at 08:52

## 2023-03-07 RX ADMIN — POLYETHYLENE GLYCOL 3350 17 G: 17 POWDER, FOR SOLUTION ORAL at 08:52

## 2023-03-07 RX ADMIN — CARBOXYMETHYLCELLULOSE SODIUM 1 DROP: 10 GEL OPHTHALMIC at 09:15

## 2023-03-07 RX ADMIN — PANTOPRAZOLE SODIUM 40 MG: 40 TABLET, DELAYED RELEASE ORAL at 05:42

## 2023-03-07 RX ADMIN — SODIUM CHLORIDE: 9 INJECTION, SOLUTION INTRAVENOUS at 21:30

## 2023-03-07 RX ADMIN — HYDROCODONE BITARTRATE AND ACETAMINOPHEN 1 TABLET: 10; 325 TABLET ORAL at 15:36

## 2023-03-07 RX ADMIN — SODIUM CHLORIDE, PRESERVATIVE FREE 5 ML: 5 INJECTION INTRAVENOUS at 21:10

## 2023-03-07 RX ADMIN — GABAPENTIN 400 MG: 300 CAPSULE ORAL at 13:45

## 2023-03-07 RX ADMIN — SODIUM CHLORIDE, PRESERVATIVE FREE 10 ML: 5 INJECTION INTRAVENOUS at 08:53

## 2023-03-07 RX ADMIN — FUROSEMIDE 40 MG: 40 TABLET ORAL at 16:51

## 2023-03-07 RX ADMIN — GABAPENTIN 400 MG: 300 CAPSULE ORAL at 21:06

## 2023-03-07 RX ADMIN — EMPAGLIFLOZIN 10 MG: 10 TABLET, FILM COATED ORAL at 08:52

## 2023-03-07 RX ADMIN — HYDROCODONE BITARTRATE AND ACETAMINOPHEN 1 TABLET: 10; 325 TABLET ORAL at 08:52

## 2023-03-07 RX ADMIN — CARBOXYMETHYLCELLULOSE SODIUM 1 DROP: 10 GEL OPHTHALMIC at 18:50

## 2023-03-07 RX ADMIN — CARBAMAZEPINE 400 MG: 200 TABLET ORAL at 21:06

## 2023-03-07 RX ADMIN — GABAPENTIN 400 MG: 300 CAPSULE ORAL at 08:52

## 2023-03-07 RX ADMIN — ACETAMINOPHEN 650 MG: 325 TABLET ORAL at 21:06

## 2023-03-07 ASSESSMENT — PAIN DESCRIPTION - ORIENTATION
ORIENTATION: RIGHT;LEFT
ORIENTATION: RIGHT;LEFT;ANTERIOR
ORIENTATION: RIGHT;LEFT;ANTERIOR

## 2023-03-07 ASSESSMENT — PAIN DESCRIPTION - LOCATION
LOCATION: NECK;BACK
LOCATION: NECK;BACK;LEG
LOCATION: NECK

## 2023-03-07 ASSESSMENT — PAIN DESCRIPTION - DESCRIPTORS
DESCRIPTORS: ACHING;DISCOMFORT
DESCRIPTORS: ACHING;DISCOMFORT
DESCRIPTORS: TENDER;SORE;ACHING;DISCOMFORT

## 2023-03-07 ASSESSMENT — PAIN SCALES - GENERAL
PAINLEVEL_OUTOF10: 0
PAINLEVEL_OUTOF10: 3
PAINLEVEL_OUTOF10: 0
PAINLEVEL_OUTOF10: 0
PAINLEVEL_OUTOF10: 7
PAINLEVEL_OUTOF10: 7
PAINLEVEL_OUTOF10: 8
PAINLEVEL_OUTOF10: 0

## 2023-03-07 ASSESSMENT — PAIN DESCRIPTION - PAIN TYPE: TYPE: ACUTE PAIN

## 2023-03-07 ASSESSMENT — PAIN - FUNCTIONAL ASSESSMENT
PAIN_FUNCTIONAL_ASSESSMENT: ACTIVITIES ARE NOT PREVENTED
PAIN_FUNCTIONAL_ASSESSMENT: PREVENTS OR INTERFERES WITH MANY ACTIVE NOT PASSIVE ACTIVITIES

## 2023-03-08 LAB
ANION GAP SERPL CALC-SCNC: 5 MMOL/L (ref 2–11)
BACTERIA SPEC CULT: NORMAL
BACTERIA SPEC CULT: NORMAL
BUN SERPL-MCNC: 29 MG/DL (ref 8–23)
CALCIUM SERPL-MCNC: 8.2 MG/DL (ref 8.3–10.4)
CHLORIDE SERPL-SCNC: 97 MMOL/L (ref 101–110)
CO2 SERPL-SCNC: 30 MMOL/L (ref 21–32)
CORTIS AM PEAK SERPL-MCNC: 5.6 UG/DL (ref 7–25)
CREAT SERPL-MCNC: 1 MG/DL (ref 0.8–1.5)
GLUCOSE SERPL-MCNC: 75 MG/DL (ref 65–100)
POTASSIUM SERPL-SCNC: 4 MMOL/L (ref 3.5–5.1)
SERVICE CMNT-IMP: NORMAL
SERVICE CMNT-IMP: NORMAL
SODIUM SERPL-SCNC: 132 MMOL/L (ref 133–143)

## 2023-03-08 PROCEDURE — 6370000000 HC RX 637 (ALT 250 FOR IP): Performed by: FAMILY MEDICINE

## 2023-03-08 PROCEDURE — 1100000003 HC PRIVATE W/ TELEMETRY

## 2023-03-08 PROCEDURE — 2580000003 HC RX 258: Performed by: FAMILY MEDICINE

## 2023-03-08 PROCEDURE — 97530 THERAPEUTIC ACTIVITIES: CPT

## 2023-03-08 PROCEDURE — 80048 BASIC METABOLIC PNL TOTAL CA: CPT

## 2023-03-08 PROCEDURE — 6370000000 HC RX 637 (ALT 250 FOR IP): Performed by: INTERNAL MEDICINE

## 2023-03-08 PROCEDURE — 97112 NEUROMUSCULAR REEDUCATION: CPT

## 2023-03-08 PROCEDURE — 6360000002 HC RX W HCPCS: Performed by: INTERNAL MEDICINE

## 2023-03-08 PROCEDURE — 2580000003 HC RX 258: Performed by: INTERNAL MEDICINE

## 2023-03-08 PROCEDURE — 36415 COLL VENOUS BLD VENIPUNCTURE: CPT

## 2023-03-08 PROCEDURE — 97535 SELF CARE MNGMENT TRAINING: CPT

## 2023-03-08 PROCEDURE — 97110 THERAPEUTIC EXERCISES: CPT

## 2023-03-08 PROCEDURE — 82533 TOTAL CORTISOL: CPT

## 2023-03-08 RX ORDER — MORPHINE SULFATE 2 MG/ML
2 INJECTION, SOLUTION INTRAMUSCULAR; INTRAVENOUS EVERY 4 HOURS PRN
Status: DISCONTINUED | OUTPATIENT
Start: 2023-03-08 | End: 2023-03-14

## 2023-03-08 RX ORDER — FLUTICASONE PROPIONATE 50 MCG
1 SPRAY, SUSPENSION (ML) NASAL DAILY PRN
Status: DISCONTINUED | OUTPATIENT
Start: 2023-03-08 | End: 2023-03-30 | Stop reason: HOSPADM

## 2023-03-08 RX ORDER — CYCLOBENZAPRINE HCL 5 MG
10 TABLET ORAL 3 TIMES DAILY PRN
Status: DISCONTINUED | OUTPATIENT
Start: 2023-03-08 | End: 2023-03-30 | Stop reason: HOSPADM

## 2023-03-08 RX ORDER — COSYNTROPIN 0.25 MG/ML
250 INJECTION, POWDER, FOR SOLUTION INTRAMUSCULAR; INTRAVENOUS ONCE
Status: COMPLETED | OUTPATIENT
Start: 2023-03-09 | End: 2023-03-09

## 2023-03-08 RX ADMIN — BISACODYL 10 MG: 10 SUPPOSITORY RECTAL at 13:08

## 2023-03-08 RX ADMIN — CARBAMAZEPINE 400 MG: 200 TABLET ORAL at 20:41

## 2023-03-08 RX ADMIN — HYDROCODONE BITARTRATE AND ACETAMINOPHEN 1 TABLET: 10; 325 TABLET ORAL at 06:07

## 2023-03-08 RX ADMIN — SODIUM CHLORIDE: 9 INJECTION, SOLUTION INTRAVENOUS at 18:33

## 2023-03-08 RX ADMIN — CARBOXYMETHYLCELLULOSE SODIUM 1 DROP: 10 GEL OPHTHALMIC at 09:06

## 2023-03-08 RX ADMIN — CARBAMAZEPINE 400 MG: 200 TABLET ORAL at 09:01

## 2023-03-08 RX ADMIN — SODIUM CHLORIDE, PRESERVATIVE FREE 5 ML: 5 INJECTION INTRAVENOUS at 09:01

## 2023-03-08 RX ADMIN — FOLIC ACID 1 MG: 1 TABLET ORAL at 09:01

## 2023-03-08 RX ADMIN — ROSUVASTATIN CALCIUM 40 MG: 20 TABLET, FILM COATED ORAL at 20:41

## 2023-03-08 RX ADMIN — EMPAGLIFLOZIN 10 MG: 10 TABLET, FILM COATED ORAL at 11:09

## 2023-03-08 RX ADMIN — BACITRACIN: 500 OINTMENT TOPICAL at 09:06

## 2023-03-08 RX ADMIN — SODIUM CHLORIDE, PRESERVATIVE FREE 10 ML: 5 INJECTION INTRAVENOUS at 20:41

## 2023-03-08 RX ADMIN — MORPHINE SULFATE 2 MG: 2 INJECTION, SOLUTION INTRAMUSCULAR; INTRAVENOUS at 20:54

## 2023-03-08 RX ADMIN — GABAPENTIN 400 MG: 300 CAPSULE ORAL at 20:41

## 2023-03-08 RX ADMIN — CYCLOBENZAPRINE HYDROCHLORIDE 10 MG: 5 TABLET, FILM COATED ORAL at 12:55

## 2023-03-08 RX ADMIN — MORPHINE SULFATE 2 MG: 2 INJECTION, SOLUTION INTRAMUSCULAR; INTRAVENOUS at 11:37

## 2023-03-08 RX ADMIN — GABAPENTIN 400 MG: 300 CAPSULE ORAL at 14:21

## 2023-03-08 RX ADMIN — PANTOPRAZOLE SODIUM 40 MG: 40 TABLET, DELAYED RELEASE ORAL at 05:55

## 2023-03-08 RX ADMIN — FUROSEMIDE 40 MG: 40 TABLET ORAL at 17:06

## 2023-03-08 RX ADMIN — FUROSEMIDE 40 MG: 40 TABLET ORAL at 09:01

## 2023-03-08 RX ADMIN — POLYETHYLENE GLYCOL 3350 17 G: 17 POWDER, FOR SOLUTION ORAL at 09:01

## 2023-03-08 RX ADMIN — GABAPENTIN 400 MG: 300 CAPSULE ORAL at 09:01

## 2023-03-08 RX ADMIN — BACITRACIN: 500 OINTMENT TOPICAL at 14:22

## 2023-03-08 RX ADMIN — BACITRACIN: 500 OINTMENT TOPICAL at 20:51

## 2023-03-08 RX ADMIN — PANTOPRAZOLE SODIUM 40 MG: 40 TABLET, DELAYED RELEASE ORAL at 17:06

## 2023-03-08 RX ADMIN — HYDROCODONE BITARTRATE AND ACETAMINOPHEN 1 TABLET: 10; 325 TABLET ORAL at 18:25

## 2023-03-08 RX ADMIN — HYDROCODONE BITARTRATE AND ACETAMINOPHEN 1 TABLET: 10; 325 TABLET ORAL at 12:07

## 2023-03-08 ASSESSMENT — PAIN DESCRIPTION - ORIENTATION
ORIENTATION: RIGHT;LEFT;ANTERIOR;POSTERIOR
ORIENTATION: RIGHT;LEFT;ANTERIOR;POSTERIOR
ORIENTATION: RIGHT;LEFT;MID
ORIENTATION: RIGHT;LEFT;ANTERIOR;POSTERIOR
ORIENTATION: RIGHT;LEFT;ANTERIOR;POSTERIOR

## 2023-03-08 ASSESSMENT — PAIN SCALES - GENERAL
PAINLEVEL_OUTOF10: 6
PAINLEVEL_OUTOF10: 4
PAINLEVEL_OUTOF10: 0
PAINLEVEL_OUTOF10: 6
PAINLEVEL_OUTOF10: 7
PAINLEVEL_OUTOF10: 5
PAINLEVEL_OUTOF10: 10
PAINLEVEL_OUTOF10: 0

## 2023-03-08 ASSESSMENT — PAIN DESCRIPTION - LOCATION
LOCATION: NECK;SHOULDER
LOCATION: NECK
LOCATION: NECK;SHOULDER
LOCATION: NECK;SHOULDER
LOCATION: NECK;BACK
LOCATION: NECK;SHOULDER
LOCATION: NECK;SHOULDER

## 2023-03-08 ASSESSMENT — PAIN - FUNCTIONAL ASSESSMENT
PAIN_FUNCTIONAL_ASSESSMENT: PREVENTS OR INTERFERES SOME ACTIVE ACTIVITIES AND ADLS
PAIN_FUNCTIONAL_ASSESSMENT: PREVENTS OR INTERFERES SOME ACTIVE ACTIVITIES AND ADLS
PAIN_FUNCTIONAL_ASSESSMENT: PREVENTS OR INTERFERES WITH ALL ACTIVE AND SOME PASSIVE ACTIVITIES

## 2023-03-08 ASSESSMENT — PAIN DESCRIPTION - DESCRIPTORS
DESCRIPTORS: ACHING;DISCOMFORT
DESCRIPTORS: ACHING;DISCOMFORT
DESCRIPTORS: ACHING;DISCOMFORT;TENDER;SORE
DESCRIPTORS: ACHING;DISCOMFORT
DESCRIPTORS: ACHING;SHARP;DISCOMFORT
DESCRIPTORS: ACHING;DISCOMFORT

## 2023-03-08 ASSESSMENT — PAIN SCALES - WONG BAKER: WONGBAKER_NUMERICALRESPONSE: 0

## 2023-03-09 ENCOUNTER — ANESTHESIA EVENT (OUTPATIENT)
Dept: SURGERY | Age: 78
End: 2023-03-09
Payer: MEDICARE

## 2023-03-09 LAB
ANION GAP SERPL CALC-SCNC: 3 MMOL/L (ref 2–11)
BACTERIA SPEC CULT: NORMAL
BACTERIA SPEC CULT: NORMAL
BASOPHILS # BLD: 0 K/UL (ref 0–0.2)
BASOPHILS NFR BLD: 0 % (ref 0–2)
BUN SERPL-MCNC: 20 MG/DL (ref 8–23)
CALCIUM SERPL-MCNC: 7.8 MG/DL (ref 8.3–10.4)
CHLORIDE SERPL-SCNC: 99 MMOL/L (ref 101–110)
CO2 SERPL-SCNC: 30 MMOL/L (ref 21–32)
CORTIS 1H P CHAL SERPL-MCNC: 20.2 UG/DL
CORTIS 30M P CHAL SERPL-MCNC: 18.3 UG/DL
CORTIS BS SERPL-MCNC: 13.4 UG/DL
CREAT SERPL-MCNC: 0.9 MG/DL (ref 0.8–1.5)
DIFFERENTIAL METHOD BLD: ABNORMAL
EOSINOPHIL # BLD: 0.1 K/UL (ref 0–0.8)
EOSINOPHIL NFR BLD: 1 % (ref 0.5–7.8)
ERYTHROCYTE [DISTWIDTH] IN BLOOD BY AUTOMATED COUNT: 13.6 % (ref 11.9–14.6)
GLUCOSE SERPL-MCNC: 78 MG/DL (ref 65–100)
HCT VFR BLD AUTO: 38.2 % (ref 41.1–50.3)
HGB BLD-MCNC: 13.1 G/DL (ref 13.6–17.2)
IMM GRANULOCYTES # BLD AUTO: 0 K/UL (ref 0–0.5)
IMM GRANULOCYTES NFR BLD AUTO: 1 % (ref 0–5)
LYMPHOCYTES # BLD: 0.9 K/UL (ref 0.5–4.6)
LYMPHOCYTES NFR BLD: 13 % (ref 13–44)
MCH RBC QN AUTO: 36.2 PG (ref 26.1–32.9)
MCHC RBC AUTO-ENTMCNC: 34.3 G/DL (ref 31.4–35)
MCV RBC AUTO: 105.5 FL (ref 82–102)
MONOCYTES # BLD: 0.6 K/UL (ref 0.1–1.3)
MONOCYTES NFR BLD: 9 % (ref 4–12)
NEUTS SEG # BLD: 5 K/UL (ref 1.7–8.2)
NEUTS SEG NFR BLD: 76 % (ref 43–78)
NRBC # BLD: 0 K/UL (ref 0–0.2)
OSMOLALITY SERPL: 288 MOSM/KG H2O (ref 280–301)
PLATELET # BLD AUTO: 129 K/UL (ref 150–450)
PMV BLD AUTO: 8.9 FL (ref 9.4–12.3)
POTASSIUM SERPL-SCNC: 3.9 MMOL/L (ref 3.5–5.1)
RBC # BLD AUTO: 3.62 M/UL (ref 4.23–5.6)
SERVICE CMNT-IMP: NORMAL
SERVICE CMNT-IMP: NORMAL
SODIUM SERPL-SCNC: 132 MMOL/L (ref 133–143)
WBC # BLD AUTO: 6.6 K/UL (ref 4.3–11.1)

## 2023-03-09 PROCEDURE — 6370000000 HC RX 637 (ALT 250 FOR IP): Performed by: INTERNAL MEDICINE

## 2023-03-09 PROCEDURE — 85025 COMPLETE CBC W/AUTO DIFF WBC: CPT

## 2023-03-09 PROCEDURE — 51702 INSERT TEMP BLADDER CATH: CPT

## 2023-03-09 PROCEDURE — 36415 COLL VENOUS BLD VENIPUNCTURE: CPT

## 2023-03-09 PROCEDURE — 1100000003 HC PRIVATE W/ TELEMETRY

## 2023-03-09 PROCEDURE — 2580000003 HC RX 258: Performed by: FAMILY MEDICINE

## 2023-03-09 PROCEDURE — 83930 ASSAY OF BLOOD OSMOLALITY: CPT

## 2023-03-09 PROCEDURE — 97530 THERAPEUTIC ACTIVITIES: CPT

## 2023-03-09 PROCEDURE — 82533 TOTAL CORTISOL: CPT

## 2023-03-09 PROCEDURE — 80048 BASIC METABOLIC PNL TOTAL CA: CPT

## 2023-03-09 PROCEDURE — 99232 SBSQ HOSP IP/OBS MODERATE 35: CPT | Performed by: NEUROLOGICAL SURGERY

## 2023-03-09 PROCEDURE — 97535 SELF CARE MNGMENT TRAINING: CPT

## 2023-03-09 PROCEDURE — 6370000000 HC RX 637 (ALT 250 FOR IP): Performed by: FAMILY MEDICINE

## 2023-03-09 PROCEDURE — 6360000002 HC RX W HCPCS: Performed by: INTERNAL MEDICINE

## 2023-03-09 PROCEDURE — 97112 NEUROMUSCULAR REEDUCATION: CPT

## 2023-03-09 RX ADMIN — SODIUM CHLORIDE, PRESERVATIVE FREE 10 ML: 5 INJECTION INTRAVENOUS at 20:45

## 2023-03-09 RX ADMIN — ROSUVASTATIN CALCIUM 40 MG: 20 TABLET, FILM COATED ORAL at 20:45

## 2023-03-09 RX ADMIN — EMPAGLIFLOZIN 10 MG: 10 TABLET, FILM COATED ORAL at 08:14

## 2023-03-09 RX ADMIN — PANTOPRAZOLE SODIUM 40 MG: 40 TABLET, DELAYED RELEASE ORAL at 15:11

## 2023-03-09 RX ADMIN — PANTOPRAZOLE SODIUM 40 MG: 40 TABLET, DELAYED RELEASE ORAL at 08:14

## 2023-03-09 RX ADMIN — MORPHINE SULFATE 2 MG: 2 INJECTION, SOLUTION INTRAMUSCULAR; INTRAVENOUS at 08:13

## 2023-03-09 RX ADMIN — POLYETHYLENE GLYCOL 3350 17 G: 17 POWDER, FOR SOLUTION ORAL at 08:14

## 2023-03-09 RX ADMIN — HYDROCODONE BITARTRATE AND ACETAMINOPHEN 1 TABLET: 10; 325 TABLET ORAL at 11:27

## 2023-03-09 RX ADMIN — HYDROCODONE BITARTRATE AND ACETAMINOPHEN 1 TABLET: 10; 325 TABLET ORAL at 18:17

## 2023-03-09 RX ADMIN — MORPHINE SULFATE 2 MG: 2 INJECTION, SOLUTION INTRAMUSCULAR; INTRAVENOUS at 20:52

## 2023-03-09 RX ADMIN — MORPHINE SULFATE 2 MG: 2 INJECTION, SOLUTION INTRAMUSCULAR; INTRAVENOUS at 15:06

## 2023-03-09 RX ADMIN — FUROSEMIDE 40 MG: 40 TABLET ORAL at 08:14

## 2023-03-09 RX ADMIN — HYDROCODONE BITARTRATE AND ACETAMINOPHEN 1 TABLET: 10; 325 TABLET ORAL at 03:34

## 2023-03-09 RX ADMIN — FOLIC ACID 1 MG: 1 TABLET ORAL at 08:14

## 2023-03-09 RX ADMIN — GABAPENTIN 400 MG: 300 CAPSULE ORAL at 08:14

## 2023-03-09 RX ADMIN — BACITRACIN: 500 OINTMENT TOPICAL at 15:08

## 2023-03-09 RX ADMIN — FUROSEMIDE 40 MG: 40 TABLET ORAL at 18:08

## 2023-03-09 RX ADMIN — HYDROCODONE BITARTRATE AND ACETAMINOPHEN 1 TABLET: 10; 325 TABLET ORAL at 23:32

## 2023-03-09 RX ADMIN — BACITRACIN: 500 OINTMENT TOPICAL at 08:15

## 2023-03-09 RX ADMIN — COSYNTROPIN 250 MCG: 0.25 INJECTION, POWDER, LYOPHILIZED, FOR SOLUTION INTRAVENOUS at 08:58

## 2023-03-09 RX ADMIN — CARBAMAZEPINE 400 MG: 200 TABLET ORAL at 20:45

## 2023-03-09 RX ADMIN — SODIUM CHLORIDE, PRESERVATIVE FREE 10 ML: 5 INJECTION INTRAVENOUS at 08:14

## 2023-03-09 RX ADMIN — CARBAMAZEPINE 400 MG: 200 TABLET ORAL at 08:14

## 2023-03-09 RX ADMIN — BACITRACIN: 500 OINTMENT TOPICAL at 20:45

## 2023-03-09 RX ADMIN — GABAPENTIN 400 MG: 300 CAPSULE ORAL at 20:45

## 2023-03-09 RX ADMIN — GABAPENTIN 400 MG: 300 CAPSULE ORAL at 14:55

## 2023-03-09 ASSESSMENT — PAIN - FUNCTIONAL ASSESSMENT
PAIN_FUNCTIONAL_ASSESSMENT: PREVENTS OR INTERFERES SOME ACTIVE ACTIVITIES AND ADLS
PAIN_FUNCTIONAL_ASSESSMENT: PREVENTS OR INTERFERES WITH ALL ACTIVE AND SOME PASSIVE ACTIVITIES
PAIN_FUNCTIONAL_ASSESSMENT: PREVENTS OR INTERFERES WITH MANY ACTIVE NOT PASSIVE ACTIVITIES
PAIN_FUNCTIONAL_ASSESSMENT: PREVENTS OR INTERFERES WITH ALL ACTIVE AND SOME PASSIVE ACTIVITIES

## 2023-03-09 ASSESSMENT — PAIN DESCRIPTION - ORIENTATION
ORIENTATION: RIGHT;LEFT;MID
ORIENTATION: RIGHT;LEFT;MID;LOWER

## 2023-03-09 ASSESSMENT — PAIN DESCRIPTION - PAIN TYPE
TYPE: ACUTE PAIN

## 2023-03-09 ASSESSMENT — PAIN SCALES - GENERAL
PAINLEVEL_OUTOF10: 0
PAINLEVEL_OUTOF10: 0
PAINLEVEL_OUTOF10: 2
PAINLEVEL_OUTOF10: 6
PAINLEVEL_OUTOF10: 8
PAINLEVEL_OUTOF10: 7
PAINLEVEL_OUTOF10: 9
PAINLEVEL_OUTOF10: 8
PAINLEVEL_OUTOF10: 7
PAINLEVEL_OUTOF10: 6

## 2023-03-09 ASSESSMENT — PAIN DESCRIPTION - LOCATION
LOCATION: GENERALIZED
LOCATION: GENERALIZED;NECK
LOCATION: NECK;ARM
LOCATION: KNEE
LOCATION: NECK;ARM
LOCATION: GENERALIZED
LOCATION: GENERALIZED

## 2023-03-09 ASSESSMENT — PAIN DESCRIPTION - DESCRIPTORS
DESCRIPTORS: ACHING
DESCRIPTORS: ACHING;DISCOMFORT;SHOOTING
DESCRIPTORS: ACHING;DISCOMFORT
DESCRIPTORS: DISCOMFORT;ACHING
DESCRIPTORS: ACHING;SORE;DISCOMFORT
DESCRIPTORS: ACHING;SORE;DISCOMFORT
DESCRIPTORS: DISCOMFORT;ACHING

## 2023-03-09 ASSESSMENT — PAIN DESCRIPTION - ONSET
ONSET: GRADUAL
ONSET: PROGRESSIVE
ONSET: ON-GOING
ONSET: ON-GOING

## 2023-03-09 ASSESSMENT — PAIN DESCRIPTION - FREQUENCY
FREQUENCY: INTERMITTENT

## 2023-03-09 ASSESSMENT — PAIN SCALES - WONG BAKER: WONGBAKER_NUMERICALRESPONSE: 0

## 2023-03-09 NOTE — ANESTHESIA PRE PROCEDURE
Department of Anesthesiology  Preprocedure Note       Name:  Maggy Angelo   Age:  76 y.o.  :  1945                                          MRN:  [id number]         Date:  3/9/2023      Surgeon: Kobe Montague):  Manasa Ramirez MD    Procedure: Procedure(s):  C5-C7 Posterior Cervical Fusion    Medications prior to admission:   Prior to Admission medications    Medication Sig Start Date End Date Taking? Authorizing Provider   furosemide (LASIX) 40 MG tablet Take 1 tablet by mouth 2 times daily 23   ANN-MARIE Desouza - CNP   JARDIANCE 10 MG tablet TAKE 1 TABLET BY MOUTH DAILY 23   Cady Graham MD   rosuvastatin (CRESTOR) 40 MG tablet TAKE 1 TABLET BY MOUTH AT BEDTIME. 23   Cady Graham MD   apixaban (ELIQUIS) 5 MG TABS tablet Take 5 mg by mouth 2 times daily 22   Ar Automatic Reconciliation   aspirin 81 MG EC tablet Take 81 mg by mouth 19   Ar Automatic Reconciliation   carBAMazepine (TEGRETOL) 200 MG tablet Take 400 mg by mouth 2 times daily    Ar Automatic Reconciliation   docusate (COLACE, DULCOLAX) 100 MG CAPS Take 100 mg by mouth 2 times daily as needed 10/26/19   Ar Automatic Reconciliation   gabapentin (NEURONTIN) 400 MG capsule 400 mg 3 times daily. 19   Ar Automatic Reconciliation   HYDROcodone-acetaminophen (NORCO)  MG per tablet Take 1 tablet by mouth every 8 hours as needed. Ar Automatic Reconciliation   metoprolol succinate (TOPROL XL) 100 MG extended release tablet Take 100 mg by mouth daily 19   Ar Automatic Reconciliation   nitroGLYCERIN (NITROSTAT) 0.4 MG SL tablet TAKE AS DIRECTED.   Patient not taking: Reported on 3/3/2023 8/17/18   Ar Automatic Reconciliation   omeprazole (PRILOSEC) 20 MG delayed release capsule Take 20 mg by mouth daily 10/10/19   Ar Automatic Reconciliation   potassium chloride (KLOR-CON M) 20 MEQ extended release tablet Take 20 mEq by mouth 2 times daily 19   Ar Automatic Reconciliation       Current medications:    No current facility-administered medications for this encounter. No current outpatient medications on file.      Facility-Administered Medications Ordered in Other Encounters   Medication Dose Route Frequency Provider Last Rate Last Admin    [START ON 3/10/2023] vancomycin (VANCOCIN) 1250 mg in sodium chloride 0.9% 250 mL IVPB  1,250 mg IntraVENous Once Buck Pederson MD        morphine injection 2 mg  2 mg IntraVENous Q4H PRN Clovis Sheets, DO   2 mg at 03/09/23 1506    cyclobenzaprine (FLEXERIL) tablet 10 mg  10 mg Oral TID PRN Clovis Sheets, DO   10 mg at 03/08/23 1255    fluticasone (FLONASE) 50 MCG/ACT nasal spray 1 spray  1 spray Each Nostril Daily PRN Clovis Sheets, DO        bisacodyl (DULCOLAX) suppository 10 mg  10 mg Rectal Daily PRN Erving Sheets, DO   10 mg at 03/08/23 1308    furosemide (LASIX) tablet 40 mg  40 mg Oral BID Erving Sheets, DO   40 mg at 97/73/01 5116    folic acid (FOLVITE) tablet 1 mg  1 mg Oral Daily Erving Sheets, DO   1 mg at 03/09/23 7696    polyethylene glycol (GLYCOLAX) packet 17 g  17 g Oral Daily Erving Sheets, DO   17 g at 03/09/23 0814    carboxymethylcellulose (THERATEARS) 1 % ophthalmic gel 1 drop  1 drop Both Eyes PRN Demetrice Núñez MD   1 drop at 03/08/23 0906    [Held by provider] apixaban (ELIQUIS) tablet 5 mg  5 mg Oral BID Oneil Davila MD        carBAMazepine (TEGRETOL) tablet 400 mg  400 mg Oral BID Oneil Davila MD   400 mg at 03/09/23 0814    docusate sodium (COLACE) capsule 100 mg  100 mg Oral BID PRN Oneil Davila MD   100 mg at 03/06/23 1628    gabapentin (NEURONTIN) capsule 400 mg  400 mg Oral TID Oneil Davila MD   400 mg at 03/09/23 1455    empagliflozin (JARDIANCE) tablet 10 mg  10 mg Oral Daily Oneil Davila MD   10 mg at 03/09/23 0814    rosuvastatin (CRESTOR) tablet 40 mg  40 mg Oral Nightly Oneil Davila MD   40 mg at 03/08/23 2041    sodium chloride flush 0.9 % injection 5-40 mL  5-40 mL IntraVENous 2 times per day Yun Jeffries MD   10 mL at 03/09/23 0814    sodium chloride flush 0.9 % injection 5-40 mL  5-40 mL IntraVENous PRN Yun Jeffries MD        0.9 % sodium chloride infusion   IntraVENous PRN Yun Jeffries MD        ondansetron (ZOFRAN-ODT) disintegrating tablet 4 mg  4 mg Oral Q8H PRN Yun Jeffries MD        Or    ondansetron (ZOFRAN) injection 4 mg  4 mg IntraVENous Q6H PRN Yun Jeffries MD        acetaminophen (TYLENOL) tablet 650 mg  650 mg Oral Q6H PRN Yun Jeffries MD   650 mg at 03/07/23 2106    Or    acetaminophen (TYLENOL) suppository 650 mg  650 mg Rectal Q6H PRN Yun Jeffries MD        bacitracin ointment   Topical TID Justen Stephens MD   Given at 03/09/23 1508    pantoprazole (PROTONIX) tablet 40 mg  40 mg Oral BID AC Yun Jeffries MD   40 mg at 03/09/23 1511    HYDROcodone-acetaminophen (NORCO)  MG per tablet 1 tablet  1 tablet Oral Q6H PRN Tessa Wilkerson MD   1 tablet at 03/09/23 1817       Allergies: Allergies   Allergen Reactions    Codeine      Other reaction(s):  Other- (not listed) - Allergy  constipation    Iodides      Other reaction(s): Unknown (comments)       Problem List:    Patient Active Problem List   Diagnosis Code    TIA (transient ischemic attack) G45.9    Tracheobronchomalacia J39.8    Elevated brain natriuretic peptide (BNP) level R79.89    S/P CABG x 3 Z95.1    Community acquired pneumonia J18.9    Dyslipidemia E78.5    Hemoptysis R04.2    History of smoking 25-50 pack years Z87.891    Hypertension T17    Systolic CHF, chronic (HCC) I50.22    Localized edema R60.0    Coronary atherosclerosis of native coronary vessel I25.10    Trigeminal neuralgia G50.0    SOB (shortness of breath) R06.02    Atrial fibrillation (HCC) I48.91    Leukocytosis D72.829    Syncope R55    Hyponatremia E87.1    Anemia D64.9    DNR (do not resuscitate) Z66    Hand pain M79.643    Chest pain, precordial R07.2    Pulmonary hypertension (HCC) I27.20    HFrEF (heart failure with reduced ejection fraction) ([facility]) I50.20    Chest pain R07.9    CAD (coronary artery disease) I25.10    Acute on chronic congestive heart failure (HCC) I50.9    Hypotension I95.9    Hypothermia T68. John Kim    Fall W19. XXXA    Cervical spinal cord compression (HCC) G95.20       Past Medical History:        Diagnosis Date    Atrial fibrillation [facility]     Atrial flutter ([facility] [facility] 2017    CAD (coronary artery disease) 2006    Cancer [facility])     prostate    GERD (gastroesophageal reflux disease)     takes omeprazole    Heart failure ([facility] [address] [facility]     Hypertension     Ill-defined condition     trigeminal neuralgia    Myocardial infarction ([facility] [address] 2019    NSTEMI     Sleep apnea     does not wear CPAP    Stroke [facility]) 2018    TIA       Past Surgical History:        Procedure Laterality Date    CARDIAC CATHETERIZATION  2006    CARDIAC CATHETERIZATION  2019    Severe 3 vessel CAD with LV EF=40-45% and unsuccessful LCX PCI.  CARDIAC PROCEDURE N/A 10/26/2022    LEFT HEART CATH / CORONARY ANGIOGRAPHY W GRAFTS performed by Grant Butt MD at [address] [facility]    COLONOSCOPY N/A 3/2/2020    COLONOSCOPY performed by Grace Bragg MD at [address] CORONARY ARTERY BYPASS GRAFT  06/10/2019    3 vessel CABG with LIMA-LAD,SVG-OM,and SVG-R posterior lateral branch with MAZE & LA appendage clipping.     EP DEVICE PROCEDURE N/A 7/10/2022    LOOP RECORDER INSERT performed by Marko Allen MD at [facility] CARDIAC CATH LAB    PROSTATE SURGERY         Social History:    Social History     Tobacco Use    Smoking status: Former     Packs/day: 1.00     Years: 14.00     Pack years: 14.00     Types: Cigarettes     Start date: 1957     Quit date: 1971     Years since quittin.3    Smokeless tobacco: Never    Tobacco comments:     Quit smoking: quit at age 27   Substance Use Topics    Alcohol use: No                                Counseling given: Not Answered  Tobacco comments: Quit smoking: quit at age 27      Vital Signs (Current): There were no vitals filed for this visit. BP Readings from Last 3 Encounters:   03/09/23 129/80   02/23/23 122/60   02/20/23 128/70       NPO Status:                                                                                 BMI:   Wt Readings from Last 3 Encounters:   03/06/23 190 lb 14.7 oz (86.6 kg)   02/23/23 182 lb 3.2 oz (82.6 kg)   02/20/23 176 lb 6.4 oz (80 kg)     There is no height or weight on file to calculate BMI.    CBC:   Lab Results   Component Value Date/Time    WBC 6.6 03/09/2023 08:53 AM    RBC 3.62 03/09/2023 08:53 AM    HGB 13.1 03/09/2023 08:53 AM    HCT 38.2 03/09/2023 08:53 AM    .5 03/09/2023 08:53 AM    RDW 13.6 03/09/2023 08:53 AM     03/09/2023 08:53 AM       CMP:   Lab Results   Component Value Date/Time     03/09/2023 08:53 AM    K 3.9 03/09/2023 08:53 AM    CL 99 03/09/2023 08:53 AM    CO2 30 03/09/2023 08:53 AM    BUN 20 03/09/2023 08:53 AM    CREATININE 0.90 03/09/2023 08:53 AM    GFRAA >60 07/11/2022 04:58 AM    AGRATIO 0.9 06/07/2021 06:04 AM    LABGLOM >60 03/09/2023 08:53 AM    GLUCOSE 78 03/09/2023 08:53 AM    PROT 7.5 03/07/2023 04:25 AM    CALCIUM 7.8 03/09/2023 08:53 AM    BILITOT 0.8 03/07/2023 04:25 AM    ALKPHOS 96 03/07/2023 04:25 AM    ALKPHOS 104 06/07/2021 06:04 AM    AST 22 03/07/2023 04:25 AM    ALT 17 03/07/2023 04:25 AM       POC Tests: No results for input(s): POCGLU, POCNA, POCK, POCCL, POCBUN, POCHEMO, POCHCT in the last 72 hours.     Coags:   Lab Results   Component Value Date/Time    PROTIME 18.8 03/03/2023 03:12 PM    PROTIME 16.5 06/10/2019 12:52 PM    INR 1.6 03/03/2023 03:12 PM    INR 1.4 06/10/2019 12:52 PM    APTT 33.7 06/10/2019 12:52 PM       HCG (If Applicable): No results found for: PREGTESTUR, PREGSERUM, HCG, HCGQUANT     ABGs:   Lab Results   Component Value Date/Time    PHART 7.370 06/10/2019 12:52 PM    PO2ART 85 06/10/2019 12:52 PM    VQQ8EYW 41.9 06/10/2019 12:52 PM    DPY2YXF 24.2 06/10/2019 12:52 PM    BEART 2 06/10/2019 11:14 AM        Type & Screen (If Applicable):  No results found for: LABABO, LABRH    Drug/Infectious Status (If Applicable):  No results found for: HIV, HEPCAB    COVID-19 Screening (If Applicable): No results found for: COVID19        Anesthesia Evaluation  Patient summary reviewed  Airway: Mallampati: Unable to assess / NA       Comment: In c-collar, plan GlideScope intubation     Dental:          Pulmonary:normal exam    (+) shortness of breath:  sleep apnea: on noncompliant,                             Cardiovascular:  Exercise tolerance: no interval change,   (+) hypertension: mild, past MI: > 6 months and no interval change, CAD: obstructive, CABG/stent: no interval change, dysrhythmias: atrial fibrillation, CHF: systolic, pulmonary hypertension:,         Rhythm: regular  Rate: normal  Echocardiogram reviewed               ROS comment: Echo 10/22 - Left Ventricle: Moderately reduced left ventricular systolic function with a visually estimated EF of 40 - 45%. Left ventricle size is normal. Mildly increased wall thickness. Mild hypokinesis of the following segments: basal inferolateral, mid inferolateral and apical lateral. Severe hypokinesis of the following segments: basal inferior, mid inferior and apical inferior. Indeterminate diastolic function.   Aortic Valve: Mild regurgitation.   Mitral Valve: Mild regurgitation.   Tricuspid Valve: Moderate regurgitation. Severely elevated RVSP. The estimated RVSP is 63 mmHg.   Left Atrium: Left atrium is mildly dilated.   Right Atrium: Right atrium is mildly dilated. Cath from 10/2022:    Mild to moderate LV systolic dysfunction with akinetic inferior wall and severely hypokinetic inferolateral wall.   LVEDP is elevated at 27 mmHg.   Severe three-vessel coronary artery disease    Widely patent left internal mammary artery to LAD, vein graft OM, vein graft to the right posterolateral artery. Neuro/Psych:   (+) neuromuscular disease (Trigeminal neuralgia):, TIA,              ROS comment: Cervical spine injury s/p fall, weakness in upper extremities, especially weak  strength bilaterally GI/Hepatic/Renal:   (+) GERD: well controlled,           Endo/Other:    (+) blood dyscrasia: thrombocytopenia:., .                 Abdominal:             Vascular: negative vascular ROS. Other Findings:           Anesthesia Plan      general     ASA 4       Induction: intravenous. Anesthetic plan and risks discussed with patient.                         Bronwyn Dale MD   3/9/2023

## 2023-03-10 ENCOUNTER — ANESTHESIA (OUTPATIENT)
Dept: SURGERY | Age: 78
End: 2023-03-10
Payer: MEDICARE

## 2023-03-10 ENCOUNTER — APPOINTMENT (OUTPATIENT)
Dept: GENERAL RADIOLOGY | Age: 78
DRG: 453 | End: 2023-03-10
Attending: PHYSICAL MEDICINE & REHABILITATION
Payer: MEDICARE

## 2023-03-10 LAB
ABO + RH BLD: NORMAL
ANION GAP SERPL CALC-SCNC: 2 MMOL/L (ref 2–11)
BLOOD GROUP ANTIBODIES SERPL: NORMAL
BUN SERPL-MCNC: 17 MG/DL (ref 8–23)
CALCIUM SERPL-MCNC: 7.8 MG/DL (ref 8.3–10.4)
CHLORIDE SERPL-SCNC: 98 MMOL/L (ref 101–110)
CO2 SERPL-SCNC: 29 MMOL/L (ref 21–32)
CREAT SERPL-MCNC: 0.7 MG/DL (ref 0.8–1.5)
ERYTHROCYTE [DISTWIDTH] IN BLOOD BY AUTOMATED COUNT: 13.7 % (ref 11.9–14.6)
GLUCOSE BLD STRIP.AUTO-MCNC: 80 MG/DL (ref 65–100)
GLUCOSE SERPL-MCNC: 88 MG/DL (ref 65–100)
HCT VFR BLD AUTO: 36.5 % (ref 41.1–50.3)
HGB BLD-MCNC: 12.6 G/DL (ref 13.6–17.2)
MCH RBC QN AUTO: 35.9 PG (ref 26.1–32.9)
MCHC RBC AUTO-ENTMCNC: 34.5 G/DL (ref 31.4–35)
MCV RBC AUTO: 104 FL (ref 82–102)
NRBC # BLD: 0 K/UL (ref 0–0.2)
PLATELET # BLD AUTO: 131 K/UL (ref 150–450)
PMV BLD AUTO: 8.7 FL (ref 9.4–12.3)
POTASSIUM SERPL-SCNC: 3.6 MMOL/L (ref 3.5–5.1)
RBC # BLD AUTO: 3.51 M/UL (ref 4.23–5.6)
SERVICE CMNT-IMP: NORMAL
SODIUM SERPL-SCNC: 129 MMOL/L (ref 133–143)
SPECIMEN EXP DATE BLD: NORMAL
WBC # BLD AUTO: 6.1 K/UL (ref 4.3–11.1)

## 2023-03-10 PROCEDURE — 7100000001 HC PACU RECOVERY - ADDTL 15 MIN: Performed by: NEUROLOGICAL SURGERY

## 2023-03-10 PROCEDURE — 2580000003 HC RX 258: Performed by: INTERNAL MEDICINE

## 2023-03-10 PROCEDURE — 2580000003 HC RX 258: Performed by: NURSE ANESTHETIST, CERTIFIED REGISTERED

## 2023-03-10 PROCEDURE — 2580000003 HC RX 258: Performed by: NEUROLOGICAL SURGERY

## 2023-03-10 PROCEDURE — 2500000003 HC RX 250 WO HCPCS: Performed by: NEUROLOGICAL SURGERY

## 2023-03-10 PROCEDURE — 80048 BASIC METABOLIC PNL TOTAL CA: CPT

## 2023-03-10 PROCEDURE — 6360000002 HC RX W HCPCS: Performed by: NEUROLOGICAL SURGERY

## 2023-03-10 PROCEDURE — 2500000003 HC RX 250 WO HCPCS: Performed by: NURSE ANESTHETIST, CERTIFIED REGISTERED

## 2023-03-10 PROCEDURE — 6370000000 HC RX 637 (ALT 250 FOR IP): Performed by: NEUROLOGICAL SURGERY

## 2023-03-10 PROCEDURE — 6360000002 HC RX W HCPCS: Performed by: ANESTHESIOLOGY

## 2023-03-10 PROCEDURE — 72040 X-RAY EXAM NECK SPINE 2-3 VW: CPT

## 2023-03-10 PROCEDURE — 20939 BONE MARROW ASPIR BONE GRFG: CPT | Performed by: NEUROLOGICAL SURGERY

## 2023-03-10 PROCEDURE — 3600000014 HC SURGERY LEVEL 4 ADDTL 15MIN: Performed by: NEUROLOGICAL SURGERY

## 2023-03-10 PROCEDURE — 3600000004 HC SURGERY LEVEL 4 BASE: Performed by: NEUROLOGICAL SURGERY

## 2023-03-10 PROCEDURE — 63015 REMOVE SPINE LAMINA >2 CRVCL: CPT | Performed by: NEUROLOGICAL SURGERY

## 2023-03-10 PROCEDURE — 22614 ARTHRD PST TQ 1NTRSPC EA ADD: CPT | Performed by: NEUROLOGICAL SURGERY

## 2023-03-10 PROCEDURE — 82962 GLUCOSE BLOOD TEST: CPT

## 2023-03-10 PROCEDURE — 36415 COLL VENOUS BLD VENIPUNCTURE: CPT

## 2023-03-10 PROCEDURE — 2709999900 HC NON-CHARGEABLE SUPPLY: Performed by: NEUROLOGICAL SURGERY

## 2023-03-10 PROCEDURE — 3700000000 HC ANESTHESIA ATTENDED CARE: Performed by: NEUROLOGICAL SURGERY

## 2023-03-10 PROCEDURE — 85027 COMPLETE CBC AUTOMATED: CPT

## 2023-03-10 PROCEDURE — 22600 ARTHRD PST TQ 1NTRSPC CRV: CPT | Performed by: NEUROLOGICAL SURGERY

## 2023-03-10 PROCEDURE — 2580000003 HC RX 258: Performed by: ANESTHESIOLOGY

## 2023-03-10 PROCEDURE — 1100000003 HC PRIVATE W/ TELEMETRY

## 2023-03-10 PROCEDURE — 2720000010 HC SURG SUPPLY STERILE: Performed by: NEUROLOGICAL SURGERY

## 2023-03-10 PROCEDURE — 6360000002 HC RX W HCPCS: Performed by: NURSE ANESTHETIST, CERTIFIED REGISTERED

## 2023-03-10 PROCEDURE — 7100000000 HC PACU RECOVERY - FIRST 15 MIN: Performed by: NEUROLOGICAL SURGERY

## 2023-03-10 PROCEDURE — 3700000001 HC ADD 15 MINUTES (ANESTHESIA): Performed by: NEUROLOGICAL SURGERY

## 2023-03-10 PROCEDURE — 22842 INSERT SPINE FIXATION DEVICE: CPT | Performed by: NEUROLOGICAL SURGERY

## 2023-03-10 PROCEDURE — 86900 BLOOD TYPING SEROLOGIC ABO: CPT

## 2023-03-10 PROCEDURE — 51702 INSERT TEMP BLADDER CATH: CPT

## 2023-03-10 PROCEDURE — C1713 ANCHOR/SCREW BN/BN,TIS/BN: HCPCS | Performed by: NEUROLOGICAL SURGERY

## 2023-03-10 PROCEDURE — A4217 STERILE WATER/SALINE, 500 ML: HCPCS | Performed by: NEUROLOGICAL SURGERY

## 2023-03-10 DEVICE — GRAFT BONE 5 CC: Type: IMPLANTABLE DEVICE | Site: SPINE CERVICAL | Status: FUNCTIONAL

## 2023-03-10 DEVICE — MULTI AXIAL SCREW 3603514 3.5 X 14MM
Type: IMPLANTABLE DEVICE | Site: SPINE CERVICAL | Status: FUNCTIONAL
Brand: INFINITY™ OCCIPITOCERVICAL UPPER THORACIC SYSTEM

## 2023-03-10 RX ORDER — HYDROMORPHONE HYDROCHLORIDE 2 MG/ML
1 INJECTION, SOLUTION INTRAMUSCULAR; INTRAVENOUS; SUBCUTANEOUS EVERY 4 HOURS PRN
Status: DISCONTINUED | OUTPATIENT
Start: 2023-03-10 | End: 2023-03-11 | Stop reason: SDUPTHER

## 2023-03-10 RX ORDER — OXYCODONE HYDROCHLORIDE 5 MG/1
10 TABLET ORAL PRN
Status: DISCONTINUED | OUTPATIENT
Start: 2023-03-10 | End: 2023-03-10 | Stop reason: HOSPADM

## 2023-03-10 RX ORDER — HYDROCODONE BITARTRATE AND ACETAMINOPHEN 10; 325 MG/1; MG/1
1 TABLET ORAL EVERY 6 HOURS PRN
Status: DISCONTINUED | OUTPATIENT
Start: 2023-03-10 | End: 2023-03-14

## 2023-03-10 RX ORDER — PROPOFOL 10 MG/ML
INJECTION, EMULSION INTRAVENOUS PRN
Status: DISCONTINUED | OUTPATIENT
Start: 2023-03-10 | End: 2023-03-10 | Stop reason: SDUPTHER

## 2023-03-10 RX ORDER — OXYCODONE HYDROCHLORIDE 5 MG/1
5 TABLET ORAL PRN
Status: DISCONTINUED | OUTPATIENT
Start: 2023-03-10 | End: 2023-03-10 | Stop reason: HOSPADM

## 2023-03-10 RX ORDER — MIDAZOLAM HYDROCHLORIDE 2 MG/2ML
2 INJECTION, SOLUTION INTRAMUSCULAR; INTRAVENOUS
Status: DISCONTINUED | OUTPATIENT
Start: 2023-03-10 | End: 2023-03-10 | Stop reason: HOSPADM

## 2023-03-10 RX ORDER — SODIUM CHLORIDE 0.9 % (FLUSH) 0.9 %
5-40 SYRINGE (ML) INJECTION PRN
Status: DISCONTINUED | OUTPATIENT
Start: 2023-03-10 | End: 2023-03-10 | Stop reason: HOSPADM

## 2023-03-10 RX ORDER — SODIUM CHLORIDE, SODIUM LACTATE, POTASSIUM CHLORIDE, CALCIUM CHLORIDE 600; 310; 30; 20 MG/100ML; MG/100ML; MG/100ML; MG/100ML
INJECTION, SOLUTION INTRAVENOUS CONTINUOUS
Status: DISCONTINUED | OUTPATIENT
Start: 2023-03-10 | End: 2023-03-10

## 2023-03-10 RX ORDER — ROCURONIUM BROMIDE 10 MG/ML
INJECTION, SOLUTION INTRAVENOUS PRN
Status: DISCONTINUED | OUTPATIENT
Start: 2023-03-10 | End: 2023-03-10 | Stop reason: SDUPTHER

## 2023-03-10 RX ORDER — FENTANYL CITRATE 50 UG/ML
INJECTION, SOLUTION INTRAMUSCULAR; INTRAVENOUS PRN
Status: DISCONTINUED | OUTPATIENT
Start: 2023-03-10 | End: 2023-03-10 | Stop reason: SDUPTHER

## 2023-03-10 RX ORDER — LIDOCAINE HYDROCHLORIDE 10 MG/ML
1 INJECTION, SOLUTION INFILTRATION; PERINEURAL
Status: DISCONTINUED | OUTPATIENT
Start: 2023-03-10 | End: 2023-03-10 | Stop reason: HOSPADM

## 2023-03-10 RX ORDER — PROCHLORPERAZINE EDISYLATE 5 MG/ML
5 INJECTION INTRAMUSCULAR; INTRAVENOUS
Status: DISCONTINUED | OUTPATIENT
Start: 2023-03-10 | End: 2023-03-10 | Stop reason: HOSPADM

## 2023-03-10 RX ORDER — HYDROMORPHONE HYDROCHLORIDE 2 MG/ML
0.5 INJECTION, SOLUTION INTRAMUSCULAR; INTRAVENOUS; SUBCUTANEOUS EVERY 5 MIN PRN
Status: DISCONTINUED | OUTPATIENT
Start: 2023-03-10 | End: 2023-03-10 | Stop reason: HOSPADM

## 2023-03-10 RX ORDER — CEFAZOLIN SODIUM 1 G/3ML
INJECTION, POWDER, FOR SOLUTION INTRAMUSCULAR; INTRAVENOUS PRN
Status: DISCONTINUED | OUTPATIENT
Start: 2023-03-10 | End: 2023-03-10 | Stop reason: SDUPTHER

## 2023-03-10 RX ORDER — HYDROMORPHONE HCL 110MG/55ML
PATIENT CONTROLLED ANALGESIA SYRINGE INTRAVENOUS PRN
Status: DISCONTINUED | OUTPATIENT
Start: 2023-03-10 | End: 2023-03-10 | Stop reason: SDUPTHER

## 2023-03-10 RX ORDER — SODIUM CHLORIDE 9 MG/ML
INJECTION, SOLUTION INTRAVENOUS ONCE
Status: COMPLETED | OUTPATIENT
Start: 2023-03-10 | End: 2023-03-10

## 2023-03-10 RX ORDER — SODIUM CHLORIDE 0.9 % (FLUSH) 0.9 %
5-40 SYRINGE (ML) INJECTION EVERY 12 HOURS SCHEDULED
Status: DISCONTINUED | OUTPATIENT
Start: 2023-03-10 | End: 2023-03-10 | Stop reason: HOSPADM

## 2023-03-10 RX ORDER — DIPHENHYDRAMINE HYDROCHLORIDE 50 MG/ML
12.5 INJECTION INTRAMUSCULAR; INTRAVENOUS
Status: DISCONTINUED | OUTPATIENT
Start: 2023-03-10 | End: 2023-03-10 | Stop reason: HOSPADM

## 2023-03-10 RX ORDER — SODIUM CHLORIDE 9 MG/ML
INJECTION, SOLUTION INTRAVENOUS PRN
Status: DISCONTINUED | OUTPATIENT
Start: 2023-03-10 | End: 2023-03-10 | Stop reason: HOSPADM

## 2023-03-10 RX ORDER — LIDOCAINE HYDROCHLORIDE 20 MG/ML
INJECTION, SOLUTION EPIDURAL; INFILTRATION; INTRACAUDAL; PERINEURAL PRN
Status: DISCONTINUED | OUTPATIENT
Start: 2023-03-10 | End: 2023-03-10 | Stop reason: SDUPTHER

## 2023-03-10 RX ORDER — ONDANSETRON 2 MG/ML
4 INJECTION INTRAMUSCULAR; INTRAVENOUS
Status: DISCONTINUED | OUTPATIENT
Start: 2023-03-10 | End: 2023-03-10 | Stop reason: HOSPADM

## 2023-03-10 RX ORDER — SODIUM CHLORIDE, SODIUM LACTATE, POTASSIUM CHLORIDE, CALCIUM CHLORIDE 600; 310; 30; 20 MG/100ML; MG/100ML; MG/100ML; MG/100ML
INJECTION, SOLUTION INTRAVENOUS CONTINUOUS PRN
Status: DISCONTINUED | OUTPATIENT
Start: 2023-03-10 | End: 2023-03-10 | Stop reason: SDUPTHER

## 2023-03-10 RX ORDER — ACETAMINOPHEN 500 MG
1000 TABLET ORAL ONCE
Status: DISCONTINUED | OUTPATIENT
Start: 2023-03-10 | End: 2023-03-10 | Stop reason: HOSPADM

## 2023-03-10 RX ORDER — EPHEDRINE SULFATE/0.9% NACL/PF 50 MG/5 ML
SYRINGE (ML) INTRAVENOUS PRN
Status: DISCONTINUED | OUTPATIENT
Start: 2023-03-10 | End: 2023-03-10 | Stop reason: SDUPTHER

## 2023-03-10 RX ORDER — ONDANSETRON 2 MG/ML
INJECTION INTRAMUSCULAR; INTRAVENOUS PRN
Status: DISCONTINUED | OUTPATIENT
Start: 2023-03-10 | End: 2023-03-10 | Stop reason: SDUPTHER

## 2023-03-10 RX ORDER — HYDROMORPHONE HCL 110MG/55ML
0.5 PATIENT CONTROLLED ANALGESIA SYRINGE INTRAVENOUS SEE ADMIN INSTRUCTIONS
Status: DISCONTINUED | OUTPATIENT
Start: 2023-03-10 | End: 2023-03-10 | Stop reason: HOSPADM

## 2023-03-10 RX ORDER — SODIUM CHLORIDE, SODIUM LACTATE, POTASSIUM CHLORIDE, CALCIUM CHLORIDE 600; 310; 30; 20 MG/100ML; MG/100ML; MG/100ML; MG/100ML
INJECTION, SOLUTION INTRAVENOUS CONTINUOUS
Status: DISCONTINUED | OUTPATIENT
Start: 2023-03-10 | End: 2023-03-10 | Stop reason: HOSPADM

## 2023-03-10 RX ORDER — KETAMINE HYDROCHLORIDE 50 MG/ML
INJECTION, SOLUTION, CONCENTRATE INTRAMUSCULAR; INTRAVENOUS PRN
Status: DISCONTINUED | OUTPATIENT
Start: 2023-03-10 | End: 2023-03-10 | Stop reason: SDUPTHER

## 2023-03-10 RX ADMIN — PROPOFOL 200 MG: 10 INJECTION, EMULSION INTRAVENOUS at 08:00

## 2023-03-10 RX ADMIN — LIDOCAINE HYDROCHLORIDE 40 MG: 20 INJECTION, SOLUTION EPIDURAL; INFILTRATION; INTRACAUDAL; PERINEURAL at 08:00

## 2023-03-10 RX ADMIN — ONDANSETRON 4 MG: 2 INJECTION INTRAMUSCULAR; INTRAVENOUS at 10:40

## 2023-03-10 RX ADMIN — BACITRACIN: 500 OINTMENT TOPICAL at 14:00

## 2023-03-10 RX ADMIN — HYDROMORPHONE HYDROCHLORIDE 0.5 MG: 2 INJECTION, SOLUTION INTRAMUSCULAR; INTRAVENOUS; SUBCUTANEOUS at 07:10

## 2023-03-10 RX ADMIN — FUROSEMIDE 40 MG: 40 TABLET ORAL at 16:21

## 2023-03-10 RX ADMIN — CYCLOBENZAPRINE HYDROCHLORIDE 10 MG: 5 TABLET, FILM COATED ORAL at 16:21

## 2023-03-10 RX ADMIN — MORPHINE SULFATE 2 MG: 2 INJECTION, SOLUTION INTRAMUSCULAR; INTRAVENOUS at 13:38

## 2023-03-10 RX ADMIN — PHENYLEPHRINE HYDROCHLORIDE 100 MCG: 10 INJECTION INTRAVENOUS at 09:20

## 2023-03-10 RX ADMIN — VANCOMYCIN HYDROCHLORIDE 1250 MG: 100 INJECTION, POWDER, LYOPHILIZED, FOR SOLUTION INTRAVENOUS at 07:10

## 2023-03-10 RX ADMIN — FENTANYL CITRATE 100 MCG: 50 INJECTION, SOLUTION INTRAMUSCULAR; INTRAVENOUS at 08:00

## 2023-03-10 RX ADMIN — CEFAZOLIN SODIUM 2 G: 1 INJECTION, POWDER, FOR SOLUTION INTRAMUSCULAR; INTRAVENOUS at 08:45

## 2023-03-10 RX ADMIN — ROCURONIUM BROMIDE 50 MG: 10 INJECTION, SOLUTION INTRAVENOUS at 08:00

## 2023-03-10 RX ADMIN — HYDROMORPHONE HYDROCHLORIDE 0.5 MG: 2 INJECTION, SOLUTION INTRAMUSCULAR; INTRAVENOUS; SUBCUTANEOUS at 12:17

## 2023-03-10 RX ADMIN — GABAPENTIN 400 MG: 300 CAPSULE ORAL at 21:06

## 2023-03-10 RX ADMIN — SUGAMMADEX 200 MG: 100 INJECTION, SOLUTION INTRAVENOUS at 11:30

## 2023-03-10 RX ADMIN — KETAMINE HYDROCHLORIDE 20 MG: 50 INJECTION, SOLUTION INTRAMUSCULAR; INTRAVENOUS at 10:05

## 2023-03-10 RX ADMIN — HYDROMORPHONE HYDROCHLORIDE 0.6 MG: 2 INJECTION INTRAMUSCULAR; INTRAVENOUS; SUBCUTANEOUS at 09:50

## 2023-03-10 RX ADMIN — KETAMINE HYDROCHLORIDE 20 MG: 50 INJECTION, SOLUTION INTRAMUSCULAR; INTRAVENOUS at 08:00

## 2023-03-10 RX ADMIN — SODIUM CHLORIDE, SODIUM LACTATE, POTASSIUM CHLORIDE, AND CALCIUM CHLORIDE: 600; 310; 30; 20 INJECTION, SOLUTION INTRAVENOUS at 08:13

## 2023-03-10 RX ADMIN — BACITRACIN: 500 OINTMENT TOPICAL at 21:21

## 2023-03-10 RX ADMIN — KETAMINE HYDROCHLORIDE 20 MG: 50 INJECTION, SOLUTION INTRAMUSCULAR; INTRAVENOUS at 09:00

## 2023-03-10 RX ADMIN — SODIUM CHLORIDE, PRESERVATIVE FREE 5 ML: 5 INJECTION INTRAVENOUS at 21:06

## 2023-03-10 RX ADMIN — ROSUVASTATIN CALCIUM 40 MG: 20 TABLET, FILM COATED ORAL at 21:06

## 2023-03-10 RX ADMIN — MORPHINE SULFATE 2 MG: 2 INJECTION, SOLUTION INTRAMUSCULAR; INTRAVENOUS at 21:05

## 2023-03-10 RX ADMIN — PHENYLEPHRINE HYDROCHLORIDE 15 MCG/MIN: 10 INJECTION INTRAVENOUS at 09:13

## 2023-03-10 RX ADMIN — PANTOPRAZOLE SODIUM 40 MG: 40 TABLET, DELAYED RELEASE ORAL at 16:21

## 2023-03-10 RX ADMIN — PHENYLEPHRINE HYDROCHLORIDE 100 MCG: 10 INJECTION INTRAVENOUS at 08:00

## 2023-03-10 RX ADMIN — CARBAMAZEPINE 400 MG: 200 TABLET ORAL at 21:06

## 2023-03-10 RX ADMIN — GABAPENTIN 400 MG: 300 CAPSULE ORAL at 13:49

## 2023-03-10 RX ADMIN — SODIUM CHLORIDE, POTASSIUM CHLORIDE, SODIUM LACTATE AND CALCIUM CHLORIDE: 600; 310; 30; 20 INJECTION, SOLUTION INTRAVENOUS at 13:38

## 2023-03-10 RX ADMIN — HYDROCODONE BITARTRATE AND ACETAMINOPHEN 1 TABLET: 10; 325 TABLET ORAL at 16:21

## 2023-03-10 RX ADMIN — HYDROMORPHONE HYDROCHLORIDE 0.5 MG: 2 INJECTION, SOLUTION INTRAMUSCULAR; INTRAVENOUS; SUBCUTANEOUS at 12:06

## 2023-03-10 RX ADMIN — Medication 3 AMPULE: at 07:05

## 2023-03-10 RX ADMIN — ROCURONIUM BROMIDE 20 MG: 10 INJECTION, SOLUTION INTRAVENOUS at 09:13

## 2023-03-10 RX ADMIN — SODIUM CHLORIDE, POTASSIUM CHLORIDE, SODIUM LACTATE AND CALCIUM CHLORIDE: 600; 310; 30; 20 INJECTION, SOLUTION INTRAVENOUS at 06:45

## 2023-03-10 RX ADMIN — PHENYLEPHRINE HYDROCHLORIDE 100 MCG: 10 INJECTION INTRAVENOUS at 09:28

## 2023-03-10 RX ADMIN — PHENYLEPHRINE HYDROCHLORIDE 100 MCG: 10 INJECTION INTRAVENOUS at 08:01

## 2023-03-10 RX ADMIN — VANCOMYCIN HYDROCHLORIDE 1000 MG: 1 INJECTION, POWDER, LYOPHILIZED, FOR SOLUTION INTRAVENOUS at 21:07

## 2023-03-10 RX ADMIN — Medication 10 MG: at 09:03

## 2023-03-10 RX ADMIN — MORPHINE SULFATE 2 MG: 2 INJECTION, SOLUTION INTRAMUSCULAR; INTRAVENOUS at 17:33

## 2023-03-10 RX ADMIN — Medication 10 MG: at 08:20

## 2023-03-10 RX ADMIN — ROCURONIUM BROMIDE 10 MG: 10 INJECTION, SOLUTION INTRAVENOUS at 10:13

## 2023-03-10 RX ADMIN — SODIUM CHLORIDE: 9 INJECTION, SOLUTION INTRAVENOUS at 16:21

## 2023-03-10 ASSESSMENT — PAIN SCALES - GENERAL
PAINLEVEL_OUTOF10: 3
PAINLEVEL_OUTOF10: 7
PAINLEVEL_OUTOF10: 6
PAINLEVEL_OUTOF10: 7
PAINLEVEL_OUTOF10: 0
PAINLEVEL_OUTOF10: 7
PAINLEVEL_OUTOF10: 0

## 2023-03-10 ASSESSMENT — PAIN DESCRIPTION - PAIN TYPE: TYPE: ACUTE PAIN

## 2023-03-10 ASSESSMENT — PAIN DESCRIPTION - LOCATION
LOCATION: NECK

## 2023-03-10 ASSESSMENT — PAIN DESCRIPTION - ONSET: ONSET: GRADUAL

## 2023-03-10 ASSESSMENT — PAIN DESCRIPTION - DESCRIPTORS
DESCRIPTORS: ACHING
DESCRIPTORS: NUMBNESS;ACHING

## 2023-03-10 ASSESSMENT — PAIN DESCRIPTION - FREQUENCY: FREQUENCY: INTERMITTENT

## 2023-03-10 ASSESSMENT — PAIN - FUNCTIONAL ASSESSMENT
PAIN_FUNCTIONAL_ASSESSMENT: ACTIVITIES ARE NOT PREVENTED
PAIN_FUNCTIONAL_ASSESSMENT: 0-10

## 2023-03-10 ASSESSMENT — ENCOUNTER SYMPTOMS: SHORTNESS OF BREATH: 1

## 2023-03-10 NOTE — ANESTHESIA PROCEDURE NOTES
Arterial Line:    An arterial line was placed using surface landmarks, in the OR for the following indication(s): continuous blood pressure monitoring. A 20 gauge (size), 1 and 3/4 inch (length), Arrow (type) catheter was placed, Seldinger technique not used, into the left radial artery, secured by tape and Tegaderm. 3/10/2023 8:05 AM3/10/2023 8:10 AM  Anesthesiologist: Bryan Martinez MD  Performed: Anesthesiologist

## 2023-03-10 NOTE — ANESTHESIA PROCEDURE NOTES
Airway  Date/Time: 3/10/2023 8:02 AM  Urgency: elective    Airway not difficult    General Information and Staff    Patient location during procedure: OR  Resident/CRNA: ANN-MARIE Chairez - CRNA    Indications and Patient Condition  Indications for airway management: anesthesia  Spontaneous Ventilation: absent  Sedation level: deep  Preoxygenated: yes  MILS maintained throughout  Mask difficulty assessment: vent by bag mask + OA or adjuvant +/- NMBA    Final Airway Details  Final airway type: endotracheal airway      Successful airway: ETT  Cuffed: yes   Successful intubation technique: video laryngoscopy  Facilitating devices/methods: intubating stylet  Endotracheal tube insertion site: oral  Blade size: #4  ETT size (mm): 8.0  Cormack-Lehane Classification: grade I - full view of glottis  Placement verified by: chest auscultation and capnometry   Measured from: lips  ETT to lips (cm): 24  Number of attempts at approach: 1  Ventilation between attempts: bag mask  Number of other approaches attempted: 0    Additional Comments  Glidescope used. Head/neck remain in neutral position during intubation.

## 2023-03-11 LAB
ANION GAP SERPL CALC-SCNC: 5 MMOL/L (ref 2–11)
BUN SERPL-MCNC: 13 MG/DL (ref 8–23)
CALCIUM SERPL-MCNC: 7.7 MG/DL (ref 8.3–10.4)
CHLORIDE SERPL-SCNC: 101 MMOL/L (ref 101–110)
CO2 SERPL-SCNC: 26 MMOL/L (ref 21–32)
CREAT SERPL-MCNC: 0.7 MG/DL (ref 0.8–1.5)
ERYTHROCYTE [DISTWIDTH] IN BLOOD BY AUTOMATED COUNT: 13.5 % (ref 11.9–14.6)
GLUCOSE SERPL-MCNC: 93 MG/DL (ref 65–100)
HCT VFR BLD AUTO: 34.7 % (ref 41.1–50.3)
HGB BLD-MCNC: 11.6 G/DL (ref 13.6–17.2)
MCH RBC QN AUTO: 35.2 PG (ref 26.1–32.9)
MCHC RBC AUTO-ENTMCNC: 33.4 G/DL (ref 31.4–35)
MCV RBC AUTO: 105.2 FL (ref 82–102)
NRBC # BLD: 0 K/UL (ref 0–0.2)
PLATELET # BLD AUTO: 126 K/UL (ref 150–450)
PMV BLD AUTO: 8.9 FL (ref 9.4–12.3)
POTASSIUM SERPL-SCNC: 3.8 MMOL/L (ref 3.5–5.1)
RBC # BLD AUTO: 3.3 M/UL (ref 4.23–5.6)
SODIUM SERPL-SCNC: 132 MMOL/L (ref 133–143)
WBC # BLD AUTO: 9 K/UL (ref 4.3–11.1)

## 2023-03-11 PROCEDURE — 1100000003 HC PRIVATE W/ TELEMETRY

## 2023-03-11 PROCEDURE — 6370000000 HC RX 637 (ALT 250 FOR IP): Performed by: INTERNAL MEDICINE

## 2023-03-11 PROCEDURE — 6370000000 HC RX 637 (ALT 250 FOR IP): Performed by: NEUROLOGICAL SURGERY

## 2023-03-11 PROCEDURE — 97530 THERAPEUTIC ACTIVITIES: CPT

## 2023-03-11 PROCEDURE — 97112 NEUROMUSCULAR REEDUCATION: CPT

## 2023-03-11 PROCEDURE — 51702 INSERT TEMP BLADDER CATH: CPT

## 2023-03-11 PROCEDURE — 99222 1ST HOSP IP/OBS MODERATE 55: CPT | Performed by: INTERNAL MEDICINE

## 2023-03-11 PROCEDURE — 2580000003 HC RX 258: Performed by: NEUROLOGICAL SURGERY

## 2023-03-11 PROCEDURE — 97168 OT RE-EVAL EST PLAN CARE: CPT

## 2023-03-11 PROCEDURE — 6360000002 HC RX W HCPCS: Performed by: INTERNAL MEDICINE

## 2023-03-11 PROCEDURE — 36415 COLL VENOUS BLD VENIPUNCTURE: CPT

## 2023-03-11 PROCEDURE — 97164 PT RE-EVAL EST PLAN CARE: CPT

## 2023-03-11 PROCEDURE — 97535 SELF CARE MNGMENT TRAINING: CPT

## 2023-03-11 PROCEDURE — 6360000002 HC RX W HCPCS: Performed by: NEUROLOGICAL SURGERY

## 2023-03-11 PROCEDURE — 85027 COMPLETE CBC AUTOMATED: CPT

## 2023-03-11 PROCEDURE — 80048 BASIC METABOLIC PNL TOTAL CA: CPT

## 2023-03-11 RX ORDER — ASPIRIN 81 MG/1
81 TABLET ORAL DAILY
Status: DISCONTINUED | OUTPATIENT
Start: 2023-03-11 | End: 2023-03-24

## 2023-03-11 RX ORDER — HYDROMORPHONE HYDROCHLORIDE 1 MG/ML
1 INJECTION, SOLUTION INTRAMUSCULAR; INTRAVENOUS; SUBCUTANEOUS EVERY 4 HOURS PRN
Status: DISCONTINUED | OUTPATIENT
Start: 2023-03-11 | End: 2023-03-18 | Stop reason: SDUPTHER

## 2023-03-11 RX ADMIN — VANCOMYCIN HYDROCHLORIDE 1000 MG: 1 INJECTION, POWDER, LYOPHILIZED, FOR SOLUTION INTRAVENOUS at 08:56

## 2023-03-11 RX ADMIN — VANCOMYCIN HYDROCHLORIDE 1000 MG: 1 INJECTION, POWDER, LYOPHILIZED, FOR SOLUTION INTRAVENOUS at 21:24

## 2023-03-11 RX ADMIN — FOLIC ACID 1 MG: 1 TABLET ORAL at 08:57

## 2023-03-11 RX ADMIN — HYDROMORPHONE HYDROCHLORIDE 1 MG: 1 INJECTION, SOLUTION INTRAMUSCULAR; INTRAVENOUS; SUBCUTANEOUS at 17:00

## 2023-03-11 RX ADMIN — BACITRACIN: 500 OINTMENT TOPICAL at 21:52

## 2023-03-11 RX ADMIN — ROSUVASTATIN CALCIUM 40 MG: 20 TABLET, FILM COATED ORAL at 21:24

## 2023-03-11 RX ADMIN — CYCLOBENZAPRINE HYDROCHLORIDE 10 MG: 5 TABLET, FILM COATED ORAL at 10:28

## 2023-03-11 RX ADMIN — FUROSEMIDE 40 MG: 40 TABLET ORAL at 08:56

## 2023-03-11 RX ADMIN — PANTOPRAZOLE SODIUM 40 MG: 40 TABLET, DELAYED RELEASE ORAL at 16:46

## 2023-03-11 RX ADMIN — HYDROCODONE BITARTRATE AND ACETAMINOPHEN 1 TABLET: 10; 325 TABLET ORAL at 12:14

## 2023-03-11 RX ADMIN — GABAPENTIN 400 MG: 300 CAPSULE ORAL at 08:56

## 2023-03-11 RX ADMIN — GABAPENTIN 400 MG: 300 CAPSULE ORAL at 21:24

## 2023-03-11 RX ADMIN — MORPHINE SULFATE 2 MG: 2 INJECTION, SOLUTION INTRAMUSCULAR; INTRAVENOUS at 05:15

## 2023-03-11 RX ADMIN — BACITRACIN: 500 OINTMENT TOPICAL at 09:27

## 2023-03-11 RX ADMIN — HYDROCODONE BITARTRATE AND ACETAMINOPHEN 1 TABLET: 10; 325 TABLET ORAL at 01:28

## 2023-03-11 RX ADMIN — CARBAMAZEPINE 400 MG: 200 TABLET ORAL at 08:56

## 2023-03-11 RX ADMIN — PANTOPRAZOLE SODIUM 40 MG: 40 TABLET, DELAYED RELEASE ORAL at 05:15

## 2023-03-11 RX ADMIN — POLYETHYLENE GLYCOL 3350 17 G: 17 POWDER, FOR SOLUTION ORAL at 08:57

## 2023-03-11 RX ADMIN — SODIUM CHLORIDE, PRESERVATIVE FREE 10 ML: 5 INJECTION INTRAVENOUS at 08:57

## 2023-03-11 RX ADMIN — EMPAGLIFLOZIN 10 MG: 10 TABLET, FILM COATED ORAL at 08:56

## 2023-03-11 RX ADMIN — CARBAMAZEPINE 400 MG: 200 TABLET ORAL at 21:24

## 2023-03-11 RX ADMIN — SODIUM CHLORIDE, PRESERVATIVE FREE 5 ML: 5 INJECTION INTRAVENOUS at 21:42

## 2023-03-11 RX ADMIN — GABAPENTIN 400 MG: 300 CAPSULE ORAL at 14:21

## 2023-03-11 RX ADMIN — ASPIRIN 81 MG: 81 TABLET ORAL at 12:14

## 2023-03-11 RX ADMIN — PANTOPRAZOLE SODIUM 40 MG: 40 TABLET, DELAYED RELEASE ORAL at 05:16

## 2023-03-11 RX ADMIN — MORPHINE SULFATE 2 MG: 2 INJECTION, SOLUTION INTRAMUSCULAR; INTRAVENOUS at 14:28

## 2023-03-11 RX ADMIN — MORPHINE SULFATE 2 MG: 2 INJECTION, SOLUTION INTRAMUSCULAR; INTRAVENOUS at 09:03

## 2023-03-11 RX ADMIN — FUROSEMIDE 40 MG: 40 TABLET ORAL at 16:46

## 2023-03-11 ASSESSMENT — PAIN SCALES - GENERAL
PAINLEVEL_OUTOF10: 7
PAINLEVEL_OUTOF10: 0
PAINLEVEL_OUTOF10: 7
PAINLEVEL_OUTOF10: 0
PAINLEVEL_OUTOF10: 7
PAINLEVEL_OUTOF10: 8
PAINLEVEL_OUTOF10: 0
PAINLEVEL_OUTOF10: 0
PAINLEVEL_OUTOF10: 5
PAINLEVEL_OUTOF10: 8
PAINLEVEL_OUTOF10: 0

## 2023-03-11 ASSESSMENT — PAIN DESCRIPTION - LOCATION
LOCATION: NECK

## 2023-03-11 ASSESSMENT — PAIN DESCRIPTION - FREQUENCY
FREQUENCY: INTERMITTENT

## 2023-03-11 ASSESSMENT — PAIN DESCRIPTION - ONSET
ONSET: GRADUAL

## 2023-03-11 ASSESSMENT — PAIN - FUNCTIONAL ASSESSMENT
PAIN_FUNCTIONAL_ASSESSMENT: ACTIVITIES ARE NOT PREVENTED

## 2023-03-11 ASSESSMENT — PAIN DESCRIPTION - PAIN TYPE
TYPE: SURGICAL PAIN

## 2023-03-11 ASSESSMENT — PAIN DESCRIPTION - ORIENTATION
ORIENTATION: POSTERIOR
ORIENTATION: MID

## 2023-03-11 ASSESSMENT — PAIN DESCRIPTION - DESCRIPTORS
DESCRIPTORS: ACHING

## 2023-03-12 LAB
FOLATE SERPL-MCNC: 9.7 NG/ML (ref 3.1–17.5)
VIT B12 SERPL-MCNC: 794 PG/ML (ref 193–986)

## 2023-03-12 PROCEDURE — 6370000000 HC RX 637 (ALT 250 FOR IP): Performed by: NEUROLOGICAL SURGERY

## 2023-03-12 PROCEDURE — 99231 SBSQ HOSP IP/OBS SF/LOW 25: CPT | Performed by: INTERNAL MEDICINE

## 2023-03-12 PROCEDURE — 82746 ASSAY OF FOLIC ACID SERUM: CPT

## 2023-03-12 PROCEDURE — 6360000002 HC RX W HCPCS: Performed by: NEUROLOGICAL SURGERY

## 2023-03-12 PROCEDURE — 97112 NEUROMUSCULAR REEDUCATION: CPT

## 2023-03-12 PROCEDURE — 1100000003 HC PRIVATE W/ TELEMETRY

## 2023-03-12 PROCEDURE — 51702 INSERT TEMP BLADDER CATH: CPT

## 2023-03-12 PROCEDURE — 97530 THERAPEUTIC ACTIVITIES: CPT

## 2023-03-12 PROCEDURE — 82607 VITAMIN B-12: CPT

## 2023-03-12 PROCEDURE — 2580000003 HC RX 258: Performed by: NEUROLOGICAL SURGERY

## 2023-03-12 PROCEDURE — 6370000000 HC RX 637 (ALT 250 FOR IP): Performed by: INTERNAL MEDICINE

## 2023-03-12 PROCEDURE — 6360000002 HC RX W HCPCS: Performed by: INTERNAL MEDICINE

## 2023-03-12 RX ADMIN — CYCLOBENZAPRINE HYDROCHLORIDE 10 MG: 5 TABLET, FILM COATED ORAL at 19:38

## 2023-03-12 RX ADMIN — FOLIC ACID 1 MG: 1 TABLET ORAL at 09:00

## 2023-03-12 RX ADMIN — CARBOXYMETHYLCELLULOSE SODIUM 1 DROP: 10 GEL OPHTHALMIC at 19:44

## 2023-03-12 RX ADMIN — POLYETHYLENE GLYCOL 3350 17 G: 17 POWDER, FOR SOLUTION ORAL at 09:01

## 2023-03-12 RX ADMIN — CYCLOBENZAPRINE HYDROCHLORIDE 10 MG: 5 TABLET, FILM COATED ORAL at 11:11

## 2023-03-12 RX ADMIN — BACITRACIN: 500 OINTMENT TOPICAL at 09:03

## 2023-03-12 RX ADMIN — SODIUM CHLORIDE, PRESERVATIVE FREE 10 ML: 5 INJECTION INTRAVENOUS at 09:01

## 2023-03-12 RX ADMIN — ROSUVASTATIN CALCIUM 40 MG: 20 TABLET, FILM COATED ORAL at 19:39

## 2023-03-12 RX ADMIN — FUROSEMIDE 40 MG: 40 TABLET ORAL at 16:49

## 2023-03-12 RX ADMIN — BACITRACIN: 500 OINTMENT TOPICAL at 19:46

## 2023-03-12 RX ADMIN — HYDROCODONE BITARTRATE AND ACETAMINOPHEN 1 TABLET: 10; 325 TABLET ORAL at 19:38

## 2023-03-12 RX ADMIN — GABAPENTIN 400 MG: 300 CAPSULE ORAL at 19:38

## 2023-03-12 RX ADMIN — CARBAMAZEPINE 400 MG: 200 TABLET ORAL at 09:00

## 2023-03-12 RX ADMIN — GABAPENTIN 400 MG: 300 CAPSULE ORAL at 13:32

## 2023-03-12 RX ADMIN — FUROSEMIDE 40 MG: 40 TABLET ORAL at 09:00

## 2023-03-12 RX ADMIN — GABAPENTIN 400 MG: 300 CAPSULE ORAL at 09:00

## 2023-03-12 RX ADMIN — PANTOPRAZOLE SODIUM 40 MG: 40 TABLET, DELAYED RELEASE ORAL at 04:23

## 2023-03-12 RX ADMIN — MORPHINE SULFATE 2 MG: 2 INJECTION, SOLUTION INTRAMUSCULAR; INTRAVENOUS at 15:15

## 2023-03-12 RX ADMIN — HYDROMORPHONE HYDROCHLORIDE 1 MG: 1 INJECTION, SOLUTION INTRAMUSCULAR; INTRAVENOUS; SUBCUTANEOUS at 09:08

## 2023-03-12 RX ADMIN — SODIUM CHLORIDE, PRESERVATIVE FREE 10 ML: 5 INJECTION INTRAVENOUS at 19:51

## 2023-03-12 RX ADMIN — ASPIRIN 81 MG: 81 TABLET ORAL at 09:00

## 2023-03-12 RX ADMIN — HYDROCODONE BITARTRATE AND ACETAMINOPHEN 1 TABLET: 10; 325 TABLET ORAL at 12:26

## 2023-03-12 RX ADMIN — HYDROMORPHONE HYDROCHLORIDE 1 MG: 1 INJECTION, SOLUTION INTRAMUSCULAR; INTRAVENOUS; SUBCUTANEOUS at 03:41

## 2023-03-12 RX ADMIN — PANTOPRAZOLE SODIUM 40 MG: 40 TABLET, DELAYED RELEASE ORAL at 16:37

## 2023-03-12 RX ADMIN — EMPAGLIFLOZIN 10 MG: 10 TABLET, FILM COATED ORAL at 09:00

## 2023-03-12 RX ADMIN — CARBAMAZEPINE 400 MG: 200 TABLET ORAL at 19:38

## 2023-03-12 ASSESSMENT — PAIN DESCRIPTION - FREQUENCY
FREQUENCY: INTERMITTENT

## 2023-03-12 ASSESSMENT — PAIN DESCRIPTION - DESCRIPTORS
DESCRIPTORS: ACHING

## 2023-03-12 ASSESSMENT — PAIN DESCRIPTION - ORIENTATION
ORIENTATION: POSTERIOR

## 2023-03-12 ASSESSMENT — PAIN SCALES - GENERAL
PAINLEVEL_OUTOF10: 7
PAINLEVEL_OUTOF10: 8
PAINLEVEL_OUTOF10: 0
PAINLEVEL_OUTOF10: 0
PAINLEVEL_OUTOF10: 8
PAINLEVEL_OUTOF10: 7
PAINLEVEL_OUTOF10: 0
PAINLEVEL_OUTOF10: 8

## 2023-03-12 ASSESSMENT — PAIN SCALES - WONG BAKER
WONGBAKER_NUMERICALRESPONSE: 0
WONGBAKER_NUMERICALRESPONSE: 0

## 2023-03-12 ASSESSMENT — PAIN DESCRIPTION - ONSET
ONSET: GRADUAL

## 2023-03-12 ASSESSMENT — PAIN DESCRIPTION - LOCATION
LOCATION: NECK

## 2023-03-12 ASSESSMENT — PAIN DESCRIPTION - PAIN TYPE
TYPE: SURGICAL PAIN

## 2023-03-12 ASSESSMENT — ENCOUNTER SYMPTOMS
COLOR CHANGE: 0
EYE DISCHARGE: 0

## 2023-03-13 PROBLEM — I50.22 CHRONIC SYSTOLIC CONGESTIVE HEART FAILURE (HCC): Status: ACTIVE | Noted: 2023-02-16

## 2023-03-13 LAB
ANION GAP SERPL CALC-SCNC: 2 MMOL/L (ref 2–11)
BASOPHILS # BLD: 0 K/UL (ref 0–0.2)
BASOPHILS NFR BLD: 0 % (ref 0–2)
BUN SERPL-MCNC: 13 MG/DL (ref 8–23)
CALCIUM SERPL-MCNC: 7.9 MG/DL (ref 8.3–10.4)
CHLORIDE SERPL-SCNC: 99 MMOL/L (ref 101–110)
CO2 SERPL-SCNC: 28 MMOL/L (ref 21–32)
CREAT SERPL-MCNC: 0.7 MG/DL (ref 0.8–1.5)
DIFFERENTIAL METHOD BLD: ABNORMAL
EOSINOPHIL # BLD: 0.2 K/UL (ref 0–0.8)
EOSINOPHIL NFR BLD: 2 % (ref 0.5–7.8)
ERYTHROCYTE [DISTWIDTH] IN BLOOD BY AUTOMATED COUNT: 13.4 % (ref 11.9–14.6)
GLUCOSE SERPL-MCNC: 83 MG/DL (ref 65–100)
HCT VFR BLD AUTO: 33.3 % (ref 41.1–50.3)
HGB BLD-MCNC: 11.6 G/DL (ref 13.6–17.2)
IMM GRANULOCYTES # BLD AUTO: 0 K/UL (ref 0–0.5)
IMM GRANULOCYTES NFR BLD AUTO: 1 % (ref 0–5)
LYMPHOCYTES # BLD: 1 K/UL (ref 0.5–4.6)
LYMPHOCYTES NFR BLD: 13 % (ref 13–44)
MCH RBC QN AUTO: 35.7 PG (ref 26.1–32.9)
MCHC RBC AUTO-ENTMCNC: 34.8 G/DL (ref 31.4–35)
MCV RBC AUTO: 102.5 FL (ref 82–102)
MONOCYTES # BLD: 0.7 K/UL (ref 0.1–1.3)
MONOCYTES NFR BLD: 10 % (ref 4–12)
NEUTS SEG # BLD: 5.7 K/UL (ref 1.7–8.2)
NEUTS SEG NFR BLD: 74 % (ref 43–78)
NRBC # BLD: 0 K/UL (ref 0–0.2)
PLATELET # BLD AUTO: 126 K/UL (ref 150–450)
PMV BLD AUTO: 9 FL (ref 9.4–12.3)
POTASSIUM SERPL-SCNC: 3.6 MMOL/L (ref 3.5–5.1)
RBC # BLD AUTO: 3.25 M/UL (ref 4.23–5.6)
SODIUM SERPL-SCNC: 129 MMOL/L (ref 133–143)
WBC # BLD AUTO: 7.7 K/UL (ref 4.3–11.1)

## 2023-03-13 PROCEDURE — 6370000000 HC RX 637 (ALT 250 FOR IP): Performed by: NEUROLOGICAL SURGERY

## 2023-03-13 PROCEDURE — 6360000002 HC RX W HCPCS: Performed by: INTERNAL MEDICINE

## 2023-03-13 PROCEDURE — 80048 BASIC METABOLIC PNL TOTAL CA: CPT

## 2023-03-13 PROCEDURE — 2580000003 HC RX 258: Performed by: NEUROLOGICAL SURGERY

## 2023-03-13 PROCEDURE — 97112 NEUROMUSCULAR REEDUCATION: CPT

## 2023-03-13 PROCEDURE — 6370000000 HC RX 637 (ALT 250 FOR IP): Performed by: INTERNAL MEDICINE

## 2023-03-13 PROCEDURE — 36415 COLL VENOUS BLD VENIPUNCTURE: CPT

## 2023-03-13 PROCEDURE — 6360000002 HC RX W HCPCS: Performed by: NEUROLOGICAL SURGERY

## 2023-03-13 PROCEDURE — 92610 EVALUATE SWALLOWING FUNCTION: CPT

## 2023-03-13 PROCEDURE — 1100000003 HC PRIVATE W/ TELEMETRY

## 2023-03-13 PROCEDURE — 85025 COMPLETE CBC W/AUTO DIFF WBC: CPT

## 2023-03-13 PROCEDURE — 97530 THERAPEUTIC ACTIVITIES: CPT

## 2023-03-13 PROCEDURE — 99232 SBSQ HOSP IP/OBS MODERATE 35: CPT | Performed by: INTERNAL MEDICINE

## 2023-03-13 RX ORDER — FUROSEMIDE 40 MG/1
40 TABLET ORAL DAILY
Status: DISCONTINUED | OUTPATIENT
Start: 2023-03-14 | End: 2023-03-30 | Stop reason: HOSPADM

## 2023-03-13 RX ADMIN — GABAPENTIN 400 MG: 300 CAPSULE ORAL at 14:22

## 2023-03-13 RX ADMIN — HYDROMORPHONE HYDROCHLORIDE 1 MG: 1 INJECTION, SOLUTION INTRAMUSCULAR; INTRAVENOUS; SUBCUTANEOUS at 17:37

## 2023-03-13 RX ADMIN — FUROSEMIDE 40 MG: 40 TABLET ORAL at 08:45

## 2023-03-13 RX ADMIN — CARBAMAZEPINE 400 MG: 200 TABLET ORAL at 21:03

## 2023-03-13 RX ADMIN — PANTOPRAZOLE SODIUM 40 MG: 40 TABLET, DELAYED RELEASE ORAL at 05:20

## 2023-03-13 RX ADMIN — ASPIRIN 81 MG: 81 TABLET ORAL at 08:45

## 2023-03-13 RX ADMIN — CARBAMAZEPINE 400 MG: 200 TABLET ORAL at 08:45

## 2023-03-13 RX ADMIN — BACITRACIN: 500 OINTMENT TOPICAL at 09:04

## 2023-03-13 RX ADMIN — MORPHINE SULFATE 2 MG: 2 INJECTION, SOLUTION INTRAMUSCULAR; INTRAVENOUS at 05:29

## 2023-03-13 RX ADMIN — GABAPENTIN 400 MG: 300 CAPSULE ORAL at 08:45

## 2023-03-13 RX ADMIN — GABAPENTIN 400 MG: 300 CAPSULE ORAL at 21:02

## 2023-03-13 RX ADMIN — HYDROMORPHONE HYDROCHLORIDE 1 MG: 1 INJECTION, SOLUTION INTRAMUSCULAR; INTRAVENOUS; SUBCUTANEOUS at 12:25

## 2023-03-13 RX ADMIN — SODIUM CHLORIDE, PRESERVATIVE FREE 10 ML: 5 INJECTION INTRAVENOUS at 09:01

## 2023-03-13 RX ADMIN — HYDROCODONE BITARTRATE AND ACETAMINOPHEN 1 TABLET: 10; 325 TABLET ORAL at 08:45

## 2023-03-13 RX ADMIN — FOLIC ACID 1 MG: 1 TABLET ORAL at 08:45

## 2023-03-13 RX ADMIN — Medication 15 G: at 11:07

## 2023-03-13 RX ADMIN — SODIUM CHLORIDE, PRESERVATIVE FREE 5 ML: 5 INJECTION INTRAVENOUS at 21:03

## 2023-03-13 RX ADMIN — POLYETHYLENE GLYCOL 3350 17 G: 17 POWDER, FOR SOLUTION ORAL at 09:03

## 2023-03-13 RX ADMIN — EMPAGLIFLOZIN 10 MG: 10 TABLET, FILM COATED ORAL at 08:45

## 2023-03-13 RX ADMIN — PANTOPRAZOLE SODIUM 40 MG: 40 TABLET, DELAYED RELEASE ORAL at 16:48

## 2023-03-13 RX ADMIN — ROSUVASTATIN CALCIUM 40 MG: 20 TABLET, FILM COATED ORAL at 21:03

## 2023-03-13 RX ADMIN — HYDROMORPHONE HYDROCHLORIDE 1 MG: 1 INJECTION, SOLUTION INTRAMUSCULAR; INTRAVENOUS; SUBCUTANEOUS at 21:28

## 2023-03-13 ASSESSMENT — PAIN SCALES - GENERAL
PAINLEVEL_OUTOF10: 9
PAINLEVEL_OUTOF10: 0
PAINLEVEL_OUTOF10: 7
PAINLEVEL_OUTOF10: 8
PAINLEVEL_OUTOF10: 0
PAINLEVEL_OUTOF10: 0
PAINLEVEL_OUTOF10: 7
PAINLEVEL_OUTOF10: 7

## 2023-03-13 ASSESSMENT — PAIN DESCRIPTION - DESCRIPTORS
DESCRIPTORS: ACHING;THROBBING;STABBING
DESCRIPTORS: ACHING;THROBBING

## 2023-03-13 ASSESSMENT — PAIN DESCRIPTION - PAIN TYPE: TYPE: SURGICAL PAIN

## 2023-03-13 ASSESSMENT — PAIN DESCRIPTION - ORIENTATION
ORIENTATION: POSTERIOR
ORIENTATION: ANTERIOR;POSTERIOR
ORIENTATION: POSTERIOR
ORIENTATION: POSTERIOR

## 2023-03-13 ASSESSMENT — PAIN SCALES - WONG BAKER
WONGBAKER_NUMERICALRESPONSE: 0
WONGBAKER_NUMERICALRESPONSE: 0

## 2023-03-13 ASSESSMENT — PAIN DESCRIPTION - ONSET: ONSET: GRADUAL

## 2023-03-13 ASSESSMENT — PAIN - FUNCTIONAL ASSESSMENT: PAIN_FUNCTIONAL_ASSESSMENT: ACTIVITIES ARE NOT PREVENTED

## 2023-03-13 ASSESSMENT — PAIN DESCRIPTION - LOCATION
LOCATION: NECK
LOCATION: NECK
LOCATION: SHOULDER;ARM;NECK
LOCATION: NECK

## 2023-03-13 ASSESSMENT — PAIN DESCRIPTION - FREQUENCY: FREQUENCY: INTERMITTENT

## 2023-03-14 ENCOUNTER — APPOINTMENT (OUTPATIENT)
Dept: GENERAL RADIOLOGY | Age: 78
DRG: 453 | End: 2023-03-14
Attending: PHYSICAL MEDICINE & REHABILITATION
Payer: MEDICARE

## 2023-03-14 ENCOUNTER — PREP FOR PROCEDURE (OUTPATIENT)
Dept: NEUROSURGERY | Age: 78
End: 2023-03-14

## 2023-03-14 LAB
ANION GAP SERPL CALC-SCNC: 6 MMOL/L (ref 2–11)
BUN SERPL-MCNC: 20 MG/DL (ref 8–23)
CALCIUM SERPL-MCNC: 8.2 MG/DL (ref 8.3–10.4)
CHLORIDE SERPL-SCNC: 98 MMOL/L (ref 101–110)
CO2 SERPL-SCNC: 27 MMOL/L (ref 21–32)
CREAT SERPL-MCNC: 0.8 MG/DL (ref 0.8–1.5)
GLUCOSE SERPL-MCNC: 121 MG/DL (ref 65–100)
POTASSIUM SERPL-SCNC: 3.7 MMOL/L (ref 3.5–5.1)
SODIUM SERPL-SCNC: 131 MMOL/L (ref 133–143)

## 2023-03-14 PROCEDURE — 6370000000 HC RX 637 (ALT 250 FOR IP): Performed by: NEUROLOGICAL SURGERY

## 2023-03-14 PROCEDURE — 36415 COLL VENOUS BLD VENIPUNCTURE: CPT

## 2023-03-14 PROCEDURE — 6360000002 HC RX W HCPCS: Performed by: INTERNAL MEDICINE

## 2023-03-14 PROCEDURE — 74230 X-RAY XM SWLNG FUNCJ C+: CPT

## 2023-03-14 PROCEDURE — 99232 SBSQ HOSP IP/OBS MODERATE 35: CPT | Performed by: INTERNAL MEDICINE

## 2023-03-14 PROCEDURE — 6370000000 HC RX 637 (ALT 250 FOR IP): Performed by: INTERNAL MEDICINE

## 2023-03-14 PROCEDURE — 92611 MOTION FLUOROSCOPY/SWALLOW: CPT

## 2023-03-14 PROCEDURE — 80048 BASIC METABOLIC PNL TOTAL CA: CPT

## 2023-03-14 PROCEDURE — 1100000003 HC PRIVATE W/ TELEMETRY

## 2023-03-14 PROCEDURE — 2580000003 HC RX 258: Performed by: NEUROLOGICAL SURGERY

## 2023-03-14 PROCEDURE — 72040 X-RAY EXAM NECK SPINE 2-3 VW: CPT

## 2023-03-14 PROCEDURE — 2500000003 HC RX 250 WO HCPCS: Performed by: INTERNAL MEDICINE

## 2023-03-14 PROCEDURE — 97112 NEUROMUSCULAR REEDUCATION: CPT

## 2023-03-14 PROCEDURE — 97530 THERAPEUTIC ACTIVITIES: CPT

## 2023-03-14 PROCEDURE — 51798 US URINE CAPACITY MEASURE: CPT

## 2023-03-14 RX ORDER — HYDROCODONE BITARTRATE AND ACETAMINOPHEN 10; 325 MG/1; MG/1
1 TABLET ORAL EVERY 6 HOURS PRN
Status: DISCONTINUED | OUTPATIENT
Start: 2023-03-14 | End: 2023-03-30 | Stop reason: HOSPADM

## 2023-03-14 RX ADMIN — POLYETHYLENE GLYCOL 3350 17 G: 17 POWDER, FOR SOLUTION ORAL at 10:34

## 2023-03-14 RX ADMIN — BACITRACIN: 500 OINTMENT TOPICAL at 08:35

## 2023-03-14 RX ADMIN — FOLIC ACID 1 MG: 1 TABLET ORAL at 10:34

## 2023-03-14 RX ADMIN — SODIUM CHLORIDE, PRESERVATIVE FREE 40 ML: 5 INJECTION INTRAVENOUS at 10:40

## 2023-03-14 RX ADMIN — BARIUM SULFATE 30 ML: 400 PASTE ORAL at 11:30

## 2023-03-14 RX ADMIN — CARBAMAZEPINE 400 MG: 200 TABLET ORAL at 20:41

## 2023-03-14 RX ADMIN — PANTOPRAZOLE SODIUM 40 MG: 40 TABLET, DELAYED RELEASE ORAL at 05:42

## 2023-03-14 RX ADMIN — GABAPENTIN 400 MG: 300 CAPSULE ORAL at 20:41

## 2023-03-14 RX ADMIN — HYDROCODONE BITARTRATE AND ACETAMINOPHEN 1 TABLET: 10; 325 TABLET ORAL at 06:38

## 2023-03-14 RX ADMIN — BARIUM SULFATE 15 ML: 400 SUSPENSION ORAL at 11:31

## 2023-03-14 RX ADMIN — EMPAGLIFLOZIN 10 MG: 10 TABLET, FILM COATED ORAL at 10:33

## 2023-03-14 RX ADMIN — PANTOPRAZOLE SODIUM 40 MG: 40 TABLET, DELAYED RELEASE ORAL at 17:32

## 2023-03-14 RX ADMIN — GABAPENTIN 400 MG: 300 CAPSULE ORAL at 13:54

## 2023-03-14 RX ADMIN — BARIUM SULFATE 30 ML: 0.81 POWDER, FOR SUSPENSION ORAL at 11:31

## 2023-03-14 RX ADMIN — Medication 15 G: at 13:56

## 2023-03-14 RX ADMIN — SODIUM CHLORIDE, PRESERVATIVE FREE 10 ML: 5 INJECTION INTRAVENOUS at 20:41

## 2023-03-14 RX ADMIN — ROSUVASTATIN CALCIUM 40 MG: 20 TABLET, FILM COATED ORAL at 20:41

## 2023-03-14 RX ADMIN — CARBAMAZEPINE 400 MG: 200 TABLET ORAL at 10:33

## 2023-03-14 RX ADMIN — FUROSEMIDE 40 MG: 40 TABLET ORAL at 10:34

## 2023-03-14 RX ADMIN — ASPIRIN 81 MG: 81 TABLET ORAL at 10:34

## 2023-03-14 RX ADMIN — GABAPENTIN 400 MG: 300 CAPSULE ORAL at 10:33

## 2023-03-14 RX ADMIN — HYDROMORPHONE HYDROCHLORIDE 1 MG: 1 INJECTION, SOLUTION INTRAMUSCULAR; INTRAVENOUS; SUBCUTANEOUS at 08:50

## 2023-03-14 ASSESSMENT — PAIN DESCRIPTION - LOCATION
LOCATION: NECK
LOCATION: NECK

## 2023-03-14 ASSESSMENT — PAIN - FUNCTIONAL ASSESSMENT: PAIN_FUNCTIONAL_ASSESSMENT: ACTIVITIES ARE NOT PREVENTED

## 2023-03-14 ASSESSMENT — PAIN DESCRIPTION - ONSET: ONSET: GRADUAL

## 2023-03-14 ASSESSMENT — PAIN SCALES - GENERAL
PAINLEVEL_OUTOF10: 8
PAINLEVEL_OUTOF10: 0
PAINLEVEL_OUTOF10: 0
PAINLEVEL_OUTOF10: 9
PAINLEVEL_OUTOF10: 0

## 2023-03-14 ASSESSMENT — PAIN DESCRIPTION - ORIENTATION
ORIENTATION: POSTERIOR
ORIENTATION: POSTERIOR

## 2023-03-14 ASSESSMENT — PAIN DESCRIPTION - DESCRIPTORS
DESCRIPTORS: ACHING;THROBBING
DESCRIPTORS: ACHING;THROBBING

## 2023-03-14 ASSESSMENT — PAIN DESCRIPTION - PAIN TYPE: TYPE: SURGICAL PAIN

## 2023-03-14 ASSESSMENT — PAIN SCALES - WONG BAKER
WONGBAKER_NUMERICALRESPONSE: 0
WONGBAKER_NUMERICALRESPONSE: 0

## 2023-03-14 ASSESSMENT — PAIN DESCRIPTION - FREQUENCY: FREQUENCY: INTERMITTENT

## 2023-03-15 PROBLEM — E87.6 HYPOKALEMIA: Status: ACTIVE | Noted: 2023-03-15

## 2023-03-15 LAB
ANION GAP SERPL CALC-SCNC: 3 MMOL/L (ref 2–11)
BASOPHILS # BLD: 0 K/UL (ref 0–0.2)
BASOPHILS NFR BLD: 1 % (ref 0–2)
BUN SERPL-MCNC: 26 MG/DL (ref 8–23)
CALCIUM SERPL-MCNC: 8.4 MG/DL (ref 8.3–10.4)
CHLORIDE SERPL-SCNC: 99 MMOL/L (ref 101–110)
CO2 SERPL-SCNC: 28 MMOL/L (ref 21–32)
CREAT SERPL-MCNC: 0.7 MG/DL (ref 0.8–1.5)
DIFFERENTIAL METHOD BLD: ABNORMAL
EOSINOPHIL # BLD: 0.1 K/UL (ref 0–0.8)
EOSINOPHIL NFR BLD: 1 % (ref 0.5–7.8)
ERYTHROCYTE [DISTWIDTH] IN BLOOD BY AUTOMATED COUNT: 13.4 % (ref 11.9–14.6)
GLUCOSE SERPL-MCNC: 81 MG/DL (ref 65–100)
HCT VFR BLD AUTO: 32 % (ref 41.1–50.3)
HGB BLD-MCNC: 11 G/DL (ref 13.6–17.2)
IMM GRANULOCYTES # BLD AUTO: 0 K/UL (ref 0–0.5)
IMM GRANULOCYTES NFR BLD AUTO: 1 % (ref 0–5)
LYMPHOCYTES # BLD: 0.9 K/UL (ref 0.5–4.6)
LYMPHOCYTES NFR BLD: 11 % (ref 13–44)
MAGNESIUM SERPL-MCNC: 2 MG/DL (ref 1.8–2.4)
MCH RBC QN AUTO: 35.4 PG (ref 26.1–32.9)
MCHC RBC AUTO-ENTMCNC: 34.4 G/DL (ref 31.4–35)
MCV RBC AUTO: 102.9 FL (ref 82–102)
MONOCYTES # BLD: 0.9 K/UL (ref 0.1–1.3)
MONOCYTES NFR BLD: 11 % (ref 4–12)
NEUTS SEG # BLD: 6.4 K/UL (ref 1.7–8.2)
NEUTS SEG NFR BLD: 75 % (ref 43–78)
NRBC # BLD: 0 K/UL (ref 0–0.2)
PLATELET # BLD AUTO: 131 K/UL (ref 150–450)
PMV BLD AUTO: 8.8 FL (ref 9.4–12.3)
POTASSIUM SERPL-SCNC: 3.4 MMOL/L (ref 3.5–5.1)
RBC # BLD AUTO: 3.11 M/UL (ref 4.23–5.6)
SODIUM SERPL-SCNC: 130 MMOL/L (ref 133–143)
WBC # BLD AUTO: 8.3 K/UL (ref 4.3–11.1)

## 2023-03-15 PROCEDURE — 6370000000 HC RX 637 (ALT 250 FOR IP): Performed by: INTERNAL MEDICINE

## 2023-03-15 PROCEDURE — 36415 COLL VENOUS BLD VENIPUNCTURE: CPT

## 2023-03-15 PROCEDURE — 51702 INSERT TEMP BLADDER CATH: CPT

## 2023-03-15 PROCEDURE — 97530 THERAPEUTIC ACTIVITIES: CPT

## 2023-03-15 PROCEDURE — 92526 ORAL FUNCTION THERAPY: CPT

## 2023-03-15 PROCEDURE — 6370000000 HC RX 637 (ALT 250 FOR IP): Performed by: FAMILY MEDICINE

## 2023-03-15 PROCEDURE — 83735 ASSAY OF MAGNESIUM: CPT

## 2023-03-15 PROCEDURE — 51798 US URINE CAPACITY MEASURE: CPT

## 2023-03-15 PROCEDURE — 1100000003 HC PRIVATE W/ TELEMETRY

## 2023-03-15 PROCEDURE — 85025 COMPLETE CBC W/AUTO DIFF WBC: CPT

## 2023-03-15 PROCEDURE — 97112 NEUROMUSCULAR REEDUCATION: CPT

## 2023-03-15 PROCEDURE — 6370000000 HC RX 637 (ALT 250 FOR IP): Performed by: NEUROLOGICAL SURGERY

## 2023-03-15 PROCEDURE — 2580000003 HC RX 258: Performed by: FAMILY MEDICINE

## 2023-03-15 PROCEDURE — 99232 SBSQ HOSP IP/OBS MODERATE 35: CPT | Performed by: INTERNAL MEDICINE

## 2023-03-15 PROCEDURE — 80048 BASIC METABOLIC PNL TOTAL CA: CPT

## 2023-03-15 PROCEDURE — 6360000002 HC RX W HCPCS: Performed by: INTERNAL MEDICINE

## 2023-03-15 PROCEDURE — 97535 SELF CARE MNGMENT TRAINING: CPT

## 2023-03-15 PROCEDURE — 2580000003 HC RX 258: Performed by: NEUROLOGICAL SURGERY

## 2023-03-15 RX ORDER — METOPROLOL SUCCINATE 25 MG/1
25 TABLET, EXTENDED RELEASE ORAL DAILY
Status: DISCONTINUED | OUTPATIENT
Start: 2023-03-15 | End: 2023-03-30 | Stop reason: HOSPADM

## 2023-03-15 RX ORDER — SODIUM CHLORIDE 9 MG/ML
INJECTION, SOLUTION INTRAVENOUS CONTINUOUS
Status: DISCONTINUED | OUTPATIENT
Start: 2023-03-15 | End: 2023-03-19

## 2023-03-15 RX ADMIN — BACITRACIN: 500 OINTMENT TOPICAL at 21:14

## 2023-03-15 RX ADMIN — METOPROLOL SUCCINATE 25 MG: 25 TABLET, EXTENDED RELEASE ORAL at 13:23

## 2023-03-15 RX ADMIN — GABAPENTIN 400 MG: 300 CAPSULE ORAL at 09:10

## 2023-03-15 RX ADMIN — PANTOPRAZOLE SODIUM 40 MG: 40 TABLET, DELAYED RELEASE ORAL at 05:00

## 2023-03-15 RX ADMIN — ROSUVASTATIN CALCIUM 40 MG: 20 TABLET, FILM COATED ORAL at 21:06

## 2023-03-15 RX ADMIN — CARBAMAZEPINE 400 MG: 200 TABLET ORAL at 21:06

## 2023-03-15 RX ADMIN — SODIUM CHLORIDE, PRESERVATIVE FREE 5 ML: 5 INJECTION INTRAVENOUS at 21:06

## 2023-03-15 RX ADMIN — FOLIC ACID 1 MG: 1 TABLET ORAL at 09:10

## 2023-03-15 RX ADMIN — HYDROMORPHONE HYDROCHLORIDE 1 MG: 1 INJECTION, SOLUTION INTRAMUSCULAR; INTRAVENOUS; SUBCUTANEOUS at 09:38

## 2023-03-15 RX ADMIN — CARBAMAZEPINE 400 MG: 200 TABLET ORAL at 09:10

## 2023-03-15 RX ADMIN — GABAPENTIN 400 MG: 300 CAPSULE ORAL at 21:06

## 2023-03-15 RX ADMIN — SODIUM CHLORIDE: 9 INJECTION, SOLUTION INTRAVENOUS at 14:22

## 2023-03-15 RX ADMIN — POTASSIUM BICARBONATE 40 MEQ: 782 TABLET, EFFERVESCENT ORAL at 14:22

## 2023-03-15 RX ADMIN — POLYETHYLENE GLYCOL 3350 17 G: 17 POWDER, FOR SOLUTION ORAL at 09:10

## 2023-03-15 RX ADMIN — Medication 15 G: at 09:38

## 2023-03-15 RX ADMIN — PANTOPRAZOLE SODIUM 40 MG: 40 TABLET, DELAYED RELEASE ORAL at 16:16

## 2023-03-15 RX ADMIN — EMPAGLIFLOZIN 10 MG: 10 TABLET, FILM COATED ORAL at 09:10

## 2023-03-15 RX ADMIN — BACITRACIN: 500 OINTMENT TOPICAL at 14:47

## 2023-03-15 RX ADMIN — ASPIRIN 81 MG: 81 TABLET ORAL at 09:10

## 2023-03-15 RX ADMIN — HYDROMORPHONE HYDROCHLORIDE 1 MG: 1 INJECTION, SOLUTION INTRAMUSCULAR; INTRAVENOUS; SUBCUTANEOUS at 15:02

## 2023-03-15 RX ADMIN — SODIUM CHLORIDE, PRESERVATIVE FREE 5 ML: 5 INJECTION INTRAVENOUS at 09:11

## 2023-03-15 RX ADMIN — FUROSEMIDE 40 MG: 40 TABLET ORAL at 09:10

## 2023-03-15 RX ADMIN — GABAPENTIN 400 MG: 300 CAPSULE ORAL at 14:22

## 2023-03-15 RX ADMIN — HYDROMORPHONE HYDROCHLORIDE 1 MG: 1 INJECTION, SOLUTION INTRAMUSCULAR; INTRAVENOUS; SUBCUTANEOUS at 20:00

## 2023-03-15 ASSESSMENT — PAIN DESCRIPTION - PAIN TYPE
TYPE: ACUTE PAIN;CHRONIC PAIN
TYPE: CHRONIC PAIN
TYPE: ACUTE PAIN;CHRONIC PAIN

## 2023-03-15 ASSESSMENT — PAIN DESCRIPTION - LOCATION
LOCATION: GENERALIZED;NECK
LOCATION: NECK;GENERALIZED
LOCATION: NECK;GENERALIZED

## 2023-03-15 ASSESSMENT — PAIN - FUNCTIONAL ASSESSMENT
PAIN_FUNCTIONAL_ASSESSMENT: PREVENTS OR INTERFERES SOME ACTIVE ACTIVITIES AND ADLS
PAIN_FUNCTIONAL_ASSESSMENT: PREVENTS OR INTERFERES SOME ACTIVE ACTIVITIES AND ADLS
PAIN_FUNCTIONAL_ASSESSMENT: PREVENTS OR INTERFERES WITH MANY ACTIVE NOT PASSIVE ACTIVITIES

## 2023-03-15 ASSESSMENT — PAIN DESCRIPTION - ORIENTATION
ORIENTATION: ANTERIOR;POSTERIOR
ORIENTATION: POSTERIOR
ORIENTATION: POSTERIOR

## 2023-03-15 ASSESSMENT — PAIN SCALES - GENERAL
PAINLEVEL_OUTOF10: 7
PAINLEVEL_OUTOF10: 9
PAINLEVEL_OUTOF10: 7
PAINLEVEL_OUTOF10: 1
PAINLEVEL_OUTOF10: 0
PAINLEVEL_OUTOF10: 1

## 2023-03-15 ASSESSMENT — PAIN DESCRIPTION - DESCRIPTORS
DESCRIPTORS: ACHING;SORE
DESCRIPTORS: ACHING
DESCRIPTORS: ACHING

## 2023-03-15 ASSESSMENT — PAIN DESCRIPTION - ONSET
ONSET: GRADUAL
ONSET: ON-GOING
ONSET: GRADUAL

## 2023-03-15 ASSESSMENT — PAIN DESCRIPTION - FREQUENCY
FREQUENCY: CONTINUOUS
FREQUENCY: INTERMITTENT
FREQUENCY: INTERMITTENT

## 2023-03-16 ENCOUNTER — ANESTHESIA EVENT (OUTPATIENT)
Dept: SURGERY | Age: 78
End: 2023-03-16
Payer: MEDICARE

## 2023-03-16 PROBLEM — R00.1 BRADYCARDIA: Status: ACTIVE | Noted: 2023-03-16

## 2023-03-16 LAB
ANION GAP SERPL CALC-SCNC: 6 MMOL/L (ref 2–11)
BUN SERPL-MCNC: 23 MG/DL (ref 8–23)
CALCIUM SERPL-MCNC: 8.1 MG/DL (ref 8.3–10.4)
CHLORIDE SERPL-SCNC: 102 MMOL/L (ref 101–110)
CO2 SERPL-SCNC: 26 MMOL/L (ref 21–32)
CREAT SERPL-MCNC: 0.7 MG/DL (ref 0.8–1.5)
GLUCOSE SERPL-MCNC: 94 MG/DL (ref 65–100)
POTASSIUM SERPL-SCNC: 3.9 MMOL/L (ref 3.5–5.1)
SODIUM SERPL-SCNC: 134 MMOL/L (ref 133–143)

## 2023-03-16 PROCEDURE — 99232 SBSQ HOSP IP/OBS MODERATE 35: CPT | Performed by: INTERNAL MEDICINE

## 2023-03-16 PROCEDURE — 97112 NEUROMUSCULAR REEDUCATION: CPT

## 2023-03-16 PROCEDURE — 6370000000 HC RX 637 (ALT 250 FOR IP): Performed by: NEUROLOGICAL SURGERY

## 2023-03-16 PROCEDURE — 2580000003 HC RX 258: Performed by: FAMILY MEDICINE

## 2023-03-16 PROCEDURE — 97535 SELF CARE MNGMENT TRAINING: CPT

## 2023-03-16 PROCEDURE — 80048 BASIC METABOLIC PNL TOTAL CA: CPT

## 2023-03-16 PROCEDURE — 2580000003 HC RX 258: Performed by: NEUROLOGICAL SURGERY

## 2023-03-16 PROCEDURE — 92526 ORAL FUNCTION THERAPY: CPT

## 2023-03-16 PROCEDURE — 1100000003 HC PRIVATE W/ TELEMETRY

## 2023-03-16 PROCEDURE — 6370000000 HC RX 637 (ALT 250 FOR IP): Performed by: INTERNAL MEDICINE

## 2023-03-16 PROCEDURE — 36415 COLL VENOUS BLD VENIPUNCTURE: CPT

## 2023-03-16 PROCEDURE — 97530 THERAPEUTIC ACTIVITIES: CPT

## 2023-03-16 PROCEDURE — 6360000002 HC RX W HCPCS: Performed by: INTERNAL MEDICINE

## 2023-03-16 RX ADMIN — BACITRACIN: 500 OINTMENT TOPICAL at 20:17

## 2023-03-16 RX ADMIN — BACITRACIN: 500 OINTMENT TOPICAL at 08:34

## 2023-03-16 RX ADMIN — PANTOPRAZOLE SODIUM 40 MG: 40 TABLET, DELAYED RELEASE ORAL at 16:32

## 2023-03-16 RX ADMIN — FUROSEMIDE 40 MG: 40 TABLET ORAL at 08:33

## 2023-03-16 RX ADMIN — HYDROMORPHONE HYDROCHLORIDE 1 MG: 1 INJECTION, SOLUTION INTRAMUSCULAR; INTRAVENOUS; SUBCUTANEOUS at 08:30

## 2023-03-16 RX ADMIN — Medication 15 G: at 08:33

## 2023-03-16 RX ADMIN — PANTOPRAZOLE SODIUM 40 MG: 40 TABLET, DELAYED RELEASE ORAL at 04:29

## 2023-03-16 RX ADMIN — CARBAMAZEPINE 400 MG: 200 TABLET ORAL at 08:33

## 2023-03-16 RX ADMIN — HYDROMORPHONE HYDROCHLORIDE 1 MG: 1 INJECTION, SOLUTION INTRAMUSCULAR; INTRAVENOUS; SUBCUTANEOUS at 17:55

## 2023-03-16 RX ADMIN — ASPIRIN 81 MG: 81 TABLET ORAL at 08:33

## 2023-03-16 RX ADMIN — GABAPENTIN 400 MG: 300 CAPSULE ORAL at 08:33

## 2023-03-16 RX ADMIN — SODIUM CHLORIDE, PRESERVATIVE FREE 5 ML: 5 INJECTION INTRAVENOUS at 08:31

## 2023-03-16 RX ADMIN — GABAPENTIN 400 MG: 300 CAPSULE ORAL at 13:50

## 2023-03-16 RX ADMIN — METOPROLOL SUCCINATE 25 MG: 25 TABLET, EXTENDED RELEASE ORAL at 08:33

## 2023-03-16 RX ADMIN — CARBAMAZEPINE 400 MG: 200 TABLET ORAL at 20:17

## 2023-03-16 RX ADMIN — HYDROMORPHONE HYDROCHLORIDE 1 MG: 1 INJECTION, SOLUTION INTRAMUSCULAR; INTRAVENOUS; SUBCUTANEOUS at 13:47

## 2023-03-16 RX ADMIN — ROSUVASTATIN CALCIUM 40 MG: 20 TABLET, FILM COATED ORAL at 20:17

## 2023-03-16 RX ADMIN — FOLIC ACID 1 MG: 1 TABLET ORAL at 08:33

## 2023-03-16 RX ADMIN — GABAPENTIN 400 MG: 300 CAPSULE ORAL at 20:17

## 2023-03-16 RX ADMIN — POLYETHYLENE GLYCOL 3350 17 G: 17 POWDER, FOR SOLUTION ORAL at 08:33

## 2023-03-16 RX ADMIN — SODIUM CHLORIDE: 9 INJECTION, SOLUTION INTRAVENOUS at 04:29

## 2023-03-16 RX ADMIN — EMPAGLIFLOZIN 10 MG: 10 TABLET, FILM COATED ORAL at 08:33

## 2023-03-16 RX ADMIN — SODIUM CHLORIDE, PRESERVATIVE FREE 5 ML: 5 INJECTION INTRAVENOUS at 20:17

## 2023-03-16 RX ADMIN — HYDROCODONE BITARTRATE AND ACETAMINOPHEN 1 TABLET: 10; 325 TABLET ORAL at 20:30

## 2023-03-16 ASSESSMENT — PAIN DESCRIPTION - LOCATION
LOCATION: NECK
LOCATION: NECK
LOCATION: NECK;SHOULDER
LOCATION: NECK

## 2023-03-16 ASSESSMENT — PAIN SCALES - GENERAL
PAINLEVEL_OUTOF10: 8
PAINLEVEL_OUTOF10: 9
PAINLEVEL_OUTOF10: 0
PAINLEVEL_OUTOF10: 6
PAINLEVEL_OUTOF10: 0
PAINLEVEL_OUTOF10: 9

## 2023-03-16 ASSESSMENT — PAIN - FUNCTIONAL ASSESSMENT: PAIN_FUNCTIONAL_ASSESSMENT: PREVENTS OR INTERFERES SOME ACTIVE ACTIVITIES AND ADLS

## 2023-03-16 ASSESSMENT — PAIN DESCRIPTION - DESCRIPTORS
DESCRIPTORS: ACHING

## 2023-03-16 ASSESSMENT — PAIN DESCRIPTION - PAIN TYPE: TYPE: SURGICAL PAIN

## 2023-03-16 ASSESSMENT — PAIN DESCRIPTION - ORIENTATION
ORIENTATION: POSTERIOR;RIGHT
ORIENTATION: UPPER
ORIENTATION: POSTERIOR
ORIENTATION: POSTERIOR

## 2023-03-16 ASSESSMENT — PAIN DESCRIPTION - FREQUENCY: FREQUENCY: CONTINUOUS

## 2023-03-16 ASSESSMENT — PAIN DESCRIPTION - ONSET: ONSET: ON-GOING

## 2023-03-16 ASSESSMENT — ENCOUNTER SYMPTOMS: SHORTNESS OF BREATH: 1

## 2023-03-16 NOTE — ANESTHESIA POSTPROCEDURE EVALUATION
Department of Anesthesiology  Postprocedure Note    Patient: Amado Castellano  MRN: [id number]  YOB: 1945  Date of evaluation: 3/16/2023      Procedure Summary     Date: 03/10/23 Room / Location:  MAIN OR  /  MAIN OR    Anesthesia Start: 0746 Anesthesia Stop: 1150    Procedure: C5-C7 Posterior Cervical Fusion (Spine Cervical) Diagnosis:       Cervical spinal cord compression ([address]      (Cervical spinal cord compression ([address] [G95.20])    Providers: Latha Zheng MD Responsible Provider: Fernanda Douglass MD    Anesthesia Type: general ASA Status: 4          Anesthesia Type: No value filed.     Ruby Phase I: Ruby Score: 6    Ruby Phase II: Ruby Score: 10      Anesthesia Post Evaluation    Patient location during evaluation: PACU  Patient participation: complete - patient participated  Level of consciousness: awake and alert  Airway patency: patent  Nausea & Vomiting: no nausea and no vomiting  Complications: no  Cardiovascular status: hemodynamically stable  Respiratory status: acceptable, nonlabored ventilation and spontaneous ventilation  Hydration status: euvolemic  Comments: /74   Pulse 70   Temp 98.4 °F (36.9 °C) (Oral)   Resp 16   Ht 5' 8\" (1.727 m)   Wt 204 lb 2.3 oz (92.6 kg)   SpO2 94%   BMI 31.04 kg/m²     Multimodal analgesia pain management approach

## 2023-03-17 ENCOUNTER — ANESTHESIA (OUTPATIENT)
Dept: SURGERY | Age: 78
End: 2023-03-17
Payer: MEDICARE

## 2023-03-17 ENCOUNTER — APPOINTMENT (OUTPATIENT)
Dept: GENERAL RADIOLOGY | Age: 78
DRG: 453 | End: 2023-03-17
Attending: PHYSICAL MEDICINE & REHABILITATION
Payer: MEDICARE

## 2023-03-17 LAB
ABO + RH BLD: NORMAL
ANION GAP SERPL CALC-SCNC: 8 MMOL/L (ref 2–11)
BASOPHILS # BLD: 0.1 K/UL (ref 0–0.2)
BASOPHILS NFR BLD: 1 % (ref 0–2)
BLOOD GROUP ANTIBODIES SERPL: NORMAL
BUN SERPL-MCNC: 24 MG/DL (ref 8–23)
CALCIUM SERPL-MCNC: 8.5 MG/DL (ref 8.3–10.4)
CHLORIDE SERPL-SCNC: 105 MMOL/L (ref 101–110)
CO2 SERPL-SCNC: 22 MMOL/L (ref 21–32)
CREAT SERPL-MCNC: 0.8 MG/DL (ref 0.8–1.5)
DIFFERENTIAL METHOD BLD: ABNORMAL
EOSINOPHIL # BLD: 0.1 K/UL (ref 0–0.8)
EOSINOPHIL NFR BLD: 1 % (ref 0.5–7.8)
ERYTHROCYTE [DISTWIDTH] IN BLOOD BY AUTOMATED COUNT: 13.7 % (ref 11.9–14.6)
GLUCOSE SERPL-MCNC: 87 MG/DL (ref 65–100)
HCT VFR BLD AUTO: 31.2 % (ref 41.1–50.3)
HGB BLD-MCNC: 10.8 G/DL (ref 13.6–17.2)
IMM GRANULOCYTES # BLD AUTO: 0 K/UL (ref 0–0.5)
IMM GRANULOCYTES NFR BLD AUTO: 0 % (ref 0–5)
LYMPHOCYTES # BLD: 0.9 K/UL (ref 0.5–4.6)
LYMPHOCYTES NFR BLD: 11 % (ref 13–44)
MCH RBC QN AUTO: 35.9 PG (ref 26.1–32.9)
MCHC RBC AUTO-ENTMCNC: 34.6 G/DL (ref 31.4–35)
MCV RBC AUTO: 103.7 FL (ref 82–102)
MONOCYTES # BLD: 0.7 K/UL (ref 0.1–1.3)
MONOCYTES NFR BLD: 8 % (ref 4–12)
NEUTS SEG # BLD: 6.5 K/UL (ref 1.7–8.2)
NEUTS SEG NFR BLD: 79 % (ref 43–78)
NRBC # BLD: 0 K/UL (ref 0–0.2)
PLATELET # BLD AUTO: 117 K/UL (ref 150–450)
PMV BLD AUTO: 8.4 FL (ref 9.4–12.3)
POTASSIUM SERPL-SCNC: 4 MMOL/L (ref 3.5–5.1)
RBC # BLD AUTO: 3.01 M/UL (ref 4.23–5.6)
SODIUM SERPL-SCNC: 135 MMOL/L (ref 133–143)
SPECIMEN EXP DATE BLD: NORMAL
WBC # BLD AUTO: 8.2 K/UL (ref 4.3–11.1)

## 2023-03-17 PROCEDURE — 85025 COMPLETE CBC W/AUTO DIFF WBC: CPT

## 2023-03-17 PROCEDURE — 2580000003 HC RX 258: Performed by: NEUROLOGICAL SURGERY

## 2023-03-17 PROCEDURE — 80048 BASIC METABOLIC PNL TOTAL CA: CPT

## 2023-03-17 PROCEDURE — 6370000000 HC RX 637 (ALT 250 FOR IP): Performed by: INTERNAL MEDICINE

## 2023-03-17 PROCEDURE — 6360000002 HC RX W HCPCS: Performed by: INTERNAL MEDICINE

## 2023-03-17 PROCEDURE — 6360000002 HC RX W HCPCS: Performed by: NEUROLOGICAL SURGERY

## 2023-03-17 PROCEDURE — 1100000000 HC RM PRIVATE

## 2023-03-17 PROCEDURE — 7100000000 HC PACU RECOVERY - FIRST 15 MIN: Performed by: NEUROLOGICAL SURGERY

## 2023-03-17 PROCEDURE — 86900 BLOOD TYPING SEROLOGIC ABO: CPT

## 2023-03-17 PROCEDURE — 2500000003 HC RX 250 WO HCPCS: Performed by: STUDENT IN AN ORGANIZED HEALTH CARE EDUCATION/TRAINING PROGRAM

## 2023-03-17 PROCEDURE — 2720000010 HC SURG SUPPLY STERILE: Performed by: NEUROLOGICAL SURGERY

## 2023-03-17 PROCEDURE — 6370000000 HC RX 637 (ALT 250 FOR IP): Performed by: NEUROLOGICAL SURGERY

## 2023-03-17 PROCEDURE — 6360000002 HC RX W HCPCS: Performed by: STUDENT IN AN ORGANIZED HEALTH CARE EDUCATION/TRAINING PROGRAM

## 2023-03-17 PROCEDURE — C1821 INTERSPINOUS IMPLANT: HCPCS | Performed by: NEUROLOGICAL SURGERY

## 2023-03-17 PROCEDURE — 3600000014 HC SURGERY LEVEL 4 ADDTL 15MIN: Performed by: NEUROLOGICAL SURGERY

## 2023-03-17 PROCEDURE — 7100000001 HC PACU RECOVERY - ADDTL 15 MIN: Performed by: NEUROLOGICAL SURGERY

## 2023-03-17 PROCEDURE — 99232 SBSQ HOSP IP/OBS MODERATE 35: CPT | Performed by: INTERNAL MEDICINE

## 2023-03-17 PROCEDURE — 2500000003 HC RX 250 WO HCPCS: Performed by: NEUROLOGICAL SURGERY

## 2023-03-17 PROCEDURE — 72040 X-RAY EXAM NECK SPINE 2-3 VW: CPT

## 2023-03-17 PROCEDURE — 2580000003 HC RX 258: Performed by: ANESTHESIOLOGY

## 2023-03-17 PROCEDURE — 88304 TISSUE EXAM BY PATHOLOGIST: CPT

## 2023-03-17 PROCEDURE — 6370000000 HC RX 637 (ALT 250 FOR IP): Performed by: FAMILY MEDICINE

## 2023-03-17 PROCEDURE — 3600000004 HC SURGERY LEVEL 4 BASE: Performed by: NEUROLOGICAL SURGERY

## 2023-03-17 PROCEDURE — C1713 ANCHOR/SCREW BN/BN,TIS/BN: HCPCS | Performed by: NEUROLOGICAL SURGERY

## 2023-03-17 PROCEDURE — 2709999900 HC NON-CHARGEABLE SUPPLY: Performed by: NEUROLOGICAL SURGERY

## 2023-03-17 PROCEDURE — C1776 JOINT DEVICE (IMPLANTABLE): HCPCS | Performed by: NEUROLOGICAL SURGERY

## 2023-03-17 PROCEDURE — 3700000000 HC ANESTHESIA ATTENDED CARE: Performed by: NEUROLOGICAL SURGERY

## 2023-03-17 PROCEDURE — A4217 STERILE WATER/SALINE, 500 ML: HCPCS | Performed by: NEUROLOGICAL SURGERY

## 2023-03-17 PROCEDURE — 3700000001 HC ADD 15 MINUTES (ANESTHESIA): Performed by: NEUROLOGICAL SURGERY

## 2023-03-17 DEVICE — 7° IMPLANT, SERRATED RADIAL RIBS, 15 X 13 X 6
Type: IMPLANTABLE DEVICE | Site: SPINE CERVICAL | Status: FUNCTIONAL
Brand: LONESTAR

## 2023-03-17 DEVICE — IMPLANTABLE DEVICE: Type: IMPLANTABLE DEVICE | Site: SPINE CERVICAL | Status: FUNCTIONAL

## 2023-03-17 DEVICE — 7° IMPLANT, SERRATED RADIAL RIBS, 15 X 13 X 7
Type: IMPLANTABLE DEVICE | Site: SPINE CERVICAL | Status: FUNCTIONAL
Brand: LONESTAR

## 2023-03-17 DEVICE — COVER PLATE ASSEMBLY, 6-9
Type: IMPLANTABLE DEVICE | Site: SPINE CERVICAL | Status: FUNCTIONAL
Brand: LONESTAR

## 2023-03-17 DEVICE — DIA 3.6MM PRIMARY BONE SCREW - 14MM
Type: IMPLANTABLE DEVICE | Site: SPINE CERVICAL | Status: FUNCTIONAL
Brand: LONESTAR

## 2023-03-17 RX ORDER — HALOPERIDOL 5 MG/ML
1 INJECTION INTRAMUSCULAR
Status: DISCONTINUED | OUTPATIENT
Start: 2023-03-17 | End: 2023-03-17 | Stop reason: HOSPADM

## 2023-03-17 RX ORDER — IPRATROPIUM BROMIDE AND ALBUTEROL SULFATE 2.5; .5 MG/3ML; MG/3ML
1 SOLUTION RESPIRATORY (INHALATION)
Status: DISCONTINUED | OUTPATIENT
Start: 2023-03-17 | End: 2023-03-17 | Stop reason: HOSPADM

## 2023-03-17 RX ORDER — DEXMEDETOMIDINE HYDROCHLORIDE 100 UG/ML
INJECTION, SOLUTION INTRAVENOUS PRN
Status: DISCONTINUED | OUTPATIENT
Start: 2023-03-17 | End: 2023-03-17 | Stop reason: SDUPTHER

## 2023-03-17 RX ORDER — ROCURONIUM BROMIDE 10 MG/ML
INJECTION, SOLUTION INTRAVENOUS PRN
Status: DISCONTINUED | OUTPATIENT
Start: 2023-03-17 | End: 2023-03-17 | Stop reason: SDUPTHER

## 2023-03-17 RX ORDER — FENTANYL CITRATE 50 UG/ML
INJECTION, SOLUTION INTRAMUSCULAR; INTRAVENOUS PRN
Status: DISCONTINUED | OUTPATIENT
Start: 2023-03-17 | End: 2023-03-17 | Stop reason: SDUPTHER

## 2023-03-17 RX ORDER — SODIUM CHLORIDE, SODIUM LACTATE, POTASSIUM CHLORIDE, CALCIUM CHLORIDE 600; 310; 30; 20 MG/100ML; MG/100ML; MG/100ML; MG/100ML
INJECTION, SOLUTION INTRAVENOUS CONTINUOUS
Status: DISCONTINUED | OUTPATIENT
Start: 2023-03-17 | End: 2023-03-17 | Stop reason: HOSPADM

## 2023-03-17 RX ORDER — PROPOFOL 10 MG/ML
INJECTION, EMULSION INTRAVENOUS PRN
Status: DISCONTINUED | OUTPATIENT
Start: 2023-03-17 | End: 2023-03-17 | Stop reason: SDUPTHER

## 2023-03-17 RX ORDER — SODIUM CHLORIDE 0.9 % (FLUSH) 0.9 %
5-40 SYRINGE (ML) INJECTION PRN
Status: DISCONTINUED | OUTPATIENT
Start: 2023-03-17 | End: 2023-03-17 | Stop reason: HOSPADM

## 2023-03-17 RX ORDER — ACETAMINOPHEN 500 MG
1000 TABLET ORAL ONCE
Status: DISCONTINUED | OUTPATIENT
Start: 2023-03-17 | End: 2023-03-17 | Stop reason: HOSPADM

## 2023-03-17 RX ORDER — ONDANSETRON 2 MG/ML
4 INJECTION INTRAMUSCULAR; INTRAVENOUS
Status: DISCONTINUED | OUTPATIENT
Start: 2023-03-17 | End: 2023-03-17 | Stop reason: HOSPADM

## 2023-03-17 RX ORDER — CEFAZOLIN SODIUM 1 G/3ML
INJECTION, POWDER, FOR SOLUTION INTRAMUSCULAR; INTRAVENOUS PRN
Status: DISCONTINUED | OUTPATIENT
Start: 2023-03-17 | End: 2023-03-17 | Stop reason: SDUPTHER

## 2023-03-17 RX ORDER — DEXAMETHASONE SODIUM PHOSPHATE 4 MG/ML
INJECTION, SOLUTION INTRA-ARTICULAR; INTRALESIONAL; INTRAMUSCULAR; INTRAVENOUS; SOFT TISSUE PRN
Status: DISCONTINUED | OUTPATIENT
Start: 2023-03-17 | End: 2023-03-17 | Stop reason: SDUPTHER

## 2023-03-17 RX ORDER — HYDROMORPHONE HYDROCHLORIDE 2 MG/ML
0.5 INJECTION, SOLUTION INTRAMUSCULAR; INTRAVENOUS; SUBCUTANEOUS EVERY 10 MIN PRN
Status: DISCONTINUED | OUTPATIENT
Start: 2023-03-17 | End: 2023-03-17 | Stop reason: HOSPADM

## 2023-03-17 RX ORDER — GLYCOPYRROLATE 0.2 MG/ML
INJECTION INTRAMUSCULAR; INTRAVENOUS PRN
Status: DISCONTINUED | OUTPATIENT
Start: 2023-03-17 | End: 2023-03-17 | Stop reason: SDUPTHER

## 2023-03-17 RX ORDER — EPHEDRINE SULFATE/0.9% NACL/PF 50 MG/5 ML
SYRINGE (ML) INTRAVENOUS PRN
Status: DISCONTINUED | OUTPATIENT
Start: 2023-03-17 | End: 2023-03-17 | Stop reason: SDUPTHER

## 2023-03-17 RX ORDER — ONDANSETRON 2 MG/ML
INJECTION INTRAMUSCULAR; INTRAVENOUS PRN
Status: DISCONTINUED | OUTPATIENT
Start: 2023-03-17 | End: 2023-03-17 | Stop reason: SDUPTHER

## 2023-03-17 RX ORDER — LIDOCAINE HYDROCHLORIDE 20 MG/ML
INJECTION, SOLUTION EPIDURAL; INFILTRATION; INTRACAUDAL; PERINEURAL PRN
Status: DISCONTINUED | OUTPATIENT
Start: 2023-03-17 | End: 2023-03-17 | Stop reason: SDUPTHER

## 2023-03-17 RX ORDER — MIDAZOLAM HYDROCHLORIDE 2 MG/2ML
2 INJECTION, SOLUTION INTRAMUSCULAR; INTRAVENOUS
Status: DISCONTINUED | OUTPATIENT
Start: 2023-03-17 | End: 2023-03-17 | Stop reason: HOSPADM

## 2023-03-17 RX ORDER — FENTANYL CITRATE 50 UG/ML
50 INJECTION, SOLUTION INTRAMUSCULAR; INTRAVENOUS EVERY 5 MIN PRN
Status: DISCONTINUED | OUTPATIENT
Start: 2023-03-17 | End: 2023-03-17 | Stop reason: HOSPADM

## 2023-03-17 RX ORDER — HYDROMORPHONE HCL 110MG/55ML
PATIENT CONTROLLED ANALGESIA SYRINGE INTRAVENOUS PRN
Status: DISCONTINUED | OUTPATIENT
Start: 2023-03-17 | End: 2023-03-17 | Stop reason: SDUPTHER

## 2023-03-17 RX ORDER — NEOSTIGMINE METHYLSULFATE 1 MG/ML
INJECTION, SOLUTION INTRAVENOUS PRN
Status: DISCONTINUED | OUTPATIENT
Start: 2023-03-17 | End: 2023-03-17 | Stop reason: SDUPTHER

## 2023-03-17 RX ORDER — VANCOMYCIN HYDROCHLORIDE 1 G/20ML
INJECTION, POWDER, LYOPHILIZED, FOR SOLUTION INTRAVENOUS PRN
Status: DISCONTINUED | OUTPATIENT
Start: 2023-03-17 | End: 2023-03-17 | Stop reason: SDUPTHER

## 2023-03-17 RX ORDER — SODIUM CHLORIDE 0.9 % (FLUSH) 0.9 %
5-40 SYRINGE (ML) INJECTION EVERY 12 HOURS SCHEDULED
Status: DISCONTINUED | OUTPATIENT
Start: 2023-03-17 | End: 2023-03-17 | Stop reason: HOSPADM

## 2023-03-17 RX ORDER — LIDOCAINE HYDROCHLORIDE 10 MG/ML
1 INJECTION, SOLUTION INFILTRATION; PERINEURAL
Status: DISCONTINUED | OUTPATIENT
Start: 2023-03-17 | End: 2023-03-17 | Stop reason: HOSPADM

## 2023-03-17 RX ORDER — OXYCODONE HYDROCHLORIDE 5 MG/1
5 TABLET ORAL
Status: DISCONTINUED | OUTPATIENT
Start: 2023-03-17 | End: 2023-03-17 | Stop reason: HOSPADM

## 2023-03-17 RX ADMIN — PROPOFOL 150 MG: 10 INJECTION, EMULSION INTRAVENOUS at 08:43

## 2023-03-17 RX ADMIN — HYDROMORPHONE HYDROCHLORIDE 1 MG: 1 INJECTION, SOLUTION INTRAMUSCULAR; INTRAVENOUS; SUBCUTANEOUS at 04:37

## 2023-03-17 RX ADMIN — HYDROMORPHONE HYDROCHLORIDE 1 MG: 1 INJECTION, SOLUTION INTRAMUSCULAR; INTRAVENOUS; SUBCUTANEOUS at 14:48

## 2023-03-17 RX ADMIN — METOPROLOL SUCCINATE 25 MG: 25 TABLET, EXTENDED RELEASE ORAL at 13:25

## 2023-03-17 RX ADMIN — Medication 10 MG: at 08:54

## 2023-03-17 RX ADMIN — ONDANSETRON 4 MG: 2 INJECTION INTRAMUSCULAR; INTRAVENOUS at 10:50

## 2023-03-17 RX ADMIN — FUROSEMIDE 40 MG: 40 TABLET ORAL at 13:24

## 2023-03-17 RX ADMIN — Medication 4 MG: at 10:52

## 2023-03-17 RX ADMIN — SODIUM CHLORIDE, SODIUM LACTATE, POTASSIUM CHLORIDE, AND CALCIUM CHLORIDE: 600; 310; 30; 20 INJECTION, SOLUTION INTRAVENOUS at 08:32

## 2023-03-17 RX ADMIN — FENTANYL CITRATE 50 MCG: 50 INJECTION, SOLUTION INTRAMUSCULAR; INTRAVENOUS at 08:43

## 2023-03-17 RX ADMIN — HYDROMORPHONE HYDROCHLORIDE 0.2 MG: 2 INJECTION INTRAMUSCULAR; INTRAVENOUS; SUBCUTANEOUS at 10:34

## 2023-03-17 RX ADMIN — GLYCOPYRROLATE 0.2 MG: 0.2 INJECTION INTRAMUSCULAR; INTRAVENOUS at 08:57

## 2023-03-17 RX ADMIN — BACITRACIN: 500 OINTMENT TOPICAL at 20:27

## 2023-03-17 RX ADMIN — VANCOMYCIN HYDROCHLORIDE 1000 MG: 1 INJECTION, POWDER, LYOPHILIZED, FOR SOLUTION INTRAVENOUS at 10:12

## 2023-03-17 RX ADMIN — GLYCOPYRROLATE 0.6 MG: 0.2 INJECTION INTRAMUSCULAR; INTRAVENOUS at 10:52

## 2023-03-17 RX ADMIN — ROCURONIUM BROMIDE 10 MG: 50 INJECTION, SOLUTION INTRAVENOUS at 10:16

## 2023-03-17 RX ADMIN — FOLIC ACID 1 MG: 1 TABLET ORAL at 13:24

## 2023-03-17 RX ADMIN — ROCURONIUM BROMIDE 50 MG: 50 INJECTION, SOLUTION INTRAVENOUS at 08:43

## 2023-03-17 RX ADMIN — GABAPENTIN 400 MG: 300 CAPSULE ORAL at 13:25

## 2023-03-17 RX ADMIN — HYDROMORPHONE HYDROCHLORIDE 0.4 MG: 2 INJECTION INTRAMUSCULAR; INTRAVENOUS; SUBCUTANEOUS at 10:02

## 2023-03-17 RX ADMIN — HYDROCODONE BITARTRATE AND ACETAMINOPHEN 1 TABLET: 10; 325 TABLET ORAL at 20:25

## 2023-03-17 RX ADMIN — FENTANYL CITRATE 50 MCG: 50 INJECTION, SOLUTION INTRAMUSCULAR; INTRAVENOUS at 09:17

## 2023-03-17 RX ADMIN — GABAPENTIN 400 MG: 300 CAPSULE ORAL at 20:25

## 2023-03-17 RX ADMIN — ROCURONIUM BROMIDE 10 MG: 50 INJECTION, SOLUTION INTRAVENOUS at 09:55

## 2023-03-17 RX ADMIN — DEXMEDETOMIDINE 8 MCG: 100 INJECTION, SOLUTION, CONCENTRATE INTRAVENOUS at 11:07

## 2023-03-17 RX ADMIN — ROSUVASTATIN CALCIUM 40 MG: 20 TABLET, FILM COATED ORAL at 20:25

## 2023-03-17 RX ADMIN — EMPAGLIFLOZIN 10 MG: 10 TABLET, FILM COATED ORAL at 13:24

## 2023-03-17 RX ADMIN — DEXAMETHASONE SODIUM PHOSPHATE 10 MG: 4 INJECTION, SOLUTION INTRAMUSCULAR; INTRAVENOUS at 09:03

## 2023-03-17 RX ADMIN — ASPIRIN 81 MG: 81 TABLET ORAL at 13:25

## 2023-03-17 RX ADMIN — SODIUM CHLORIDE, PRESERVATIVE FREE 10 ML: 5 INJECTION INTRAVENOUS at 20:26

## 2023-03-17 RX ADMIN — LIDOCAINE HYDROCHLORIDE 100 MG: 20 INJECTION, SOLUTION EPIDURAL; INFILTRATION; INTRACAUDAL; PERINEURAL at 08:43

## 2023-03-17 RX ADMIN — HYDROMORPHONE HYDROCHLORIDE 1 MG: 1 INJECTION, SOLUTION INTRAMUSCULAR; INTRAVENOUS; SUBCUTANEOUS at 23:07

## 2023-03-17 RX ADMIN — Medication 10 MG: at 08:56

## 2023-03-17 RX ADMIN — CARBAMAZEPINE 400 MG: 200 TABLET ORAL at 20:25

## 2023-03-17 RX ADMIN — CARBAMAZEPINE 400 MG: 200 TABLET ORAL at 13:24

## 2023-03-17 RX ADMIN — HYDROMORPHONE HYDROCHLORIDE 1 MG: 1 INJECTION, SOLUTION INTRAMUSCULAR; INTRAVENOUS; SUBCUTANEOUS at 18:49

## 2023-03-17 RX ADMIN — DEXMEDETOMIDINE 8 MCG: 100 INJECTION, SOLUTION, CONCENTRATE INTRAVENOUS at 09:59

## 2023-03-17 RX ADMIN — DEXMEDETOMIDINE 8 MCG: 100 INJECTION, SOLUTION, CONCENTRATE INTRAVENOUS at 10:30

## 2023-03-17 RX ADMIN — CEFAZOLIN SODIUM 2 G: 1 INJECTION, POWDER, FOR SOLUTION INTRAMUSCULAR; INTRAVENOUS at 08:59

## 2023-03-17 RX ADMIN — PHENOL 1 SPRAY: 1.5 SPRAY ORAL at 17:13

## 2023-03-17 RX ADMIN — PANTOPRAZOLE SODIUM 40 MG: 40 TABLET, DELAYED RELEASE ORAL at 14:48

## 2023-03-17 ASSESSMENT — PAIN DESCRIPTION - DESCRIPTORS
DESCRIPTORS: ACHING
DESCRIPTORS: ACHING
DESCRIPTORS: SORE
DESCRIPTORS: ACHING
DESCRIPTORS: ACHING

## 2023-03-17 ASSESSMENT — PAIN SCALES - GENERAL
PAINLEVEL_OUTOF10: 5
PAINLEVEL_OUTOF10: 0
PAINLEVEL_OUTOF10: 8
PAINLEVEL_OUTOF10: 7
PAINLEVEL_OUTOF10: 0
PAINLEVEL_OUTOF10: 9
PAINLEVEL_OUTOF10: 0
PAINLEVEL_OUTOF10: 7
PAINLEVEL_OUTOF10: 0

## 2023-03-17 ASSESSMENT — PAIN DESCRIPTION - PAIN TYPE
TYPE: SURGICAL PAIN

## 2023-03-17 ASSESSMENT — PAIN DESCRIPTION - ORIENTATION
ORIENTATION: RIGHT
ORIENTATION: ANTERIOR;RIGHT
ORIENTATION: RIGHT
ORIENTATION: POSTERIOR
ORIENTATION: ANTERIOR;RIGHT

## 2023-03-17 ASSESSMENT — PAIN - FUNCTIONAL ASSESSMENT
PAIN_FUNCTIONAL_ASSESSMENT: 0-10
PAIN_FUNCTIONAL_ASSESSMENT: PREVENTS OR INTERFERES SOME ACTIVE ACTIVITIES AND ADLS
PAIN_FUNCTIONAL_ASSESSMENT: PREVENTS OR INTERFERES SOME ACTIVE ACTIVITIES AND ADLS
PAIN_FUNCTIONAL_ASSESSMENT: 0-10
PAIN_FUNCTIONAL_ASSESSMENT: PREVENTS OR INTERFERES SOME ACTIVE ACTIVITIES AND ADLS

## 2023-03-17 ASSESSMENT — PAIN DESCRIPTION - LOCATION
LOCATION: NECK
LOCATION: NECK;SHOULDER
LOCATION: NECK

## 2023-03-17 ASSESSMENT — PAIN DESCRIPTION - FREQUENCY
FREQUENCY: CONTINUOUS
FREQUENCY: INTERMITTENT
FREQUENCY: CONTINUOUS

## 2023-03-17 ASSESSMENT — PAIN DESCRIPTION - ONSET
ONSET: ON-GOING
ONSET: ON-GOING
ONSET: GRADUAL

## 2023-03-17 NOTE — ANESTHESIA POSTPROCEDURE EVALUATION
Department of Anesthesiology  Postprocedure Note    Patient: Nely Benavides  MRN: [id number]  YOB: 1945  Date of evaluation: 3/17/2023      Procedure Summary     Date: 03/17/23 Room / Location: [facility] MAIN OR  / [facility] MAIN OR    Anesthesia Start: 9846 Anesthesia Stop: 3385    Procedure: C5-C7 ACD&F (Spine Cervical) Diagnosis:       Cervical spinal cord compression (HCC)      (Cervical spinal cord compression (HCC) [G95.20])    Providers: Tomas Null MD Responsible Provider: Vanessa Swain MD    Anesthesia Type: General ASA Status: 3          Anesthesia Type: General    Ruby Phase I: Ruby Score: 5    Ruby Phase II: Ruby Score: 10      Anesthesia Post Evaluation    Patient location during evaluation: PACU  Patient participation: complete - patient participated  Level of consciousness: awake and alert  Airway patency: patent  Nausea & Vomiting: no nausea and no vomiting  Complications: no  Cardiovascular status: hemodynamically stable  Respiratory status: acceptable, nonlabored ventilation and spontaneous ventilation  Hydration status: euvolemic  Comments: BP (!) 115/58   Pulse 64   Temp (!) 100.5 °F (38.1 °C) (Skin)   Resp 16   Ht 5' 8\" (1.727 m)   Wt 202 lb (91.6 kg)   SpO2 97%   BMI 30.71 kg/m²     Multimodal analgesia pain management approach

## 2023-03-17 NOTE — ANESTHESIA PRE PROCEDURE
Department of Anesthesiology  Preprocedure Note       Name:  Chet Conde   Age:  76 y.o.  :  1945                                          MRN:  [id number]         Date:  3/16/2023      Surgeon: Tiffanie Hickman):  Dot Garcia MD    Procedure: Procedure(s):  C5-C7 ACD&F    Medications prior to admission:   Prior to Admission medications    Medication Sig Start Date End Date Taking? Authorizing Provider   furosemide (LASIX) 40 MG tablet Take 1 tablet by mouth 2 times daily 23   ANN-MARIE Maldonado - CNP   JARDIANCE 10 MG tablet TAKE 1 TABLET BY MOUTH DAILY 23   Beba Dejesus MD   rosuvastatin (CRESTOR) 40 MG tablet TAKE 1 TABLET BY MOUTH AT BEDTIME. 23   Beba Dejesus MD   apixaban (ELIQUIS) 5 MG TABS tablet Take 5 mg by mouth 2 times daily 22   Ar Automatic Reconciliation   aspirin 81 MG EC tablet Take 81 mg by mouth 19   Ar Automatic Reconciliation   carBAMazepine (TEGRETOL) 200 MG tablet Take 400 mg by mouth 2 times daily    Ar Automatic Reconciliation   docusate (COLACE, DULCOLAX) 100 MG CAPS Take 100 mg by mouth 2 times daily as needed 10/26/19   Ar Automatic Reconciliation   gabapentin (NEURONTIN) 400 MG capsule 400 mg 3 times daily. 19   Ar Automatic Reconciliation   HYDROcodone-acetaminophen (NORCO)  MG per tablet Take 1 tablet by mouth every 8 hours as needed. Ar Automatic Reconciliation   metoprolol succinate (TOPROL XL) 100 MG extended release tablet Take 100 mg by mouth daily  Patient not taking: Reported on 3/10/2023 11/8/19   Ar Automatic Reconciliation   nitroGLYCERIN (NITROSTAT) 0.4 MG SL tablet TAKE AS DIRECTED.   Patient not taking: Reported on 3/3/2023 8/17/18   Ar Automatic Reconciliation   omeprazole (PRILOSEC) 20 MG delayed release capsule Take 20 mg by mouth daily 10/10/19   Ar Automatic Reconciliation   potassium chloride (KLOR-CON M) 20 MEQ extended release tablet Take 20 mEq by mouth 2 times daily 19   Ar Automatic Reconciliation       Current medications:    Current Facility-Administered Medications   Medication Dose Route Frequency Provider Last Rate Last Admin    metoprolol succinate (TOPROL XL) extended release tablet 25 mg  25 mg Oral Daily Barbara Hutton MD   25 mg at 03/16/23 0833    0.9 % sodium chloride infusion   IntraVENous Continuous Emma Doyle MD 75 mL/hr at 03/16/23 0429 New Bag at 03/16/23 0429    HYDROcodone-acetaminophen ([facility])  MG per tablet 1 tablet  1 tablet Oral Q6H PRN Miguel Gaming MD        urea (URE-NA) packet 15 g  15 g Oral Daily Miguel Gaming MD   15 g at 03/16/23 4082    furosemide (LASIX) tablet 40 mg  40 mg Oral Daily Barbara Hutton MD   40 mg at 03/16/23 9647    aspirin EC tablet 81 mg  81 mg Oral Daily Miguel Gaming MD   81 mg at 03/16/23 6835    HYDROmorphone HCl PF (DILAUDID) injection 1 mg  1 mg IntraVENous Q4H PRN Miguel Gaming MD   1 mg at 03/16/23 1755    cyclobenzaprine (FLEXERIL) tablet 10 mg  10 mg Oral TID PRN Nisha Waller MD   10 mg at 03/12/23 1938    fluticasone (FLONASE) 50 MCG/ACT nasal spray 1 spray  1 spray Each Nostril Daily PRN Nisha Waller MD        bisacodyl (DULCOLAX) suppository 10 mg  10 mg Rectal Daily PRN Nisha Waller MD   10 mg at 59/25/21 1868    folic acid (FOLVITE) tablet 1 mg  1 mg Oral Daily Nisha Waller MD   1 mg at 03/16/23 8480    polyethylene glycol (GLYCOLAX) packet 17 g  17 g Oral Daily Nisha Waller MD   17 g at 03/16/23 0833    carboxymethylcellulose (THERATEARS) 1 % ophthalmic gel 1 drop  1 drop Both Eyes PRN Nisha Waller MD   1 drop at 03/12/23 1944    carBAMazepine (TEGRETOL) tablet 400 mg  400 mg Oral BID Nisha Waller MD   400 mg at 03/16/23 2017    docusate sodium (COLACE) capsule 100 mg  100 mg Oral BID PRN Nisha Waller MD   100 mg at 03/06/23 1628    gabapentin (NEURONTIN) capsule 400 mg  400 mg Oral TID iNsha Waller MD   400 mg at 03/16/23 2017    empagliflozin (JARDIANCE) tablet 10 mg  10 mg Oral Daily Jacquie Ortega MD   10 mg at 03/16/23 1575    rosuvastatin (CRESTOR) tablet 40 mg  40 mg Oral Nightly Jacquie Ortega MD   40 mg at 03/16/23 2017    sodium chloride flush 0.9 % injection 5-40 mL  5-40 mL IntraVENous 2 times per day Jacquie Ortega MD   5 mL at 03/16/23 2017    sodium chloride flush 0.9 % injection 5-40 mL  5-40 mL IntraVENous PRN Jacquie Ortega MD        0.9 % sodium chloride infusion   IntraVENous PRN Jacquie Ortega MD        ondansetron (ZOFRAN-ODT) disintegrating tablet 4 mg  4 mg Oral Q8H PRN Jacquie Ortega MD        Or    ondansetron [facility]) injection 4 mg  4 mg IntraVENous Q6H PRN Jacquie Ortega MD        acetaminophen (TYLENOL) tablet 650 mg  650 mg Oral Q6H PRN Jacquie Ortega MD   650 mg at 03/07/23 2106    Or    acetaminophen (TYLENOL) suppository 650 mg  650 mg Rectal Q6H PRN Jacquie Ortega MD        bacitracin ointment   Topical TID Jacquie Ortega MD   Given at 03/16/23 2017    pantoprazole (PROTONIX) tablet 40 mg  40 mg Oral BID AC Jacquie Ortega MD   40 mg at 03/16/23 1632       Allergies: Allergies   Allergen Reactions    Codeine      Other reaction(s):  Other- (not listed) - Allergy  constipation    Iodides      Other reaction(s): Unknown (comments)    Iodinated Contrast Media Other (See Comments)     Shaking all over, jumpy feeling       Problem List:    Patient Active Problem List   Diagnosis Code    TIA (transient ischemic attack) G45.9    Tracheobronchomalacia J39.8    Elevated brain natriuretic peptide (BNP) level R79.89    S/P CABG x 3 Z95.1    Community acquired pneumonia J18.9    Dyslipidemia E78.5    Hemoptysis R04.2    History of smoking 25-50 pack years Z87.891    Hypertension H31    Systolic CHF, chronic (HCC) I50.22    Localized edema R60.0    Coronary atherosclerosis of native coronary vessel I25.10    Trigeminal neuralgia G50.0    SOB (shortness of breath) R06.02  Atrial fibrillation (HCC) I48.91    Leukocytosis D72.829    Syncope R55    Hyponatremia E87.1    Anemia D64.9    DNR (do not resuscitate) Z66    Hand pain M79.643    Chest pain, precordial R07.2    Pulmonary hypertension (HCC) I27.20    Chronic systolic congestive heart failure (HCC) I50.22    Chest pain R07.9    CAD (coronary artery disease) I25.10    Acute on chronic congestive heart failure (HCC) I50.9    Hypotension I95.9    Hypothermia T68. Latricia Gram    Fall W19. XXXA    Cervical spinal cord compression (HCC) G95.20    Hypokalemia E87.6    Bradycardia R00.1       Past Medical History:        Diagnosis Date    [facility]     Atrial flutter ([facility] 4/7/2017    CAD (coronary artery disease) 2006    Cancer [facility]) 2011    prostate    GERD (gastroesophageal reflux disease)     takes omeprazole    Heart failure ([facility]     Hypertension     Ill-defined condition     trigeminal neuralgia    Myocardial infarction ([facility] 06/06/2019    NSTEMI     Sleep apnea     does not wear CPAP    Stroke [facility]) 2018    TIA       Past Surgical History:        Procedure Laterality Date    CARDIAC CATHETERIZATION  08/14/2006    CARDIAC CATHETERIZATION  06/07/2019    Severe 3 vessel CAD with LV EF=40-45% and unsuccessful LCX PCI.  CARDIAC PROCEDURE N/A 10/26/2022    LEFT HEART CATH / CORONARY ANGIOGRAPHY W GRAFTS performed by Magdalena Zhou MD at [address] [place] [address] CATH LAB   [address] [place] [address] N/A 3/10/2023    C5-C7 Posterior Cervical Fusion performed by Keaton Dove MD at [facility] COLONOSCOPY N/A 3/2/2020    COLONOSCOPY performed by Natalie Fox MD at [address] [place] [address] CORONARY ARTERY BYPASS GRAFT  06/10/2019    3 vessel CABG with LIMA-LAD,SVG-OM,and SVG-R posterior lateral branch with MAZE & LA appendage clipping.     EP DEVICE PROCEDURE N/A 7/10/2022    LOOP RECORDER INSERT performed by Citlalli Ernandez MD at [address] [place] [address] CATH LAB    PROSTATE SURGERY         Social History:    Social History     Tobacco Use    Smoking status: Former     Packs/day: 1.00     Years: 14.00     Pack years: 14.00     Types: Cigarettes     Start date: 1957     Quit date: 1971     Years since quittin.4    Smokeless tobacco: Never    Tobacco comments:     Quit smoking: quit at age 27   Substance Use Topics    Alcohol use: No                                Counseling given: Not Answered  Tobacco comments: Quit smoking: quit at age 27      Vital Signs (Current):   Vitals:    23 0732 23 1219 23 1536 23 1937   BP: 128/74 124/75 (!) 142/79 129/81   Pulse: 70 73 78 81   Resp: 16  18   Temp: 98.4 °F (36.9 °C) 97.8 °F (36.6 °C) 97.7 °F (36.5 °C) 98.4 °F (36.9 °C)   TempSrc: Oral Oral Oral Oral   SpO2: 94%  92% 94%   Weight:       Height:                                                  BP Readings from Last 3 Encounters:   23 129/81   23 122/60   23 128/70       NPO Status: Time of last liquid consumption:                         Time of last solid consumption:                         Date of last liquid consumption: 23                        Date of last solid food consumption: 23    BMI:   Wt Readings from Last 3 Encounters:   03/15/23 204 lb 2.3 oz (92.6 kg)   23 182 lb 3.2 oz (82.6 kg)   23 176 lb 6.4 oz (80 kg)     Body mass index is 31.04 kg/m².     CBC:   Lab Results   Component Value Date/Time    WBC 8.3 03/15/2023 05:27 AM    RBC 3.11 03/15/2023 05:27 AM    HGB 11.0 03/15/2023 05:27 AM    HCT 32.0 03/15/2023 05:27 AM    .9 03/15/2023 05:27 AM    RDW 13.4 03/15/2023 05:27 AM     03/15/2023 05:27 AM       CMP:   Lab Results   Component Value Date/Time     2023 04:12 AM    K 3.9 2023 04:12 AM     2023 04:12 AM    CO2 26 2023 04:12 AM    BUN 23 2023 04:12 AM    CREATININE 0.70 2023 04:12 AM    GFRAA >60 2022 04:58 AM    AGRATIO 0.9 06/07/2021 06:04 AM    LABGLOM >60 03/16/2023 04:12 AM    GLUCOSE 94 03/16/2023 04:12 AM    PROT 7.5 03/07/2023 04:25 AM    CALCIUM 8.1 03/16/2023 04:12 AM    BILITOT 0.8 03/07/2023 04:25 AM    ALKPHOS 96 03/07/2023 04:25 AM    ALKPHOS 104 06/07/2021 06:04 AM    AST 22 03/07/2023 04:25 AM    ALT 17 03/07/2023 04:25 AM       POC Tests: No results for input(s): POCGLU, POCNA, POCK, POCCL, POCBUN, POCHEMO, POCHCT in the last 72 hours.     Coags:   Lab Results   Component Value Date/Time    PROTIME 18.8 03/03/2023 03:12 PM    PROTIME 16.5 06/10/2019 12:52 PM    INR 1.6 03/03/2023 03:12 PM    INR 1.4 06/10/2019 12:52 PM    APTT 33.7 06/10/2019 12:52 PM       HCG (If Applicable): No results found for: PREGTESTUR, PREGSERUM, HCG, HCGQUANT     ABGs:   Lab Results   Component Value Date/Time    PHART 7.370 06/10/2019 12:52 PM    PO2ART 85 06/10/2019 12:52 PM    GVK0XRV 41.9 06/10/2019 12:52 PM    NBP3INH 24.2 06/10/2019 12:52 PM    BEART 2 06/10/2019 11:14 AM        Type & Screen (If Applicable):  No results found for: LABABO, LABRH    Drug/Infectious Status (If Applicable):  No results found for: HIV, HEPCAB    COVID-19 Screening (If Applicable): No results found for: COVID19        Anesthesia Evaluation  Patient summary reviewed and Nursing notes reviewed  Airway: Mallampati: II  TM distance: >3 FB   Neck ROM: limited  Mouth opening: > = 3 FB   Dental:    (+) poor dentition      Pulmonary:Negative Pulmonary ROS and normal exam  breath sounds clear to auscultation  (+) shortness of breath: no interval change and chronic,  sleep apnea:                             Cardiovascular:Negative CV ROS  Exercise tolerance: good (>4 METS),   (+) CAD:, CABG/stent:, CHF:,         Rhythm: regular  Rate: normal                 ROS comment: 2006:  PCI RCA - Liberte, PCI LAD Cypher, PCI LCx Cypher  01/2014:  Stress testing with fixed inferior defect - no ischemia  11/2014:  EF with EF 50-55% and inferior HK   07/2018: EF 45-50%  01/2019:  EF 40-45%  05/2019:  CABG LIMA-LAD, SVG-OM, SVG-SUSAN  08/2021:  EF 40-45%  10/2022:  Stable CAD/Grafts     Neuro/Psych:   Negative Neuro/Psych ROS  (+) CVA:,             GI/Hepatic/Renal: Neg GI/Hepatic/Renal ROS  (+) GERD:,           Endo/Other:                      ROS comment: In C Collar s/p fall Abdominal:             Vascular: negative vascular ROS. Other Findings:           Anesthesia Plan      general     ASA 3     (In C Collar)  Induction: intravenous. arterial line    Anesthetic plan and risks discussed with patient.                         Remi Monique MD   3/16/2023

## 2023-03-17 NOTE — ANESTHESIA PROCEDURE NOTES
Airway  Date/Time: 3/17/2023 8:49 AM  Urgency: elective    Airway not difficult    General Information and Staff    Patient location during procedure: OR  Resident/CRNA: ANN-MARIE Waters CRNA  Performed: resident/CRNA     Indications and Patient Condition  Indications for airway management: anesthesia  Spontaneous Ventilation: absent  Sedation level: deep  Preoxygenated: yes  Patient position: sniffing  MILS not maintained throughout  Mask difficulty assessment: vent by bag mask + OA or adjuvant +/- NMBA    Final Airway Details  Final airway type: endotracheal airway      Successful airway: ETT  Cuffed: yes   Successful intubation technique: video laryngoscopy  Facilitating devices/methods: intubating stylet  Endotracheal tube insertion site: oral  Blade: Del  Blade size: #4  ETT size (mm): 8.0  Placement verified by: chest auscultation and capnometry   Measured from: lips  ETT to lips (cm): 24  Number of attempts at approach: 1  Ventilation between attempts: bag mask    Additional Comments  Atraumatic insertion, lips and teeth as preeval. Lube placed on lips

## 2023-03-18 LAB
ANION GAP SERPL CALC-SCNC: 5 MMOL/L (ref 2–11)
BASOPHILS # BLD: 0.1 K/UL (ref 0–0.2)
BASOPHILS NFR BLD: 0 % (ref 0–2)
BUN SERPL-MCNC: 21 MG/DL (ref 8–23)
CALCIUM SERPL-MCNC: 8.4 MG/DL (ref 8.3–10.4)
CHLORIDE SERPL-SCNC: 103 MMOL/L (ref 101–110)
CO2 SERPL-SCNC: 26 MMOL/L (ref 21–32)
CREAT SERPL-MCNC: 0.7 MG/DL (ref 0.8–1.5)
DIFFERENTIAL METHOD BLD: ABNORMAL
EOSINOPHIL # BLD: 0 K/UL (ref 0–0.8)
EOSINOPHIL NFR BLD: 0 % (ref 0.5–7.8)
ERYTHROCYTE [DISTWIDTH] IN BLOOD BY AUTOMATED COUNT: 13.7 % (ref 11.9–14.6)
GLUCOSE SERPL-MCNC: 98 MG/DL (ref 65–100)
HCT VFR BLD AUTO: 32.1 % (ref 41.1–50.3)
HGB BLD-MCNC: 10.8 G/DL (ref 13.6–17.2)
IMM GRANULOCYTES # BLD AUTO: 0.1 K/UL (ref 0–0.5)
IMM GRANULOCYTES NFR BLD AUTO: 1 % (ref 0–5)
LYMPHOCYTES # BLD: 0.8 K/UL (ref 0.5–4.6)
LYMPHOCYTES NFR BLD: 7 % (ref 13–44)
MCH RBC QN AUTO: 35.5 PG (ref 26.1–32.9)
MCHC RBC AUTO-ENTMCNC: 33.6 G/DL (ref 31.4–35)
MCV RBC AUTO: 105.6 FL (ref 82–102)
MONOCYTES # BLD: 0.8 K/UL (ref 0.1–1.3)
MONOCYTES NFR BLD: 7 % (ref 4–12)
NEUTS SEG # BLD: 10.6 K/UL (ref 1.7–8.2)
NEUTS SEG NFR BLD: 85 % (ref 43–78)
NRBC # BLD: 0 K/UL (ref 0–0.2)
PLATELET # BLD AUTO: 138 K/UL (ref 150–450)
PMV BLD AUTO: 8.9 FL (ref 9.4–12.3)
POTASSIUM SERPL-SCNC: 4.3 MMOL/L (ref 3.5–5.1)
RBC # BLD AUTO: 3.04 M/UL (ref 4.23–5.6)
SODIUM SERPL-SCNC: 134 MMOL/L (ref 133–143)
WBC # BLD AUTO: 12.3 K/UL (ref 4.3–11.1)

## 2023-03-18 PROCEDURE — 6370000000 HC RX 637 (ALT 250 FOR IP): Performed by: NEUROLOGICAL SURGERY

## 2023-03-18 PROCEDURE — 6360000002 HC RX W HCPCS: Performed by: INTERNAL MEDICINE

## 2023-03-18 PROCEDURE — 36415 COLL VENOUS BLD VENIPUNCTURE: CPT

## 2023-03-18 PROCEDURE — 6370000000 HC RX 637 (ALT 250 FOR IP): Performed by: INTERNAL MEDICINE

## 2023-03-18 PROCEDURE — 2580000003 HC RX 258: Performed by: NEUROLOGICAL SURGERY

## 2023-03-18 PROCEDURE — 99232 SBSQ HOSP IP/OBS MODERATE 35: CPT | Performed by: INTERNAL MEDICINE

## 2023-03-18 PROCEDURE — 80048 BASIC METABOLIC PNL TOTAL CA: CPT

## 2023-03-18 PROCEDURE — 1100000000 HC RM PRIVATE

## 2023-03-18 PROCEDURE — 2580000003 HC RX 258: Performed by: FAMILY MEDICINE

## 2023-03-18 PROCEDURE — 85025 COMPLETE CBC W/AUTO DIFF WBC: CPT

## 2023-03-18 RX ORDER — MORPHINE SULFATE 2 MG/ML
2 INJECTION, SOLUTION INTRAMUSCULAR; INTRAVENOUS
Status: DISCONTINUED | OUTPATIENT
Start: 2023-03-18 | End: 2023-03-19 | Stop reason: SDUPTHER

## 2023-03-18 RX ADMIN — METOPROLOL SUCCINATE 25 MG: 25 TABLET, EXTENDED RELEASE ORAL at 08:12

## 2023-03-18 RX ADMIN — SODIUM CHLORIDE: 9 INJECTION, SOLUTION INTRAVENOUS at 05:30

## 2023-03-18 RX ADMIN — FUROSEMIDE 40 MG: 40 TABLET ORAL at 08:12

## 2023-03-18 RX ADMIN — MORPHINE SULFATE 2 MG: 2 INJECTION, SOLUTION INTRAMUSCULAR; INTRAVENOUS at 22:55

## 2023-03-18 RX ADMIN — FOLIC ACID 1 MG: 1 TABLET ORAL at 08:12

## 2023-03-18 RX ADMIN — ASPIRIN 81 MG: 81 TABLET ORAL at 08:12

## 2023-03-18 RX ADMIN — HYDROMORPHONE HYDROCHLORIDE 1 MG: 1 INJECTION, SOLUTION INTRAMUSCULAR; INTRAVENOUS; SUBCUTANEOUS at 02:44

## 2023-03-18 RX ADMIN — CARBAMAZEPINE 400 MG: 200 TABLET ORAL at 08:11

## 2023-03-18 RX ADMIN — HYDROMORPHONE HYDROCHLORIDE 1 MG: 1 INJECTION, SOLUTION INTRAMUSCULAR; INTRAVENOUS; SUBCUTANEOUS at 08:32

## 2023-03-18 RX ADMIN — GABAPENTIN 400 MG: 300 CAPSULE ORAL at 13:42

## 2023-03-18 RX ADMIN — GABAPENTIN 400 MG: 300 CAPSULE ORAL at 08:11

## 2023-03-18 RX ADMIN — HYDROMORPHONE HYDROCHLORIDE 1 MG: 1 INJECTION, SOLUTION INTRAMUSCULAR; INTRAVENOUS; SUBCUTANEOUS at 14:46

## 2023-03-18 RX ADMIN — CYCLOBENZAPRINE HYDROCHLORIDE 10 MG: 5 TABLET, FILM COATED ORAL at 05:19

## 2023-03-18 RX ADMIN — PANTOPRAZOLE SODIUM 40 MG: 40 TABLET, DELAYED RELEASE ORAL at 05:16

## 2023-03-18 RX ADMIN — EMPAGLIFLOZIN 10 MG: 10 TABLET, FILM COATED ORAL at 08:12

## 2023-03-18 RX ADMIN — Medication 15 G: at 08:11

## 2023-03-18 RX ADMIN — HYDROCODONE BITARTRATE AND ACETAMINOPHEN 1 TABLET: 10; 325 TABLET ORAL at 05:19

## 2023-03-18 RX ADMIN — SODIUM CHLORIDE, PRESERVATIVE FREE 10 ML: 5 INJECTION INTRAVENOUS at 08:37

## 2023-03-18 RX ADMIN — POLYETHYLENE GLYCOL 3350 17 G: 17 POWDER, FOR SOLUTION ORAL at 08:11

## 2023-03-18 RX ADMIN — CARBOXYMETHYLCELLULOSE SODIUM 1 DROP: 10 GEL OPHTHALMIC at 05:25

## 2023-03-18 ASSESSMENT — PAIN - FUNCTIONAL ASSESSMENT
PAIN_FUNCTIONAL_ASSESSMENT: PREVENTS OR INTERFERES SOME ACTIVE ACTIVITIES AND ADLS
PAIN_FUNCTIONAL_ASSESSMENT: PREVENTS OR INTERFERES SOME ACTIVE ACTIVITIES AND ADLS
PAIN_FUNCTIONAL_ASSESSMENT: ACTIVITIES ARE NOT PREVENTED

## 2023-03-18 ASSESSMENT — PAIN DESCRIPTION - DESCRIPTORS
DESCRIPTORS: ACHING
DESCRIPTORS: ACHING
DESCRIPTORS: DISCOMFORT
DESCRIPTORS: ACHING
DESCRIPTORS: ACHING

## 2023-03-18 ASSESSMENT — PAIN SCALES - GENERAL
PAINLEVEL_OUTOF10: 6
PAINLEVEL_OUTOF10: 0
PAINLEVEL_OUTOF10: 8
PAINLEVEL_OUTOF10: 0
PAINLEVEL_OUTOF10: 7

## 2023-03-18 ASSESSMENT — PAIN DESCRIPTION - PAIN TYPE
TYPE: SURGICAL PAIN

## 2023-03-18 ASSESSMENT — PAIN SCALES - WONG BAKER: WONGBAKER_NUMERICALRESPONSE: 0

## 2023-03-18 ASSESSMENT — PAIN DESCRIPTION - ORIENTATION
ORIENTATION: ANTERIOR
ORIENTATION: ANTERIOR
ORIENTATION: MID
ORIENTATION: ANTERIOR

## 2023-03-18 ASSESSMENT — PAIN DESCRIPTION - ONSET
ONSET: ON-GOING
ONSET: GRADUAL
ONSET: ON-GOING

## 2023-03-18 ASSESSMENT — PAIN DESCRIPTION - FREQUENCY
FREQUENCY: CONTINUOUS
FREQUENCY: INTERMITTENT
FREQUENCY: CONTINUOUS

## 2023-03-18 ASSESSMENT — PAIN DESCRIPTION - LOCATION
LOCATION: NECK;THROAT
LOCATION: HIP;NECK
LOCATION: NECK

## 2023-03-19 LAB
ANION GAP SERPL CALC-SCNC: 4 MMOL/L (ref 2–11)
BASOPHILS # BLD: 0.1 K/UL (ref 0–0.2)
BASOPHILS NFR BLD: 0 % (ref 0–2)
BUN SERPL-MCNC: 25 MG/DL (ref 8–23)
CALCIUM SERPL-MCNC: 8.1 MG/DL (ref 8.3–10.4)
CHLORIDE SERPL-SCNC: 103 MMOL/L (ref 101–110)
CO2 SERPL-SCNC: 27 MMOL/L (ref 21–32)
CREAT SERPL-MCNC: 0.7 MG/DL (ref 0.8–1.5)
DIFFERENTIAL METHOD BLD: ABNORMAL
EOSINOPHIL # BLD: 0 K/UL (ref 0–0.8)
EOSINOPHIL NFR BLD: 0 % (ref 0.5–7.8)
ERYTHROCYTE [DISTWIDTH] IN BLOOD BY AUTOMATED COUNT: 13.6 % (ref 11.9–14.6)
GLUCOSE SERPL-MCNC: 86 MG/DL (ref 65–100)
HCT VFR BLD AUTO: 30.8 % (ref 41.1–50.3)
HGB BLD-MCNC: 10.4 G/DL (ref 13.6–17.2)
IMM GRANULOCYTES # BLD AUTO: 0.1 K/UL (ref 0–0.5)
IMM GRANULOCYTES NFR BLD AUTO: 0 % (ref 0–5)
LYMPHOCYTES # BLD: 0.8 K/UL (ref 0.5–4.6)
LYMPHOCYTES NFR BLD: 5 % (ref 13–44)
MCH RBC QN AUTO: 35.6 PG (ref 26.1–32.9)
MCHC RBC AUTO-ENTMCNC: 33.8 G/DL (ref 31.4–35)
MCV RBC AUTO: 105.5 FL (ref 82–102)
MONOCYTES # BLD: 0.8 K/UL (ref 0.1–1.3)
MONOCYTES NFR BLD: 6 % (ref 4–12)
NEUTS SEG # BLD: 12.4 K/UL (ref 1.7–8.2)
NEUTS SEG NFR BLD: 89 % (ref 43–78)
NRBC # BLD: 0 K/UL (ref 0–0.2)
PLATELET # BLD AUTO: 121 K/UL (ref 150–450)
PMV BLD AUTO: 8.7 FL (ref 9.4–12.3)
POTASSIUM SERPL-SCNC: 3.6 MMOL/L (ref 3.5–5.1)
RBC # BLD AUTO: 2.92 M/UL (ref 4.23–5.6)
SODIUM SERPL-SCNC: 134 MMOL/L (ref 133–143)
WBC # BLD AUTO: 14.1 K/UL (ref 4.3–11.1)

## 2023-03-19 PROCEDURE — 92526 ORAL FUNCTION THERAPY: CPT

## 2023-03-19 PROCEDURE — 2700000000 HC OXYGEN THERAPY PER DAY

## 2023-03-19 PROCEDURE — 2580000003 HC RX 258: Performed by: FAMILY MEDICINE

## 2023-03-19 PROCEDURE — 85025 COMPLETE CBC W/AUTO DIFF WBC: CPT

## 2023-03-19 PROCEDURE — 6360000002 HC RX W HCPCS: Performed by: INTERNAL MEDICINE

## 2023-03-19 PROCEDURE — 97168 OT RE-EVAL EST PLAN CARE: CPT

## 2023-03-19 PROCEDURE — 36415 COLL VENOUS BLD VENIPUNCTURE: CPT

## 2023-03-19 PROCEDURE — 97112 NEUROMUSCULAR REEDUCATION: CPT

## 2023-03-19 PROCEDURE — 6360000002 HC RX W HCPCS: Performed by: FAMILY MEDICINE

## 2023-03-19 PROCEDURE — 80048 BASIC METABOLIC PNL TOTAL CA: CPT

## 2023-03-19 PROCEDURE — 94760 N-INVAS EAR/PLS OXIMETRY 1: CPT

## 2023-03-19 PROCEDURE — C9113 INJ PANTOPRAZOLE SODIUM, VIA: HCPCS | Performed by: FAMILY MEDICINE

## 2023-03-19 PROCEDURE — 99232 SBSQ HOSP IP/OBS MODERATE 35: CPT | Performed by: INTERNAL MEDICINE

## 2023-03-19 PROCEDURE — 97530 THERAPEUTIC ACTIVITIES: CPT

## 2023-03-19 PROCEDURE — 2580000003 HC RX 258: Performed by: NEUROLOGICAL SURGERY

## 2023-03-19 PROCEDURE — 1100000000 HC RM PRIVATE

## 2023-03-19 PROCEDURE — 97164 PT RE-EVAL EST PLAN CARE: CPT

## 2023-03-19 PROCEDURE — A4216 STERILE WATER/SALINE, 10 ML: HCPCS | Performed by: FAMILY MEDICINE

## 2023-03-19 RX ORDER — SODIUM CHLORIDE, SODIUM LACTATE, POTASSIUM CHLORIDE, CALCIUM CHLORIDE 600; 310; 30; 20 MG/100ML; MG/100ML; MG/100ML; MG/100ML
INJECTION, SOLUTION INTRAVENOUS CONTINUOUS
Status: DISCONTINUED | OUTPATIENT
Start: 2023-03-19 | End: 2023-03-20

## 2023-03-19 RX ORDER — HYDROMORPHONE HYDROCHLORIDE 1 MG/ML
1 INJECTION, SOLUTION INTRAMUSCULAR; INTRAVENOUS; SUBCUTANEOUS EVERY 4 HOURS PRN
Status: DISCONTINUED | OUTPATIENT
Start: 2023-03-19 | End: 2023-03-30 | Stop reason: HOSPADM

## 2023-03-19 RX ORDER — KETOROLAC TROMETHAMINE 30 MG/ML
15 INJECTION, SOLUTION INTRAMUSCULAR; INTRAVENOUS EVERY 6 HOURS PRN
Status: DISPENSED | OUTPATIENT
Start: 2023-03-19 | End: 2023-03-24

## 2023-03-19 RX ORDER — METOPROLOL TARTRATE 5 MG/5ML
2.5 INJECTION INTRAVENOUS EVERY 6 HOURS PRN
Status: DISCONTINUED | OUTPATIENT
Start: 2023-03-19 | End: 2023-03-30 | Stop reason: HOSPADM

## 2023-03-19 RX ORDER — DEXTROSE AND SODIUM CHLORIDE 5; .9 G/100ML; G/100ML
INJECTION, SOLUTION INTRAVENOUS CONTINUOUS
Status: DISCONTINUED | OUTPATIENT
Start: 2023-03-19 | End: 2023-03-19

## 2023-03-19 RX ORDER — MORPHINE SULFATE 2 MG/ML
1 INJECTION, SOLUTION INTRAMUSCULAR; INTRAVENOUS EVERY 6 HOURS PRN
Status: DISCONTINUED | OUTPATIENT
Start: 2023-03-19 | End: 2023-03-19

## 2023-03-19 RX ORDER — FUROSEMIDE 10 MG/ML
20 INJECTION INTRAMUSCULAR; INTRAVENOUS DAILY
Status: DISCONTINUED | OUTPATIENT
Start: 2023-03-19 | End: 2023-03-24

## 2023-03-19 RX ADMIN — SODIUM CHLORIDE, PRESERVATIVE FREE 5 ML: 5 INJECTION INTRAVENOUS at 20:17

## 2023-03-19 RX ADMIN — MORPHINE SULFATE 2 MG: 2 INJECTION, SOLUTION INTRAMUSCULAR; INTRAVENOUS at 14:24

## 2023-03-19 RX ADMIN — HYDROMORPHONE HYDROCHLORIDE 1 MG: 1 INJECTION, SOLUTION INTRAMUSCULAR; INTRAVENOUS; SUBCUTANEOUS at 22:34

## 2023-03-19 RX ADMIN — SODIUM CHLORIDE, PRESERVATIVE FREE 5 ML: 5 INJECTION INTRAVENOUS at 14:27

## 2023-03-19 RX ADMIN — MORPHINE SULFATE 2 MG: 2 INJECTION, SOLUTION INTRAMUSCULAR; INTRAVENOUS at 11:53

## 2023-03-19 RX ADMIN — BACITRACIN: 500 OINTMENT TOPICAL at 10:00

## 2023-03-19 RX ADMIN — FUROSEMIDE 20 MG: 10 INJECTION, SOLUTION INTRAMUSCULAR; INTRAVENOUS at 14:24

## 2023-03-19 RX ADMIN — MORPHINE SULFATE 2 MG: 2 INJECTION, SOLUTION INTRAMUSCULAR; INTRAVENOUS at 06:50

## 2023-03-19 RX ADMIN — MORPHINE SULFATE 2 MG: 2 INJECTION, SOLUTION INTRAMUSCULAR; INTRAVENOUS at 20:09

## 2023-03-19 RX ADMIN — SODIUM CHLORIDE 40 MG: 9 INJECTION, SOLUTION INTRAMUSCULAR; INTRAVENOUS; SUBCUTANEOUS at 14:24

## 2023-03-19 RX ADMIN — SODIUM CHLORIDE, POTASSIUM CHLORIDE, SODIUM LACTATE AND CALCIUM CHLORIDE: 600; 310; 30; 20 INJECTION, SOLUTION INTRAVENOUS at 17:45

## 2023-03-19 RX ADMIN — MORPHINE SULFATE 2 MG: 2 INJECTION, SOLUTION INTRAMUSCULAR; INTRAVENOUS at 03:08

## 2023-03-19 RX ADMIN — BACITRACIN: 500 OINTMENT TOPICAL at 20:18

## 2023-03-19 RX ADMIN — MORPHINE SULFATE 2 MG: 2 INJECTION, SOLUTION INTRAMUSCULAR; INTRAVENOUS at 17:03

## 2023-03-19 RX ADMIN — BACITRACIN: 500 OINTMENT TOPICAL at 14:45

## 2023-03-19 ASSESSMENT — PAIN SCALES - GENERAL
PAINLEVEL_OUTOF10: 6
PAINLEVEL_OUTOF10: 7
PAINLEVEL_OUTOF10: 7
PAINLEVEL_OUTOF10: 8
PAINLEVEL_OUTOF10: 0
PAINLEVEL_OUTOF10: 3
PAINLEVEL_OUTOF10: 8
PAINLEVEL_OUTOF10: 3
PAINLEVEL_OUTOF10: 8
PAINLEVEL_OUTOF10: 4
PAINLEVEL_OUTOF10: 7
PAINLEVEL_OUTOF10: 7
PAINLEVEL_OUTOF10: 3

## 2023-03-19 ASSESSMENT — PAIN - FUNCTIONAL ASSESSMENT

## 2023-03-19 ASSESSMENT — PAIN DESCRIPTION - ORIENTATION
ORIENTATION: ANTERIOR

## 2023-03-19 ASSESSMENT — PAIN DESCRIPTION - DESCRIPTORS
DESCRIPTORS: ACHING

## 2023-03-19 ASSESSMENT — PAIN DESCRIPTION - LOCATION
LOCATION: NECK
LOCATION: NECK
LOCATION: HIP;NECK
LOCATION: NECK
LOCATION: NECK

## 2023-03-19 ASSESSMENT — PAIN DESCRIPTION - PAIN TYPE
TYPE: SURGICAL PAIN

## 2023-03-19 ASSESSMENT — PAIN SCALES - WONG BAKER
WONGBAKER_NUMERICALRESPONSE: 0
WONGBAKER_NUMERICALRESPONSE: 0

## 2023-03-20 DIAGNOSIS — G45.9 TIA (TRANSIENT ISCHEMIC ATTACK): ICD-10-CM

## 2023-03-20 DIAGNOSIS — Z95.818 STATUS POST PLACEMENT OF IMPLANTABLE LOOP RECORDER: ICD-10-CM

## 2023-03-20 DIAGNOSIS — G45.9 TIA (TRANSIENT ISCHEMIC ATTACK): Primary | ICD-10-CM

## 2023-03-20 LAB
ANION GAP SERPL CALC-SCNC: 7 MMOL/L (ref 2–11)
BASOPHILS # BLD: 0 K/UL (ref 0–0.2)
BASOPHILS NFR BLD: 0 % (ref 0–2)
BUN SERPL-MCNC: 21 MG/DL (ref 8–23)
CALCIUM SERPL-MCNC: 8.3 MG/DL (ref 8.3–10.4)
CHLORIDE SERPL-SCNC: 107 MMOL/L (ref 101–110)
CO2 SERPL-SCNC: 25 MMOL/L (ref 21–32)
CREAT SERPL-MCNC: 0.5 MG/DL (ref 0.8–1.5)
DIFFERENTIAL METHOD BLD: ABNORMAL
EOSINOPHIL # BLD: 0 K/UL (ref 0–0.8)
EOSINOPHIL NFR BLD: 0 % (ref 0.5–7.8)
ERYTHROCYTE [DISTWIDTH] IN BLOOD BY AUTOMATED COUNT: 13.5 % (ref 11.9–14.6)
GLUCOSE SERPL-MCNC: 71 MG/DL (ref 65–100)
HCT VFR BLD AUTO: 31.8 % (ref 41.1–50.3)
HGB BLD-MCNC: 10.5 G/DL (ref 13.6–17.2)
IMM GRANULOCYTES # BLD AUTO: 0.1 K/UL (ref 0–0.5)
IMM GRANULOCYTES NFR BLD AUTO: 1 % (ref 0–5)
LYMPHOCYTES # BLD: 0.7 K/UL (ref 0.5–4.6)
LYMPHOCYTES NFR BLD: 5 % (ref 13–44)
MAGNESIUM SERPL-MCNC: 2 MG/DL (ref 1.8–2.4)
MCH RBC QN AUTO: 35.6 PG (ref 26.1–32.9)
MCHC RBC AUTO-ENTMCNC: 33 G/DL (ref 31.4–35)
MCV RBC AUTO: 107.8 FL (ref 82–102)
MONOCYTES # BLD: 0.5 K/UL (ref 0.1–1.3)
MONOCYTES NFR BLD: 4 % (ref 4–12)
NEUTS SEG # BLD: 11.5 K/UL (ref 1.7–8.2)
NEUTS SEG NFR BLD: 90 % (ref 43–78)
NRBC # BLD: 0 K/UL (ref 0–0.2)
PLATELET # BLD AUTO: 122 K/UL (ref 150–450)
PMV BLD AUTO: 8.9 FL (ref 9.4–12.3)
POTASSIUM SERPL-SCNC: 3.4 MMOL/L (ref 3.5–5.1)
RBC # BLD AUTO: 2.95 M/UL (ref 4.23–5.6)
SODIUM SERPL-SCNC: 139 MMOL/L (ref 133–143)
WBC # BLD AUTO: 12.8 K/UL (ref 4.3–11.1)

## 2023-03-20 PROCEDURE — 6370000000 HC RX 637 (ALT 250 FOR IP): Performed by: FAMILY MEDICINE

## 2023-03-20 PROCEDURE — 6360000002 HC RX W HCPCS: Performed by: INTERNAL MEDICINE

## 2023-03-20 PROCEDURE — 80048 BASIC METABOLIC PNL TOTAL CA: CPT

## 2023-03-20 PROCEDURE — 2580000003 HC RX 258: Performed by: NEUROLOGICAL SURGERY

## 2023-03-20 PROCEDURE — 1100000003 HC PRIVATE W/ TELEMETRY

## 2023-03-20 PROCEDURE — 6360000002 HC RX W HCPCS: Performed by: FAMILY MEDICINE

## 2023-03-20 PROCEDURE — 83735 ASSAY OF MAGNESIUM: CPT

## 2023-03-20 PROCEDURE — 85025 COMPLETE CBC W/AUTO DIFF WBC: CPT

## 2023-03-20 PROCEDURE — 92526 ORAL FUNCTION THERAPY: CPT

## 2023-03-20 PROCEDURE — C9113 INJ PANTOPRAZOLE SODIUM, VIA: HCPCS | Performed by: FAMILY MEDICINE

## 2023-03-20 PROCEDURE — 99232 SBSQ HOSP IP/OBS MODERATE 35: CPT | Performed by: INTERNAL MEDICINE

## 2023-03-20 PROCEDURE — A4216 STERILE WATER/SALINE, 10 ML: HCPCS | Performed by: FAMILY MEDICINE

## 2023-03-20 PROCEDURE — 97530 THERAPEUTIC ACTIVITIES: CPT

## 2023-03-20 PROCEDURE — 36415 COLL VENOUS BLD VENIPUNCTURE: CPT

## 2023-03-20 PROCEDURE — 97110 THERAPEUTIC EXERCISES: CPT

## 2023-03-20 PROCEDURE — 2580000003 HC RX 258: Performed by: FAMILY MEDICINE

## 2023-03-20 RX ORDER — DEXTROSE AND SODIUM CHLORIDE 5; .9 G/100ML; G/100ML
INJECTION, SOLUTION INTRAVENOUS CONTINUOUS
Status: DISCONTINUED | OUTPATIENT
Start: 2023-03-20 | End: 2023-03-24

## 2023-03-20 RX ORDER — METHYLPREDNISOLONE SODIUM SUCCINATE 40 MG/ML
40 INJECTION, POWDER, LYOPHILIZED, FOR SOLUTION INTRAMUSCULAR; INTRAVENOUS ONCE
Status: COMPLETED | OUTPATIENT
Start: 2023-03-20 | End: 2023-03-20

## 2023-03-20 RX ORDER — POTASSIUM CHLORIDE 7.45 MG/ML
10 INJECTION INTRAVENOUS
Status: COMPLETED | OUTPATIENT
Start: 2023-03-20 | End: 2023-03-20

## 2023-03-20 RX ORDER — LIDOCAINE 4 G/G
1 PATCH TOPICAL DAILY
Status: DISCONTINUED | OUTPATIENT
Start: 2023-03-20 | End: 2023-03-30 | Stop reason: HOSPADM

## 2023-03-20 RX ORDER — POTASSIUM CHLORIDE 7.45 MG/ML
10 INJECTION INTRAVENOUS
Status: DISPENSED | OUTPATIENT
Start: 2023-03-20 | End: 2023-03-20

## 2023-03-20 RX ADMIN — SODIUM CHLORIDE 40 MG: 9 INJECTION, SOLUTION INTRAMUSCULAR; INTRAVENOUS; SUBCUTANEOUS at 02:18

## 2023-03-20 RX ADMIN — HYDROMORPHONE HYDROCHLORIDE 1 MG: 1 INJECTION, SOLUTION INTRAMUSCULAR; INTRAVENOUS; SUBCUTANEOUS at 20:06

## 2023-03-20 RX ADMIN — KETOROLAC TROMETHAMINE 15 MG: 30 INJECTION, SOLUTION INTRAMUSCULAR at 01:27

## 2023-03-20 RX ADMIN — POTASSIUM CHLORIDE 10 MEQ: 7.46 INJECTION, SOLUTION INTRAVENOUS at 18:49

## 2023-03-20 RX ADMIN — SODIUM CHLORIDE, PRESERVATIVE FREE 10 ML: 5 INJECTION INTRAVENOUS at 08:19

## 2023-03-20 RX ADMIN — HYDROMORPHONE HYDROCHLORIDE 1 MG: 1 INJECTION, SOLUTION INTRAMUSCULAR; INTRAVENOUS; SUBCUTANEOUS at 10:00

## 2023-03-20 RX ADMIN — DEXTROSE MONOHYDRATE AND SODIUM CHLORIDE: 5; .9 INJECTION, SOLUTION INTRAVENOUS at 16:16

## 2023-03-20 RX ADMIN — FUROSEMIDE 20 MG: 10 INJECTION, SOLUTION INTRAMUSCULAR; INTRAVENOUS at 08:18

## 2023-03-20 RX ADMIN — BACITRACIN: 500 OINTMENT TOPICAL at 20:16

## 2023-03-20 RX ADMIN — HYDROMORPHONE HYDROCHLORIDE 1 MG: 1 INJECTION, SOLUTION INTRAMUSCULAR; INTRAVENOUS; SUBCUTANEOUS at 02:27

## 2023-03-20 RX ADMIN — HYDROMORPHONE HYDROCHLORIDE 1 MG: 1 INJECTION, SOLUTION INTRAMUSCULAR; INTRAVENOUS; SUBCUTANEOUS at 06:40

## 2023-03-20 RX ADMIN — HYDROMORPHONE HYDROCHLORIDE 1 MG: 1 INJECTION, SOLUTION INTRAMUSCULAR; INTRAVENOUS; SUBCUTANEOUS at 16:21

## 2023-03-20 RX ADMIN — POTASSIUM CHLORIDE 10 MEQ: 7.46 INJECTION, SOLUTION INTRAVENOUS at 16:16

## 2023-03-20 RX ADMIN — METHYLPREDNISOLONE SODIUM SUCCINATE 40 MG: 40 INJECTION, POWDER, FOR SOLUTION INTRAMUSCULAR; INTRAVENOUS at 12:34

## 2023-03-20 RX ADMIN — POTASSIUM CHLORIDE 10 MEQ: 7.46 INJECTION, SOLUTION INTRAVENOUS at 12:34

## 2023-03-20 RX ADMIN — POTASSIUM CHLORIDE 10 MEQ: 7.46 INJECTION, SOLUTION INTRAVENOUS at 08:18

## 2023-03-20 RX ADMIN — SODIUM CHLORIDE 40 MG: 9 INJECTION, SOLUTION INTRAMUSCULAR; INTRAVENOUS; SUBCUTANEOUS at 12:34

## 2023-03-20 ASSESSMENT — PAIN SCALES - GENERAL
PAINLEVEL_OUTOF10: 7
PAINLEVEL_OUTOF10: 0
PAINLEVEL_OUTOF10: 7
PAINLEVEL_OUTOF10: 7
PAINLEVEL_OUTOF10: 0
PAINLEVEL_OUTOF10: 3
PAINLEVEL_OUTOF10: 7
PAINLEVEL_OUTOF10: 0
PAINLEVEL_OUTOF10: 8
PAINLEVEL_OUTOF10: 8

## 2023-03-20 ASSESSMENT — PAIN - FUNCTIONAL ASSESSMENT
PAIN_FUNCTIONAL_ASSESSMENT: ACTIVITIES ARE NOT PREVENTED

## 2023-03-20 ASSESSMENT — PAIN DESCRIPTION - LOCATION
LOCATION: NECK

## 2023-03-20 ASSESSMENT — PAIN DESCRIPTION - ORIENTATION
ORIENTATION: ANTERIOR
ORIENTATION: RIGHT
ORIENTATION: POSTERIOR;ANTERIOR

## 2023-03-20 ASSESSMENT — PAIN DESCRIPTION - DESCRIPTORS
DESCRIPTORS: ACHING
DESCRIPTORS: ACHING;BURNING;DISCOMFORT
DESCRIPTORS: ACHING
DESCRIPTORS: ACHING
DESCRIPTORS: DISCOMFORT
DESCRIPTORS: ACHING

## 2023-03-21 LAB
ANION GAP SERPL CALC-SCNC: 4 MMOL/L (ref 2–11)
BUN SERPL-MCNC: 23 MG/DL (ref 8–23)
CALCIUM SERPL-MCNC: 8.5 MG/DL (ref 8.3–10.4)
CHLORIDE SERPL-SCNC: 108 MMOL/L (ref 101–110)
CO2 SERPL-SCNC: 23 MMOL/L (ref 21–32)
CREAT SERPL-MCNC: 0.6 MG/DL (ref 0.8–1.5)
GLUCOSE SERPL-MCNC: 95 MG/DL (ref 65–100)
POTASSIUM SERPL-SCNC: 4.8 MMOL/L (ref 3.5–5.1)
SODIUM SERPL-SCNC: 135 MMOL/L (ref 133–143)

## 2023-03-21 PROCEDURE — A4216 STERILE WATER/SALINE, 10 ML: HCPCS | Performed by: FAMILY MEDICINE

## 2023-03-21 PROCEDURE — 6360000002 HC RX W HCPCS: Performed by: FAMILY MEDICINE

## 2023-03-21 PROCEDURE — 99232 SBSQ HOSP IP/OBS MODERATE 35: CPT | Performed by: INTERNAL MEDICINE

## 2023-03-21 PROCEDURE — 36415 COLL VENOUS BLD VENIPUNCTURE: CPT

## 2023-03-21 PROCEDURE — C9113 INJ PANTOPRAZOLE SODIUM, VIA: HCPCS | Performed by: FAMILY MEDICINE

## 2023-03-21 PROCEDURE — 92612 ENDOSCOPY SWALLOW (FEES) VID: CPT

## 2023-03-21 PROCEDURE — 1100000003 HC PRIVATE W/ TELEMETRY

## 2023-03-21 PROCEDURE — 80048 BASIC METABOLIC PNL TOTAL CA: CPT

## 2023-03-21 PROCEDURE — 2700000000 HC OXYGEN THERAPY PER DAY

## 2023-03-21 PROCEDURE — 94760 N-INVAS EAR/PLS OXIMETRY 1: CPT

## 2023-03-21 PROCEDURE — 2580000003 HC RX 258: Performed by: NEUROLOGICAL SURGERY

## 2023-03-21 PROCEDURE — 6360000002 HC RX W HCPCS: Performed by: INTERNAL MEDICINE

## 2023-03-21 PROCEDURE — 2580000003 HC RX 258: Performed by: FAMILY MEDICINE

## 2023-03-21 PROCEDURE — 6370000000 HC RX 637 (ALT 250 FOR IP): Performed by: FAMILY MEDICINE

## 2023-03-21 PROCEDURE — 97530 THERAPEUTIC ACTIVITIES: CPT

## 2023-03-21 RX ORDER — METHYLPREDNISOLONE SODIUM SUCCINATE 40 MG/ML
40 INJECTION, POWDER, LYOPHILIZED, FOR SOLUTION INTRAMUSCULAR; INTRAVENOUS ONCE
Status: COMPLETED | OUTPATIENT
Start: 2023-03-21 | End: 2023-03-21

## 2023-03-21 RX ADMIN — HYDROMORPHONE HYDROCHLORIDE 1 MG: 1 INJECTION, SOLUTION INTRAMUSCULAR; INTRAVENOUS; SUBCUTANEOUS at 17:53

## 2023-03-21 RX ADMIN — KETOROLAC TROMETHAMINE 15 MG: 30 INJECTION, SOLUTION INTRAMUSCULAR at 05:29

## 2023-03-21 RX ADMIN — SODIUM CHLORIDE, PRESERVATIVE FREE 5 ML: 5 INJECTION INTRAVENOUS at 08:56

## 2023-03-21 RX ADMIN — HYDROMORPHONE HYDROCHLORIDE 1 MG: 1 INJECTION, SOLUTION INTRAMUSCULAR; INTRAVENOUS; SUBCUTANEOUS at 13:44

## 2023-03-21 RX ADMIN — SODIUM CHLORIDE 40 MG: 9 INJECTION, SOLUTION INTRAMUSCULAR; INTRAVENOUS; SUBCUTANEOUS at 13:44

## 2023-03-21 RX ADMIN — CARBOXYMETHYLCELLULOSE SODIUM 1 DROP: 10 GEL OPHTHALMIC at 20:25

## 2023-03-21 RX ADMIN — HYDROMORPHONE HYDROCHLORIDE 1 MG: 1 INJECTION, SOLUTION INTRAMUSCULAR; INTRAVENOUS; SUBCUTANEOUS at 09:01

## 2023-03-21 RX ADMIN — FUROSEMIDE 20 MG: 10 INJECTION, SOLUTION INTRAMUSCULAR; INTRAVENOUS at 08:53

## 2023-03-21 RX ADMIN — SODIUM CHLORIDE 40 MG: 9 INJECTION, SOLUTION INTRAMUSCULAR; INTRAVENOUS; SUBCUTANEOUS at 00:23

## 2023-03-21 RX ADMIN — BACITRACIN: 500 OINTMENT TOPICAL at 20:24

## 2023-03-21 RX ADMIN — METHYLPREDNISOLONE SODIUM SUCCINATE 40 MG: 40 INJECTION, POWDER, FOR SOLUTION INTRAMUSCULAR; INTRAVENOUS at 13:44

## 2023-03-21 RX ADMIN — SODIUM CHLORIDE, PRESERVATIVE FREE 10 ML: 5 INJECTION INTRAVENOUS at 20:25

## 2023-03-21 RX ADMIN — KETOROLAC TROMETHAMINE 15 MG: 30 INJECTION, SOLUTION INTRAMUSCULAR at 21:18

## 2023-03-21 RX ADMIN — HYDROMORPHONE HYDROCHLORIDE 1 MG: 1 INJECTION, SOLUTION INTRAMUSCULAR; INTRAVENOUS; SUBCUTANEOUS at 04:26

## 2023-03-21 RX ADMIN — HYDROMORPHONE HYDROCHLORIDE 1 MG: 1 INJECTION, SOLUTION INTRAMUSCULAR; INTRAVENOUS; SUBCUTANEOUS at 00:29

## 2023-03-21 ASSESSMENT — PAIN DESCRIPTION - LOCATION
LOCATION: NECK

## 2023-03-21 ASSESSMENT — PAIN DESCRIPTION - ORIENTATION
ORIENTATION: ANTERIOR;POSTERIOR
ORIENTATION: ANTERIOR;POSTERIOR
ORIENTATION: POSTERIOR;ANTERIOR
ORIENTATION: RIGHT
ORIENTATION: ANTERIOR;POSTERIOR
ORIENTATION: RIGHT
ORIENTATION: RIGHT
ORIENTATION: ANTERIOR;POSTERIOR

## 2023-03-21 ASSESSMENT — PAIN SCALES - GENERAL
PAINLEVEL_OUTOF10: 9
PAINLEVEL_OUTOF10: 0
PAINLEVEL_OUTOF10: 8
PAINLEVEL_OUTOF10: 8
PAINLEVEL_OUTOF10: 6
PAINLEVEL_OUTOF10: 8
PAINLEVEL_OUTOF10: 0
PAINLEVEL_OUTOF10: 0
PAINLEVEL_OUTOF10: 2
PAINLEVEL_OUTOF10: 0
PAINLEVEL_OUTOF10: 8
PAINLEVEL_OUTOF10: 8
PAINLEVEL_OUTOF10: 0
PAINLEVEL_OUTOF10: 6

## 2023-03-21 ASSESSMENT — PAIN DESCRIPTION - PAIN TYPE
TYPE: SURGICAL PAIN;CHRONIC PAIN
TYPE: SURGICAL PAIN

## 2023-03-21 ASSESSMENT — PAIN - FUNCTIONAL ASSESSMENT
PAIN_FUNCTIONAL_ASSESSMENT: PREVENTS OR INTERFERES WITH MANY ACTIVE NOT PASSIVE ACTIVITIES
PAIN_FUNCTIONAL_ASSESSMENT: PREVENTS OR INTERFERES WITH MANY ACTIVE NOT PASSIVE ACTIVITIES

## 2023-03-21 ASSESSMENT — PAIN DESCRIPTION - DESCRIPTORS
DESCRIPTORS: SHARP;SHOOTING
DESCRIPTORS: ACHING;DISCOMFORT
DESCRIPTORS: ACHING;DISCOMFORT
DESCRIPTORS: ACHING
DESCRIPTORS: ACHING
DESCRIPTORS: ACHING;DISCOMFORT
DESCRIPTORS: ACHING
DESCRIPTORS: SHARP;SHOOTING

## 2023-03-21 ASSESSMENT — PAIN DESCRIPTION - ONSET
ONSET: ON-GOING
ONSET: ON-GOING

## 2023-03-21 ASSESSMENT — PAIN DESCRIPTION - FREQUENCY
FREQUENCY: CONTINUOUS
FREQUENCY: CONTINUOUS

## 2023-03-22 LAB
ANION GAP SERPL CALC-SCNC: 3 MMOL/L (ref 2–11)
BASOPHILS # BLD: 0 K/UL (ref 0–0.2)
BASOPHILS NFR BLD: 0 % (ref 0–2)
BUN SERPL-MCNC: 28 MG/DL (ref 8–23)
CALCIUM SERPL-MCNC: 9 MG/DL (ref 8.3–10.4)
CHLORIDE SERPL-SCNC: 108 MMOL/L (ref 101–110)
CO2 SERPL-SCNC: 27 MMOL/L (ref 21–32)
CREAT SERPL-MCNC: 0.8 MG/DL (ref 0.8–1.5)
DIFFERENTIAL METHOD BLD: ABNORMAL
EOSINOPHIL # BLD: 0 K/UL (ref 0–0.8)
EOSINOPHIL NFR BLD: 0 % (ref 0.5–7.8)
ERYTHROCYTE [DISTWIDTH] IN BLOOD BY AUTOMATED COUNT: 13.5 % (ref 11.9–14.6)
GLUCOSE SERPL-MCNC: 107 MG/DL (ref 65–100)
HCT VFR BLD AUTO: 33.6 % (ref 41.1–50.3)
HGB BLD-MCNC: 11 G/DL (ref 13.6–17.2)
IMM GRANULOCYTES # BLD AUTO: 0 K/UL (ref 0–0.5)
IMM GRANULOCYTES NFR BLD AUTO: 0 % (ref 0–5)
LYMPHOCYTES # BLD: 0.9 K/UL (ref 0.5–4.6)
LYMPHOCYTES NFR BLD: 9 % (ref 13–44)
MCH RBC QN AUTO: 34.6 PG (ref 26.1–32.9)
MCHC RBC AUTO-ENTMCNC: 32.7 G/DL (ref 31.4–35)
MCV RBC AUTO: 105.7 FL (ref 82–102)
MONOCYTES # BLD: 0.5 K/UL (ref 0.1–1.3)
MONOCYTES NFR BLD: 5 % (ref 4–12)
NEUTS SEG # BLD: 8.7 K/UL (ref 1.7–8.2)
NEUTS SEG NFR BLD: 86 % (ref 43–78)
NRBC # BLD: 0 K/UL (ref 0–0.2)
PLATELET # BLD AUTO: 194 K/UL (ref 150–450)
PMV BLD AUTO: 8.8 FL (ref 9.4–12.3)
POTASSIUM SERPL-SCNC: 3.7 MMOL/L (ref 3.5–5.1)
RBC # BLD AUTO: 3.18 M/UL (ref 4.23–5.6)
SODIUM SERPL-SCNC: 138 MMOL/L (ref 133–143)
WBC # BLD AUTO: 10.2 K/UL (ref 4.3–11.1)

## 2023-03-22 PROCEDURE — 36415 COLL VENOUS BLD VENIPUNCTURE: CPT

## 2023-03-22 PROCEDURE — 2580000003 HC RX 258: Performed by: NEUROLOGICAL SURGERY

## 2023-03-22 PROCEDURE — 80048 BASIC METABOLIC PNL TOTAL CA: CPT

## 2023-03-22 PROCEDURE — 97535 SELF CARE MNGMENT TRAINING: CPT

## 2023-03-22 PROCEDURE — 6370000000 HC RX 637 (ALT 250 FOR IP): Performed by: FAMILY MEDICINE

## 2023-03-22 PROCEDURE — A4216 STERILE WATER/SALINE, 10 ML: HCPCS | Performed by: FAMILY MEDICINE

## 2023-03-22 PROCEDURE — 6360000002 HC RX W HCPCS: Performed by: INTERNAL MEDICINE

## 2023-03-22 PROCEDURE — 97530 THERAPEUTIC ACTIVITIES: CPT

## 2023-03-22 PROCEDURE — 85025 COMPLETE CBC W/AUTO DIFF WBC: CPT

## 2023-03-22 PROCEDURE — C9113 INJ PANTOPRAZOLE SODIUM, VIA: HCPCS | Performed by: FAMILY MEDICINE

## 2023-03-22 PROCEDURE — 92526 ORAL FUNCTION THERAPY: CPT

## 2023-03-22 PROCEDURE — 97112 NEUROMUSCULAR REEDUCATION: CPT

## 2023-03-22 PROCEDURE — 6360000002 HC RX W HCPCS: Performed by: FAMILY MEDICINE

## 2023-03-22 PROCEDURE — 51702 INSERT TEMP BLADDER CATH: CPT

## 2023-03-22 PROCEDURE — 6360000002 HC RX W HCPCS: Performed by: NURSE PRACTITIONER

## 2023-03-22 PROCEDURE — 2580000003 HC RX 258: Performed by: FAMILY MEDICINE

## 2023-03-22 PROCEDURE — 1100000003 HC PRIVATE W/ TELEMETRY

## 2023-03-22 RX ORDER — LORAZEPAM 2 MG/ML
0.5 INJECTION INTRAMUSCULAR EVERY 8 HOURS PRN
Status: DISCONTINUED | OUTPATIENT
Start: 2023-03-22 | End: 2023-03-30 | Stop reason: HOSPADM

## 2023-03-22 RX ORDER — CLONAZEPAM 0.5 MG/1
0.5 TABLET ORAL EVERY 12 HOURS PRN
Status: DISCONTINUED | OUTPATIENT
Start: 2023-03-22 | End: 2023-03-22

## 2023-03-22 RX ORDER — LORAZEPAM 2 MG/ML
0.5 INJECTION INTRAMUSCULAR EVERY 12 HOURS PRN
Status: DISCONTINUED | OUTPATIENT
Start: 2023-03-22 | End: 2023-03-22

## 2023-03-22 RX ADMIN — HYDROMORPHONE HYDROCHLORIDE 1 MG: 1 INJECTION, SOLUTION INTRAMUSCULAR; INTRAVENOUS; SUBCUTANEOUS at 11:32

## 2023-03-22 RX ADMIN — FUROSEMIDE 20 MG: 10 INJECTION, SOLUTION INTRAMUSCULAR; INTRAVENOUS at 08:02

## 2023-03-22 RX ADMIN — SODIUM CHLORIDE 40 MG: 9 INJECTION, SOLUTION INTRAMUSCULAR; INTRAVENOUS; SUBCUTANEOUS at 00:01

## 2023-03-22 RX ADMIN — SODIUM CHLORIDE, PRESERVATIVE FREE 10 ML: 5 INJECTION INTRAVENOUS at 20:38

## 2023-03-22 RX ADMIN — LORAZEPAM 0.5 MG: 2 INJECTION INTRAMUSCULAR; INTRAVENOUS at 16:12

## 2023-03-22 RX ADMIN — KETOROLAC TROMETHAMINE 15 MG: 30 INJECTION, SOLUTION INTRAMUSCULAR at 08:11

## 2023-03-22 RX ADMIN — LORAZEPAM 0.5 MG: 2 INJECTION INTRAMUSCULAR; INTRAVENOUS at 08:02

## 2023-03-22 RX ADMIN — HYDROMORPHONE HYDROCHLORIDE 1 MG: 1 INJECTION, SOLUTION INTRAMUSCULAR; INTRAVENOUS; SUBCUTANEOUS at 20:38

## 2023-03-22 RX ADMIN — HYDROMORPHONE HYDROCHLORIDE 1 MG: 1 INJECTION, SOLUTION INTRAMUSCULAR; INTRAVENOUS; SUBCUTANEOUS at 00:01

## 2023-03-22 RX ADMIN — HYDROMORPHONE HYDROCHLORIDE 1 MG: 1 INJECTION, SOLUTION INTRAMUSCULAR; INTRAVENOUS; SUBCUTANEOUS at 04:19

## 2023-03-22 RX ADMIN — SODIUM CHLORIDE, PRESERVATIVE FREE 10 ML: 5 INJECTION INTRAVENOUS at 08:16

## 2023-03-22 RX ADMIN — SODIUM CHLORIDE 40 MG: 9 INJECTION, SOLUTION INTRAMUSCULAR; INTRAVENOUS; SUBCUTANEOUS at 13:42

## 2023-03-22 RX ADMIN — HYDROMORPHONE HYDROCHLORIDE 1 MG: 1 INJECTION, SOLUTION INTRAMUSCULAR; INTRAVENOUS; SUBCUTANEOUS at 15:47

## 2023-03-22 ASSESSMENT — PAIN DESCRIPTION - ORIENTATION
ORIENTATION: RIGHT
ORIENTATION: RIGHT
ORIENTATION: RIGHT;ANTERIOR

## 2023-03-22 ASSESSMENT — PAIN - FUNCTIONAL ASSESSMENT: PAIN_FUNCTIONAL_ASSESSMENT: PREVENTS OR INTERFERES SOME ACTIVE ACTIVITIES AND ADLS

## 2023-03-22 ASSESSMENT — PAIN DESCRIPTION - FREQUENCY: FREQUENCY: CONTINUOUS

## 2023-03-22 ASSESSMENT — PAIN SCALES - GENERAL
PAINLEVEL_OUTOF10: 8
PAINLEVEL_OUTOF10: 0
PAINLEVEL_OUTOF10: 3
PAINLEVEL_OUTOF10: 0
PAINLEVEL_OUTOF10: 0
PAINLEVEL_OUTOF10: 8
PAINLEVEL_OUTOF10: 0
PAINLEVEL_OUTOF10: 8
PAINLEVEL_OUTOF10: 9
PAINLEVEL_OUTOF10: 9
PAINLEVEL_OUTOF10: 0
PAINLEVEL_OUTOF10: 9

## 2023-03-22 ASSESSMENT — PAIN DESCRIPTION - LOCATION
LOCATION: JAW
LOCATION: GENERALIZED
LOCATION: NECK
LOCATION: NECK
LOCATION: GENERALIZED

## 2023-03-22 ASSESSMENT — PAIN DESCRIPTION - PAIN TYPE: TYPE: SURGICAL PAIN;CHRONIC PAIN

## 2023-03-22 ASSESSMENT — PAIN DESCRIPTION - DESCRIPTORS
DESCRIPTORS: ACHING
DESCRIPTORS: ACHING
DESCRIPTORS: THROBBING;TENDER
DESCRIPTORS: ACHING
DESCRIPTORS: SHARP

## 2023-03-22 ASSESSMENT — PAIN DESCRIPTION - ONSET: ONSET: ON-GOING

## 2023-03-23 PROCEDURE — 92526 ORAL FUNCTION THERAPY: CPT

## 2023-03-23 PROCEDURE — 2580000003 HC RX 258: Performed by: FAMILY MEDICINE

## 2023-03-23 PROCEDURE — 97530 THERAPEUTIC ACTIVITIES: CPT

## 2023-03-23 PROCEDURE — 97535 SELF CARE MNGMENT TRAINING: CPT

## 2023-03-23 PROCEDURE — 97112 NEUROMUSCULAR REEDUCATION: CPT

## 2023-03-23 PROCEDURE — 6360000002 HC RX W HCPCS: Performed by: INTERNAL MEDICINE

## 2023-03-23 PROCEDURE — 6370000000 HC RX 637 (ALT 250 FOR IP): Performed by: NEUROLOGICAL SURGERY

## 2023-03-23 PROCEDURE — 6370000000 HC RX 637 (ALT 250 FOR IP): Performed by: NURSE PRACTITIONER

## 2023-03-23 PROCEDURE — 6360000002 HC RX W HCPCS: Performed by: FAMILY MEDICINE

## 2023-03-23 PROCEDURE — 2580000003 HC RX 258: Performed by: NEUROLOGICAL SURGERY

## 2023-03-23 PROCEDURE — C9113 INJ PANTOPRAZOLE SODIUM, VIA: HCPCS | Performed by: FAMILY MEDICINE

## 2023-03-23 PROCEDURE — 1100000003 HC PRIVATE W/ TELEMETRY

## 2023-03-23 PROCEDURE — 6370000000 HC RX 637 (ALT 250 FOR IP): Performed by: FAMILY MEDICINE

## 2023-03-23 PROCEDURE — 51702 INSERT TEMP BLADDER CATH: CPT

## 2023-03-23 PROCEDURE — A4216 STERILE WATER/SALINE, 10 ML: HCPCS | Performed by: FAMILY MEDICINE

## 2023-03-23 RX ORDER — HYDRALAZINE HYDROCHLORIDE 20 MG/ML
10 INJECTION INTRAMUSCULAR; INTRAVENOUS EVERY 4 HOURS PRN
Status: DISCONTINUED | OUTPATIENT
Start: 2023-03-23 | End: 2023-03-30 | Stop reason: HOSPADM

## 2023-03-23 RX ADMIN — SODIUM CHLORIDE 40 MG: 9 INJECTION, SOLUTION INTRAMUSCULAR; INTRAVENOUS; SUBCUTANEOUS at 13:43

## 2023-03-23 RX ADMIN — SODIUM CHLORIDE, PRESERVATIVE FREE 5 ML: 5 INJECTION INTRAVENOUS at 20:19

## 2023-03-23 RX ADMIN — SODIUM CHLORIDE, PRESERVATIVE FREE 10 ML: 5 INJECTION INTRAVENOUS at 08:50

## 2023-03-23 RX ADMIN — FUROSEMIDE 20 MG: 10 INJECTION, SOLUTION INTRAMUSCULAR; INTRAVENOUS at 08:50

## 2023-03-23 RX ADMIN — CARBAMAZEPINE 400 MG: 200 TABLET ORAL at 15:55

## 2023-03-23 RX ADMIN — DEXTROSE MONOHYDRATE AND SODIUM CHLORIDE: 5; .9 INJECTION, SOLUTION INTRAVENOUS at 08:58

## 2023-03-23 RX ADMIN — DOCUSATE SODIUM 100 MG: 50 LIQUID ORAL at 16:22

## 2023-03-23 RX ADMIN — HYDROMORPHONE HYDROCHLORIDE 1 MG: 1 INJECTION, SOLUTION INTRAMUSCULAR; INTRAVENOUS; SUBCUTANEOUS at 15:29

## 2023-03-23 RX ADMIN — HYDROMORPHONE HYDROCHLORIDE 1 MG: 1 INJECTION, SOLUTION INTRAMUSCULAR; INTRAVENOUS; SUBCUTANEOUS at 04:27

## 2023-03-23 RX ADMIN — GABAPENTIN 400 MG: 300 CAPSULE ORAL at 20:19

## 2023-03-23 RX ADMIN — CARBAMAZEPINE 400 MG: 200 TABLET ORAL at 20:19

## 2023-03-23 RX ADMIN — BACITRACIN: 500 OINTMENT TOPICAL at 08:51

## 2023-03-23 RX ADMIN — HYDROMORPHONE HYDROCHLORIDE 1 MG: 1 INJECTION, SOLUTION INTRAMUSCULAR; INTRAVENOUS; SUBCUTANEOUS at 10:40

## 2023-03-23 RX ADMIN — SODIUM CHLORIDE 40 MG: 9 INJECTION, SOLUTION INTRAMUSCULAR; INTRAVENOUS; SUBCUTANEOUS at 00:50

## 2023-03-23 RX ADMIN — GABAPENTIN 400 MG: 300 CAPSULE ORAL at 15:56

## 2023-03-23 RX ADMIN — BACITRACIN: 500 OINTMENT TOPICAL at 20:20

## 2023-03-23 RX ADMIN — HYDROMORPHONE HYDROCHLORIDE 1 MG: 1 INJECTION, SOLUTION INTRAMUSCULAR; INTRAVENOUS; SUBCUTANEOUS at 00:50

## 2023-03-23 ASSESSMENT — PAIN DESCRIPTION - LOCATION
LOCATION: NECK
LOCATION: NECK
LOCATION: GENERALIZED

## 2023-03-23 ASSESSMENT — PAIN DESCRIPTION - ORIENTATION: ORIENTATION: POSTERIOR

## 2023-03-23 ASSESSMENT — PAIN SCALES - GENERAL
PAINLEVEL_OUTOF10: 8
PAINLEVEL_OUTOF10: 3
PAINLEVEL_OUTOF10: 9
PAINLEVEL_OUTOF10: 10

## 2023-03-23 ASSESSMENT — PAIN DESCRIPTION - DESCRIPTORS
DESCRIPTORS: ACHING;THROBBING
DESCRIPTORS: ACHING;TENDER;THROBBING

## 2023-03-24 ENCOUNTER — APPOINTMENT (OUTPATIENT)
Dept: GENERAL RADIOLOGY | Age: 78
DRG: 453 | End: 2023-03-24
Attending: PHYSICAL MEDICINE & REHABILITATION
Payer: MEDICARE

## 2023-03-24 LAB
ANION GAP SERPL CALC-SCNC: 4 MMOL/L (ref 2–11)
BUN SERPL-MCNC: 17 MG/DL (ref 8–23)
CALCIUM SERPL-MCNC: 8.5 MG/DL (ref 8.3–10.4)
CHLORIDE SERPL-SCNC: 111 MMOL/L (ref 101–110)
CO2 SERPL-SCNC: 26 MMOL/L (ref 21–32)
CREAT SERPL-MCNC: 0.5 MG/DL (ref 0.8–1.5)
GLUCOSE SERPL-MCNC: 114 MG/DL (ref 65–100)
POTASSIUM SERPL-SCNC: 2.9 MMOL/L (ref 3.5–5.1)
SODIUM SERPL-SCNC: 141 MMOL/L (ref 133–143)

## 2023-03-24 PROCEDURE — 80048 BASIC METABOLIC PNL TOTAL CA: CPT

## 2023-03-24 PROCEDURE — 6360000002 HC RX W HCPCS: Performed by: FAMILY MEDICINE

## 2023-03-24 PROCEDURE — 6360000002 HC RX W HCPCS: Performed by: NURSE PRACTITIONER

## 2023-03-24 PROCEDURE — 1100000003 HC PRIVATE W/ TELEMETRY

## 2023-03-24 PROCEDURE — 2580000003 HC RX 258: Performed by: NEUROLOGICAL SURGERY

## 2023-03-24 PROCEDURE — 97530 THERAPEUTIC ACTIVITIES: CPT

## 2023-03-24 PROCEDURE — C9113 INJ PANTOPRAZOLE SODIUM, VIA: HCPCS | Performed by: FAMILY MEDICINE

## 2023-03-24 PROCEDURE — 97110 THERAPEUTIC EXERCISES: CPT

## 2023-03-24 PROCEDURE — 92611 MOTION FLUOROSCOPY/SWALLOW: CPT

## 2023-03-24 PROCEDURE — 6370000000 HC RX 637 (ALT 250 FOR IP): Performed by: NURSE PRACTITIONER

## 2023-03-24 PROCEDURE — 2580000003 HC RX 258: Performed by: FAMILY MEDICINE

## 2023-03-24 PROCEDURE — 74230 X-RAY XM SWLNG FUNCJ C+: CPT

## 2023-03-24 PROCEDURE — 2500000003 HC RX 250 WO HCPCS: Performed by: NURSE PRACTITIONER

## 2023-03-24 PROCEDURE — 36415 COLL VENOUS BLD VENIPUNCTURE: CPT

## 2023-03-24 PROCEDURE — 6370000000 HC RX 637 (ALT 250 FOR IP): Performed by: NEUROLOGICAL SURGERY

## 2023-03-24 PROCEDURE — A4216 STERILE WATER/SALINE, 10 ML: HCPCS | Performed by: FAMILY MEDICINE

## 2023-03-24 RX ORDER — POTASSIUM CHLORIDE 7.45 MG/ML
10 INJECTION INTRAVENOUS
Status: COMPLETED | OUTPATIENT
Start: 2023-03-24 | End: 2023-03-24

## 2023-03-24 RX ORDER — DEXTROSE, SODIUM CHLORIDE, AND POTASSIUM CHLORIDE 5; .45; .15 G/100ML; G/100ML; G/100ML
INJECTION INTRAVENOUS CONTINUOUS
Status: DISCONTINUED | OUTPATIENT
Start: 2023-03-24 | End: 2023-03-29

## 2023-03-24 RX ORDER — ASPIRIN 81 MG/1
81 TABLET, CHEWABLE ORAL DAILY
Status: DISCONTINUED | OUTPATIENT
Start: 2023-03-24 | End: 2023-03-30 | Stop reason: HOSPADM

## 2023-03-24 RX ADMIN — ASPIRIN 81 MG 81 MG: 81 TABLET ORAL at 12:51

## 2023-03-24 RX ADMIN — GABAPENTIN 400 MG: 300 CAPSULE ORAL at 12:51

## 2023-03-24 RX ADMIN — DEXTROSE MONOHYDRATE AND SODIUM CHLORIDE: 5; .9 INJECTION, SOLUTION INTRAVENOUS at 03:03

## 2023-03-24 RX ADMIN — POTASSIUM CHLORIDE 10 MEQ: 7.46 INJECTION, SOLUTION INTRAVENOUS at 14:51

## 2023-03-24 RX ADMIN — BACITRACIN: 500 OINTMENT TOPICAL at 21:20

## 2023-03-24 RX ADMIN — GABAPENTIN 400 MG: 300 CAPSULE ORAL at 21:07

## 2023-03-24 RX ADMIN — POTASSIUM CHLORIDE 10 MEQ: 7.46 INJECTION, SOLUTION INTRAVENOUS at 12:51

## 2023-03-24 RX ADMIN — BARIUM SULFATE 15 ML: 400 SUSPENSION ORAL at 09:32

## 2023-03-24 RX ADMIN — CARBAMAZEPINE 400 MG: 200 TABLET ORAL at 21:07

## 2023-03-24 RX ADMIN — BARIUM SULFATE 30 ML: 0.81 POWDER, FOR SUSPENSION ORAL at 09:32

## 2023-03-24 RX ADMIN — BACITRACIN: 500 OINTMENT TOPICAL at 09:51

## 2023-03-24 RX ADMIN — SODIUM CHLORIDE, PRESERVATIVE FREE 10 ML: 5 INJECTION INTRAVENOUS at 08:46

## 2023-03-24 RX ADMIN — POTASSIUM CHLORIDE 10 MEQ: 7.46 INJECTION, SOLUTION INTRAVENOUS at 18:24

## 2023-03-24 RX ADMIN — SODIUM CHLORIDE, PRESERVATIVE FREE 5 ML: 5 INJECTION INTRAVENOUS at 21:08

## 2023-03-24 RX ADMIN — SODIUM CHLORIDE 40 MG: 9 INJECTION, SOLUTION INTRAMUSCULAR; INTRAVENOUS; SUBCUTANEOUS at 03:03

## 2023-03-24 RX ADMIN — GABAPENTIN 400 MG: 300 CAPSULE ORAL at 08:45

## 2023-03-24 RX ADMIN — FUROSEMIDE 20 MG: 10 INJECTION, SOLUTION INTRAMUSCULAR; INTRAVENOUS at 08:46

## 2023-03-24 RX ADMIN — POTASSIUM CHLORIDE 10 MEQ: 7.46 INJECTION, SOLUTION INTRAVENOUS at 16:54

## 2023-03-24 RX ADMIN — DEXTROSE MONOHYDRATE, SODIUM CHLORIDE, AND POTASSIUM CHLORIDE: 50; 4.5; 1.49 INJECTION, SOLUTION INTRAVENOUS at 09:42

## 2023-03-24 RX ADMIN — BARIUM SULFATE 30 ML: 400 PASTE ORAL at 09:32

## 2023-03-24 RX ADMIN — CARBAMAZEPINE 400 MG: 200 TABLET ORAL at 08:45

## 2023-03-24 ASSESSMENT — PAIN SCALES - GENERAL
PAINLEVEL_OUTOF10: 0
PAINLEVEL_OUTOF10: 0

## 2023-03-25 LAB
ANION GAP SERPL CALC-SCNC: 5 MMOL/L (ref 2–11)
BUN SERPL-MCNC: 12 MG/DL (ref 8–23)
CALCIUM SERPL-MCNC: 8.6 MG/DL (ref 8.3–10.4)
CHLORIDE SERPL-SCNC: 109 MMOL/L (ref 101–110)
CO2 SERPL-SCNC: 26 MMOL/L (ref 21–32)
CREAT SERPL-MCNC: 0.6 MG/DL (ref 0.8–1.5)
ERYTHROCYTE [DISTWIDTH] IN BLOOD BY AUTOMATED COUNT: 14.1 % (ref 11.9–14.6)
GLUCOSE SERPL-MCNC: 114 MG/DL (ref 65–100)
HCT VFR BLD AUTO: 32 % (ref 41.1–50.3)
HGB BLD-MCNC: 10.7 G/DL (ref 13.6–17.2)
MCH RBC QN AUTO: 35.2 PG (ref 26.1–32.9)
MCHC RBC AUTO-ENTMCNC: 33.4 G/DL (ref 31.4–35)
MCV RBC AUTO: 105.3 FL (ref 82–102)
NRBC # BLD: 0 K/UL (ref 0–0.2)
PLATELET # BLD AUTO: 232 K/UL (ref 150–450)
PMV BLD AUTO: 8.9 FL (ref 9.4–12.3)
POTASSIUM SERPL-SCNC: 3.1 MMOL/L (ref 3.5–5.1)
RBC # BLD AUTO: 3.04 M/UL (ref 4.23–5.6)
SODIUM SERPL-SCNC: 140 MMOL/L (ref 133–143)
WBC # BLD AUTO: 8.6 K/UL (ref 4.3–11.1)

## 2023-03-25 PROCEDURE — 2580000003 HC RX 258: Performed by: NURSE PRACTITIONER

## 2023-03-25 PROCEDURE — 6360000002 HC RX W HCPCS: Performed by: NURSE PRACTITIONER

## 2023-03-25 PROCEDURE — 6370000000 HC RX 637 (ALT 250 FOR IP): Performed by: NURSE PRACTITIONER

## 2023-03-25 PROCEDURE — 6370000000 HC RX 637 (ALT 250 FOR IP): Performed by: NEUROLOGICAL SURGERY

## 2023-03-25 PROCEDURE — 6370000000 HC RX 637 (ALT 250 FOR IP): Performed by: PHYSICIAN ASSISTANT

## 2023-03-25 PROCEDURE — 36415 COLL VENOUS BLD VENIPUNCTURE: CPT

## 2023-03-25 PROCEDURE — 97164 PT RE-EVAL EST PLAN CARE: CPT

## 2023-03-25 PROCEDURE — 97530 THERAPEUTIC ACTIVITIES: CPT

## 2023-03-25 PROCEDURE — 2500000003 HC RX 250 WO HCPCS: Performed by: NURSE PRACTITIONER

## 2023-03-25 PROCEDURE — C9113 INJ PANTOPRAZOLE SODIUM, VIA: HCPCS | Performed by: NURSE PRACTITIONER

## 2023-03-25 PROCEDURE — 85027 COMPLETE CBC AUTOMATED: CPT

## 2023-03-25 PROCEDURE — 1100000003 HC PRIVATE W/ TELEMETRY

## 2023-03-25 PROCEDURE — 6360000002 HC RX W HCPCS: Performed by: PHYSICIAN ASSISTANT

## 2023-03-25 PROCEDURE — 80048 BASIC METABOLIC PNL TOTAL CA: CPT

## 2023-03-25 RX ORDER — POTASSIUM CHLORIDE 7.45 MG/ML
10 INJECTION INTRAVENOUS
Status: COMPLETED | OUTPATIENT
Start: 2023-03-25 | End: 2023-03-25

## 2023-03-25 RX ADMIN — GABAPENTIN 400 MG: 300 CAPSULE ORAL at 08:42

## 2023-03-25 RX ADMIN — CARBAMAZEPINE 400 MG: 200 TABLET ORAL at 08:43

## 2023-03-25 RX ADMIN — CARBAMAZEPINE 400 MG: 200 TABLET ORAL at 20:31

## 2023-03-25 RX ADMIN — FOLIC ACID 1 MG: 1 TABLET ORAL at 08:43

## 2023-03-25 RX ADMIN — POTASSIUM CHLORIDE 10 MEQ: 7.46 INJECTION, SOLUTION INTRAVENOUS at 20:33

## 2023-03-25 RX ADMIN — POTASSIUM CHLORIDE 10 MEQ: 7.46 INJECTION, SOLUTION INTRAVENOUS at 15:50

## 2023-03-25 RX ADMIN — APIXABAN 5 MG: 5 TABLET, FILM COATED ORAL at 20:31

## 2023-03-25 RX ADMIN — POTASSIUM CHLORIDE 10 MEQ: 7.46 INJECTION, SOLUTION INTRAVENOUS at 21:52

## 2023-03-25 RX ADMIN — GABAPENTIN 400 MG: 300 CAPSULE ORAL at 20:31

## 2023-03-25 RX ADMIN — DEXTROSE MONOHYDRATE, SODIUM CHLORIDE, AND POTASSIUM CHLORIDE: 50; 4.5; 1.49 INJECTION, SOLUTION INTRAVENOUS at 15:50

## 2023-03-25 RX ADMIN — DEXTROSE MONOHYDRATE, SODIUM CHLORIDE, AND POTASSIUM CHLORIDE: 50; 4.5; 1.49 INJECTION, SOLUTION INTRAVENOUS at 02:00

## 2023-03-25 RX ADMIN — CYCLOBENZAPRINE HYDROCHLORIDE 10 MG: 5 TABLET, FILM COATED ORAL at 08:43

## 2023-03-25 RX ADMIN — GABAPENTIN 400 MG: 300 CAPSULE ORAL at 14:33

## 2023-03-25 RX ADMIN — SODIUM CHLORIDE, PRESERVATIVE FREE 40 MG: 5 INJECTION INTRAVENOUS at 08:43

## 2023-03-25 RX ADMIN — BACITRACIN: 500 OINTMENT TOPICAL at 20:34

## 2023-03-25 RX ADMIN — ASPIRIN 81 MG 81 MG: 81 TABLET ORAL at 08:43

## 2023-03-25 RX ADMIN — POTASSIUM CHLORIDE 10 MEQ: 7.46 INJECTION, SOLUTION INTRAVENOUS at 17:21

## 2023-03-25 ASSESSMENT — PAIN SCALES - WONG BAKER: WONGBAKER_NUMERICALRESPONSE: 0

## 2023-03-25 ASSESSMENT — PAIN SCALES - GENERAL
PAINLEVEL_OUTOF10: 0
PAINLEVEL_OUTOF10: 0

## 2023-03-26 PROBLEM — R55 SYNCOPE: Status: RESOLVED | Noted: 2022-07-07 | Resolved: 2023-03-26

## 2023-03-26 PROBLEM — I95.9 HYPOTENSION: Status: RESOLVED | Noted: 2023-03-03 | Resolved: 2023-03-26

## 2023-03-26 PROBLEM — T68.XXXA HYPOTHERMIA: Status: RESOLVED | Noted: 2023-03-03 | Resolved: 2023-03-26

## 2023-03-26 PROBLEM — Z98.1 S/P LAMINECTOMY WITH SPINAL FUSION: Status: ACTIVE | Noted: 2023-03-26

## 2023-03-26 PROBLEM — Z98.890 S/P CERVICAL DISCECTOMY: Status: ACTIVE | Noted: 2023-03-26

## 2023-03-26 PROBLEM — R13.10 DYSPHAGIA: Status: ACTIVE | Noted: 2023-03-26

## 2023-03-26 PROBLEM — E87.1 HYPONATREMIA: Status: RESOLVED | Noted: 2022-07-07 | Resolved: 2023-03-26

## 2023-03-26 PROCEDURE — 6370000000 HC RX 637 (ALT 250 FOR IP): Performed by: INTERNAL MEDICINE

## 2023-03-26 PROCEDURE — 6370000000 HC RX 637 (ALT 250 FOR IP): Performed by: NEUROLOGICAL SURGERY

## 2023-03-26 PROCEDURE — 6360000002 HC RX W HCPCS: Performed by: NURSE PRACTITIONER

## 2023-03-26 PROCEDURE — 99221 1ST HOSP IP/OBS SF/LOW 40: CPT | Performed by: PHYSICAL MEDICINE & REHABILITATION

## 2023-03-26 PROCEDURE — 6370000000 HC RX 637 (ALT 250 FOR IP): Performed by: NURSE PRACTITIONER

## 2023-03-26 PROCEDURE — C9113 INJ PANTOPRAZOLE SODIUM, VIA: HCPCS | Performed by: NURSE PRACTITIONER

## 2023-03-26 PROCEDURE — 97110 THERAPEUTIC EXERCISES: CPT

## 2023-03-26 PROCEDURE — 2500000003 HC RX 250 WO HCPCS: Performed by: NURSE PRACTITIONER

## 2023-03-26 PROCEDURE — 97530 THERAPEUTIC ACTIVITIES: CPT

## 2023-03-26 PROCEDURE — 6370000000 HC RX 637 (ALT 250 FOR IP): Performed by: PHYSICIAN ASSISTANT

## 2023-03-26 PROCEDURE — 2580000003 HC RX 258: Performed by: NURSE PRACTITIONER

## 2023-03-26 PROCEDURE — 1100000003 HC PRIVATE W/ TELEMETRY

## 2023-03-26 PROCEDURE — A4216 STERILE WATER/SALINE, 10 ML: HCPCS | Performed by: NURSE PRACTITIONER

## 2023-03-26 PROCEDURE — 2580000003 HC RX 258: Performed by: NEUROLOGICAL SURGERY

## 2023-03-26 RX ADMIN — GABAPENTIN 400 MG: 300 CAPSULE ORAL at 15:26

## 2023-03-26 RX ADMIN — METOPROLOL SUCCINATE 25 MG: 25 TABLET, EXTENDED RELEASE ORAL at 09:39

## 2023-03-26 RX ADMIN — FOLIC ACID 1 MG: 1 TABLET ORAL at 09:39

## 2023-03-26 RX ADMIN — BACITRACIN: 500 OINTMENT TOPICAL at 14:31

## 2023-03-26 RX ADMIN — CARBAMAZEPINE 400 MG: 200 TABLET ORAL at 20:34

## 2023-03-26 RX ADMIN — HYDROCODONE BITARTRATE AND ACETAMINOPHEN 1 TABLET: 10; 325 TABLET ORAL at 20:42

## 2023-03-26 RX ADMIN — BACITRACIN: 500 OINTMENT TOPICAL at 20:44

## 2023-03-26 RX ADMIN — ASPIRIN 81 MG 81 MG: 81 TABLET ORAL at 09:40

## 2023-03-26 RX ADMIN — SODIUM CHLORIDE, PRESERVATIVE FREE 40 MG: 5 INJECTION INTRAVENOUS at 09:39

## 2023-03-26 RX ADMIN — GABAPENTIN 400 MG: 300 CAPSULE ORAL at 20:34

## 2023-03-26 RX ADMIN — POTASSIUM BICARBONATE 40 MEQ: 782 TABLET, EFFERVESCENT ORAL at 09:43

## 2023-03-26 RX ADMIN — SODIUM CHLORIDE, PRESERVATIVE FREE 5 ML: 5 INJECTION INTRAVENOUS at 20:42

## 2023-03-26 RX ADMIN — DEXTROSE MONOHYDRATE, SODIUM CHLORIDE, AND POTASSIUM CHLORIDE: 50; 4.5; 1.49 INJECTION, SOLUTION INTRAVENOUS at 18:53

## 2023-03-26 RX ADMIN — APIXABAN 5 MG: 5 TABLET, FILM COATED ORAL at 20:34

## 2023-03-26 RX ADMIN — APIXABAN 5 MG: 5 TABLET, FILM COATED ORAL at 09:39

## 2023-03-26 RX ADMIN — CARBAMAZEPINE 400 MG: 200 TABLET ORAL at 09:39

## 2023-03-26 RX ADMIN — SODIUM CHLORIDE, PRESERVATIVE FREE 10 ML: 5 INJECTION INTRAVENOUS at 09:43

## 2023-03-26 RX ADMIN — BACITRACIN: 500 OINTMENT TOPICAL at 09:49

## 2023-03-26 RX ADMIN — GABAPENTIN 400 MG: 300 CAPSULE ORAL at 09:40

## 2023-03-26 ASSESSMENT — PAIN DESCRIPTION - FREQUENCY: FREQUENCY: INTERMITTENT

## 2023-03-26 ASSESSMENT — PAIN DESCRIPTION - PAIN TYPE: TYPE: ACUTE PAIN

## 2023-03-26 ASSESSMENT — PAIN SCALES - GENERAL
PAINLEVEL_OUTOF10: 5
PAINLEVEL_OUTOF10: 0
PAINLEVEL_OUTOF10: 0

## 2023-03-26 ASSESSMENT — PAIN DESCRIPTION - DESCRIPTORS: DESCRIPTORS: ACHING

## 2023-03-26 ASSESSMENT — PAIN - FUNCTIONAL ASSESSMENT: PAIN_FUNCTIONAL_ASSESSMENT: ACTIVITIES ARE NOT PREVENTED

## 2023-03-26 ASSESSMENT — PAIN DESCRIPTION - ORIENTATION: ORIENTATION: RIGHT

## 2023-03-26 ASSESSMENT — PAIN DESCRIPTION - LOCATION: LOCATION: JAW

## 2023-03-26 ASSESSMENT — PAIN DESCRIPTION - ONSET: ONSET: GRADUAL

## 2023-03-27 PROCEDURE — 6370000000 HC RX 637 (ALT 250 FOR IP): Performed by: INTERNAL MEDICINE

## 2023-03-27 PROCEDURE — 1100000003 HC PRIVATE W/ TELEMETRY

## 2023-03-27 PROCEDURE — 6370000000 HC RX 637 (ALT 250 FOR IP): Performed by: NEUROLOGICAL SURGERY

## 2023-03-27 PROCEDURE — 2580000003 HC RX 258: Performed by: NEUROLOGICAL SURGERY

## 2023-03-27 PROCEDURE — 97112 NEUROMUSCULAR REEDUCATION: CPT

## 2023-03-27 PROCEDURE — 97535 SELF CARE MNGMENT TRAINING: CPT

## 2023-03-27 PROCEDURE — A4216 STERILE WATER/SALINE, 10 ML: HCPCS | Performed by: NURSE PRACTITIONER

## 2023-03-27 PROCEDURE — 2580000003 HC RX 258: Performed by: NURSE PRACTITIONER

## 2023-03-27 PROCEDURE — 6370000000 HC RX 637 (ALT 250 FOR IP): Performed by: PHYSICIAN ASSISTANT

## 2023-03-27 PROCEDURE — C9113 INJ PANTOPRAZOLE SODIUM, VIA: HCPCS | Performed by: NURSE PRACTITIONER

## 2023-03-27 PROCEDURE — 6370000000 HC RX 637 (ALT 250 FOR IP): Performed by: FAMILY MEDICINE

## 2023-03-27 PROCEDURE — 92526 ORAL FUNCTION THERAPY: CPT

## 2023-03-27 PROCEDURE — 6370000000 HC RX 637 (ALT 250 FOR IP): Performed by: NURSE PRACTITIONER

## 2023-03-27 PROCEDURE — 6360000002 HC RX W HCPCS: Performed by: NURSE PRACTITIONER

## 2023-03-27 PROCEDURE — 97530 THERAPEUTIC ACTIVITIES: CPT

## 2023-03-27 RX ORDER — LANSOPRAZOLE
30 KIT
Status: DISCONTINUED | OUTPATIENT
Start: 2023-03-28 | End: 2023-03-28

## 2023-03-27 RX ORDER — MULTIVITAMIN WITH IRON
1 TABLET ORAL DAILY
Status: DISCONTINUED | OUTPATIENT
Start: 2023-03-27 | End: 2023-03-30 | Stop reason: HOSPADM

## 2023-03-27 RX ADMIN — GABAPENTIN 400 MG: 300 CAPSULE ORAL at 09:16

## 2023-03-27 RX ADMIN — POTASSIUM BICARBONATE 40 MEQ: 782 TABLET, EFFERVESCENT ORAL at 17:35

## 2023-03-27 RX ADMIN — CARBAMAZEPINE 400 MG: 200 TABLET ORAL at 20:45

## 2023-03-27 RX ADMIN — SODIUM CHLORIDE, PRESERVATIVE FREE 10 ML: 5 INJECTION INTRAVENOUS at 09:17

## 2023-03-27 RX ADMIN — GABAPENTIN 400 MG: 300 CAPSULE ORAL at 20:45

## 2023-03-27 RX ADMIN — HYDROCODONE BITARTRATE AND ACETAMINOPHEN 1 TABLET: 10; 325 TABLET ORAL at 20:50

## 2023-03-27 RX ADMIN — ASPIRIN 81 MG 81 MG: 81 TABLET ORAL at 09:16

## 2023-03-27 RX ADMIN — METOPROLOL SUCCINATE 25 MG: 25 TABLET, EXTENDED RELEASE ORAL at 09:17

## 2023-03-27 RX ADMIN — DOCUSATE SODIUM 100 MG: 50 LIQUID ORAL at 09:16

## 2023-03-27 RX ADMIN — FOLIC ACID 1 MG: 1 TABLET ORAL at 09:17

## 2023-03-27 RX ADMIN — CARBAMAZEPINE 400 MG: 200 TABLET ORAL at 09:16

## 2023-03-27 RX ADMIN — SODIUM CHLORIDE, PRESERVATIVE FREE 40 MG: 5 INJECTION INTRAVENOUS at 09:16

## 2023-03-27 RX ADMIN — GABAPENTIN 400 MG: 300 CAPSULE ORAL at 13:50

## 2023-03-27 RX ADMIN — B-COMPLEX W/ C & FOLIC ACID TAB 1 TABLET: TAB at 17:35

## 2023-03-27 RX ADMIN — APIXABAN 5 MG: 5 TABLET, FILM COATED ORAL at 09:17

## 2023-03-27 RX ADMIN — BACITRACIN: 500 OINTMENT TOPICAL at 20:45

## 2023-03-27 RX ADMIN — HYDROCODONE BITARTRATE AND ACETAMINOPHEN 1 TABLET: 10; 325 TABLET ORAL at 10:37

## 2023-03-27 ASSESSMENT — PAIN SCALES - GENERAL
PAINLEVEL_OUTOF10: 6
PAINLEVEL_OUTOF10: 5
PAINLEVEL_OUTOF10: 0

## 2023-03-27 ASSESSMENT — PAIN DESCRIPTION - DESCRIPTORS
DESCRIPTORS: ACHING
DESCRIPTORS: ACHING

## 2023-03-27 ASSESSMENT — PAIN DESCRIPTION - LOCATION
LOCATION: NECK
LOCATION: NECK

## 2023-03-27 ASSESSMENT — PAIN DESCRIPTION - FREQUENCY: FREQUENCY: INTERMITTENT

## 2023-03-27 ASSESSMENT — PAIN - FUNCTIONAL ASSESSMENT
PAIN_FUNCTIONAL_ASSESSMENT: PREVENTS OR INTERFERES SOME ACTIVE ACTIVITIES AND ADLS
PAIN_FUNCTIONAL_ASSESSMENT: PREVENTS OR INTERFERES SOME ACTIVE ACTIVITIES AND ADLS

## 2023-03-27 ASSESSMENT — PAIN DESCRIPTION - ONSET: ONSET: GRADUAL

## 2023-03-27 ASSESSMENT — PAIN DESCRIPTION - ORIENTATION: ORIENTATION: ANTERIOR;POSTERIOR

## 2023-03-27 ASSESSMENT — PAIN DESCRIPTION - PAIN TYPE: TYPE: ACUTE PAIN

## 2023-03-28 ENCOUNTER — APPOINTMENT (OUTPATIENT)
Dept: GENERAL RADIOLOGY | Age: 78
DRG: 453 | End: 2023-03-28
Attending: PHYSICAL MEDICINE & REHABILITATION
Payer: MEDICARE

## 2023-03-28 LAB
ANION GAP SERPL CALC-SCNC: 3 MMOL/L (ref 2–11)
BUN SERPL-MCNC: 8 MG/DL (ref 8–23)
CALCIUM SERPL-MCNC: 8.9 MG/DL (ref 8.3–10.4)
CHLORIDE SERPL-SCNC: 110 MMOL/L (ref 101–110)
CO2 SERPL-SCNC: 25 MMOL/L (ref 21–32)
CREAT SERPL-MCNC: 0.6 MG/DL (ref 0.8–1.5)
GLUCOSE SERPL-MCNC: 109 MG/DL (ref 65–100)
POTASSIUM SERPL-SCNC: 3.6 MMOL/L (ref 3.5–5.1)
SODIUM SERPL-SCNC: 138 MMOL/L (ref 133–143)

## 2023-03-28 PROCEDURE — 74230 X-RAY XM SWLNG FUNCJ C+: CPT

## 2023-03-28 PROCEDURE — A4216 STERILE WATER/SALINE, 10 ML: HCPCS | Performed by: FAMILY MEDICINE

## 2023-03-28 PROCEDURE — 97112 NEUROMUSCULAR REEDUCATION: CPT

## 2023-03-28 PROCEDURE — 6360000002 HC RX W HCPCS

## 2023-03-28 PROCEDURE — 94760 N-INVAS EAR/PLS OXIMETRY 1: CPT

## 2023-03-28 PROCEDURE — 6370000000 HC RX 637 (ALT 250 FOR IP): Performed by: NEUROLOGICAL SURGERY

## 2023-03-28 PROCEDURE — 6360000002 HC RX W HCPCS: Performed by: FAMILY MEDICINE

## 2023-03-28 PROCEDURE — C9113 INJ PANTOPRAZOLE SODIUM, VIA: HCPCS | Performed by: FAMILY MEDICINE

## 2023-03-28 PROCEDURE — 6370000000 HC RX 637 (ALT 250 FOR IP): Performed by: INTERNAL MEDICINE

## 2023-03-28 PROCEDURE — 6370000000 HC RX 637 (ALT 250 FOR IP): Performed by: NURSE PRACTITIONER

## 2023-03-28 PROCEDURE — 2500000003 HC RX 250 WO HCPCS: Performed by: INTERNAL MEDICINE

## 2023-03-28 PROCEDURE — 92611 MOTION FLUOROSCOPY/SWALLOW: CPT

## 2023-03-28 PROCEDURE — 1100000000 HC RM PRIVATE

## 2023-03-28 PROCEDURE — 2580000003 HC RX 258: Performed by: FAMILY MEDICINE

## 2023-03-28 PROCEDURE — 97530 THERAPEUTIC ACTIVITIES: CPT

## 2023-03-28 PROCEDURE — 80048 BASIC METABOLIC PNL TOTAL CA: CPT

## 2023-03-28 PROCEDURE — 51798 US URINE CAPACITY MEASURE: CPT

## 2023-03-28 PROCEDURE — 94640 AIRWAY INHALATION TREATMENT: CPT

## 2023-03-28 PROCEDURE — 2580000003 HC RX 258: Performed by: NEUROLOGICAL SURGERY

## 2023-03-28 PROCEDURE — 36415 COLL VENOUS BLD VENIPUNCTURE: CPT

## 2023-03-28 RX ORDER — ALBUTEROL SULFATE 2.5 MG/3ML
2.5 SOLUTION RESPIRATORY (INHALATION) EVERY 4 HOURS PRN
Status: DISCONTINUED | OUTPATIENT
Start: 2023-03-28 | End: 2023-03-30 | Stop reason: HOSPADM

## 2023-03-28 RX ORDER — TAMSULOSIN HYDROCHLORIDE 0.4 MG/1
0.4 CAPSULE ORAL DAILY
Status: DISCONTINUED | OUTPATIENT
Start: 2023-03-28 | End: 2023-03-30 | Stop reason: HOSPADM

## 2023-03-28 RX ORDER — ALBUTEROL SULFATE 2.5 MG/3ML
SOLUTION RESPIRATORY (INHALATION)
Status: COMPLETED
Start: 2023-03-28 | End: 2023-03-28

## 2023-03-28 RX ADMIN — GABAPENTIN 400 MG: 300 CAPSULE ORAL at 14:00

## 2023-03-28 RX ADMIN — METOPROLOL SUCCINATE 25 MG: 25 TABLET, EXTENDED RELEASE ORAL at 08:47

## 2023-03-28 RX ADMIN — SODIUM CHLORIDE, PRESERVATIVE FREE 40 ML: 5 INJECTION INTRAVENOUS at 08:50

## 2023-03-28 RX ADMIN — ALBUTEROL SULFATE 2.5 MG: 2.5 SOLUTION RESPIRATORY (INHALATION) at 09:18

## 2023-03-28 RX ADMIN — EMPAGLIFLOZIN 10 MG: 10 TABLET, FILM COATED ORAL at 08:48

## 2023-03-28 RX ADMIN — FOLIC ACID 1 MG: 1 TABLET ORAL at 08:48

## 2023-03-28 RX ADMIN — CARBAMAZEPINE 400 MG: 200 TABLET ORAL at 08:47

## 2023-03-28 RX ADMIN — CYCLOBENZAPRINE HYDROCHLORIDE 10 MG: 5 TABLET, FILM COATED ORAL at 22:00

## 2023-03-28 RX ADMIN — ROSUVASTATIN CALCIUM 40 MG: 20 TABLET, FILM COATED ORAL at 21:55

## 2023-03-28 RX ADMIN — SODIUM CHLORIDE 40 MG: 9 INJECTION, SOLUTION INTRAMUSCULAR; INTRAVENOUS; SUBCUTANEOUS at 06:26

## 2023-03-28 RX ADMIN — TAMSULOSIN HYDROCHLORIDE 0.4 MG: 0.4 CAPSULE ORAL at 13:59

## 2023-03-28 RX ADMIN — GABAPENTIN 400 MG: 300 CAPSULE ORAL at 08:48

## 2023-03-28 RX ADMIN — BARIUM SULFATE 30 ML: 0.81 POWDER, FOR SUSPENSION ORAL at 11:41

## 2023-03-28 RX ADMIN — BARIUM SULFATE 30 ML: 400 PASTE ORAL at 11:41

## 2023-03-28 RX ADMIN — SODIUM CHLORIDE, PRESERVATIVE FREE 5 ML: 5 INJECTION INTRAVENOUS at 23:23

## 2023-03-28 RX ADMIN — B-COMPLEX W/ C & FOLIC ACID TAB 1 TABLET: TAB at 08:48

## 2023-03-28 RX ADMIN — ASPIRIN 81 MG 81 MG: 81 TABLET ORAL at 08:47

## 2023-03-28 RX ADMIN — DOCUSATE SODIUM 100 MG: 50 LIQUID ORAL at 08:48

## 2023-03-28 RX ADMIN — BACITRACIN: 500 OINTMENT TOPICAL at 21:52

## 2023-03-28 RX ADMIN — CARBAMAZEPINE 400 MG: 200 TABLET ORAL at 21:55

## 2023-03-28 RX ADMIN — BACITRACIN: 500 OINTMENT TOPICAL at 08:49

## 2023-03-28 RX ADMIN — GABAPENTIN 400 MG: 300 CAPSULE ORAL at 21:53

## 2023-03-28 RX ADMIN — BARIUM SULFATE 15 ML: 400 SUSPENSION ORAL at 11:42

## 2023-03-28 ASSESSMENT — PAIN SCALES - GENERAL: PAINLEVEL_OUTOF10: 0

## 2023-03-28 ASSESSMENT — PAIN SCALES - WONG BAKER: WONGBAKER_NUMERICALRESPONSE: 0

## 2023-03-29 PROBLEM — R00.1 BRADYCARDIA: Status: RESOLVED | Noted: 2023-03-16 | Resolved: 2023-03-29

## 2023-03-29 PROBLEM — E87.6 HYPOKALEMIA: Status: RESOLVED | Noted: 2023-03-15 | Resolved: 2023-03-29

## 2023-03-29 LAB
ANION GAP SERPL CALC-SCNC: 4 MMOL/L (ref 2–11)
BUN SERPL-MCNC: 9 MG/DL (ref 8–23)
CALCIUM SERPL-MCNC: 8.5 MG/DL (ref 8.3–10.4)
CHLORIDE SERPL-SCNC: 110 MMOL/L (ref 101–110)
CO2 SERPL-SCNC: 22 MMOL/L (ref 21–32)
CREAT SERPL-MCNC: 0.6 MG/DL (ref 0.8–1.5)
GLUCOSE SERPL-MCNC: 95 MG/DL (ref 65–100)
POTASSIUM SERPL-SCNC: 3.7 MMOL/L (ref 3.5–5.1)
SARS-COV-2 RDRP RESP QL NAA+PROBE: NOT DETECTED
SODIUM SERPL-SCNC: 136 MMOL/L (ref 133–143)
SOURCE: NORMAL

## 2023-03-29 PROCEDURE — 36415 COLL VENOUS BLD VENIPUNCTURE: CPT

## 2023-03-29 PROCEDURE — 6370000000 HC RX 637 (ALT 250 FOR IP): Performed by: NEUROLOGICAL SURGERY

## 2023-03-29 PROCEDURE — 97112 NEUROMUSCULAR REEDUCATION: CPT

## 2023-03-29 PROCEDURE — 97530 THERAPEUTIC ACTIVITIES: CPT

## 2023-03-29 PROCEDURE — 6370000000 HC RX 637 (ALT 250 FOR IP): Performed by: FAMILY MEDICINE

## 2023-03-29 PROCEDURE — 6370000000 HC RX 637 (ALT 250 FOR IP): Performed by: NURSE PRACTITIONER

## 2023-03-29 PROCEDURE — 6360000002 HC RX W HCPCS: Performed by: FAMILY MEDICINE

## 2023-03-29 PROCEDURE — 6370000000 HC RX 637 (ALT 250 FOR IP): Performed by: INTERNAL MEDICINE

## 2023-03-29 PROCEDURE — C9113 INJ PANTOPRAZOLE SODIUM, VIA: HCPCS | Performed by: FAMILY MEDICINE

## 2023-03-29 PROCEDURE — 2580000003 HC RX 258: Performed by: NEUROLOGICAL SURGERY

## 2023-03-29 PROCEDURE — 80048 BASIC METABOLIC PNL TOTAL CA: CPT

## 2023-03-29 PROCEDURE — A4216 STERILE WATER/SALINE, 10 ML: HCPCS | Performed by: FAMILY MEDICINE

## 2023-03-29 PROCEDURE — 2580000003 HC RX 258: Performed by: FAMILY MEDICINE

## 2023-03-29 PROCEDURE — 1100000000 HC RM PRIVATE

## 2023-03-29 PROCEDURE — 6370000000 HC RX 637 (ALT 250 FOR IP): Performed by: PHYSICIAN ASSISTANT

## 2023-03-29 PROCEDURE — 97535 SELF CARE MNGMENT TRAINING: CPT

## 2023-03-29 PROCEDURE — 87635 SARS-COV-2 COVID-19 AMP PRB: CPT

## 2023-03-29 PROCEDURE — 2500000003 HC RX 250 WO HCPCS: Performed by: NURSE PRACTITIONER

## 2023-03-29 RX ORDER — PANTOPRAZOLE SODIUM 40 MG/1
40 TABLET, DELAYED RELEASE ORAL
Status: DISCONTINUED | OUTPATIENT
Start: 2023-03-30 | End: 2023-03-30 | Stop reason: HOSPADM

## 2023-03-29 RX ADMIN — APIXABAN 5 MG: 5 TABLET, FILM COATED ORAL at 22:23

## 2023-03-29 RX ADMIN — GABAPENTIN 400 MG: 300 CAPSULE ORAL at 09:11

## 2023-03-29 RX ADMIN — EMPAGLIFLOZIN 10 MG: 10 TABLET, FILM COATED ORAL at 09:11

## 2023-03-29 RX ADMIN — ASPIRIN 81 MG 81 MG: 81 TABLET ORAL at 09:12

## 2023-03-29 RX ADMIN — GABAPENTIN 400 MG: 300 CAPSULE ORAL at 22:23

## 2023-03-29 RX ADMIN — HYDROCODONE BITARTRATE AND ACETAMINOPHEN 1 TABLET: 10; 325 TABLET ORAL at 11:50

## 2023-03-29 RX ADMIN — GABAPENTIN 400 MG: 300 CAPSULE ORAL at 14:36

## 2023-03-29 RX ADMIN — CARBAMAZEPINE 400 MG: 200 TABLET ORAL at 22:23

## 2023-03-29 RX ADMIN — FOLIC ACID 1 MG: 1 TABLET ORAL at 09:11

## 2023-03-29 RX ADMIN — BACITRACIN: 500 OINTMENT TOPICAL at 22:40

## 2023-03-29 RX ADMIN — BACITRACIN: 500 OINTMENT TOPICAL at 14:38

## 2023-03-29 RX ADMIN — SODIUM CHLORIDE, PRESERVATIVE FREE 5 ML: 5 INJECTION INTRAVENOUS at 09:14

## 2023-03-29 RX ADMIN — HYDROCODONE BITARTRATE AND ACETAMINOPHEN 1 TABLET: 10; 325 TABLET ORAL at 22:30

## 2023-03-29 RX ADMIN — CARBAMAZEPINE 400 MG: 200 TABLET ORAL at 09:09

## 2023-03-29 RX ADMIN — DEXTROSE MONOHYDRATE, SODIUM CHLORIDE, AND POTASSIUM CHLORIDE: 50; 4.5; 1.49 INJECTION, SOLUTION INTRAVENOUS at 04:31

## 2023-03-29 RX ADMIN — SODIUM CHLORIDE 40 MG: 9 INJECTION, SOLUTION INTRAMUSCULAR; INTRAVENOUS; SUBCUTANEOUS at 05:27

## 2023-03-29 RX ADMIN — CYCLOBENZAPRINE HYDROCHLORIDE 10 MG: 5 TABLET, FILM COATED ORAL at 22:30

## 2023-03-29 RX ADMIN — SODIUM CHLORIDE, PRESERVATIVE FREE 5 ML: 5 INJECTION INTRAVENOUS at 22:24

## 2023-03-29 RX ADMIN — B-COMPLEX W/ C & FOLIC ACID TAB 1 TABLET: TAB at 09:11

## 2023-03-29 RX ADMIN — METOPROLOL SUCCINATE 25 MG: 25 TABLET, EXTENDED RELEASE ORAL at 09:11

## 2023-03-29 RX ADMIN — TAMSULOSIN HYDROCHLORIDE 0.4 MG: 0.4 CAPSULE ORAL at 09:12

## 2023-03-29 RX ADMIN — BACITRACIN: 500 OINTMENT TOPICAL at 09:12

## 2023-03-29 ASSESSMENT — PAIN SCALES - WONG BAKER: WONGBAKER_NUMERICALRESPONSE: 0

## 2023-03-29 ASSESSMENT — PAIN - FUNCTIONAL ASSESSMENT
PAIN_FUNCTIONAL_ASSESSMENT: PREVENTS OR INTERFERES WITH MANY ACTIVE NOT PASSIVE ACTIVITIES
PAIN_FUNCTIONAL_ASSESSMENT: PREVENTS OR INTERFERES SOME ACTIVE ACTIVITIES AND ADLS

## 2023-03-29 ASSESSMENT — PAIN SCALES - GENERAL
PAINLEVEL_OUTOF10: 5
PAINLEVEL_OUTOF10: 0
PAINLEVEL_OUTOF10: 0
PAINLEVEL_OUTOF10: 8
PAINLEVEL_OUTOF10: 0

## 2023-03-29 ASSESSMENT — PAIN DESCRIPTION - FREQUENCY: FREQUENCY: INTERMITTENT

## 2023-03-29 ASSESSMENT — PAIN DESCRIPTION - LOCATION
LOCATION: NECK;GENERALIZED
LOCATION: OTHER (COMMENT)

## 2023-03-29 ASSESSMENT — PAIN DESCRIPTION - DESCRIPTORS
DESCRIPTORS: ACHING
DESCRIPTORS: ACHING

## 2023-03-29 ASSESSMENT — PAIN DESCRIPTION - PAIN TYPE: TYPE: ACUTE PAIN

## 2023-03-29 ASSESSMENT — PAIN DESCRIPTION - ONSET: ONSET: GRADUAL

## 2023-03-29 ASSESSMENT — PAIN DESCRIPTION - ORIENTATION: ORIENTATION: ANTERIOR;POSTERIOR

## 2023-03-30 ENCOUNTER — HOSPITAL ENCOUNTER (INPATIENT)
Age: 78
LOS: 33 days | Discharge: NURSING, REHAB OR LONG-TERM CARE | DRG: 052 | End: 2023-05-02
Attending: PHYSICAL MEDICINE & REHABILITATION | Admitting: PHYSICAL MEDICINE & REHABILITATION
Payer: MEDICARE

## 2023-03-30 VITALS
BODY MASS INDEX: 30.1 KG/M2 | RESPIRATION RATE: 18 BRPM | HEART RATE: 77 BPM | WEIGHT: 198.6 LBS | OXYGEN SATURATION: 94 % | DIASTOLIC BLOOD PRESSURE: 77 MMHG | HEIGHT: 68 IN | TEMPERATURE: 97.3 F | SYSTOLIC BLOOD PRESSURE: 124 MMHG

## 2023-03-30 DIAGNOSIS — Z98.1 S/P LAMINECTOMY WITH SPINAL FUSION: Primary | ICD-10-CM

## 2023-03-30 DIAGNOSIS — Z98.890 S/P CERVICAL DISCECTOMY: ICD-10-CM

## 2023-03-30 PROBLEM — S14.109A CERVICAL SPINAL CORD INJURY, INITIAL ENCOUNTER (HCC): Status: ACTIVE | Noted: 2023-03-30

## 2023-03-30 PROBLEM — R60.0 LOCALIZED EDEMA: Status: ACTIVE | Noted: 2019-06-24

## 2023-03-30 PROBLEM — J18.9 COMMUNITY ACQUIRED PNEUMONIA: Status: RESOLVED | Noted: 2021-03-28 | Resolved: 2023-03-30

## 2023-03-30 PROCEDURE — 97112 NEUROMUSCULAR REEDUCATION: CPT

## 2023-03-30 PROCEDURE — 6370000000 HC RX 637 (ALT 250 FOR IP): Performed by: NEUROLOGICAL SURGERY

## 2023-03-30 PROCEDURE — 2580000003 HC RX 258: Performed by: NEUROLOGICAL SURGERY

## 2023-03-30 PROCEDURE — 6370000000 HC RX 637 (ALT 250 FOR IP): Performed by: NURSE PRACTITIONER

## 2023-03-30 PROCEDURE — 6370000000 HC RX 637 (ALT 250 FOR IP): Performed by: PHYSICIAN ASSISTANT

## 2023-03-30 PROCEDURE — 6370000000 HC RX 637 (ALT 250 FOR IP): Performed by: INTERNAL MEDICINE

## 2023-03-30 PROCEDURE — 97535 SELF CARE MNGMENT TRAINING: CPT

## 2023-03-30 PROCEDURE — 6370000000 HC RX 637 (ALT 250 FOR IP): Performed by: FAMILY MEDICINE

## 2023-03-30 PROCEDURE — 6370000000 HC RX 637 (ALT 250 FOR IP): Performed by: HOSPITALIST

## 2023-03-30 PROCEDURE — 1180000000 HC REHAB R&B

## 2023-03-30 PROCEDURE — 97530 THERAPEUTIC ACTIVITIES: CPT

## 2023-03-30 RX ORDER — LIDOCAINE 4 G/G
1 PATCH TOPICAL DAILY
Status: DISCONTINUED | OUTPATIENT
Start: 2023-03-31 | End: 2023-05-02 | Stop reason: HOSPADM

## 2023-03-30 RX ORDER — ASPIRIN 81 MG/1
81 TABLET, CHEWABLE ORAL DAILY
Status: CANCELLED | OUTPATIENT
Start: 2023-03-31

## 2023-03-30 RX ORDER — ALBUTEROL SULFATE 2.5 MG/3ML
2.5 SOLUTION RESPIRATORY (INHALATION) EVERY 4 HOURS PRN
Status: DISCONTINUED | OUTPATIENT
Start: 2023-03-30 | End: 2023-05-02 | Stop reason: HOSPADM

## 2023-03-30 RX ORDER — TAMSULOSIN HYDROCHLORIDE 0.4 MG/1
0.4 CAPSULE ORAL DAILY
Status: DISCONTINUED | OUTPATIENT
Start: 2023-03-31 | End: 2023-05-02 | Stop reason: HOSPADM

## 2023-03-30 RX ORDER — CYCLOBENZAPRINE HCL 10 MG
10 TABLET ORAL 3 TIMES DAILY PRN
Status: DISCONTINUED | OUTPATIENT
Start: 2023-03-30 | End: 2023-04-10

## 2023-03-30 RX ORDER — TAMSULOSIN HYDROCHLORIDE 0.4 MG/1
0.4 CAPSULE ORAL DAILY
Qty: 30 CAPSULE | Refills: 0 | Status: ON HOLD
Start: 2023-03-31 | End: 2023-04-30

## 2023-03-30 RX ORDER — ROSUVASTATIN CALCIUM 10 MG/1
40 TABLET, COATED ORAL NIGHTLY
Status: DISCONTINUED | OUTPATIENT
Start: 2023-03-30 | End: 2023-05-02 | Stop reason: HOSPADM

## 2023-03-30 RX ORDER — HYDRALAZINE HYDROCHLORIDE 50 MG/1
25 TABLET, FILM COATED ORAL EVERY 6 HOURS PRN
Status: CANCELLED | OUTPATIENT
Start: 2023-03-30

## 2023-03-30 RX ORDER — HYDROCODONE BITARTRATE AND ACETAMINOPHEN 10; 325 MG/1; MG/1
1 TABLET ORAL EVERY 6 HOURS PRN
Status: CANCELLED | OUTPATIENT
Start: 2023-03-30

## 2023-03-30 RX ORDER — CYCLOBENZAPRINE HCL 5 MG
10 TABLET ORAL 3 TIMES DAILY PRN
Status: CANCELLED | OUTPATIENT
Start: 2023-03-30

## 2023-03-30 RX ORDER — ONDANSETRON 4 MG/1
4 TABLET, ORALLY DISINTEGRATING ORAL EVERY 8 HOURS PRN
Status: CANCELLED | OUTPATIENT
Start: 2023-03-30

## 2023-03-30 RX ORDER — DOCUSATE SODIUM 100 MG/1
100 CAPSULE, LIQUID FILLED ORAL 2 TIMES DAILY PRN
Status: DISCONTINUED | OUTPATIENT
Start: 2023-03-30 | End: 2023-05-02 | Stop reason: HOSPADM

## 2023-03-30 RX ORDER — GINSENG 100 MG
CAPSULE ORAL 3 TIMES DAILY PRN
Status: DISCONTINUED | OUTPATIENT
Start: 2023-03-30 | End: 2023-05-02 | Stop reason: HOSPADM

## 2023-03-30 RX ORDER — PANTOPRAZOLE SODIUM 40 MG/1
40 TABLET, DELAYED RELEASE ORAL
Status: DISCONTINUED | OUTPATIENT
Start: 2023-03-31 | End: 2023-05-02 | Stop reason: HOSPADM

## 2023-03-30 RX ORDER — GINSENG 100 MG
CAPSULE ORAL
Qty: 2 G | Refills: 0 | Status: ON HOLD
Start: 2023-03-30 | End: 2023-04-09

## 2023-03-30 RX ORDER — CARBOXYMETHYLCELLULOSE SODIUM 10 MG/ML
1 GEL OPHTHALMIC PRN
Status: DISCONTINUED | OUTPATIENT
Start: 2023-03-30 | End: 2023-05-02 | Stop reason: HOSPADM

## 2023-03-30 RX ORDER — BISACODYL 10 MG
10 SUPPOSITORY, RECTAL RECTAL DAILY PRN
Status: DISCONTINUED | OUTPATIENT
Start: 2023-03-30 | End: 2023-05-02 | Stop reason: HOSPADM

## 2023-03-30 RX ORDER — POLYETHYLENE GLYCOL 3350 17 G/17G
17 POWDER, FOR SOLUTION ORAL DAILY PRN
Qty: 527 G | Refills: 0 | Status: ON HOLD
Start: 2023-03-30 | End: 2023-04-30

## 2023-03-30 RX ORDER — FUROSEMIDE 40 MG/1
40 TABLET ORAL ONCE
Status: COMPLETED | OUTPATIENT
Start: 2023-03-30 | End: 2023-03-30

## 2023-03-30 RX ORDER — PANTOPRAZOLE SODIUM 40 MG/1
40 TABLET, DELAYED RELEASE ORAL
Status: CANCELLED | OUTPATIENT
Start: 2023-03-31

## 2023-03-30 RX ORDER — FOLIC ACID 1 MG/1
1 TABLET ORAL DAILY
Status: CANCELLED | OUTPATIENT
Start: 2023-03-31

## 2023-03-30 RX ORDER — DOCUSATE SODIUM 100 MG/1
100 CAPSULE, LIQUID FILLED ORAL 2 TIMES DAILY PRN
Status: CANCELLED | OUTPATIENT
Start: 2023-03-30

## 2023-03-30 RX ORDER — CARBAMAZEPINE 200 MG/1
400 TABLET ORAL 2 TIMES DAILY
Status: CANCELLED | OUTPATIENT
Start: 2023-03-30

## 2023-03-30 RX ORDER — ALBUTEROL SULFATE 2.5 MG/3ML
2.5 SOLUTION RESPIRATORY (INHALATION) EVERY 4 HOURS PRN
Status: CANCELLED | OUTPATIENT
Start: 2023-03-30

## 2023-03-30 RX ORDER — ACETAMINOPHEN 650 MG/1
650 SUPPOSITORY RECTAL EVERY 6 HOURS PRN
Status: DISCONTINUED | OUTPATIENT
Start: 2023-03-30 | End: 2023-05-02 | Stop reason: HOSPADM

## 2023-03-30 RX ORDER — CARBOXYMETHYLCELLULOSE SODIUM 10 MG/ML
1 GEL OPHTHALMIC PRN
Status: CANCELLED | OUTPATIENT
Start: 2023-03-30

## 2023-03-30 RX ORDER — FLUTICASONE PROPIONATE 50 MCG
1 SPRAY, SUSPENSION (ML) NASAL DAILY PRN
Status: CANCELLED | OUTPATIENT
Start: 2023-03-30

## 2023-03-30 RX ORDER — HYDROCODONE BITARTRATE AND ACETAMINOPHEN 10; 325 MG/1; MG/1
1 TABLET ORAL EVERY 6 HOURS PRN
Status: DISCONTINUED | OUTPATIENT
Start: 2023-03-30 | End: 2023-05-02 | Stop reason: HOSPADM

## 2023-03-30 RX ORDER — ACETAMINOPHEN 650 MG/1
650 SUPPOSITORY RECTAL EVERY 6 HOURS PRN
Status: CANCELLED | OUTPATIENT
Start: 2023-03-30

## 2023-03-30 RX ORDER — TAMSULOSIN HYDROCHLORIDE 0.4 MG/1
0.4 CAPSULE ORAL DAILY
Status: CANCELLED | OUTPATIENT
Start: 2023-03-31

## 2023-03-30 RX ORDER — HYDRALAZINE HYDROCHLORIDE 50 MG/1
25 TABLET, FILM COATED ORAL EVERY 6 HOURS PRN
Status: DISCONTINUED | OUTPATIENT
Start: 2023-03-30 | End: 2023-05-02 | Stop reason: HOSPADM

## 2023-03-30 RX ORDER — ACETAMINOPHEN 325 MG/1
650 TABLET ORAL EVERY 6 HOURS PRN
Status: DISCONTINUED | OUTPATIENT
Start: 2023-03-30 | End: 2023-05-02 | Stop reason: HOSPADM

## 2023-03-30 RX ORDER — FOLIC ACID 1 MG/1
1 TABLET ORAL DAILY
Status: DISCONTINUED | OUTPATIENT
Start: 2023-03-31 | End: 2023-05-02 | Stop reason: HOSPADM

## 2023-03-30 RX ORDER — ASPIRIN 81 MG/1
81 TABLET, CHEWABLE ORAL DAILY
Status: DISCONTINUED | OUTPATIENT
Start: 2023-03-31 | End: 2023-05-02 | Stop reason: HOSPADM

## 2023-03-30 RX ORDER — GINSENG 100 MG
CAPSULE ORAL 3 TIMES DAILY
Status: CANCELLED | OUTPATIENT
Start: 2023-03-30

## 2023-03-30 RX ORDER — FLUTICASONE PROPIONATE 50 MCG
1 SPRAY, SUSPENSION (ML) NASAL DAILY PRN
Status: DISCONTINUED | OUTPATIENT
Start: 2023-03-30 | End: 2023-05-02 | Stop reason: HOSPADM

## 2023-03-30 RX ORDER — CARBAMAZEPINE 200 MG/1
400 TABLET ORAL 2 TIMES DAILY
Status: DISCONTINUED | OUTPATIENT
Start: 2023-03-30 | End: 2023-05-02 | Stop reason: HOSPADM

## 2023-03-30 RX ORDER — METOPROLOL SUCCINATE 25 MG/1
25 TABLET, EXTENDED RELEASE ORAL DAILY
Status: DISCONTINUED | OUTPATIENT
Start: 2023-03-31 | End: 2023-05-02 | Stop reason: HOSPADM

## 2023-03-30 RX ORDER — ACETAMINOPHEN 325 MG/1
650 TABLET ORAL EVERY 6 HOURS PRN
Status: CANCELLED | OUTPATIENT
Start: 2023-03-30

## 2023-03-30 RX ORDER — METOPROLOL SUCCINATE 25 MG/1
25 TABLET, EXTENDED RELEASE ORAL DAILY
Status: CANCELLED | OUTPATIENT
Start: 2023-03-31

## 2023-03-30 RX ORDER — ROSUVASTATIN CALCIUM 20 MG/1
40 TABLET, COATED ORAL NIGHTLY
Status: CANCELLED | OUTPATIENT
Start: 2023-03-30

## 2023-03-30 RX ORDER — ONDANSETRON 4 MG/1
4 TABLET, ORALLY DISINTEGRATING ORAL EVERY 8 HOURS PRN
Status: DISCONTINUED | OUTPATIENT
Start: 2023-03-30 | End: 2023-05-02 | Stop reason: HOSPADM

## 2023-03-30 RX ORDER — MULTIVITAMIN WITH IRON
1 TABLET ORAL DAILY
Status: DISCONTINUED | OUTPATIENT
Start: 2023-03-31 | End: 2023-05-02 | Stop reason: HOSPADM

## 2023-03-30 RX ORDER — FOLIC ACID 1 MG/1
1 TABLET ORAL DAILY
Qty: 30 TABLET | Refills: 0 | Status: ON HOLD
Start: 2023-03-31 | End: 2023-04-30

## 2023-03-30 RX ORDER — BISACODYL 10 MG
10 SUPPOSITORY, RECTAL RECTAL DAILY PRN
Status: CANCELLED | OUTPATIENT
Start: 2023-03-30

## 2023-03-30 RX ORDER — MULTIVITAMIN WITH IRON
1 TABLET ORAL DAILY
Status: CANCELLED | OUTPATIENT
Start: 2023-03-31

## 2023-03-30 RX ORDER — POLYETHYLENE GLYCOL 3350 17 G/17G
17 POWDER, FOR SOLUTION ORAL DAILY
Status: DISCONTINUED | OUTPATIENT
Start: 2023-03-30 | End: 2023-03-30 | Stop reason: HOSPADM

## 2023-03-30 RX ORDER — POLYETHYLENE GLYCOL 3350 17 G/17G
17 POWDER, FOR SOLUTION ORAL DAILY
Status: CANCELLED | OUTPATIENT
Start: 2023-03-31

## 2023-03-30 RX ORDER — POLYETHYLENE GLYCOL 3350 17 G/17G
17 POWDER, FOR SOLUTION ORAL DAILY
Status: DISCONTINUED | OUTPATIENT
Start: 2023-03-31 | End: 2023-05-02 | Stop reason: HOSPADM

## 2023-03-30 RX ORDER — LIDOCAINE 4 G/G
1 PATCH TOPICAL DAILY
Status: CANCELLED | OUTPATIENT
Start: 2023-03-31

## 2023-03-30 RX ORDER — METOPROLOL SUCCINATE 25 MG/1
25 TABLET, EXTENDED RELEASE ORAL DAILY
Qty: 30 TABLET | Refills: 0 | Status: ON HOLD
Start: 2023-03-31

## 2023-03-30 RX ADMIN — POLYETHYLENE GLYCOL 3350 17 G: 17 POWDER, FOR SOLUTION ORAL at 14:36

## 2023-03-30 RX ADMIN — HYDROCODONE BITARTRATE AND ACETAMINOPHEN 1 TABLET: 10; 325 TABLET ORAL at 16:24

## 2023-03-30 RX ADMIN — EMPAGLIFLOZIN 10 MG: 10 TABLET, FILM COATED ORAL at 09:57

## 2023-03-30 RX ADMIN — TAMSULOSIN HYDROCHLORIDE 0.4 MG: 0.4 CAPSULE ORAL at 09:56

## 2023-03-30 RX ADMIN — FUROSEMIDE 40 MG: 40 TABLET ORAL at 16:23

## 2023-03-30 RX ADMIN — GABAPENTIN 400 MG: 300 CAPSULE ORAL at 09:56

## 2023-03-30 RX ADMIN — APIXABAN 5 MG: 5 TABLET, FILM COATED ORAL at 09:57

## 2023-03-30 RX ADMIN — PANTOPRAZOLE SODIUM 40 MG: 40 TABLET, DELAYED RELEASE ORAL at 05:38

## 2023-03-30 RX ADMIN — CARBAMAZEPINE 400 MG: 200 TABLET ORAL at 21:05

## 2023-03-30 RX ADMIN — CYCLOBENZAPRINE 10 MG: 10 TABLET, FILM COATED ORAL at 16:23

## 2023-03-30 RX ADMIN — FOLIC ACID 1 MG: 1 TABLET ORAL at 09:57

## 2023-03-30 RX ADMIN — HYDROCODONE BITARTRATE AND ACETAMINOPHEN 1 TABLET: 10; 325 TABLET ORAL at 21:06

## 2023-03-30 RX ADMIN — GABAPENTIN 400 MG: 300 CAPSULE ORAL at 21:04

## 2023-03-30 RX ADMIN — CARBAMAZEPINE 400 MG: 200 TABLET ORAL at 09:56

## 2023-03-30 RX ADMIN — HYDROCODONE BITARTRATE AND ACETAMINOPHEN 1 TABLET: 10; 325 TABLET ORAL at 09:56

## 2023-03-30 RX ADMIN — B-COMPLEX W/ C & FOLIC ACID TAB 1 TABLET: TAB at 09:57

## 2023-03-30 RX ADMIN — CYCLOBENZAPRINE HYDROCHLORIDE 10 MG: 5 TABLET, FILM COATED ORAL at 06:27

## 2023-03-30 RX ADMIN — BACITRACIN: 500 OINTMENT TOPICAL at 09:00

## 2023-03-30 RX ADMIN — GABAPENTIN 400 MG: 300 CAPSULE ORAL at 14:35

## 2023-03-30 RX ADMIN — BACITRACIN: 500 OINTMENT TOPICAL at 14:39

## 2023-03-30 RX ADMIN — APIXABAN 5 MG: 5 TABLET, FILM COATED ORAL at 21:05

## 2023-03-30 RX ADMIN — METOPROLOL SUCCINATE 25 MG: 25 TABLET, EXTENDED RELEASE ORAL at 09:57

## 2023-03-30 RX ADMIN — SODIUM CHLORIDE, PRESERVATIVE FREE 10 ML: 5 INJECTION INTRAVENOUS at 09:58

## 2023-03-30 RX ADMIN — ASPIRIN 81 MG 81 MG: 81 TABLET ORAL at 09:57

## 2023-03-30 ASSESSMENT — PAIN DESCRIPTION - FREQUENCY: FREQUENCY: INTERMITTENT

## 2023-03-30 ASSESSMENT — PAIN SCALES - GENERAL
PAINLEVEL_OUTOF10: 0
PAINLEVEL_OUTOF10: 7
PAINLEVEL_OUTOF10: 7
PAINLEVEL_OUTOF10: 0

## 2023-03-30 ASSESSMENT — PAIN SCALES - WONG BAKER: WONGBAKER_NUMERICALRESPONSE: 0

## 2023-03-30 ASSESSMENT — PAIN DESCRIPTION - LOCATION: LOCATION: GENERALIZED

## 2023-03-30 ASSESSMENT — PAIN DESCRIPTION - PAIN TYPE: TYPE: ACUTE PAIN

## 2023-03-30 ASSESSMENT — PAIN DESCRIPTION - DESCRIPTORS: DESCRIPTORS: ACHING

## 2023-03-30 ASSESSMENT — PAIN DESCRIPTION - ONSET: ONSET: GRADUAL

## 2023-03-31 PROCEDURE — 6370000000 HC RX 637 (ALT 250 FOR IP): Performed by: PHYSICIAN ASSISTANT

## 2023-03-31 PROCEDURE — 51798 US URINE CAPACITY MEASURE: CPT

## 2023-03-31 PROCEDURE — 97535 SELF CARE MNGMENT TRAINING: CPT

## 2023-03-31 PROCEDURE — 97167 OT EVAL HIGH COMPLEX 60 MIN: CPT

## 2023-03-31 PROCEDURE — 1180000000 HC REHAB R&B

## 2023-03-31 PROCEDURE — 51701 INSERT BLADDER CATHETER: CPT

## 2023-03-31 PROCEDURE — 97163 PT EVAL HIGH COMPLEX 45 MIN: CPT

## 2023-03-31 PROCEDURE — 97542 WHEELCHAIR MNGMENT TRAINING: CPT

## 2023-03-31 PROCEDURE — 97530 THERAPEUTIC ACTIVITIES: CPT

## 2023-03-31 PROCEDURE — 92610 EVALUATE SWALLOWING FUNCTION: CPT

## 2023-03-31 RX ADMIN — GABAPENTIN 400 MG: 300 CAPSULE ORAL at 14:35

## 2023-03-31 RX ADMIN — HYDROCODONE BITARTRATE AND ACETAMINOPHEN 1 TABLET: 10; 325 TABLET ORAL at 08:44

## 2023-03-31 RX ADMIN — ASPIRIN 81 MG 81 MG: 81 TABLET ORAL at 08:47

## 2023-03-31 RX ADMIN — METOPROLOL SUCCINATE 25 MG: 25 TABLET, EXTENDED RELEASE ORAL at 08:47

## 2023-03-31 RX ADMIN — PANTOPRAZOLE SODIUM 40 MG: 40 TABLET, DELAYED RELEASE ORAL at 05:06

## 2023-03-31 RX ADMIN — GABAPENTIN 400 MG: 300 CAPSULE ORAL at 21:06

## 2023-03-31 RX ADMIN — B-COMPLEX W/ C & FOLIC ACID TAB 1 TABLET: TAB at 08:47

## 2023-03-31 RX ADMIN — APIXABAN 5 MG: 5 TABLET, FILM COATED ORAL at 08:47

## 2023-03-31 RX ADMIN — CYCLOBENZAPRINE 10 MG: 10 TABLET, FILM COATED ORAL at 22:47

## 2023-03-31 RX ADMIN — TAMSULOSIN HYDROCHLORIDE 0.4 MG: 0.4 CAPSULE ORAL at 08:48

## 2023-03-31 RX ADMIN — GABAPENTIN 400 MG: 300 CAPSULE ORAL at 08:44

## 2023-03-31 RX ADMIN — CARBAMAZEPINE 400 MG: 200 TABLET ORAL at 21:06

## 2023-03-31 RX ADMIN — APIXABAN 5 MG: 5 TABLET, FILM COATED ORAL at 21:07

## 2023-03-31 RX ADMIN — CARBAMAZEPINE 400 MG: 200 TABLET ORAL at 08:48

## 2023-03-31 RX ADMIN — POLYETHYLENE GLYCOL 3350 17 G: 17 POWDER, FOR SOLUTION ORAL at 08:48

## 2023-03-31 RX ADMIN — EMPAGLIFLOZIN 10 MG: 10 TABLET, FILM COATED ORAL at 08:47

## 2023-03-31 RX ADMIN — HYDROCODONE BITARTRATE AND ACETAMINOPHEN 1 TABLET: 10; 325 TABLET ORAL at 21:07

## 2023-03-31 RX ADMIN — FOLIC ACID 1 MG: 1 TABLET ORAL at 08:46

## 2023-03-31 RX ADMIN — CYCLOBENZAPRINE 10 MG: 10 TABLET, FILM COATED ORAL at 06:35

## 2023-03-31 ASSESSMENT — PAIN SCALES - GENERAL
PAINLEVEL_OUTOF10: 3
PAINLEVEL_OUTOF10: 6
PAINLEVEL_OUTOF10: 2
PAINLEVEL_OUTOF10: 2

## 2023-03-31 ASSESSMENT — PAIN DESCRIPTION - DESCRIPTORS
DESCRIPTORS: ACHING
DESCRIPTORS: ACHING

## 2023-03-31 ASSESSMENT — PAIN SCALES - WONG BAKER: WONGBAKER_NUMERICALRESPONSE: 0

## 2023-03-31 ASSESSMENT — PAIN DESCRIPTION - LOCATION
LOCATION: HEAD;NECK
LOCATION: OTHER (COMMENT)

## 2023-04-01 PROCEDURE — 2500000003 HC RX 250 WO HCPCS: Performed by: PHYSICAL MEDICINE & REHABILITATION

## 2023-04-01 PROCEDURE — 99232 SBSQ HOSP IP/OBS MODERATE 35: CPT | Performed by: PHYSICAL MEDICINE & REHABILITATION

## 2023-04-01 PROCEDURE — 1180000000 HC REHAB R&B

## 2023-04-01 PROCEDURE — 97535 SELF CARE MNGMENT TRAINING: CPT

## 2023-04-01 PROCEDURE — 6370000000 HC RX 637 (ALT 250 FOR IP): Performed by: PHYSICIAN ASSISTANT

## 2023-04-01 PROCEDURE — 97530 THERAPEUTIC ACTIVITIES: CPT

## 2023-04-01 PROCEDURE — 97542 WHEELCHAIR MNGMENT TRAINING: CPT

## 2023-04-01 RX ADMIN — POLYETHYLENE GLYCOL 3350 17 G: 17 POWDER, FOR SOLUTION ORAL at 08:36

## 2023-04-01 RX ADMIN — ASPIRIN 81 MG 81 MG: 81 TABLET ORAL at 08:33

## 2023-04-01 RX ADMIN — HYDROCODONE BITARTRATE AND ACETAMINOPHEN 1 TABLET: 10; 325 TABLET ORAL at 21:19

## 2023-04-01 RX ADMIN — APIXABAN 5 MG: 5 TABLET, FILM COATED ORAL at 21:20

## 2023-04-01 RX ADMIN — METOPROLOL SUCCINATE 25 MG: 25 TABLET, EXTENDED RELEASE ORAL at 08:34

## 2023-04-01 RX ADMIN — CARBAMAZEPINE 400 MG: 200 TABLET ORAL at 21:19

## 2023-04-01 RX ADMIN — TAMSULOSIN HYDROCHLORIDE 0.4 MG: 0.4 CAPSULE ORAL at 08:36

## 2023-04-01 RX ADMIN — PANTOPRAZOLE SODIUM 40 MG: 40 TABLET, DELAYED RELEASE ORAL at 05:06

## 2023-04-01 RX ADMIN — DOCUSATE SODIUM 100 MG: 100 CAPSULE, LIQUID FILLED ORAL at 21:19

## 2023-04-01 RX ADMIN — B-COMPLEX W/ C & FOLIC ACID TAB 1 TABLET: TAB at 08:36

## 2023-04-01 RX ADMIN — ROSUVASTATIN CALCIUM 40 MG: 10 TABLET, FILM COATED ORAL at 21:18

## 2023-04-01 RX ADMIN — ANTI-FUNGAL POWDER MICONAZOLE NITRATE TALC FREE: 1.42 POWDER TOPICAL at 21:20

## 2023-04-01 RX ADMIN — GABAPENTIN 400 MG: 300 CAPSULE ORAL at 08:34

## 2023-04-01 RX ADMIN — ANTI-FUNGAL POWDER MICONAZOLE NITRATE TALC FREE: 1.42 POWDER TOPICAL at 14:47

## 2023-04-01 RX ADMIN — CYCLOBENZAPRINE 10 MG: 10 TABLET, FILM COATED ORAL at 08:40

## 2023-04-01 RX ADMIN — FOLIC ACID 1 MG: 1 TABLET ORAL at 08:36

## 2023-04-01 RX ADMIN — GABAPENTIN 400 MG: 300 CAPSULE ORAL at 21:19

## 2023-04-01 RX ADMIN — CYCLOBENZAPRINE 10 MG: 10 TABLET, FILM COATED ORAL at 21:19

## 2023-04-01 RX ADMIN — EMPAGLIFLOZIN 10 MG: 10 TABLET, FILM COATED ORAL at 08:35

## 2023-04-01 RX ADMIN — GABAPENTIN 400 MG: 300 CAPSULE ORAL at 14:46

## 2023-04-01 RX ADMIN — APIXABAN 5 MG: 5 TABLET, FILM COATED ORAL at 08:34

## 2023-04-01 RX ADMIN — CARBAMAZEPINE 400 MG: 200 TABLET ORAL at 08:36

## 2023-04-01 ASSESSMENT — PAIN SCALES - GENERAL
PAINLEVEL_OUTOF10: 7
PAINLEVEL_OUTOF10: 5
PAINLEVEL_OUTOF10: 0

## 2023-04-01 ASSESSMENT — PAIN DESCRIPTION - DESCRIPTORS: DESCRIPTORS: ACHING

## 2023-04-01 ASSESSMENT — PAIN DESCRIPTION - LOCATION: LOCATION: NECK

## 2023-04-02 PROCEDURE — 6370000000 HC RX 637 (ALT 250 FOR IP): Performed by: PHYSICIAN ASSISTANT

## 2023-04-02 PROCEDURE — 1180000000 HC REHAB R&B

## 2023-04-02 PROCEDURE — 99232 SBSQ HOSP IP/OBS MODERATE 35: CPT | Performed by: PHYSICAL MEDICINE & REHABILITATION

## 2023-04-02 PROCEDURE — 2500000003 HC RX 250 WO HCPCS: Performed by: PHYSICAL MEDICINE & REHABILITATION

## 2023-04-02 RX ADMIN — POLYETHYLENE GLYCOL 3350 17 G: 17 POWDER, FOR SOLUTION ORAL at 08:09

## 2023-04-02 RX ADMIN — ANTI-FUNGAL POWDER MICONAZOLE NITRATE TALC FREE: 1.42 POWDER TOPICAL at 21:06

## 2023-04-02 RX ADMIN — PANTOPRAZOLE SODIUM 40 MG: 40 TABLET, DELAYED RELEASE ORAL at 05:39

## 2023-04-02 RX ADMIN — APIXABAN 5 MG: 5 TABLET, FILM COATED ORAL at 20:13

## 2023-04-02 RX ADMIN — ANTI-FUNGAL POWDER MICONAZOLE NITRATE TALC FREE: 1.42 POWDER TOPICAL at 08:17

## 2023-04-02 RX ADMIN — METOPROLOL SUCCINATE 25 MG: 25 TABLET, EXTENDED RELEASE ORAL at 08:09

## 2023-04-02 RX ADMIN — GABAPENTIN 400 MG: 300 CAPSULE ORAL at 14:36

## 2023-04-02 RX ADMIN — GABAPENTIN 400 MG: 300 CAPSULE ORAL at 08:08

## 2023-04-02 RX ADMIN — B-COMPLEX W/ C & FOLIC ACID TAB 1 TABLET: TAB at 08:09

## 2023-04-02 RX ADMIN — CARBAMAZEPINE 400 MG: 200 TABLET ORAL at 08:09

## 2023-04-02 RX ADMIN — CYCLOBENZAPRINE 10 MG: 10 TABLET, FILM COATED ORAL at 20:21

## 2023-04-02 RX ADMIN — EMPAGLIFLOZIN 10 MG: 10 TABLET, FILM COATED ORAL at 08:09

## 2023-04-02 RX ADMIN — ASPIRIN 81 MG 81 MG: 81 TABLET ORAL at 08:08

## 2023-04-02 RX ADMIN — CARBAMAZEPINE 400 MG: 200 TABLET ORAL at 20:14

## 2023-04-02 RX ADMIN — HYDROCODONE BITARTRATE AND ACETAMINOPHEN 1 TABLET: 10; 325 TABLET ORAL at 20:22

## 2023-04-02 RX ADMIN — TAMSULOSIN HYDROCHLORIDE 0.4 MG: 0.4 CAPSULE ORAL at 08:09

## 2023-04-02 RX ADMIN — FOLIC ACID 1 MG: 1 TABLET ORAL at 08:09

## 2023-04-02 RX ADMIN — APIXABAN 5 MG: 5 TABLET, FILM COATED ORAL at 08:09

## 2023-04-02 RX ADMIN — GABAPENTIN 400 MG: 300 CAPSULE ORAL at 20:15

## 2023-04-02 ASSESSMENT — PAIN SCALES - GENERAL
PAINLEVEL_OUTOF10: 0
PAINLEVEL_OUTOF10: 6
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
## 2023-04-03 LAB
ANION GAP SERPL CALC-SCNC: 3 MMOL/L (ref 2–11)
BUN SERPL-MCNC: 11 MG/DL (ref 8–23)
CALCIUM SERPL-MCNC: 8.3 MG/DL (ref 8.3–10.4)
CHLORIDE SERPL-SCNC: 105 MMOL/L (ref 101–110)
CO2 SERPL-SCNC: 27 MMOL/L (ref 21–32)
CREAT SERPL-MCNC: 0.6 MG/DL (ref 0.8–1.5)
ERYTHROCYTE [DISTWIDTH] IN BLOOD BY AUTOMATED COUNT: 15.6 % (ref 11.9–14.6)
GLUCOSE SERPL-MCNC: 70 MG/DL (ref 65–100)
HCT VFR BLD AUTO: 30.7 % (ref 41.1–50.3)
HGB BLD-MCNC: 10.4 G/DL (ref 13.6–17.2)
MCH RBC QN AUTO: 35.7 PG (ref 26.1–32.9)
MCHC RBC AUTO-ENTMCNC: 33.9 G/DL (ref 31.4–35)
MCV RBC AUTO: 105.5 FL (ref 82–102)
NRBC # BLD: 0 K/UL (ref 0–0.2)
PLATELET # BLD AUTO: 301 K/UL (ref 150–450)
PMV BLD AUTO: 9.5 FL (ref 9.4–12.3)
POTASSIUM SERPL-SCNC: 4.2 MMOL/L (ref 3.5–5.1)
RBC # BLD AUTO: 2.91 M/UL (ref 4.23–5.6)
SODIUM SERPL-SCNC: 135 MMOL/L (ref 133–143)
WBC # BLD AUTO: 7.8 K/UL (ref 4.3–11.1)

## 2023-04-03 PROCEDURE — 97110 THERAPEUTIC EXERCISES: CPT

## 2023-04-03 PROCEDURE — 6370000000 HC RX 637 (ALT 250 FOR IP): Performed by: PHYSICAL MEDICINE & REHABILITATION

## 2023-04-03 PROCEDURE — 80048 BASIC METABOLIC PNL TOTAL CA: CPT

## 2023-04-03 PROCEDURE — 97530 THERAPEUTIC ACTIVITIES: CPT

## 2023-04-03 PROCEDURE — 92526 ORAL FUNCTION THERAPY: CPT

## 2023-04-03 PROCEDURE — 6370000000 HC RX 637 (ALT 250 FOR IP): Performed by: PHYSICIAN ASSISTANT

## 2023-04-03 PROCEDURE — 85027 COMPLETE CBC AUTOMATED: CPT

## 2023-04-03 PROCEDURE — 36415 COLL VENOUS BLD VENIPUNCTURE: CPT

## 2023-04-03 PROCEDURE — 97535 SELF CARE MNGMENT TRAINING: CPT

## 2023-04-03 PROCEDURE — 1180000000 HC REHAB R&B

## 2023-04-03 PROCEDURE — 97112 NEUROMUSCULAR REEDUCATION: CPT

## 2023-04-03 RX ORDER — POTASSIUM CHLORIDE 20 MEQ/1
20 TABLET, EXTENDED RELEASE ORAL 2 TIMES DAILY WITH MEALS
Status: DISCONTINUED | OUTPATIENT
Start: 2023-04-03 | End: 2023-04-11

## 2023-04-03 RX ORDER — FUROSEMIDE 40 MG/1
40 TABLET ORAL 2 TIMES DAILY
Status: DISCONTINUED | OUTPATIENT
Start: 2023-04-03 | End: 2023-04-11

## 2023-04-03 RX ADMIN — EMPAGLIFLOZIN 10 MG: 10 TABLET, FILM COATED ORAL at 08:37

## 2023-04-03 RX ADMIN — B-COMPLEX W/ C & FOLIC ACID TAB 1 TABLET: TAB at 08:36

## 2023-04-03 RX ADMIN — ANTI-FUNGAL POWDER MICONAZOLE NITRATE TALC FREE: 1.42 POWDER TOPICAL at 08:49

## 2023-04-03 RX ADMIN — FOLIC ACID 1 MG: 1 TABLET ORAL at 08:37

## 2023-04-03 RX ADMIN — CARBAMAZEPINE 400 MG: 200 TABLET ORAL at 20:39

## 2023-04-03 RX ADMIN — GABAPENTIN 400 MG: 300 CAPSULE ORAL at 20:39

## 2023-04-03 RX ADMIN — FUROSEMIDE 40 MG: 40 TABLET ORAL at 18:08

## 2023-04-03 RX ADMIN — HYDROCODONE BITARTRATE AND ACETAMINOPHEN 1 TABLET: 10; 325 TABLET ORAL at 20:38

## 2023-04-03 RX ADMIN — CARBAMAZEPINE 400 MG: 200 TABLET ORAL at 08:37

## 2023-04-03 RX ADMIN — CYCLOBENZAPRINE 10 MG: 10 TABLET, FILM COATED ORAL at 20:38

## 2023-04-03 RX ADMIN — POTASSIUM CHLORIDE 20 MEQ: 20 TABLET, EXTENDED RELEASE ORAL at 18:08

## 2023-04-03 RX ADMIN — POLYETHYLENE GLYCOL 3350 17 G: 17 POWDER, FOR SOLUTION ORAL at 08:37

## 2023-04-03 RX ADMIN — TAMSULOSIN HYDROCHLORIDE 0.4 MG: 0.4 CAPSULE ORAL at 08:36

## 2023-04-03 RX ADMIN — APIXABAN 5 MG: 5 TABLET, FILM COATED ORAL at 20:38

## 2023-04-03 RX ADMIN — ASPIRIN 81 MG 81 MG: 81 TABLET ORAL at 08:36

## 2023-04-03 RX ADMIN — APIXABAN 5 MG: 5 TABLET, FILM COATED ORAL at 08:37

## 2023-04-03 RX ADMIN — GABAPENTIN 400 MG: 300 CAPSULE ORAL at 15:43

## 2023-04-03 RX ADMIN — PANTOPRAZOLE SODIUM 40 MG: 40 TABLET, DELAYED RELEASE ORAL at 05:30

## 2023-04-03 RX ADMIN — ANTI-FUNGAL POWDER MICONAZOLE NITRATE TALC FREE: 1.42 POWDER TOPICAL at 20:40

## 2023-04-03 RX ADMIN — GABAPENTIN 400 MG: 300 CAPSULE ORAL at 08:36

## 2023-04-03 RX ADMIN — METOPROLOL SUCCINATE 25 MG: 25 TABLET, EXTENDED RELEASE ORAL at 08:37

## 2023-04-03 ASSESSMENT — PAIN DESCRIPTION - PAIN TYPE: TYPE: ACUTE PAIN;SURGICAL PAIN

## 2023-04-03 ASSESSMENT — PAIN DESCRIPTION - DESCRIPTORS
DESCRIPTORS: ACHING;SORE
DESCRIPTORS: ACHING;SORE

## 2023-04-03 ASSESSMENT — PAIN SCALES - GENERAL
PAINLEVEL_OUTOF10: 5
PAINLEVEL_OUTOF10: 4
PAINLEVEL_OUTOF10: 7

## 2023-04-03 ASSESSMENT — PAIN DESCRIPTION - FREQUENCY
FREQUENCY: CONTINUOUS
FREQUENCY: CONTINUOUS

## 2023-04-03 ASSESSMENT — PAIN DESCRIPTION - ONSET
ONSET: ON-GOING
ONSET: ON-GOING

## 2023-04-03 ASSESSMENT — PAIN DESCRIPTION - LOCATION
LOCATION: GENERALIZED
LOCATION: GENERALIZED

## 2023-04-04 PROCEDURE — 97110 THERAPEUTIC EXERCISES: CPT

## 2023-04-04 PROCEDURE — 6370000000 HC RX 637 (ALT 250 FOR IP): Performed by: PHYSICIAN ASSISTANT

## 2023-04-04 PROCEDURE — 97535 SELF CARE MNGMENT TRAINING: CPT

## 2023-04-04 PROCEDURE — 97112 NEUROMUSCULAR REEDUCATION: CPT

## 2023-04-04 PROCEDURE — 97542 WHEELCHAIR MNGMENT TRAINING: CPT

## 2023-04-04 PROCEDURE — 6370000000 HC RX 637 (ALT 250 FOR IP): Performed by: PHYSICAL MEDICINE & REHABILITATION

## 2023-04-04 PROCEDURE — 92526 ORAL FUNCTION THERAPY: CPT

## 2023-04-04 PROCEDURE — 1180000000 HC REHAB R&B

## 2023-04-04 PROCEDURE — 97530 THERAPEUTIC ACTIVITIES: CPT

## 2023-04-04 RX ADMIN — ASPIRIN 81 MG 81 MG: 81 TABLET ORAL at 08:04

## 2023-04-04 RX ADMIN — FOLIC ACID 1 MG: 1 TABLET ORAL at 08:04

## 2023-04-04 RX ADMIN — POLYETHYLENE GLYCOL 3350 17 G: 17 POWDER, FOR SOLUTION ORAL at 08:04

## 2023-04-04 RX ADMIN — APIXABAN 5 MG: 5 TABLET, FILM COATED ORAL at 20:52

## 2023-04-04 RX ADMIN — HYDROCODONE BITARTRATE AND ACETAMINOPHEN 1 TABLET: 10; 325 TABLET ORAL at 04:56

## 2023-04-04 RX ADMIN — FUROSEMIDE 40 MG: 40 TABLET ORAL at 08:05

## 2023-04-04 RX ADMIN — GABAPENTIN 400 MG: 300 CAPSULE ORAL at 14:07

## 2023-04-04 RX ADMIN — GABAPENTIN 400 MG: 300 CAPSULE ORAL at 08:05

## 2023-04-04 RX ADMIN — B-COMPLEX W/ C & FOLIC ACID TAB 1 TABLET: TAB at 08:05

## 2023-04-04 RX ADMIN — POTASSIUM CHLORIDE 20 MEQ: 20 TABLET, EXTENDED RELEASE ORAL at 17:34

## 2023-04-04 RX ADMIN — METOPROLOL SUCCINATE 25 MG: 25 TABLET, EXTENDED RELEASE ORAL at 08:11

## 2023-04-04 RX ADMIN — POTASSIUM CHLORIDE 20 MEQ: 20 TABLET, EXTENDED RELEASE ORAL at 08:05

## 2023-04-04 RX ADMIN — CYCLOBENZAPRINE 10 MG: 10 TABLET, FILM COATED ORAL at 21:22

## 2023-04-04 RX ADMIN — CARBAMAZEPINE 400 MG: 200 TABLET ORAL at 08:05

## 2023-04-04 RX ADMIN — TAMSULOSIN HYDROCHLORIDE 0.4 MG: 0.4 CAPSULE ORAL at 08:05

## 2023-04-04 RX ADMIN — ANTI-FUNGAL POWDER MICONAZOLE NITRATE TALC FREE: 1.42 POWDER TOPICAL at 08:06

## 2023-04-04 RX ADMIN — APIXABAN 5 MG: 5 TABLET, FILM COATED ORAL at 08:05

## 2023-04-04 RX ADMIN — PANTOPRAZOLE SODIUM 40 MG: 40 TABLET, DELAYED RELEASE ORAL at 06:20

## 2023-04-04 RX ADMIN — EMPAGLIFLOZIN 10 MG: 10 TABLET, FILM COATED ORAL at 08:05

## 2023-04-04 RX ADMIN — GABAPENTIN 400 MG: 300 CAPSULE ORAL at 20:52

## 2023-04-04 RX ADMIN — CARBAMAZEPINE 400 MG: 200 TABLET ORAL at 20:52

## 2023-04-04 RX ADMIN — FUROSEMIDE 40 MG: 40 TABLET ORAL at 17:34

## 2023-04-04 RX ADMIN — CYCLOBENZAPRINE 10 MG: 10 TABLET, FILM COATED ORAL at 04:57

## 2023-04-04 RX ADMIN — HYDROCODONE BITARTRATE AND ACETAMINOPHEN 1 TABLET: 10; 325 TABLET ORAL at 20:52

## 2023-04-04 ASSESSMENT — PAIN DESCRIPTION - ONSET: ONSET: ON-GOING

## 2023-04-04 ASSESSMENT — PAIN SCALES - GENERAL
PAINLEVEL_OUTOF10: 7
PAINLEVEL_OUTOF10: 2
PAINLEVEL_OUTOF10: 0
PAINLEVEL_OUTOF10: 10

## 2023-04-04 ASSESSMENT — PAIN DESCRIPTION - LOCATION
LOCATION: GENERALIZED
LOCATION: GENERALIZED

## 2023-04-04 ASSESSMENT — PAIN DESCRIPTION - DESCRIPTORS
DESCRIPTORS: ACHING
DESCRIPTORS: ACHING

## 2023-04-04 ASSESSMENT — PAIN - FUNCTIONAL ASSESSMENT: PAIN_FUNCTIONAL_ASSESSMENT: ACTIVITIES ARE NOT PREVENTED

## 2023-04-05 PROCEDURE — 97112 NEUROMUSCULAR REEDUCATION: CPT

## 2023-04-05 PROCEDURE — 97535 SELF CARE MNGMENT TRAINING: CPT

## 2023-04-05 PROCEDURE — 6370000000 HC RX 637 (ALT 250 FOR IP): Performed by: PHYSICAL MEDICINE & REHABILITATION

## 2023-04-05 PROCEDURE — 1180000000 HC REHAB R&B

## 2023-04-05 PROCEDURE — 6370000000 HC RX 637 (ALT 250 FOR IP): Performed by: PHYSICIAN ASSISTANT

## 2023-04-05 PROCEDURE — 97530 THERAPEUTIC ACTIVITIES: CPT

## 2023-04-05 RX ADMIN — GABAPENTIN 400 MG: 300 CAPSULE ORAL at 09:23

## 2023-04-05 RX ADMIN — GABAPENTIN 400 MG: 300 CAPSULE ORAL at 21:30

## 2023-04-05 RX ADMIN — POLYETHYLENE GLYCOL 3350 17 G: 17 POWDER, FOR SOLUTION ORAL at 09:33

## 2023-04-05 RX ADMIN — POTASSIUM CHLORIDE 20 MEQ: 20 TABLET, EXTENDED RELEASE ORAL at 17:10

## 2023-04-05 RX ADMIN — APIXABAN 5 MG: 5 TABLET, FILM COATED ORAL at 09:24

## 2023-04-05 RX ADMIN — FUROSEMIDE 40 MG: 40 TABLET ORAL at 17:11

## 2023-04-05 RX ADMIN — B-COMPLEX W/ C & FOLIC ACID TAB 1 TABLET: TAB at 09:33

## 2023-04-05 RX ADMIN — ANTI-FUNGAL POWDER MICONAZOLE NITRATE TALC FREE: 1.42 POWDER TOPICAL at 09:57

## 2023-04-05 RX ADMIN — GABAPENTIN 400 MG: 300 CAPSULE ORAL at 14:16

## 2023-04-05 RX ADMIN — TAMSULOSIN HYDROCHLORIDE 0.4 MG: 0.4 CAPSULE ORAL at 09:33

## 2023-04-05 RX ADMIN — APIXABAN 5 MG: 5 TABLET, FILM COATED ORAL at 21:29

## 2023-04-05 RX ADMIN — FOLIC ACID 1 MG: 1 TABLET ORAL at 09:24

## 2023-04-05 RX ADMIN — CARBAMAZEPINE 400 MG: 200 TABLET ORAL at 09:24

## 2023-04-05 RX ADMIN — METOPROLOL SUCCINATE 25 MG: 25 TABLET, EXTENDED RELEASE ORAL at 09:34

## 2023-04-05 RX ADMIN — HYDROCODONE BITARTRATE AND ACETAMINOPHEN 1 TABLET: 10; 325 TABLET ORAL at 22:33

## 2023-04-05 RX ADMIN — EMPAGLIFLOZIN 10 MG: 10 TABLET, FILM COATED ORAL at 11:16

## 2023-04-05 RX ADMIN — POTASSIUM CHLORIDE 20 MEQ: 20 TABLET, EXTENDED RELEASE ORAL at 09:33

## 2023-04-05 RX ADMIN — CYCLOBENZAPRINE 10 MG: 10 TABLET, FILM COATED ORAL at 21:30

## 2023-04-05 RX ADMIN — FUROSEMIDE 40 MG: 40 TABLET ORAL at 09:25

## 2023-04-05 RX ADMIN — PANTOPRAZOLE SODIUM 40 MG: 40 TABLET, DELAYED RELEASE ORAL at 06:47

## 2023-04-05 RX ADMIN — CARBAMAZEPINE 400 MG: 200 TABLET ORAL at 21:30

## 2023-04-05 RX ADMIN — ASPIRIN 81 MG 81 MG: 81 TABLET ORAL at 09:23

## 2023-04-05 ASSESSMENT — PAIN SCALES - GENERAL
PAINLEVEL_OUTOF10: 8
PAINLEVEL_OUTOF10: 0

## 2023-04-05 ASSESSMENT — PAIN DESCRIPTION - DESCRIPTORS: DESCRIPTORS: ACHING

## 2023-04-05 ASSESSMENT — PAIN DESCRIPTION - LOCATION
LOCATION: NECK
LOCATION: LEG;NECK

## 2023-04-05 ASSESSMENT — PAIN DESCRIPTION - ORIENTATION: ORIENTATION: POSTERIOR

## 2023-04-05 ASSESSMENT — PAIN - FUNCTIONAL ASSESSMENT: PAIN_FUNCTIONAL_ASSESSMENT: ACTIVITIES ARE NOT PREVENTED

## 2023-04-06 PROCEDURE — 6370000000 HC RX 637 (ALT 250 FOR IP): Performed by: PHYSICAL MEDICINE & REHABILITATION

## 2023-04-06 PROCEDURE — 97150 GROUP THERAPEUTIC PROCEDURES: CPT

## 2023-04-06 PROCEDURE — 1180000000 HC REHAB R&B

## 2023-04-06 PROCEDURE — 6370000000 HC RX 637 (ALT 250 FOR IP): Performed by: PHYSICIAN ASSISTANT

## 2023-04-06 PROCEDURE — 97110 THERAPEUTIC EXERCISES: CPT

## 2023-04-06 PROCEDURE — 97530 THERAPEUTIC ACTIVITIES: CPT

## 2023-04-06 PROCEDURE — 97535 SELF CARE MNGMENT TRAINING: CPT

## 2023-04-06 PROCEDURE — 97112 NEUROMUSCULAR REEDUCATION: CPT

## 2023-04-06 PROCEDURE — 97542 WHEELCHAIR MNGMENT TRAINING: CPT

## 2023-04-06 RX ADMIN — POLYETHYLENE GLYCOL 3350 17 G: 17 POWDER, FOR SOLUTION ORAL at 08:27

## 2023-04-06 RX ADMIN — APIXABAN 5 MG: 5 TABLET, FILM COATED ORAL at 20:36

## 2023-04-06 RX ADMIN — FUROSEMIDE 40 MG: 40 TABLET ORAL at 17:52

## 2023-04-06 RX ADMIN — HYDROCODONE BITARTRATE AND ACETAMINOPHEN 1 TABLET: 10; 325 TABLET ORAL at 20:36

## 2023-04-06 RX ADMIN — FUROSEMIDE 40 MG: 40 TABLET ORAL at 08:30

## 2023-04-06 RX ADMIN — GABAPENTIN 400 MG: 300 CAPSULE ORAL at 08:30

## 2023-04-06 RX ADMIN — GABAPENTIN 400 MG: 300 CAPSULE ORAL at 13:03

## 2023-04-06 RX ADMIN — TAMSULOSIN HYDROCHLORIDE 0.4 MG: 0.4 CAPSULE ORAL at 08:30

## 2023-04-06 RX ADMIN — GABAPENTIN 400 MG: 300 CAPSULE ORAL at 20:36

## 2023-04-06 RX ADMIN — POTASSIUM CHLORIDE 20 MEQ: 20 TABLET, EXTENDED RELEASE ORAL at 08:28

## 2023-04-06 RX ADMIN — ANTI-FUNGAL POWDER MICONAZOLE NITRATE TALC FREE: 1.42 POWDER TOPICAL at 08:45

## 2023-04-06 RX ADMIN — CARBAMAZEPINE 400 MG: 200 TABLET ORAL at 20:36

## 2023-04-06 RX ADMIN — CYCLOBENZAPRINE 10 MG: 10 TABLET, FILM COATED ORAL at 20:36

## 2023-04-06 RX ADMIN — B-COMPLEX W/ C & FOLIC ACID TAB 1 TABLET: TAB at 08:30

## 2023-04-06 RX ADMIN — CARBAMAZEPINE 400 MG: 200 TABLET ORAL at 08:29

## 2023-04-06 RX ADMIN — POTASSIUM CHLORIDE 20 MEQ: 20 TABLET, EXTENDED RELEASE ORAL at 17:52

## 2023-04-06 RX ADMIN — APIXABAN 5 MG: 5 TABLET, FILM COATED ORAL at 08:30

## 2023-04-06 RX ADMIN — BISACODYL 10 MG: 10 SUPPOSITORY RECTAL at 18:18

## 2023-04-06 RX ADMIN — ASPIRIN 81 MG 81 MG: 81 TABLET ORAL at 08:30

## 2023-04-06 RX ADMIN — PANTOPRAZOLE SODIUM 40 MG: 40 TABLET, DELAYED RELEASE ORAL at 05:20

## 2023-04-06 RX ADMIN — EMPAGLIFLOZIN 10 MG: 10 TABLET, FILM COATED ORAL at 13:03

## 2023-04-06 RX ADMIN — FOLIC ACID 1 MG: 1 TABLET ORAL at 08:30

## 2023-04-06 RX ADMIN — METOPROLOL SUCCINATE 25 MG: 25 TABLET, EXTENDED RELEASE ORAL at 08:30

## 2023-04-06 ASSESSMENT — PAIN - FUNCTIONAL ASSESSMENT: PAIN_FUNCTIONAL_ASSESSMENT: PREVENTS OR INTERFERES WITH MANY ACTIVE NOT PASSIVE ACTIVITIES

## 2023-04-06 ASSESSMENT — PAIN SCALES - GENERAL
PAINLEVEL_OUTOF10: 7
PAINLEVEL_OUTOF10: 0

## 2023-04-06 ASSESSMENT — PAIN DESCRIPTION - LOCATION: LOCATION: GENERALIZED

## 2023-04-06 ASSESSMENT — PAIN DESCRIPTION - DESCRIPTORS: DESCRIPTORS: DISCOMFORT

## 2023-04-07 PROCEDURE — 92526 ORAL FUNCTION THERAPY: CPT

## 2023-04-07 PROCEDURE — 6370000000 HC RX 637 (ALT 250 FOR IP): Performed by: PHYSICAL MEDICINE & REHABILITATION

## 2023-04-07 PROCEDURE — 97112 NEUROMUSCULAR REEDUCATION: CPT

## 2023-04-07 PROCEDURE — 97535 SELF CARE MNGMENT TRAINING: CPT

## 2023-04-07 PROCEDURE — 97530 THERAPEUTIC ACTIVITIES: CPT

## 2023-04-07 PROCEDURE — 94760 N-INVAS EAR/PLS OXIMETRY 1: CPT

## 2023-04-07 PROCEDURE — 1180000000 HC REHAB R&B

## 2023-04-07 PROCEDURE — 6370000000 HC RX 637 (ALT 250 FOR IP): Performed by: PHYSICIAN ASSISTANT

## 2023-04-07 PROCEDURE — 97110 THERAPEUTIC EXERCISES: CPT

## 2023-04-07 RX ADMIN — METOPROLOL SUCCINATE 25 MG: 25 TABLET, EXTENDED RELEASE ORAL at 08:53

## 2023-04-07 RX ADMIN — CARBAMAZEPINE 400 MG: 200 TABLET ORAL at 08:53

## 2023-04-07 RX ADMIN — PANTOPRAZOLE SODIUM 40 MG: 40 TABLET, DELAYED RELEASE ORAL at 05:36

## 2023-04-07 RX ADMIN — GABAPENTIN 400 MG: 300 CAPSULE ORAL at 08:53

## 2023-04-07 RX ADMIN — B-COMPLEX W/ C & FOLIC ACID TAB 1 TABLET: TAB at 08:52

## 2023-04-07 RX ADMIN — ASPIRIN 81 MG 81 MG: 81 TABLET ORAL at 08:53

## 2023-04-07 RX ADMIN — EMPAGLIFLOZIN 10 MG: 10 TABLET, FILM COATED ORAL at 13:31

## 2023-04-07 RX ADMIN — FOLIC ACID 1 MG: 1 TABLET ORAL at 08:53

## 2023-04-07 RX ADMIN — FUROSEMIDE 40 MG: 40 TABLET ORAL at 18:01

## 2023-04-07 RX ADMIN — APIXABAN 5 MG: 5 TABLET, FILM COATED ORAL at 08:53

## 2023-04-07 RX ADMIN — TAMSULOSIN HYDROCHLORIDE 0.4 MG: 0.4 CAPSULE ORAL at 08:53

## 2023-04-07 RX ADMIN — POTASSIUM CHLORIDE 20 MEQ: 20 TABLET, EXTENDED RELEASE ORAL at 08:52

## 2023-04-07 RX ADMIN — POTASSIUM CHLORIDE 20 MEQ: 20 TABLET, EXTENDED RELEASE ORAL at 17:59

## 2023-04-07 RX ADMIN — CYCLOBENZAPRINE 10 MG: 10 TABLET, FILM COATED ORAL at 21:18

## 2023-04-07 RX ADMIN — GABAPENTIN 400 MG: 300 CAPSULE ORAL at 21:20

## 2023-04-07 RX ADMIN — CARBAMAZEPINE 400 MG: 200 TABLET ORAL at 21:19

## 2023-04-07 RX ADMIN — FUROSEMIDE 40 MG: 40 TABLET ORAL at 08:53

## 2023-04-07 RX ADMIN — ANTI-FUNGAL POWDER MICONAZOLE NITRATE TALC FREE: 1.42 POWDER TOPICAL at 09:07

## 2023-04-07 RX ADMIN — APIXABAN 5 MG: 5 TABLET, FILM COATED ORAL at 21:18

## 2023-04-07 RX ADMIN — GABAPENTIN 400 MG: 300 CAPSULE ORAL at 13:31

## 2023-04-07 RX ADMIN — POLYETHYLENE GLYCOL 3350 17 G: 17 POWDER, FOR SOLUTION ORAL at 08:50

## 2023-04-07 RX ADMIN — HYDROCODONE BITARTRATE AND ACETAMINOPHEN 1 TABLET: 10; 325 TABLET ORAL at 21:18

## 2023-04-07 ASSESSMENT — PAIN - FUNCTIONAL ASSESSMENT: PAIN_FUNCTIONAL_ASSESSMENT: PREVENTS OR INTERFERES SOME ACTIVE ACTIVITIES AND ADLS

## 2023-04-07 ASSESSMENT — PAIN DESCRIPTION - ORIENTATION: ORIENTATION: POSTERIOR

## 2023-04-07 ASSESSMENT — PAIN DESCRIPTION - LOCATION: LOCATION: NECK

## 2023-04-07 ASSESSMENT — PAIN SCALES - GENERAL
PAINLEVEL_OUTOF10: 0
PAINLEVEL_OUTOF10: 6

## 2023-04-07 ASSESSMENT — PAIN DESCRIPTION - ONSET: ONSET: ON-GOING

## 2023-04-07 ASSESSMENT — PAIN DESCRIPTION - FREQUENCY: FREQUENCY: CONTINUOUS

## 2023-04-07 ASSESSMENT — PAIN DESCRIPTION - DESCRIPTORS: DESCRIPTORS: DISCOMFORT

## 2023-04-07 ASSESSMENT — PAIN DESCRIPTION - PAIN TYPE: TYPE: SURGICAL PAIN

## 2023-04-08 PROCEDURE — 97110 THERAPEUTIC EXERCISES: CPT

## 2023-04-08 PROCEDURE — 97542 WHEELCHAIR MNGMENT TRAINING: CPT

## 2023-04-08 PROCEDURE — 6370000000 HC RX 637 (ALT 250 FOR IP): Performed by: PHYSICIAN ASSISTANT

## 2023-04-08 PROCEDURE — 1180000000 HC REHAB R&B

## 2023-04-08 PROCEDURE — 6370000000 HC RX 637 (ALT 250 FOR IP): Performed by: PHYSICAL MEDICINE & REHABILITATION

## 2023-04-08 PROCEDURE — 97530 THERAPEUTIC ACTIVITIES: CPT

## 2023-04-08 PROCEDURE — 97112 NEUROMUSCULAR REEDUCATION: CPT

## 2023-04-08 RX ADMIN — CYCLOBENZAPRINE 10 MG: 10 TABLET, FILM COATED ORAL at 21:35

## 2023-04-08 RX ADMIN — APIXABAN 5 MG: 5 TABLET, FILM COATED ORAL at 08:38

## 2023-04-08 RX ADMIN — CARBAMAZEPINE 400 MG: 200 TABLET ORAL at 21:29

## 2023-04-08 RX ADMIN — ACETAMINOPHEN 650 MG: 325 TABLET ORAL at 15:59

## 2023-04-08 RX ADMIN — ANTI-FUNGAL POWDER MICONAZOLE NITRATE TALC FREE: 1.42 POWDER TOPICAL at 22:44

## 2023-04-08 RX ADMIN — GABAPENTIN 400 MG: 300 CAPSULE ORAL at 14:56

## 2023-04-08 RX ADMIN — ASPIRIN 81 MG 81 MG: 81 TABLET ORAL at 08:38

## 2023-04-08 RX ADMIN — METOPROLOL SUCCINATE 25 MG: 25 TABLET, EXTENDED RELEASE ORAL at 08:38

## 2023-04-08 RX ADMIN — POTASSIUM CHLORIDE 20 MEQ: 20 TABLET, EXTENDED RELEASE ORAL at 08:38

## 2023-04-08 RX ADMIN — B-COMPLEX W/ C & FOLIC ACID TAB 1 TABLET: TAB at 08:38

## 2023-04-08 RX ADMIN — GABAPENTIN 400 MG: 300 CAPSULE ORAL at 08:38

## 2023-04-08 RX ADMIN — TAMSULOSIN HYDROCHLORIDE 0.4 MG: 0.4 CAPSULE ORAL at 08:38

## 2023-04-08 RX ADMIN — POLYETHYLENE GLYCOL 3350 17 G: 17 POWDER, FOR SOLUTION ORAL at 08:38

## 2023-04-08 RX ADMIN — CARBAMAZEPINE 400 MG: 200 TABLET ORAL at 08:38

## 2023-04-08 RX ADMIN — PANTOPRAZOLE SODIUM 40 MG: 40 TABLET, DELAYED RELEASE ORAL at 06:11

## 2023-04-08 RX ADMIN — FUROSEMIDE 40 MG: 40 TABLET ORAL at 17:52

## 2023-04-08 RX ADMIN — APIXABAN 5 MG: 5 TABLET, FILM COATED ORAL at 21:29

## 2023-04-08 RX ADMIN — FOLIC ACID 1 MG: 1 TABLET ORAL at 08:38

## 2023-04-08 RX ADMIN — GABAPENTIN 400 MG: 300 CAPSULE ORAL at 21:29

## 2023-04-08 RX ADMIN — EMPAGLIFLOZIN 10 MG: 10 TABLET, FILM COATED ORAL at 13:00

## 2023-04-08 RX ADMIN — FUROSEMIDE 40 MG: 40 TABLET ORAL at 08:38

## 2023-04-08 RX ADMIN — ANTI-FUNGAL POWDER MICONAZOLE NITRATE TALC FREE: 1.42 POWDER TOPICAL at 08:53

## 2023-04-08 RX ADMIN — POTASSIUM CHLORIDE 20 MEQ: 20 TABLET, EXTENDED RELEASE ORAL at 17:52

## 2023-04-08 ASSESSMENT — PAIN DESCRIPTION - LOCATION: LOCATION: HEAD

## 2023-04-08 ASSESSMENT — PAIN SCALES - GENERAL
PAINLEVEL_OUTOF10: 7
PAINLEVEL_OUTOF10: 0
PAINLEVEL_OUTOF10: 0

## 2023-04-09 PROCEDURE — 1180000000 HC REHAB R&B

## 2023-04-09 PROCEDURE — 6370000000 HC RX 637 (ALT 250 FOR IP): Performed by: PHYSICIAN ASSISTANT

## 2023-04-09 PROCEDURE — 6370000000 HC RX 637 (ALT 250 FOR IP): Performed by: PHYSICAL MEDICINE & REHABILITATION

## 2023-04-09 RX ADMIN — B-COMPLEX W/ C & FOLIC ACID TAB 1 TABLET: TAB at 09:17

## 2023-04-09 RX ADMIN — GABAPENTIN 400 MG: 300 CAPSULE ORAL at 21:25

## 2023-04-09 RX ADMIN — ANTI-FUNGAL POWDER MICONAZOLE NITRATE TALC FREE: 1.42 POWDER TOPICAL at 21:27

## 2023-04-09 RX ADMIN — FOLIC ACID 1 MG: 1 TABLET ORAL at 09:05

## 2023-04-09 RX ADMIN — CARBAMAZEPINE 400 MG: 200 TABLET ORAL at 21:25

## 2023-04-09 RX ADMIN — ASPIRIN 81 MG 81 MG: 81 TABLET ORAL at 09:05

## 2023-04-09 RX ADMIN — GABAPENTIN 400 MG: 300 CAPSULE ORAL at 14:42

## 2023-04-09 RX ADMIN — TAMSULOSIN HYDROCHLORIDE 0.4 MG: 0.4 CAPSULE ORAL at 09:02

## 2023-04-09 RX ADMIN — FUROSEMIDE 40 MG: 40 TABLET ORAL at 16:48

## 2023-04-09 RX ADMIN — EMPAGLIFLOZIN 10 MG: 10 TABLET, FILM COATED ORAL at 10:50

## 2023-04-09 RX ADMIN — APIXABAN 5 MG: 5 TABLET, FILM COATED ORAL at 09:05

## 2023-04-09 RX ADMIN — CARBAMAZEPINE 400 MG: 200 TABLET ORAL at 09:02

## 2023-04-09 RX ADMIN — ACETAMINOPHEN 650 MG: 325 TABLET ORAL at 21:25

## 2023-04-09 RX ADMIN — HYDROCODONE BITARTRATE AND ACETAMINOPHEN 1 TABLET: 10; 325 TABLET ORAL at 00:19

## 2023-04-09 RX ADMIN — FUROSEMIDE 40 MG: 40 TABLET ORAL at 09:04

## 2023-04-09 RX ADMIN — APIXABAN 5 MG: 5 TABLET, FILM COATED ORAL at 21:25

## 2023-04-09 RX ADMIN — POTASSIUM CHLORIDE 20 MEQ: 20 TABLET, EXTENDED RELEASE ORAL at 09:04

## 2023-04-09 RX ADMIN — POLYETHYLENE GLYCOL 3350 17 G: 17 POWDER, FOR SOLUTION ORAL at 09:02

## 2023-04-09 RX ADMIN — PANTOPRAZOLE SODIUM 40 MG: 40 TABLET, DELAYED RELEASE ORAL at 05:19

## 2023-04-09 RX ADMIN — ANTI-FUNGAL POWDER MICONAZOLE NITRATE TALC FREE: 1.42 POWDER TOPICAL at 09:16

## 2023-04-09 RX ADMIN — GABAPENTIN 400 MG: 300 CAPSULE ORAL at 09:04

## 2023-04-09 RX ADMIN — POTASSIUM CHLORIDE 20 MEQ: 20 TABLET, EXTENDED RELEASE ORAL at 16:48

## 2023-04-09 RX ADMIN — CYCLOBENZAPRINE 10 MG: 10 TABLET, FILM COATED ORAL at 21:26

## 2023-04-09 RX ADMIN — METOPROLOL SUCCINATE 25 MG: 25 TABLET, EXTENDED RELEASE ORAL at 09:05

## 2023-04-09 ASSESSMENT — PAIN DESCRIPTION - DESCRIPTORS
DESCRIPTORS: ACHING
DESCRIPTORS: ACHING

## 2023-04-09 ASSESSMENT — PAIN SCALES - GENERAL
PAINLEVEL_OUTOF10: 0
PAINLEVEL_OUTOF10: 0
PAINLEVEL_OUTOF10: 8
PAINLEVEL_OUTOF10: 7
PAINLEVEL_OUTOF10: 7

## 2023-04-09 ASSESSMENT — PAIN SCALES - WONG BAKER: WONGBAKER_NUMERICALRESPONSE: 0

## 2023-04-09 ASSESSMENT — PAIN DESCRIPTION - ORIENTATION: ORIENTATION: RIGHT;LEFT

## 2023-04-09 ASSESSMENT — PAIN DESCRIPTION - ONSET: ONSET: ON-GOING

## 2023-04-09 ASSESSMENT — PAIN DESCRIPTION - LOCATION
LOCATION: HAND;GENERALIZED
LOCATION: GENERALIZED
LOCATION: GENERALIZED

## 2023-04-10 LAB
ANION GAP SERPL CALC-SCNC: 2 MMOL/L (ref 2–11)
BASOPHILS # BLD: 0.1 K/UL (ref 0–0.2)
BASOPHILS NFR BLD: 1 % (ref 0–2)
BUN SERPL-MCNC: 22 MG/DL (ref 8–23)
CALCIUM SERPL-MCNC: 8.7 MG/DL (ref 8.3–10.4)
CHLORIDE SERPL-SCNC: 98 MMOL/L (ref 101–110)
CO2 SERPL-SCNC: 31 MMOL/L (ref 21–32)
CREAT SERPL-MCNC: 0.7 MG/DL (ref 0.8–1.5)
DIFFERENTIAL METHOD BLD: ABNORMAL
EOSINOPHIL # BLD: 0.1 K/UL (ref 0–0.8)
EOSINOPHIL NFR BLD: 1 % (ref 0.5–7.8)
ERYTHROCYTE [DISTWIDTH] IN BLOOD BY AUTOMATED COUNT: 16.3 % (ref 11.9–14.6)
GLUCOSE SERPL-MCNC: 87 MG/DL (ref 65–100)
HCT VFR BLD AUTO: 34.8 % (ref 41.1–50.3)
HGB BLD-MCNC: 11.6 G/DL (ref 13.6–17.2)
IMM GRANULOCYTES # BLD AUTO: 0.1 K/UL (ref 0–0.5)
IMM GRANULOCYTES NFR BLD AUTO: 1 % (ref 0–5)
LYMPHOCYTES # BLD: 1.4 K/UL (ref 0.5–4.6)
LYMPHOCYTES NFR BLD: 16 % (ref 13–44)
MCH RBC QN AUTO: 35.3 PG (ref 26.1–32.9)
MCHC RBC AUTO-ENTMCNC: 33.3 G/DL (ref 31.4–35)
MCV RBC AUTO: 105.8 FL (ref 82–102)
MONOCYTES # BLD: 0.7 K/UL (ref 0.1–1.3)
MONOCYTES NFR BLD: 9 % (ref 4–12)
NEUTS SEG # BLD: 6.2 K/UL (ref 1.7–8.2)
NEUTS SEG NFR BLD: 72 % (ref 43–78)
NRBC # BLD: 0 K/UL (ref 0–0.2)
PLATELET # BLD AUTO: 160 K/UL (ref 150–450)
PMV BLD AUTO: 8.6 FL (ref 9.4–12.3)
POTASSIUM SERPL-SCNC: 4.2 MMOL/L (ref 3.5–5.1)
RBC # BLD AUTO: 3.29 M/UL (ref 4.23–5.6)
SODIUM SERPL-SCNC: 131 MMOL/L (ref 133–143)
WBC # BLD AUTO: 8.6 K/UL (ref 4.3–11.1)

## 2023-04-10 PROCEDURE — 85025 COMPLETE CBC W/AUTO DIFF WBC: CPT

## 2023-04-10 PROCEDURE — 97535 SELF CARE MNGMENT TRAINING: CPT

## 2023-04-10 PROCEDURE — 97530 THERAPEUTIC ACTIVITIES: CPT

## 2023-04-10 PROCEDURE — 6370000000 HC RX 637 (ALT 250 FOR IP): Performed by: PHYSICAL MEDICINE & REHABILITATION

## 2023-04-10 PROCEDURE — 80048 BASIC METABOLIC PNL TOTAL CA: CPT

## 2023-04-10 PROCEDURE — 36415 COLL VENOUS BLD VENIPUNCTURE: CPT

## 2023-04-10 PROCEDURE — 97112 NEUROMUSCULAR REEDUCATION: CPT

## 2023-04-10 PROCEDURE — 97110 THERAPEUTIC EXERCISES: CPT

## 2023-04-10 PROCEDURE — 1180000000 HC REHAB R&B

## 2023-04-10 PROCEDURE — 6370000000 HC RX 637 (ALT 250 FOR IP): Performed by: PHYSICIAN ASSISTANT

## 2023-04-10 RX ADMIN — GABAPENTIN 400 MG: 300 CAPSULE ORAL at 08:57

## 2023-04-10 RX ADMIN — TAMSULOSIN HYDROCHLORIDE 0.4 MG: 0.4 CAPSULE ORAL at 08:57

## 2023-04-10 RX ADMIN — EMPAGLIFLOZIN 10 MG: 10 TABLET, FILM COATED ORAL at 08:57

## 2023-04-10 RX ADMIN — APIXABAN 5 MG: 5 TABLET, FILM COATED ORAL at 20:50

## 2023-04-10 RX ADMIN — APIXABAN 5 MG: 5 TABLET, FILM COATED ORAL at 08:57

## 2023-04-10 RX ADMIN — POLYETHYLENE GLYCOL 3350 17 G: 17 POWDER, FOR SOLUTION ORAL at 15:16

## 2023-04-10 RX ADMIN — B-COMPLEX W/ C & FOLIC ACID TAB 1 TABLET: TAB at 08:56

## 2023-04-10 RX ADMIN — HYDROCODONE BITARTRATE AND ACETAMINOPHEN 1 TABLET: 10; 325 TABLET ORAL at 22:36

## 2023-04-10 RX ADMIN — ASPIRIN 81 MG 81 MG: 81 TABLET ORAL at 08:57

## 2023-04-10 RX ADMIN — FOLIC ACID 1 MG: 1 TABLET ORAL at 08:57

## 2023-04-10 RX ADMIN — POTASSIUM CHLORIDE 20 MEQ: 20 TABLET, EXTENDED RELEASE ORAL at 08:55

## 2023-04-10 RX ADMIN — HYDROCODONE BITARTRATE AND ACETAMINOPHEN 1 TABLET: 10; 325 TABLET ORAL at 01:03

## 2023-04-10 RX ADMIN — GABAPENTIN 400 MG: 300 CAPSULE ORAL at 15:12

## 2023-04-10 RX ADMIN — FUROSEMIDE 40 MG: 40 TABLET ORAL at 08:57

## 2023-04-10 RX ADMIN — ANTI-FUNGAL POWDER MICONAZOLE NITRATE TALC FREE: 1.42 POWDER TOPICAL at 20:50

## 2023-04-10 RX ADMIN — FUROSEMIDE 40 MG: 40 TABLET ORAL at 18:10

## 2023-04-10 RX ADMIN — METOPROLOL SUCCINATE 25 MG: 25 TABLET, EXTENDED RELEASE ORAL at 08:57

## 2023-04-10 RX ADMIN — GABAPENTIN 400 MG: 300 CAPSULE ORAL at 20:50

## 2023-04-10 RX ADMIN — CARBAMAZEPINE 400 MG: 200 TABLET ORAL at 20:50

## 2023-04-10 RX ADMIN — POTASSIUM CHLORIDE 20 MEQ: 20 TABLET, EXTENDED RELEASE ORAL at 18:10

## 2023-04-10 RX ADMIN — PANTOPRAZOLE SODIUM 40 MG: 40 TABLET, DELAYED RELEASE ORAL at 05:49

## 2023-04-10 RX ADMIN — CARBAMAZEPINE 400 MG: 200 TABLET ORAL at 08:57

## 2023-04-10 RX ADMIN — ANTI-FUNGAL POWDER MICONAZOLE NITRATE TALC FREE: 1.42 POWDER TOPICAL at 09:11

## 2023-04-10 ASSESSMENT — PAIN DESCRIPTION - ORIENTATION
ORIENTATION: ANTERIOR;POSTERIOR
ORIENTATION: LEFT;RIGHT

## 2023-04-10 ASSESSMENT — PAIN SCALES - GENERAL
PAINLEVEL_OUTOF10: 0
PAINLEVEL_OUTOF10: 4
PAINLEVEL_OUTOF10: 7

## 2023-04-10 ASSESSMENT — PAIN DESCRIPTION - ONSET: ONSET: ON-GOING

## 2023-04-10 ASSESSMENT — PAIN DESCRIPTION - DESCRIPTORS
DESCRIPTORS: ACHING
DESCRIPTORS: ACHING;CRAMPING

## 2023-04-10 ASSESSMENT — PAIN DESCRIPTION - LOCATION
LOCATION: LEG
LOCATION: NECK

## 2023-04-10 ASSESSMENT — PAIN DESCRIPTION - FREQUENCY: FREQUENCY: INTERMITTENT

## 2023-04-10 ASSESSMENT — PAIN - FUNCTIONAL ASSESSMENT: PAIN_FUNCTIONAL_ASSESSMENT: PREVENTS OR INTERFERES SOME ACTIVE ACTIVITIES AND ADLS

## 2023-04-11 PROCEDURE — 1180000000 HC REHAB R&B

## 2023-04-11 PROCEDURE — 97150 GROUP THERAPEUTIC PROCEDURES: CPT

## 2023-04-11 PROCEDURE — 6370000000 HC RX 637 (ALT 250 FOR IP): Performed by: PHYSICAL MEDICINE & REHABILITATION

## 2023-04-11 PROCEDURE — 97110 THERAPEUTIC EXERCISES: CPT

## 2023-04-11 PROCEDURE — 97530 THERAPEUTIC ACTIVITIES: CPT

## 2023-04-11 PROCEDURE — 6370000000 HC RX 637 (ALT 250 FOR IP): Performed by: PHYSICIAN ASSISTANT

## 2023-04-11 PROCEDURE — 97535 SELF CARE MNGMENT TRAINING: CPT

## 2023-04-11 PROCEDURE — 97112 NEUROMUSCULAR REEDUCATION: CPT

## 2023-04-11 RX ORDER — POTASSIUM CHLORIDE 20 MEQ/1
20 TABLET, EXTENDED RELEASE ORAL
Status: DISCONTINUED | OUTPATIENT
Start: 2023-04-12 | End: 2023-05-02 | Stop reason: HOSPADM

## 2023-04-11 RX ORDER — FUROSEMIDE 40 MG/1
40 TABLET ORAL DAILY
Status: DISCONTINUED | OUTPATIENT
Start: 2023-04-12 | End: 2023-05-02 | Stop reason: HOSPADM

## 2023-04-11 RX ORDER — BACLOFEN 10 MG/1
5 TABLET ORAL NIGHTLY
Status: DISCONTINUED | OUTPATIENT
Start: 2023-04-11 | End: 2023-04-16

## 2023-04-11 RX ADMIN — GABAPENTIN 400 MG: 300 CAPSULE ORAL at 13:05

## 2023-04-11 RX ADMIN — ANTI-FUNGAL POWDER MICONAZOLE NITRATE TALC FREE: 1.42 POWDER TOPICAL at 09:05

## 2023-04-11 RX ADMIN — EMPAGLIFLOZIN 10 MG: 10 TABLET, FILM COATED ORAL at 08:51

## 2023-04-11 RX ADMIN — APIXABAN 5 MG: 5 TABLET, FILM COATED ORAL at 08:53

## 2023-04-11 RX ADMIN — B-COMPLEX W/ C & FOLIC ACID TAB 1 TABLET: TAB at 08:52

## 2023-04-11 RX ADMIN — FOLIC ACID 1 MG: 1 TABLET ORAL at 08:53

## 2023-04-11 RX ADMIN — GABAPENTIN 400 MG: 300 CAPSULE ORAL at 08:53

## 2023-04-11 RX ADMIN — APIXABAN 5 MG: 5 TABLET, FILM COATED ORAL at 21:29

## 2023-04-11 RX ADMIN — CARBAMAZEPINE 400 MG: 200 TABLET ORAL at 21:29

## 2023-04-11 RX ADMIN — ANTI-FUNGAL POWDER MICONAZOLE NITRATE TALC FREE: 1.42 POWDER TOPICAL at 21:42

## 2023-04-11 RX ADMIN — BACLOFEN 5 MG: 10 TABLET ORAL at 21:29

## 2023-04-11 RX ADMIN — HYDROCODONE BITARTRATE AND ACETAMINOPHEN 1 TABLET: 10; 325 TABLET ORAL at 21:28

## 2023-04-11 RX ADMIN — PANTOPRAZOLE SODIUM 40 MG: 40 TABLET, DELAYED RELEASE ORAL at 06:52

## 2023-04-11 RX ADMIN — POTASSIUM CHLORIDE 20 MEQ: 20 TABLET, EXTENDED RELEASE ORAL at 08:51

## 2023-04-11 RX ADMIN — CARBAMAZEPINE 400 MG: 200 TABLET ORAL at 08:52

## 2023-04-11 RX ADMIN — TAMSULOSIN HYDROCHLORIDE 0.4 MG: 0.4 CAPSULE ORAL at 08:53

## 2023-04-11 RX ADMIN — ASPIRIN 81 MG 81 MG: 81 TABLET ORAL at 08:53

## 2023-04-11 RX ADMIN — METOPROLOL SUCCINATE 25 MG: 25 TABLET, EXTENDED RELEASE ORAL at 08:53

## 2023-04-11 RX ADMIN — FUROSEMIDE 40 MG: 40 TABLET ORAL at 08:53

## 2023-04-11 RX ADMIN — GABAPENTIN 400 MG: 300 CAPSULE ORAL at 21:29

## 2023-04-11 ASSESSMENT — PAIN - FUNCTIONAL ASSESSMENT: PAIN_FUNCTIONAL_ASSESSMENT: PREVENTS OR INTERFERES SOME ACTIVE ACTIVITIES AND ADLS

## 2023-04-11 ASSESSMENT — PAIN DESCRIPTION - PAIN TYPE: TYPE: SURGICAL PAIN

## 2023-04-11 ASSESSMENT — PAIN DESCRIPTION - DESCRIPTORS: DESCRIPTORS: ACHING;CRAMPING

## 2023-04-11 ASSESSMENT — PAIN SCALES - GENERAL
PAINLEVEL_OUTOF10: 6
PAINLEVEL_OUTOF10: 0
PAINLEVEL_OUTOF10: 0

## 2023-04-11 ASSESSMENT — PAIN DESCRIPTION - ORIENTATION: ORIENTATION: RIGHT;LEFT

## 2023-04-11 ASSESSMENT — PAIN DESCRIPTION - LOCATION: LOCATION: LEG

## 2023-04-11 ASSESSMENT — PAIN DESCRIPTION - FREQUENCY: FREQUENCY: INTERMITTENT

## 2023-04-11 ASSESSMENT — PAIN DESCRIPTION - ONSET: ONSET: ON-GOING

## 2023-04-12 PROCEDURE — 1180000000 HC REHAB R&B

## 2023-04-12 PROCEDURE — 6370000000 HC RX 637 (ALT 250 FOR IP): Performed by: PHYSICIAN ASSISTANT

## 2023-04-12 PROCEDURE — 97116 GAIT TRAINING THERAPY: CPT

## 2023-04-12 PROCEDURE — 97112 NEUROMUSCULAR REEDUCATION: CPT

## 2023-04-12 PROCEDURE — 97530 THERAPEUTIC ACTIVITIES: CPT

## 2023-04-12 PROCEDURE — 97535 SELF CARE MNGMENT TRAINING: CPT

## 2023-04-12 PROCEDURE — 6370000000 HC RX 637 (ALT 250 FOR IP): Performed by: PHYSICAL MEDICINE & REHABILITATION

## 2023-04-12 RX ADMIN — PANTOPRAZOLE SODIUM 40 MG: 40 TABLET, DELAYED RELEASE ORAL at 05:20

## 2023-04-12 RX ADMIN — BACLOFEN 5 MG: 10 TABLET ORAL at 23:11

## 2023-04-12 RX ADMIN — METOPROLOL SUCCINATE 25 MG: 25 TABLET, EXTENDED RELEASE ORAL at 09:02

## 2023-04-12 RX ADMIN — GABAPENTIN 400 MG: 300 CAPSULE ORAL at 23:12

## 2023-04-12 RX ADMIN — GABAPENTIN 400 MG: 300 CAPSULE ORAL at 14:36

## 2023-04-12 RX ADMIN — ONDANSETRON 4 MG: 4 TABLET, ORALLY DISINTEGRATING ORAL at 17:02

## 2023-04-12 RX ADMIN — CARBAMAZEPINE 400 MG: 200 TABLET ORAL at 23:11

## 2023-04-12 RX ADMIN — CARBAMAZEPINE 400 MG: 200 TABLET ORAL at 09:04

## 2023-04-12 RX ADMIN — EMPAGLIFLOZIN 10 MG: 10 TABLET, FILM COATED ORAL at 09:04

## 2023-04-12 RX ADMIN — FOLIC ACID 1 MG: 1 TABLET ORAL at 09:04

## 2023-04-12 RX ADMIN — GABAPENTIN 400 MG: 300 CAPSULE ORAL at 09:04

## 2023-04-12 RX ADMIN — TAMSULOSIN HYDROCHLORIDE 0.4 MG: 0.4 CAPSULE ORAL at 09:04

## 2023-04-12 RX ADMIN — ANTI-FUNGAL POWDER MICONAZOLE NITRATE TALC FREE: 1.42 POWDER TOPICAL at 09:11

## 2023-04-12 RX ADMIN — APIXABAN 5 MG: 5 TABLET, FILM COATED ORAL at 23:11

## 2023-04-12 RX ADMIN — ASPIRIN 81 MG 81 MG: 81 TABLET ORAL at 09:04

## 2023-04-12 RX ADMIN — POTASSIUM CHLORIDE 20 MEQ: 1500 TABLET, EXTENDED RELEASE ORAL at 09:04

## 2023-04-12 RX ADMIN — POLYETHYLENE GLYCOL 3350 17 G: 17 POWDER, FOR SOLUTION ORAL at 09:05

## 2023-04-12 RX ADMIN — FUROSEMIDE 40 MG: 40 TABLET ORAL at 09:04

## 2023-04-12 RX ADMIN — APIXABAN 5 MG: 5 TABLET, FILM COATED ORAL at 09:02

## 2023-04-12 RX ADMIN — HYDROCODONE BITARTRATE AND ACETAMINOPHEN 1 TABLET: 10; 325 TABLET ORAL at 14:36

## 2023-04-12 RX ADMIN — B-COMPLEX W/ C & FOLIC ACID TAB 1 TABLET: TAB at 09:04

## 2023-04-12 ASSESSMENT — PAIN DESCRIPTION - ORIENTATION: ORIENTATION: POSTERIOR

## 2023-04-12 ASSESSMENT — PAIN DESCRIPTION - LOCATION: LOCATION: NECK

## 2023-04-12 ASSESSMENT — PAIN SCALES - GENERAL
PAINLEVEL_OUTOF10: 0
PAINLEVEL_OUTOF10: 0
PAINLEVEL_OUTOF10: 7

## 2023-04-12 ASSESSMENT — PAIN SCALES - WONG BAKER: WONGBAKER_NUMERICALRESPONSE: 0

## 2023-04-12 ASSESSMENT — PAIN DESCRIPTION - DESCRIPTORS: DESCRIPTORS: SHARP

## 2023-04-12 NOTE — CARE COORDINATION
Interdisciplinary Wednesday, Team Conference Meeting Notes    Interdisciplinary team conference meeting completed to discuss plan of care.      Estimated D/C Date: ***    Recommended Follow-Up Therapy:       Communication with family/caregivers: ***

## 2023-04-13 PROCEDURE — 6370000000 HC RX 637 (ALT 250 FOR IP): Performed by: PHYSICIAN ASSISTANT

## 2023-04-13 PROCEDURE — 97110 THERAPEUTIC EXERCISES: CPT

## 2023-04-13 PROCEDURE — 97530 THERAPEUTIC ACTIVITIES: CPT

## 2023-04-13 PROCEDURE — 97116 GAIT TRAINING THERAPY: CPT

## 2023-04-13 PROCEDURE — 97112 NEUROMUSCULAR REEDUCATION: CPT

## 2023-04-13 PROCEDURE — 1180000000 HC REHAB R&B

## 2023-04-13 PROCEDURE — 6370000000 HC RX 637 (ALT 250 FOR IP): Performed by: PHYSICAL MEDICINE & REHABILITATION

## 2023-04-13 PROCEDURE — 97535 SELF CARE MNGMENT TRAINING: CPT

## 2023-04-13 RX ADMIN — TAMSULOSIN HYDROCHLORIDE 0.4 MG: 0.4 CAPSULE ORAL at 08:32

## 2023-04-13 RX ADMIN — EMPAGLIFLOZIN 10 MG: 10 TABLET, FILM COATED ORAL at 08:27

## 2023-04-13 RX ADMIN — BACLOFEN 5 MG: 10 TABLET ORAL at 21:55

## 2023-04-13 RX ADMIN — POLYETHYLENE GLYCOL 3350 17 G: 17 POWDER, FOR SOLUTION ORAL at 08:27

## 2023-04-13 RX ADMIN — FUROSEMIDE 40 MG: 40 TABLET ORAL at 08:26

## 2023-04-13 RX ADMIN — FOLIC ACID 1 MG: 1 TABLET ORAL at 08:26

## 2023-04-13 RX ADMIN — POTASSIUM CHLORIDE 20 MEQ: 1500 TABLET, EXTENDED RELEASE ORAL at 08:32

## 2023-04-13 RX ADMIN — PANTOPRAZOLE SODIUM 40 MG: 40 TABLET, DELAYED RELEASE ORAL at 05:23

## 2023-04-13 RX ADMIN — GABAPENTIN 400 MG: 300 CAPSULE ORAL at 14:10

## 2023-04-13 RX ADMIN — CARBAMAZEPINE 400 MG: 200 TABLET ORAL at 08:26

## 2023-04-13 RX ADMIN — B-COMPLEX W/ C & FOLIC ACID TAB 1 TABLET: TAB at 08:26

## 2023-04-13 RX ADMIN — GABAPENTIN 400 MG: 300 CAPSULE ORAL at 08:26

## 2023-04-13 RX ADMIN — GABAPENTIN 400 MG: 300 CAPSULE ORAL at 21:55

## 2023-04-13 RX ADMIN — METOPROLOL SUCCINATE 25 MG: 25 TABLET, EXTENDED RELEASE ORAL at 10:23

## 2023-04-13 RX ADMIN — APIXABAN 5 MG: 5 TABLET, FILM COATED ORAL at 08:26

## 2023-04-13 RX ADMIN — CARBAMAZEPINE 400 MG: 200 TABLET ORAL at 21:55

## 2023-04-13 RX ADMIN — HYDROCODONE BITARTRATE AND ACETAMINOPHEN 1 TABLET: 10; 325 TABLET ORAL at 21:56

## 2023-04-13 RX ADMIN — ASPIRIN 81 MG 81 MG: 81 TABLET ORAL at 08:26

## 2023-04-13 RX ADMIN — APIXABAN 5 MG: 5 TABLET, FILM COATED ORAL at 21:55

## 2023-04-13 RX ADMIN — ANTI-FUNGAL POWDER MICONAZOLE NITRATE TALC FREE: 1.42 POWDER TOPICAL at 22:00

## 2023-04-13 ASSESSMENT — PAIN DESCRIPTION - ORIENTATION: ORIENTATION: LEFT;RIGHT

## 2023-04-13 ASSESSMENT — PAIN SCALES - GENERAL: PAINLEVEL_OUTOF10: 7

## 2023-04-13 ASSESSMENT — PAIN DESCRIPTION - DESCRIPTORS: DESCRIPTORS: ACHING

## 2023-04-13 ASSESSMENT — PAIN DESCRIPTION - LOCATION: LOCATION: NECK;LEG

## 2023-04-13 ASSESSMENT — PAIN - FUNCTIONAL ASSESSMENT: PAIN_FUNCTIONAL_ASSESSMENT: ACTIVITIES ARE NOT PREVENTED

## 2023-04-13 NOTE — PLAN OF CARE
Problem: Safety - Adult  Goal: Free from fall injury  4/13/2023 0748 by Jayleen Young RN  Outcome: Progressing  4/12/2023 2006 by Tahir Whittaker. ZAYRA Interiano  Outcome: Progressing     Problem: Pain  Goal: Verbalizes/displays adequate comfort level or baseline comfort level  4/12/2023 2006 by Tahir Whittaker.  ZAYRA Interiano  Outcome: Progressing

## 2023-04-14 PROCEDURE — 97530 THERAPEUTIC ACTIVITIES: CPT

## 2023-04-14 PROCEDURE — 97116 GAIT TRAINING THERAPY: CPT

## 2023-04-14 PROCEDURE — 1180000000 HC REHAB R&B

## 2023-04-14 PROCEDURE — 97112 NEUROMUSCULAR REEDUCATION: CPT

## 2023-04-14 PROCEDURE — 6370000000 HC RX 637 (ALT 250 FOR IP): Performed by: PHYSICIAN ASSISTANT

## 2023-04-14 PROCEDURE — 6370000000 HC RX 637 (ALT 250 FOR IP): Performed by: PHYSICAL MEDICINE & REHABILITATION

## 2023-04-14 PROCEDURE — 97535 SELF CARE MNGMENT TRAINING: CPT

## 2023-04-14 PROCEDURE — 97150 GROUP THERAPEUTIC PROCEDURES: CPT

## 2023-04-14 PROCEDURE — 97110 THERAPEUTIC EXERCISES: CPT

## 2023-04-14 RX ADMIN — APIXABAN 5 MG: 5 TABLET, FILM COATED ORAL at 08:29

## 2023-04-14 RX ADMIN — GABAPENTIN 400 MG: 300 CAPSULE ORAL at 20:43

## 2023-04-14 RX ADMIN — EMPAGLIFLOZIN 10 MG: 10 TABLET, FILM COATED ORAL at 08:29

## 2023-04-14 RX ADMIN — FOLIC ACID 1 MG: 1 TABLET ORAL at 08:29

## 2023-04-14 RX ADMIN — POLYETHYLENE GLYCOL 3350 17 G: 17 POWDER, FOR SOLUTION ORAL at 08:29

## 2023-04-14 RX ADMIN — CARBAMAZEPINE 400 MG: 200 TABLET ORAL at 08:28

## 2023-04-14 RX ADMIN — BACLOFEN 5 MG: 10 TABLET ORAL at 20:42

## 2023-04-14 RX ADMIN — TAMSULOSIN HYDROCHLORIDE 0.4 MG: 0.4 CAPSULE ORAL at 08:29

## 2023-04-14 RX ADMIN — B-COMPLEX W/ C & FOLIC ACID TAB 1 TABLET: TAB at 08:28

## 2023-04-14 RX ADMIN — FUROSEMIDE 40 MG: 40 TABLET ORAL at 08:29

## 2023-04-14 RX ADMIN — APIXABAN 5 MG: 5 TABLET, FILM COATED ORAL at 20:42

## 2023-04-14 RX ADMIN — GABAPENTIN 400 MG: 300 CAPSULE ORAL at 14:28

## 2023-04-14 RX ADMIN — ASPIRIN 81 MG 81 MG: 81 TABLET ORAL at 08:28

## 2023-04-14 RX ADMIN — POTASSIUM CHLORIDE 20 MEQ: 1500 TABLET, EXTENDED RELEASE ORAL at 08:28

## 2023-04-14 RX ADMIN — METOPROLOL SUCCINATE 25 MG: 25 TABLET, EXTENDED RELEASE ORAL at 08:29

## 2023-04-14 RX ADMIN — CARBAMAZEPINE 400 MG: 200 TABLET ORAL at 20:42

## 2023-04-14 RX ADMIN — GABAPENTIN 400 MG: 300 CAPSULE ORAL at 08:29

## 2023-04-14 RX ADMIN — HYDROCODONE BITARTRATE AND ACETAMINOPHEN 1 TABLET: 10; 325 TABLET ORAL at 20:44

## 2023-04-14 RX ADMIN — PANTOPRAZOLE SODIUM 40 MG: 40 TABLET, DELAYED RELEASE ORAL at 05:50

## 2023-04-14 ASSESSMENT — PAIN SCALES - GENERAL
PAINLEVEL_OUTOF10: 6
PAINLEVEL_OUTOF10: 3

## 2023-04-14 ASSESSMENT — PAIN DESCRIPTION - LOCATION: LOCATION: NECK

## 2023-04-14 ASSESSMENT — PAIN DESCRIPTION - DESCRIPTORS: DESCRIPTORS: ACHING

## 2023-04-14 NOTE — PLAN OF CARE
Problem: Safety - Adult  Goal: Free from fall injury  4/14/2023 0758 by Edel Rodriguez RN  Outcome: Progressing  4/13/2023 2027 by Bill Billy. ZAYRA Interiano  Outcome: Progressing     Problem: Pain  Goal: Verbalizes/displays adequate comfort level or baseline comfort level  4/14/2023 0758 by Edel Rodriguez RN  Outcome: Progressing  4/13/2023 2027 by Bill Billy.  ZAYRA Interiano  Outcome: Progressing

## 2023-04-15 PROCEDURE — 97110 THERAPEUTIC EXERCISES: CPT

## 2023-04-15 PROCEDURE — 97150 GROUP THERAPEUTIC PROCEDURES: CPT

## 2023-04-15 PROCEDURE — 6370000000 HC RX 637 (ALT 250 FOR IP): Performed by: PHYSICAL MEDICINE & REHABILITATION

## 2023-04-15 PROCEDURE — 6370000000 HC RX 637 (ALT 250 FOR IP): Performed by: PHYSICIAN ASSISTANT

## 2023-04-15 PROCEDURE — 97112 NEUROMUSCULAR REEDUCATION: CPT

## 2023-04-15 PROCEDURE — 1180000000 HC REHAB R&B

## 2023-04-15 PROCEDURE — 97530 THERAPEUTIC ACTIVITIES: CPT

## 2023-04-15 PROCEDURE — 97535 SELF CARE MNGMENT TRAINING: CPT

## 2023-04-15 RX ADMIN — CARBAMAZEPINE 400 MG: 200 TABLET ORAL at 07:59

## 2023-04-15 RX ADMIN — FOLIC ACID 1 MG: 1 TABLET ORAL at 08:00

## 2023-04-15 RX ADMIN — BACLOFEN 5 MG: 10 TABLET ORAL at 21:18

## 2023-04-15 RX ADMIN — TAMSULOSIN HYDROCHLORIDE 0.4 MG: 0.4 CAPSULE ORAL at 08:00

## 2023-04-15 RX ADMIN — FUROSEMIDE 40 MG: 40 TABLET ORAL at 08:00

## 2023-04-15 RX ADMIN — METOPROLOL SUCCINATE 25 MG: 25 TABLET, EXTENDED RELEASE ORAL at 08:00

## 2023-04-15 RX ADMIN — B-COMPLEX W/ C & FOLIC ACID TAB 1 TABLET: TAB at 08:00

## 2023-04-15 RX ADMIN — CARBAMAZEPINE 400 MG: 200 TABLET ORAL at 21:20

## 2023-04-15 RX ADMIN — GABAPENTIN 400 MG: 300 CAPSULE ORAL at 13:33

## 2023-04-15 RX ADMIN — GABAPENTIN 400 MG: 300 CAPSULE ORAL at 21:21

## 2023-04-15 RX ADMIN — POLYETHYLENE GLYCOL 3350 17 G: 17 POWDER, FOR SOLUTION ORAL at 08:00

## 2023-04-15 RX ADMIN — PANTOPRAZOLE SODIUM 40 MG: 40 TABLET, DELAYED RELEASE ORAL at 05:48

## 2023-04-15 RX ADMIN — APIXABAN 5 MG: 5 TABLET, FILM COATED ORAL at 08:00

## 2023-04-15 RX ADMIN — ASPIRIN 81 MG 81 MG: 81 TABLET ORAL at 08:00

## 2023-04-15 RX ADMIN — APIXABAN 5 MG: 5 TABLET, FILM COATED ORAL at 21:20

## 2023-04-15 RX ADMIN — ANTI-FUNGAL POWDER MICONAZOLE NITRATE TALC FREE: 1.42 POWDER TOPICAL at 21:31

## 2023-04-15 RX ADMIN — GABAPENTIN 400 MG: 300 CAPSULE ORAL at 08:00

## 2023-04-15 RX ADMIN — EMPAGLIFLOZIN 10 MG: 10 TABLET, FILM COATED ORAL at 08:00

## 2023-04-15 RX ADMIN — POTASSIUM CHLORIDE 20 MEQ: 1500 TABLET, EXTENDED RELEASE ORAL at 08:00

## 2023-04-15 RX ADMIN — HYDROCODONE BITARTRATE AND ACETAMINOPHEN 1 TABLET: 10; 325 TABLET ORAL at 21:20

## 2023-04-15 ASSESSMENT — PAIN SCALES - GENERAL: PAINLEVEL_OUTOF10: 0

## 2023-04-15 ASSESSMENT — PAIN SCALES - WONG BAKER: WONGBAKER_NUMERICALRESPONSE: 0

## 2023-04-15 NOTE — PLAN OF CARE
Problem: Safety - Adult  Goal: Free from fall injury  Outcome: Progressing  Flowsheets (Taken 4/14/2023 6603)  Free From Fall Injury:   Instruct family/caregiver on patient safety   Based on caregiver fall risk screen, instruct family/caregiver to ask for assistance with transferring infant if caregiver noted to have fall risk factors     Problem: Skin/Tissue Integrity  Goal: Absence of new skin breakdown  Description: 1. Monitor for areas of redness and/or skin breakdown  2. Assess vascular access sites hourly  3. Every 4-6 hours minimum:  Change oxygen saturation probe site  4. Every 4-6 hours:  If on nasal continuous positive airway pressure, respiratory therapy assess nares and determine need for appliance change or resting period.   Outcome: Progressing

## 2023-04-15 NOTE — PLAN OF CARE
Problem: Safety - Adult  Goal: Free from fall injury  4/15/2023 0723 by Celedonio Phalen, RN  Outcome: Progressing  4/14/2023 2354 by Apple Regan RN  Outcome: Progressing  Flowsheets (Taken 4/14/2023 2353)  Free From Fall Injury:   Yany Allen family/caregiver on patient safety   Based on caregiver fall risk screen, instruct family/caregiver to ask for assistance with transferring infant if caregiver noted to have fall risk factors     Problem: Skin/Tissue Integrity  Goal: Absence of new skin breakdown  Description: 1. Monitor for areas of redness and/or skin breakdown  2. Assess vascular access sites hourly  3. Every 4-6 hours minimum:  Change oxygen saturation probe site  4. Every 4-6 hours:  If on nasal continuous positive airway pressure, respiratory therapy assess nares and determine need for appliance change or resting period.   4/14/2023 2354 by Apple Regan RN  Outcome: Progressing     Problem: ABCDS Injury Assessment  Goal: Absence of physical injury  Outcome: Progressing

## 2023-04-16 PROCEDURE — 6370000000 HC RX 637 (ALT 250 FOR IP): Performed by: PHYSICIAN ASSISTANT

## 2023-04-16 PROCEDURE — 1180000000 HC REHAB R&B

## 2023-04-16 PROCEDURE — 6370000000 HC RX 637 (ALT 250 FOR IP): Performed by: PHYSICAL MEDICINE & REHABILITATION

## 2023-04-16 PROCEDURE — 99232 SBSQ HOSP IP/OBS MODERATE 35: CPT | Performed by: PHYSICIAN ASSISTANT

## 2023-04-16 RX ORDER — CYCLOBENZAPRINE HCL 10 MG
10 TABLET ORAL NIGHTLY
Status: DISCONTINUED | OUTPATIENT
Start: 2023-04-16 | End: 2023-04-18

## 2023-04-16 RX ADMIN — PANTOPRAZOLE SODIUM 40 MG: 40 TABLET, DELAYED RELEASE ORAL at 06:20

## 2023-04-16 RX ADMIN — HYDROCODONE BITARTRATE AND ACETAMINOPHEN 1 TABLET: 10; 325 TABLET ORAL at 20:44

## 2023-04-16 RX ADMIN — TAMSULOSIN HYDROCHLORIDE 0.4 MG: 0.4 CAPSULE ORAL at 08:19

## 2023-04-16 RX ADMIN — APIXABAN 5 MG: 5 TABLET, FILM COATED ORAL at 20:44

## 2023-04-16 RX ADMIN — GABAPENTIN 400 MG: 300 CAPSULE ORAL at 08:19

## 2023-04-16 RX ADMIN — CYCLOBENZAPRINE 10 MG: 10 TABLET, FILM COATED ORAL at 20:43

## 2023-04-16 RX ADMIN — APIXABAN 5 MG: 5 TABLET, FILM COATED ORAL at 08:20

## 2023-04-16 RX ADMIN — HYDROCODONE BITARTRATE AND ACETAMINOPHEN 1 TABLET: 10; 325 TABLET ORAL at 09:34

## 2023-04-16 RX ADMIN — GABAPENTIN 400 MG: 300 CAPSULE ORAL at 20:42

## 2023-04-16 RX ADMIN — EMPAGLIFLOZIN 10 MG: 10 TABLET, FILM COATED ORAL at 08:20

## 2023-04-16 RX ADMIN — GABAPENTIN 400 MG: 300 CAPSULE ORAL at 14:24

## 2023-04-16 RX ADMIN — B-COMPLEX W/ C & FOLIC ACID TAB 1 TABLET: TAB at 08:20

## 2023-04-16 RX ADMIN — POLYETHYLENE GLYCOL 3350 17 G: 17 POWDER, FOR SOLUTION ORAL at 08:20

## 2023-04-16 RX ADMIN — FOLIC ACID 1 MG: 1 TABLET ORAL at 08:20

## 2023-04-16 RX ADMIN — ASPIRIN 81 MG 81 MG: 81 TABLET ORAL at 08:19

## 2023-04-16 RX ADMIN — CARBAMAZEPINE 400 MG: 200 TABLET ORAL at 08:20

## 2023-04-16 RX ADMIN — METOPROLOL SUCCINATE 25 MG: 25 TABLET, EXTENDED RELEASE ORAL at 08:20

## 2023-04-16 RX ADMIN — POTASSIUM CHLORIDE 20 MEQ: 1500 TABLET, EXTENDED RELEASE ORAL at 08:20

## 2023-04-16 RX ADMIN — CARBAMAZEPINE 400 MG: 200 TABLET ORAL at 20:43

## 2023-04-16 RX ADMIN — FUROSEMIDE 40 MG: 40 TABLET ORAL at 08:20

## 2023-04-16 RX ADMIN — ANTI-FUNGAL POWDER MICONAZOLE NITRATE TALC FREE: 1.42 POWDER TOPICAL at 20:52

## 2023-04-16 ASSESSMENT — PAIN DESCRIPTION - ORIENTATION: ORIENTATION: POSTERIOR

## 2023-04-16 ASSESSMENT — PAIN SCALES - GENERAL
PAINLEVEL_OUTOF10: 0
PAINLEVEL_OUTOF10: 6
PAINLEVEL_OUTOF10: 6

## 2023-04-16 ASSESSMENT — PAIN DESCRIPTION - LOCATION: LOCATION: NECK

## 2023-04-16 ASSESSMENT — PAIN DESCRIPTION - DESCRIPTORS: DESCRIPTORS: ACHING

## 2023-04-17 PROCEDURE — 6370000000 HC RX 637 (ALT 250 FOR IP): Performed by: PHYSICAL MEDICINE & REHABILITATION

## 2023-04-17 PROCEDURE — 6370000000 HC RX 637 (ALT 250 FOR IP): Performed by: PHYSICIAN ASSISTANT

## 2023-04-17 PROCEDURE — 97116 GAIT TRAINING THERAPY: CPT

## 2023-04-17 PROCEDURE — 1180000000 HC REHAB R&B

## 2023-04-17 PROCEDURE — 97530 THERAPEUTIC ACTIVITIES: CPT

## 2023-04-17 PROCEDURE — 97535 SELF CARE MNGMENT TRAINING: CPT

## 2023-04-17 PROCEDURE — 97110 THERAPEUTIC EXERCISES: CPT

## 2023-04-17 PROCEDURE — 97112 NEUROMUSCULAR REEDUCATION: CPT

## 2023-04-17 PROCEDURE — 97150 GROUP THERAPEUTIC PROCEDURES: CPT

## 2023-04-17 RX ADMIN — TAMSULOSIN HYDROCHLORIDE 0.4 MG: 0.4 CAPSULE ORAL at 08:55

## 2023-04-17 RX ADMIN — POTASSIUM CHLORIDE 20 MEQ: 1500 TABLET, EXTENDED RELEASE ORAL at 08:45

## 2023-04-17 RX ADMIN — FUROSEMIDE 40 MG: 40 TABLET ORAL at 08:44

## 2023-04-17 RX ADMIN — HYDROCODONE BITARTRATE AND ACETAMINOPHEN 1 TABLET: 10; 325 TABLET ORAL at 22:03

## 2023-04-17 RX ADMIN — B-COMPLEX W/ C & FOLIC ACID TAB 1 TABLET: TAB at 08:43

## 2023-04-17 RX ADMIN — ANTI-FUNGAL POWDER MICONAZOLE NITRATE TALC FREE: 1.42 POWDER TOPICAL at 09:00

## 2023-04-17 RX ADMIN — GABAPENTIN 400 MG: 300 CAPSULE ORAL at 13:57

## 2023-04-17 RX ADMIN — METOPROLOL SUCCINATE 25 MG: 25 TABLET, EXTENDED RELEASE ORAL at 08:44

## 2023-04-17 RX ADMIN — ASPIRIN 81 MG 81 MG: 81 TABLET ORAL at 08:44

## 2023-04-17 RX ADMIN — CARBAMAZEPINE 400 MG: 200 TABLET ORAL at 21:29

## 2023-04-17 RX ADMIN — APIXABAN 5 MG: 5 TABLET, FILM COATED ORAL at 21:29

## 2023-04-17 RX ADMIN — ANTI-FUNGAL POWDER MICONAZOLE NITRATE TALC FREE: 1.42 POWDER TOPICAL at 22:09

## 2023-04-17 RX ADMIN — APIXABAN 5 MG: 5 TABLET, FILM COATED ORAL at 08:44

## 2023-04-17 RX ADMIN — CYCLOBENZAPRINE 10 MG: 10 TABLET, FILM COATED ORAL at 21:35

## 2023-04-17 RX ADMIN — PANTOPRAZOLE SODIUM 40 MG: 40 TABLET, DELAYED RELEASE ORAL at 05:27

## 2023-04-17 RX ADMIN — GABAPENTIN 400 MG: 300 CAPSULE ORAL at 21:29

## 2023-04-17 RX ADMIN — POLYETHYLENE GLYCOL 3350 17 G: 17 POWDER, FOR SOLUTION ORAL at 08:47

## 2023-04-17 RX ADMIN — CARBAMAZEPINE 400 MG: 200 TABLET ORAL at 08:55

## 2023-04-17 RX ADMIN — FOLIC ACID 1 MG: 1 TABLET ORAL at 08:44

## 2023-04-17 RX ADMIN — GABAPENTIN 400 MG: 300 CAPSULE ORAL at 08:43

## 2023-04-17 RX ADMIN — EMPAGLIFLOZIN 10 MG: 10 TABLET, FILM COATED ORAL at 08:43

## 2023-04-17 ASSESSMENT — PAIN DESCRIPTION - ORIENTATION: ORIENTATION: LOWER

## 2023-04-17 ASSESSMENT — PAIN DESCRIPTION - LOCATION: LOCATION: BACK;NECK

## 2023-04-17 ASSESSMENT — PAIN DESCRIPTION - DESCRIPTORS: DESCRIPTORS: ACHING

## 2023-04-17 ASSESSMENT — PAIN - FUNCTIONAL ASSESSMENT: PAIN_FUNCTIONAL_ASSESSMENT: ACTIVITIES ARE NOT PREVENTED

## 2023-04-17 ASSESSMENT — PAIN SCALES - GENERAL
PAINLEVEL_OUTOF10: 0
PAINLEVEL_OUTOF10: 0
PAINLEVEL_OUTOF10: 6

## 2023-04-17 ASSESSMENT — PAIN SCALES - WONG BAKER: WONGBAKER_NUMERICALRESPONSE: 0

## 2023-04-18 PROCEDURE — 97150 GROUP THERAPEUTIC PROCEDURES: CPT

## 2023-04-18 PROCEDURE — 97112 NEUROMUSCULAR REEDUCATION: CPT

## 2023-04-18 PROCEDURE — 6370000000 HC RX 637 (ALT 250 FOR IP): Performed by: PHYSICAL MEDICINE & REHABILITATION

## 2023-04-18 PROCEDURE — 97110 THERAPEUTIC EXERCISES: CPT

## 2023-04-18 PROCEDURE — 97116 GAIT TRAINING THERAPY: CPT

## 2023-04-18 PROCEDURE — 97530 THERAPEUTIC ACTIVITIES: CPT

## 2023-04-18 PROCEDURE — 1180000000 HC REHAB R&B

## 2023-04-18 PROCEDURE — 97535 SELF CARE MNGMENT TRAINING: CPT

## 2023-04-18 PROCEDURE — 6370000000 HC RX 637 (ALT 250 FOR IP): Performed by: PHYSICIAN ASSISTANT

## 2023-04-18 RX ORDER — BACLOFEN 10 MG/1
5 TABLET ORAL 3 TIMES DAILY
Status: DISCONTINUED | OUTPATIENT
Start: 2023-04-18 | End: 2023-05-02 | Stop reason: HOSPADM

## 2023-04-18 RX ADMIN — POLYETHYLENE GLYCOL 3350 17 G: 17 POWDER, FOR SOLUTION ORAL at 12:27

## 2023-04-18 RX ADMIN — EMPAGLIFLOZIN 10 MG: 10 TABLET, FILM COATED ORAL at 08:36

## 2023-04-18 RX ADMIN — CARBAMAZEPINE 400 MG: 200 TABLET ORAL at 20:40

## 2023-04-18 RX ADMIN — TAMSULOSIN HYDROCHLORIDE 0.4 MG: 0.4 CAPSULE ORAL at 08:36

## 2023-04-18 RX ADMIN — APIXABAN 5 MG: 5 TABLET, FILM COATED ORAL at 20:40

## 2023-04-18 RX ADMIN — GABAPENTIN 400 MG: 300 CAPSULE ORAL at 08:36

## 2023-04-18 RX ADMIN — ASPIRIN 81 MG 81 MG: 81 TABLET ORAL at 08:36

## 2023-04-18 RX ADMIN — PANTOPRAZOLE SODIUM 40 MG: 40 TABLET, DELAYED RELEASE ORAL at 06:20

## 2023-04-18 RX ADMIN — BACLOFEN 5 MG: 10 TABLET ORAL at 12:36

## 2023-04-18 RX ADMIN — ANTI-FUNGAL POWDER MICONAZOLE NITRATE TALC FREE: 1.42 POWDER TOPICAL at 08:37

## 2023-04-18 RX ADMIN — FOLIC ACID 1 MG: 1 TABLET ORAL at 08:36

## 2023-04-18 RX ADMIN — POTASSIUM CHLORIDE 20 MEQ: 1500 TABLET, EXTENDED RELEASE ORAL at 08:36

## 2023-04-18 RX ADMIN — B-COMPLEX W/ C & FOLIC ACID TAB 1 TABLET: TAB at 08:35

## 2023-04-18 RX ADMIN — FUROSEMIDE 40 MG: 40 TABLET ORAL at 08:35

## 2023-04-18 RX ADMIN — CARBAMAZEPINE 400 MG: 200 TABLET ORAL at 08:35

## 2023-04-18 RX ADMIN — HYDROCODONE BITARTRATE AND ACETAMINOPHEN 1 TABLET: 10; 325 TABLET ORAL at 20:48

## 2023-04-18 RX ADMIN — BACLOFEN 5 MG: 10 TABLET ORAL at 21:54

## 2023-04-18 RX ADMIN — APIXABAN 5 MG: 5 TABLET, FILM COATED ORAL at 08:35

## 2023-04-18 RX ADMIN — METOPROLOL SUCCINATE 25 MG: 25 TABLET, EXTENDED RELEASE ORAL at 08:35

## 2023-04-18 RX ADMIN — ANTI-FUNGAL POWDER MICONAZOLE NITRATE TALC FREE: 1.42 POWDER TOPICAL at 20:41

## 2023-04-18 RX ADMIN — GABAPENTIN 400 MG: 300 CAPSULE ORAL at 20:41

## 2023-04-18 ASSESSMENT — PAIN SCALES - WONG BAKER: WONGBAKER_NUMERICALRESPONSE: 0

## 2023-04-18 ASSESSMENT — PAIN SCALES - GENERAL
PAINLEVEL_OUTOF10: 6
PAINLEVEL_OUTOF10: 0

## 2023-04-18 ASSESSMENT — PAIN DESCRIPTION - DESCRIPTORS: DESCRIPTORS: ACHING

## 2023-04-18 ASSESSMENT — PAIN - FUNCTIONAL ASSESSMENT: PAIN_FUNCTIONAL_ASSESSMENT: PREVENTS OR INTERFERES SOME ACTIVE ACTIVITIES AND ADLS

## 2023-04-18 ASSESSMENT — PAIN DESCRIPTION - LOCATION: LOCATION: GENERALIZED

## 2023-04-19 PROCEDURE — 97116 GAIT TRAINING THERAPY: CPT

## 2023-04-19 PROCEDURE — 97530 THERAPEUTIC ACTIVITIES: CPT

## 2023-04-19 PROCEDURE — 1180000000 HC REHAB R&B

## 2023-04-19 PROCEDURE — 6370000000 HC RX 637 (ALT 250 FOR IP): Performed by: PHYSICAL MEDICINE & REHABILITATION

## 2023-04-19 PROCEDURE — 6370000000 HC RX 637 (ALT 250 FOR IP): Performed by: PHYSICIAN ASSISTANT

## 2023-04-19 PROCEDURE — 97535 SELF CARE MNGMENT TRAINING: CPT

## 2023-04-19 PROCEDURE — 97110 THERAPEUTIC EXERCISES: CPT

## 2023-04-19 RX ORDER — GABAPENTIN 100 MG/1
100 CAPSULE ORAL ONCE
Status: DISCONTINUED | OUTPATIENT
Start: 2023-04-19 | End: 2023-05-02 | Stop reason: HOSPADM

## 2023-04-19 RX ADMIN — ASPIRIN 81 MG 81 MG: 81 TABLET ORAL at 08:13

## 2023-04-19 RX ADMIN — BACLOFEN 5 MG: 10 TABLET ORAL at 09:24

## 2023-04-19 RX ADMIN — POLYETHYLENE GLYCOL 3350 17 G: 17 POWDER, FOR SOLUTION ORAL at 08:13

## 2023-04-19 RX ADMIN — CARBAMAZEPINE 400 MG: 200 TABLET ORAL at 08:13

## 2023-04-19 RX ADMIN — POTASSIUM CHLORIDE 20 MEQ: 1500 TABLET, EXTENDED RELEASE ORAL at 08:12

## 2023-04-19 RX ADMIN — FOLIC ACID 1 MG: 1 TABLET ORAL at 08:13

## 2023-04-19 RX ADMIN — APIXABAN 5 MG: 5 TABLET, FILM COATED ORAL at 21:01

## 2023-04-19 RX ADMIN — EMPAGLIFLOZIN 10 MG: 10 TABLET, FILM COATED ORAL at 08:13

## 2023-04-19 RX ADMIN — GABAPENTIN 400 MG: 300 CAPSULE ORAL at 21:02

## 2023-04-19 RX ADMIN — ANTI-FUNGAL POWDER MICONAZOLE NITRATE TALC FREE: 1.42 POWDER TOPICAL at 21:04

## 2023-04-19 RX ADMIN — METOPROLOL SUCCINATE 25 MG: 25 TABLET, EXTENDED RELEASE ORAL at 08:13

## 2023-04-19 RX ADMIN — TAMSULOSIN HYDROCHLORIDE 0.4 MG: 0.4 CAPSULE ORAL at 08:12

## 2023-04-19 RX ADMIN — B-COMPLEX W/ C & FOLIC ACID TAB 1 TABLET: TAB at 08:13

## 2023-04-19 RX ADMIN — APIXABAN 5 MG: 5 TABLET, FILM COATED ORAL at 08:13

## 2023-04-19 RX ADMIN — PANTOPRAZOLE SODIUM 40 MG: 40 TABLET, DELAYED RELEASE ORAL at 06:12

## 2023-04-19 RX ADMIN — HYDROCODONE BITARTRATE AND ACETAMINOPHEN 1 TABLET: 10; 325 TABLET ORAL at 19:23

## 2023-04-19 RX ADMIN — FUROSEMIDE 40 MG: 40 TABLET ORAL at 08:13

## 2023-04-19 RX ADMIN — BACLOFEN 5 MG: 10 TABLET ORAL at 21:09

## 2023-04-19 RX ADMIN — CARBAMAZEPINE 400 MG: 200 TABLET ORAL at 21:02

## 2023-04-19 RX ADMIN — BACLOFEN 5 MG: 10 TABLET ORAL at 15:12

## 2023-04-19 ASSESSMENT — PAIN SCALES - WONG BAKER: WONGBAKER_NUMERICALRESPONSE: 0

## 2023-04-20 PROCEDURE — 97112 NEUROMUSCULAR REEDUCATION: CPT

## 2023-04-20 PROCEDURE — 6370000000 HC RX 637 (ALT 250 FOR IP): Performed by: PHYSICIAN ASSISTANT

## 2023-04-20 PROCEDURE — 1180000000 HC REHAB R&B

## 2023-04-20 PROCEDURE — 97535 SELF CARE MNGMENT TRAINING: CPT

## 2023-04-20 PROCEDURE — 97110 THERAPEUTIC EXERCISES: CPT

## 2023-04-20 PROCEDURE — 6370000000 HC RX 637 (ALT 250 FOR IP): Performed by: PHYSICAL MEDICINE & REHABILITATION

## 2023-04-20 PROCEDURE — 97530 THERAPEUTIC ACTIVITIES: CPT

## 2023-04-20 RX ADMIN — TAMSULOSIN HYDROCHLORIDE 0.4 MG: 0.4 CAPSULE ORAL at 08:33

## 2023-04-20 RX ADMIN — ANTI-FUNGAL POWDER MICONAZOLE NITRATE TALC FREE: 1.42 POWDER TOPICAL at 22:15

## 2023-04-20 RX ADMIN — PANTOPRAZOLE SODIUM 40 MG: 40 TABLET, DELAYED RELEASE ORAL at 05:39

## 2023-04-20 RX ADMIN — POTASSIUM CHLORIDE 20 MEQ: 1500 TABLET, EXTENDED RELEASE ORAL at 08:33

## 2023-04-20 RX ADMIN — BACLOFEN 5 MG: 10 TABLET ORAL at 08:33

## 2023-04-20 RX ADMIN — FUROSEMIDE 40 MG: 40 TABLET ORAL at 08:33

## 2023-04-20 RX ADMIN — B-COMPLEX W/ C & FOLIC ACID TAB 1 TABLET: TAB at 08:33

## 2023-04-20 RX ADMIN — GABAPENTIN 400 MG: 300 CAPSULE ORAL at 14:10

## 2023-04-20 RX ADMIN — FOLIC ACID 1 MG: 1 TABLET ORAL at 08:33

## 2023-04-20 RX ADMIN — GABAPENTIN 400 MG: 300 CAPSULE ORAL at 22:18

## 2023-04-20 RX ADMIN — GABAPENTIN 400 MG: 300 CAPSULE ORAL at 08:33

## 2023-04-20 RX ADMIN — APIXABAN 5 MG: 5 TABLET, FILM COATED ORAL at 08:33

## 2023-04-20 RX ADMIN — POLYETHYLENE GLYCOL 3350 17 G: 17 POWDER, FOR SOLUTION ORAL at 08:33

## 2023-04-20 RX ADMIN — EMPAGLIFLOZIN 10 MG: 10 TABLET, FILM COATED ORAL at 08:33

## 2023-04-20 RX ADMIN — BACLOFEN 5 MG: 10 TABLET ORAL at 22:16

## 2023-04-20 RX ADMIN — ASPIRIN 81 MG 81 MG: 81 TABLET ORAL at 08:33

## 2023-04-20 RX ADMIN — BACLOFEN 5 MG: 10 TABLET ORAL at 14:10

## 2023-04-20 RX ADMIN — ROSUVASTATIN CALCIUM 40 MG: 10 TABLET, FILM COATED ORAL at 22:22

## 2023-04-20 RX ADMIN — METOPROLOL SUCCINATE 25 MG: 25 TABLET, EXTENDED RELEASE ORAL at 08:33

## 2023-04-20 RX ADMIN — CARBAMAZEPINE 400 MG: 200 TABLET ORAL at 22:17

## 2023-04-20 RX ADMIN — CARBAMAZEPINE 400 MG: 200 TABLET ORAL at 08:33

## 2023-04-20 RX ADMIN — APIXABAN 5 MG: 5 TABLET, FILM COATED ORAL at 22:19

## 2023-04-20 ASSESSMENT — PAIN SCALES - GENERAL: PAINLEVEL_OUTOF10: 0

## 2023-04-21 LAB
APPEARANCE UR: ABNORMAL
BACTERIA URNS QL MICRO: ABNORMAL /HPF
BILIRUB UR QL: NEGATIVE
CASTS URNS QL MICRO: 0 /LPF
COLOR UR: ABNORMAL
CRYSTALS URNS QL MICRO: 0 /LPF
EPI CELLS #/AREA URNS HPF: 0 /HPF
GLUCOSE UR STRIP.AUTO-MCNC: 250 MG/DL
HGB UR QL STRIP: ABNORMAL
KETONES UR QL STRIP.AUTO: NEGATIVE MG/DL
LEUKOCYTE ESTERASE UR QL STRIP.AUTO: ABNORMAL
MUCOUS THREADS URNS QL MICRO: 0 /LPF
NITRITE UR QL STRIP.AUTO: POSITIVE
OTHER OBSERVATIONS: ABNORMAL
PH UR STRIP: 6.5 (ref 5–9)
PROT UR STRIP-MCNC: 30 MG/DL
RBC #/AREA URNS HPF: 0 /HPF
SP GR UR REFRACTOMETRY: 1.02 (ref 1–1.02)
URINE CULTURE IF INDICATED: ABNORMAL
UROBILINOGEN UR QL STRIP.AUTO: 1 EU/DL (ref 0.2–1)
WBC URNS QL MICRO: >100 /HPF

## 2023-04-21 PROCEDURE — 87186 SC STD MICRODIL/AGAR DIL: CPT

## 2023-04-21 PROCEDURE — 1180000000 HC REHAB R&B

## 2023-04-21 PROCEDURE — 6370000000 HC RX 637 (ALT 250 FOR IP): Performed by: PHYSICAL MEDICINE & REHABILITATION

## 2023-04-21 PROCEDURE — 97535 SELF CARE MNGMENT TRAINING: CPT

## 2023-04-21 PROCEDURE — 97530 THERAPEUTIC ACTIVITIES: CPT

## 2023-04-21 PROCEDURE — 81001 URINALYSIS AUTO W/SCOPE: CPT

## 2023-04-21 PROCEDURE — 87086 URINE CULTURE/COLONY COUNT: CPT

## 2023-04-21 PROCEDURE — 97112 NEUROMUSCULAR REEDUCATION: CPT

## 2023-04-21 PROCEDURE — 87088 URINE BACTERIA CULTURE: CPT

## 2023-04-21 PROCEDURE — 97116 GAIT TRAINING THERAPY: CPT

## 2023-04-21 PROCEDURE — 97110 THERAPEUTIC EXERCISES: CPT

## 2023-04-21 PROCEDURE — 6370000000 HC RX 637 (ALT 250 FOR IP): Performed by: PHYSICIAN ASSISTANT

## 2023-04-21 RX ADMIN — BACLOFEN 5 MG: 10 TABLET ORAL at 08:12

## 2023-04-21 RX ADMIN — EMPAGLIFLOZIN 10 MG: 10 TABLET, FILM COATED ORAL at 08:13

## 2023-04-21 RX ADMIN — ANTI-FUNGAL POWDER MICONAZOLE NITRATE TALC FREE: 1.42 POWDER TOPICAL at 09:01

## 2023-04-21 RX ADMIN — APIXABAN 5 MG: 5 TABLET, FILM COATED ORAL at 20:40

## 2023-04-21 RX ADMIN — PANTOPRAZOLE SODIUM 40 MG: 40 TABLET, DELAYED RELEASE ORAL at 06:51

## 2023-04-21 RX ADMIN — GABAPENTIN 400 MG: 300 CAPSULE ORAL at 08:13

## 2023-04-21 RX ADMIN — CARBAMAZEPINE 400 MG: 200 TABLET ORAL at 08:12

## 2023-04-21 RX ADMIN — CARBAMAZEPINE 400 MG: 200 TABLET ORAL at 20:40

## 2023-04-21 RX ADMIN — B-COMPLEX W/ C & FOLIC ACID TAB 1 TABLET: TAB at 08:12

## 2023-04-21 RX ADMIN — GABAPENTIN 400 MG: 300 CAPSULE ORAL at 14:48

## 2023-04-21 RX ADMIN — ASPIRIN 81 MG 81 MG: 81 TABLET ORAL at 08:12

## 2023-04-21 RX ADMIN — BACLOFEN 5 MG: 10 TABLET ORAL at 14:48

## 2023-04-21 RX ADMIN — APIXABAN 5 MG: 5 TABLET, FILM COATED ORAL at 08:13

## 2023-04-21 RX ADMIN — TAMSULOSIN HYDROCHLORIDE 0.4 MG: 0.4 CAPSULE ORAL at 08:14

## 2023-04-21 RX ADMIN — GABAPENTIN 400 MG: 300 CAPSULE ORAL at 20:41

## 2023-04-21 RX ADMIN — FUROSEMIDE 40 MG: 40 TABLET ORAL at 08:13

## 2023-04-21 RX ADMIN — HYDROCODONE BITARTRATE AND ACETAMINOPHEN 1 TABLET: 10; 325 TABLET ORAL at 08:33

## 2023-04-21 RX ADMIN — FOLIC ACID 1 MG: 1 TABLET ORAL at 08:13

## 2023-04-21 RX ADMIN — METOPROLOL SUCCINATE 25 MG: 25 TABLET, EXTENDED RELEASE ORAL at 08:13

## 2023-04-21 RX ADMIN — BACLOFEN 5 MG: 10 TABLET ORAL at 20:40

## 2023-04-21 RX ADMIN — POTASSIUM CHLORIDE 20 MEQ: 1500 TABLET, EXTENDED RELEASE ORAL at 08:12

## 2023-04-21 RX ADMIN — POLYETHYLENE GLYCOL 3350 17 G: 17 POWDER, FOR SOLUTION ORAL at 08:14

## 2023-04-21 ASSESSMENT — PAIN SCALES - GENERAL
PAINLEVEL_OUTOF10: 0
PAINLEVEL_OUTOF10: 4

## 2023-04-21 ASSESSMENT — PAIN DESCRIPTION - LOCATION: LOCATION: NECK

## 2023-04-22 PROCEDURE — 97530 THERAPEUTIC ACTIVITIES: CPT

## 2023-04-22 PROCEDURE — 1180000000 HC REHAB R&B

## 2023-04-22 PROCEDURE — 97112 NEUROMUSCULAR REEDUCATION: CPT

## 2023-04-22 PROCEDURE — 6370000000 HC RX 637 (ALT 250 FOR IP): Performed by: PHYSICIAN ASSISTANT

## 2023-04-22 PROCEDURE — 97116 GAIT TRAINING THERAPY: CPT

## 2023-04-22 PROCEDURE — 6370000000 HC RX 637 (ALT 250 FOR IP): Performed by: PHYSICAL MEDICINE & REHABILITATION

## 2023-04-22 PROCEDURE — 97110 THERAPEUTIC EXERCISES: CPT

## 2023-04-22 RX ORDER — CIPROFLOXACIN 250 MG/1
500 TABLET, FILM COATED ORAL EVERY 12 HOURS SCHEDULED
Status: COMPLETED | OUTPATIENT
Start: 2023-04-22 | End: 2023-05-01

## 2023-04-22 RX ADMIN — PANTOPRAZOLE SODIUM 40 MG: 40 TABLET, DELAYED RELEASE ORAL at 05:41

## 2023-04-22 RX ADMIN — CIPROFLOXACIN 500 MG: 250 TABLET, FILM COATED ORAL at 20:35

## 2023-04-22 RX ADMIN — HYDROCODONE BITARTRATE AND ACETAMINOPHEN 1 TABLET: 10; 325 TABLET ORAL at 20:31

## 2023-04-22 RX ADMIN — POLYETHYLENE GLYCOL 3350 17 G: 17 POWDER, FOR SOLUTION ORAL at 08:55

## 2023-04-22 RX ADMIN — GABAPENTIN 400 MG: 300 CAPSULE ORAL at 14:30

## 2023-04-22 RX ADMIN — FUROSEMIDE 40 MG: 40 TABLET ORAL at 08:58

## 2023-04-22 RX ADMIN — POTASSIUM CHLORIDE 20 MEQ: 1500 TABLET, EXTENDED RELEASE ORAL at 08:59

## 2023-04-22 RX ADMIN — FOLIC ACID 1 MG: 1 TABLET ORAL at 08:56

## 2023-04-22 RX ADMIN — APIXABAN 5 MG: 5 TABLET, FILM COATED ORAL at 20:35

## 2023-04-22 RX ADMIN — ANTI-FUNGAL POWDER MICONAZOLE NITRATE TALC FREE: 1.42 POWDER TOPICAL at 08:55

## 2023-04-22 RX ADMIN — TAMSULOSIN HYDROCHLORIDE 0.4 MG: 0.4 CAPSULE ORAL at 08:54

## 2023-04-22 RX ADMIN — B-COMPLEX W/ C & FOLIC ACID TAB 1 TABLET: TAB at 08:55

## 2023-04-22 RX ADMIN — BACLOFEN 5 MG: 10 TABLET ORAL at 08:54

## 2023-04-22 RX ADMIN — BACLOFEN 5 MG: 10 TABLET ORAL at 20:35

## 2023-04-22 RX ADMIN — GABAPENTIN 400 MG: 300 CAPSULE ORAL at 20:35

## 2023-04-22 RX ADMIN — ANTI-FUNGAL POWDER MICONAZOLE NITRATE TALC FREE: 1.42 POWDER TOPICAL at 20:42

## 2023-04-22 RX ADMIN — METOPROLOL SUCCINATE 25 MG: 25 TABLET, EXTENDED RELEASE ORAL at 08:58

## 2023-04-22 RX ADMIN — GABAPENTIN 400 MG: 300 CAPSULE ORAL at 08:57

## 2023-04-22 RX ADMIN — BACLOFEN 5 MG: 10 TABLET ORAL at 15:37

## 2023-04-22 RX ADMIN — ROSUVASTATIN CALCIUM 40 MG: 10 TABLET, FILM COATED ORAL at 20:35

## 2023-04-22 RX ADMIN — CIPROFLOXACIN 500 MG: 250 TABLET, FILM COATED ORAL at 10:32

## 2023-04-22 RX ADMIN — CARBAMAZEPINE 400 MG: 200 TABLET ORAL at 20:36

## 2023-04-22 RX ADMIN — CARBAMAZEPINE 400 MG: 200 TABLET ORAL at 08:55

## 2023-04-22 RX ADMIN — ASPIRIN 81 MG 81 MG: 81 TABLET ORAL at 08:56

## 2023-04-22 RX ADMIN — APIXABAN 5 MG: 5 TABLET, FILM COATED ORAL at 08:55

## 2023-04-22 RX ADMIN — EMPAGLIFLOZIN 10 MG: 10 TABLET, FILM COATED ORAL at 08:58

## 2023-04-22 ASSESSMENT — PAIN SCALES - GENERAL
PAINLEVEL_OUTOF10: 9
PAINLEVEL_OUTOF10: 0
PAINLEVEL_OUTOF10: 8
PAINLEVEL_OUTOF10: 0
PAINLEVEL_OUTOF10: 9

## 2023-04-22 ASSESSMENT — PAIN DESCRIPTION - LOCATION
LOCATION: NECK
LOCATION: NECK;GENERALIZED

## 2023-04-22 ASSESSMENT — PAIN DESCRIPTION - DESCRIPTORS
DESCRIPTORS: ACHING
DESCRIPTORS: ACHING

## 2023-04-23 LAB
BACTERIA SPEC CULT: ABNORMAL
SERVICE CMNT-IMP: ABNORMAL

## 2023-04-23 PROCEDURE — 6370000000 HC RX 637 (ALT 250 FOR IP): Performed by: PHYSICAL MEDICINE & REHABILITATION

## 2023-04-23 PROCEDURE — 1180000000 HC REHAB R&B

## 2023-04-23 PROCEDURE — 6370000000 HC RX 637 (ALT 250 FOR IP): Performed by: PHYSICIAN ASSISTANT

## 2023-04-23 RX ADMIN — B-COMPLEX W/ C & FOLIC ACID TAB 1 TABLET: TAB at 09:50

## 2023-04-23 RX ADMIN — GABAPENTIN 400 MG: 300 CAPSULE ORAL at 09:48

## 2023-04-23 RX ADMIN — APIXABAN 5 MG: 5 TABLET, FILM COATED ORAL at 21:10

## 2023-04-23 RX ADMIN — ANTI-FUNGAL POWDER MICONAZOLE NITRATE TALC FREE: 1.42 POWDER TOPICAL at 21:20

## 2023-04-23 RX ADMIN — GABAPENTIN 400 MG: 300 CAPSULE ORAL at 14:18

## 2023-04-23 RX ADMIN — FUROSEMIDE 40 MG: 40 TABLET ORAL at 09:48

## 2023-04-23 RX ADMIN — CARBAMAZEPINE 400 MG: 200 TABLET ORAL at 21:12

## 2023-04-23 RX ADMIN — HYDROCODONE BITARTRATE AND ACETAMINOPHEN 1 TABLET: 10; 325 TABLET ORAL at 21:11

## 2023-04-23 RX ADMIN — POTASSIUM CHLORIDE 20 MEQ: 1500 TABLET, EXTENDED RELEASE ORAL at 09:53

## 2023-04-23 RX ADMIN — CIPROFLOXACIN 500 MG: 250 TABLET, FILM COATED ORAL at 09:49

## 2023-04-23 RX ADMIN — ANTI-FUNGAL POWDER MICONAZOLE NITRATE TALC FREE: 1.42 POWDER TOPICAL at 09:53

## 2023-04-23 RX ADMIN — EMPAGLIFLOZIN 10 MG: 10 TABLET, FILM COATED ORAL at 09:48

## 2023-04-23 RX ADMIN — HYDROCODONE BITARTRATE AND ACETAMINOPHEN 1 TABLET: 10; 325 TABLET ORAL at 13:05

## 2023-04-23 RX ADMIN — ASPIRIN 81 MG 81 MG: 81 TABLET ORAL at 09:51

## 2023-04-23 RX ADMIN — BACLOFEN 5 MG: 10 TABLET ORAL at 14:16

## 2023-04-23 RX ADMIN — BACLOFEN 5 MG: 10 TABLET ORAL at 21:10

## 2023-04-23 RX ADMIN — HYDROCODONE BITARTRATE AND ACETAMINOPHEN 1 TABLET: 10; 325 TABLET ORAL at 02:07

## 2023-04-23 RX ADMIN — TAMSULOSIN HYDROCHLORIDE 0.4 MG: 0.4 CAPSULE ORAL at 09:52

## 2023-04-23 RX ADMIN — CARBAMAZEPINE 400 MG: 200 TABLET ORAL at 09:51

## 2023-04-23 RX ADMIN — ROSUVASTATIN CALCIUM 40 MG: 10 TABLET, FILM COATED ORAL at 21:10

## 2023-04-23 RX ADMIN — FOLIC ACID 1 MG: 1 TABLET ORAL at 09:52

## 2023-04-23 RX ADMIN — CIPROFLOXACIN 500 MG: 250 TABLET, FILM COATED ORAL at 21:09

## 2023-04-23 RX ADMIN — PANTOPRAZOLE SODIUM 40 MG: 40 TABLET, DELAYED RELEASE ORAL at 06:05

## 2023-04-23 RX ADMIN — METOPROLOL SUCCINATE 25 MG: 25 TABLET, EXTENDED RELEASE ORAL at 09:49

## 2023-04-23 RX ADMIN — BACLOFEN 5 MG: 10 TABLET ORAL at 09:50

## 2023-04-23 RX ADMIN — GABAPENTIN 400 MG: 300 CAPSULE ORAL at 21:10

## 2023-04-23 RX ADMIN — APIXABAN 5 MG: 5 TABLET, FILM COATED ORAL at 09:50

## 2023-04-23 ASSESSMENT — PAIN SCALES - GENERAL
PAINLEVEL_OUTOF10: 7
PAINLEVEL_OUTOF10: 5
PAINLEVEL_OUTOF10: 8
PAINLEVEL_OUTOF10: 0
PAINLEVEL_OUTOF10: 8
PAINLEVEL_OUTOF10: 7
PAINLEVEL_OUTOF10: 7

## 2023-04-23 ASSESSMENT — PAIN DESCRIPTION - DESCRIPTORS
DESCRIPTORS: ACHING
DESCRIPTORS: ACHING;POUNDING
DESCRIPTORS: ACHING

## 2023-04-23 ASSESSMENT — PAIN DESCRIPTION - LOCATION
LOCATION: FACE
LOCATION: NECK
LOCATION: NECK
LOCATION: FACE

## 2023-04-23 ASSESSMENT — PAIN DESCRIPTION - ORIENTATION
ORIENTATION: LEFT
ORIENTATION: RIGHT

## 2023-04-23 ASSESSMENT — PAIN DESCRIPTION - ONSET: ONSET: ON-GOING

## 2023-04-23 ASSESSMENT — PAIN DESCRIPTION - FREQUENCY: FREQUENCY: INTERMITTENT

## 2023-04-24 PROCEDURE — 97112 NEUROMUSCULAR REEDUCATION: CPT

## 2023-04-24 PROCEDURE — 6370000000 HC RX 637 (ALT 250 FOR IP): Performed by: PHYSICAL MEDICINE & REHABILITATION

## 2023-04-24 PROCEDURE — 97116 GAIT TRAINING THERAPY: CPT

## 2023-04-24 PROCEDURE — 97110 THERAPEUTIC EXERCISES: CPT

## 2023-04-24 PROCEDURE — 97530 THERAPEUTIC ACTIVITIES: CPT

## 2023-04-24 PROCEDURE — 1180000000 HC REHAB R&B

## 2023-04-24 PROCEDURE — 6370000000 HC RX 637 (ALT 250 FOR IP): Performed by: PHYSICIAN ASSISTANT

## 2023-04-24 PROCEDURE — 97535 SELF CARE MNGMENT TRAINING: CPT

## 2023-04-24 RX ADMIN — TAMSULOSIN HYDROCHLORIDE 0.4 MG: 0.4 CAPSULE ORAL at 08:24

## 2023-04-24 RX ADMIN — ROSUVASTATIN CALCIUM 40 MG: 10 TABLET, FILM COATED ORAL at 21:36

## 2023-04-24 RX ADMIN — GABAPENTIN 400 MG: 300 CAPSULE ORAL at 21:37

## 2023-04-24 RX ADMIN — HYDROCODONE BITARTRATE AND ACETAMINOPHEN 1 TABLET: 10; 325 TABLET ORAL at 09:27

## 2023-04-24 RX ADMIN — CARBAMAZEPINE 400 MG: 200 TABLET ORAL at 08:09

## 2023-04-24 RX ADMIN — POTASSIUM CHLORIDE 20 MEQ: 1500 TABLET, EXTENDED RELEASE ORAL at 08:24

## 2023-04-24 RX ADMIN — GABAPENTIN 400 MG: 300 CAPSULE ORAL at 14:23

## 2023-04-24 RX ADMIN — POLYETHYLENE GLYCOL 3350 17 G: 17 POWDER, FOR SOLUTION ORAL at 08:09

## 2023-04-24 RX ADMIN — ANTI-FUNGAL POWDER MICONAZOLE NITRATE TALC FREE: 1.42 POWDER TOPICAL at 21:46

## 2023-04-24 RX ADMIN — PANTOPRAZOLE SODIUM 40 MG: 40 TABLET, DELAYED RELEASE ORAL at 06:04

## 2023-04-24 RX ADMIN — BACLOFEN 5 MG: 10 TABLET ORAL at 14:42

## 2023-04-24 RX ADMIN — ASPIRIN 81 MG 81 MG: 81 TABLET ORAL at 08:10

## 2023-04-24 RX ADMIN — CIPROFLOXACIN 500 MG: 250 TABLET, FILM COATED ORAL at 08:10

## 2023-04-24 RX ADMIN — BACLOFEN 5 MG: 10 TABLET ORAL at 08:10

## 2023-04-24 RX ADMIN — FOLIC ACID 1 MG: 1 TABLET ORAL at 08:10

## 2023-04-24 RX ADMIN — APIXABAN 5 MG: 5 TABLET, FILM COATED ORAL at 08:09

## 2023-04-24 RX ADMIN — B-COMPLEX W/ C & FOLIC ACID TAB 1 TABLET: TAB at 08:10

## 2023-04-24 RX ADMIN — EMPAGLIFLOZIN 10 MG: 10 TABLET, FILM COATED ORAL at 08:10

## 2023-04-24 RX ADMIN — METOPROLOL SUCCINATE 25 MG: 25 TABLET, EXTENDED RELEASE ORAL at 08:10

## 2023-04-24 RX ADMIN — FUROSEMIDE 40 MG: 40 TABLET ORAL at 08:11

## 2023-04-24 RX ADMIN — CARBAMAZEPINE 400 MG: 200 TABLET ORAL at 21:37

## 2023-04-24 RX ADMIN — GABAPENTIN 400 MG: 300 CAPSULE ORAL at 08:09

## 2023-04-24 RX ADMIN — HYDROCODONE BITARTRATE AND ACETAMINOPHEN 1 TABLET: 10; 325 TABLET ORAL at 21:39

## 2023-04-24 RX ADMIN — APIXABAN 5 MG: 5 TABLET, FILM COATED ORAL at 21:37

## 2023-04-24 RX ADMIN — BACLOFEN 5 MG: 10 TABLET ORAL at 21:37

## 2023-04-24 RX ADMIN — CIPROFLOXACIN 500 MG: 250 TABLET, FILM COATED ORAL at 21:36

## 2023-04-24 ASSESSMENT — PAIN SCALES - GENERAL
PAINLEVEL_OUTOF10: 5
PAINLEVEL_OUTOF10: 0
PAINLEVEL_OUTOF10: 6
PAINLEVEL_OUTOF10: 0
PAINLEVEL_OUTOF10: 8

## 2023-04-24 ASSESSMENT — PAIN DESCRIPTION - LOCATION
LOCATION: NECK
LOCATION: BACK;GENERALIZED

## 2023-04-24 ASSESSMENT — PAIN DESCRIPTION - DESCRIPTORS
DESCRIPTORS: ACHING
DESCRIPTORS: ACHING

## 2023-04-25 DIAGNOSIS — G45.9 TIA (TRANSIENT ISCHEMIC ATTACK): ICD-10-CM

## 2023-04-25 DIAGNOSIS — Z95.818 STATUS POST PLACEMENT OF IMPLANTABLE LOOP RECORDER: ICD-10-CM

## 2023-04-25 PROCEDURE — 97112 NEUROMUSCULAR REEDUCATION: CPT

## 2023-04-25 PROCEDURE — 1180000000 HC REHAB R&B

## 2023-04-25 PROCEDURE — 97110 THERAPEUTIC EXERCISES: CPT

## 2023-04-25 PROCEDURE — 6370000000 HC RX 637 (ALT 250 FOR IP): Performed by: PHYSICIAN ASSISTANT

## 2023-04-25 PROCEDURE — 6370000000 HC RX 637 (ALT 250 FOR IP): Performed by: PHYSICAL MEDICINE & REHABILITATION

## 2023-04-25 PROCEDURE — 97535 SELF CARE MNGMENT TRAINING: CPT

## 2023-04-25 PROCEDURE — 97530 THERAPEUTIC ACTIVITIES: CPT

## 2023-04-25 PROCEDURE — 97116 GAIT TRAINING THERAPY: CPT

## 2023-04-25 RX ADMIN — BACLOFEN 5 MG: 10 TABLET ORAL at 14:36

## 2023-04-25 RX ADMIN — ANTI-FUNGAL POWDER MICONAZOLE NITRATE TALC FREE: 1.42 POWDER TOPICAL at 21:20

## 2023-04-25 RX ADMIN — ROSUVASTATIN CALCIUM 40 MG: 10 TABLET, FILM COATED ORAL at 20:13

## 2023-04-25 RX ADMIN — POLYETHYLENE GLYCOL 3350 17 G: 17 POWDER, FOR SOLUTION ORAL at 08:18

## 2023-04-25 RX ADMIN — APIXABAN 5 MG: 5 TABLET, FILM COATED ORAL at 20:13

## 2023-04-25 RX ADMIN — GABAPENTIN 400 MG: 300 CAPSULE ORAL at 20:13

## 2023-04-25 RX ADMIN — HYDROCODONE BITARTRATE AND ACETAMINOPHEN 1 TABLET: 10; 325 TABLET ORAL at 08:26

## 2023-04-25 RX ADMIN — EMPAGLIFLOZIN 10 MG: 10 TABLET, FILM COATED ORAL at 08:17

## 2023-04-25 RX ADMIN — METOPROLOL SUCCINATE 25 MG: 25 TABLET, EXTENDED RELEASE ORAL at 08:17

## 2023-04-25 RX ADMIN — ASPIRIN 81 MG 81 MG: 81 TABLET ORAL at 08:18

## 2023-04-25 RX ADMIN — CIPROFLOXACIN 500 MG: 250 TABLET, FILM COATED ORAL at 20:14

## 2023-04-25 RX ADMIN — HYDROCODONE BITARTRATE AND ACETAMINOPHEN 1 TABLET: 10; 325 TABLET ORAL at 20:14

## 2023-04-25 RX ADMIN — CARBAMAZEPINE 400 MG: 200 TABLET ORAL at 20:13

## 2023-04-25 RX ADMIN — TAMSULOSIN HYDROCHLORIDE 0.4 MG: 0.4 CAPSULE ORAL at 08:18

## 2023-04-25 RX ADMIN — FUROSEMIDE 40 MG: 40 TABLET ORAL at 08:17

## 2023-04-25 RX ADMIN — B-COMPLEX W/ C & FOLIC ACID TAB 1 TABLET: TAB at 08:18

## 2023-04-25 RX ADMIN — CARBAMAZEPINE 400 MG: 200 TABLET ORAL at 08:17

## 2023-04-25 RX ADMIN — GABAPENTIN 400 MG: 300 CAPSULE ORAL at 08:18

## 2023-04-25 RX ADMIN — POTASSIUM CHLORIDE 20 MEQ: 1500 TABLET, EXTENDED RELEASE ORAL at 08:17

## 2023-04-25 RX ADMIN — APIXABAN 5 MG: 5 TABLET, FILM COATED ORAL at 08:18

## 2023-04-25 RX ADMIN — BACLOFEN 5 MG: 10 TABLET ORAL at 08:18

## 2023-04-25 RX ADMIN — CIPROFLOXACIN 500 MG: 250 TABLET, FILM COATED ORAL at 08:17

## 2023-04-25 RX ADMIN — GABAPENTIN 400 MG: 300 CAPSULE ORAL at 14:26

## 2023-04-25 RX ADMIN — BACLOFEN 5 MG: 10 TABLET ORAL at 20:13

## 2023-04-25 RX ADMIN — PANTOPRAZOLE SODIUM 40 MG: 40 TABLET, DELAYED RELEASE ORAL at 05:48

## 2023-04-25 RX ADMIN — FOLIC ACID 1 MG: 1 TABLET ORAL at 08:18

## 2023-04-25 ASSESSMENT — PAIN SCALES - GENERAL
PAINLEVEL_OUTOF10: 0
PAINLEVEL_OUTOF10: 5
PAINLEVEL_OUTOF10: 0
PAINLEVEL_OUTOF10: 0
PAINLEVEL_OUTOF10: 6

## 2023-04-25 ASSESSMENT — PAIN DESCRIPTION - ORIENTATION: ORIENTATION: RIGHT

## 2023-04-25 ASSESSMENT — PAIN DESCRIPTION - LOCATION
LOCATION: GENERALIZED
LOCATION: FACE

## 2023-04-25 ASSESSMENT — PAIN DESCRIPTION - DESCRIPTORS
DESCRIPTORS: TINGLING
DESCRIPTORS: ACHING

## 2023-04-25 ASSESSMENT — PAIN SCALES - WONG BAKER: WONGBAKER_NUMERICALRESPONSE: 0

## 2023-04-26 LAB
ANION GAP SERPL CALC-SCNC: 6 MMOL/L (ref 2–11)
BUN SERPL-MCNC: 20 MG/DL (ref 8–23)
CALCIUM SERPL-MCNC: 8.6 MG/DL (ref 8.3–10.4)
CHLORIDE SERPL-SCNC: 101 MMOL/L (ref 101–110)
CO2 SERPL-SCNC: 26 MMOL/L (ref 21–32)
CREAT SERPL-MCNC: 0.6 MG/DL (ref 0.8–1.5)
ERYTHROCYTE [DISTWIDTH] IN BLOOD BY AUTOMATED COUNT: 15.9 % (ref 11.9–14.6)
GLUCOSE SERPL-MCNC: 86 MG/DL (ref 65–100)
HCT VFR BLD AUTO: 34.3 % (ref 41.1–50.3)
HGB BLD-MCNC: 11.7 G/DL (ref 13.6–17.2)
MCH RBC QN AUTO: 35.5 PG (ref 26.1–32.9)
MCHC RBC AUTO-ENTMCNC: 34.1 G/DL (ref 31.4–35)
MCV RBC AUTO: 103.9 FL (ref 82–102)
NRBC # BLD: 0 K/UL (ref 0–0.2)
PLATELET # BLD AUTO: 254 K/UL (ref 150–450)
PMV BLD AUTO: 8.5 FL (ref 9.4–12.3)
POTASSIUM SERPL-SCNC: 4 MMOL/L (ref 3.5–5.1)
RBC # BLD AUTO: 3.3 M/UL (ref 4.23–5.6)
SODIUM SERPL-SCNC: 133 MMOL/L (ref 133–143)
WBC # BLD AUTO: 8.8 K/UL (ref 4.3–11.1)

## 2023-04-26 PROCEDURE — 36415 COLL VENOUS BLD VENIPUNCTURE: CPT

## 2023-04-26 PROCEDURE — 80048 BASIC METABOLIC PNL TOTAL CA: CPT

## 2023-04-26 PROCEDURE — 1180000000 HC REHAB R&B

## 2023-04-26 PROCEDURE — 97112 NEUROMUSCULAR REEDUCATION: CPT

## 2023-04-26 PROCEDURE — 85027 COMPLETE CBC AUTOMATED: CPT

## 2023-04-26 PROCEDURE — 6370000000 HC RX 637 (ALT 250 FOR IP): Performed by: PHYSICAL MEDICINE & REHABILITATION

## 2023-04-26 PROCEDURE — 6370000000 HC RX 637 (ALT 250 FOR IP): Performed by: PHYSICIAN ASSISTANT

## 2023-04-26 PROCEDURE — 97110 THERAPEUTIC EXERCISES: CPT

## 2023-04-26 PROCEDURE — 97535 SELF CARE MNGMENT TRAINING: CPT

## 2023-04-26 PROCEDURE — 97116 GAIT TRAINING THERAPY: CPT

## 2023-04-26 PROCEDURE — 97530 THERAPEUTIC ACTIVITIES: CPT

## 2023-04-26 RX ADMIN — HYDROCODONE BITARTRATE AND ACETAMINOPHEN 1 TABLET: 10; 325 TABLET ORAL at 18:30

## 2023-04-26 RX ADMIN — B-COMPLEX W/ C & FOLIC ACID TAB 1 TABLET: TAB at 08:17

## 2023-04-26 RX ADMIN — APIXABAN 5 MG: 5 TABLET, FILM COATED ORAL at 20:00

## 2023-04-26 RX ADMIN — GABAPENTIN 400 MG: 300 CAPSULE ORAL at 19:59

## 2023-04-26 RX ADMIN — METOPROLOL SUCCINATE 25 MG: 25 TABLET, EXTENDED RELEASE ORAL at 08:17

## 2023-04-26 RX ADMIN — GABAPENTIN 400 MG: 300 CAPSULE ORAL at 14:10

## 2023-04-26 RX ADMIN — POLYETHYLENE GLYCOL 3350 17 G: 17 POWDER, FOR SOLUTION ORAL at 10:57

## 2023-04-26 RX ADMIN — FUROSEMIDE 40 MG: 40 TABLET ORAL at 08:17

## 2023-04-26 RX ADMIN — ROSUVASTATIN CALCIUM 40 MG: 10 TABLET, FILM COATED ORAL at 20:00

## 2023-04-26 RX ADMIN — BACLOFEN 5 MG: 10 TABLET ORAL at 08:17

## 2023-04-26 RX ADMIN — BACLOFEN 5 MG: 10 TABLET ORAL at 20:00

## 2023-04-26 RX ADMIN — GABAPENTIN 400 MG: 300 CAPSULE ORAL at 08:18

## 2023-04-26 RX ADMIN — CIPROFLOXACIN 500 MG: 250 TABLET, FILM COATED ORAL at 19:59

## 2023-04-26 RX ADMIN — CARBAMAZEPINE 400 MG: 200 TABLET ORAL at 08:17

## 2023-04-26 RX ADMIN — FOLIC ACID 1 MG: 1 TABLET ORAL at 08:18

## 2023-04-26 RX ADMIN — ASPIRIN 81 MG 81 MG: 81 TABLET ORAL at 08:18

## 2023-04-26 RX ADMIN — CARBAMAZEPINE 400 MG: 200 TABLET ORAL at 20:00

## 2023-04-26 RX ADMIN — POTASSIUM CHLORIDE 20 MEQ: 1500 TABLET, EXTENDED RELEASE ORAL at 08:18

## 2023-04-26 RX ADMIN — EMPAGLIFLOZIN 10 MG: 10 TABLET, FILM COATED ORAL at 08:18

## 2023-04-26 RX ADMIN — APIXABAN 5 MG: 5 TABLET, FILM COATED ORAL at 08:17

## 2023-04-26 RX ADMIN — TAMSULOSIN HYDROCHLORIDE 0.4 MG: 0.4 CAPSULE ORAL at 08:17

## 2023-04-26 RX ADMIN — CIPROFLOXACIN 500 MG: 250 TABLET, FILM COATED ORAL at 08:17

## 2023-04-26 RX ADMIN — PANTOPRAZOLE SODIUM 40 MG: 40 TABLET, DELAYED RELEASE ORAL at 05:33

## 2023-04-26 RX ADMIN — BACLOFEN 5 MG: 10 TABLET ORAL at 14:10

## 2023-04-26 ASSESSMENT — PAIN DESCRIPTION - LOCATION: LOCATION: NECK

## 2023-04-26 ASSESSMENT — PAIN SCALES - GENERAL
PAINLEVEL_OUTOF10: 3
PAINLEVEL_OUTOF10: 6

## 2023-04-26 ASSESSMENT — PAIN DESCRIPTION - DESCRIPTORS: DESCRIPTORS: ACHING;THROBBING

## 2023-04-27 PROCEDURE — 97110 THERAPEUTIC EXERCISES: CPT

## 2023-04-27 PROCEDURE — 97530 THERAPEUTIC ACTIVITIES: CPT

## 2023-04-27 PROCEDURE — 1180000000 HC REHAB R&B

## 2023-04-27 PROCEDURE — 97112 NEUROMUSCULAR REEDUCATION: CPT

## 2023-04-27 PROCEDURE — 97535 SELF CARE MNGMENT TRAINING: CPT

## 2023-04-27 PROCEDURE — 6370000000 HC RX 637 (ALT 250 FOR IP): Performed by: PHYSICAL MEDICINE & REHABILITATION

## 2023-04-27 PROCEDURE — 6370000000 HC RX 637 (ALT 250 FOR IP): Performed by: PHYSICIAN ASSISTANT

## 2023-04-27 PROCEDURE — 97116 GAIT TRAINING THERAPY: CPT

## 2023-04-27 RX ADMIN — CIPROFLOXACIN 500 MG: 250 TABLET, FILM COATED ORAL at 20:46

## 2023-04-27 RX ADMIN — GABAPENTIN 400 MG: 300 CAPSULE ORAL at 08:04

## 2023-04-27 RX ADMIN — POTASSIUM CHLORIDE 20 MEQ: 1500 TABLET, EXTENDED RELEASE ORAL at 08:05

## 2023-04-27 RX ADMIN — FOLIC ACID 1 MG: 1 TABLET ORAL at 08:05

## 2023-04-27 RX ADMIN — BACLOFEN 5 MG: 10 TABLET ORAL at 20:45

## 2023-04-27 RX ADMIN — CARBAMAZEPINE 400 MG: 200 TABLET ORAL at 08:03

## 2023-04-27 RX ADMIN — GABAPENTIN 400 MG: 300 CAPSULE ORAL at 20:48

## 2023-04-27 RX ADMIN — BACLOFEN 5 MG: 10 TABLET ORAL at 14:25

## 2023-04-27 RX ADMIN — FUROSEMIDE 40 MG: 40 TABLET ORAL at 08:04

## 2023-04-27 RX ADMIN — EMPAGLIFLOZIN 10 MG: 10 TABLET, FILM COATED ORAL at 08:04

## 2023-04-27 RX ADMIN — HYDROCODONE BITARTRATE AND ACETAMINOPHEN 1 TABLET: 10; 325 TABLET ORAL at 18:25

## 2023-04-27 RX ADMIN — METOPROLOL SUCCINATE 25 MG: 25 TABLET, EXTENDED RELEASE ORAL at 08:04

## 2023-04-27 RX ADMIN — GABAPENTIN 400 MG: 300 CAPSULE ORAL at 14:25

## 2023-04-27 RX ADMIN — APIXABAN 5 MG: 5 TABLET, FILM COATED ORAL at 20:45

## 2023-04-27 RX ADMIN — B-COMPLEX W/ C & FOLIC ACID TAB 1 TABLET: TAB at 11:30

## 2023-04-27 RX ADMIN — PANTOPRAZOLE SODIUM 40 MG: 40 TABLET, DELAYED RELEASE ORAL at 05:48

## 2023-04-27 RX ADMIN — CARBAMAZEPINE 400 MG: 200 TABLET ORAL at 20:46

## 2023-04-27 RX ADMIN — APIXABAN 5 MG: 5 TABLET, FILM COATED ORAL at 08:05

## 2023-04-27 RX ADMIN — ANTI-FUNGAL POWDER MICONAZOLE NITRATE TALC FREE: 1.42 POWDER TOPICAL at 08:40

## 2023-04-27 RX ADMIN — ANTI-FUNGAL POWDER MICONAZOLE NITRATE TALC FREE: 1.42 POWDER TOPICAL at 20:49

## 2023-04-27 RX ADMIN — TAMSULOSIN HYDROCHLORIDE 0.4 MG: 0.4 CAPSULE ORAL at 08:03

## 2023-04-27 RX ADMIN — BACLOFEN 5 MG: 10 TABLET ORAL at 08:04

## 2023-04-27 RX ADMIN — ASPIRIN 81 MG 81 MG: 81 TABLET ORAL at 08:03

## 2023-04-27 RX ADMIN — CIPROFLOXACIN 500 MG: 250 TABLET, FILM COATED ORAL at 08:03

## 2023-04-27 ASSESSMENT — PAIN DESCRIPTION - LOCATION: LOCATION: GENERALIZED

## 2023-04-27 ASSESSMENT — PAIN SCALES - GENERAL
PAINLEVEL_OUTOF10: 0
PAINLEVEL_OUTOF10: 0
PAINLEVEL_OUTOF10: 7
PAINLEVEL_OUTOF10: 0

## 2023-04-27 ASSESSMENT — PAIN DESCRIPTION - DESCRIPTORS
DESCRIPTORS: SPASM
DESCRIPTORS: ACHING

## 2023-04-28 PROCEDURE — 1180000000 HC REHAB R&B

## 2023-04-28 PROCEDURE — 97530 THERAPEUTIC ACTIVITIES: CPT

## 2023-04-28 PROCEDURE — 97116 GAIT TRAINING THERAPY: CPT

## 2023-04-28 PROCEDURE — 6370000000 HC RX 637 (ALT 250 FOR IP): Performed by: PHYSICIAN ASSISTANT

## 2023-04-28 PROCEDURE — 6370000000 HC RX 637 (ALT 250 FOR IP): Performed by: PHYSICAL MEDICINE & REHABILITATION

## 2023-04-28 PROCEDURE — 97110 THERAPEUTIC EXERCISES: CPT

## 2023-04-28 PROCEDURE — 97535 SELF CARE MNGMENT TRAINING: CPT

## 2023-04-28 PROCEDURE — 97112 NEUROMUSCULAR REEDUCATION: CPT

## 2023-04-28 RX ADMIN — FUROSEMIDE 40 MG: 40 TABLET ORAL at 08:31

## 2023-04-28 RX ADMIN — CIPROFLOXACIN 500 MG: 250 TABLET, FILM COATED ORAL at 21:36

## 2023-04-28 RX ADMIN — ASPIRIN 81 MG 81 MG: 81 TABLET ORAL at 08:30

## 2023-04-28 RX ADMIN — B-COMPLEX W/ C & FOLIC ACID TAB 1 TABLET: TAB at 08:30

## 2023-04-28 RX ADMIN — GABAPENTIN 400 MG: 300 CAPSULE ORAL at 15:02

## 2023-04-28 RX ADMIN — CARBAMAZEPINE 400 MG: 200 TABLET ORAL at 08:31

## 2023-04-28 RX ADMIN — POLYETHYLENE GLYCOL 3350 17 G: 17 POWDER, FOR SOLUTION ORAL at 08:30

## 2023-04-28 RX ADMIN — CIPROFLOXACIN 500 MG: 250 TABLET, FILM COATED ORAL at 08:30

## 2023-04-28 RX ADMIN — APIXABAN 5 MG: 5 TABLET, FILM COATED ORAL at 21:35

## 2023-04-28 RX ADMIN — HYDROCODONE BITARTRATE AND ACETAMINOPHEN 1 TABLET: 10; 325 TABLET ORAL at 02:48

## 2023-04-28 RX ADMIN — PANTOPRAZOLE SODIUM 40 MG: 40 TABLET, DELAYED RELEASE ORAL at 05:48

## 2023-04-28 RX ADMIN — FOLIC ACID 1 MG: 1 TABLET ORAL at 08:31

## 2023-04-28 RX ADMIN — METOPROLOL SUCCINATE 25 MG: 25 TABLET, EXTENDED RELEASE ORAL at 08:30

## 2023-04-28 RX ADMIN — CARBAMAZEPINE 400 MG: 200 TABLET ORAL at 21:35

## 2023-04-28 RX ADMIN — ANTI-FUNGAL POWDER MICONAZOLE NITRATE TALC FREE: 1.42 POWDER TOPICAL at 08:43

## 2023-04-28 RX ADMIN — BACLOFEN 5 MG: 10 TABLET ORAL at 15:02

## 2023-04-28 RX ADMIN — EMPAGLIFLOZIN 10 MG: 10 TABLET, FILM COATED ORAL at 08:30

## 2023-04-28 RX ADMIN — TAMSULOSIN HYDROCHLORIDE 0.4 MG: 0.4 CAPSULE ORAL at 08:30

## 2023-04-28 RX ADMIN — GABAPENTIN 400 MG: 300 CAPSULE ORAL at 08:30

## 2023-04-28 RX ADMIN — HYDROCODONE BITARTRATE AND ACETAMINOPHEN 1 TABLET: 10; 325 TABLET ORAL at 08:36

## 2023-04-28 RX ADMIN — BACLOFEN 5 MG: 10 TABLET ORAL at 08:30

## 2023-04-28 RX ADMIN — BACLOFEN 5 MG: 10 TABLET ORAL at 21:35

## 2023-04-28 RX ADMIN — HYDROCODONE BITARTRATE AND ACETAMINOPHEN 1 TABLET: 10; 325 TABLET ORAL at 18:33

## 2023-04-28 RX ADMIN — POTASSIUM CHLORIDE 20 MEQ: 1500 TABLET, EXTENDED RELEASE ORAL at 08:30

## 2023-04-28 RX ADMIN — GABAPENTIN 400 MG: 300 CAPSULE ORAL at 21:36

## 2023-04-28 RX ADMIN — APIXABAN 5 MG: 5 TABLET, FILM COATED ORAL at 08:31

## 2023-04-28 ASSESSMENT — PAIN DESCRIPTION - ORIENTATION: ORIENTATION: RIGHT

## 2023-04-28 ASSESSMENT — PAIN DESCRIPTION - DESCRIPTORS
DESCRIPTORS: ACHING
DESCRIPTORS: ACHING

## 2023-04-28 ASSESSMENT — PAIN SCALES - GENERAL
PAINLEVEL_OUTOF10: 5
PAINLEVEL_OUTOF10: 2
PAINLEVEL_OUTOF10: 0
PAINLEVEL_OUTOF10: 7

## 2023-04-28 ASSESSMENT — PAIN DESCRIPTION - LOCATION
LOCATION: GENERALIZED
LOCATION: SHOULDER

## 2023-04-29 PROCEDURE — 97116 GAIT TRAINING THERAPY: CPT

## 2023-04-29 PROCEDURE — 1180000000 HC REHAB R&B

## 2023-04-29 PROCEDURE — 6370000000 HC RX 637 (ALT 250 FOR IP): Performed by: PHYSICAL MEDICINE & REHABILITATION

## 2023-04-29 PROCEDURE — 99231 SBSQ HOSP IP/OBS SF/LOW 25: CPT | Performed by: PHYSICAL MEDICINE & REHABILITATION

## 2023-04-29 PROCEDURE — 2500000003 HC RX 250 WO HCPCS: Performed by: PHYSICAL MEDICINE & REHABILITATION

## 2023-04-29 PROCEDURE — 6370000000 HC RX 637 (ALT 250 FOR IP): Performed by: PHYSICIAN ASSISTANT

## 2023-04-29 RX ADMIN — ASPIRIN 81 MG 81 MG: 81 TABLET ORAL at 09:38

## 2023-04-29 RX ADMIN — CARBAMAZEPINE 400 MG: 200 TABLET ORAL at 20:30

## 2023-04-29 RX ADMIN — GABAPENTIN 400 MG: 300 CAPSULE ORAL at 15:29

## 2023-04-29 RX ADMIN — TUBERCULIN PURIFIED PROTEIN DERIVATIVE 5 UNITS: 5 INJECTION, SOLUTION INTRADERMAL at 11:37

## 2023-04-29 RX ADMIN — FUROSEMIDE 40 MG: 40 TABLET ORAL at 08:21

## 2023-04-29 RX ADMIN — POTASSIUM CHLORIDE 20 MEQ: 1500 TABLET, EXTENDED RELEASE ORAL at 09:37

## 2023-04-29 RX ADMIN — PANTOPRAZOLE SODIUM 40 MG: 40 TABLET, DELAYED RELEASE ORAL at 06:40

## 2023-04-29 RX ADMIN — EMPAGLIFLOZIN 10 MG: 10 TABLET, FILM COATED ORAL at 09:37

## 2023-04-29 RX ADMIN — CIPROFLOXACIN 500 MG: 250 TABLET, FILM COATED ORAL at 20:30

## 2023-04-29 RX ADMIN — FOLIC ACID 1 MG: 1 TABLET ORAL at 09:37

## 2023-04-29 RX ADMIN — APIXABAN 5 MG: 5 TABLET, FILM COATED ORAL at 08:22

## 2023-04-29 RX ADMIN — CIPROFLOXACIN 500 MG: 250 TABLET, FILM COATED ORAL at 08:20

## 2023-04-29 RX ADMIN — BACLOFEN 5 MG: 10 TABLET ORAL at 20:24

## 2023-04-29 RX ADMIN — HYDROCODONE BITARTRATE AND ACETAMINOPHEN 1 TABLET: 10; 325 TABLET ORAL at 08:20

## 2023-04-29 RX ADMIN — METOPROLOL SUCCINATE 25 MG: 25 TABLET, EXTENDED RELEASE ORAL at 09:37

## 2023-04-29 RX ADMIN — B-COMPLEX W/ C & FOLIC ACID TAB 1 TABLET: TAB at 09:37

## 2023-04-29 RX ADMIN — POLYETHYLENE GLYCOL 3350 17 G: 17 POWDER, FOR SOLUTION ORAL at 09:37

## 2023-04-29 RX ADMIN — TAMSULOSIN HYDROCHLORIDE 0.4 MG: 0.4 CAPSULE ORAL at 09:37

## 2023-04-29 RX ADMIN — CARBAMAZEPINE 400 MG: 200 TABLET ORAL at 08:21

## 2023-04-29 RX ADMIN — ANTI-FUNGAL POWDER MICONAZOLE NITRATE TALC FREE: 1.42 POWDER TOPICAL at 20:44

## 2023-04-29 RX ADMIN — BACLOFEN 5 MG: 10 TABLET ORAL at 15:29

## 2023-04-29 RX ADMIN — APIXABAN 5 MG: 5 TABLET, FILM COATED ORAL at 20:30

## 2023-04-29 RX ADMIN — HYDROCODONE BITARTRATE AND ACETAMINOPHEN 1 TABLET: 10; 325 TABLET ORAL at 19:45

## 2023-04-29 RX ADMIN — GABAPENTIN 400 MG: 300 CAPSULE ORAL at 08:21

## 2023-04-29 RX ADMIN — GABAPENTIN 400 MG: 300 CAPSULE ORAL at 20:30

## 2023-04-29 RX ADMIN — BACLOFEN 5 MG: 10 TABLET ORAL at 08:22

## 2023-04-29 ASSESSMENT — PAIN DESCRIPTION - ORIENTATION
ORIENTATION: ANTERIOR

## 2023-04-29 ASSESSMENT — PAIN DESCRIPTION - DESCRIPTORS
DESCRIPTORS: SORE
DESCRIPTORS: ACHING
DESCRIPTORS: SORE

## 2023-04-29 ASSESSMENT — PAIN SCALES - GENERAL
PAINLEVEL_OUTOF10: 8
PAINLEVEL_OUTOF10: 8
PAINLEVEL_OUTOF10: 7
PAINLEVEL_OUTOF10: 6

## 2023-04-29 ASSESSMENT — PAIN DESCRIPTION - LOCATION
LOCATION: NECK
LOCATION: NECK
LOCATION: JAW;NECK

## 2023-04-29 ASSESSMENT — PAIN DESCRIPTION - ONSET: ONSET: ON-GOING

## 2023-04-30 LAB
MM INDURATION, POC: 0 MM (ref 0–5)
PPD, POC: NEGATIVE

## 2023-04-30 PROCEDURE — 6370000000 HC RX 637 (ALT 250 FOR IP): Performed by: PHYSICIAN ASSISTANT

## 2023-04-30 PROCEDURE — 1180000000 HC REHAB R&B

## 2023-04-30 PROCEDURE — 99231 SBSQ HOSP IP/OBS SF/LOW 25: CPT | Performed by: PHYSICAL MEDICINE & REHABILITATION

## 2023-04-30 PROCEDURE — 6370000000 HC RX 637 (ALT 250 FOR IP): Performed by: PHYSICAL MEDICINE & REHABILITATION

## 2023-04-30 RX ADMIN — GABAPENTIN 400 MG: 300 CAPSULE ORAL at 20:53

## 2023-04-30 RX ADMIN — HYDROCODONE BITARTRATE AND ACETAMINOPHEN 1 TABLET: 10; 325 TABLET ORAL at 18:17

## 2023-04-30 RX ADMIN — TAMSULOSIN HYDROCHLORIDE 0.4 MG: 0.4 CAPSULE ORAL at 10:31

## 2023-04-30 RX ADMIN — POTASSIUM CHLORIDE 20 MEQ: 1500 TABLET, EXTENDED RELEASE ORAL at 10:31

## 2023-04-30 RX ADMIN — CIPROFLOXACIN 500 MG: 250 TABLET, FILM COATED ORAL at 09:02

## 2023-04-30 RX ADMIN — ANTI-FUNGAL POWDER MICONAZOLE NITRATE TALC FREE: 1.42 POWDER TOPICAL at 21:06

## 2023-04-30 RX ADMIN — CIPROFLOXACIN 500 MG: 250 TABLET, FILM COATED ORAL at 20:54

## 2023-04-30 RX ADMIN — EMPAGLIFLOZIN 10 MG: 10 TABLET, FILM COATED ORAL at 09:03

## 2023-04-30 RX ADMIN — BACLOFEN 5 MG: 10 TABLET ORAL at 13:05

## 2023-04-30 RX ADMIN — FUROSEMIDE 40 MG: 40 TABLET ORAL at 10:31

## 2023-04-30 RX ADMIN — GABAPENTIN 400 MG: 300 CAPSULE ORAL at 09:02

## 2023-04-30 RX ADMIN — ASPIRIN 81 MG 81 MG: 81 TABLET ORAL at 10:31

## 2023-04-30 RX ADMIN — HYDROCODONE BITARTRATE AND ACETAMINOPHEN 1 TABLET: 10; 325 TABLET ORAL at 05:58

## 2023-04-30 RX ADMIN — BACLOFEN 5 MG: 10 TABLET ORAL at 20:52

## 2023-04-30 RX ADMIN — GABAPENTIN 400 MG: 300 CAPSULE ORAL at 13:06

## 2023-04-30 RX ADMIN — B-COMPLEX W/ C & FOLIC ACID TAB 1 TABLET: TAB at 10:31

## 2023-04-30 RX ADMIN — METOPROLOL SUCCINATE 25 MG: 25 TABLET, EXTENDED RELEASE ORAL at 10:31

## 2023-04-30 RX ADMIN — BACLOFEN 5 MG: 10 TABLET ORAL at 09:03

## 2023-04-30 RX ADMIN — PANTOPRAZOLE SODIUM 40 MG: 40 TABLET, DELAYED RELEASE ORAL at 05:59

## 2023-04-30 RX ADMIN — CARBAMAZEPINE 400 MG: 200 TABLET ORAL at 20:52

## 2023-04-30 RX ADMIN — APIXABAN 5 MG: 5 TABLET, FILM COATED ORAL at 09:02

## 2023-04-30 RX ADMIN — FOLIC ACID 1 MG: 1 TABLET ORAL at 10:31

## 2023-04-30 RX ADMIN — APIXABAN 5 MG: 5 TABLET, FILM COATED ORAL at 20:52

## 2023-04-30 RX ADMIN — CARBAMAZEPINE 400 MG: 200 TABLET ORAL at 09:02

## 2023-04-30 ASSESSMENT — PAIN - FUNCTIONAL ASSESSMENT: PAIN_FUNCTIONAL_ASSESSMENT: ACTIVITIES ARE NOT PREVENTED

## 2023-04-30 ASSESSMENT — PAIN DESCRIPTION - DESCRIPTORS
DESCRIPTORS: ACHING
DESCRIPTORS: ACHING

## 2023-04-30 ASSESSMENT — PAIN SCALES - GENERAL
PAINLEVEL_OUTOF10: 0
PAINLEVEL_OUTOF10: 6
PAINLEVEL_OUTOF10: 7

## 2023-04-30 ASSESSMENT — PAIN DESCRIPTION - LOCATION
LOCATION: NECK
LOCATION: JAW;NECK

## 2023-05-01 LAB
MM INDURATION, POC: 0 MM (ref 0–5)
PPD, POC: NEGATIVE

## 2023-05-01 PROCEDURE — 97116 GAIT TRAINING THERAPY: CPT

## 2023-05-01 PROCEDURE — 6370000000 HC RX 637 (ALT 250 FOR IP): Performed by: PHYSICIAN ASSISTANT

## 2023-05-01 PROCEDURE — 97530 THERAPEUTIC ACTIVITIES: CPT

## 2023-05-01 PROCEDURE — 1180000000 HC REHAB R&B

## 2023-05-01 PROCEDURE — 97110 THERAPEUTIC EXERCISES: CPT

## 2023-05-01 PROCEDURE — 97112 NEUROMUSCULAR REEDUCATION: CPT

## 2023-05-01 PROCEDURE — 97535 SELF CARE MNGMENT TRAINING: CPT

## 2023-05-01 PROCEDURE — 6370000000 HC RX 637 (ALT 250 FOR IP): Performed by: PHYSICAL MEDICINE & REHABILITATION

## 2023-05-01 RX ORDER — LIDOCAINE 4 G/G
1 PATCH TOPICAL DAILY
Qty: 1 EACH | Refills: 0 | Status: SHIPPED | OUTPATIENT
Start: 2023-05-01

## 2023-05-01 RX ORDER — FUROSEMIDE 40 MG/1
40 TABLET ORAL DAILY
Qty: 2 TABLET | Refills: 0 | Status: SHIPPED
Start: 2023-05-01

## 2023-05-01 RX ORDER — CARBOXYMETHYLCELLULOSE SODIUM 10 MG/ML
1 GEL OPHTHALMIC PRN
Refills: 0 | COMMUNITY
Start: 2023-05-01

## 2023-05-01 RX ORDER — POTASSIUM CHLORIDE 20 MEQ/1
20 TABLET, EXTENDED RELEASE ORAL
Qty: 2 TABLET | Refills: 0 | Status: SHIPPED
Start: 2023-05-01

## 2023-05-01 RX ORDER — MULTIVITAMIN WITH IRON
1 TABLET ORAL DAILY
Refills: 0 | Status: SHIPPED | DISCHARGE
Start: 2023-05-02

## 2023-05-01 RX ORDER — ALBUTEROL SULFATE 2.5 MG/3ML
2.5 SOLUTION RESPIRATORY (INHALATION) EVERY 4 HOURS PRN
Qty: 6 EACH | Refills: 0 | Status: SHIPPED | OUTPATIENT
Start: 2023-05-01

## 2023-05-01 RX ORDER — FLUTICASONE PROPIONATE 50 MCG
1 SPRAY, SUSPENSION (ML) NASAL DAILY PRN
Qty: 16 G | Refills: 0 | COMMUNITY
Start: 2023-05-01

## 2023-05-01 RX ORDER — POLYETHYLENE GLYCOL 3350 17 G/17G
POWDER, FOR SOLUTION ORAL
Qty: 527 G | Refills: 1 | COMMUNITY
Start: 2023-05-01

## 2023-05-01 RX ORDER — BACLOFEN 5 MG/1
5 TABLET ORAL 3 TIMES DAILY
Qty: 3 TABLET | Refills: 0 | Status: SHIPPED | OUTPATIENT
Start: 2023-05-01

## 2023-05-01 RX ORDER — HYDROCODONE BITARTRATE AND ACETAMINOPHEN 10; 325 MG/1; MG/1
1 TABLET ORAL EVERY 6 HOURS PRN
Qty: 4 TABLET | Refills: 0 | Status: SHIPPED | OUTPATIENT
Start: 2023-05-01 | End: 2023-05-04

## 2023-05-01 RX ADMIN — CIPROFLOXACIN 500 MG: 250 TABLET, FILM COATED ORAL at 20:49

## 2023-05-01 RX ADMIN — GABAPENTIN 400 MG: 300 CAPSULE ORAL at 08:49

## 2023-05-01 RX ADMIN — GABAPENTIN 400 MG: 300 CAPSULE ORAL at 20:49

## 2023-05-01 RX ADMIN — FOLIC ACID 1 MG: 1 TABLET ORAL at 08:53

## 2023-05-01 RX ADMIN — GABAPENTIN 400 MG: 300 CAPSULE ORAL at 12:56

## 2023-05-01 RX ADMIN — PANTOPRAZOLE SODIUM 40 MG: 40 TABLET, DELAYED RELEASE ORAL at 05:58

## 2023-05-01 RX ADMIN — HYDROCODONE BITARTRATE AND ACETAMINOPHEN 1 TABLET: 10; 325 TABLET ORAL at 00:37

## 2023-05-01 RX ADMIN — POLYETHYLENE GLYCOL 3350 17 G: 17 POWDER, FOR SOLUTION ORAL at 08:53

## 2023-05-01 RX ADMIN — BACLOFEN 5 MG: 10 TABLET ORAL at 20:47

## 2023-05-01 RX ADMIN — EMPAGLIFLOZIN 10 MG: 10 TABLET, FILM COATED ORAL at 08:49

## 2023-05-01 RX ADMIN — CARBAMAZEPINE 400 MG: 200 TABLET ORAL at 08:52

## 2023-05-01 RX ADMIN — APIXABAN 5 MG: 5 TABLET, FILM COATED ORAL at 20:47

## 2023-05-01 RX ADMIN — TAMSULOSIN HYDROCHLORIDE 0.4 MG: 0.4 CAPSULE ORAL at 08:53

## 2023-05-01 RX ADMIN — HYDROCODONE BITARTRATE AND ACETAMINOPHEN 1 TABLET: 10; 325 TABLET ORAL at 19:09

## 2023-05-01 RX ADMIN — CIPROFLOXACIN 500 MG: 250 TABLET, FILM COATED ORAL at 08:49

## 2023-05-01 RX ADMIN — CARBAMAZEPINE 400 MG: 200 TABLET ORAL at 20:49

## 2023-05-01 RX ADMIN — HYDROCODONE BITARTRATE AND ACETAMINOPHEN 1 TABLET: 10; 325 TABLET ORAL at 06:35

## 2023-05-01 RX ADMIN — BACLOFEN 5 MG: 10 TABLET ORAL at 12:56

## 2023-05-01 RX ADMIN — ANTI-FUNGAL POWDER MICONAZOLE NITRATE TALC FREE: 1.42 POWDER TOPICAL at 20:50

## 2023-05-01 RX ADMIN — B-COMPLEX W/ C & FOLIC ACID TAB 1 TABLET: TAB at 08:49

## 2023-05-01 RX ADMIN — ASPIRIN 81 MG 81 MG: 81 TABLET ORAL at 08:49

## 2023-05-01 RX ADMIN — METOPROLOL SUCCINATE 25 MG: 25 TABLET, EXTENDED RELEASE ORAL at 08:53

## 2023-05-01 RX ADMIN — HYDROCODONE BITARTRATE AND ACETAMINOPHEN 1 TABLET: 10; 325 TABLET ORAL at 12:56

## 2023-05-01 RX ADMIN — BACLOFEN 5 MG: 10 TABLET ORAL at 08:49

## 2023-05-01 RX ADMIN — POTASSIUM CHLORIDE 20 MEQ: 1500 TABLET, EXTENDED RELEASE ORAL at 08:53

## 2023-05-01 RX ADMIN — APIXABAN 5 MG: 5 TABLET, FILM COATED ORAL at 08:53

## 2023-05-01 RX ADMIN — FUROSEMIDE 40 MG: 40 TABLET ORAL at 08:53

## 2023-05-01 ASSESSMENT — PAIN - FUNCTIONAL ASSESSMENT: PAIN_FUNCTIONAL_ASSESSMENT: PREVENTS OR INTERFERES SOME ACTIVE ACTIVITIES AND ADLS

## 2023-05-01 ASSESSMENT — PAIN SCALES - GENERAL
PAINLEVEL_OUTOF10: 6
PAINLEVEL_OUTOF10: 0
PAINLEVEL_OUTOF10: 6
PAINLEVEL_OUTOF10: 8

## 2023-05-01 ASSESSMENT — PAIN DESCRIPTION - DESCRIPTORS
DESCRIPTORS: ACHING
DESCRIPTORS: JABBING;SORE

## 2023-05-01 ASSESSMENT — PAIN DESCRIPTION - LOCATION
LOCATION: JAW;NECK
LOCATION: HEAD;JAW

## 2023-05-01 ASSESSMENT — PAIN DESCRIPTION - ORIENTATION: ORIENTATION: RIGHT

## 2023-05-01 NOTE — CARE COORDINATION
Patient needs are continue to be followed by Dr. Patric Lanza. Patient has discharge date / plan at this time scheduled for 5/2/23. Transport has been scheduled with [facility] @ 1pm.  Family has been made aware. CM provided patient IMM letter left at bedside for family. CM explained the IMM letter patient unable to sign. Information has been documented. Package placed a the nurse station. Patient room # 21  / report # [phone] ask for SNF nursing station. CM will continue to follow / monitor for any needs, concerns or questions that may arise.           ASSESSMENT NOTE    Attending Physician: No att. providers found  Admit Problem: Cervical spinal cord injury, initial encounter [facility]) [S14.109A]  Date/Time of Admission: 3/30/2023  3:35 PM  Problem List:  Patient Active Problem List   Diagnosis    TIA (transient ischemic attack)    Tracheobronchomalacia    Elevated brain natriuretic peptide (BNP) level    S/P CABG x 3    Dyslipidemia    Hemoptysis    History of smoking 25-50 pack years    Hypertension    Systolic CHF, chronic (HCC)    Localized edema    Coronary atherosclerosis of native coronary vessel    Trigeminal neuralgia    SOB (shortness of breath)    Atrial fibrillation (HCC)    Leukocytosis    Anemia    DNR (do not resuscitate)    Hand pain    Chest pain, precordial    Pulmonary hypertension ([address]    Chronic systolic congestive heart failure (HCC)    Chest pain    CAD (coronary artery disease)    Acute on chronic congestive heart failure ([address]    Cervical spinal cord compression (HCC)    S/P laminectomy with spinal fusion    S/P cervical discectomy    Dysphagia    Cervical spinal cord injury, initial encounter [facility])       Service Assessment  Patient Orientation Alert and Oriented, Person, Place, Situation, Self   Cognition Alert   History Provided By Medical Record   Primary Caregiver Self   Accompanied By/Relationship     Support Systems Children, Family Members   Patient's Healthcare

## 2023-05-01 NOTE — DISCHARGE SUMMARY
[id number]  In[facility] Re[facility]      PHYSICAL MEDICINE & REHABILITATION DISCHARGE SUMMARY     Date: 5/2/2023  Admission Date: 3/30/2023  Discharge Date: 5/2/2023    Primary Care Provider: Sulaiman Epstein MD  Surgeon: Sarah Vela MD     Discharged Condition: stable    Principal Problem:    Cervical spinal cord injury, initial encounter [facility])  Active Problems:    Pulmonary hypertension ([address]    Chronic systolic congestive heart failure ([address]    CAD (coronary artery disease)    Acute on chronic congestive heart failure ([address]    Anemia    DNR (do not resuscitate)    Tracheobronchomalacia    Dyslipidemia    Hypertension    Localized edema    Atrial fibrillation ([address]    Cervical spinal cord compression (HCC)    S/P laminectomy with spinal fusion    S/P cervical discectomy    Dysphagia  Resolved Problems:    * No resolved hospital problems. *      Hospital Course:   Carrie Ochoa is a [de-identified] white male patient at [address] [place] [address] who was admitted to [address] [place] [address] on 3/30/2023 by Dutch Stapleton MD of Physical Medicine and Rehab service with below-mentioned medical history. HPI: this [de-identified] WM presented to the ED 3/3/2023 after forward fall with forehead / nose / face strike and possible syncopal episode at home. In the ED he was noted to have bilateral weakness. Code S initiated. Head CT without acute intracranial abnormalities. Neurology noted NIHSS was 8 but non-focal and stated no evidence patient had had a stroke but suspected orthostatic syncope. Cardiology consulted for syncope and opined likely orthostatic in nature. Cervical spine MRI showed anterior and posterior ligament disruptions C5-C7, severe spinal stenosis with cord compression and high signal change in the cord consistent with cord edema and injury. Short course of steroids were initiated with some improvement in extremity strength.  Neurosurgery reviewed imaging and recommended

## 2023-05-02 VITALS
HEIGHT: 68 IN | OXYGEN SATURATION: 92 % | BODY MASS INDEX: 24.08 KG/M2 | SYSTOLIC BLOOD PRESSURE: 120 MMHG | WEIGHT: 158.9 LBS | TEMPERATURE: 97.7 F | HEART RATE: 64 BPM | RESPIRATION RATE: 18 BRPM | DIASTOLIC BLOOD PRESSURE: 74 MMHG

## 2023-05-02 PROCEDURE — 97530 THERAPEUTIC ACTIVITIES: CPT

## 2023-05-02 PROCEDURE — 99239 HOSP IP/OBS DSCHRG MGMT >30: CPT | Performed by: PHYSICIAN ASSISTANT

## 2023-05-02 PROCEDURE — 6370000000 HC RX 637 (ALT 250 FOR IP): Performed by: PHYSICIAN ASSISTANT

## 2023-05-02 PROCEDURE — 6370000000 HC RX 637 (ALT 250 FOR IP): Performed by: PHYSICAL MEDICINE & REHABILITATION

## 2023-05-02 RX ADMIN — POTASSIUM CHLORIDE 20 MEQ: 1500 TABLET, EXTENDED RELEASE ORAL at 08:01

## 2023-05-02 RX ADMIN — GABAPENTIN 400 MG: 300 CAPSULE ORAL at 08:00

## 2023-05-02 RX ADMIN — EMPAGLIFLOZIN 10 MG: 10 TABLET, FILM COATED ORAL at 08:00

## 2023-05-02 RX ADMIN — HYDROCODONE BITARTRATE AND ACETAMINOPHEN 1 TABLET: 10; 325 TABLET ORAL at 07:59

## 2023-05-02 RX ADMIN — METOPROLOL SUCCINATE 25 MG: 25 TABLET, EXTENDED RELEASE ORAL at 08:01

## 2023-05-02 RX ADMIN — ANTI-FUNGAL POWDER MICONAZOLE NITRATE TALC FREE: 1.42 POWDER TOPICAL at 08:07

## 2023-05-02 RX ADMIN — B-COMPLEX W/ C & FOLIC ACID TAB 1 TABLET: TAB at 08:00

## 2023-05-02 RX ADMIN — ASPIRIN 81 MG 81 MG: 81 TABLET ORAL at 08:01

## 2023-05-02 RX ADMIN — APIXABAN 5 MG: 5 TABLET, FILM COATED ORAL at 08:00

## 2023-05-02 RX ADMIN — CARBAMAZEPINE 400 MG: 200 TABLET ORAL at 08:00

## 2023-05-02 RX ADMIN — GABAPENTIN 400 MG: 300 CAPSULE ORAL at 13:06

## 2023-05-02 RX ADMIN — BACLOFEN 5 MG: 10 TABLET ORAL at 08:01

## 2023-05-02 RX ADMIN — FUROSEMIDE 40 MG: 40 TABLET ORAL at 08:01

## 2023-05-02 RX ADMIN — BACLOFEN 5 MG: 10 TABLET ORAL at 13:06

## 2023-05-02 RX ADMIN — POLYETHYLENE GLYCOL 3350 17 G: 17 POWDER, FOR SOLUTION ORAL at 08:02

## 2023-05-02 RX ADMIN — TAMSULOSIN HYDROCHLORIDE 0.4 MG: 0.4 CAPSULE ORAL at 08:01

## 2023-05-02 RX ADMIN — FOLIC ACID 1 MG: 1 TABLET ORAL at 08:00

## 2023-05-02 RX ADMIN — PANTOPRAZOLE SODIUM 40 MG: 40 TABLET, DELAYED RELEASE ORAL at 05:16

## 2023-05-02 NOTE — PLAN OF CARE
Problem: Safety - Adult  Goal: Free from fall injury  Outcome: Adequate for Discharge     Problem: Pain  Goal: Verbalizes/displays adequate comfort level or baseline comfort level  Outcome: Adequate for Discharge  Flowsheets (Taken 5/2/2023 7649)  Verbalizes/displays adequate comfort level or baseline comfort level: Encourage patient to monitor pain and request assistance     Problem: Skin/Tissue Integrity  Goal: Absence of new skin breakdown  Description: 1. Monitor for areas of redness and/or skin breakdown  2. Assess vascular access sites hourly  3. Every 4-6 hours minimum:  Change oxygen saturation probe site  4. Every 4-6 hours:  If on nasal continuous positive airway pressure, respiratory therapy assess nares and determine need for appliance change or resting period.   Outcome: Adequate for Discharge     Problem: ABCDS Injury Assessment  Goal: Absence of physical injury  Outcome: Adequate for Discharge     Problem: Chronic Conditions and Co-morbidities  Goal: Patient's chronic conditions and co-morbidity symptoms are monitored and maintained or improved  Outcome: Adequate for Discharge  Flowsheets (Taken 5/2/2023 6068)  Care Plan - Patient's Chronic Conditions and Co-Morbidity Symptoms are Monitored and Maintained or Improved: Monitor and assess patient's chronic conditions and comorbid symptoms for stability, deterioration, or improvement

## 2023-05-02 NOTE — PROGRESS NOTES
Attempted to call report to [facility] with number provided, no answer. Will attempt at later time. Patient [facility] at 1300 [facility]
INPATIENT REHAB CENTER PROGRESS NOTE    Shaquille Sotelo  Admit Date: 3/30/2023  Admit Diagnosis:   Cervical spinal cord injury, initial encounter [facility]) [S14.109A]    Subjective     4/11/2023 seen today in therapy. No issues over the weekend. He c/o waking up at night to urinate. We discussed eliminating night time dose of lasix. 4/7/2023 had large BM yesterday. Feeling better today. No new complaints. 4/6/2023 still no BM. Discussed trial dulcolax. Pain controlled. 4/5/2023 no BM is a few days but he states he feels he need to go today. Voiding. Pain controlled. 4/3/2023 no issues over the weekend. He is voiding on his own and having BMs. No pain.      Objective:     Current Facility-Administered Medications   Medication Dose Route Frequency    [START ON 4/12/2023] furosemide (LASIX) tablet 40 mg  40 mg Oral Daily    potassium chloride (KLOR-CON M) extended release tablet 20 mEq  20 mEq Oral BID WC    miconazole (MICOTIN) 2 % powder   Topical BID    acetaminophen (TYLENOL) tablet 650 mg  650 mg Oral Q6H PRN    Or    acetaminophen (TYLENOL) suppository 650 mg  650 mg Rectal Q6H PRN    albuterol (PROVENTIL) nebulizer solution 2.5 mg  2.5 mg Nebulization Q4H PRN    apixaban (ELIQUIS) tablet 5 mg  5 mg Oral BID    aspirin chewable tablet 81 mg  81 mg Oral Daily    bacitracin ointment   Topical TID PRN    bisacodyl (DULCOLAX) suppository 10 mg  10 mg Rectal Daily PRN    carBAMazepine (TEGRETOL) tablet 400 mg  400 mg Oral BID    carboxymethylcellulose (THERATEARS) 1 % ophthalmic gel 1 drop  1 drop Both Eyes PRN    docusate sodium (COLACE) capsule 100 mg  100 mg Oral BID PRN    empagliflozin (JARDIANCE) tablet 10 mg  10 mg Oral Daily    fluticasone (FLONASE) 50 MCG/ACT nasal spray 1 spray  1 spray Each Nostril Daily PRN    folic acid (FOLVITE) tablet 1 mg  1 mg Oral Daily    gabapentin (NEURONTIN) capsule 400 mg  400 mg Oral TID    hydrALAZINE (APRESOLINE) tablet 25 mg  25 mg Oral Q6H PRN
It is with great vel that we pray for your family today:          \" [place] is my rock,   My God is my rock, in whom I find protection. He is my shield, the power that saves me,  And my place of safety. \"    Sandra Paez    In my distress I called upon your name    And my cry came directly into your ear. You were moved with compassion    As the one you love was in distress. You came down upon a cloud    And rescued me. I am forever grateful. Amen.          Good visit with pt    Calm    Alert    Very receptive to  support    prayer
Neurosurgery    Status post 360 degree anterior posterior fusion 6 weeks ago at C5-7. Discharge plans are for home today. Cervical collar on when out of bed but not in bed. Return visit to my office in 4 weeks with AP plus lateral cervical spine x-rays prior to the visit. Corie Varma.  Jose Garcia MD
OT DAILY NOTE    Time In:    [id number]  Time Out: 1554 2374    Functional Mobility  See PT not    Activities of Daily Living   Score Comments   Eating Setup or clean-up assistance S/U   Oral Hygiene Partial/moderate assistance Therapist did partials   Bathing Substantial/maximal assistance Pt does UB   Upper Body  Dressing Substantial/maximal assistance Pt pushes arms through   Lower Body Dressing Dependent A for all parts   Donning/[place] Footwear Dependent A for all parts   Toilet Transfer Dependent A for all parts   [address] A for urinal     Summary of Session: Pt was in bed and agreeable to tx. Pt's performance with ADL is reflected in above chart. Pt took his first shower and was so grateful. Pt worked with [employer] to improve [address] and shoulder strength with good quality. Pt worked on opening and closing various household containers with good ability with increased time. Pt was visibly fatigued by session. Pt was left in room with all needs within reach.      Connor Correa, OT  4/14/2023
OT DAILY NOTE    Time In:    8734     Time Out: 0741       Functional Mobility   Score Comments   Rolling 3: Partial/Moderate A  A for LB   Supine to Sit 2: Substantial/Maximal A A for trunk and legs   Sit to Stand 1: Dependent Ax2   Transfer Assist 1: Dependent Ax2 with board     Activities of Daily Living   Score Comments   Eating Setup or clean-up assistance S/U; set-up with a fork with good use; pt demonstrating better ability to clear plate   Oral Hygiene Partial/moderate assistance Therapist did partials   Bathing Dependent A for sitting balance, feet, and Ax2 for buttocks in standing   Upper Body  Dressing Substantial/maximal assistance Pt help push arms through shirt   Lower Body Dressing Dependent Ax2 in standing   Donning/[place] Footwear Dependent A for all parts     Summary of Session: Pt was in bed and agreeable to tx. Attempted in unsupported sitting, but pt struggled to maintain balance. Pt's performance with ADL is reflected in above chart. Pt was left in w/c with all needs within reach. Issued built up  for fork as well.      Kassy Wellington, OT  4/10/2023
OT Daily Note  Time In:    0914     Time Out: 1000        Pain:  Pt reported pain when doing todd, but denied pain medication. Functional Mobility   Pt was I with w/c mobility. Neuro-Muscular Re-Education   Pt worked 1'' velcro cubes removing and replacing them with both hands with fair quality. Pt completed the following exercises with 3 lb todd to promote UB strength, activity tolerance, and shoulder stabilization for integration into functional mobility:  Exercise Reps Comments   Protraction/Retraction 10    Abduction/Adduction 10    Circles 20 1/2 CW, 1/2 CCW   Vs 10    Pt demonstrated good quality during all exercises. Pt placed large pegs in peg board with RUE and removed them with LUE working on crossing midline requiring several rest breaks. Pt worked on placing small wooden pieces for 24 pc matching activity. Pt removed the pieces with LUE. Pt required rest breaks during activity. Education   Purpose of activity     Interdisciplinary Communication: PT Brandt Peel on pt's performance    Plan: Continue with POC. Pt was left in room with all needs within reach.      Rosetta Patches OTR/L  4/10/2023
OT Daily Note  Time In:    1030     Time Out: 1114        Pain: Pt has no complaints of pain. Functional Mobility      Score Comments   Transfer Assist 3: Partial/Moderate A LPT with board; dependent during hypertensive episode        Neuro-Muscular Re-Education   Pt was facilitated in unsupported sitting while working on one handed task. Pt was struggling to keep balance needing mod A to CGA. Pt was sitting up for approximately 10 minutes when he c/o vision changes and dizziness. BP was found to be 213/146. Pt was dependently transferred back to w/c and reclined. BP was 110s/60s and was returned to sitting with no changes in BP. Pt continued to work in sitting on 60 number sequencing activity working on in-hand manipulation and grasp. Education   Autonomic dysreflexia     Interdisciplinary Communication: Team conference    Plan: Continue with POC. Pt was left in room with all needs within reach.      Amos Conde OTR/L  4/12/2023
OT Daily Note  Time In:    1030     Time Out: 1116        Pain: Pt has no complaints of pain. Functional Mobility   Pt was I with power mobility. Neuro-Muscular Re-Education   Pt engaged with the [place]/ [place] [address] Test-C to improve in-hand manipulation to utilize during ADL. Activity was placed on slant board to improve shoulder strength with good quality. Pt removed approximately 10 pcs with LUE. Education   SNF vs inpatient rehab     Interdisciplinary Communication: PTA Vanessa Mejia on pt's performance    Plan: Continue with POC. Pt was left in w/c awaiting KAUSHIK Brunson OTR/L  5/1/2023
OT Daily Note  Time In:    1032     Time Out: 1115        Pain: Pt has no complaints of pain. Functional Mobility   Pt is I with w/c mobility. Neuro-Muscular Re-Education   Pt worked on matching task with various shaped small pieces x23. Pt is demonstrating increased pincer grasp and better B compensation. Pt demonstrates poor extensors though. Pt worked on taking apart pop beads. Pt has decreased  strength and needed mod A. Pt worked on creating designs with multi-colored visual perceptual blocks. He had success building figures 3 blocks tall. Pt is demonstrating increased coordination. Self-Care   Pt held the urinal during toileting with success. Pt still requires A to open clothing. Education   Family training     Interdisciplinary Communication: PT Claudia Lambert on toileting    Plan: Continue with POC. Pt was left in room with all needs within reach.      Tobin Vazquez OTR/L  4/11/2023
OT Daily Note  Time In:    1302     Time Out: 1345        Pain: Pt has no complaints of pain. Functional Mobility   Pt was mod A for turning tight corners. Strengthening   Pt completed the following exercises B to promote UB strength, activity tolerance, and shoulder stabilization for integration into ADL:  Exercise Sets Reps Comments   Chest Press 1 10 1 lb dumbbell   Chest Pull 1 10    Biceps Curls 1 20 1 lb dumbbell   Anterior Alphabet 1  Min cueing         Self-Care   Pt used urinal dependently, but was able to hold the urinal.      Education   Family training     Interdisciplinary Communication: PT Brandt Bass on pt's performance    Plan: Continue with POC.  Pt was left awaiting PT.     Rosetta Patches OTR/L  4/11/2023
OT Daily Note  Time In:    1303     Time Out: 1335        Pain: Pt has no complaints of pain. Pt reporting he is low energy and not feeling well. Functional Mobility   Pt was I with w/c mobility. Neuro-Muscular Re-Education   Attempted [place] Test of Spatial Relations-B with pt making slow progress, but struggling hard with getting pieces in correct hole and orienting them. Graded activity down to removing [place] checkers with fair performance with increased time. Session was terminated early secondary to pt not feeling well. Education   Purpose of activities; pressure relief in w/c     Interdisciplinary Communication: ZAYRA Tineo above upgrading pt to s/u A for feeding. Plan: Continue with POC. Pt was left in room with all needs within reach.      Winnie Rain OTR/L  4/10/2023
OT Daily Note  Time In:    1320     Time Out: 1354        Pain: Pt has no complaints of pain. Education   Pt, his son, his daughter, and his sisters were present for session. Educated on current functional level and 24/7 would be required at home as well as a power w/c. Discussed SNF vs HH pros and cons. Questions were answered. Family appeared overwhelmed with information, but appreciative. Family was told to call the floor if they had followup questions. Interdisciplinary Communication: Team conference    Plan: Continue with POC. Pt was left in w/c with all needs within reach.      Carlena Goltz OTR/L  4/12/2023
OT Daily Note  Time In:    1345     Time Out: 1430        Pain: Pt has no complaints of pain. Functional Mobility   Pt was I with w/c mobility. Neuro-Muscular Re-Education   Pt worked with [employer] working on [facility] and shoulder strength. Pt fatigued quickly, but is showing increased ROM. Pt unlaced a board figure laced every other hole with increased time. Pt was able to use both hands. Pt required increased time and has he fatigued he would drop the board figure more often. Education   Purpose of the activity     Interdisciplinary Communication: PT Idalia Jacobson on pt's performance    Plan: Continue with POC. Pt was left with PT Naida.      Muna Vega OTR/L  4/13/2023
OT WEEKLY PROGRESS NOTE    Time In:    9894     Time Out: 1379       Goals:  STG 1: Pt will be moderate A with functional transfers by 4/14/23 to prevent skin breakdown. 4/6/2023 Goal ongoing  4/13/2023 Goal met   LTG 1: Pt will be moderate A with toileting by 4/28/23 to prevent skin breakdown. 4/13/2023 Change goal to pt will be S/U with transfers      STG 2: Pt will be maximal A with LB dressing by 4/14/23 to reduce risk of falls. 4/6/2023 Goal ongoing  4/13/2023 Goal ongoing; make LTG by d/c  LTG 2: Pt will be moderate A with LB dressing by 4/28/23 to reduce risk of falls. 4/13/2023 D/C goal secondary to slow progress     STG 3: Pt will be maximal A with bathing by 4/14/23 to promote good skin integrity. 4/6/2023 Goal ongoing  4/13/2023 Goal met   LTG 3: Pt will be touching A with bathing by 4/28/23 to promote good skin integrity. LTG 4: Pt will be able to get a snack from the fridge with independent by 4/28/23 to promote proper nutrition and hydration. 4/6/2023 Goal ongoing  4/13/2023 Goal ongoing     LTG 5: Pt/caregiver will verbalize  understanding of OT recommendations regarding ADL status, functional transfer status, home safety, DME, AE, energy conservation techniques, safety awareness, activity tolerance, and/or follow-up therapy to increase safety with functional tasks upon discharge. 4/6/2023 Goal ongoing  4/13/2023 Goal ongoing     STG 6: Pt will be max A with UB dressing by 4/7/23 to demonstrate improved sitting balance. 4/6/2023 Goal met   LTG 6: Pt will be  S with UB dressing by 4/28/23 to demonstrate improved sitting balance. 4/13/2023 Goal ongoing     STG 7: Pt will be able to self-feed with mod A by 4/7/23 to improve independence. 4/6/2023 Goal met   LTG 7: Pt will be able to self-feed with S/U by 4/28/23 to improve independence.    4/13/2023 Goal met     *Initial Assessment is the worst a patient did on that activity in the first 72 hrs of being admitted as gathered by
Occupational Therapy  OT Daily Note  Time In 1357   Time Out 1433     Subjective: \"I really like that thing. \" [Regarding vibration tool used on BUE]  Pain: No complaint of pain. Education: spinal cord education  Interdisciplinary Communication: with PT regarding functional status     Neuro-Muscular Re-Education   Provided vibration stimulation to R and L wrist extensors, finger extensors, and finger flexors. Patient utilized RUE to remove and replace approx 30 hooked cubes with good quality and extra time, with focus on digit and wrist extension. Completed static stretch to all digits RUE and LUE. Self-Care   Patient required toileting; utilized urinal in [facility] with total A to manage urinal and pants as well as clean penis following urination. Voided 350 mL. Assessment: Good response to vibration for neuro re-education. Patient tolerated session well and was left seated up in [facility] in room in gravity offloading position with call light/all needs in reach. Plan: Continue OT POC with focus on ongoing deficits.      Romario Verduzco OTR/L   4/15/2023
PHYSICAL THERAPY DAILY NOTE  Time In:  0830  Time Out:  7993   Total Treatment Time:  40 Minutes  Pt. Seen for: AM, Balance Training, and Transfer Training     Subjective: \"I thought I didn't have to wear my brace anymore after I got a shower. \"         Objective:  Precautions: Falls, Spinal, and [place] on @ all times    GROSS ASSESSMENT Daily Assessment    Pt. Supine in bed upon arrival       COGNITION Daily Assessment    Pt. Requiring cues for recall of hand placement performing a routine transfer (sliding board) in a new environment (shower chair)       BED/MAT MOBILITY Daily Assessment    Rolling Right: NT  Rolling Left: NT  Supine to Sit: Supervision/Standby Assist HOB fully elevated  Sit to Supine: NT       TRANSFERS Daily Assessment   Sliding board transfers bed to w/c min A (level surfaces)  Shower chair to w/c mod A x2 (secondary to fatigue & lack of lower body clothing) Sit to Stand: Total A mod Ax 2  Stand to Sit: Total A max A x2  Transfer Type: Stand Pivot  Transfer Assistance: Total A max A x2 using grab bar  Car Transfers: NT         GAIT Daily Assessment   NT        STEPS/STAIRS Daily Assessment   NT        BALANCE Daily Assessment   Pt. Required min A for sitting balance on shower chair during ADLs as well as VC & manual cues to facilitate L sided weight shift secondary to excessive R sided lean when engaging UEs in ADL task Static Sitting: Good:  Pt. able to maintain balance w/o UE support;  exhibits some postural sway  Dynamic Sitting: Fair - accepts minimal challenge;  can maintain balance while turning head/trunk  Static Standing:  Total:  Pt. unable to maintain without total support  Dynamic Standing: Poor - unable to accept challenge or move without LOB        WHEELCHAIR MOBILITY Daily Assessment   NT        Pain level: no c/o pain    Vital Signs:  /65   Pulse 70   Temp 98.8 °F (37.1 °C)   Resp 18   Ht 5' 8\" (1.727 m)   Wt 198 lb (89.8 kg)   SpO2 97%   BMI 30.11 kg/m²      Education:
PHYSICAL THERAPY DAILY NOTE  Time In:  0915  Time Out:  1010  Total Treatment Time:  54 Minutes  Pt. Seen for: AM, Gait Training, Therapeutic Exercise, Transfer Training, and Wheelchair mobility     Subjective: \"I can't believe I was walking! \"         Objective:  Precautions: Falls, Spinal, and [place] collar on @ all times    COGNITION Daily Assessment    Pt. Requires repeated cues for carry over of mobility tasks       BED/MAT MOBILITY Daily Assessment    Rolling Right: NT  Rolling Left: NT  Supine to Sit: Supervision/Standby Assist w/ HOB fully elevated & using railings  Sit to Supine: NT       TRANSFERS Daily Assessment    Sit to Stand: Mod A and Max A  Stand to Sit: Mod A and Max A  Transfer Type: Sliding Board  Transfer Assistance: Mod A  Car Transfers: NT         GAIT Daily Assessment   Ax2 to assist w/ balance & forward propulsion of RW & Ax3 to follow closely w/ w/c    Pt. Exhibits flexed posture & scissoring. First gait attempt pt. Exhibits B knee hyperextension in stance, improved w/ use of AFOs Amount of Assistance:  Total A  Distance (ft): 10'x1, 20'x1  Assistive Device: RW, Gait Belt, and B platform attachments, B ace wraps to knee to encourage knee extension, B AFOs , abdominal binder to core  Surface: Level Surface       STEPS/STAIRS Daily Assessment   NT        BALANCE Daily Assessment    Static Sitting: Good:  Pt. able to maintain balance w/o UE support;  exhibits some postural sway  Dynamic Sitting: Fair - accepts minimal challenge;  can maintain balance while turning head/trunk  Static Standing: Fair:  Pt. requires UE support;  may need occasional min A  Dynamic Standing: Poor - unable to accept challenge or move without LOB        WHEELCHAIR MOBILITY Daily Assessment    Able to Propel (ft): 50  Assistance: Supervision/Standby Assist using power w/c  Surface: Level Surface  Wheelchair Set-up: Dependent     Pain level: no c/o pain    Vital Signs:  /66   Pulse 71   Temp 97.9 °F (36.6 °C)
PHYSICAL THERAPY DAILY NOTE  Time In:  1031  Time Out:  1116  Total Treatment Time:  39 Minutes  Pt. Seen for: AM, Therapeutic Exercise, Transfer Training, and Wheelchair mobility     Subjective: \"I guess I'm just tired because no one got me out of bed yesterday. \"         Objective:  Precautions: Falls, Spinal, and [place] collar on @ all times. COGNITION Daily Assessment    Pt. W/ depressed affect this AM.  Requires cues for recall of transfer technique       BED/MAT MOBILITY Daily Assessment    Rolling Right: Min A  Rolling Left: NT  Supine to Sit: Max A  Sit to Supine: Mod A for LEs       TRANSFERS Daily Assessment   Vcs for hand placement & sequencing of transfers Sit to Stand: NT  Stand to Sit: NT  Transfer Type: Sliding Board  Transfer Assistance: Total A mod A x2 & SBA x2 to stabilize sliding board  Car Transfers: NT         GAIT Daily Assessment   NT        STEPS/STAIRS Daily Assessment   NT        BALANCE Daily Assessment   Pt. Required min A for dynamic sitting balance activities (donning shoes) secondary to decreased postural reactions Static Sitting: Fair:  Pt. requires UE support;  may need occasional min A  Dynamic Sitting: Poor - unable to accept challenge or move without LOB   Static Standing: NT  Dynamic Standing: NT       WHEELCHAIR MOBILITY Daily Assessment    Able to Propel (ft): 70  Assistance: Supervision/Standby Assist  Surface: Level Surface  Wheelchair Set-up: Dependent       LOWER EXTREMITY EXERCISES Daily Assessment   Pt.  Performed supine LE exercises to increase strength & ROM B LEs SUPINE EXERCISES Sets Reps Comments   Ankle Pumps 1 12    Isometric hip adduction 1 12    bridging 1 12    Heel Slides 1 12 AAROM   Hip Abduction 1 12 AAROM R LE   Short Arc Quad 1 12    Bent knee fallout 1 12 Green theraband   Hip IR 1 12    Straight Leg Raise 1 12 AAROM         Pain level: no c/o pain    Vital Signs:  /66   Pulse 70   Temp 97.7 °F (36.5 °C) (Oral)   Resp 18   Ht 5' 8\" (1.727 m)
PHYSICAL THERAPY DAILY NOTE  Time In:  1400  Time Out:  1508   Total Treatment Time:  76 Minutes  Pt. Seen for: PM, Balance Training, and Transfer Training     Subjective: \"Standing makes me tired. \"         Objective:  Precautions: Falls, Spinal, and [place] collar on @ all times    GROSS ASSESSMENT Daily Assessment    Pt. Had incontinent stool while standing in standing frame       COGNITION Daily Assessment    Pt. Is mildly [place]. Requires cues to problem solve mobility tasks       BED/MAT MOBILITY Daily Assessment    Rolling Right: Min A  Rolling Left: Min A  Supine to Sit: NT  Sit to Supine: Mod A       TRANSFERS Daily Assessment    Sit to Stand: Total A  Stand to Sit: Total A  Transfer Type:  Timmy lift  Transfer Assistance: Total A  Car Transfers: NT         GAIT Daily Assessment   NT        STEPS/STAIRS Daily Assessment   NT        BALANCE Daily Assessment   Pt. Stood in standing frame to provide B LE WB as well as to engage trunk musculature, focusing on maintaining upright stance. Pt. Stood 5 minutes x1, 3 minutes x1, & 2 minutes x1    Pt.  Stood w/ timmy lift to assist w/ toileting hygiene w/ mod A x1 Static Sitting: Good:  Pt. able to maintain balance w/o UE support;  exhibits some postural sway  Dynamic Sitting: Fair - accepts minimal challenge;  can maintain balance while turning head/trunk  Static Standing: Poor:  Pt. requires UE support & mod-max A to maintain position  Dynamic Standing: Poor - unable to accept challenge or move without LOB        WHEELCHAIR MOBILITY Daily Assessment    Able to Propel (ft): 50  Assistance: Supervision/Standby Assist using power w/c  Surface: Level Surface  Wheelchair Set-up: Dependent       Pain level: no c/o pain    Vital Signs:  /62   Pulse 70   Temp 98.6 °F (37 °C) (Oral)   Resp 18   Ht 5' 8\" (1.727 m)   Wt 198 lb (89.8 kg)   SpO2 98%   BMI 30.11 kg/m²      Education:  reviewed purpose of standing w/ standing frame    Interdisciplinary Communication:
PHYSICAL THERAPY DAILY NOTE  Time In:  1406  Time Out:  1454  Total Treatment Time:  40 Minutes  Pt. Seen for: PM, Gait Training, Therapeutic Exercise, Transfer Training, and Wheelchair mobility     Subjective: \"I didn't realize a shower would make me so tired. \"         Objective:  Precautions: Falls, Spinal, and [place] collar on @ all times    COGNITION Daily Assessment    Requires cues to problem solve transfers       BED/MAT MOBILITY Daily Assessment    Rolling Right: NT  Rolling Left: NT  Supine to Sit: NT  Sit to Supine: Min A for LEs       TRANSFERS Daily Assessment    Sit to Stand: Total A mod A x2  Stand to Sit: Total A mod A x2  Transfer Type: Sliding Board  Transfer Assistance: Min A  Car Transfers: NT         GAIT Daily Assessment   Pt. W/ heavy reliance on Ues, scissoring gait, & flexed LEs Amount of Assistance: Total A mod A x2 w/ SBA x1 to follow w/ w/c  Distance (ft): 20  Assistive Device: RW, Gait Belt, Orthotic Device, and B platform attachments, B AFOs, abdominal binder  Surface: Level Surface       STEPS/STAIRS Daily Assessment   NT        BALANCE Daily Assessment    Static Sitting: Good:  Pt. able to maintain balance w/o UE support;  exhibits some postural sway  Dynamic Sitting: Fair - accepts minimal challenge;  can maintain balance while turning head/trunk  Static Standing: Fair:  Pt. requires UE support;  may need occasional min A  Dynamic Standing: Poor - unable to accept challenge or move without LOB        WHEELCHAIR MOBILITY Daily Assessment    Able to Propel (ft): 50'x2  Assistance: Supervision/Standby Assist  Surface: Level Surface  Wheelchair Set-up: Dependent       LOWER EXTREMITY EXERCISES Daily Assessment    Pt.  Performed Motomed @ resistance level 3 x10 minutes to increase strength & endurance B LEs     Pain level: no c/o pain    Vital Signs:  /65   Pulse 70   Temp 98.8 °F (37.1 °C)   Resp 18   Ht 5' 8\" (1.727 m)   Wt 198 lb (89.8 kg)   SpO2 97%   BMI 30.11 kg/m²
PHYSICAL THERAPY DAILY NOTE  Time In:  1412  Time Out:  1451  Total Treatment Time:  39 Minutes  Pt. Seen for: PM, Balance Training, Transfer Training, and Wheelchair mobility     Subjective: \"I'm just so upset. I might have to go to another rehab. \"         Objective:  Precautions: Falls, Spinal, and [place] collar    COGNITION Daily Assessment    Pt. Tearful this PM, upset over potential plans to d/c to SNF after [facility] [place] stay. BED/MAT MOBILITY Daily Assessment    Rolling Right: NT  Rolling Left: NT  Supine to Sit: NT  Sit to Supine: NT       TRANSFERS Daily Assessment    Sit to Stand: NT  Stand to Sit: NT  Transfer Type: Sliding Board  Transfer Assistance: Mod A  Car Transfers: NT         GAIT Daily Assessment   NT        STEPS/STAIRS Daily Assessment   NA        BALANCE Daily Assessment   Worked on unsupported sitting. Pt. Able to sit EOB 10 minutes. Duration limited by increasing neck pain Static Sitting: Good:  Pt. able to maintain balance w/o UE support;  exhibits some postural sway  Dynamic Sitting: Fair - accepts minimal challenge;  can maintain balance while turning head/trunk  Static Standing: NT  Dynamic Standing: NT       WHEELCHAIR MOBILITY Daily Assessment    Able to Propel (ft): 50'x2   Assistance: Supervision/Standby Assist using power w/c  Surface: Level Surface  Wheelchair Set-up: Dependent       Pain level: 7/10  Pain Location:  neck  Pain Interventions: RN contacted & provided pain medication;  pt. Repositioned in tilt in space chair to allow head support    Vital Signs:  /74   Pulse 69   Temp 97.5 °F (36.4 °C) (Axillary)   Resp 18   Ht 5' 8\" (1.727 m)   Wt 198 lb (89.8 kg)   SpO2 97%   BMI 30.11 kg/m²      Education:  provided active listening & support to patient has he explored his emotions    Interdisciplinary Communication: RN made aware of medication needs    Pt. Left up in w/c tilted back, call bell in reach, son @ bedside.          Assessment: Session limited by neck pain
PHYSICAL THERAPY DAILY NOTE  Time In:  1430  Time Out:  1547  Total Treatment Time:  77 Minutes  Pt. Seen for: PM, Gait Training, Therapeutic Exercise, and Transfer Training     Subjective: \"Is there a way I can move the head of my bed myself? \"         Objective:  Precautions: Falls, Spinal, and [place] on @ all times    GROSS ASSESSMENT Daily Assessment    Adapted pt's handheld bed remote by building up pt's button to allow him to depress button       COGNITION Daily Assessment    Pt. W/ depressed affect but motivated to work w/ PT       BED/MAT MOBILITY Daily Assessment    Rolling Right: NT  Rolling Left: NT  Supine to Sit: NT  Sit to Supine: Min A HOB elevated, assist w/ LEs       TRANSFERS Daily Assessment    Sit to Stand: Total A mod A x2  Stand to Sit: Total A mod A x2  Transfer Type: Sliding Board  Transfer Assistance: Min A  Car Transfers: NT         GAIT Daily Assessment   Cues to avoid scissoring & manage proper step length Amount of Assistance: Total A mod A x1/min A x1 for balance as well as to advance RW & Ax3 to follow w/ w/c  Distance (ft): 20'x1,10'x1  Assistive Device: RW, Gait Belt, Orthotic Device, and B platform attachments, abdominal binder, B AFOs  Surface: Level Surface       STEPS/STAIRS Daily Assessment   NT        BALANCE Daily Assessment    Static Sitting: Good:  Pt. able to maintain balance w/o UE support;  exhibits some postural sway  Dynamic Sitting: Fair - accepts minimal challenge;  can maintain balance while turning head/trunk  Static Standing: Poor:  Pt. requires UE support & mod-max A to maintain position  Dynamic Standing: Poor - unable to accept challenge or move without LOB        WHEELCHAIR MOBILITY Daily Assessment    Able to Propel (ft): 75  Assistance: Modified Independent  Surface: Level Surface  Wheelchair Set-up: Dependent       LOWER EXTREMITY EXERCISES Daily Assessment    Pt.  Performed Motomed @ resistance level 3 x10 minutes to increase strength & endurance B LEs
PHYSICAL THERAPY DAILY NOTE  Time In:  2851  Time Out:  0910  Total Treatment Time:  20 Minutes  Pt. Seen for: AM and Equipment Assessment     Subjective: \"I hope I won't need a wheelchair. I want to walk again. \"         Objective:  Precautions: Falls, Spinal, and [place] on when [address] Daily Assessment    Pt. Up in w/c       COGNITION Daily Assessment    Pt. Emotional regarding d/c to SNF this PM       BED/MAT MOBILITY Daily Assessment    Rolling Right: NT  Rolling Left: NT  Supine to Sit: NT  Sit to Supine: NT       TRANSFERS Daily Assessment    Sit to Stand: NT  Stand to Sit: NT  Transfer Type:  NT  Transfer Assistance: NT  Car Transfers: NT         GAIT Daily Assessment   NT        STEPS/STAIRS Daily Assessment   NT        BALANCE Daily Assessment    Static Sitting: Good:  Pt. able to maintain balance w/o UE support;  exhibits some postural sway  Dynamic Sitting: Good - accepts moderate challenge;  can maintain balance while picking object off the floor  Static Standing: NT  Dynamic Standing: NT       WHEELCHAIR MOBILITY Daily Assessment    NT       Pain level: np c/o pain    Vital Signs:  /74   Pulse 64   Temp 97.7 °F (36.5 °C) (Oral)   Resp 18   Ht 5' 8\" (1.727 m)   Wt 158 lb 14.4 oz (72.1 kg)   SpO2 92%   BMI 24.16 kg/m²      Education:  Pt. & family educated regarding power w/c ordering process    Interdisciplinary Communication: worked w/ [employer] to address seating needs    Pt. Left up in w/c in NAD, call bell in reach, family @ bedside. Assessment: Met w/ patient regarding seated needs. Pt. Is not able to ambulate household distances & currently requires Ax2. Pt. Also does not have the upper body strength to propel manual w/c or to assist w/ pressure reliefs. Therefore, recommend power w/c w/ tilt in space to allow pt. To mobilize with his house & community as well as independently perform pressure reliefs.          Plan of Care: Continue with POC and progress as
PHYSICAL THERAPY DAILY NOTE  Time In:  3551  Time Out:  1013  Total Treatment Time:  51 Minutes  Pt. Seen for: AM, Balance Training, Therapeutic Exercise, Transfer Training, and Wheelchair mobility     Subjective: \"Do you think I'll ever walk again? \"         Objective:  Precautions: Falls, Spinal, and [place] collar on @ all times    COGNITION Daily Assessment    Pt. Presents w/ decreased insight into deficits & decreased short term recall       BED/MAT MOBILITY Daily Assessment   Pt. Able to perform functional bridge for pants management Rolling Right: Min A  Rolling Left: NT  Supine to Sit: Min A w/ HOB elevated & using rails  Sit to Supine: NT       TRANSFERS Daily Assessment    Sit to Stand: NT  Stand to Sit: NT  Transfer Type: Sliding Board  Transfer Assistance: Mod A  Car Transfers: NT         GAIT Daily Assessment   NT        STEPS/STAIRS Daily Assessment   NA        BALANCE Daily Assessment   Sitting balance EOB while performing ADLs supervision -min A when reaching overhead to don/doff shirt. Pt. Postures w/ posterior pelvic tilt & sacral sitting Static Sitting: Good:  Pt. able to maintain balance w/o UE support;  exhibits some postural sway  Dynamic Sitting: Fair - accepts minimal challenge;  can maintain balance while turning head/trunk  Static Standing: NT  Dynamic Standing: NT       WHEELCHAIR MOBILITY Daily Assessment   Pt. Propelling in power w/c Able to Propel (ft): 75'x2  Assistance: Supervision/Standby Assist  Surface: Level Surface  Wheelchair Set-up: Dependent       LOWER EXTREMITY EXERCISES Daily Assessment    Pt.  Performed Motomed @ resistance level 2 x10 minutes to increase strength & endurance B LEs     Pain level: no c/o pain    Vital Signs:  /77   Pulse 69   Temp 97.9 °F (36.6 °C) (Oral)   Resp 18   Ht 5' 8\" (1.727 m)   Wt 198 lb (89.8 kg)   SpO2 96%   BMI 30.11 kg/m²      Education:  discussed progression of activities to build towards gait activities    Interdisciplinary
PHYSICAL THERAPY DAILY NOTE  Time In:  6958  Time Out:  1609  Total Treatment Time:  36 Minutes  Pt. Seen for: PM, Therapeutic Exercise, and Transfer Training     Subjective: \"I just don't feel good today. \"         Objective:  Precautions: Falls, Spinal, and [place] collar on @ all times    GROSS ASSESSMENT Daily Assessment    Pt. Up in w/c upon arrival;  pallor grayed this PM       COGNITION Daily Assessment    Pt. W/ flat affect this PM.  Required cues for recall of sequencing for transfers       BED/MAT MOBILITY Daily Assessment    Rolling Right: NT  Rolling Left: NT  Supine to Sit: NT  Sit to Supine: Mod A for LEs       TRANSFERS Daily Assessment    Sit to Stand: NT  Stand to Sit: NT  Transfer Type: Sliding Board  Transfer Assistance: Mod A  Car Transfers: NT         GAIT Daily Assessment   NT        STEPS/STAIRS Daily Assessment   NT        BALANCE Daily Assessment    Static Sitting: Fair:  Pt. requires UE support;  may need occasional min A  Dynamic Sitting: Poor - unable to accept challenge or move without LOB   Static Standing: NT  Dynamic Standing: NT       WHEELCHAIR MOBILITY Daily Assessment    Able to Propel (ft): 150 in power w/c  Assistance: Supervision/Standby Assist  Surface: Level Surface  Wheelchair Set-up: Dependent       LOWER EXTREMITY EXERCISES Daily Assessment    Pt. Performed Motomed @ resistance level 2 x10 minutes to increase strength & endurance B LEs     Pain level: no c/o pain    Vital Signs:  /62   Pulse 63   Temp 97.7 °F (36.5 °C) (Oral)   Resp 18   Ht 5' 8\" (1.727 m)   Wt 198 lb (89.8 kg)   SpO2 99%   BMI 30.11 kg/m²      Education:  reviewed transfer steps    Interdisciplinary Communication: discussed pt's low energy w/ RN    Pt. Left supine in NAD, call bell in reach. Assessment: Pt. Feeling poorly this PM with fatigue & low energy. Unable to tolerate full tx session this PM.  Pt. Returned to bed & RN made aware. Will cont. Efforts.          Plan of Care:
PHYSICAL THERAPY DISCHARGE SUMMARY    TIME IN: 56  TIME OUT: 1426  Total Treatment Time:  45 Minutes      Precautions at discharge:   Falls and Spinal cervical collar on when OOB    Problem List:    Decreased strength B LE  [x]     Decreased strength trunk/core  [x]     Decreased AROM   [x]     Decreased PROM  []     Decreased balance sitting  [x]     Decreased balance standing  [x]     Decreased endurance  [x]     Pain  []        Functional Limitations:   Decreased independence with bed mobility  [x]     Decreased independence with functional transfers  [x]     Decreased independence with ambulation  [x]     Decreased independence with stair negotiation  [x]               Objective:     Vital Signs:Visit Vitals  /73   Pulse 64   Temp 97.7 °F (36.5 °C) (Oral)   Resp 18   Ht 5' 8\" (1.727 m)   Wt 158 lb 14.4 oz (72.1 kg)   SpO2 96%   BMI 24.16 kg/m²         Pain level: 0 to 2 out of 10  Pain location: cervical spine area  Pain interventions: Medication per order, rest, positioning,relaxation, body mechanics, cervical collar       Patient education: Bed mobility training,transfer training, gait training, balance training,fall precautions, body mechanics,activity pacing, spinal precautions, w/c mobility and parts management, Patient verbalizing understanding and demonstrating understanding of patient education.  Recommend follow up education at [facility]    Interdisciplinary Communication: spoke with OT and  regarding D/C plans     Cognition: Alert, able to follow commands,cooperating,participating, motivated,       Lower Extremity Function:    LLE RLE    ROM PROM WFL PROM WFL   Fine Motor Coordination Impaired:  decreased eccentric control Impaired:  decreased eccentric control   Tone Normal Normal   Sensation NT NT   Strength MMT  MMT                   MMT Initial Assessment    Right Lower Extremity Left Lower Extremity   Hip Flexion 3-/5 3-/5   Hip Extension 3-/5 3-/5   Hip Abduction 2/5 2/5   Hip
PHYSICAL WEEKLY PROGRESS NOTE    TIME IN: 0830  TIME OUT: 5054  Total Treatment Time: 51 Minutes    Subjective: \"I want to do whatever helps make me better. \"                Objective:     Precautions:   Falls, Spinal, and [place] collar      Vital Signs:/82   Pulse 69   Temp 98 °F (36.7 °C)   Resp 16   Ht 5' 8\" (1.727 m)   Wt 198 lb (89.8 kg)   SpO2 98%   BMI 30.11 kg/m²      Pain level: no c/o pain     Patient education: reviewed body mechanics for sliding board transfer     Interdisciplinary Communication: Collaborated w/ OT regarding pt's progress. Cognition: Pt. Is alert & follows commands consistently. Requires repeated cues to engage new learning.     Lower Extremity Function:      LLE RLE   ROM WFL WFL   Fine Motor Coordination Impaired:  by strength deficits Impaired:  by strength deficits   Tone Normal Normal   Sensation NT NT   Strength MMT  MMT        MMT Initial Assessment   Right Lower Extremity Left Lower Extremity   Hip Flexion 3-/5 3-/5   Hip Extension 3+/5 3+/5   Hip Abduction 2/5 2+/5   Hip Adduction 2/5 2+/5   Knee Extension 4/5 4/5   Knee Flexion 5/5 5/5   Ankle Dorsiflexion 5/5 5/5   Ankle Plantarflexion 5/5 5/5   0/5 No palpable muscle contraction  1/5 Palpable muscle contraction, no joint movement  2-/5 Less than full range of motion in gravity eliminated position  2/5 Able to complete full range of motion in gravity eliminated position  2+/5 Able to initiate movement against gravity  3-/5 More than half but not full range of motion against gravity  3/5 Able to complete full range of motion against gravity  3+/5 Completes full range of motion against gravity with minimal resistance  4-/5 Completes full range of motion against gravity with minimal-moderate resistance  4/5 Completes full range of motion against gravity with moderate resistance  4+/5 Completes full range of motion against gravity with moderate-maximum resistance  5/5 Completes full range of motion against gravity
Per notes pt is progressing in his care goals  Will follow as needed
Physical Therapy  Facility/Department: [facility] INPATIENT REHAB UNIT  Daily Treatment Note  NAME: Moshe Ormond  : 1945  MRN: [id number]    Date of Service: 4/15/2023    Discharge Recommendations:           Patient Diagnosis(es): There were no encounter diagnoses. Assessment         Plan          Restrictions        Subjective          Objective   Vitals                         Goals       Education       Therapy Time   Individual Concurrent Group Co-treatment   Time In          Time Out 1346         Minutes 39         Timed Code Treatment Minutes: 44 Minutes       Odell Hadley PTA         PHYSICAL THERAPY DAILY NOTE  Time In:  13:07 pm  Time Out:  13:46 pm  Total Treatment Time:  44 Minutes  Pt. Seen for: PM, Patient Education, Therapeutic Exercise, and Transfer Training     Subjective: \"My neck still hurts some\"         Objective:  Precautions: Falls and Spinal    GROSS ASSESSMENT Daily Assessment           COGNITION Daily Assessment           BED/MAT MOBILITY Daily Assessment    Rolling Right: Mod A  Rolling Left: Mod A  Supine to Sit: Mod A  Sit to Supine: NT       TRANSFERS Daily Assessment    Sit to Stand: NT  Stand to Sit: NT  Transfer Type: Sliding Board  Transfer Assistance:  Mod A  Car Transfers: NT         GAIT Daily Assessment    Amount of Assistance: NT  Distance (ft): 0  Assistive Device:  NT  Surface: NT       STEPS/STAIRS Daily Assessment    Steps Ambulated:  0  Level of Assistance:  NT  Railing: NT  Assistive Device:  NT       BALANCE Daily Assessment    Static Sitting: Good:  Pt. able to maintain balance w/o UE support;  exhibits some postural sway  Dynamic Sitting: Fair - accepts minimal challenge;  can maintain balance while turning head/trunk  Static Standing: Poor:  Pt. requires UE support & mod-max A to maintain position  Dynamic Standing: NT       WHEELCHAIR MOBILITY Daily Assessment    Able to Propel (ft): 0  Assistance: NT  Surface: NT  Wheelchair Set-up: NT       LOWER
Physical Therapy  PHYSICAL THERAPY DAILY NOTE  Time In:  1125 AM  Time Out:  5625 PM  Total Treatment Time:  (P) 39 Minutes  Pt. Seen for: AM, Gait Training, and Transfer Training     Subjective: \" I did not sleep at all last night. \"         Objective:  Precautions: Falls and Poor Safety Awareness    GROSS ASSESSMENT Daily Assessment           COGNITION Daily Assessment           BED/MAT MOBILITY Daily Assessment    Rolling Right:   Rolling Left:   Supine to Sit: NT  Sit to Supine: NT       TRANSFERS Daily Assessment    Sit to Stand: Mod A  Stand to Sit: Mod A  Transfer Type: SPT with rolling walker  Transfer Assistance: Mod A  Car Transfers: NT         GAIT Daily Assessment   Right LE scissoring . He required cues to keep right foot out to the right. Amount of Assistance: Mod A  Distance (ft): 80 x 3  Assistive Device: RW and with bilateral platforms. Surface: Level Surface       STEPS/STAIRS Daily Assessment    Steps Ambulated:    Level of Assistance:    Railing:  Assistive Device:        BALANCE Daily Assessment    Static Sitting:   Dynamic Sitting:   Static Standing:   Dynamic Standing:        WHEELCHAIR MOBILITY Daily Assessment    Able to Propel (ft):   Assistance:   Surface:   Wheelchair Set-up:        LOWER EXTREMITY EXERCISES Daily Assessment         Pain level: no pain noted. Pain Location:    Pain Interventions:     Vital Signs:  /73   Pulse 64   Temp 97.7 °F (36.5 °C) (Oral)   Resp 18   Ht 5' 8\" (1.727 m)   Wt 158 lb 14.4 oz (72.1 kg)   SpO2 96%   BMI 24.16 kg/m²       Education:      Interdisciplinary Communication:     Pt. Left in wheelchair and set up tray for lunch. Call bell within reach. Assessment: Patient seems a little anxious. Plan of Care: Continue with plan of care.     Hair Coto, PTA  5/1/2023
Pt assisted back to bed from motorized wheelchair with slide board. Pt c/o n/v,  300ml brown liquid and undigested food observed. Zofran 4mg Zofran po adm.
Pt resting in  bed, voices decreased nausea after adm of Zofran. Pt did not want to eat dinner, note placed on  tray not to remove, informed pt nursing staff can warm his food when he is ready to eat.
ongoing  4/20/2023 Goal ongoing  4/27/2023 Goal met      STG 7: Pt will be able to self-feed with mod A by 4/7/23 to improve independence. 4/6/2023 Goal met   LTG 7: Pt will be able to self-feed with S/U by 4/28/23 to improve independence. 4/13/2023 Goal met     *Initial Assessment is the worst a patient did on that activity in the first 72 hrs of being admitted as gathered by the OT   *Discharge Assessment is an average of the patient's performance over the last 48 hrs   Initial Assessment Discharge Assessment 5/1/2023   Eating Dependent:  A with all parts Setup or clean-up assistance:  S/U   Oral Hygiene Dependent:   A with all parts Independent:  I   Bathing Dependent:  A for all parts; Ax2 for buttocks Partial/moderate assistance:  A for buttocks   Upper Body Dressing Dependent: Ax2 to roll Partial/moderate assistance:  A to pull down and L shoulder; pt appears worried about transfer   Lower Body Dressing Dependent: Ax2 to roll Partial/moderate assistance:  A for over waist and cueing for threading   Donning/Violet Footwear Dependent:  A for all parts Dependent:  A for all parts   Toilet Transfer Dependent:  A for all parts Supervision or touching assistance:  CGA   Toilet Hygiene Dependent:  A for all parts Dependent:  A for all parts   BIMS: 11/15    Precautions at Discharge: Falls and Cervical collar when OOB  Discharge [facility]: [facility]  Safety/Supervision Recommendations/Functional Level: Pt needs significant A with ADL. Pt needs 24/7 A. Family Training: Not indicated secondary to going to SNF    Recommended Continuing Therapy: Continue OT  Residual Deficits: balance, strength, activity tolerance, cognition, and safety awareness  Progress over LOS: Pt made improvements in balance, strength, FMC, and compensatory strategies allowing for improvements in bathing, toileting, and dressing. Pt's biggest barrier is poor insight. Pt would benefit from continued skilled services.      Summary of Session: Pt was
No acute distress, well developed, well nourished. HEENT: Normocephalic, oral mucosa is moist. Extraocular movements are intact. Neck: Supple, non-tender. Anterior and posterior surgical sites. C-collar. Respiratory: Respirations are non-labored. Equal chest rise. Cardiovascular: Normal rate, no cyanosis. ABD: Non-distended, no visible mass. Integumentary: Clean, no rash, no skin breakdown. Surgical incision anterior neck R CDI. Surgical incision posterior neck midline CDI    Musculoskeletal: No joint tenderness, no joint swelling. Psychiatric: Appropriate mood & affect. Neuro: Alert, oriented, speech and language intact. Sensation intact. Weakness in BUE worse than BLE. BLE tone normal.        Functional Assessment:    Per PT:   TRANSFERS Daily Assessment     Sit to Stand: Mod A  Stand to Sit: Mod A  Transfer Type: Stand Pivot  Transfer Assistance: Mod A  Car Transfers: NT            GAIT Daily Assessment   Additional 40ft x3, 60ft x1 using platform walker mod a  Amount of Assistance: Mod A  Distance (ft): 40  Assistive Device:  platform walker  Surface: Level Surface      Per OT:         Functional Mobility   Pt was I with w/c mobility. Visual-Perceptual   Pt worked with bi-colored visual perceptual blocks to improve problem solving and reduce crashes in power w/c. Pt completed 4 block designs with mod verbal cueing and did 2 9 block designs with mod verbal cueing. Pt demonstrates an inability to appropriately ask for help or plan. Pt demonstrated poor carryover of strategies.        Labs/Studies:  Recent Results (from the past 72 hour(s))   PLEASE READ & DOCUMENT PPD TEST IN 24 HRS    Collection Time: 04/30/23 12:00 AM   Result Value Ref Range    PPD, (POC) Negative Negative    mm Induration 0 0 - 5 mm   PLEASE READ & DOCUMENT PPD TEST IN 48 HRS    Collection Time: 05/01/23 11:43 AM   Result Value Ref Range    PPD, (POC) Negative Negative    mm Induration 0 0 - 5 mm             Assessment and Plan
which will affect rehab course with plan for mitigation. Continue daily physician / PA medical management:  Principal Problem:    Cervical spinal cord injury, initial encounter [facility])  Active Problems:    Pulmonary hypertension (HCC)    Chronic systolic congestive heart failure (HCC)    CAD (coronary artery disease)    Acute on chronic congestive heart failure ([facility] [address]    Anemia    DNR (do not resuscitate)    Tracheobronchomalacia    Dyslipidemia    Hypertension    Localized edema    Atrial fibrillation (HCC)    Cervical spinal cord compression (HCC)    S/P laminectomy with spinal fusion    S/P cervical discectomy    Dysphagia  Resolved Problems:    * No resolved hospital problems.  *          Signed By: Sharri Cox MD     April 13, 2023

## 2023-05-03 NOTE — CARE COORDINATION
Patient has discharge orders for today. Patient has been accepted and approved at [facility]. Transport has been scheduled with [facility] @ 1pm. Patient room # 197 / report # [phone]. Package placed at the nurse station. CM will continue to follow and remain available for any needs, concerns or questions that may arise. 04/03/23 9451   Service Assessment   Patient Orientation Alert and Oriented;Person;Place;Situation;Self   Cognition Alert   History Provided By Medical Record   Primary Caregiver Self   Support Systems Children;Family Members   Patient's Healthcare Decision Maker is: Legal Next of Tu 67   PCP Verified by CM Yes   Last Visit to PCP Within last 3 months   Prior Functional Level Independent in ADLs/IADLs   Current Functional Level Assistance with the following:;Bathing;Dressing; Toileting;Feeding;Cooking;Housework; Shopping;Mobility   Can patient return to prior living arrangement Unknown at present   Ability to make needs known: Fair   Family able to assist with home care needs: Other (comment)   Would you like for me to discuss the discharge plan with any other family members/significant others, and if so, who? Yes   Financial Resources [employer] Resources None   Social/Functional History   Lives With Alone   Type of [facility] [address] Two level; Laundry in basement; Work area in basement;Able to [employer] on Main level with [place] Stairs to enter without rails   Entrance Stairs - Number of Steps 2   Bathroom Shower/Tub Walk-in shower; Shower chair with back   [address] bars in shower; Shower chair   [address] Help From Family;Friend(s)   ADL Assistance Needs assistance   [address] Needs assistance   Ambulation Assistance Non-ambulatory   Transfer Assistance Needs assistance   Active  No   Occupation Part time employment; Works

## 2023-05-04 ENCOUNTER — TELEPHONE (OUTPATIENT)
Dept: NEUROSURGERY | Age: 78
End: 2023-05-04

## 2023-05-04 DIAGNOSIS — S14.109A CERVICAL SPINAL CORD INJURY, INITIAL ENCOUNTER (HCC): ICD-10-CM

## 2023-05-04 DIAGNOSIS — G95.20 CERVICAL SPINAL CORD COMPRESSION (HCC): Primary | ICD-10-CM

## 2023-05-04 NOTE — TELEPHONE ENCOUNTER
Patient's daughter called to schedule post op follow up. Patient's C5-7 ACDF surgery was on 3/17/23 by Dr. Moisés Elizabeth. Patient was recently d/c from the hospital to a skilled nursing facility. Should patient see Dr. Moisés Elizabeth for first post op follow up? Does patient need c-spine x-rays at that appt? Please advise.      Lorie [phone]

## 2023-05-04 NOTE — TELEPHONE ENCOUNTER
Cervical spine xray order placed prior to appointment with Dr. Rehan Cline  Will have Rg Jenkins contact daughter to schedule first avail appointment with Dr. Rehan Cline

## 2023-05-16 ENCOUNTER — HOSPITAL ENCOUNTER (OUTPATIENT)
Dept: GENERAL RADIOLOGY | Age: 78
Discharge: HOME | End: 2023-05-19
Payer: MEDICARE

## 2023-05-16 ENCOUNTER — OFFICE VISIT (OUTPATIENT)
Dept: NEUROSURGERY | Age: 78
End: 2023-05-16

## 2023-05-16 VITALS
BODY MASS INDEX: 24.16 KG/M2 | TEMPERATURE: 97.9 F | HEART RATE: 71 BPM | OXYGEN SATURATION: 96 % | HEIGHT: 68 IN | DIASTOLIC BLOOD PRESSURE: 64 MMHG | SYSTOLIC BLOOD PRESSURE: 141 MMHG

## 2023-05-16 DIAGNOSIS — S14.109A CERVICAL SPINAL CORD INJURY, INITIAL ENCOUNTER (HCC): ICD-10-CM

## 2023-05-16 DIAGNOSIS — G50.0 TRIGEMINAL NEURALGIA: ICD-10-CM

## 2023-05-16 DIAGNOSIS — G95.20 CERVICAL SPINAL CORD COMPRESSION (HCC): ICD-10-CM

## 2023-05-16 DIAGNOSIS — S14.109A CERVICAL SPINAL CORD INJURY, INITIAL ENCOUNTER (HCC): Primary | ICD-10-CM

## 2023-05-16 PROCEDURE — 72040 X-RAY EXAM NECK SPINE 2-3 VW: CPT

## 2023-05-16 RX ORDER — OXYCODONE HYDROCHLORIDE 5 MG/1
TABLET ORAL
COMMUNITY
Start: 2023-05-09

## 2023-05-16 NOTE — PROGRESS NOTES
History of Present Illness    Patient Words: [de-identified] y.o. This patient is a [de-identified] y.o. male who presents today for postsurgical follow-up status post posterior cervical fusion and anterior cervical discectomy and fusion C5-7 status post fall with central cord contusion and bruising with stenosis ventrally and dorsally requiring 360 degree fusion. Overall the patient is doing well. The limiting factor is exacerbation of his right trigeminal neuralgia. He is taking multiple medications for this without relief this has hindered his rehabilitation. X-rays obtained today of the cervical spine show a stable appearance to the fusions both dorsally and ventrally C5-7. Past Medical History:   Diagnosis Date    Atrial fibrillation [facility])     Atrial flutter ([facility] 4/7/2017    CAD (coronary artery disease) 2006    Cancer [facility]) 2011    prostate    Community acquired pneumonia 3/28/2021    GERD (gastroesophageal reflux disease)     takes omeprazole    Heart failure (HCC)     Hypertension     Ill-defined condition     trigeminal neuralgia    Myocardial infarction ([facility] 06/06/2019    NSTEMI     Sleep apnea     does not wear CPAP    Stroke ([facility] 2018    TIA        Allergies   Allergen Reactions    Codeine      Other reaction(s): Other- (not listed) - Allergy  constipation    Iodides      Other reaction(s): Unknown (comments)    Iodinated Contrast Media Other (See Comments)     Shaking all over, jumpy feeling        Family History   Problem Relation Age of Onset    Heart Disease Mother     Heart Disease Sister     Other Other         cerebral aneurysm         Social History     Socioeconomic History    Marital status:       Spouse name: Not on file    Number of children: Not on file    Years of education: Not on file    Highest education level: Not on file   Occupational History    Not on file   Tobacco Use    Smoking status: Former     Packs/day: 1.00     Years: 14.00     Pack years: 14.00     Types: Cigarettes     Start

## 2023-05-23 DIAGNOSIS — G45.9 TIA (TRANSIENT ISCHEMIC ATTACK): ICD-10-CM

## 2023-05-23 DIAGNOSIS — Z95.818 STATUS POST PLACEMENT OF IMPLANTABLE LOOP RECORDER: ICD-10-CM

## 2023-06-01 ENCOUNTER — TELEPHONE (OUTPATIENT)
Dept: NEUROSURGERY | Age: 78
End: 2023-06-01

## 2023-06-01 NOTE — TELEPHONE ENCOUNTER
Please call patient's daughter, Savanah Barrios, regarding patient's referral to Dr. Cheyanne Greene. [phone].

## 2023-06-18 ENCOUNTER — APPOINTMENT (OUTPATIENT)
Dept: GENERAL RADIOLOGY | Age: 78
End: 2023-06-18
Payer: MEDICARE

## 2023-06-18 ENCOUNTER — HOSPITAL ENCOUNTER (EMERGENCY)
Age: 78
Discharge: HOME | End: 2023-06-18
Attending: STUDENT IN AN ORGANIZED HEALTH CARE EDUCATION/TRAINING PROGRAM
Payer: MEDICARE

## 2023-06-18 ENCOUNTER — APPOINTMENT (OUTPATIENT)
Dept: CT IMAGING | Age: 78
End: 2023-06-18
Payer: MEDICARE

## 2023-06-18 VITALS
HEART RATE: 76 BPM | SYSTOLIC BLOOD PRESSURE: 140 MMHG | BODY MASS INDEX: 28.19 KG/M2 | WEIGHT: 186 LBS | HEIGHT: 68 IN | RESPIRATION RATE: 18 BRPM | OXYGEN SATURATION: 96 % | DIASTOLIC BLOOD PRESSURE: 70 MMHG | TEMPERATURE: 98 F

## 2023-06-18 DIAGNOSIS — L02.91 ABSCESS: Primary | ICD-10-CM

## 2023-06-18 LAB
ALBUMIN SERPL-MCNC: 3.3 G/DL (ref 3.2–4.6)
ALBUMIN/GLOB SERPL: 0.7 (ref 0.4–1.6)
ALP SERPL-CCNC: 137 U/L (ref 50–136)
ALT SERPL-CCNC: 18 U/L (ref 12–65)
ANION GAP SERPL CALC-SCNC: 5 MMOL/L (ref 2–11)
AST SERPL-CCNC: 22 U/L (ref 15–37)
BASOPHILS # BLD: 0.1 K/UL (ref 0–0.2)
BASOPHILS NFR BLD: 1 % (ref 0–2)
BILIRUB SERPL-MCNC: 0.8 MG/DL (ref 0.2–1.1)
BUN SERPL-MCNC: 14 MG/DL (ref 8–23)
CALCIUM SERPL-MCNC: 8.6 MG/DL (ref 8.3–10.4)
CHLORIDE SERPL-SCNC: 96 MMOL/L (ref 101–110)
CO2 SERPL-SCNC: 29 MMOL/L (ref 21–32)
CREAT SERPL-MCNC: 0.7 MG/DL (ref 0.8–1.5)
DIFFERENTIAL METHOD BLD: ABNORMAL
EOSINOPHIL # BLD: 0.4 K/UL (ref 0–0.8)
EOSINOPHIL NFR BLD: 5 % (ref 0.5–7.8)
ERYTHROCYTE [DISTWIDTH] IN BLOOD BY AUTOMATED COUNT: 15.4 % (ref 11.9–14.6)
GLOBULIN SER CALC-MCNC: 4.5 G/DL (ref 2.8–4.5)
GLUCOSE SERPL-MCNC: 92 MG/DL (ref 65–100)
HCT VFR BLD AUTO: 35.1 % (ref 41.1–50.3)
HGB BLD-MCNC: 11.8 G/DL (ref 13.6–17.2)
IMM GRANULOCYTES # BLD AUTO: 0.1 K/UL (ref 0–0.5)
IMM GRANULOCYTES NFR BLD AUTO: 1 % (ref 0–5)
LYMPHOCYTES # BLD: 1 K/UL (ref 0.5–4.6)
LYMPHOCYTES NFR BLD: 14 % (ref 13–44)
MCH RBC QN AUTO: 34.4 PG (ref 26.1–32.9)
MCHC RBC AUTO-ENTMCNC: 33.6 G/DL (ref 31.4–35)
MCV RBC AUTO: 102.3 FL (ref 82–102)
MONOCYTES # BLD: 0.5 K/UL (ref 0.1–1.3)
MONOCYTES NFR BLD: 8 % (ref 4–12)
NEUTS SEG # BLD: 5.2 K/UL (ref 1.7–8.2)
NEUTS SEG NFR BLD: 71 % (ref 43–78)
NRBC # BLD: 0 K/UL (ref 0–0.2)
PLATELET # BLD AUTO: 187 K/UL (ref 150–450)
PMV BLD AUTO: 8.6 FL (ref 9.4–12.3)
POTASSIUM SERPL-SCNC: 4.5 MMOL/L (ref 3.5–5.1)
PROT SERPL-MCNC: 7.8 G/DL (ref 6.3–8.2)
RBC # BLD AUTO: 3.43 M/UL (ref 4.23–5.6)
SODIUM SERPL-SCNC: 130 MMOL/L (ref 133–143)
WBC # BLD AUTO: 7.2 K/UL (ref 4.3–11.1)

## 2023-06-18 PROCEDURE — 6370000000 HC RX 637 (ALT 250 FOR IP): Performed by: STUDENT IN AN ORGANIZED HEALTH CARE EDUCATION/TRAINING PROGRAM

## 2023-06-18 PROCEDURE — 6360000004 HC RX CONTRAST MEDICATION: Performed by: STUDENT IN AN ORGANIZED HEALTH CARE EDUCATION/TRAINING PROGRAM

## 2023-06-18 PROCEDURE — 80053 COMPREHEN METABOLIC PANEL: CPT

## 2023-06-18 PROCEDURE — 85025 COMPLETE CBC W/AUTO DIFF WBC: CPT

## 2023-06-18 PROCEDURE — 74018 RADEX ABDOMEN 1 VIEW: CPT

## 2023-06-18 PROCEDURE — 74177 CT ABD & PELVIS W/CONTRAST: CPT

## 2023-06-18 PROCEDURE — 2580000003 HC RX 258: Performed by: STUDENT IN AN ORGANIZED HEALTH CARE EDUCATION/TRAINING PROGRAM

## 2023-06-18 PROCEDURE — 99285 EMERGENCY DEPT VISIT HI MDM: CPT

## 2023-06-18 RX ORDER — KETOROLAC TROMETHAMINE 15 MG/ML
15 INJECTION, SOLUTION INTRAMUSCULAR; INTRAVENOUS ONCE
Status: DISCONTINUED | OUTPATIENT
Start: 2023-06-18 | End: 2023-06-18

## 2023-06-18 RX ORDER — 0.9 % SODIUM CHLORIDE 0.9 %
1000 INTRAVENOUS SOLUTION INTRAVENOUS
Status: COMPLETED | OUTPATIENT
Start: 2023-06-18 | End: 2023-06-18

## 2023-06-18 RX ORDER — AMOXICILLIN AND CLAVULANATE POTASSIUM 875; 125 MG/1; MG/1
1 TABLET, FILM COATED ORAL 2 TIMES DAILY
Qty: 14 TABLET | Refills: 0 | Status: SHIPPED | OUTPATIENT
Start: 2023-06-18 | End: 2023-06-19 | Stop reason: SDUPTHER

## 2023-06-18 RX ORDER — HYDROCODONE BITARTRATE AND ACETAMINOPHEN 7.5; 325 MG/1; MG/1
1 TABLET ORAL
Status: COMPLETED | OUTPATIENT
Start: 2023-06-18 | End: 2023-06-18

## 2023-06-18 RX ORDER — AMOXICILLIN AND CLAVULANATE POTASSIUM 875; 125 MG/1; MG/1
1 TABLET, FILM COATED ORAL
Status: COMPLETED | OUTPATIENT
Start: 2023-06-18 | End: 2023-06-18

## 2023-06-18 RX ADMIN — IOPAMIDOL 100 ML: 755 INJECTION, SOLUTION INTRAVENOUS at 18:14

## 2023-06-18 RX ADMIN — HYDROCODONE BITARTRATE AND ACETAMINOPHEN 1 TABLET: 7.5; 325 TABLET ORAL at 21:23

## 2023-06-18 RX ADMIN — SODIUM CHLORIDE 1000 ML: 9 INJECTION, SOLUTION INTRAVENOUS at 20:02

## 2023-06-18 RX ADMIN — AMOXICILLIN AND CLAVULANATE POTASSIUM 1 TABLET: 875; 125 TABLET, FILM COATED ORAL at 20:25

## 2023-06-18 ASSESSMENT — LIFESTYLE VARIABLES
HOW MANY STANDARD DRINKS CONTAINING ALCOHOL DO YOU HAVE ON A TYPICAL DAY: PATIENT DOES NOT DRINK
HOW OFTEN DO YOU HAVE A DRINK CONTAINING ALCOHOL: NEVER

## 2023-06-18 ASSESSMENT — PAIN DESCRIPTION - ORIENTATION: ORIENTATION: LEFT

## 2023-06-18 ASSESSMENT — PAIN - FUNCTIONAL ASSESSMENT
PAIN_FUNCTIONAL_ASSESSMENT: 0-10
PAIN_FUNCTIONAL_ASSESSMENT: 0-10

## 2023-06-18 ASSESSMENT — PAIN SCALES - GENERAL
PAINLEVEL_OUTOF10: 5
PAINLEVEL_OUTOF10: 9

## 2023-06-18 ASSESSMENT — PAIN DESCRIPTION - LOCATION: LOCATION: LEG

## 2023-06-19 RX ORDER — AMOXICILLIN AND CLAVULANATE POTASSIUM 875; 125 MG/1; MG/1
1 TABLET, FILM COATED ORAL 2 TIMES DAILY
Qty: 14 TABLET | Refills: 0 | Status: SHIPPED | OUTPATIENT
Start: 2023-06-19 | End: 2023-06-26

## 2023-06-19 NOTE — ED NOTES
Attempted to call report to returning facility. No answer. Voicemail full.       Dori Cabrales RN  06/18/23 1401

## 2023-06-19 NOTE — ED NOTES
Filiberto Joseph RN called from facility. Given ED SBAR report on patient.       Arsalan Wei RN  06/18/23 1765

## 2023-06-19 NOTE — ED NOTES
Case management requesting that I provide prescription for Augmentin as it was not sent to the correct pharmacy. Prescription for Augmentin so that it can be given to patient.      Ravi Diamond., MD  06/19/23 2584

## 2023-06-19 NOTE — CARE COORDINATION
LMSW received call back from Ekaterina Mark at [facility] requesting that they be faxed pt's D/C info from ED visit yesterday and a script for prescribed antibiotic. Faxed to [phone] as requested.

## 2023-06-19 NOTE — DISCHARGE SUMMARY
I have reviewed discharge instructions with the patient. The patient verbalized understanding. Patient left ED via Discharge Method: stretcher to [facility] with Harjinder Arroyo for questions and clarification provided. Patient given 1 scripts. To continue your aftercare when you leave the hospital, you may receive an automated call from our care team to check in on how you are doing. This is a free service and part of our promise to provide the best care and service to meet your aftercare needs.  If you have questions, or wish to unsubscribe from this service please call [phone]. Thank you for Choosing our [facility] Emergency Department.

## 2023-06-19 NOTE — DISCHARGE INSTRUCTIONS
Take the antibiotic prescribed as directed to run out of medication. Change dressing daily and monitor for spreading of redness, swelling drainage and discharge. Perform warm soaks in the tub several times daily if possible to help with drainage. Arrange follow-up with your primary care provider, caregiver at your facility and return to this facility in 2 days for recheck of wound. Return sooner for worsened symptoms, concerns or questions.

## 2023-06-19 NOTE — ED NOTES
Called [facility] to transport patient to [facility]. ETA: 9:00 PM.     Alfonso Khan  06/18/23 2035

## 2023-06-21 PROCEDURE — G2066 INTER DEVC REMOTE 30D: HCPCS | Performed by: INTERNAL MEDICINE

## 2023-06-21 PROCEDURE — 93298 REM INTERROG DEV EVAL SCRMS: CPT | Performed by: INTERNAL MEDICINE

## 2023-06-26 ENCOUNTER — APPOINTMENT (OUTPATIENT)
Dept: GENERAL RADIOLOGY | Age: 78
DRG: 871 | End: 2023-06-26
Payer: MEDICARE

## 2023-06-26 ENCOUNTER — HOSPITAL ENCOUNTER (INPATIENT)
Age: 78
LOS: 7 days | Discharge: NURSING, REHAB OR LONG-TERM CARE | DRG: 871 | End: 2023-07-03
Attending: EMERGENCY MEDICINE | Admitting: FAMILY MEDICINE
Payer: MEDICARE

## 2023-06-26 ENCOUNTER — APPOINTMENT (OUTPATIENT)
Dept: CT IMAGING | Age: 78
DRG: 871 | End: 2023-06-26
Payer: MEDICARE

## 2023-06-26 DIAGNOSIS — R79.89 ELEVATED BRAIN NATRIURETIC PEPTIDE (BNP) LEVEL: ICD-10-CM

## 2023-06-26 DIAGNOSIS — A41.9 SEPTICEMIA (HCC): ICD-10-CM

## 2023-06-26 DIAGNOSIS — R09.02 HYPOXIA: ICD-10-CM

## 2023-06-26 DIAGNOSIS — G50.0 TRIGEMINAL NEURALGIA: ICD-10-CM

## 2023-06-26 DIAGNOSIS — I50.9 CONGESTIVE HEART FAILURE, UNSPECIFIED HF CHRONICITY, UNSPECIFIED HEART FAILURE TYPE (HCC): Primary | ICD-10-CM

## 2023-06-26 PROBLEM — R07.9 CHEST PAIN: Status: RESOLVED | Noted: 2023-02-16 | Resolved: 2023-06-26

## 2023-06-26 PROBLEM — F51.01 PRIMARY INSOMNIA: Status: ACTIVE | Noted: 2019-06-24

## 2023-06-26 PROBLEM — R04.2 HEMOPTYSIS: Status: RESOLVED | Noted: 2019-06-09 | Resolved: 2023-06-26

## 2023-06-26 PROBLEM — R06.02 SOB (SHORTNESS OF BREATH): Status: RESOLVED | Noted: 2019-10-24 | Resolved: 2023-06-26

## 2023-06-26 PROBLEM — K59.01 SLOW TRANSIT CONSTIPATION: Status: ACTIVE | Noted: 2019-06-24

## 2023-06-26 PROBLEM — K92.1 HEMATOCHEZIA: Status: ACTIVE | Noted: 2020-01-21

## 2023-06-26 PROBLEM — M62.81 MUSCLE WEAKNESS: Status: ACTIVE | Noted: 2019-06-24

## 2023-06-26 PROBLEM — J96.01 SEPSIS WITH ACUTE HYPOXIC RESPIRATORY FAILURE WITHOUT SEPTIC SHOCK (HCC): Status: ACTIVE | Noted: 2023-06-26

## 2023-06-26 PROBLEM — U07.1 COVID-19 VIRUS INFECTION: Status: ACTIVE | Noted: 2020-12-10

## 2023-06-26 PROBLEM — R65.20 SEPSIS WITH ACUTE HYPOXIC RESPIRATORY FAILURE WITHOUT SEPTIC SHOCK (HCC): Status: ACTIVE | Noted: 2023-06-26

## 2023-06-26 PROBLEM — J18.9 COMMUNITY ACQUIRED PNEUMONIA: Status: ACTIVE | Noted: 2021-03-28

## 2023-06-26 PROBLEM — G45.9 TIA (TRANSIENT ISCHEMIC ATTACK): Status: RESOLVED | Noted: 2018-07-20 | Resolved: 2023-06-26

## 2023-06-26 PROBLEM — J18.9 COMMUNITY ACQUIRED PNEUMONIA: Status: RESOLVED | Noted: 2021-03-28 | Resolved: 2023-06-26

## 2023-06-26 PROBLEM — K92.1 HEMATOCHEZIA: Status: RESOLVED | Noted: 2020-01-21 | Resolved: 2023-06-26

## 2023-06-26 PROBLEM — L85.3 DRY SKIN: Status: ACTIVE | Noted: 2019-07-14

## 2023-06-26 PROBLEM — D72.829 LEUKOCYTOSIS: Status: RESOLVED | Noted: 2021-03-28 | Resolved: 2023-06-26

## 2023-06-26 PROBLEM — U07.1 COVID-19 VIRUS INFECTION: Status: RESOLVED | Noted: 2020-12-10 | Resolved: 2023-06-26

## 2023-06-26 LAB
ALBUMIN SERPL-MCNC: 2.8 G/DL (ref 3.2–4.6)
ALBUMIN/GLOB SERPL: 0.7 (ref 0.4–1.6)
ALP SERPL-CCNC: 108 U/L (ref 50–136)
ALT SERPL-CCNC: 15 U/L (ref 12–65)
ANION GAP SERPL CALC-SCNC: 6 MMOL/L (ref 2–11)
APPEARANCE UR: CLEAR
AST SERPL-CCNC: 19 U/L (ref 15–37)
BACTERIA URNS QL MICRO: NEGATIVE /HPF
BASOPHILS # BLD: 0 K/UL (ref 0–0.2)
BASOPHILS NFR BLD: 0 % (ref 0–2)
BILIRUB SERPL-MCNC: 1.2 MG/DL (ref 0.2–1.1)
BILIRUB UR QL: NEGATIVE
BILIRUB UR QL: NEGATIVE
BUN SERPL-MCNC: 14 MG/DL (ref 8–23)
CALCIUM SERPL-MCNC: 8.4 MG/DL (ref 8.3–10.4)
CASTS URNS QL MICRO: ABNORMAL /LPF
CHLORIDE SERPL-SCNC: 99 MMOL/L (ref 101–110)
CO2 SERPL-SCNC: 24 MMOL/L (ref 21–32)
COLOR UR: ABNORMAL
CREAT SERPL-MCNC: 0.8 MG/DL (ref 0.8–1.5)
DIFFERENTIAL METHOD BLD: ABNORMAL
EKG ATRIAL RATE: 75 BPM
EKG DIAGNOSIS: NORMAL
EKG P AXIS: 68 DEGREES
EKG P-R INTERVAL: 217 MS
EKG Q-T INTERVAL: 418 MS
EKG QRS DURATION: 130 MS
EKG QTC CALCULATION (BAZETT): 467 MS
EKG R AXIS: -57 DEGREES
EKG T AXIS: 133 DEGREES
EKG VENTRICULAR RATE: 75 BPM
EOSINOPHIL # BLD: 0 K/UL (ref 0–0.8)
EOSINOPHIL NFR BLD: 0 % (ref 0.5–7.8)
EPI CELLS #/AREA URNS HPF: ABNORMAL /HPF
ERYTHROCYTE [DISTWIDTH] IN BLOOD BY AUTOMATED COUNT: 15.3 % (ref 11.9–14.6)
GLOBULIN SER CALC-MCNC: 4.2 G/DL (ref 2.8–4.5)
GLUCOSE SERPL-MCNC: 90 MG/DL (ref 65–100)
GLUCOSE UR QL STRIP.AUTO: >1000 MG/DL
GLUCOSE UR STRIP.AUTO-MCNC: >1000 MG/DL
HCT VFR BLD AUTO: 34.6 % (ref 41.1–50.3)
HGB BLD-MCNC: 11.7 G/DL (ref 13.6–17.2)
HGB UR QL STRIP: ABNORMAL
IMM GRANULOCYTES # BLD AUTO: 0.1 K/UL (ref 0–0.5)
IMM GRANULOCYTES NFR BLD AUTO: 1 % (ref 0–5)
KETONES UR QL STRIP.AUTO: 15 MG/DL
KETONES UR-MCNC: 15 MG/DL
LACTATE SERPL-SCNC: 1.4 MMOL/L (ref 0.4–2)
LEUKOCYTE ESTERASE UR QL STRIP.AUTO: NEGATIVE
LEUKOCYTE ESTERASE UR QL STRIP: NEGATIVE
LYMPHOCYTES # BLD: 0.5 K/UL (ref 0.5–4.6)
LYMPHOCYTES NFR BLD: 3 % (ref 13–44)
MCH RBC QN AUTO: 33.9 PG (ref 26.1–32.9)
MCHC RBC AUTO-ENTMCNC: 33.8 G/DL (ref 31.4–35)
MCV RBC AUTO: 100.3 FL (ref 82–102)
MONOCYTES # BLD: 1.2 K/UL (ref 0.1–1.3)
MONOCYTES NFR BLD: 7 % (ref 4–12)
NEUTS SEG # BLD: 14.6 K/UL (ref 1.7–8.2)
NEUTS SEG NFR BLD: 89 % (ref 43–78)
NITRITE UR QL STRIP.AUTO: NEGATIVE
NITRITE UR QL: NEGATIVE
NRBC # BLD: 0 K/UL (ref 0–0.2)
NT PRO BNP: 7231 PG/ML
PH UR STRIP: 6 (ref 5–9)
PH UR: 6 (ref 5–9)
PLATELET # BLD AUTO: 142 K/UL (ref 150–450)
PMV BLD AUTO: 8.7 FL (ref 9.4–12.3)
POTASSIUM SERPL-SCNC: 3.9 MMOL/L (ref 3.5–5.1)
PROCALCITONIN SERPL-MCNC: 0.17 NG/ML (ref 0–0.49)
PROT SERPL-MCNC: 7 G/DL (ref 6.3–8.2)
PROT UR QL: 30 MG/DL
PROT UR STRIP-MCNC: ABNORMAL MG/DL
RBC # BLD AUTO: 3.45 M/UL (ref 4.23–5.6)
RBC # UR STRIP: ABNORMAL
RBC #/AREA URNS HPF: ABNORMAL /HPF
SERVICE CMNT-IMP: ABNORMAL
SODIUM SERPL-SCNC: 129 MMOL/L (ref 133–143)
SP GR UR REFRACTOMETRY: 1.03 (ref 1–1.02)
SP GR UR: 1.02 (ref 1–1.02)
UROBILINOGEN UR QL STRIP.AUTO: 1 EU/DL (ref 0.2–1)
UROBILINOGEN UR QL: 1 EU/DL (ref 0.2–1)
WBC # BLD AUTO: 16.5 K/UL (ref 4.3–11.1)
WBC URNS QL MICRO: ABNORMAL /HPF

## 2023-06-26 PROCEDURE — 6360000002 HC RX W HCPCS: Performed by: FAMILY MEDICINE

## 2023-06-26 PROCEDURE — 93005 ELECTROCARDIOGRAM TRACING: CPT | Performed by: EMERGENCY MEDICINE

## 2023-06-26 PROCEDURE — 1100000000 HC RM PRIVATE

## 2023-06-26 PROCEDURE — 87040 BLOOD CULTURE FOR BACTERIA: CPT

## 2023-06-26 PROCEDURE — 36415 COLL VENOUS BLD VENIPUNCTURE: CPT

## 2023-06-26 PROCEDURE — 81001 URINALYSIS AUTO W/SCOPE: CPT

## 2023-06-26 PROCEDURE — 6370000000 HC RX 637 (ALT 250 FOR IP): Performed by: FAMILY MEDICINE

## 2023-06-26 PROCEDURE — 2580000003 HC RX 258: Performed by: EMERGENCY MEDICINE

## 2023-06-26 PROCEDURE — 99285 EMERGENCY DEPT VISIT HI MDM: CPT

## 2023-06-26 PROCEDURE — 6360000002 HC RX W HCPCS: Performed by: EMERGENCY MEDICINE

## 2023-06-26 PROCEDURE — 85025 COMPLETE CBC W/AUTO DIFF WBC: CPT

## 2023-06-26 PROCEDURE — 71045 X-RAY EXAM CHEST 1 VIEW: CPT

## 2023-06-26 PROCEDURE — 83880 ASSAY OF NATRIURETIC PEPTIDE: CPT

## 2023-06-26 PROCEDURE — 81003 URINALYSIS AUTO W/O SCOPE: CPT

## 2023-06-26 PROCEDURE — 93010 ELECTROCARDIOGRAM REPORT: CPT | Performed by: INTERNAL MEDICINE

## 2023-06-26 PROCEDURE — 80053 COMPREHEN METABOLIC PANEL: CPT

## 2023-06-26 PROCEDURE — 83605 ASSAY OF LACTIC ACID: CPT

## 2023-06-26 PROCEDURE — 6360000004 HC RX CONTRAST MEDICATION: Performed by: EMERGENCY MEDICINE

## 2023-06-26 PROCEDURE — 2500000003 HC RX 250 WO HCPCS: Performed by: FAMILY MEDICINE

## 2023-06-26 PROCEDURE — 2580000003 HC RX 258: Performed by: FAMILY MEDICINE

## 2023-06-26 PROCEDURE — 96365 THER/PROPH/DIAG IV INF INIT: CPT

## 2023-06-26 PROCEDURE — 74177 CT ABD & PELVIS W/CONTRAST: CPT

## 2023-06-26 PROCEDURE — 84145 PROCALCITONIN (PCT): CPT

## 2023-06-26 RX ORDER — POLYETHYLENE GLYCOL 3350 17 G/17G
17 POWDER, FOR SOLUTION ORAL DAILY PRN
Status: DISCONTINUED | OUTPATIENT
Start: 2023-06-26 | End: 2023-07-03 | Stop reason: HOSPADM

## 2023-06-26 RX ORDER — SODIUM CHLORIDE 0.9 % (FLUSH) 0.9 %
5-40 SYRINGE (ML) INJECTION EVERY 12 HOURS SCHEDULED
Status: DISCONTINUED | OUTPATIENT
Start: 2023-06-26 | End: 2023-07-03 | Stop reason: HOSPADM

## 2023-06-26 RX ORDER — ACETAMINOPHEN 325 MG/1
650 TABLET ORAL EVERY 6 HOURS PRN
Status: DISCONTINUED | OUTPATIENT
Start: 2023-06-26 | End: 2023-07-03 | Stop reason: HOSPADM

## 2023-06-26 RX ORDER — SODIUM CHLORIDE 0.9 % (FLUSH) 0.9 %
5-40 SYRINGE (ML) INJECTION PRN
Status: DISCONTINUED | OUTPATIENT
Start: 2023-06-26 | End: 2023-07-03 | Stop reason: HOSPADM

## 2023-06-26 RX ORDER — MAGNESIUM SULFATE IN WATER 40 MG/ML
2000 INJECTION, SOLUTION INTRAVENOUS PRN
Status: DISCONTINUED | OUTPATIENT
Start: 2023-06-26 | End: 2023-06-27

## 2023-06-26 RX ORDER — SODIUM CHLORIDE 0.9 % (FLUSH) 0.9 %
10 SYRINGE (ML) INJECTION
Status: COMPLETED | OUTPATIENT
Start: 2023-06-26 | End: 2023-06-26

## 2023-06-26 RX ORDER — ONDANSETRON 2 MG/ML
4 INJECTION INTRAMUSCULAR; INTRAVENOUS EVERY 6 HOURS PRN
Status: DISCONTINUED | OUTPATIENT
Start: 2023-06-26 | End: 2023-07-03 | Stop reason: HOSPADM

## 2023-06-26 RX ORDER — ONDANSETRON 4 MG/1
4 TABLET, ORALLY DISINTEGRATING ORAL EVERY 8 HOURS PRN
Status: DISCONTINUED | OUTPATIENT
Start: 2023-06-26 | End: 2023-07-03 | Stop reason: HOSPADM

## 2023-06-26 RX ORDER — POTASSIUM CHLORIDE 20 MEQ/1
40 TABLET, EXTENDED RELEASE ORAL PRN
Status: DISCONTINUED | OUTPATIENT
Start: 2023-06-26 | End: 2023-06-26 | Stop reason: SDUPTHER

## 2023-06-26 RX ORDER — MAGNESIUM SULFATE IN WATER 40 MG/ML
2000 INJECTION, SOLUTION INTRAVENOUS PRN
Status: DISCONTINUED | OUTPATIENT
Start: 2023-06-26 | End: 2023-06-26 | Stop reason: SDUPTHER

## 2023-06-26 RX ORDER — METOPROLOL SUCCINATE 25 MG/1
25 TABLET, EXTENDED RELEASE ORAL DAILY
Status: DISCONTINUED | OUTPATIENT
Start: 2023-06-26 | End: 2023-07-03 | Stop reason: HOSPADM

## 2023-06-26 RX ORDER — BUMETANIDE 0.25 MG/ML
1 INJECTION INTRAMUSCULAR; INTRAVENOUS 2 TIMES DAILY
Status: DISCONTINUED | OUTPATIENT
Start: 2023-06-26 | End: 2023-06-27

## 2023-06-26 RX ORDER — ACETAMINOPHEN 650 MG/1
650 SUPPOSITORY RECTAL EVERY 6 HOURS PRN
Status: DISCONTINUED | OUTPATIENT
Start: 2023-06-26 | End: 2023-07-03 | Stop reason: HOSPADM

## 2023-06-26 RX ORDER — POTASSIUM CHLORIDE 20 MEQ/1
40 TABLET, EXTENDED RELEASE ORAL PRN
Status: DISCONTINUED | OUTPATIENT
Start: 2023-06-26 | End: 2023-06-27

## 2023-06-26 RX ORDER — POTASSIUM CHLORIDE 7.45 MG/ML
10 INJECTION INTRAVENOUS PRN
Status: DISCONTINUED | OUTPATIENT
Start: 2023-06-26 | End: 2023-06-27

## 2023-06-26 RX ORDER — TAMSULOSIN HYDROCHLORIDE 0.4 MG/1
0.4 CAPSULE ORAL DAILY
Status: DISCONTINUED | OUTPATIENT
Start: 2023-06-26 | End: 2023-07-03 | Stop reason: HOSPADM

## 2023-06-26 RX ORDER — ASPIRIN 81 MG/1
81 TABLET ORAL DAILY
Status: DISCONTINUED | OUTPATIENT
Start: 2023-06-26 | End: 2023-06-30

## 2023-06-26 RX ORDER — SODIUM CHLORIDE 9 MG/ML
INJECTION, SOLUTION INTRAVENOUS PRN
Status: DISCONTINUED | OUTPATIENT
Start: 2023-06-26 | End: 2023-07-03 | Stop reason: HOSPADM

## 2023-06-26 RX ORDER — PANTOPRAZOLE SODIUM 40 MG/1
40 TABLET, DELAYED RELEASE ORAL
Status: DISCONTINUED | OUTPATIENT
Start: 2023-06-27 | End: 2023-07-03 | Stop reason: HOSPADM

## 2023-06-26 RX ORDER — POTASSIUM CHLORIDE 7.45 MG/ML
10 INJECTION INTRAVENOUS PRN
Status: DISCONTINUED | OUTPATIENT
Start: 2023-06-26 | End: 2023-06-26 | Stop reason: SDUPTHER

## 2023-06-26 RX ORDER — AZITHROMYCIN 250 MG/1
500 TABLET, FILM COATED ORAL EVERY 24 HOURS
Status: COMPLETED | OUTPATIENT
Start: 2023-06-26 | End: 2023-06-28

## 2023-06-26 RX ORDER — 0.9 % SODIUM CHLORIDE 0.9 %
100 INTRAVENOUS SOLUTION INTRAVENOUS
Status: COMPLETED | OUTPATIENT
Start: 2023-06-26 | End: 2023-06-26

## 2023-06-26 RX ORDER — ENOXAPARIN SODIUM 100 MG/ML
40 INJECTION SUBCUTANEOUS DAILY
Status: DISCONTINUED | OUTPATIENT
Start: 2023-06-26 | End: 2023-06-26 | Stop reason: SDUPTHER

## 2023-06-26 RX ADMIN — APIXABAN 5 MG: 5 TABLET, FILM COATED ORAL at 16:43

## 2023-06-26 RX ADMIN — PIPERACILLIN AND TAZOBACTAM 4500 MG: 4; .5 INJECTION, POWDER, LYOPHILIZED, FOR SOLUTION INTRAVENOUS at 08:54

## 2023-06-26 RX ADMIN — PIPERACILLIN AND TAZOBACTAM 4500 MG: 4; .5 INJECTION, POWDER, LYOPHILIZED, FOR SOLUTION INTRAVENOUS at 16:42

## 2023-06-26 RX ADMIN — PIPERACILLIN AND TAZOBACTAM 3375 MG: 3; .375 INJECTION, POWDER, LYOPHILIZED, FOR SOLUTION INTRAVENOUS at 20:49

## 2023-06-26 RX ADMIN — SODIUM CHLORIDE, PRESERVATIVE FREE 10 ML: 5 INJECTION INTRAVENOUS at 09:49

## 2023-06-26 RX ADMIN — GABAPENTIN 400 MG: 100 CAPSULE ORAL at 20:49

## 2023-06-26 RX ADMIN — TUBERCULIN PURIFIED PROTEIN DERIVATIVE 5 UNITS: 5 INJECTION, SOLUTION INTRADERMAL at 16:25

## 2023-06-26 RX ADMIN — IOPAMIDOL 100 ML: 755 INJECTION, SOLUTION INTRAVENOUS at 09:49

## 2023-06-26 RX ADMIN — APIXABAN 5 MG: 5 TABLET, FILM COATED ORAL at 20:50

## 2023-06-26 RX ADMIN — SODIUM CHLORIDE, PRESERVATIVE FREE 10 ML: 5 INJECTION INTRAVENOUS at 20:51

## 2023-06-26 RX ADMIN — SODIUM CHLORIDE 100 ML: 9 INJECTION, SOLUTION INTRAVENOUS at 09:49

## 2023-06-26 RX ADMIN — GABAPENTIN 400 MG: 100 CAPSULE ORAL at 16:46

## 2023-06-26 RX ADMIN — AZITHROMYCIN MONOHYDRATE 500 MG: 250 TABLET ORAL at 17:08

## 2023-06-26 RX ADMIN — BUMETANIDE 1 MG: 0.25 INJECTION INTRAMUSCULAR; INTRAVENOUS at 20:50

## 2023-06-26 RX ADMIN — TAMSULOSIN HYDROCHLORIDE 0.4 MG: 0.4 CAPSULE ORAL at 16:43

## 2023-06-26 RX ADMIN — ASPIRIN 81 MG: 81 TABLET ORAL at 16:43

## 2023-06-26 RX ADMIN — METOPROLOL SUCCINATE 25 MG: 25 TABLET, EXTENDED RELEASE ORAL at 16:43

## 2023-06-26 RX ADMIN — BUMETANIDE 1 MG: 0.25 INJECTION INTRAMUSCULAR; INTRAVENOUS at 16:43

## 2023-06-26 ASSESSMENT — PAIN - FUNCTIONAL ASSESSMENT: PAIN_FUNCTIONAL_ASSESSMENT: 0-10

## 2023-06-26 ASSESSMENT — PAIN SCALES - GENERAL: PAINLEVEL_OUTOF10: 9

## 2023-06-26 ASSESSMENT — PAIN DESCRIPTION - LOCATION: LOCATION: ABDOMEN

## 2023-06-26 ASSESSMENT — LIFESTYLE VARIABLES
HOW OFTEN DO YOU HAVE A DRINK CONTAINING ALCOHOL: NEVER
HOW MANY STANDARD DRINKS CONTAINING ALCOHOL DO YOU HAVE ON A TYPICAL DAY: PATIENT DOES NOT DRINK

## 2023-06-27 ENCOUNTER — APPOINTMENT (OUTPATIENT)
Dept: NON INVASIVE DIAGNOSTICS | Age: 78
DRG: 871 | End: 2023-06-27
Attending: FAMILY MEDICINE
Payer: MEDICARE

## 2023-06-27 LAB
ALBUMIN SERPL-MCNC: 2.5 G/DL (ref 3.2–4.6)
ANION GAP SERPL CALC-SCNC: 5 MMOL/L (ref 2–11)
BUN SERPL-MCNC: 15 MG/DL (ref 8–23)
CALCIUM SERPL-MCNC: 8.2 MG/DL (ref 8.3–10.4)
CHLORIDE SERPL-SCNC: 99 MMOL/L (ref 101–110)
CHOLEST SERPL-MCNC: 95 MG/DL
CO2 SERPL-SCNC: 26 MMOL/L (ref 21–32)
CREAT SERPL-MCNC: 0.7 MG/DL (ref 0.8–1.5)
ECHO AV MEAN GRADIENT: 2 MMHG
ECHO AV MEAN VELOCITY: 0.6 M/S
ECHO AV PEAK GRADIENT: 4 MMHG
ECHO AV PEAK VELOCITY: 1 M/S
ECHO AV VELOCITY RATIO: 0.7
ECHO AV VTI: 17.2 CM
ECHO BSA: 2.01 M2
ECHO EST RA PRESSURE: 15 MMHG
ECHO IVC PROX: 2.8 CM
ECHO LA DIAMETER INDEX: 2.42 CM/M2
ECHO LA DIAMETER: 4.8 CM
ECHO LV EDV A2C: 166 ML
ECHO LV EDV A4C: 152 ML
ECHO LV EDV INDEX A4C: 77 ML/M2
ECHO LV EDV NDEX A2C: 84 ML/M2
ECHO LV EJECTION FRACTION A2C: 45 %
ECHO LV EJECTION FRACTION A4C: 45 %
ECHO LV EJECTION FRACTION BIPLANE: 45 % (ref 55–100)
ECHO LV ESV A2C: 92 ML
ECHO LV ESV A4C: 84 ML
ECHO LV ESV INDEX A2C: 46 ML/M2
ECHO LV ESV INDEX A4C: 42 ML/M2
ECHO LV FRACTIONAL SHORTENING: 30 % (ref 28–44)
ECHO LV INTERNAL DIMENSION DIASTOLE INDEX: 2.53 CM/M2
ECHO LV INTERNAL DIMENSION DIASTOLIC: 5 CM (ref 4.2–5.9)
ECHO LV INTERNAL DIMENSION SYSTOLIC INDEX: 1.77 CM/M2
ECHO LV INTERNAL DIMENSION SYSTOLIC: 3.5 CM
ECHO LV IVSD: 1.2 CM (ref 0.6–1)
ECHO LV MASS 2D: 207.1 G (ref 88–224)
ECHO LV MASS INDEX 2D: 104.6 G/M2 (ref 49–115)
ECHO LV POSTERIOR WALL DIASTOLIC: 1 CM (ref 0.6–1)
ECHO LV RELATIVE WALL THICKNESS RATIO: 0.4
ECHO LVOT AV VTI INDEX: 0.87
ECHO LVOT MEAN GRADIENT: 1 MMHG
ECHO LVOT PEAK GRADIENT: 2 MMHG
ECHO LVOT PEAK VELOCITY: 0.7 M/S
ECHO LVOT VTI: 15 CM
ECHO MV A VELOCITY: 0.27 M/S
ECHO MV E DECELERATION TIME (DT): 251 MS
ECHO MV E VELOCITY: 0.81 M/S
ECHO MV E/A RATIO: 3
ECHO PV ACCELERATION TIME (AT): 74 MS
ECHO PV MAX VELOCITY: 0.9 M/S
ECHO PV PEAK GRADIENT: 3 MMHG
ECHO RIGHT VENTRICULAR SYSTOLIC PRESSURE (RVSP): 62 MMHG
ECHO TV REGURGITANT MAX VELOCITY: 3.43 M/S
ECHO TV REGURGITANT PEAK GRADIENT: 47 MMHG
ERYTHROCYTE [DISTWIDTH] IN BLOOD BY AUTOMATED COUNT: 15.4 % (ref 11.9–14.6)
EST. AVERAGE GLUCOSE BLD GHB EST-MCNC: 100 MG/DL
GLUCOSE SERPL-MCNC: 90 MG/DL (ref 65–100)
HBA1C MFR BLD: 5.1 % (ref 4.8–5.6)
HCT VFR BLD AUTO: 33.5 % (ref 41.1–50.3)
HDLC SERPL-MCNC: 29 MG/DL (ref 40–60)
HDLC SERPL: 3.3
HGB BLD-MCNC: 11.3 G/DL (ref 13.6–17.2)
LDLC SERPL CALC-MCNC: 54.6 MG/DL
MAGNESIUM SERPL-MCNC: 1.6 MG/DL (ref 1.8–2.4)
MCH RBC QN AUTO: 34.3 PG (ref 26.1–32.9)
MCHC RBC AUTO-ENTMCNC: 33.7 G/DL (ref 31.4–35)
MCV RBC AUTO: 101.8 FL (ref 82–102)
MM INDURATION, POC: 0 MM (ref 0–5)
NRBC # BLD: 0 K/UL (ref 0–0.2)
PHOSPHATE SERPL-MCNC: 2.6 MG/DL (ref 2.3–3.7)
PLATELET # BLD AUTO: 112 K/UL (ref 150–450)
PMV BLD AUTO: 8.8 FL (ref 9.4–12.3)
POTASSIUM SERPL-SCNC: 2.8 MMOL/L (ref 3.5–5.1)
PPD, POC: NEGATIVE
PROCALCITONIN SERPL-MCNC: 0.48 NG/ML (ref 0–0.49)
RBC # BLD AUTO: 3.29 M/UL (ref 4.23–5.6)
SODIUM SERPL-SCNC: 130 MMOL/L (ref 133–143)
TRIGL SERPL-MCNC: 57 MG/DL (ref 35–150)
VLDLC SERPL CALC-MCNC: 11.4 MG/DL (ref 6–23)
WBC # BLD AUTO: 15.4 K/UL (ref 4.3–11.1)

## 2023-06-27 PROCEDURE — 6370000000 HC RX 637 (ALT 250 FOR IP): Performed by: HOSPITALIST

## 2023-06-27 PROCEDURE — 6360000002 HC RX W HCPCS: Performed by: FAMILY MEDICINE

## 2023-06-27 PROCEDURE — 97535 SELF CARE MNGMENT TRAINING: CPT

## 2023-06-27 PROCEDURE — C8924 2D TTE W OR W/O FOL W/CON,FU: HCPCS

## 2023-06-27 PROCEDURE — 80061 LIPID PANEL: CPT

## 2023-06-27 PROCEDURE — 80048 BASIC METABOLIC PNL TOTAL CA: CPT

## 2023-06-27 PROCEDURE — 97161 PT EVAL LOW COMPLEX 20 MIN: CPT

## 2023-06-27 PROCEDURE — 36415 COLL VENOUS BLD VENIPUNCTURE: CPT

## 2023-06-27 PROCEDURE — 97530 THERAPEUTIC ACTIVITIES: CPT

## 2023-06-27 PROCEDURE — 2580000003 HC RX 258: Performed by: HOSPITALIST

## 2023-06-27 PROCEDURE — 6360000004 HC RX CONTRAST MEDICATION: Performed by: HOSPITALIST

## 2023-06-27 PROCEDURE — 83735 ASSAY OF MAGNESIUM: CPT

## 2023-06-27 PROCEDURE — 97166 OT EVAL MOD COMPLEX 45 MIN: CPT

## 2023-06-27 PROCEDURE — 87899 AGENT NOS ASSAY W/OPTIC: CPT

## 2023-06-27 PROCEDURE — 99223 1ST HOSP IP/OBS HIGH 75: CPT | Performed by: INTERNAL MEDICINE

## 2023-06-27 PROCEDURE — 6370000000 HC RX 637 (ALT 250 FOR IP): Performed by: FAMILY MEDICINE

## 2023-06-27 PROCEDURE — 86738 MYCOPLASMA ANTIBODY: CPT

## 2023-06-27 PROCEDURE — 84100 ASSAY OF PHOSPHORUS: CPT

## 2023-06-27 PROCEDURE — 1100000000 HC RM PRIVATE

## 2023-06-27 PROCEDURE — 6360000002 HC RX W HCPCS: Performed by: NURSE PRACTITIONER

## 2023-06-27 PROCEDURE — 83036 HEMOGLOBIN GLYCOSYLATED A1C: CPT

## 2023-06-27 PROCEDURE — 82040 ASSAY OF SERUM ALBUMIN: CPT

## 2023-06-27 PROCEDURE — 2580000003 HC RX 258: Performed by: FAMILY MEDICINE

## 2023-06-27 PROCEDURE — 6370000000 HC RX 637 (ALT 250 FOR IP): Performed by: NURSE PRACTITIONER

## 2023-06-27 PROCEDURE — 84145 PROCALCITONIN (PCT): CPT

## 2023-06-27 PROCEDURE — 2500000003 HC RX 250 WO HCPCS: Performed by: FAMILY MEDICINE

## 2023-06-27 PROCEDURE — 85027 COMPLETE CBC AUTOMATED: CPT

## 2023-06-27 PROCEDURE — 2580000003 HC RX 258: Performed by: NURSE PRACTITIONER

## 2023-06-27 PROCEDURE — 87449 NOS EACH ORGANISM AG IA: CPT

## 2023-06-27 RX ORDER — OXYCODONE HYDROCHLORIDE 5 MG/1
5 TABLET ORAL EVERY 4 HOURS PRN
Status: DISCONTINUED | OUTPATIENT
Start: 2023-06-27 | End: 2023-07-03 | Stop reason: HOSPADM

## 2023-06-27 RX ORDER — OXYCODONE HYDROCHLORIDE 5 MG/1
5 TABLET ORAL EVERY 6 HOURS PRN
Status: DISCONTINUED | OUTPATIENT
Start: 2023-06-27 | End: 2023-06-27

## 2023-06-27 RX ORDER — POLYETHYLENE GLYCOL 3350 17 G/17G
17 POWDER, FOR SOLUTION ORAL 2 TIMES DAILY
Status: DISCONTINUED | OUTPATIENT
Start: 2023-06-27 | End: 2023-06-27

## 2023-06-27 RX ORDER — OXYCODONE AND ACETAMINOPHEN 7.5; 325 MG/1; MG/1
1 TABLET ORAL
Status: ON HOLD | COMMUNITY
End: 2023-07-03 | Stop reason: HOSPADM

## 2023-06-27 RX ORDER — POLYETHYLENE GLYCOL 3350 17 G/17G
17 POWDER, FOR SOLUTION ORAL DAILY
Status: DISCONTINUED | OUTPATIENT
Start: 2023-06-28 | End: 2023-07-03 | Stop reason: HOSPADM

## 2023-06-27 RX ORDER — POTASSIUM CHLORIDE 20 MEQ/1
40 TABLET, EXTENDED RELEASE ORAL 2 TIMES DAILY WITH MEALS
Status: DISCONTINUED | OUTPATIENT
Start: 2023-06-27 | End: 2023-07-03 | Stop reason: HOSPADM

## 2023-06-27 RX ORDER — MAGNESIUM SULFATE IN WATER 40 MG/ML
2000 INJECTION, SOLUTION INTRAVENOUS
Status: COMPLETED | OUTPATIENT
Start: 2023-06-27 | End: 2023-06-28

## 2023-06-27 RX ORDER — BISACODYL 10 MG
10 SUPPOSITORY, RECTAL RECTAL ONCE
Status: DISCONTINUED | OUTPATIENT
Start: 2023-06-27 | End: 2023-07-03 | Stop reason: HOSPADM

## 2023-06-27 RX ORDER — MAGNESIUM SULFATE IN WATER 40 MG/ML
4000 INJECTION, SOLUTION INTRAVENOUS ONCE
Status: DISCONTINUED | OUTPATIENT
Start: 2023-06-27 | End: 2023-06-27

## 2023-06-27 RX ORDER — ROSUVASTATIN CALCIUM 10 MG/1
10 TABLET, COATED ORAL NIGHTLY
Status: DISCONTINUED | OUTPATIENT
Start: 2023-06-27 | End: 2023-07-03 | Stop reason: HOSPADM

## 2023-06-27 RX ORDER — DULOXETIN HYDROCHLORIDE 60 MG/1
60 CAPSULE, DELAYED RELEASE ORAL DAILY
COMMUNITY

## 2023-06-27 RX ORDER — CARBAMAZEPINE 200 MG/1
200 TABLET ORAL 2 TIMES DAILY
Status: DISCONTINUED | OUTPATIENT
Start: 2023-06-27 | End: 2023-07-03 | Stop reason: HOSPADM

## 2023-06-27 RX ORDER — FUROSEMIDE 10 MG/ML
40 INJECTION INTRAMUSCULAR; INTRAVENOUS 2 TIMES DAILY
Status: DISCONTINUED | OUTPATIENT
Start: 2023-06-27 | End: 2023-06-30

## 2023-06-27 RX ADMIN — AZITHROMYCIN MONOHYDRATE 500 MG: 250 TABLET ORAL at 16:35

## 2023-06-27 RX ADMIN — GABAPENTIN 400 MG: 100 CAPSULE ORAL at 22:33

## 2023-06-27 RX ADMIN — METOPROLOL SUCCINATE 25 MG: 25 TABLET, EXTENDED RELEASE ORAL at 09:04

## 2023-06-27 RX ADMIN — GABAPENTIN 400 MG: 100 CAPSULE ORAL at 09:04

## 2023-06-27 RX ADMIN — APIXABAN 5 MG: 5 TABLET, FILM COATED ORAL at 22:32

## 2023-06-27 RX ADMIN — CARBAMAZEPINE 200 MG: 200 TABLET ORAL at 22:32

## 2023-06-27 RX ADMIN — MAGNESIUM SULFATE HEPTAHYDRATE 2000 MG: 40 INJECTION, SOLUTION INTRAVENOUS at 22:46

## 2023-06-27 RX ADMIN — POTASSIUM CHLORIDE 40 MEQ: 20 TABLET, EXTENDED RELEASE ORAL at 19:35

## 2023-06-27 RX ADMIN — CEFTRIAXONE 1000 MG: 1 INJECTION, POWDER, FOR SOLUTION INTRAMUSCULAR; INTRAVENOUS at 13:03

## 2023-06-27 RX ADMIN — BUMETANIDE 1 MG: 0.25 INJECTION INTRAMUSCULAR; INTRAVENOUS at 09:07

## 2023-06-27 RX ADMIN — FUROSEMIDE 40 MG: 10 INJECTION, SOLUTION INTRAMUSCULAR; INTRAVENOUS at 18:27

## 2023-06-27 RX ADMIN — OXYCODONE HYDROCHLORIDE 5 MG: 5 TABLET ORAL at 15:02

## 2023-06-27 RX ADMIN — TAMSULOSIN HYDROCHLORIDE 0.4 MG: 0.4 CAPSULE ORAL at 09:04

## 2023-06-27 RX ADMIN — GABAPENTIN 400 MG: 100 CAPSULE ORAL at 13:04

## 2023-06-27 RX ADMIN — PANTOPRAZOLE SODIUM 40 MG: 40 TABLET, DELAYED RELEASE ORAL at 05:16

## 2023-06-27 RX ADMIN — OXYCODONE HYDROCHLORIDE 5 MG: 5 TABLET ORAL at 19:49

## 2023-06-27 RX ADMIN — PIPERACILLIN AND TAZOBACTAM 3375 MG: 3; .375 INJECTION, POWDER, LYOPHILIZED, FOR SOLUTION INTRAVENOUS at 05:20

## 2023-06-27 RX ADMIN — SODIUM CHLORIDE, PRESERVATIVE FREE 10 ML: 5 INJECTION INTRAVENOUS at 22:33

## 2023-06-27 RX ADMIN — GABAPENTIN 400 MG: 100 CAPSULE ORAL at 17:26

## 2023-06-27 RX ADMIN — ROSUVASTATIN CALCIUM 10 MG: 10 TABLET, FILM COATED ORAL at 22:32

## 2023-06-27 RX ADMIN — SODIUM CHLORIDE, PRESERVATIVE FREE 0.3 ML: 5 INJECTION INTRAVENOUS at 15:54

## 2023-06-27 RX ADMIN — ASPIRIN 81 MG: 81 TABLET ORAL at 09:04

## 2023-06-27 RX ADMIN — MAGNESIUM SULFATE HEPTAHYDRATE 2000 MG: 40 INJECTION, SOLUTION INTRAVENOUS at 19:35

## 2023-06-27 RX ADMIN — FUROSEMIDE 40 MG: 10 INJECTION, SOLUTION INTRAMUSCULAR; INTRAVENOUS at 11:03

## 2023-06-27 RX ADMIN — SODIUM CHLORIDE, PRESERVATIVE FREE 10 ML: 5 INJECTION INTRAVENOUS at 09:13

## 2023-06-27 ASSESSMENT — PAIN SCALES - GENERAL
PAINLEVEL_OUTOF10: 3
PAINLEVEL_OUTOF10: 7
PAINLEVEL_OUTOF10: 7

## 2023-06-27 ASSESSMENT — PAIN DESCRIPTION - DESCRIPTORS: DESCRIPTORS: SORE

## 2023-06-27 ASSESSMENT — PAIN DESCRIPTION - ORIENTATION
ORIENTATION: MID
ORIENTATION: RIGHT

## 2023-06-27 ASSESSMENT — PAIN DESCRIPTION - LOCATION
LOCATION: JAW
LOCATION: ABDOMEN;BACK

## 2023-06-27 ASSESSMENT — ENCOUNTER SYMPTOMS
EYES NEGATIVE: 1
ALLERGIC/IMMUNOLOGIC NEGATIVE: 1
GASTROINTESTINAL NEGATIVE: 1
SHORTNESS OF BREATH: 1

## 2023-06-28 LAB
ALBUMIN SERPL-MCNC: 2.6 G/DL (ref 3.2–4.6)
ANION GAP SERPL CALC-SCNC: 5 MMOL/L (ref 2–11)
B PERT DNA SPEC QL NAA+PROBE: NOT DETECTED
BORDETELLA PARAPERTUSSIS BY PCR: NOT DETECTED
BUN SERPL-MCNC: 14 MG/DL (ref 8–23)
C PNEUM DNA SPEC QL NAA+PROBE: NOT DETECTED
CALCIUM SERPL-MCNC: 8.2 MG/DL (ref 8.3–10.4)
CHLORIDE SERPL-SCNC: 101 MMOL/L (ref 101–110)
CO2 SERPL-SCNC: 28 MMOL/L (ref 21–32)
CREAT SERPL-MCNC: 0.6 MG/DL (ref 0.8–1.5)
ERYTHROCYTE [DISTWIDTH] IN BLOOD BY AUTOMATED COUNT: 15.2 % (ref 11.9–14.6)
FLUAV SUBTYP SPEC NAA+PROBE: NOT DETECTED
FLUBV RNA SPEC QL NAA+PROBE: NOT DETECTED
GLUCOSE SERPL-MCNC: 90 MG/DL (ref 65–100)
HADV DNA SPEC QL NAA+PROBE: NOT DETECTED
HCOV 229E RNA SPEC QL NAA+PROBE: NOT DETECTED
HCOV HKU1 RNA SPEC QL NAA+PROBE: NOT DETECTED
HCOV NL63 RNA SPEC QL NAA+PROBE: NOT DETECTED
HCOV OC43 RNA SPEC QL NAA+PROBE: NOT DETECTED
HCT VFR BLD AUTO: 33.8 % (ref 41.1–50.3)
HGB BLD-MCNC: 11.3 G/DL (ref 13.6–17.2)
HMPV RNA SPEC QL NAA+PROBE: NOT DETECTED
HPIV1 RNA SPEC QL NAA+PROBE: NOT DETECTED
HPIV2 RNA SPEC QL NAA+PROBE: NOT DETECTED
HPIV3 RNA SPEC QL NAA+PROBE: NOT DETECTED
HPIV4 RNA SPEC QL NAA+PROBE: NOT DETECTED
M PNEUMO DNA SPEC QL NAA+PROBE: NOT DETECTED
M PNEUMO IGG SER IA-ACNC: 609 U/ML (ref 0–99)
M PNEUMO IGM SER IA-ACNC: <770 U/ML (ref 0–769)
MAGNESIUM SERPL-MCNC: 2.1 MG/DL (ref 1.8–2.4)
MCH RBC QN AUTO: 34.1 PG (ref 26.1–32.9)
MCHC RBC AUTO-ENTMCNC: 33.4 G/DL (ref 31.4–35)
MCV RBC AUTO: 102.1 FL (ref 82–102)
MM INDURATION, POC: 0 MM (ref 0–5)
NRBC # BLD: 0 K/UL (ref 0–0.2)
PHOSPHATE SERPL-MCNC: 2.2 MG/DL (ref 2.3–3.7)
PLATELET # BLD AUTO: 120 K/UL (ref 150–450)
PMV BLD AUTO: 9 FL (ref 9.4–12.3)
POTASSIUM SERPL-SCNC: 3 MMOL/L (ref 3.5–5.1)
PPD, POC: NEGATIVE
PROCALCITONIN SERPL-MCNC: 0.27 NG/ML (ref 0–0.49)
RBC # BLD AUTO: 3.31 M/UL (ref 4.23–5.6)
RSV RNA SPEC QL NAA+PROBE: NOT DETECTED
RV+EV RNA SPEC QL NAA+PROBE: NOT DETECTED
SARS-COV-2 RNA RESP QL NAA+PROBE: NOT DETECTED
SODIUM SERPL-SCNC: 134 MMOL/L (ref 133–143)
WBC # BLD AUTO: 9.9 K/UL (ref 4.3–11.1)

## 2023-06-28 PROCEDURE — 2580000003 HC RX 258: Performed by: NURSE PRACTITIONER

## 2023-06-28 PROCEDURE — 97110 THERAPEUTIC EXERCISES: CPT

## 2023-06-28 PROCEDURE — 6370000000 HC RX 637 (ALT 250 FOR IP): Performed by: NURSE PRACTITIONER

## 2023-06-28 PROCEDURE — 97530 THERAPEUTIC ACTIVITIES: CPT

## 2023-06-28 PROCEDURE — 84100 ASSAY OF PHOSPHORUS: CPT

## 2023-06-28 PROCEDURE — 99232 SBSQ HOSP IP/OBS MODERATE 35: CPT | Performed by: INTERNAL MEDICINE

## 2023-06-28 PROCEDURE — 83735 ASSAY OF MAGNESIUM: CPT

## 2023-06-28 PROCEDURE — 85027 COMPLETE CBC AUTOMATED: CPT

## 2023-06-28 PROCEDURE — 6370000000 HC RX 637 (ALT 250 FOR IP): Performed by: HOSPITALIST

## 2023-06-28 PROCEDURE — 82040 ASSAY OF SERUM ALBUMIN: CPT

## 2023-06-28 PROCEDURE — 6370000000 HC RX 637 (ALT 250 FOR IP): Performed by: FAMILY MEDICINE

## 2023-06-28 PROCEDURE — 1100000000 HC RM PRIVATE

## 2023-06-28 PROCEDURE — 36415 COLL VENOUS BLD VENIPUNCTURE: CPT

## 2023-06-28 PROCEDURE — 84145 PROCALCITONIN (PCT): CPT

## 2023-06-28 PROCEDURE — 0202U NFCT DS 22 TRGT SARS-COV-2: CPT

## 2023-06-28 PROCEDURE — 6360000002 HC RX W HCPCS: Performed by: NURSE PRACTITIONER

## 2023-06-28 PROCEDURE — 80048 BASIC METABOLIC PNL TOTAL CA: CPT

## 2023-06-28 PROCEDURE — 97535 SELF CARE MNGMENT TRAINING: CPT

## 2023-06-28 PROCEDURE — 2580000003 HC RX 258: Performed by: FAMILY MEDICINE

## 2023-06-28 RX ADMIN — AZITHROMYCIN MONOHYDRATE 500 MG: 250 TABLET ORAL at 17:03

## 2023-06-28 RX ADMIN — POTASSIUM CHLORIDE 40 MEQ: 20 TABLET, EXTENDED RELEASE ORAL at 17:02

## 2023-06-28 RX ADMIN — FUROSEMIDE 40 MG: 10 INJECTION, SOLUTION INTRAMUSCULAR; INTRAVENOUS at 17:03

## 2023-06-28 RX ADMIN — SODIUM CHLORIDE, PRESERVATIVE FREE 10 ML: 5 INJECTION INTRAVENOUS at 21:56

## 2023-06-28 RX ADMIN — ROSUVASTATIN CALCIUM 10 MG: 10 TABLET, FILM COATED ORAL at 21:55

## 2023-06-28 RX ADMIN — APIXABAN 5 MG: 5 TABLET, FILM COATED ORAL at 09:03

## 2023-06-28 RX ADMIN — GABAPENTIN 400 MG: 100 CAPSULE ORAL at 21:55

## 2023-06-28 RX ADMIN — POTASSIUM CHLORIDE 40 MEQ: 20 TABLET, EXTENDED RELEASE ORAL at 09:02

## 2023-06-28 RX ADMIN — GABAPENTIN 400 MG: 100 CAPSULE ORAL at 09:02

## 2023-06-28 RX ADMIN — APIXABAN 5 MG: 5 TABLET, FILM COATED ORAL at 21:55

## 2023-06-28 RX ADMIN — FUROSEMIDE 40 MG: 10 INJECTION, SOLUTION INTRAMUSCULAR; INTRAVENOUS at 09:04

## 2023-06-28 RX ADMIN — GABAPENTIN 400 MG: 100 CAPSULE ORAL at 17:02

## 2023-06-28 RX ADMIN — CEFTRIAXONE 1000 MG: 1 INJECTION, POWDER, FOR SOLUTION INTRAMUSCULAR; INTRAVENOUS at 14:53

## 2023-06-28 RX ADMIN — METOPROLOL SUCCINATE 25 MG: 25 TABLET, EXTENDED RELEASE ORAL at 09:03

## 2023-06-28 RX ADMIN — GABAPENTIN 400 MG: 100 CAPSULE ORAL at 14:52

## 2023-06-28 RX ADMIN — PANTOPRAZOLE SODIUM 40 MG: 40 TABLET, DELAYED RELEASE ORAL at 05:56

## 2023-06-28 RX ADMIN — CARBAMAZEPINE 200 MG: 200 TABLET ORAL at 09:03

## 2023-06-28 RX ADMIN — ASPIRIN 81 MG: 81 TABLET ORAL at 09:02

## 2023-06-28 RX ADMIN — TAMSULOSIN HYDROCHLORIDE 0.4 MG: 0.4 CAPSULE ORAL at 09:02

## 2023-06-28 RX ADMIN — CARBAMAZEPINE 200 MG: 200 TABLET ORAL at 21:55

## 2023-06-28 RX ADMIN — SODIUM CHLORIDE, PRESERVATIVE FREE 10 ML: 5 INJECTION INTRAVENOUS at 09:03

## 2023-06-28 RX ADMIN — SODIUM CHLORIDE, PRESERVATIVE FREE 10 ML: 5 INJECTION INTRAVENOUS at 09:05

## 2023-06-28 RX ADMIN — OXYCODONE HYDROCHLORIDE 5 MG: 5 TABLET ORAL at 16:11

## 2023-06-28 RX ADMIN — OXYCODONE HYDROCHLORIDE 5 MG: 5 TABLET ORAL at 09:02

## 2023-06-28 ASSESSMENT — PAIN DESCRIPTION - LOCATION: LOCATION: SCROTUM

## 2023-06-28 ASSESSMENT — PAIN SCALES - GENERAL
PAINLEVEL_OUTOF10: 7
PAINLEVEL_OUTOF10: 6

## 2023-06-28 ASSESSMENT — PAIN DESCRIPTION - DESCRIPTORS: DESCRIPTORS: THROBBING

## 2023-06-29 LAB
ALBUMIN SERPL-MCNC: 2.7 G/DL (ref 3.2–4.6)
ANION GAP SERPL CALC-SCNC: 4 MMOL/L (ref 2–11)
BUN SERPL-MCNC: 10 MG/DL (ref 8–23)
CALCIUM SERPL-MCNC: 8.6 MG/DL (ref 8.3–10.4)
CHLORIDE SERPL-SCNC: 102 MMOL/L (ref 101–110)
CO2 SERPL-SCNC: 28 MMOL/L (ref 21–32)
CREAT SERPL-MCNC: 0.6 MG/DL (ref 0.8–1.5)
ERYTHROCYTE [DISTWIDTH] IN BLOOD BY AUTOMATED COUNT: 15.3 % (ref 11.9–14.6)
FLUID CULTURE: NORMAL
GLUCOSE SERPL-MCNC: 91 MG/DL (ref 65–100)
HCT VFR BLD AUTO: 34.9 % (ref 41.1–50.3)
HGB BLD-MCNC: 11.5 G/DL (ref 13.6–17.2)
L PNEUMO1 AG UR QL IA: NEGATIVE
Lab: NORMAL
MAGNESIUM SERPL-MCNC: 1.8 MG/DL (ref 1.8–2.4)
MCH RBC QN AUTO: 33.5 PG (ref 26.1–32.9)
MCHC RBC AUTO-ENTMCNC: 33 G/DL (ref 31.4–35)
MCV RBC AUTO: 101.7 FL (ref 82–102)
NRBC # BLD: 0 K/UL (ref 0–0.2)
NT PRO BNP: 2059 PG/ML
ORGANISM ID: NORMAL
PHOSPHATE SERPL-MCNC: 2.6 MG/DL (ref 2.3–3.7)
PLATELET # BLD AUTO: 126 K/UL (ref 150–450)
PMV BLD AUTO: 8.7 FL (ref 9.4–12.3)
POTASSIUM SERPL-SCNC: 3.4 MMOL/L (ref 3.5–5.1)
PROCALCITONIN SERPL-MCNC: 0.12 NG/ML (ref 0–0.49)
RBC # BLD AUTO: 3.43 M/UL (ref 4.23–5.6)
S PNEUM AG SPEC QL LA: NEGATIVE
SODIUM SERPL-SCNC: 134 MMOL/L (ref 133–143)
SPECIMEN SOURCE: NORMAL
SPECIMEN SOURCE: NORMAL
SPECIMEN: NORMAL
WBC # BLD AUTO: 7.3 K/UL (ref 4.3–11.1)

## 2023-06-29 PROCEDURE — 6370000000 HC RX 637 (ALT 250 FOR IP): Performed by: HOSPITALIST

## 2023-06-29 PROCEDURE — 84100 ASSAY OF PHOSPHORUS: CPT

## 2023-06-29 PROCEDURE — 83880 ASSAY OF NATRIURETIC PEPTIDE: CPT

## 2023-06-29 PROCEDURE — 82040 ASSAY OF SERUM ALBUMIN: CPT

## 2023-06-29 PROCEDURE — 99231 SBSQ HOSP IP/OBS SF/LOW 25: CPT | Performed by: INTERNAL MEDICINE

## 2023-06-29 PROCEDURE — 36415 COLL VENOUS BLD VENIPUNCTURE: CPT

## 2023-06-29 PROCEDURE — 1100000000 HC RM PRIVATE

## 2023-06-29 PROCEDURE — 6360000002 HC RX W HCPCS: Performed by: NURSE PRACTITIONER

## 2023-06-29 PROCEDURE — 80048 BASIC METABOLIC PNL TOTAL CA: CPT

## 2023-06-29 PROCEDURE — 84145 PROCALCITONIN (PCT): CPT

## 2023-06-29 PROCEDURE — 83735 ASSAY OF MAGNESIUM: CPT

## 2023-06-29 PROCEDURE — 85027 COMPLETE CBC AUTOMATED: CPT

## 2023-06-29 PROCEDURE — 6370000000 HC RX 637 (ALT 250 FOR IP): Performed by: FAMILY MEDICINE

## 2023-06-29 PROCEDURE — 2580000003 HC RX 258: Performed by: FAMILY MEDICINE

## 2023-06-29 PROCEDURE — 6370000000 HC RX 637 (ALT 250 FOR IP): Performed by: NURSE PRACTITIONER

## 2023-06-29 RX ADMIN — GABAPENTIN 400 MG: 100 CAPSULE ORAL at 08:36

## 2023-06-29 RX ADMIN — POTASSIUM CHLORIDE 40 MEQ: 20 TABLET, EXTENDED RELEASE ORAL at 08:36

## 2023-06-29 RX ADMIN — SODIUM CHLORIDE, PRESERVATIVE FREE 10 ML: 5 INJECTION INTRAVENOUS at 21:00

## 2023-06-29 RX ADMIN — FUROSEMIDE 40 MG: 10 INJECTION, SOLUTION INTRAMUSCULAR; INTRAVENOUS at 08:36

## 2023-06-29 RX ADMIN — PANTOPRAZOLE SODIUM 40 MG: 40 TABLET, DELAYED RELEASE ORAL at 08:36

## 2023-06-29 RX ADMIN — CARBAMAZEPINE 200 MG: 200 TABLET ORAL at 23:30

## 2023-06-29 RX ADMIN — FUROSEMIDE 40 MG: 10 INJECTION, SOLUTION INTRAMUSCULAR; INTRAVENOUS at 18:23

## 2023-06-29 RX ADMIN — OXYCODONE HYDROCHLORIDE 5 MG: 5 TABLET ORAL at 08:38

## 2023-06-29 RX ADMIN — APIXABAN 5 MG: 5 TABLET, FILM COATED ORAL at 19:23

## 2023-06-29 RX ADMIN — TAMSULOSIN HYDROCHLORIDE 0.4 MG: 0.4 CAPSULE ORAL at 08:36

## 2023-06-29 RX ADMIN — ROSUVASTATIN CALCIUM 10 MG: 10 TABLET, FILM COATED ORAL at 19:23

## 2023-06-29 RX ADMIN — CARBAMAZEPINE 200 MG: 200 TABLET ORAL at 08:36

## 2023-06-29 RX ADMIN — ACETAMINOPHEN 650 MG: 325 TABLET ORAL at 19:23

## 2023-06-29 RX ADMIN — GABAPENTIN 400 MG: 100 CAPSULE ORAL at 19:23

## 2023-06-29 RX ADMIN — OXYCODONE HYDROCHLORIDE 5 MG: 5 TABLET ORAL at 19:22

## 2023-06-29 RX ADMIN — POTASSIUM CHLORIDE 40 MEQ: 20 TABLET, EXTENDED RELEASE ORAL at 18:23

## 2023-06-29 RX ADMIN — SODIUM CHLORIDE, PRESERVATIVE FREE 10 ML: 5 INJECTION INTRAVENOUS at 08:54

## 2023-06-29 RX ADMIN — ASPIRIN 81 MG: 81 TABLET ORAL at 08:36

## 2023-06-29 RX ADMIN — SODIUM CHLORIDE, PRESERVATIVE FREE 10 ML: 5 INJECTION INTRAVENOUS at 19:26

## 2023-06-29 RX ADMIN — GABAPENTIN 400 MG: 100 CAPSULE ORAL at 14:20

## 2023-06-29 RX ADMIN — METOPROLOL SUCCINATE 25 MG: 25 TABLET, EXTENDED RELEASE ORAL at 08:36

## 2023-06-29 RX ADMIN — APIXABAN 5 MG: 5 TABLET, FILM COATED ORAL at 08:37

## 2023-06-29 RX ADMIN — OXYCODONE HYDROCHLORIDE 5 MG: 5 TABLET ORAL at 23:30

## 2023-06-29 RX ADMIN — SODIUM CHLORIDE, PRESERVATIVE FREE 10 ML: 5 INJECTION INTRAVENOUS at 08:53

## 2023-06-29 RX ADMIN — GABAPENTIN 400 MG: 100 CAPSULE ORAL at 18:23

## 2023-06-29 ASSESSMENT — PAIN SCALES - GENERAL
PAINLEVEL_OUTOF10: 5
PAINLEVEL_OUTOF10: 5

## 2023-06-30 LAB
ANION GAP SERPL CALC-SCNC: 4 MMOL/L (ref 2–11)
BASOPHILS # BLD: 0.1 K/UL (ref 0–0.2)
BASOPHILS NFR BLD: 1 % (ref 0–2)
BUN SERPL-MCNC: 13 MG/DL (ref 8–23)
CALCIUM SERPL-MCNC: 8.8 MG/DL (ref 8.3–10.4)
CHLORIDE SERPL-SCNC: 102 MMOL/L (ref 101–110)
CO2 SERPL-SCNC: 29 MMOL/L (ref 21–32)
CREAT SERPL-MCNC: 0.7 MG/DL (ref 0.8–1.5)
DIFFERENTIAL METHOD BLD: ABNORMAL
EOSINOPHIL # BLD: 0.2 K/UL (ref 0–0.8)
EOSINOPHIL NFR BLD: 3 % (ref 0.5–7.8)
ERYTHROCYTE [DISTWIDTH] IN BLOOD BY AUTOMATED COUNT: 15.1 % (ref 11.9–14.6)
GLUCOSE SERPL-MCNC: 128 MG/DL (ref 65–100)
HCT VFR BLD AUTO: 36.6 % (ref 41.1–50.3)
HGB BLD-MCNC: 12.3 G/DL (ref 13.6–17.2)
IMM GRANULOCYTES # BLD AUTO: 0.1 K/UL (ref 0–0.5)
IMM GRANULOCYTES NFR BLD AUTO: 1 % (ref 0–5)
LYMPHOCYTES # BLD: 1.1 K/UL (ref 0.5–4.6)
LYMPHOCYTES NFR BLD: 14 % (ref 13–44)
MAGNESIUM SERPL-MCNC: 1.7 MG/DL (ref 1.8–2.4)
MCH RBC QN AUTO: 33.8 PG (ref 26.1–32.9)
MCHC RBC AUTO-ENTMCNC: 33.6 G/DL (ref 31.4–35)
MCV RBC AUTO: 100.5 FL (ref 82–102)
MONOCYTES # BLD: 0.5 K/UL (ref 0.1–1.3)
MONOCYTES NFR BLD: 7 % (ref 4–12)
NEUTS SEG # BLD: 5.5 K/UL (ref 1.7–8.2)
NEUTS SEG NFR BLD: 74 % (ref 43–78)
NRBC # BLD: 0 K/UL (ref 0–0.2)
PLATELET # BLD AUTO: 147 K/UL (ref 150–450)
PMV BLD AUTO: 8.8 FL (ref 9.4–12.3)
POTASSIUM SERPL-SCNC: 3.9 MMOL/L (ref 3.5–5.1)
RBC # BLD AUTO: 3.64 M/UL (ref 4.23–5.6)
SODIUM SERPL-SCNC: 135 MMOL/L (ref 133–143)
WBC # BLD AUTO: 7.4 K/UL (ref 4.3–11.1)

## 2023-06-30 PROCEDURE — 97530 THERAPEUTIC ACTIVITIES: CPT

## 2023-06-30 PROCEDURE — 6360000002 HC RX W HCPCS: Performed by: PHYSICIAN ASSISTANT

## 2023-06-30 PROCEDURE — 97110 THERAPEUTIC EXERCISES: CPT

## 2023-06-30 PROCEDURE — 6370000000 HC RX 637 (ALT 250 FOR IP): Performed by: NURSE PRACTITIONER

## 2023-06-30 PROCEDURE — 1100000000 HC RM PRIVATE

## 2023-06-30 PROCEDURE — 80048 BASIC METABOLIC PNL TOTAL CA: CPT

## 2023-06-30 PROCEDURE — 6370000000 HC RX 637 (ALT 250 FOR IP): Performed by: HOSPITALIST

## 2023-06-30 PROCEDURE — 2580000003 HC RX 258: Performed by: FAMILY MEDICINE

## 2023-06-30 PROCEDURE — 6370000000 HC RX 637 (ALT 250 FOR IP): Performed by: PHYSICIAN ASSISTANT

## 2023-06-30 PROCEDURE — 83735 ASSAY OF MAGNESIUM: CPT

## 2023-06-30 PROCEDURE — 36415 COLL VENOUS BLD VENIPUNCTURE: CPT

## 2023-06-30 PROCEDURE — 6370000000 HC RX 637 (ALT 250 FOR IP): Performed by: FAMILY MEDICINE

## 2023-06-30 PROCEDURE — 85025 COMPLETE CBC W/AUTO DIFF WBC: CPT

## 2023-06-30 PROCEDURE — 2500000003 HC RX 250 WO HCPCS: Performed by: PHYSICIAN ASSISTANT

## 2023-06-30 RX ORDER — METRONIDAZOLE 500 MG/100ML
500 INJECTION, SOLUTION INTRAVENOUS EVERY 8 HOURS
Status: DISCONTINUED | OUTPATIENT
Start: 2023-06-30 | End: 2023-07-01

## 2023-06-30 RX ORDER — LOSARTAN POTASSIUM 50 MG/1
25 TABLET ORAL DAILY
Status: DISCONTINUED | OUTPATIENT
Start: 2023-06-30 | End: 2023-07-03 | Stop reason: HOSPADM

## 2023-06-30 RX ORDER — MAGNESIUM SULFATE IN WATER 40 MG/ML
2000 INJECTION, SOLUTION INTRAVENOUS ONCE
Status: COMPLETED | OUTPATIENT
Start: 2023-06-30 | End: 2023-06-30

## 2023-06-30 RX ORDER — CEPHALEXIN 250 MG/1
250 CAPSULE ORAL EVERY 6 HOURS SCHEDULED
Status: DISCONTINUED | OUTPATIENT
Start: 2023-06-30 | End: 2023-07-01

## 2023-06-30 RX ORDER — SENNA AND DOCUSATE SODIUM 50; 8.6 MG/1; MG/1
2 TABLET, FILM COATED ORAL DAILY
Status: DISCONTINUED | OUTPATIENT
Start: 2023-06-30 | End: 2023-07-03 | Stop reason: HOSPADM

## 2023-06-30 RX ORDER — FUROSEMIDE 40 MG/1
40 TABLET ORAL 2 TIMES DAILY
Status: DISCONTINUED | OUTPATIENT
Start: 2023-06-30 | End: 2023-07-03 | Stop reason: HOSPADM

## 2023-06-30 RX ADMIN — METRONIDAZOLE 500 MG: 500 INJECTION, SOLUTION INTRAVENOUS at 16:08

## 2023-06-30 RX ADMIN — GABAPENTIN 400 MG: 100 CAPSULE ORAL at 16:10

## 2023-06-30 RX ADMIN — CEPHALEXIN 250 MG: 250 CAPSULE ORAL at 16:07

## 2023-06-30 RX ADMIN — CARBAMAZEPINE 200 MG: 200 TABLET ORAL at 21:16

## 2023-06-30 RX ADMIN — SODIUM CHLORIDE, PRESERVATIVE FREE 10 ML: 5 INJECTION INTRAVENOUS at 08:58

## 2023-06-30 RX ADMIN — FUROSEMIDE 40 MG: 40 TABLET ORAL at 09:10

## 2023-06-30 RX ADMIN — GABAPENTIN 400 MG: 100 CAPSULE ORAL at 21:16

## 2023-06-30 RX ADMIN — POTASSIUM CHLORIDE 40 MEQ: 20 TABLET, EXTENDED RELEASE ORAL at 16:06

## 2023-06-30 RX ADMIN — OXYCODONE HYDROCHLORIDE 5 MG: 5 TABLET ORAL at 16:07

## 2023-06-30 RX ADMIN — POLYETHYLENE GLYCOL 3350 17 G: 17 POWDER, FOR SOLUTION ORAL at 08:55

## 2023-06-30 RX ADMIN — POTASSIUM CHLORIDE 40 MEQ: 20 TABLET, EXTENDED RELEASE ORAL at 08:56

## 2023-06-30 RX ADMIN — GABAPENTIN 400 MG: 100 CAPSULE ORAL at 13:23

## 2023-06-30 RX ADMIN — TAMSULOSIN HYDROCHLORIDE 0.4 MG: 0.4 CAPSULE ORAL at 08:56

## 2023-06-30 RX ADMIN — OXYCODONE HYDROCHLORIDE 5 MG: 5 TABLET ORAL at 03:12

## 2023-06-30 RX ADMIN — METOPROLOL SUCCINATE 25 MG: 25 TABLET, EXTENDED RELEASE ORAL at 08:57

## 2023-06-30 RX ADMIN — APIXABAN 5 MG: 5 TABLET, FILM COATED ORAL at 08:57

## 2023-06-30 RX ADMIN — ACETAMINOPHEN 650 MG: 325 TABLET ORAL at 03:12

## 2023-06-30 RX ADMIN — SODIUM CHLORIDE, PRESERVATIVE FREE 10 ML: 5 INJECTION INTRAVENOUS at 21:17

## 2023-06-30 RX ADMIN — MAGNESIUM SULFATE HEPTAHYDRATE 2000 MG: 40 INJECTION, SOLUTION INTRAVENOUS at 08:57

## 2023-06-30 RX ADMIN — APIXABAN 5 MG: 5 TABLET, FILM COATED ORAL at 21:16

## 2023-06-30 RX ADMIN — ROSUVASTATIN CALCIUM 10 MG: 10 TABLET, FILM COATED ORAL at 21:16

## 2023-06-30 RX ADMIN — LOSARTAN POTASSIUM 25 MG: 50 TABLET, FILM COATED ORAL at 08:56

## 2023-06-30 RX ADMIN — GABAPENTIN 400 MG: 100 CAPSULE ORAL at 08:56

## 2023-06-30 RX ADMIN — OXYCODONE HYDROCHLORIDE 5 MG: 5 TABLET ORAL at 09:09

## 2023-06-30 RX ADMIN — SENNOSIDES AND DOCUSATE SODIUM 2 TABLET: 8.6; 5 TABLET ORAL at 16:06

## 2023-06-30 RX ADMIN — CARBAMAZEPINE 200 MG: 200 TABLET ORAL at 08:56

## 2023-06-30 RX ADMIN — FUROSEMIDE 40 MG: 40 TABLET ORAL at 16:08

## 2023-06-30 ASSESSMENT — PAIN DESCRIPTION - DESCRIPTORS: DESCRIPTORS: DISCOMFORT

## 2023-06-30 ASSESSMENT — PAIN SCALES - GENERAL
PAINLEVEL_OUTOF10: 10
PAINLEVEL_OUTOF10: 5

## 2023-06-30 ASSESSMENT — PAIN - FUNCTIONAL ASSESSMENT: PAIN_FUNCTIONAL_ASSESSMENT: ACTIVITIES ARE NOT PREVENTED

## 2023-06-30 ASSESSMENT — PAIN DESCRIPTION - LOCATION: LOCATION: ABDOMEN

## 2023-07-01 LAB
ALBUMIN SERPL-MCNC: 2.9 G/DL (ref 3.2–4.6)
ALBUMIN/GLOB SERPL: 0.6 (ref 0.4–1.6)
ALP SERPL-CCNC: 101 U/L (ref 50–136)
ALT SERPL-CCNC: 18 U/L (ref 12–65)
ANION GAP SERPL CALC-SCNC: 3 MMOL/L (ref 2–11)
AST SERPL-CCNC: 22 U/L (ref 15–37)
BACTERIA SPEC CULT: NORMAL
BASOPHILS # BLD: 0.1 K/UL (ref 0–0.2)
BASOPHILS NFR BLD: 1 % (ref 0–2)
BILIRUB SERPL-MCNC: 0.9 MG/DL (ref 0.2–1.1)
BUN SERPL-MCNC: 10 MG/DL (ref 8–23)
CALCIUM SERPL-MCNC: 9 MG/DL (ref 8.3–10.4)
CHLORIDE SERPL-SCNC: 103 MMOL/L (ref 101–110)
CO2 SERPL-SCNC: 29 MMOL/L (ref 21–32)
CREAT SERPL-MCNC: 0.6 MG/DL (ref 0.8–1.5)
DIFFERENTIAL METHOD BLD: ABNORMAL
EOSINOPHIL # BLD: 0.3 K/UL (ref 0–0.8)
EOSINOPHIL NFR BLD: 3 % (ref 0.5–7.8)
ERYTHROCYTE [DISTWIDTH] IN BLOOD BY AUTOMATED COUNT: 14.9 % (ref 11.9–14.6)
GLOBULIN SER CALC-MCNC: 4.5 G/DL (ref 2.8–4.5)
GLUCOSE SERPL-MCNC: 89 MG/DL (ref 65–100)
HCT VFR BLD AUTO: 36.3 % (ref 41.1–50.3)
HGB BLD-MCNC: 12.1 G/DL (ref 13.6–17.2)
IMM GRANULOCYTES # BLD AUTO: 0.1 K/UL (ref 0–0.5)
IMM GRANULOCYTES NFR BLD AUTO: 1 % (ref 0–5)
LYMPHOCYTES # BLD: 1.2 K/UL (ref 0.5–4.6)
LYMPHOCYTES NFR BLD: 14 % (ref 13–44)
MCH RBC QN AUTO: 34.1 PG (ref 26.1–32.9)
MCHC RBC AUTO-ENTMCNC: 33.3 G/DL (ref 31.4–35)
MCV RBC AUTO: 102.3 FL (ref 82–102)
MONOCYTES # BLD: 0.5 K/UL (ref 0.1–1.3)
MONOCYTES NFR BLD: 6 % (ref 4–12)
NEUTS SEG # BLD: 6.3 K/UL (ref 1.7–8.2)
NEUTS SEG NFR BLD: 75 % (ref 43–78)
NRBC # BLD: 0 K/UL (ref 0–0.2)
PLATELET # BLD AUTO: 163 K/UL (ref 150–450)
PMV BLD AUTO: 8.7 FL (ref 9.4–12.3)
POTASSIUM SERPL-SCNC: 4.3 MMOL/L (ref 3.5–5.1)
PROT SERPL-MCNC: 7.4 G/DL (ref 6.3–8.2)
RBC # BLD AUTO: 3.55 M/UL (ref 4.23–5.6)
SERVICE CMNT-IMP: NORMAL
SODIUM SERPL-SCNC: 135 MMOL/L (ref 133–143)
WBC # BLD AUTO: 8.3 K/UL (ref 4.3–11.1)

## 2023-07-01 PROCEDURE — 2580000003 HC RX 258: Performed by: FAMILY MEDICINE

## 2023-07-01 PROCEDURE — 6370000000 HC RX 637 (ALT 250 FOR IP): Performed by: FAMILY MEDICINE

## 2023-07-01 PROCEDURE — 6370000000 HC RX 637 (ALT 250 FOR IP): Performed by: PHYSICIAN ASSISTANT

## 2023-07-01 PROCEDURE — 6370000000 HC RX 637 (ALT 250 FOR IP): Performed by: HOSPITALIST

## 2023-07-01 PROCEDURE — 85025 COMPLETE CBC W/AUTO DIFF WBC: CPT

## 2023-07-01 PROCEDURE — 6360000002 HC RX W HCPCS: Performed by: INTERNAL MEDICINE

## 2023-07-01 PROCEDURE — 6360000002 HC RX W HCPCS: Performed by: PHYSICIAN ASSISTANT

## 2023-07-01 PROCEDURE — 2580000003 HC RX 258: Performed by: INTERNAL MEDICINE

## 2023-07-01 PROCEDURE — 6370000000 HC RX 637 (ALT 250 FOR IP): Performed by: NURSE PRACTITIONER

## 2023-07-01 PROCEDURE — 1100000000 HC RM PRIVATE

## 2023-07-01 PROCEDURE — 80053 COMPREHEN METABOLIC PANEL: CPT

## 2023-07-01 PROCEDURE — 36415 COLL VENOUS BLD VENIPUNCTURE: CPT

## 2023-07-01 RX ADMIN — LOSARTAN POTASSIUM 25 MG: 50 TABLET, FILM COATED ORAL at 08:59

## 2023-07-01 RX ADMIN — OXYCODONE HYDROCHLORIDE 5 MG: 5 TABLET ORAL at 01:33

## 2023-07-01 RX ADMIN — GABAPENTIN 400 MG: 100 CAPSULE ORAL at 12:39

## 2023-07-01 RX ADMIN — GABAPENTIN 400 MG: 100 CAPSULE ORAL at 21:23

## 2023-07-01 RX ADMIN — SODIUM CHLORIDE, PRESERVATIVE FREE 10 ML: 5 INJECTION INTRAVENOUS at 21:25

## 2023-07-01 RX ADMIN — OXYCODONE HYDROCHLORIDE 5 MG: 5 TABLET ORAL at 05:47

## 2023-07-01 RX ADMIN — CEPHALEXIN 250 MG: 250 CAPSULE ORAL at 05:47

## 2023-07-01 RX ADMIN — APIXABAN 5 MG: 5 TABLET, FILM COATED ORAL at 08:59

## 2023-07-01 RX ADMIN — OXYCODONE HYDROCHLORIDE 5 MG: 5 TABLET ORAL at 13:25

## 2023-07-01 RX ADMIN — FUROSEMIDE 40 MG: 40 TABLET ORAL at 17:42

## 2023-07-01 RX ADMIN — GABAPENTIN 400 MG: 100 CAPSULE ORAL at 16:42

## 2023-07-01 RX ADMIN — METOPROLOL SUCCINATE 25 MG: 25 TABLET, EXTENDED RELEASE ORAL at 09:09

## 2023-07-01 RX ADMIN — OXYCODONE HYDROCHLORIDE 5 MG: 5 TABLET ORAL at 21:27

## 2023-07-01 RX ADMIN — POLYETHYLENE GLYCOL 3350 17 G: 17 POWDER, FOR SOLUTION ORAL at 08:57

## 2023-07-01 RX ADMIN — METRONIDAZOLE 500 MG: 500 INJECTION, SOLUTION INTRAVENOUS at 08:10

## 2023-07-01 RX ADMIN — OXYCODONE HYDROCHLORIDE 5 MG: 5 TABLET ORAL at 09:15

## 2023-07-01 RX ADMIN — APIXABAN 5 MG: 5 TABLET, FILM COATED ORAL at 21:23

## 2023-07-01 RX ADMIN — OXYCODONE HYDROCHLORIDE 5 MG: 5 TABLET ORAL at 17:40

## 2023-07-01 RX ADMIN — POTASSIUM CHLORIDE 40 MEQ: 20 TABLET, EXTENDED RELEASE ORAL at 17:42

## 2023-07-01 RX ADMIN — GABAPENTIN 400 MG: 100 CAPSULE ORAL at 08:57

## 2023-07-01 RX ADMIN — PIPERACILLIN AND TAZOBACTAM 4500 MG: 4; .5 INJECTION, POWDER, LYOPHILIZED, FOR SOLUTION INTRAVENOUS at 11:39

## 2023-07-01 RX ADMIN — CEPHALEXIN 250 MG: 250 CAPSULE ORAL at 01:24

## 2023-07-01 RX ADMIN — CARBAMAZEPINE 200 MG: 200 TABLET ORAL at 08:59

## 2023-07-01 RX ADMIN — POTASSIUM CHLORIDE 40 MEQ: 20 TABLET, EXTENDED RELEASE ORAL at 08:58

## 2023-07-01 RX ADMIN — METRONIDAZOLE 500 MG: 500 INJECTION, SOLUTION INTRAVENOUS at 01:34

## 2023-07-01 RX ADMIN — FUROSEMIDE 40 MG: 40 TABLET ORAL at 08:59

## 2023-07-01 RX ADMIN — TAMSULOSIN HYDROCHLORIDE 0.4 MG: 0.4 CAPSULE ORAL at 08:59

## 2023-07-01 RX ADMIN — ROSUVASTATIN CALCIUM 10 MG: 10 TABLET, FILM COATED ORAL at 21:23

## 2023-07-01 RX ADMIN — ONDANSETRON 4 MG: 4 TABLET, ORALLY DISINTEGRATING ORAL at 17:44

## 2023-07-01 RX ADMIN — PIPERACILLIN AND TAZOBACTAM 3375 MG: 3; .375 INJECTION, POWDER, LYOPHILIZED, FOR SOLUTION INTRAVENOUS at 16:53

## 2023-07-01 RX ADMIN — CARBAMAZEPINE 200 MG: 200 TABLET ORAL at 21:26

## 2023-07-01 RX ADMIN — PANTOPRAZOLE SODIUM 40 MG: 40 TABLET, DELAYED RELEASE ORAL at 05:59

## 2023-07-01 ASSESSMENT — PAIN SCALES - GENERAL
PAINLEVEL_OUTOF10: 3
PAINLEVEL_OUTOF10: 7
PAINLEVEL_OUTOF10: 3
PAINLEVEL_OUTOF10: 7
PAINLEVEL_OUTOF10: 8
PAINLEVEL_OUTOF10: 2
PAINLEVEL_OUTOF10: 2
PAINLEVEL_OUTOF10: 7
PAINLEVEL_OUTOF10: 3
PAINLEVEL_OUTOF10: 7

## 2023-07-01 ASSESSMENT — PAIN DESCRIPTION - DESCRIPTORS
DESCRIPTORS: ACHING;SORE
DESCRIPTORS: ACHING
DESCRIPTORS: ACHING;SORE

## 2023-07-01 ASSESSMENT — PAIN DESCRIPTION - LOCATION
LOCATION: LEG
LOCATION: JAW
LOCATION: HEAD;JAW
LOCATION: ABDOMEN;JAW
LOCATION: FACE
LOCATION: ABDOMEN;JAW
LOCATION: JAW

## 2023-07-01 ASSESSMENT — PAIN - FUNCTIONAL ASSESSMENT
PAIN_FUNCTIONAL_ASSESSMENT: ACTIVITIES ARE NOT PREVENTED

## 2023-07-01 ASSESSMENT — PAIN DESCRIPTION - ORIENTATION
ORIENTATION: RIGHT
ORIENTATION: ANTERIOR
ORIENTATION: RIGHT
ORIENTATION: RIGHT

## 2023-07-01 ASSESSMENT — PAIN SCALES - WONG BAKER: WONGBAKER_NUMERICALRESPONSE: 0

## 2023-07-02 LAB
BASOPHILS # BLD: 0.1 K/UL (ref 0–0.2)
BASOPHILS NFR BLD: 1 % (ref 0–2)
DIFFERENTIAL METHOD BLD: ABNORMAL
EOSINOPHIL # BLD: 0.3 K/UL (ref 0–0.8)
EOSINOPHIL NFR BLD: 3 % (ref 0.5–7.8)
ERYTHROCYTE [DISTWIDTH] IN BLOOD BY AUTOMATED COUNT: 14.8 % (ref 11.9–14.6)
HCT VFR BLD AUTO: 39.3 % (ref 41.1–50.3)
HGB BLD-MCNC: 12.8 G/DL (ref 13.6–17.2)
IMM GRANULOCYTES # BLD AUTO: 0.1 K/UL (ref 0–0.5)
IMM GRANULOCYTES NFR BLD AUTO: 1 % (ref 0–5)
LYMPHOCYTES # BLD: 1.4 K/UL (ref 0.5–4.6)
LYMPHOCYTES NFR BLD: 14 % (ref 13–44)
MCH RBC QN AUTO: 33.2 PG (ref 26.1–32.9)
MCHC RBC AUTO-ENTMCNC: 32.6 G/DL (ref 31.4–35)
MCV RBC AUTO: 101.8 FL (ref 82–102)
MM INDURATION, POC: 0 MM (ref 0–5)
MONOCYTES # BLD: 0.5 K/UL (ref 0.1–1.3)
MONOCYTES NFR BLD: 6 % (ref 4–12)
NEUTS SEG # BLD: 7.1 K/UL (ref 1.7–8.2)
NEUTS SEG NFR BLD: 75 % (ref 43–78)
NRBC # BLD: 0 K/UL (ref 0–0.2)
PLATELET # BLD AUTO: 190 K/UL (ref 150–450)
PMV BLD AUTO: 8.5 FL (ref 9.4–12.3)
PPD, POC: NEGATIVE
RBC # BLD AUTO: 3.86 M/UL (ref 4.23–5.6)
WBC # BLD AUTO: 9.5 K/UL (ref 4.3–11.1)

## 2023-07-02 PROCEDURE — 2580000003 HC RX 258: Performed by: INTERNAL MEDICINE

## 2023-07-02 PROCEDURE — 6370000000 HC RX 637 (ALT 250 FOR IP): Performed by: HOSPITALIST

## 2023-07-02 PROCEDURE — 85025 COMPLETE CBC W/AUTO DIFF WBC: CPT

## 2023-07-02 PROCEDURE — 6370000000 HC RX 637 (ALT 250 FOR IP): Performed by: PHYSICIAN ASSISTANT

## 2023-07-02 PROCEDURE — 2580000003 HC RX 258: Performed by: FAMILY MEDICINE

## 2023-07-02 PROCEDURE — 6360000002 HC RX W HCPCS: Performed by: INTERNAL MEDICINE

## 2023-07-02 PROCEDURE — 1100000000 HC RM PRIVATE

## 2023-07-02 PROCEDURE — 36415 COLL VENOUS BLD VENIPUNCTURE: CPT

## 2023-07-02 PROCEDURE — 6370000000 HC RX 637 (ALT 250 FOR IP): Performed by: NURSE PRACTITIONER

## 2023-07-02 PROCEDURE — 6370000000 HC RX 637 (ALT 250 FOR IP): Performed by: FAMILY MEDICINE

## 2023-07-02 RX ADMIN — FUROSEMIDE 40 MG: 40 TABLET ORAL at 17:49

## 2023-07-02 RX ADMIN — POTASSIUM CHLORIDE 40 MEQ: 20 TABLET, EXTENDED RELEASE ORAL at 17:49

## 2023-07-02 RX ADMIN — POTASSIUM CHLORIDE 40 MEQ: 20 TABLET, EXTENDED RELEASE ORAL at 10:20

## 2023-07-02 RX ADMIN — PIPERACILLIN AND TAZOBACTAM 3375 MG: 3; .375 INJECTION, POWDER, LYOPHILIZED, FOR SOLUTION INTRAVENOUS at 10:19

## 2023-07-02 RX ADMIN — POLYETHYLENE GLYCOL 3350 17 G: 17 POWDER, FOR SOLUTION ORAL at 11:45

## 2023-07-02 RX ADMIN — GABAPENTIN 400 MG: 100 CAPSULE ORAL at 14:25

## 2023-07-02 RX ADMIN — METOPROLOL SUCCINATE 25 MG: 25 TABLET, EXTENDED RELEASE ORAL at 10:20

## 2023-07-02 RX ADMIN — SODIUM CHLORIDE, PRESERVATIVE FREE 10 ML: 5 INJECTION INTRAVENOUS at 11:45

## 2023-07-02 RX ADMIN — PANTOPRAZOLE SODIUM 40 MG: 40 TABLET, DELAYED RELEASE ORAL at 06:04

## 2023-07-02 RX ADMIN — ROSUVASTATIN CALCIUM 10 MG: 10 TABLET, FILM COATED ORAL at 20:43

## 2023-07-02 RX ADMIN — APIXABAN 5 MG: 5 TABLET, FILM COATED ORAL at 20:43

## 2023-07-02 RX ADMIN — OXYCODONE HYDROCHLORIDE 5 MG: 5 TABLET ORAL at 06:10

## 2023-07-02 RX ADMIN — OXYCODONE HYDROCHLORIDE 5 MG: 5 TABLET ORAL at 17:55

## 2023-07-02 RX ADMIN — LOSARTAN POTASSIUM 25 MG: 50 TABLET, FILM COATED ORAL at 10:20

## 2023-07-02 RX ADMIN — PIPERACILLIN AND TAZOBACTAM 3375 MG: 3; .375 INJECTION, POWDER, LYOPHILIZED, FOR SOLUTION INTRAVENOUS at 01:45

## 2023-07-02 RX ADMIN — APIXABAN 5 MG: 5 TABLET, FILM COATED ORAL at 10:20

## 2023-07-02 RX ADMIN — GABAPENTIN 400 MG: 100 CAPSULE ORAL at 17:49

## 2023-07-02 RX ADMIN — FUROSEMIDE 40 MG: 40 TABLET ORAL at 10:21

## 2023-07-02 RX ADMIN — GABAPENTIN 400 MG: 100 CAPSULE ORAL at 20:43

## 2023-07-02 RX ADMIN — SENNOSIDES AND DOCUSATE SODIUM 2 TABLET: 8.6; 5 TABLET ORAL at 10:19

## 2023-07-02 RX ADMIN — CARBAMAZEPINE 200 MG: 200 TABLET ORAL at 20:43

## 2023-07-02 RX ADMIN — PIPERACILLIN AND TAZOBACTAM 3375 MG: 3; .375 INJECTION, POWDER, LYOPHILIZED, FOR SOLUTION INTRAVENOUS at 17:50

## 2023-07-02 RX ADMIN — TAMSULOSIN HYDROCHLORIDE 0.4 MG: 0.4 CAPSULE ORAL at 10:21

## 2023-07-02 RX ADMIN — GABAPENTIN 400 MG: 100 CAPSULE ORAL at 10:19

## 2023-07-02 RX ADMIN — CARBAMAZEPINE 200 MG: 200 TABLET ORAL at 10:20

## 2023-07-02 ASSESSMENT — PAIN DESCRIPTION - LOCATION: LOCATION: JAW;ABDOMEN

## 2023-07-02 ASSESSMENT — PAIN SCALES - GENERAL
PAINLEVEL_OUTOF10: 0
PAINLEVEL_OUTOF10: 0
PAINLEVEL_OUTOF10: 7
PAINLEVEL_OUTOF10: 8

## 2023-07-02 ASSESSMENT — PAIN - FUNCTIONAL ASSESSMENT: PAIN_FUNCTIONAL_ASSESSMENT: ACTIVITIES ARE NOT PREVENTED

## 2023-07-02 ASSESSMENT — PAIN DESCRIPTION - ORIENTATION: ORIENTATION: RIGHT;LEFT;ANTERIOR

## 2023-07-02 ASSESSMENT — PAIN DESCRIPTION - DESCRIPTORS: DESCRIPTORS: ACHING;SORE

## 2023-07-03 VITALS
OXYGEN SATURATION: 98 % | BODY MASS INDEX: 24.69 KG/M2 | DIASTOLIC BLOOD PRESSURE: 65 MMHG | TEMPERATURE: 97.7 F | HEIGHT: 68 IN | RESPIRATION RATE: 18 BRPM | HEART RATE: 66 BPM | WEIGHT: 162.92 LBS | SYSTOLIC BLOOD PRESSURE: 123 MMHG

## 2023-07-03 LAB
ANION GAP SERPL CALC-SCNC: 4 MMOL/L (ref 2–11)
BASOPHILS # BLD: 0.1 K/UL (ref 0–0.2)
BASOPHILS NFR BLD: 1 % (ref 0–2)
BUN SERPL-MCNC: 15 MG/DL (ref 8–23)
CALCIUM SERPL-MCNC: 9.3 MG/DL (ref 8.3–10.4)
CHLORIDE SERPL-SCNC: 102 MMOL/L (ref 101–110)
CO2 SERPL-SCNC: 31 MMOL/L (ref 21–32)
CREAT SERPL-MCNC: 0.8 MG/DL (ref 0.8–1.5)
DIFFERENTIAL METHOD BLD: ABNORMAL
EOSINOPHIL # BLD: 0.5 K/UL (ref 0–0.8)
EOSINOPHIL NFR BLD: 4 % (ref 0.5–7.8)
ERYTHROCYTE [DISTWIDTH] IN BLOOD BY AUTOMATED COUNT: 14.7 % (ref 11.9–14.6)
GLUCOSE SERPL-MCNC: 83 MG/DL (ref 65–100)
HCT VFR BLD AUTO: 39 % (ref 41.1–50.3)
HGB BLD-MCNC: 12.9 G/DL (ref 13.6–17.2)
IMM GRANULOCYTES # BLD AUTO: 0.2 K/UL (ref 0–0.5)
IMM GRANULOCYTES NFR BLD AUTO: 1 % (ref 0–5)
LYMPHOCYTES # BLD: 1.5 K/UL (ref 0.5–4.6)
LYMPHOCYTES NFR BLD: 15 % (ref 13–44)
MCH RBC QN AUTO: 33.5 PG (ref 26.1–32.9)
MCHC RBC AUTO-ENTMCNC: 33.1 G/DL (ref 31.4–35)
MCV RBC AUTO: 101.3 FL (ref 82–102)
MONOCYTES # BLD: 0.8 K/UL (ref 0.1–1.3)
MONOCYTES NFR BLD: 8 % (ref 4–12)
NEUTS SEG # BLD: 7.4 K/UL (ref 1.7–8.2)
NEUTS SEG NFR BLD: 71 % (ref 43–78)
NRBC # BLD: 0 K/UL (ref 0–0.2)
PLATELET # BLD AUTO: 225 K/UL (ref 150–450)
PMV BLD AUTO: 8.6 FL (ref 9.4–12.3)
POTASSIUM SERPL-SCNC: 4.2 MMOL/L (ref 3.5–5.1)
RBC # BLD AUTO: 3.85 M/UL (ref 4.23–5.6)
SARS-COV-2 RDRP RESP QL NAA+PROBE: NOT DETECTED
SODIUM SERPL-SCNC: 137 MMOL/L (ref 133–143)
SOURCE: NORMAL
WBC # BLD AUTO: 10.5 K/UL (ref 4.3–11.1)

## 2023-07-03 PROCEDURE — 80048 BASIC METABOLIC PNL TOTAL CA: CPT

## 2023-07-03 PROCEDURE — 6370000000 HC RX 637 (ALT 250 FOR IP): Performed by: FAMILY MEDICINE

## 2023-07-03 PROCEDURE — 87635 SARS-COV-2 COVID-19 AMP PRB: CPT

## 2023-07-03 PROCEDURE — 6370000000 HC RX 637 (ALT 250 FOR IP): Performed by: NURSE PRACTITIONER

## 2023-07-03 PROCEDURE — 85025 COMPLETE CBC W/AUTO DIFF WBC: CPT

## 2023-07-03 PROCEDURE — 6360000002 HC RX W HCPCS: Performed by: INTERNAL MEDICINE

## 2023-07-03 PROCEDURE — 2580000003 HC RX 258: Performed by: FAMILY MEDICINE

## 2023-07-03 PROCEDURE — 6370000000 HC RX 637 (ALT 250 FOR IP): Performed by: HOSPITALIST

## 2023-07-03 PROCEDURE — 2580000003 HC RX 258: Performed by: INTERNAL MEDICINE

## 2023-07-03 PROCEDURE — 36415 COLL VENOUS BLD VENIPUNCTURE: CPT

## 2023-07-03 PROCEDURE — 6370000000 HC RX 637 (ALT 250 FOR IP): Performed by: PHYSICIAN ASSISTANT

## 2023-07-03 RX ORDER — OXYCODONE HYDROCHLORIDE 5 MG/1
5 TABLET ORAL EVERY 8 HOURS PRN
Qty: 9 TABLET | Refills: 0 | Status: SHIPPED | OUTPATIENT
Start: 2023-07-03 | End: 2023-07-03 | Stop reason: SDUPTHER

## 2023-07-03 RX ORDER — CEPHALEXIN 250 MG/1
250 CAPSULE ORAL EVERY 6 HOURS SCHEDULED
Status: DISCONTINUED | OUTPATIENT
Start: 2023-07-03 | End: 2023-07-03 | Stop reason: HOSPADM

## 2023-07-03 RX ORDER — ROSUVASTATIN CALCIUM 10 MG/1
10 TABLET, COATED ORAL NIGHTLY
Qty: 30 TABLET | Refills: 0
Start: 2023-07-03

## 2023-07-03 RX ORDER — OXYCODONE HYDROCHLORIDE 5 MG/1
5 TABLET ORAL EVERY 8 HOURS PRN
Qty: 9 TABLET | Refills: 0 | Status: SHIPPED | OUTPATIENT
Start: 2023-07-03 | End: 2023-07-06

## 2023-07-03 RX ORDER — METRONIDAZOLE 500 MG/1
500 TABLET ORAL EVERY 8 HOURS SCHEDULED
Status: DISCONTINUED | OUTPATIENT
Start: 2023-07-03 | End: 2023-07-03 | Stop reason: HOSPADM

## 2023-07-03 RX ORDER — CEPHALEXIN 250 MG/1
250 CAPSULE ORAL 4 TIMES DAILY
Qty: 12 CAPSULE | Refills: 0
Start: 2023-07-03 | End: 2023-07-06

## 2023-07-03 RX ORDER — LOSARTAN POTASSIUM 25 MG/1
25 TABLET ORAL DAILY
Qty: 30 TABLET | Refills: 0
Start: 2023-07-04

## 2023-07-03 RX ORDER — METRONIDAZOLE 500 MG/1
500 TABLET ORAL EVERY 8 HOURS SCHEDULED
Qty: 9 TABLET | Refills: 0
Start: 2023-07-03 | End: 2023-07-06

## 2023-07-03 RX ADMIN — LOSARTAN POTASSIUM 25 MG: 50 TABLET, FILM COATED ORAL at 10:06

## 2023-07-03 RX ADMIN — PANTOPRAZOLE SODIUM 40 MG: 40 TABLET, DELAYED RELEASE ORAL at 05:31

## 2023-07-03 RX ADMIN — OXYCODONE HYDROCHLORIDE 5 MG: 5 TABLET ORAL at 13:00

## 2023-07-03 RX ADMIN — FUROSEMIDE 40 MG: 40 TABLET ORAL at 09:06

## 2023-07-03 RX ADMIN — SENNOSIDES AND DOCUSATE SODIUM 2 TABLET: 8.6; 5 TABLET ORAL at 10:06

## 2023-07-03 RX ADMIN — TAMSULOSIN HYDROCHLORIDE 0.4 MG: 0.4 CAPSULE ORAL at 10:06

## 2023-07-03 RX ADMIN — METOPROLOL SUCCINATE 25 MG: 25 TABLET, EXTENDED RELEASE ORAL at 10:06

## 2023-07-03 RX ADMIN — GABAPENTIN 400 MG: 100 CAPSULE ORAL at 09:07

## 2023-07-03 RX ADMIN — POTASSIUM CHLORIDE 40 MEQ: 20 TABLET, EXTENDED RELEASE ORAL at 09:08

## 2023-07-03 RX ADMIN — PIPERACILLIN AND TAZOBACTAM 3375 MG: 3; .375 INJECTION, POWDER, LYOPHILIZED, FOR SOLUTION INTRAVENOUS at 01:29

## 2023-07-03 RX ADMIN — OXYCODONE HYDROCHLORIDE 5 MG: 5 TABLET ORAL at 09:00

## 2023-07-03 RX ADMIN — SODIUM CHLORIDE, PRESERVATIVE FREE 10 ML: 5 INJECTION INTRAVENOUS at 10:10

## 2023-07-03 RX ADMIN — POLYETHYLENE GLYCOL 3350 17 G: 17 POWDER, FOR SOLUTION ORAL at 10:19

## 2023-07-03 RX ADMIN — CARBAMAZEPINE 200 MG: 200 TABLET ORAL at 09:06

## 2023-07-03 RX ADMIN — SODIUM CHLORIDE, PRESERVATIVE FREE 10 ML: 5 INJECTION INTRAVENOUS at 10:11

## 2023-07-03 RX ADMIN — APIXABAN 5 MG: 5 TABLET, FILM COATED ORAL at 09:06

## 2023-07-03 ASSESSMENT — PAIN DESCRIPTION - ORIENTATION: ORIENTATION: RIGHT

## 2023-07-03 ASSESSMENT — PAIN SCALES - GENERAL: PAINLEVEL_OUTOF10: 7

## 2023-07-03 ASSESSMENT — PAIN DESCRIPTION - LOCATION
LOCATION: JAW
LOCATION: JAW

## 2023-07-03 ASSESSMENT — PAIN DESCRIPTION - DESCRIPTORS: DESCRIPTORS: ACHING;SORE

## 2023-07-03 NOTE — PLAN OF CARE
Problem: Discharge Planning  Goal: Discharge to home or other facility with appropriate resources  7/3/2023 1201 by Rosa Figueroa RN  Outcome: Adequate for Discharge  7/3/2023 1159 by Rosa Figueroa RN  Outcome: Progressing     Problem: Chronic Conditions and Co-morbidities  Goal: Patient's chronic conditions and co-morbidity symptoms are monitored and maintained or improved  7/3/2023 1201 by Rosa Figueroa RN  Outcome: Adequate for Discharge  7/3/2023 1159 by Rosa Figueroa RN  Outcome: Progressing     Problem: Pain  Goal: Verbalizes/displays adequate comfort level or baseline comfort level  7/3/2023 1201 by Rosa Figueroa RN  Outcome: Adequate for Discharge  7/3/2023 1159 by Rosa Figueroa RN  Outcome: Progressing     Problem: Safety - Adult  Goal: Free from fall injury  7/3/2023 1201 by Rosa Figueroa RN  Outcome: Adequate for Discharge  7/3/2023 1159 by Rosa Figueroa RN  Outcome: Progressing     Problem: Skin/Tissue Integrity  Goal: Absence of new skin breakdown  Description: 1. Monitor for areas of redness and/or skin breakdown  2. Assess vascular access sites hourly  3. Every 4-6 hours minimum:  Change oxygen saturation probe site  4. Every 4-6 hours:  If on nasal continuous positive airway pressure, respiratory therapy assess nares and determine need for appliance change or resting period.   7/3/2023 1201 by Rosa Figueroa RN  Outcome: Adequate for Discharge  7/3/2023 1159 by Rosa Figueroa RN  Outcome: Progressing

## 2023-07-03 NOTE — DISCHARGE SUMMARY
per tablet Comments:   Reason for Stopping:         OXCARBAZEPINE PO Comments:   Reason for Stopping:         lidocaine 4 % external patch Comments:   Reason for Stopping:         miconazole (MICOTIN) 2 % powder Comments:   Reason for Stopping:         carBAMazepine (TEGRETOL) 200 MG tablet Comments:   Reason for Stopping:               Some medications may have been reported old/obsolete and marked \"stop taking\" by the system; in reality pt was already off these meds; defer to outpatient or prescribing providers. Procedures done this admission:  * No surgery found *    Consults this admission:  IP CONSULT TO HEART FAILURE NURSE/COORDINATOR  IP CONSULT TO DIETITIAN  IP WOUND CARE NURSE CONSULT TO EVAL  IP CONSULT TO SOCIAL WORK  IP CONSULT TO CARDIOLOGY    Echocardiogram results:  06/26/23    TRANSTHORACIC ECHOCARDIOGRAM (TTE) LIMITED (CONTRAST/BUBBLE/3D PRN) 06/27/2023  4:15 PM (Final)    Interpretation Summary    Left Ventricle: Mildly reduced left ventricular systolic function with a visually estimated EF of 45 - 50%. Left ventricle size is normal. Normal wall thickness. Mild global hypokinesis present. Aortic Valve: Mild regurgitation. Mitral Valve: Mild regurgitation. Tricuspid Valve: Moderate regurgitation. Severely elevated RVSP. The estimated RVSP is 62 mmHg. Left Atrium: Left atrium is mildly dilated. Right Atrium: Right atrium is mildly dilated. Pericardium: Left pleural effusion. Contrast used: Definity. Signed by: Charley Alfredo MD on 6/27/2023  4:15 PM      Diagnostic Imaging/Tests:   XR ABDOMEN (KUB) (SINGLE AP VIEW)    Result Date: 6/18/2023  1. Nonobstructive bowel gas pattern. This exam was interpreted by a board-certified, body-imaging fellowship trained radiologist from [place] [address] [facility]. If there are any questions regarding this examination please feel free to contact a radiologist at [phone].   Natacha Lay 6/18/2023 10:19:00 PM    CT ABDOMEN

## 2023-07-03 NOTE — PROGRESS NOTES
Pt d/c'd to [facility], room 194 via ambulance. PIV removed, pt tolerated well. Report called to Nicanor IVERSON at [phone].

## 2023-07-03 NOTE — CARE COORDINATION
Patient is aware and in agreement with this discharge today. Patient requested CM call his daughter to notify her of this discharge because his son Pk Eugene is at the beach. CM called [address], [phone], as requested and left a voicemail message. No CM needs voiced or noted at this time.
notified by QIO of appeal request:     Time notified by QIO of appeal request:     Detailed Notice of Discharge given to:     Date Notice of Discharge given:     Time Notice of Discharge given:     Date records sent to [address] [place] [address]     Time records sent to [address] [place] [address]     Date Notified of Outcome     Time Notified of Outcome     Outcome of appeal           Kady Curiel 07/03/23 11:19 AM

## 2023-07-24 ENCOUNTER — PATIENT MESSAGE (OUTPATIENT)
Age: 78
End: 2023-07-24

## 2023-07-24 ENCOUNTER — TELEPHONE (OUTPATIENT)
Age: 78
End: 2023-07-24

## 2023-07-24 NOTE — TELEPHONE ENCOUNTER
Hold Eliquis x7 days then resume. These episodes are somewhat normal for patients on Eliquis. Patient should be careful about brushing arms and against objects such as doorjamb's and tables.

## 2023-07-24 NOTE — TELEPHONE ENCOUNTER
----- Message from Radha Collado MA sent at 7/24/2023  2:35 PM EDT -----  Regarding: FW: Dionnaquis  Contact: [phone]    ----- Message -----  From: Reynaldo Feldman  Sent: 7/24/2023   1:48 PM EDT  To: [facility] Clinical Staff  Subject: Eliquis                                          Hi,   The capillaries are bursting in both of my arms. Would it be find to leave off my Eliquis for a couple of days. If this is ok can you call  the [facility], to let them know. Their number is [phone]. Thank you.

## 2023-07-24 NOTE — TELEPHONE ENCOUNTER
Pt.kem are busting in his arms. The doctor at the [facility] told him to call here and ask. If it is ok to do this please notify the [facility] Their number is [phone].

## 2023-07-25 NOTE — TELEPHONE ENCOUNTER
I spoke w/Owen at the [facility] and she took the verbal order to hold Eliquis x 7 days    Pt.daughter also notified of MD response and v/u.

## 2023-08-11 PROCEDURE — G2066 INTER DEVC REMOTE 30D: HCPCS | Performed by: INTERNAL MEDICINE

## 2023-08-11 PROCEDURE — 93298 REM INTERROG DEV EVAL SCRMS: CPT | Performed by: INTERNAL MEDICINE

## 2023-08-14 ENCOUNTER — OFFICE VISIT (OUTPATIENT)
Age: 78
End: 2023-08-14
Payer: MEDICARE

## 2023-08-14 VITALS
DIASTOLIC BLOOD PRESSURE: 58 MMHG | WEIGHT: 162 LBS | HEIGHT: 68 IN | BODY MASS INDEX: 24.55 KG/M2 | SYSTOLIC BLOOD PRESSURE: 114 MMHG | HEART RATE: 76 BPM

## 2023-08-14 DIAGNOSIS — I25.10 ATHEROSCLEROSIS OF NATIVE CORONARY ARTERY OF NATIVE HEART WITHOUT ANGINA PECTORIS: Primary | ICD-10-CM

## 2023-08-14 DIAGNOSIS — I48.0 PAROXYSMAL ATRIAL FIBRILLATION (HCC): ICD-10-CM

## 2023-08-14 DIAGNOSIS — I27.20 PULMONARY HYPERTENSION (HCC): Chronic | ICD-10-CM

## 2023-08-14 DIAGNOSIS — I10 PRIMARY HYPERTENSION: ICD-10-CM

## 2023-08-14 DIAGNOSIS — I50.22 SYSTOLIC CHF, CHRONIC (HCC): ICD-10-CM

## 2023-08-14 PROBLEM — R07.2 CHEST PAIN, PRECORDIAL: Status: RESOLVED | Noted: 2022-10-24 | Resolved: 2023-08-14

## 2023-08-14 PROCEDURE — 3078F DIAST BP <80 MM HG: CPT | Performed by: INTERNAL MEDICINE

## 2023-08-14 PROCEDURE — 3074F SYST BP LT 130 MM HG: CPT | Performed by: INTERNAL MEDICINE

## 2023-08-14 PROCEDURE — G8427 DOCREV CUR MEDS BY ELIG CLIN: HCPCS | Performed by: INTERNAL MEDICINE

## 2023-08-14 PROCEDURE — 1036F TOBACCO NON-USER: CPT | Performed by: INTERNAL MEDICINE

## 2023-08-14 PROCEDURE — G8420 CALC BMI NORM PARAMETERS: HCPCS | Performed by: INTERNAL MEDICINE

## 2023-08-14 PROCEDURE — 99214 OFFICE O/P EST MOD 30 MIN: CPT | Performed by: INTERNAL MEDICINE

## 2023-08-14 PROCEDURE — 1123F ACP DISCUSS/DSCN MKR DOCD: CPT | Performed by: INTERNAL MEDICINE

## 2023-08-14 RX ORDER — OXCARBAZEPINE 300 MG/1
300 TABLET, FILM COATED ORAL 2 TIMES DAILY
COMMUNITY

## 2023-08-14 RX ORDER — IVERMECTIN 3 MG/1
TABLET ORAL ONCE
COMMUNITY

## 2023-08-14 RX ORDER — ATORVASTATIN CALCIUM 20 MG/1
20 TABLET, FILM COATED ORAL DAILY
COMMUNITY

## 2023-08-14 ASSESSMENT — ENCOUNTER SYMPTOMS
BACK PAIN: 0
ABDOMINAL PAIN: 0
COUGH: 0
SHORTNESS OF BREATH: 0

## 2023-08-14 NOTE — PROGRESS NOTES
Mental Status: He is oriented to person, place, and time. Medical problems, medical history and test results were reviewed with the patient today.       Lab Results   Component Value Date    CHOL 95 06/27/2023    CHOL 123 07/10/2022    CHOL 178 06/07/2019     Lab Results   Component Value Date    TRIG 57 06/27/2023    TRIG 72 07/10/2022    TRIG 74 06/07/2019     Lab Results   Component Value Date    HDL 29 (L) 06/27/2023    HDL 27 (L) 07/10/2022    HDL 37 (L) 06/07/2019     Lab Results   Component Value Date    LDLCALC 54.6 06/27/2023    LDLCALC 81.6 07/10/2022    LDLCALC 126.2 (H) 06/07/2019     Lab Results   Component Value Date    LABVLDL 11.4 06/27/2023    LABVLDL 14.4 07/10/2022    LABVLDL 14.8 06/07/2019     Lab Results   Component Value Date    CHOLHDLRATIO 3.3 06/27/2023    CHOLHDLRATIO 4.6 07/10/2022    CHOLHDLRATIO 4.8 06/07/2019        Lab Results   Component Value Date    LABA1C 5.1 06/27/2023     Lab Results   Component Value Date     06/27/2023        Lab Results   Component Value Date     07/03/2023    K 4.2 07/03/2023     07/03/2023    CO2 31 07/03/2023    BUN 15 07/03/2023    CREATININE 0.80 07/03/2023    GLUCOSE 83 07/03/2023    CALCIUM 9.3 07/03/2023    PROT 7.4 07/01/2023    LABALBU 2.9 (L) 07/01/2023    BILITOT 0.9 07/01/2023    ALKPHOS 101 07/01/2023    AST 22 07/01/2023    ALT 18 07/01/2023    LABGLOM >60 07/03/2023    GFRAA >60 07/11/2022    AGRATIO 0.9 (L) 06/07/2021    GLOB 4.5 07/01/2023       Lab Results   Component Value Date/Time     07/03/2023 05:49 AM    K 4.2 07/03/2023 05:49 AM     07/03/2023 05:49 AM    CO2 31 07/03/2023 05:49 AM    BUN 15 07/03/2023 05:49 AM    CREATININE 0.80 07/03/2023 05:49 AM    GLUCOSE 83 07/03/2023 05:49 AM    CALCIUM 9.3 07/03/2023 05:49 AM        Lab Results   Component Value Date    WBC 10.5 07/03/2023    HGB 12.9 (L) 07/03/2023    HCT 39.0 (L) 07/03/2023    .3 07/03/2023     07/03/2023

## 2023-08-15 DIAGNOSIS — Z95.818 STATUS POST PLACEMENT OF IMPLANTABLE LOOP RECORDER: ICD-10-CM

## 2023-08-15 DIAGNOSIS — G45.9 TIA (TRANSIENT ISCHEMIC ATTACK): ICD-10-CM

## 2023-08-23 ENCOUNTER — OFFICE VISIT (OUTPATIENT)
Dept: NEUROSURGERY | Age: 78
End: 2023-08-23
Payer: MEDICARE

## 2023-08-23 ENCOUNTER — HOSPITAL ENCOUNTER (OUTPATIENT)
Dept: GENERAL RADIOLOGY | Age: 78
Discharge: HOME | End: 2023-08-26
Payer: MEDICARE

## 2023-08-23 VITALS
TEMPERATURE: 98.6 F | DIASTOLIC BLOOD PRESSURE: 73 MMHG | HEIGHT: 68 IN | SYSTOLIC BLOOD PRESSURE: 136 MMHG | BODY MASS INDEX: 24.63 KG/M2 | OXYGEN SATURATION: 96 % | HEART RATE: 68 BPM

## 2023-08-23 DIAGNOSIS — S14.109A CERVICAL SPINAL CORD INJURY, INITIAL ENCOUNTER (HCC): Primary | ICD-10-CM

## 2023-08-23 DIAGNOSIS — G95.9 MYELOPATHY (HCC): ICD-10-CM

## 2023-08-23 DIAGNOSIS — G50.0 TRIGEMINAL NEURALGIA: ICD-10-CM

## 2023-08-23 DIAGNOSIS — G95.20 CERVICAL SPINAL CORD COMPRESSION (HCC): ICD-10-CM

## 2023-08-23 DIAGNOSIS — S14.109A CERVICAL SPINAL CORD INJURY, INITIAL ENCOUNTER (HCC): ICD-10-CM

## 2023-08-23 PROCEDURE — 99213 OFFICE O/P EST LOW 20 MIN: CPT | Performed by: NEUROLOGICAL SURGERY

## 2023-08-23 PROCEDURE — 3075F SYST BP GE 130 - 139MM HG: CPT | Performed by: NEUROLOGICAL SURGERY

## 2023-08-23 PROCEDURE — 1036F TOBACCO NON-USER: CPT | Performed by: NEUROLOGICAL SURGERY

## 2023-08-23 PROCEDURE — 3078F DIAST BP <80 MM HG: CPT | Performed by: NEUROLOGICAL SURGERY

## 2023-08-23 PROCEDURE — 1123F ACP DISCUSS/DSCN MKR DOCD: CPT | Performed by: NEUROLOGICAL SURGERY

## 2023-08-23 PROCEDURE — G8427 DOCREV CUR MEDS BY ELIG CLIN: HCPCS | Performed by: NEUROLOGICAL SURGERY

## 2023-08-23 PROCEDURE — 72040 X-RAY EXAM NECK SPINE 2-3 VW: CPT

## 2023-08-23 PROCEDURE — G8420 CALC BMI NORM PARAMETERS: HCPCS | Performed by: NEUROLOGICAL SURGERY

## 2023-08-23 RX ORDER — OXYCODONE HYDROCHLORIDE 5 MG/1
TABLET ORAL
COMMUNITY
Start: 2023-07-11

## 2023-08-23 RX ORDER — CARBAMAZEPINE 200 MG/1
400 TABLET ORAL
COMMUNITY
Start: 2023-07-05

## 2023-08-23 NOTE — PROGRESS NOTES
physical therapy to try to maximize his recovery over the next 12 months. Follow-up here as needed. Overall he is very pleased.     Angel Guerrero MD

## 2023-09-21 ENCOUNTER — TELEPHONE (OUTPATIENT)
Dept: NEUROSURGERY | Age: 78
End: 2023-09-21

## 2023-09-21 NOTE — TELEPHONE ENCOUNTER
Pt has a set of robotic gloves to help with his hands. His therapist that comes [facility] will not let him use them without a doctors order. Would you write an order for pt to use these gloves?

## 2023-09-22 PROCEDURE — 93298 REM INTERROG DEV EVAL SCRMS: CPT | Performed by: INTERNAL MEDICINE

## 2023-09-22 PROCEDURE — G2066 INTER DEVC REMOTE 30D: HCPCS | Performed by: INTERNAL MEDICINE

## 2023-10-20 ENCOUNTER — PATIENT MESSAGE (OUTPATIENT)
Age: 78
End: 2023-10-20

## 2023-10-20 NOTE — TELEPHONE ENCOUNTER
From: Beau Banks  To: Dr. Jm Addison: 10/20/2023 10:02 AM EDT  Subject: Form to stop Eqliuis    Can you send over a request to stop the Equilis to [facility]. Fax number [phone].      Thank you,    Romeo Cunningham

## 2023-11-08 PROCEDURE — G2066 INTER DEVC REMOTE 30D: HCPCS | Performed by: INTERNAL MEDICINE

## 2023-11-08 PROCEDURE — 93298 REM INTERROG DEV EVAL SCRMS: CPT | Performed by: INTERNAL MEDICINE

## 2023-11-24 NOTE — TELEPHONE ENCOUNTER
Daughter reports that JAMES has worsened. He can barely walk 20 ft w/out being extremely short of breath. He refuses to go to the ER. He has no CP and has no edema. He looks really bad \"like death warmed over\"to the daughter. O2 sat is normal.Daughter wants to know what  advises. right leg pain

## 2023-12-13 ENCOUNTER — OFFICE VISIT (OUTPATIENT)
Age: 78
End: 2023-12-13
Payer: MEDICARE

## 2023-12-13 VITALS
DIASTOLIC BLOOD PRESSURE: 72 MMHG | HEIGHT: 68 IN | SYSTOLIC BLOOD PRESSURE: 128 MMHG | HEART RATE: 72 BPM | BODY MASS INDEX: 24.63 KG/M2

## 2023-12-13 DIAGNOSIS — I48.0 PAROXYSMAL ATRIAL FIBRILLATION (HCC): ICD-10-CM

## 2023-12-13 DIAGNOSIS — I27.20 PULMONARY HYPERTENSION (HCC): Chronic | ICD-10-CM

## 2023-12-13 DIAGNOSIS — I25.10 ATHEROSCLEROSIS OF NATIVE CORONARY ARTERY OF NATIVE HEART WITHOUT ANGINA PECTORIS: ICD-10-CM

## 2023-12-13 DIAGNOSIS — I10 PRIMARY HYPERTENSION: ICD-10-CM

## 2023-12-13 DIAGNOSIS — I50.22 SYSTOLIC CHF, CHRONIC (HCC): Primary | ICD-10-CM

## 2023-12-13 PROCEDURE — G8420 CALC BMI NORM PARAMETERS: HCPCS | Performed by: INTERNAL MEDICINE

## 2023-12-13 PROCEDURE — G8428 CUR MEDS NOT DOCUMENT: HCPCS | Performed by: INTERNAL MEDICINE

## 2023-12-13 PROCEDURE — 3074F SYST BP LT 130 MM HG: CPT | Performed by: INTERNAL MEDICINE

## 2023-12-13 PROCEDURE — 1123F ACP DISCUSS/DSCN MKR DOCD: CPT | Performed by: INTERNAL MEDICINE

## 2023-12-13 PROCEDURE — 1036F TOBACCO NON-USER: CPT | Performed by: INTERNAL MEDICINE

## 2023-12-13 PROCEDURE — 99214 OFFICE O/P EST MOD 30 MIN: CPT | Performed by: INTERNAL MEDICINE

## 2023-12-13 PROCEDURE — G8484 FLU IMMUNIZE NO ADMIN: HCPCS | Performed by: INTERNAL MEDICINE

## 2023-12-13 PROCEDURE — 3078F DIAST BP <80 MM HG: CPT | Performed by: INTERNAL MEDICINE

## 2023-12-13 RX ORDER — OXYCODONE AND ACETAMINOPHEN 10; 325 MG/1; MG/1
TABLET ORAL
COMMUNITY
Start: 2023-12-06

## 2023-12-13 ASSESSMENT — ENCOUNTER SYMPTOMS
SHORTNESS OF BREATH: 0
ABDOMINAL PAIN: 0
COUGH: 0
BACK PAIN: 0

## 2023-12-13 NOTE — PROGRESS NOTES
[facility] CARDIOLOGY  [id number] [place] [address]      23      NAME:  Chay Wilder  : 1945  MRN: [id number]      SUBJECTIVE:   Chay Wilder is a 78 y.o. male seen for a follow up visit regarding the following:     Chief Complaint   Patient presents with    Coronary Artery Disease       HPI:   Patient was admitted 2019 with NSTEMI and severe 3VCAD complicated by AF/RVR. He had CABG/MAZE and JENNIFER clip. He has struggled  significantly post CABG with pleural effusions and fluid retention. LV systolic function was severely reduced postoperatively. Echocardiogram 2020 demonstrated stable moderate LV systolic dysfunction with ejection fraction of 40 to 45% no significant  valvular heart disease. He had COVID-19 2020 and severe GI illnesses. He had Covid again 2022. He has recovered. Remains dyspneic. Echo 10/2022 with stable EF 40-45%. RVSP 63mmHg. [facility] with stable graft anatomy. Patient was admitted to the hospital 2023 secondary to acute volume overload. Patient continues to decline physically. He was readmitted 2023 with acute sepsis and respiratory failure. Echocardiogram 2023 demonstrated low normal left ventricular systolic function. RVSP remains severely elevated. No other significant valvular heart disease. Past Medical History, Past Surgical History, Family history, Social History, and Medications were all reviewed with the patient today and updated as necessary.      Current Outpatient Medications   Medication Sig Dispense Refill    oxyCODONE-acetaminophen (PERCOCET)  MG per tablet       carBAMazepine (TEGRETOL) 200 MG tablet Take 2 tablets by mouth      oxyCODONE (ROXICODONE) 5 MG immediate release tablet       atorvastatin (LIPITOR) 20 MG tablet Take 1 tablet by mouth daily      ivermectin 3 MG tablet Take by mouth once      OXcarbazepine (TRILEPTAL) 300 MG tablet Take 1 tablet by mouth 2 times daily

## 2024-01-10 PROCEDURE — 93298 REM INTERROG DEV EVAL SCRMS: CPT | Performed by: INTERNAL MEDICINE

## 2024-03-22 PROCEDURE — 93298 REM INTERROG DEV EVAL SCRMS: CPT | Performed by: INTERNAL MEDICINE

## 2024-04-22 PROCEDURE — 93298 REM INTERROG DEV EVAL SCRMS: CPT | Performed by: INTERNAL MEDICINE

## 2024-06-17 ENCOUNTER — PATIENT MESSAGE (OUTPATIENT)
Age: 79
End: 2024-06-17

## 2024-06-17 NOTE — TELEPHONE ENCOUNTER
From: Shaquille Llanos  To: Dr. Hans Rushing  Sent: 6/17/2024 10:44 AM EDT  Subject: Seeing a Family member    Hi Dr. AMAYA  I want to send you a message asking if you would consider seeing my Grandson New Gutierrez. You also see his Dad Travis Gutierrez and his Grandmother Monika Llanos. He has been having some tightness in his chest and has been to the ER twice. They referred him to a Cardiologist and he would like to see you if possible .     Thank you,  Shaquille Llanos

## 2024-07-11 PROCEDURE — 93298 REM INTERROG DEV EVAL SCRMS: CPT | Performed by: INTERNAL MEDICINE

## 2024-08-19 ENCOUNTER — OFFICE VISIT (OUTPATIENT)
Age: 79
End: 2024-08-19
Payer: MEDICARE

## 2024-08-19 VITALS
WEIGHT: 162 LBS | HEART RATE: 70 BPM | DIASTOLIC BLOOD PRESSURE: 80 MMHG | SYSTOLIC BLOOD PRESSURE: 124 MMHG | BODY MASS INDEX: 24.55 KG/M2 | HEIGHT: 68 IN

## 2024-08-19 DIAGNOSIS — E78.5 DYSLIPIDEMIA: ICD-10-CM

## 2024-08-19 DIAGNOSIS — I25.10 ATHEROSCLEROSIS OF NATIVE CORONARY ARTERY OF NATIVE HEART WITHOUT ANGINA PECTORIS: ICD-10-CM

## 2024-08-19 DIAGNOSIS — I27.20 PULMONARY HYPERTENSION (HCC): Chronic | ICD-10-CM

## 2024-08-19 DIAGNOSIS — I50.22 SYSTOLIC CHF, CHRONIC (HCC): ICD-10-CM

## 2024-08-19 DIAGNOSIS — I10 PRIMARY HYPERTENSION: ICD-10-CM

## 2024-08-19 DIAGNOSIS — I50.22 CHRONIC SYSTOLIC CONGESTIVE HEART FAILURE (HCC): ICD-10-CM

## 2024-08-19 DIAGNOSIS — I48.0 PAROXYSMAL ATRIAL FIBRILLATION (HCC): Primary | ICD-10-CM

## 2024-08-19 PROCEDURE — 1036F TOBACCO NON-USER: CPT | Performed by: INTERNAL MEDICINE

## 2024-08-19 PROCEDURE — 1123F ACP DISCUSS/DSCN MKR DOCD: CPT | Performed by: INTERNAL MEDICINE

## 2024-08-19 PROCEDURE — 3079F DIAST BP 80-89 MM HG: CPT | Performed by: INTERNAL MEDICINE

## 2024-08-19 PROCEDURE — 3074F SYST BP LT 130 MM HG: CPT | Performed by: INTERNAL MEDICINE

## 2024-08-19 PROCEDURE — G8420 CALC BMI NORM PARAMETERS: HCPCS | Performed by: INTERNAL MEDICINE

## 2024-08-19 PROCEDURE — 93000 ELECTROCARDIOGRAM COMPLETE: CPT | Performed by: INTERNAL MEDICINE

## 2024-08-19 PROCEDURE — 99214 OFFICE O/P EST MOD 30 MIN: CPT | Performed by: INTERNAL MEDICINE

## 2024-08-19 PROCEDURE — G8428 CUR MEDS NOT DOCUMENT: HCPCS | Performed by: INTERNAL MEDICINE

## 2024-08-19 ASSESSMENT — ENCOUNTER SYMPTOMS
COUGH: 0
BACK PAIN: 0
SHORTNESS OF BREATH: 0
ABDOMINAL PAIN: 0

## 2024-08-19 NOTE — PROGRESS NOTES
[facility] CARDIOLOGY  [address] [place] [address], [address]  [place] [address]      24      NAME:  Shaquille Llanos  : 1945  MRN: [id number]      SUBJECTIVE:   Shaquille Llanos is a 79 y.o. male seen for a follow up visit regarding the following:     Chief Complaint   Patient presents with    6 Month Follow-Up    Coronary Artery Disease    Irregular Heart Beat       HPI:   79 y.o. male was admitted 2019 with NSTEMI and severe 3VCAD complicated by AF/RVR.  He had CABG/MAZE and JENNIFER clip.  He has struggled significantly post CABG.  LV systolic function was severely reduced postoperatively.  Echocardiogram 2020 demonstrated stable moderate LV systolic dysfunction with ejection fraction of 40 to 45% no significant  valvular heart disease.   He had COVID-19 2020 and severe GI illnesses.  He had Covid again 2022. Echo 10/2022 with stable EF 40-45%.  RVSP 63mmHg.  [facility] with stable graft anatomy.  Patient was admitted to the hospital 2023 secondary to acute volume overload.  Patient continues to decline physically.  He was readmitted 2023 with acute sepsis and respiratory failure.  Patient fell in  with cervical neck fracture and chronic upper extremity weakness.  Patient is continued to decline physically.  He now lives in assisted living.  Most recent echocardiogram 2024 demonstrates severe recurrent LV systolic dysfunction ejection fraction 25-30%.  RV systolic pressures remain 60 mmHg.        Past Medical History, Past Surgical History, Family history, Social History, and Medications were all reviewed with the patient today and updated as necessary.     Current Outpatient Medications   Medication Sig Dispense Refill    apixaban (ELIQUIS) 2.5 MG TABS tablet Take 1 tablet by mouth 2 times daily Per assisted living facility pt is on 2.5mg BID      oxyCODONE-acetaminophen (PERCOCET)  MG per tablet       atorvastatin (LIPITOR) 20 MG tablet Take 1 tablet by mouth daily

## 2024-08-20 PROCEDURE — 93298 REM INTERROG DEV EVAL SCRMS: CPT | Performed by: INTERNAL MEDICINE

## 2024-09-09 ENCOUNTER — TELEPHONE (OUTPATIENT)
Age: 79
End: 2024-09-09

## 2024-09-09 ENCOUNTER — PATIENT MESSAGE (OUTPATIENT)
Age: 79
End: 2024-09-09

## 2024-10-03 ENCOUNTER — HOSPITAL ENCOUNTER (INPATIENT)
Age: 79
LOS: 4 days | Discharge: HOME | End: 2024-10-07
Attending: INTERNAL MEDICINE | Admitting: INTERNAL MEDICINE
Payer: MEDICARE

## 2024-10-03 DIAGNOSIS — I50.9 ACUTE EXACERBATION OF CHRONIC HEART FAILURE (HCC): Primary | ICD-10-CM

## 2024-10-03 DIAGNOSIS — R09.02 HYPOXEMIA: ICD-10-CM

## 2024-10-03 PROCEDURE — 36415 COLL VENOUS BLD VENIPUNCTURE: CPT

## 2024-10-03 PROCEDURE — 83880 ASSAY OF NATRIURETIC PEPTIDE: CPT

## 2024-10-03 PROCEDURE — 80053 COMPREHEN METABOLIC PANEL: CPT

## 2024-10-03 PROCEDURE — 85025 COMPLETE CBC W/AUTO DIFF WBC: CPT

## 2024-10-03 PROCEDURE — 96374 THER/PROPH/DIAG INJ IV PUSH: CPT

## 2024-10-03 PROCEDURE — 1100000000 HC RM PRIVATE

## 2024-10-03 PROCEDURE — 99285 EMERGENCY DEPT VISIT HI MDM: CPT

## 2024-10-03 RX ORDER — FUROSEMIDE 10 MG/ML
40 INJECTION INTRAMUSCULAR; INTRAVENOUS EVERY 12 HOURS
Status: DISCONTINUED | OUTPATIENT
Start: 2024-10-04 | End: 2024-10-07

## 2024-10-03 RX ORDER — LOSARTAN POTASSIUM 25 MG/1
25 TABLET ORAL DAILY
Status: DISCONTINUED | OUTPATIENT
Start: 2024-10-04 | End: 2024-10-07 | Stop reason: HOSPADM

## 2024-10-03 RX ORDER — METOPROLOL SUCCINATE 25 MG/1
25 TABLET, EXTENDED RELEASE ORAL DAILY
Status: DISCONTINUED | OUTPATIENT
Start: 2024-10-04 | End: 2024-10-07 | Stop reason: HOSPADM

## 2024-10-03 RX ORDER — FUROSEMIDE 10 MG/ML
INJECTION INTRAMUSCULAR; INTRAVENOUS
Status: DISPENSED
Start: 2024-10-03 | End: 2024-10-04

## 2024-10-03 RX ORDER — OXYCODONE AND ACETAMINOPHEN 10; 325 MG/1; MG/1
1 TABLET ORAL EVERY 6 HOURS PRN
Status: DISCONTINUED | OUTPATIENT
Start: 2024-10-03 | End: 2024-10-07 | Stop reason: HOSPADM

## 2024-10-03 RX ORDER — DULOXETIN HYDROCHLORIDE 60 MG/1
60 CAPSULE, DELAYED RELEASE ORAL DAILY
Status: DISCONTINUED | OUTPATIENT
Start: 2024-10-04 | End: 2024-10-07 | Stop reason: HOSPADM

## 2024-10-03 RX ORDER — ONDANSETRON 2 MG/ML
4 INJECTION INTRAMUSCULAR; INTRAVENOUS EVERY 8 HOURS PRN
Status: DISCONTINUED | OUTPATIENT
Start: 2024-10-03 | End: 2024-10-07 | Stop reason: HOSPADM

## 2024-10-03 RX ORDER — ACETAMINOPHEN 500 MG
500 TABLET ORAL EVERY 6 HOURS PRN
Status: DISCONTINUED | OUTPATIENT
Start: 2024-10-03 | End: 2024-10-07 | Stop reason: HOSPADM

## 2024-10-03 RX ORDER — OXCARBAZEPINE 300 MG/1
600 TABLET, FILM COATED ORAL 2 TIMES DAILY
Status: DISCONTINUED | OUTPATIENT
Start: 2024-10-04 | End: 2024-10-07 | Stop reason: HOSPADM

## 2024-10-03 RX ORDER — BACLOFEN 10 MG/1
TABLET ORAL
Status: DISPENSED
Start: 2024-10-03 | End: 2024-10-04

## 2024-10-03 RX ORDER — ATORVASTATIN CALCIUM 20 MG/1
TABLET, FILM COATED ORAL
Status: DISPENSED
Start: 2024-10-03 | End: 2024-10-04

## 2024-10-03 RX ORDER — ATORVASTATIN CALCIUM 20 MG/1
20 TABLET, FILM COATED ORAL NIGHTLY
Status: DISCONTINUED | OUTPATIENT
Start: 2024-10-04 | End: 2024-10-07 | Stop reason: HOSPADM

## 2024-10-03 RX ORDER — BACLOFEN 10 MG/1
5 TABLET ORAL 3 TIMES DAILY
Status: DISCONTINUED | OUTPATIENT
Start: 2024-10-04 | End: 2024-10-07 | Stop reason: HOSPADM

## 2024-10-03 NOTE — ED PROVIDER NOTES
Emergency Department Provider Note       PCP: Celio Snow Jr., MD   Age: 79 y.o.   Sex: male     DISPOSITION Admitted 10/03/2024 05:30:06 PM  Condition at Disposition: Data Unavailable       ICD-10-CM    1. Acute exacerbation of chronic heart failure (HCC)  I50.9       2. Hypoxemia  R09.02           Medical Decision Making     79-year-old male with history of hypertension, CHF presents with complaint of worsening shortness of breath over the past two weeks. Endorses progressive JAMES and worsening LE pitting edema.  Pt hypoxic with sats 87-88% on RA. Placed on 2L O2.   Patient states that he is not on supplemental O2 at baseline.  Chest X-ray with evidence of CHF/effusion. ProBNP 4,082. No leukocytosis. Glucose 85. K 4.01.  Pt given Lasix 40 mg IV.  Hospitalist consulted for admission via perfect serve.     1 or more chronic illnesses with a severe exacerbation or progression.  Chronic medical problems impacting care include CHF, hypertension, CAD.  Shared medical decision making was utilized in creating the patients health plan today.    I independently ordered and reviewed each unique test.  I reviewed external records: provider visit note from PCP.  I reviewed external records: provider visit note from outside specialist.  I reviewed external records: previous lab results from outside ED.  I reviewed external records: previous imaging study including radiologist interpretation.     Echocardiogram reviewed from 8/12/2024.  Severely reduced left ventricular systolic function with a visually estimated EF of 25-30%.  Normal wall thickness.  Global hypokinesis present.  Indeterminate diastolic function.    I interpreted the X-rays chest x-ray with evidence of CHF/volume overload.  Pleural effusion noted.  No infiltrate.  Agree radiologist..  My Independent EKG Interpretation: sinus rhythm, no evidence of arrhythmia and left ventricular hypertrophy      ST Segments:Normal ST segments - NO STEMI   Rate: 78  The

## 2024-10-04 ENCOUNTER — APPOINTMENT (OUTPATIENT)
Dept: GENERAL RADIOLOGY | Age: 79
End: 2024-10-04
Payer: MEDICARE

## 2024-10-04 LAB
ALBUMIN SERPL-MCNC: 3.1 G/DL (ref 3.2–4.6)
ALBUMIN/GLOB SERPL: 0.8 (ref 1–1.9)
ALP SERPL-CCNC: 150 U/L (ref 40–129)
ALT SERPL-CCNC: 12 U/L (ref 8–55)
ANION GAP SERPL CALC-SCNC: 11 MMOL/L (ref 9–18)
AST SERPL-CCNC: 26 U/L (ref 15–37)
BASOPHILS # BLD: 0 K/UL (ref 0–0.2)
BASOPHILS NFR BLD: 1 % (ref 0–2)
BILIRUB SERPL-MCNC: 1.4 MG/DL (ref 0–1.2)
BUN SERPL-MCNC: 16 MG/DL (ref 8–23)
CALCIUM SERPL-MCNC: 8.6 MG/DL (ref 8.8–10.2)
CHLORIDE SERPL-SCNC: 103 MMOL/L (ref 98–107)
CO2 SERPL-SCNC: 22 MMOL/L (ref 20–28)
CREAT SERPL-MCNC: 0.6 MG/DL (ref 0.8–1.3)
DIFFERENTIAL METHOD BLD: ABNORMAL
EOSINOPHIL # BLD: 0.1 K/UL (ref 0–0.8)
EOSINOPHIL NFR BLD: 2 % (ref 0.5–7.8)
ERYTHROCYTE [DISTWIDTH] IN BLOOD BY AUTOMATED COUNT: 16.4 % (ref 11.9–14.6)
GLOBULIN SER CALC-MCNC: 4.1 G/DL (ref 2.3–3.5)
GLUCOSE SERPL-MCNC: 86 MG/DL (ref 70–99)
HCT VFR BLD AUTO: 38.3 % (ref 41.1–50.3)
HGB BLD-MCNC: 12.3 G/DL (ref 13.6–17.2)
IMM GRANULOCYTES # BLD AUTO: 0 K/UL (ref 0–0.5)
IMM GRANULOCYTES NFR BLD AUTO: 0 % (ref 0–5)
LYMPHOCYTES # BLD: 0.5 K/UL (ref 0.5–4.6)
LYMPHOCYTES NFR BLD: 10 % (ref 13–44)
MCH RBC QN AUTO: 33.6 PG (ref 26.1–32.9)
MCHC RBC AUTO-ENTMCNC: 32.1 G/DL (ref 31.4–35)
MCV RBC AUTO: 104.6 FL (ref 82–102)
MONOCYTES # BLD: 0.5 K/UL (ref 0.1–1.3)
MONOCYTES NFR BLD: 9 % (ref 4–12)
NEUTS SEG # BLD: 4.3 K/UL (ref 1.7–8.2)
NEUTS SEG NFR BLD: 78 % (ref 43–78)
NRBC # BLD: 0 K/UL (ref 0–0.2)
NT PRO BNP: 4082 PG/ML (ref 0–450)
PLATELET # BLD AUTO: 158 K/UL (ref 150–450)
PMV BLD AUTO: 8.8 FL (ref 9.4–12.3)
POTASSIUM SERPL-SCNC: 4 MMOL/L (ref 3.5–5.1)
PROT SERPL-MCNC: 7.2 G/DL (ref 6.3–8.2)
RBC # BLD AUTO: 3.66 M/UL (ref 4.23–5.6)
SODIUM SERPL-SCNC: 136 MMOL/L (ref 136–145)
WBC # BLD AUTO: 5.5 K/UL (ref 4.3–11.1)

## 2024-10-04 PROCEDURE — 6360000002 HC RX W HCPCS: Performed by: INTERNAL MEDICINE

## 2024-10-04 PROCEDURE — 6370000000 HC RX 637 (ALT 250 FOR IP): Performed by: INTERNAL MEDICINE

## 2024-10-04 PROCEDURE — 6370000000 HC RX 637 (ALT 250 FOR IP)

## 2024-10-04 PROCEDURE — 1100000003 HC PRIVATE W/ TELEMETRY

## 2024-10-04 PROCEDURE — 74018 RADEX ABDOMEN 1 VIEW: CPT

## 2024-10-04 RX ORDER — GABAPENTIN 400 MG/1
CAPSULE ORAL
Status: COMPLETED
Start: 2024-10-04 | End: 2024-10-04

## 2024-10-04 RX ORDER — SENNA AND DOCUSATE SODIUM 50; 8.6 MG/1; MG/1
2 TABLET, FILM COATED ORAL 2 TIMES DAILY
Status: DISCONTINUED | OUTPATIENT
Start: 2024-10-04 | End: 2024-10-07 | Stop reason: HOSPADM

## 2024-10-04 RX ORDER — GABAPENTIN 400 MG/1
400 CAPSULE ORAL 3 TIMES DAILY
Status: DISCONTINUED | OUTPATIENT
Start: 2024-10-04 | End: 2024-10-07 | Stop reason: HOSPADM

## 2024-10-04 RX ORDER — POLYETHYLENE GLYCOL 3350 17 G/17G
17 POWDER, FOR SOLUTION ORAL DAILY
Status: DISCONTINUED | OUTPATIENT
Start: 2024-10-04 | End: 2024-10-07 | Stop reason: HOSPADM

## 2024-10-04 RX ADMIN — METOPROLOL SUCCINATE 25 MG: 25 TABLET, FILM COATED, EXTENDED RELEASE ORAL at 09:46

## 2024-10-04 RX ADMIN — APIXABAN 2.5 MG: 2.5 TABLET, FILM COATED ORAL at 10:15

## 2024-10-04 RX ADMIN — APIXABAN 2.5 MG: 5 TABLET, FILM COATED ORAL at 10:15

## 2024-10-04 RX ADMIN — OXYCODONE HYDROCHLORIDE AND ACETAMINOPHEN 1 TABLET: 10; 325 TABLET ORAL at 20:27

## 2024-10-04 RX ADMIN — GABAPENTIN 400 MG: 400 CAPSULE ORAL at 17:13

## 2024-10-04 RX ADMIN — BACLOFEN 5 MG: 10 TABLET ORAL at 09:47

## 2024-10-04 RX ADMIN — OXCARBAZEPINE 600 MG: 300 TABLET, FILM COATED ORAL at 20:30

## 2024-10-04 RX ADMIN — FUROSEMIDE 40 MG: 10 INJECTION, SOLUTION INTRAMUSCULAR; INTRAVENOUS at 09:46

## 2024-10-04 RX ADMIN — GABAPENTIN 400 MG: 400 CAPSULE ORAL at 20:30

## 2024-10-04 RX ADMIN — GABAPENTIN 400 MG: 400 CAPSULE ORAL at 09:45

## 2024-10-04 RX ADMIN — BACLOFEN 5 MG: 10 TABLET ORAL at 20:29

## 2024-10-04 RX ADMIN — FUROSEMIDE 40 MG: 10 INJECTION, SOLUTION INTRAMUSCULAR; INTRAVENOUS at 20:31

## 2024-10-04 RX ADMIN — DOCUSATE SODIUM 50 MG AND SENNOSIDES 8.6 MG 2 TABLET: 8.6; 5 TABLET, FILM COATED ORAL at 20:30

## 2024-10-04 RX ADMIN — LOSARTAN POTASSIUM 25 MG: 25 TABLET, FILM COATED ORAL at 09:47

## 2024-10-04 RX ADMIN — BACLOFEN 5 MG: 10 TABLET ORAL at 17:13

## 2024-10-04 RX ADMIN — DULOXETINE HYDROCHLORIDE 60 MG: 60 CAPSULE, DELAYED RELEASE ORAL at 09:47

## 2024-10-04 RX ADMIN — APIXABAN 2.5 MG: 2.5 TABLET, FILM COATED ORAL at 20:29

## 2024-10-04 RX ADMIN — OXCARBAZEPINE 600 MG: 300 TABLET, FILM COATED ORAL at 09:45

## 2024-10-04 RX ADMIN — ATORVASTATIN CALCIUM 20 MG: 20 TABLET, FILM COATED ORAL at 20:30

## 2024-10-04 ASSESSMENT — PAIN SCALES - GENERAL: PAINLEVEL_OUTOF10: 6

## 2024-10-04 ASSESSMENT — PAIN DESCRIPTION - DESCRIPTORS: DESCRIPTORS: SORE;THROBBING

## 2024-10-04 ASSESSMENT — PAIN DESCRIPTION - LOCATION: LOCATION: JAW

## 2024-10-04 NOTE — CARE COORDINATION
Pt presented to [facility] ED for worsening SOB and bilateral LE edema. Medical hx includes: HTN, systolic heart failure and CAD. Pt will return back to the [facility] when medically stable. Has family supports. He reported that he has lived there for two years. Is fairly indep with his ADLs. On 2L supplemental, denies home oxygen use. Uses a RW for ambulation assistance, denies recent h/o falls. Receives therapy at the facility. PCP established, stated that he has an upcoming appt with him in November. [place] and able to afford home meds. Pt reported that his family will transport him back to the facility at discharge. Will remain available.       10/04/24 0324   Service Assessment   Patient Orientation Alert and Oriented;Person;Place;Situation;Self   Cognition Alert   History Provided By Patient   Primary Caregiver Family   Support Systems Spouse/Significant Other;Children;Family Members;[facility];Friends/Neighbors   Patient's Healthcare Decision Maker is: Legal Next of Kin   PCP Verified by CM Yes  (Has an upcoming appt in November)   Prior Functional Level Assistance with the following:;Mobility   Current Functional Level Assistance with the following:;Mobility   Can patient return to prior living arrangement Yes   Ability to make needs known: Fair   Family able to assist with home care needs: Yes   Would you like for me to discuss the discharge plan with any other family members/significant others, and if so, who? No   Financial Resources Medicare   Community Resources Assisted Living  ([facility])   Social/Functional History   Lives With Alone   Type of Home Assisted living   Home Layout One level   Home Access Level entry   Bathroom Shower/Tub Walk-in shower   Bathroom Toilet Standard   Bathroom Accessibility Accessible   Home Equipment Walker - Rolling   Receives Help From Family;Personal care attendant   ADL Assistance Independent   Ambulation Assistance Needs

## 2024-10-04 NOTE — PROGRESS NOTES
Hospitalist Progress Note   Admit Date:  10/3/2024  5:20 PM   Name:  Shaquille Llanos   Age:  79 y.o.  Sex:  male  :  1945   MRN:  [id number]   Room:  [facility]/    Presenting/Chief Complaint: No chief complaint on file.     Reason(s) for Admission: Hypoxemia [R09.02]  Acute exacerbation of chronic heart failure (HCC) [I50.9]     Hospital Course:   Shaquille Llanos is a 79 y.o. male with medical history of hypertension, systolic heart failure, CAD.  Patient coming in secondary to worsening shortness of breath over the past 1 to 2 weeks and worsening bilateral lower extremity edema.  Patient has been placed on IV Lasix at this time.      Subjective & 24hr Events:   Patient resting comfortably on examination.  Patient states that his shortness of breath is improving at this time.      Assessment & Plan:     Acute on chronic heart failure exacerbation  Patient is breathing improving at this time  Wean oxygen as tolerated  Continue Lasix 40 mg IV twice daily  Metoprolol 25 mg daily  Strict I's/O and daily weights  Continue to monitor    Atrial fibrillation  Heart rates adequately controlled  Continue Eliquis 2.5 mg twice daily  Continue to monitor    Hyperlipidemia  Continue statin      Anticipated Discharge Arrangements:   Home    PT/OT evals ordered?  Not ordered; patient not expected to need rehab  Diet:  ADULT DIET; Regular; Low Fat/Low Chol/High Fiber/POLLO; Low Sodium (2 gm)  VTE prophylaxis: Already on anticoagulation  Code status: DNR      Non-peripheral Lines and Tubes (if present):          Telemetry (if present):           Hospital Problems:  Principal Problem:    Acute exacerbation of chronic heart failure (HCC)  Resolved Problems:    * No resolved hospital problems. *      Objective:   No data found.         Estimated body mass index is 24.63 kg/m² as calculated from the following:    Height as of 24: 1.727 m (5' 8\").    Weight as of 24: 73.5 kg (162 lb).  No intake or output data

## 2024-10-05 LAB
ANION GAP SERPL CALC-SCNC: 8 MMOL/L (ref 9–18)
BUN SERPL-MCNC: 18 MG/DL (ref 8–23)
CALCIUM SERPL-MCNC: 8.1 MG/DL (ref 8.8–10.2)
CHLORIDE SERPL-SCNC: 98 MMOL/L (ref 98–107)
CO2 SERPL-SCNC: 30 MMOL/L (ref 20–28)
CREAT SERPL-MCNC: 0.66 MG/DL (ref 0.8–1.3)
ERYTHROCYTE [DISTWIDTH] IN BLOOD BY AUTOMATED COUNT: 16 % (ref 11.9–14.6)
GLUCOSE SERPL-MCNC: 79 MG/DL (ref 70–99)
HCT VFR BLD AUTO: 35.6 % (ref 41.1–50.3)
HGB BLD-MCNC: 11.6 G/DL (ref 13.6–17.2)
MCH RBC QN AUTO: 33.4 PG (ref 26.1–32.9)
MCHC RBC AUTO-ENTMCNC: 32.6 G/DL (ref 31.4–35)
MCV RBC AUTO: 102.6 FL (ref 82–102)
NRBC # BLD: 0 K/UL (ref 0–0.2)
PLATELET # BLD AUTO: 145 K/UL (ref 150–450)
PMV BLD AUTO: 8.7 FL (ref 9.4–12.3)
POTASSIUM SERPL-SCNC: 3.4 MMOL/L (ref 3.5–5.1)
RBC # BLD AUTO: 3.47 M/UL (ref 4.23–5.6)
SODIUM SERPL-SCNC: 136 MMOL/L (ref 136–145)
WBC # BLD AUTO: 6 K/UL (ref 4.3–11.1)

## 2024-10-05 PROCEDURE — 6370000000 HC RX 637 (ALT 250 FOR IP): Performed by: INTERNAL MEDICINE

## 2024-10-05 PROCEDURE — 6370000000 HC RX 637 (ALT 250 FOR IP): Performed by: NURSE PRACTITIONER

## 2024-10-05 PROCEDURE — 6360000002 HC RX W HCPCS: Performed by: INTERNAL MEDICINE

## 2024-10-05 PROCEDURE — 6370000000 HC RX 637 (ALT 250 FOR IP): Performed by: PHYSICIAN ASSISTANT

## 2024-10-05 PROCEDURE — 2700000000 HC OXYGEN THERAPY PER DAY

## 2024-10-05 PROCEDURE — 85027 COMPLETE CBC AUTOMATED: CPT

## 2024-10-05 PROCEDURE — 1100000003 HC PRIVATE W/ TELEMETRY

## 2024-10-05 PROCEDURE — 80048 BASIC METABOLIC PNL TOTAL CA: CPT

## 2024-10-05 PROCEDURE — 99222 1ST HOSP IP/OBS MODERATE 55: CPT | Performed by: INTERNAL MEDICINE

## 2024-10-05 PROCEDURE — 36415 COLL VENOUS BLD VENIPUNCTURE: CPT

## 2024-10-05 RX ORDER — POTASSIUM CHLORIDE 1500 MG/1
40 TABLET, EXTENDED RELEASE ORAL ONCE
Status: COMPLETED | OUTPATIENT
Start: 2024-10-05 | End: 2024-10-05

## 2024-10-05 RX ADMIN — DULOXETINE HYDROCHLORIDE 60 MG: 60 CAPSULE, DELAYED RELEASE ORAL at 09:20

## 2024-10-05 RX ADMIN — METOPROLOL SUCCINATE 25 MG: 25 TABLET, FILM COATED, EXTENDED RELEASE ORAL at 09:21

## 2024-10-05 RX ADMIN — ATORVASTATIN CALCIUM 20 MG: 20 TABLET, FILM COATED ORAL at 20:07

## 2024-10-05 RX ADMIN — OXCARBAZEPINE 600 MG: 300 TABLET, FILM COATED ORAL at 09:21

## 2024-10-05 RX ADMIN — POTASSIUM CHLORIDE 40 MEQ: 1500 TABLET, EXTENDED RELEASE ORAL at 12:21

## 2024-10-05 RX ADMIN — OXCARBAZEPINE 600 MG: 300 TABLET, FILM COATED ORAL at 20:07

## 2024-10-05 RX ADMIN — APIXABAN 2.5 MG: 2.5 TABLET, FILM COATED ORAL at 09:21

## 2024-10-05 RX ADMIN — GABAPENTIN 400 MG: 400 CAPSULE ORAL at 15:57

## 2024-10-05 RX ADMIN — BACLOFEN 5 MG: 10 TABLET ORAL at 09:21

## 2024-10-05 RX ADMIN — EMPAGLIFLOZIN 10 MG: 10 TABLET, FILM COATED ORAL at 15:58

## 2024-10-05 RX ADMIN — DOCUSATE SODIUM 50 MG AND SENNOSIDES 8.6 MG 2 TABLET: 8.6; 5 TABLET, FILM COATED ORAL at 09:20

## 2024-10-05 RX ADMIN — POLYETHYLENE GLYCOL 3350 17 G: 17 POWDER, FOR SOLUTION ORAL at 09:22

## 2024-10-05 RX ADMIN — DOCUSATE SODIUM 50 MG AND SENNOSIDES 8.6 MG 2 TABLET: 8.6; 5 TABLET, FILM COATED ORAL at 20:07

## 2024-10-05 RX ADMIN — APIXABAN 2.5 MG: 2.5 TABLET, FILM COATED ORAL at 20:08

## 2024-10-05 RX ADMIN — BACLOFEN 5 MG: 10 TABLET ORAL at 20:07

## 2024-10-05 RX ADMIN — BACLOFEN 5 MG: 10 TABLET ORAL at 15:57

## 2024-10-05 RX ADMIN — GABAPENTIN 400 MG: 400 CAPSULE ORAL at 09:20

## 2024-10-05 RX ADMIN — FUROSEMIDE 40 MG: 10 INJECTION, SOLUTION INTRAMUSCULAR; INTRAVENOUS at 20:09

## 2024-10-05 RX ADMIN — GABAPENTIN 400 MG: 400 CAPSULE ORAL at 20:07

## 2024-10-05 RX ADMIN — SODIUM PHOSPHATE 1 ENEMA: 7; 19 ENEMA RECTAL at 05:21

## 2024-10-05 RX ADMIN — LOSARTAN POTASSIUM 25 MG: 25 TABLET, FILM COATED ORAL at 09:21

## 2024-10-05 RX ADMIN — FUROSEMIDE 40 MG: 10 INJECTION, SOLUTION INTRAMUSCULAR; INTRAVENOUS at 09:22

## 2024-10-05 ASSESSMENT — PAIN SCALES - GENERAL: PAINLEVEL_OUTOF10: 0

## 2024-10-05 NOTE — CONSULTS
[facility] Cardiology Initial Cardiac Evaluation                Date of  Admission: 10/3/2024  5:20 PM     PCP: Celio Snow Jr., MD  Requested by: Dr Cota  Primary Cardiologist: Dr Rushing  APC Attending: Dr Madera    Reason for Evaluation: Possible CHF      Shaquille Llanos is a 79 y.o. male with hx of atrial flutter, atrial fibrillation, CAD, prostate cancer, previous COVID-19 infection, GERD, hypertension, sleep apnea without use of CPAP, CVA, TIA, status post CABG, maze, and JENNIFER clip.  Patient has history of LV dysfunction with ejection fraction of 4050% with no symptoms given valvular disease in the past and elevated RVSP of 63.  Patient came to the hospital with complaints of worsening 1 to 2 weeks of shortness of breath and bilateral lower extremity edema.  Patient placed on IV Lasix with hospital admission with improving shortness of breath.  Review of patient's labs from admission show normal potassium, creatinine 0.6 now 0.66, GFR greater than 90, NT proBNP of 4082, albumin 3.1, alk phos 150, total bili 1.4, hemoglobin 12.3 now 11.6, platelets 145, and CO2 of 30.  Review the patient's vitals today show respirate 16 pulse rate of 71 /77 with a normal temperature of 99% O2 sats on nasal cannula.  Currently patient is out 2.69 L this admission on  on Lasix 40 mg every 12 hours.  Patient's continued on optimize medical therapy including Lasix for symptom control, Cozaar 25 mg, Toprol XL 25 mg, but is no longer taking home Jardiance due to skin issues that caused infection between his testicles.  Patient feels to use still volume overloaded with fullness in his belly and his edema is not back to baseline.  Since has been in an assisted living facility has not kept track of his weight, diet, or fluid intake.      Recent Cardiac Synopsis    LHC/NST:  10/26/22    CARDIAC PROCEDURE 10/26/2022 12:36 PM (Final)    Conclusion    Left radial access with pneumatic band hemostasis    Mild to  moderate LV systolic dysfunction with akinetic inferior wall and severely hypokinetic inferolateral wall.  LVEDP is elevated at 27 mmHg.    Severe three-vessel coronary artery disease    Widely patent left internal mammary artery to LAD, vein graft OM, vein graft to the right posterolateral artery.    Stable LV systolic dysfunction with EF 40-45%.  Patient does have elevated LVEDP and will have patient resume oral furosemide.  Patient with three-vessel coronary artery disease and widely patent grafts to LAD OM and right posterolateral branch.    Signed by: Hans Rushing MD on 10/26/2022 12:36 PM      Echo: 08/12/24    ECHO (TTE) COMPLETE (PRN CONTRAST/BUBBLE/STRAIN/3D) 08/12/2024  6:05 PM (Final)    Interpretation Summary    Left Ventricle: Severely reduced left ventricular systolic function with a visually estimated EF of 25 - 30%. Left ventricle size is normal. Normal wall thickness. Global hypokinesis present. Indeterminate diastolic function.    Right Ventricle: Right ventricle is mildly dilated. RV basal diameter is 4.6 cm. RV mid diameter is 3.8 cm.    Mitral Valve: Mild regurgitation.    Tricuspid Valve: Mild regurgitation. Moderately elevated RVSP, consistent with moderate pulmonary hypertension. The estimated RVSP is 60 mmHg.    Right Atrium: Right atrium is moderately dilated.    Image quality is adequate. Contrast used: Definity. Technically difficult study with poor endocardial visualization.    Signed by: Beck Stevens MD on 8/12/2024  6:05 PM    Link interrogation: Completed 8/20/2024 no arrhythmias or events recorded    Past Medical History:   Diagnosis Date    Acute on chronic congestive heart failure (HCC) 2/16/2023    Atrial fibrillation (HCC)     Atrial flutter (HCC) 4/7/2017    CAD (coronary artery disease) 2006    Cancer (HCC) 2011    prostate    Chest pain 2/16/2023    Community acquired pneumonia 3/28/2021    Community acquired pneumonia 3/28/2021    COVID-19 virus infection 12/10/2020

## 2024-10-05 NOTE — PROGRESS NOTES
Hospitalist Progress Note   Admit Date:  10/3/2024  5:20 PM   Name:  Shaquille Llanos   Age:  79 y.o.  Sex:  male  :  1945   MRN:  [id number]   Room:  [facility]/    Presenting/Chief Complaint: No chief complaint on file.     Reason(s) for Admission: Hypoxemia [R09.02]  Acute exacerbation of chronic heart failure (HCC) [I50.9]     Hospital Course:   Shaquille Llanos is a 79 y.o. male with medical history of hypertension, systolic heart failure, CAD.  Patient coming in secondary to worsening shortness of breath over the past 1 to 2 weeks and worsening bilateral lower extremity edema.  Patient has been placed on IV Lasix at this time and cardiology to see patient      Subjective & 24hr Events:   Patient resting comfortably on examination.  Patient states that his shortness of breath is improving at this time.  Patient did have bowel movement yesterday and states he did get an enema this morning to help as well.  Patient denied any other complaints on exam.      Assessment & Plan:     Acute on chronic heart failure exacerbation  Patient states that breathing is improving at this time  Wean oxygen as tolerated  Continue IV Lasix 40 mg twice daily  Metoprolol 25 mg daily  Strict I's/O and daily weights  Cardiology consulted  Continue to monitor    Atrial fibrillation  Status post CABG x 3  Patient without any chest pain on exam  Heart rates adequately controlled  Continue Eliquis 2.5 mg twice daily  Continue to monitor    Hyperlipidemia  Continue statin    Trigeminal neuralgia  Continue oxcarbazepine    Attempted to call family but no answer    Anticipated Discharge Arrangements:   Home    PT/OT evals ordered?  Not ordered; patient not expected to need rehab  Diet:  ADULT DIET; Regular; Low Fat/Low Chol/High Fiber/POLLO; Low Sodium (2 gm); 2000 ml  VTE prophylaxis: Already on anticoagulation  Code status: DNR      Non-peripheral Lines and Tubes (if present):      External Urinary Catheter (Active)     extremity edema bilateral   Skin:     General: Skin is warm.   Neurological:      Mental Status: He is alert. Mental status is at baseline.       I have personally reviewed labs and tests:  Recent Labs:  Recent Results (from the past 48 hour(s))   CBC with Auto Differential    Collection Time: 10/03/24 12:00 PM   Result Value Ref Range    WBC 5.5 4.3 - 11.1 K/uL    RBC 3.66 (L) 4.23 - 5.6 M/uL    Hemoglobin 12.3 (L) 13.6 - 17.2 g/dL    Hematocrit 38.3 (L) 41.1 - 50.3 %    .6 (H) 82 - 102 FL    MCH 33.6 (H) 26.1 - 32.9 PG    MCHC 32.1 31.4 - 35.0 g/dL    RDW 16.4 (H) 11.9 - 14.6 %    Platelets 158 150 - 450 K/uL    MPV 8.8 (L) 9.4 - 12.3 FL    nRBC 0.00 0.0 - 0.2 K/uL    Differential Type AUTOMATED      Neutrophils % 78 43 - 78 %    Lymphocytes % 10 (L) 13 - 44 %    Monocytes % 9 4.0 - 12.0 %    Eosinophils % 2 0.5 - 7.8 %    Basophils % 1 0.0 - 2.0 %    Immature Granulocytes % 0 0.0 - 5.0 %    Neutrophils Absolute 4.3 1.7 - 8.2 K/UL    Lymphocytes Absolute 0.5 0.5 - 4.6 K/UL    Monocytes Absolute 0.5 0.1 - 1.3 K/UL    Eosinophils Absolute 0.1 0.0 - 0.8 K/UL    Basophils Absolute 0.0 0.0 - 0.2 K/UL    Immature Granulocytes Absolute 0.0 0.0 - 0.5 K/UL   Comprehensive Metabolic Panel    Collection Time: 10/03/24 12:00 PM   Result Value Ref Range    Sodium 136 136 - 145 mmol/L    Potassium 4.0 3.5 - 5.1 mmol/L    Chloride 103 98 - 107 mmol/L    CO2 22 20 - 28 mmol/L    Anion Gap 11 9 - 18 mmol/L    Glucose 86 70 - 99 mg/dL    BUN 16 8 - 23 MG/DL    Creatinine 0.60 (L) 0.80 - 1.30 MG/DL    Est, Glom Filt Rate >90 >60 ml/min/1.73m2    Calcium 8.6 (L) 8.8 - 10.2 MG/DL    Total Bilirubin 1.4 (H) 0.0 - 1.2 MG/DL    ALT 12 8 - 55 U/L    AST 26 15 - 37 U/L    Alk Phosphatase 150 (H) 40 - 129 U/L    Total Protein 7.2 6.3 - 8.2 g/dL    Albumin 3.1 (L) 3.2 - 4.6 g/dL    Globulin 4.1 (H) 2.3 - 3.5 g/dL    Albumin/Globulin Ratio 0.8 (L) 1.0 - 1.9     Brain Natriuretic Peptide    Collection Time: 10/03/24 12:00 PM   Result  mg  25 mg Oral Daily    metoprolol succinate (TOPROL XL) extended release tablet 25 mg  25 mg Oral Daily       Signed:  Onur Cota MD    Part of this note may have been written by using a voice dictation software.  The note has been proof read but may still contain some grammatical/other typographical errors.

## 2024-10-06 LAB
ANION GAP SERPL CALC-SCNC: 9 MMOL/L (ref 9–18)
BUN SERPL-MCNC: 15 MG/DL (ref 8–23)
CALCIUM SERPL-MCNC: 8.2 MG/DL (ref 8.8–10.2)
CHLORIDE SERPL-SCNC: 100 MMOL/L (ref 98–107)
CO2 SERPL-SCNC: 27 MMOL/L (ref 20–28)
CREAT SERPL-MCNC: 0.56 MG/DL (ref 0.8–1.3)
GLUCOSE SERPL-MCNC: 85 MG/DL (ref 70–99)
POTASSIUM SERPL-SCNC: 3.9 MMOL/L (ref 3.5–5.1)
SODIUM SERPL-SCNC: 136 MMOL/L (ref 136–145)

## 2024-10-06 PROCEDURE — 6370000000 HC RX 637 (ALT 250 FOR IP): Performed by: INTERNAL MEDICINE

## 2024-10-06 PROCEDURE — 80048 BASIC METABOLIC PNL TOTAL CA: CPT

## 2024-10-06 PROCEDURE — 6360000002 HC RX W HCPCS: Performed by: INTERNAL MEDICINE

## 2024-10-06 PROCEDURE — 99232 SBSQ HOSP IP/OBS MODERATE 35: CPT | Performed by: INTERNAL MEDICINE

## 2024-10-06 PROCEDURE — 1100000003 HC PRIVATE W/ TELEMETRY

## 2024-10-06 PROCEDURE — 36415 COLL VENOUS BLD VENIPUNCTURE: CPT

## 2024-10-06 PROCEDURE — 6370000000 HC RX 637 (ALT 250 FOR IP): Performed by: NURSE PRACTITIONER

## 2024-10-06 RX ADMIN — DULOXETINE HYDROCHLORIDE 60 MG: 60 CAPSULE, DELAYED RELEASE ORAL at 08:22

## 2024-10-06 RX ADMIN — DOCUSATE SODIUM 50 MG AND SENNOSIDES 8.6 MG 2 TABLET: 8.6; 5 TABLET, FILM COATED ORAL at 08:22

## 2024-10-06 RX ADMIN — METOPROLOL SUCCINATE 25 MG: 25 TABLET, FILM COATED, EXTENDED RELEASE ORAL at 08:22

## 2024-10-06 RX ADMIN — LOSARTAN POTASSIUM 25 MG: 25 TABLET, FILM COATED ORAL at 08:22

## 2024-10-06 RX ADMIN — DOCUSATE SODIUM 50 MG AND SENNOSIDES 8.6 MG 2 TABLET: 8.6; 5 TABLET, FILM COATED ORAL at 20:50

## 2024-10-06 RX ADMIN — POLYETHYLENE GLYCOL 3350 17 G: 17 POWDER, FOR SOLUTION ORAL at 08:22

## 2024-10-06 RX ADMIN — BACLOFEN 5 MG: 10 TABLET ORAL at 14:25

## 2024-10-06 RX ADMIN — EMPAGLIFLOZIN 10 MG: 10 TABLET, FILM COATED ORAL at 08:22

## 2024-10-06 RX ADMIN — OXCARBAZEPINE 600 MG: 300 TABLET, FILM COATED ORAL at 08:21

## 2024-10-06 RX ADMIN — ATORVASTATIN CALCIUM 20 MG: 20 TABLET, FILM COATED ORAL at 20:50

## 2024-10-06 RX ADMIN — APIXABAN 2.5 MG: 2.5 TABLET, FILM COATED ORAL at 20:50

## 2024-10-06 RX ADMIN — BACLOFEN 5 MG: 10 TABLET ORAL at 08:22

## 2024-10-06 RX ADMIN — GABAPENTIN 400 MG: 400 CAPSULE ORAL at 14:25

## 2024-10-06 RX ADMIN — FUROSEMIDE 40 MG: 10 INJECTION, SOLUTION INTRAMUSCULAR; INTRAVENOUS at 21:05

## 2024-10-06 RX ADMIN — BACLOFEN 5 MG: 10 TABLET ORAL at 20:50

## 2024-10-06 RX ADMIN — APIXABAN 2.5 MG: 2.5 TABLET, FILM COATED ORAL at 08:22

## 2024-10-06 RX ADMIN — OXCARBAZEPINE 600 MG: 300 TABLET, FILM COATED ORAL at 20:50

## 2024-10-06 RX ADMIN — FUROSEMIDE 40 MG: 10 INJECTION, SOLUTION INTRAMUSCULAR; INTRAVENOUS at 08:22

## 2024-10-06 RX ADMIN — GABAPENTIN 400 MG: 400 CAPSULE ORAL at 20:50

## 2024-10-06 RX ADMIN — GABAPENTIN 400 MG: 400 CAPSULE ORAL at 08:22

## 2024-10-06 ASSESSMENT — PAIN SCALES - GENERAL
PAINLEVEL_OUTOF10: 0

## 2024-10-06 NOTE — PROGRESS NOTES
[facility] CARDIOLOGY PROGRESS NOTE           10/6/2024 9:58 AM    Admit Date: 10/3/2024    Admit Diagnosis: Hypoxemia [R09.02]  Acute exacerbation of chronic heart failure (HCC) [I50.9]    Assessment:   ICM, EF 25-30%, NYHA class III  ADHF  CAD s/p CABG  TIA  AF/AFL s/p MAZE and JENNIFER clip  HTN    Plan:      -ADHF - stable, continue GDMT as tolerated. Continue diuretics and ensure stable ~1L net output daily. Will continue IV pushes at least 1 more day given stable renal function and stable UOP. Strict I/Os. Check BMP daily. Ensure K/Mg repleted to 4.0/2.0, respectively.      -AF/AFL - stable, remains in NSR, continue BB and Eliquis.      -ICM - EF 25-30%, if has trouble with recurrent hospitalizations and NYHA class III symptoms, consider OP EP evaluation for CCM (Cardiac Contractility Modulation).     -HTN - stable, continue current therapies.      -Stroke/TIA - indefinite use of OAC.      -Dispo - ongoing inpt care.     Thank you for allowing me to participate in the electrophysiologic care of this most pleasant patient. Please feel free to contact me if there are any questions or concerns.    Federico Madera MD, MS  Clinical Cardiac Electrophysiology  [facility] Cardiology    Subjective:   No complaints this AM, no chest pain or shortness of breath    Interval History: (History of pertinent interval events obtained from nursing staff)    ROS:  GEN:  No fever or chills  Cardiovascular:  As noted above  Pulmonary:  As noted above  Neuro:  No new focal motor or sensory loss      Objective:     Vitals:    10/05/24 2039 10/05/24 2351 10/06/24 0426 10/06/24 0728   BP: 135/77 119/63 132/75 133/81   Pulse: 65 63 64 65   Resp: 17 17 18 16   Temp: 97.7 °F (36.5 °C) 98.1 °F (36.7 °C) 97.3 °F (36.3 °C) 97.9 °F (36.6 °C)   TempSrc: Oral Oral Oral    SpO2: 99% 97% 95% 100%   Weight:           Physical Exam:  General-Well Developed, Well Nourished, No Acute Distress, Alert & Oriented x 3, appropriate

## 2024-10-06 NOTE — PROGRESS NOTES
Hospitalist Progress Note   Admit Date:  10/3/2024  5:20 PM   Name:  Shaquille Llanos   Age:  79 y.o.  Sex:  male  :  1945   MRN:  [id number]   Room:  [facility]/    Presenting/Chief Complaint: No chief complaint on file.     Reason(s) for Admission: Hypoxemia [R09.02]  Acute exacerbation of chronic heart failure (HCC) [I50.9]     Hospital Course:   Shaquille Llanos is a 79 y.o. male with medical history of hypertension, systolic heart failure, CAD.  Patient coming in secondary to worsening shortness of breath over the past 1 to 2 weeks and worsening bilateral lower extremity edema.  Patient has been placed on IV Lasix at this time and cardiology to see patient      Subjective & 24hr Events:   Patient resting comfortably on examination.  Patient states that his shortness of breath is improving at this time but still present.  Patient denies any other complaints on exam.      Assessment & Plan:     Acute on chronic heart failure exacerbation  Patient states that breathing is improving at this time  Wean oxygen as tolerated  Continue IV Lasix 40 mg twice daily  Metoprolol 25 mg daily  Jardiance 10 mg daily  Strict I's/O and daily weights  Cardiology following  Continue to monitor    Atrial fibrillation  Status post CABG x 3  Patient without any chest pain on exam  Heart rates adequately controlled  Continue Eliquis 2.5 mg twice daily  Continue to monitor    Hyperlipidemia  Continue statin    Trigeminal neuralgia  Continue oxcarbazepine      Anticipated Discharge Arrangements:   Home    PT/OT evals ordered?  Not ordered; patient not expected to need rehab  Diet:  ADULT DIET; Regular; Low Fat/Low Chol/High Fiber/POLLO; Low Sodium (2 gm); 2000 ml  VTE prophylaxis: Already on anticoagulation  Code status: DNR      Non-peripheral Lines and Tubes (if present):      External Urinary Catheter (Active)        Telemetry (if present):  Cardiac/Telemetry Monitor On: Portable telemetry pack [facility]

## 2024-10-07 ENCOUNTER — APPOINTMENT (OUTPATIENT)
Dept: GENERAL RADIOLOGY | Age: 79
End: 2024-10-07
Payer: MEDICARE

## 2024-10-07 VITALS
OXYGEN SATURATION: 96 % | BODY MASS INDEX: 25.27 KG/M2 | RESPIRATION RATE: 16 BRPM | TEMPERATURE: 99.1 F | HEART RATE: 76 BPM | SYSTOLIC BLOOD PRESSURE: 123 MMHG | DIASTOLIC BLOOD PRESSURE: 61 MMHG | WEIGHT: 166.23 LBS

## 2024-10-07 LAB
ANION GAP SERPL CALC-SCNC: 10 MMOL/L (ref 9–18)
BUN SERPL-MCNC: 18 MG/DL (ref 8–23)
CALCIUM SERPL-MCNC: 8.6 MG/DL (ref 8.8–10.2)
CHLORIDE SERPL-SCNC: 99 MMOL/L (ref 98–107)
CO2 SERPL-SCNC: 28 MMOL/L (ref 20–28)
CREAT SERPL-MCNC: 0.54 MG/DL (ref 0.8–1.3)
GLUCOSE SERPL-MCNC: 98 MG/DL (ref 70–99)
POTASSIUM SERPL-SCNC: 3.5 MMOL/L (ref 3.5–5.1)
SODIUM SERPL-SCNC: 137 MMOL/L (ref 136–145)

## 2024-10-07 PROCEDURE — 6360000002 HC RX W HCPCS: Performed by: INTERNAL MEDICINE

## 2024-10-07 PROCEDURE — 6370000000 HC RX 637 (ALT 250 FOR IP): Performed by: INTERNAL MEDICINE

## 2024-10-07 PROCEDURE — 71045 X-RAY EXAM CHEST 1 VIEW: CPT

## 2024-10-07 PROCEDURE — 6370000000 HC RX 637 (ALT 250 FOR IP): Performed by: NURSE PRACTITIONER

## 2024-10-07 PROCEDURE — 36415 COLL VENOUS BLD VENIPUNCTURE: CPT

## 2024-10-07 PROCEDURE — 80048 BASIC METABOLIC PNL TOTAL CA: CPT

## 2024-10-07 PROCEDURE — 99232 SBSQ HOSP IP/OBS MODERATE 35: CPT | Performed by: INTERNAL MEDICINE

## 2024-10-07 RX ORDER — FUROSEMIDE 40 MG/1
40 TABLET ORAL 2 TIMES DAILY
Status: DISCONTINUED | OUTPATIENT
Start: 2024-10-07 | End: 2024-10-07 | Stop reason: HOSPADM

## 2024-10-07 RX ADMIN — BACLOFEN 5 MG: 10 TABLET ORAL at 14:39

## 2024-10-07 RX ADMIN — EMPAGLIFLOZIN 10 MG: 10 TABLET, FILM COATED ORAL at 08:57

## 2024-10-07 RX ADMIN — METOPROLOL SUCCINATE 25 MG: 25 TABLET, FILM COATED, EXTENDED RELEASE ORAL at 08:59

## 2024-10-07 RX ADMIN — LOSARTAN POTASSIUM 25 MG: 25 TABLET, FILM COATED ORAL at 08:58

## 2024-10-07 RX ADMIN — GABAPENTIN 400 MG: 400 CAPSULE ORAL at 08:57

## 2024-10-07 RX ADMIN — DULOXETINE HYDROCHLORIDE 60 MG: 60 CAPSULE, DELAYED RELEASE ORAL at 08:56

## 2024-10-07 RX ADMIN — FUROSEMIDE 40 MG: 10 INJECTION, SOLUTION INTRAMUSCULAR; INTRAVENOUS at 09:16

## 2024-10-07 RX ADMIN — GABAPENTIN 400 MG: 400 CAPSULE ORAL at 14:39

## 2024-10-07 RX ADMIN — APIXABAN 2.5 MG: 2.5 TABLET, FILM COATED ORAL at 08:56

## 2024-10-07 RX ADMIN — OXCARBAZEPINE 600 MG: 300 TABLET, FILM COATED ORAL at 08:56

## 2024-10-07 RX ADMIN — POLYETHYLENE GLYCOL 3350 17 G: 17 POWDER, FOR SOLUTION ORAL at 08:56

## 2024-10-07 RX ADMIN — BACLOFEN 5 MG: 10 TABLET ORAL at 08:57

## 2024-10-07 RX ADMIN — DOCUSATE SODIUM 50 MG AND SENNOSIDES 8.6 MG 2 TABLET: 8.6; 5 TABLET, FILM COATED ORAL at 08:58

## 2024-10-07 ASSESSMENT — PAIN SCALES - GENERAL
PAINLEVEL_OUTOF10: 0
PAINLEVEL_OUTOF10: 0

## 2024-10-07 NOTE — CARE COORDINATION
Discharge order is in. Pt is discharging and returning back to the [facility]. CM spoke to Damaris and asked primary RN to contact her for report at: [phone]. Pt stated he would contact his dtr for transportation but CM will arrange an ambo if needed. Pt medically stable for d/c. No other discharge needs identified by CM. Tx goals met.    1216-Pts dtr requesting medical transport for pt. [facility] scheduled for 1500.       10/07/24 1057   Services At/After Discharge   Transition of Care Consult (CM Consult) Discharge Planning   Services At/After Discharge Assisted living  ([facility])   [place] Resource Information Provided? No   Mode of Transport at Discharge Other (see comment)  (Family)   Confirm Follow Up Transport Family   Condition of Participation: Discharge Planning   The Patient and/or Patient Representative was provided with a Choice of Provider? Patient   The Patient and/Or Patient Representative agree with the Discharge Plan? Yes   Freedom of Choice list was provided with basic dialogue that supports the patient's individualized plan of care/goals, treatment preferences, and shares the quality data associated with the providers?  Yes

## 2024-10-07 NOTE — PLAN OF CARE
Problem: Chronic Conditions and Co-morbidities  Goal: Patient's chronic conditions and co-morbidity symptoms are monitored and maintained or improved  Outcome: Completed     Problem: Discharge Planning  Goal: Discharge to home or other facility with appropriate resources  Outcome: Completed     Problem: Safety - Adult  Goal: Free from fall injury  Outcome: Completed     Problem: Pain  Goal: Verbalizes/displays adequate comfort level or baseline comfort level  Outcome: Completed     Problem: Skin/Tissue Integrity  Goal: Absence of new skin breakdown  Description: 1.  Monitor for areas of redness and/or skin breakdown  2.  Assess vascular access sites hourly  3.  Every 4-6 hours minimum:  Change oxygen saturation probe site  4.  Every 4-6 hours:  If on nasal continuous positive airway pressure, respiratory therapy assess nares and determine need for appliance change or resting period.  Outcome: Completed

## 2024-10-07 NOTE — PROGRESS NOTES
TRANSFER - OUT REPORT:    Verbal report given to Damaris IVERSON on Shaquille Llanos  being transferred to [facility] for routine progression of patient care       Report consisted of patient's Situation, Background, Assessment and   Recommendations(SBAR).     Information from the following report(s) Index, ED Encounter Summary, ED SBAR, Adult Overview, Cardiac Rhythm NSR, and Neuro Assessment was reviewed with the receiving nurse.           Lines:       Opportunity for questions and clarification was provided.      Patient transported with: ambulance

## 2024-10-07 NOTE — DISCHARGE SUMMARY
Hospitalist Discharge Summary   Admit Date:  10/3/2024  5:20 PM   DC Note date: 10/7/2024  Name:  Shaquille Llanos   Age:  79 y.o.  Sex:  male  :  1945   MRN:  [id number]   Room:  [facility]  PCP:  Celio Snow Jr., MD    Presenting Complaint: No chief complaint on file.     Initial Admission Diagnosis: Hypoxemia [R09.02]  Acute exacerbation of chronic heart failure (HCC) [I50.9]     Problem List for this Hospitalization (present on admission):    Principal Problem:    Acute exacerbation of chronic heart failure (HCC)  Active Problems:    Pulmonary hypertension (HCC)    Anemia    DNR (do not resuscitate)    S/P CABG x 3    Dyslipidemia    Hypertension    Trigeminal neuralgia    Atrial fibrillation (HCC)  Resolved Problems:    * No resolved hospital problems. *    Acute on chronic heart failure exacerbation  Patient states that breathing is improving at this time  Wean oxygen as tolerated  Continue oral Lasix on discharge  Metoprolol 25 mg daily  Jardiance 10 mg daily  Outpatient follow-up with cardiology     Atrial fibrillation  Status post CABG x 3  Patient without any chest pain on exam  Heart rates adequately controlled  Continue Eliquis 2.5 mg twice daily     Hyperlipidemia  Continue statin     Trigeminal neuralgia  Continue oxcarbazepine      Hospital Course:  79-year-old male past medical history significant for hypertension, systolic heart failure, and CAD.  Patient came in secondary to worsening shortness of breath and lower extremity edema.  Patient was treated with IV diuresis with improvement in respiratory status and lower extremity swelling.  Patient back to room air at this time and was seen by cardiology team.  Patient to be discharged and follow-up with primary care provider and cardiology on discharge.  Patient stable for discharge to prior facility at this time.    Disposition:  [facility] to fire facility  Diet: ADULT DIET; Regular; Low Fat/Low Chol/High Fiber/POLLO; Low Sodium (2 gm);    Result Value Ref Range    Sodium 137 136 - 145 mmol/L    Potassium 3.5 3.5 - 5.1 mmol/L    Chloride 99 98 - 107 mmol/L    CO2 28 20 - 28 mmol/L    Anion Gap 10 9 - 18 mmol/L    Glucose 98 70 - 99 mg/dL    BUN 18 8 - 23 MG/DL    Creatinine 0.54 (L) 0.80 - 1.30 MG/DL    Est, Glom Filt Rate >90 >60 ml/min/1.73m2    Calcium 8.6 (L) 8.8 - 10.2 MG/DL       No results for input(s): \"COVID19\" in the last 72 hours.    Recent Vital Data:  Patient Vitals for the past 24 hrs:   Temp Pulse Resp BP SpO2   10/07/24 0738 98.1 °F (36.7 °C) 78 16 120/78 91 %   10/07/24 0339 97.7 °F (36.5 °C) 74 16 130/69 95 %   10/06/24 2340 98.1 °F (36.7 °C) 74 16 133/63 93 %   10/06/24 2106 97.3 °F (36.3 °C) 74 18 138/72 95 %   10/06/24 1603 98.1 °F (36.7 °C) 72 18 121/71 94 %   10/06/24 1135 97.9 °F (36.6 °C) 68 18 (!) 102/56 95 %       Oxygen Therapy  SpO2: 91 %  O2 Device: None (Room air)  O2 Flow Rate (L/min): 2 L/min    Estimated body mass index is 25.27 kg/m² as calculated from the following:    Height as of 8/19/24: 1.727 m (5' 8\").    Weight as of this encounter: 75.4 kg (166 lb 3.6 oz).    Intake/Output Summary (Last 24 hours) at 10/7/2024 0914  Last data filed at 10/7/2024 0339  Gross per 24 hour   Intake 350 ml   Output 3800 ml   Net -3450 ml         Physical Exam:  Physical Exam  Vitals and nursing note reviewed.   Constitutional:       General: He is not in acute distress.     Comments: Chronically ill-appearing   Eyes:      Conjunctiva/sclera: Conjunctivae normal.   Cardiovascular:      Rate and Rhythm: Normal rate.      Heart sounds: No murmur heard.     No friction rub. No gallop.   Pulmonary:      Effort: Pulmonary effort is normal.      Breath sounds: Normal breath sounds. No stridor. No wheezing or rales.   Abdominal:      General: There is no distension.      Palpations: Abdomen is soft.      Tenderness: There is no abdominal tenderness.   Musculoskeletal:      Comments: Trace lower extremity edema bilateral   Skin:      General: Skin is warm.   Neurological:      Mental Status: He is alert. Mental status is at baseline.             Signed:  Onur Cota MD    Part of this note may have been written by using a voice dictation software.  The note has been proof read but may still contain some grammatical/other typographical errors.

## 2024-10-07 NOTE — PROGRESS NOTES
10/7/2024 12:13 PM    Admit Date: 10/3/2024    Admit Diagnosis: Hypoxemia [R09.02]  Acute exacerbation of chronic heart failure (HCC) [I50.9]      Subjective:   No cp or sob      Objective:    /61   Pulse 76   Temp 99.1 °F (37.3 °C) (Axillary)   Resp 16   Wt 75.4 kg (166 lb 3.6 oz)   SpO2 96%   BMI 25.27 kg/m²     Physical Exam:  General-Well Developed, Well Nourished, No Acute Distress, Alert & Oriented x 3, appropriate mood.  Neck- supple, no JVD  CV- regular rate and rhythm no MRG  Lung- clear bilaterally  Abd- soft, nontender, nondistended  Ext- no edema bilaterally.  Skin- warm and dry        Data Review:   Recent Labs     10/05/24  0400 10/06/24  0432 10/07/24  0337      < > 137   K 3.4*   < > 3.5   BUN 18   < > 18   WBC 6.0  --   --    HGB 11.6*  --   --    HCT 35.6*  --   --    *  --   --     < > = values in this interval not displayed.       Assessment/Plan:     Active Hospital Problems    Pulmonary hypertension (HCC)      Anemia      DNR (do not resuscitate)      Acute exacerbation of chronic heart failure (HCC)-Improved with current therapy. Will continue medications change to po lasix      S/P CABG x 3      Trigeminal neuralgia      Hypertension      Dyslipidemia      Atrial fibrillation (HCC)

## 2024-10-07 NOTE — PROGRESS NOTES
Discharge instructions gone over with patient. Ambulance arrived and transported patient back to [facility].

## 2024-10-08 ENCOUNTER — TELEPHONE (OUTPATIENT)
Dept: PHARMACY | Age: 79
End: 2024-10-08

## 2024-10-08 ENCOUNTER — TELEPHONE (OUTPATIENT)
Dept: ICU | Age: 79
End: 2024-10-08

## 2024-10-08 PROCEDURE — 93298 REM INTERROG DEV EVAL SCRMS: CPT | Performed by: INTERNAL MEDICINE

## 2024-10-08 NOTE — TELEPHONE ENCOUNTER
[facility] Pharmacist consult.  Mr. Llanos was discharged from [facility] with CHF.  I called to review his discharge medications with him and got his voice mail.  I left my name and cell phone number.  I asked him to call me with any medication questions or issues.

## 2024-10-11 NOTE — ED NOTES
Attempt made to contact pt post discharge to inquire about follow up care with Case Management. Unable to reach pt and unable to leave VM.      Kristy Guadarrama, RN  10/11/24 [id number]

## 2024-10-14 ENCOUNTER — TELEPHONE (OUTPATIENT)
Age: 79
End: 2024-10-14

## 2024-10-14 NOTE — TELEPHONE ENCOUNTER
I spoke w/pt.he is doing okay.He is breathing okay and has no edema.Has no scales to weigh on at  home.He is in assisted living at the [facility].He says home health is suppose to come set up scales but has not heard from them yet.I educated on CHF monitoring w/daily weight,diet,and fu appt.along w/importance of med compliance.All questions answered and pt.advised to call PRN.

## 2024-10-14 NOTE — TELEPHONE ENCOUNTER
Care Transitions Initial Follow Up Call    Call within 2 business days of discharge: Yes     Patient: Shaquille Llanos Patient : 1945 MRN: [id number]    [unfilled]    RARS: Readmission Risk Score: 17.9       Spoke with: .    Discharge department/facility: .    Non-face-to-face services provided:      Follow Up  Future Appointments   Date Time Provider Department Center   10/16/2024  3:15 PM Tim Muñoz III, MD [facility] AMB   2024  2:30 PM [facility] ECHO 63 [facility] AMB   2024  2:30 PM [facility] ECHO 63 [facility] AMB   2025 11:15 AM Hans Rushing MD [facility] AMB       Heather Kaur LPN

## 2024-10-16 ENCOUNTER — OFFICE VISIT (OUTPATIENT)
Age: 79
End: 2024-10-16
Payer: MEDICARE

## 2024-10-16 VITALS
DIASTOLIC BLOOD PRESSURE: 60 MMHG | HEIGHT: 68 IN | HEART RATE: 86 BPM | OXYGEN SATURATION: 93 % | RESPIRATION RATE: 18 BRPM | WEIGHT: 166 LBS | BODY MASS INDEX: 25.16 KG/M2 | SYSTOLIC BLOOD PRESSURE: 110 MMHG

## 2024-10-16 DIAGNOSIS — I48.0 PAROXYSMAL ATRIAL FIBRILLATION (HCC): Primary | ICD-10-CM

## 2024-10-16 DIAGNOSIS — I50.20 HFREF (HEART FAILURE WITH REDUCED EJECTION FRACTION) (HCC): ICD-10-CM

## 2024-10-16 DIAGNOSIS — R06.09 DOE (DYSPNEA ON EXERTION): ICD-10-CM

## 2024-10-16 DIAGNOSIS — E78.5 DYSLIPIDEMIA: ICD-10-CM

## 2024-10-16 DIAGNOSIS — I10 PRIMARY HYPERTENSION: ICD-10-CM

## 2024-10-16 PROCEDURE — 1123F ACP DISCUSS/DSCN MKR DOCD: CPT | Performed by: INTERNAL MEDICINE

## 2024-10-16 PROCEDURE — G8427 DOCREV CUR MEDS BY ELIG CLIN: HCPCS | Performed by: INTERNAL MEDICINE

## 2024-10-16 PROCEDURE — 3074F SYST BP LT 130 MM HG: CPT | Performed by: INTERNAL MEDICINE

## 2024-10-16 PROCEDURE — 3078F DIAST BP <80 MM HG: CPT | Performed by: INTERNAL MEDICINE

## 2024-10-16 PROCEDURE — 99214 OFFICE O/P EST MOD 30 MIN: CPT | Performed by: INTERNAL MEDICINE

## 2024-10-16 PROCEDURE — 1111F DSCHRG MED/CURRENT MED MERGE: CPT | Performed by: INTERNAL MEDICINE

## 2024-10-16 PROCEDURE — G8484 FLU IMMUNIZE NO ADMIN: HCPCS | Performed by: INTERNAL MEDICINE

## 2024-10-16 PROCEDURE — G8419 CALC BMI OUT NRM PARAM NOF/U: HCPCS | Performed by: INTERNAL MEDICINE

## 2024-10-16 PROCEDURE — 1036F TOBACCO NON-USER: CPT | Performed by: INTERNAL MEDICINE

## 2024-10-16 PROCEDURE — 1126F AMNT PAIN NOTED NONE PRSNT: CPT | Performed by: INTERNAL MEDICINE

## 2024-10-17 NOTE — PROGRESS NOTES
Physician Progress Note      PATIENT:               LISA DELEON  [facility] #:                  [id number]  :                       1945  ADMIT DATE:       10/3/2024 5:20 PM  DISCH DATE:        10/7/2024 3:28 PM  RESPONDING  PROVIDER #:        Onur Cota MD          QUERY TEXT:    Patient admitted with CHF, noted to have atrial fibrillation and is maintained   on Eliquis. If possible, please document in progress notes and discharge   summary if you are evaluating and/or treating any of the following:?  ?  The medical record reflects the following:  Risk Factors: 79 yr old, HTN, CHF, CAD, CVA  Clinical Indicators: Pt with Afib on Eliquis  Treatment: Medication management, lab monitoring  Options provided:  -- Secondary hypercoagulable state in a patient with atrial fibrillation  -- Other - I will add my own diagnosis  -- Disagree - Not applicable / Not valid  -- Disagree - Clinically unable to determine / Unknown  -- Refer to Clinical Documentation Reviewer    PROVIDER RESPONSE TEXT:    This patient has secondary hypercoagulable state in a patient with atrial   fibrillation.    Query created by: Shanta Duran on 10/17/2024 9:27 AM      QUERY TEXT:    Pt admitted with CHF. Pt noted to also have hypertension and ICM was admitted   for acute systolic CHF If possible, please document in progress notes and   discharge summary the etiology of CHF, if able to be determined.    The medical record reflects the following:  Risk Factors: 79 yr old, CAD, CHF, HTN, CVA  Clinical Indicators: ECHO 24 EF of 25 - 30% Normal wall thickness. Global   hypokinesis  present.  Treatment: IV Lasix, lab monitoring, medication management, cardio consult  Options provided:  -- CHF due to Hypertensive Heart Disease  -- CHF due to ICMP  -- CHF due to Hypertensive Heart Disease and ICMP  -- Other - I will add my own diagnosis  -- Disagree - Not applicable / Not valid  -- Disagree - Clinically unable to determine /

## 2024-10-18 ENCOUNTER — APPOINTMENT (OUTPATIENT)
Dept: GENERAL RADIOLOGY | Age: 79
DRG: 291 | End: 2024-10-18
Payer: MEDICARE

## 2024-10-18 ENCOUNTER — HOSPITAL ENCOUNTER (INPATIENT)
Age: 79
LOS: 1 days | Discharge: HOSPICE | DRG: 291 | End: 2024-10-22
Attending: EMERGENCY MEDICINE | Admitting: STUDENT IN AN ORGANIZED HEALTH CARE EDUCATION/TRAINING PROGRAM
Payer: MEDICARE

## 2024-10-18 DIAGNOSIS — J96.01 ACUTE RESPIRATORY FAILURE WITH HYPOXIA (HCC): ICD-10-CM

## 2024-10-18 DIAGNOSIS — I50.9 ACUTE ON CHRONIC CONGESTIVE HEART FAILURE, UNSPECIFIED HEART FAILURE TYPE (HCC): Primary | ICD-10-CM

## 2024-10-18 PROBLEM — I50.23 ACUTE ON CHRONIC SYSTOLIC CONGESTIVE HEART FAILURE (HCC): Status: ACTIVE | Noted: 2019-01-31

## 2024-10-18 PROBLEM — I50.21: Status: ACTIVE | Noted: 2024-10-18

## 2024-10-18 PROBLEM — I48.0 PAROXYSMAL ATRIAL FIBRILLATION (HCC): Status: ACTIVE | Noted: 2017-04-07

## 2024-10-18 PROBLEM — N40.0 BENIGN PROSTATE HYPERPLASIA: Status: ACTIVE | Noted: 2024-10-18

## 2024-10-18 LAB
ALBUMIN SERPL-MCNC: 3.2 G/DL (ref 3.2–4.6)
ALBUMIN/GLOB SERPL: 0.7 (ref 1–1.9)
ALP SERPL-CCNC: 159 U/L (ref 40–129)
ALT SERPL-CCNC: 26 U/L (ref 8–55)
ANION GAP SERPL CALC-SCNC: 14 MMOL/L (ref 9–18)
AST SERPL-CCNC: 46 U/L (ref 15–37)
BASOPHILS # BLD: 0 K/UL (ref 0–0.2)
BASOPHILS NFR BLD: 0 % (ref 0–2)
BILIRUB SERPL-MCNC: 1.7 MG/DL (ref 0–1.2)
BUN SERPL-MCNC: 29 MG/DL (ref 8–23)
CALCIUM SERPL-MCNC: 9 MG/DL (ref 8.8–10.2)
CHLORIDE SERPL-SCNC: 102 MMOL/L (ref 98–107)
CO2 SERPL-SCNC: 18 MMOL/L (ref 20–28)
CREAT SERPL-MCNC: 0.66 MG/DL (ref 0.8–1.3)
DIFFERENTIAL METHOD BLD: ABNORMAL
EKG ATRIAL RATE: 94 BPM
EKG DIAGNOSIS: NORMAL
EKG P AXIS: 75 DEGREES
EKG P-R INTERVAL: 194 MS
EKG Q-T INTERVAL: 382 MS
EKG QRS DURATION: 134 MS
EKG QTC CALCULATION (BAZETT): 478 MS
EKG R AXIS: -63 DEGREES
EKG T AXIS: 99 DEGREES
EKG VENTRICULAR RATE: 94 BPM
EOSINOPHIL # BLD: 0 K/UL (ref 0–0.8)
EOSINOPHIL NFR BLD: 0 % (ref 0.5–7.8)
ERYTHROCYTE [DISTWIDTH] IN BLOOD BY AUTOMATED COUNT: 16.2 % (ref 11.9–14.6)
GLOBULIN SER CALC-MCNC: 4.7 G/DL (ref 2.3–3.5)
GLUCOSE SERPL-MCNC: 81 MG/DL (ref 70–99)
HCT VFR BLD AUTO: 37.9 % (ref 41.1–50.3)
HGB BLD-MCNC: 12.7 G/DL (ref 13.6–17.2)
IMM GRANULOCYTES # BLD AUTO: 0.1 K/UL (ref 0–0.5)
IMM GRANULOCYTES NFR BLD AUTO: 0 % (ref 0–5)
LYMPHOCYTES # BLD: 0.7 K/UL (ref 0.5–4.6)
LYMPHOCYTES NFR BLD: 6 % (ref 13–44)
MCH RBC QN AUTO: 34.3 PG (ref 26.1–32.9)
MCHC RBC AUTO-ENTMCNC: 33.5 G/DL (ref 31.4–35)
MCV RBC AUTO: 102.4 FL (ref 82–102)
MONOCYTES # BLD: 0.8 K/UL (ref 0.1–1.3)
MONOCYTES NFR BLD: 7 % (ref 4–12)
NEUTS SEG # BLD: 10 K/UL (ref 1.7–8.2)
NEUTS SEG NFR BLD: 87 % (ref 43–78)
NRBC # BLD: 0 K/UL (ref 0–0.2)
NT PRO BNP: 8503 PG/ML (ref 0–450)
PLATELET # BLD AUTO: 202 K/UL (ref 150–450)
PMV BLD AUTO: 9 FL (ref 9.4–12.3)
POTASSIUM SERPL-SCNC: 3.9 MMOL/L (ref 3.5–5.1)
PROT SERPL-MCNC: 7.9 G/DL (ref 6.3–8.2)
RBC # BLD AUTO: 3.7 M/UL (ref 4.23–5.6)
SODIUM SERPL-SCNC: 134 MMOL/L (ref 136–145)
TROPONIN T SERPL HS-MCNC: 29 NG/L (ref 0–22)
TROPONIN T SERPL HS-MCNC: 30 NG/L (ref 0–22)
WBC # BLD AUTO: 11.6 K/UL (ref 4.3–11.1)

## 2024-10-18 PROCEDURE — 83880 ASSAY OF NATRIURETIC PEPTIDE: CPT

## 2024-10-18 PROCEDURE — 85025 COMPLETE CBC W/AUTO DIFF WBC: CPT

## 2024-10-18 PROCEDURE — 36415 COLL VENOUS BLD VENIPUNCTURE: CPT

## 2024-10-18 PROCEDURE — 96375 TX/PRO/DX INJ NEW DRUG ADDON: CPT

## 2024-10-18 PROCEDURE — 99223 1ST HOSP IP/OBS HIGH 75: CPT | Performed by: INTERNAL MEDICINE

## 2024-10-18 PROCEDURE — 5A0945A ASSISTANCE WITH RESPIRATORY VENTILATION, 24-96 CONSECUTIVE HOURS, HIGH NASAL FLOW/VELOCITY: ICD-10-PCS | Performed by: INTERNAL MEDICINE

## 2024-10-18 PROCEDURE — 6360000002 HC RX W HCPCS: Performed by: EMERGENCY MEDICINE

## 2024-10-18 PROCEDURE — 99285 EMERGENCY DEPT VISIT HI MDM: CPT

## 2024-10-18 PROCEDURE — 96374 THER/PROPH/DIAG INJ IV PUSH: CPT

## 2024-10-18 PROCEDURE — G0378 HOSPITAL OBSERVATION PER HR: HCPCS

## 2024-10-18 PROCEDURE — 6370000000 HC RX 637 (ALT 250 FOR IP): Performed by: INTERNAL MEDICINE

## 2024-10-18 PROCEDURE — 93010 ELECTROCARDIOGRAM REPORT: CPT | Performed by: INTERNAL MEDICINE

## 2024-10-18 PROCEDURE — 6370000000 HC RX 637 (ALT 250 FOR IP): Performed by: PHYSICIAN ASSISTANT

## 2024-10-18 PROCEDURE — 71045 X-RAY EXAM CHEST 1 VIEW: CPT

## 2024-10-18 PROCEDURE — 2700000000 HC OXYGEN THERAPY PER DAY

## 2024-10-18 PROCEDURE — 80053 COMPREHEN METABOLIC PANEL: CPT

## 2024-10-18 PROCEDURE — 93005 ELECTROCARDIOGRAM TRACING: CPT | Performed by: EMERGENCY MEDICINE

## 2024-10-18 PROCEDURE — 84484 ASSAY OF TROPONIN QUANT: CPT

## 2024-10-18 RX ORDER — DULOXETIN HYDROCHLORIDE 60 MG/1
60 CAPSULE, DELAYED RELEASE ORAL DAILY
Status: DISCONTINUED | OUTPATIENT
Start: 2024-10-19 | End: 2024-10-22 | Stop reason: HOSPADM

## 2024-10-18 RX ORDER — GABAPENTIN 400 MG/1
400 CAPSULE ORAL 3 TIMES DAILY
Status: DISCONTINUED | OUTPATIENT
Start: 2024-10-18 | End: 2024-10-22 | Stop reason: HOSPADM

## 2024-10-18 RX ORDER — TAMSULOSIN HYDROCHLORIDE 0.4 MG/1
0.4 CAPSULE ORAL DAILY
Status: DISCONTINUED | OUTPATIENT
Start: 2024-10-18 | End: 2024-10-22 | Stop reason: HOSPADM

## 2024-10-18 RX ORDER — METOPROLOL SUCCINATE 25 MG/1
25 TABLET, EXTENDED RELEASE ORAL DAILY
Status: DISCONTINUED | OUTPATIENT
Start: 2024-10-19 | End: 2024-10-22 | Stop reason: HOSPADM

## 2024-10-18 RX ORDER — BACLOFEN 10 MG/1
5 TABLET ORAL 3 TIMES DAILY
Status: DISCONTINUED | OUTPATIENT
Start: 2024-10-18 | End: 2024-10-22 | Stop reason: HOSPADM

## 2024-10-18 RX ORDER — ATORVASTATIN CALCIUM 20 MG/1
20 TABLET, FILM COATED ORAL NIGHTLY
Status: DISCONTINUED | OUTPATIENT
Start: 2024-10-18 | End: 2024-10-22 | Stop reason: HOSPADM

## 2024-10-18 RX ORDER — OXCARBAZEPINE 300 MG/1
600 TABLET, FILM COATED ORAL 2 TIMES DAILY
Status: DISCONTINUED | OUTPATIENT
Start: 2024-10-18 | End: 2024-10-22 | Stop reason: HOSPADM

## 2024-10-18 RX ORDER — OXYCODONE AND ACETAMINOPHEN 5; 325 MG/1; MG/1
1 TABLET ORAL EVERY 6 HOURS PRN
Status: DISCONTINUED | OUTPATIENT
Start: 2024-10-18 | End: 2024-10-22 | Stop reason: HOSPADM

## 2024-10-18 RX ORDER — ONDANSETRON 2 MG/ML
4 INJECTION INTRAMUSCULAR; INTRAVENOUS EVERY 6 HOURS PRN
Status: DISCONTINUED | OUTPATIENT
Start: 2024-10-18 | End: 2024-10-22 | Stop reason: HOSPADM

## 2024-10-18 RX ORDER — ACETAMINOPHEN 325 MG/1
650 TABLET ORAL EVERY 6 HOURS PRN
Status: DISCONTINUED | OUTPATIENT
Start: 2024-10-18 | End: 2024-10-22 | Stop reason: HOSPADM

## 2024-10-18 RX ORDER — POLYETHYLENE GLYCOL 3350 17 G/17G
17 POWDER, FOR SOLUTION ORAL DAILY PRN
Status: DISCONTINUED | OUTPATIENT
Start: 2024-10-18 | End: 2024-10-22 | Stop reason: HOSPADM

## 2024-10-18 RX ORDER — ACETAMINOPHEN 650 MG/1
650 SUPPOSITORY RECTAL EVERY 6 HOURS PRN
Status: DISCONTINUED | OUTPATIENT
Start: 2024-10-18 | End: 2024-10-22 | Stop reason: HOSPADM

## 2024-10-18 RX ORDER — FUROSEMIDE 10 MG/ML
40 INJECTION INTRAMUSCULAR; INTRAVENOUS ONCE
Status: COMPLETED | OUTPATIENT
Start: 2024-10-18 | End: 2024-10-18

## 2024-10-18 RX ORDER — FUROSEMIDE 10 MG/ML
40 INJECTION INTRAMUSCULAR; INTRAVENOUS 2 TIMES DAILY
Status: DISCONTINUED | OUTPATIENT
Start: 2024-10-19 | End: 2024-10-22 | Stop reason: HOSPADM

## 2024-10-18 RX ORDER — ONDANSETRON 4 MG/1
4 TABLET, ORALLY DISINTEGRATING ORAL EVERY 8 HOURS PRN
Status: DISCONTINUED | OUTPATIENT
Start: 2024-10-18 | End: 2024-10-22 | Stop reason: HOSPADM

## 2024-10-18 RX ADMIN — APIXABAN 2.5 MG: 5 TABLET, FILM COATED ORAL at 20:39

## 2024-10-18 RX ADMIN — OXCARBAZEPINE 600 MG: 300 TABLET, FILM COATED ORAL at 20:39

## 2024-10-18 RX ADMIN — GABAPENTIN 400 MG: 100 CAPSULE ORAL at 17:47

## 2024-10-18 RX ADMIN — BACLOFEN 5 MG: 10 TABLET ORAL at 17:48

## 2024-10-18 RX ADMIN — ATORVASTATIN CALCIUM 20 MG: 20 TABLET, FILM COATED ORAL at 20:39

## 2024-10-18 RX ADMIN — BACLOFEN 5 MG: 10 TABLET ORAL at 20:39

## 2024-10-18 RX ADMIN — FUROSEMIDE 40 MG: 10 INJECTION, SOLUTION INTRAMUSCULAR; INTRAVENOUS at 15:04

## 2024-10-18 RX ADMIN — TAMSULOSIN HYDROCHLORIDE 0.4 MG: 0.4 CAPSULE ORAL at 17:47

## 2024-10-18 RX ADMIN — GABAPENTIN 400 MG: 100 CAPSULE ORAL at 20:39

## 2024-10-18 ASSESSMENT — PAIN SCALES - GENERAL: PAINLEVEL_OUTOF10: 0

## 2024-10-18 NOTE — FLOWSHEET NOTE
10/18/24 0099   Dual Clinician Skin Assessment   Dual Skin Assessment (4 Eyes) X   Second Clinical  (First and Last Name) ZAYRA Reed   Pressure Injury Documentation Pressure Injury Noted-See Wound LDA to Document  (Pink area with thin skin at coxxyx)

## 2024-10-18 NOTE — ACP (ADVANCE CARE PLANNING)
Advance Care Planning Note   Admit Date:  10/18/2024  1:22 PM   Name:  Shaquille Llanos   Age:  79 y.o.  Sex:  male  :  1945   MRN:  [id number]   Room:  [place] [address]    Shaquille Llanos has capacity to make his own decisions:   Yes    If pt unable to make decisions, POA/surrogate decision maker:  Son and Daughter    Other people present:   Son and Daughter    Patient / surrogate decision-maker directed code status:  DNR/DNI    Other ACP topics discussed, if applicable:   None    Patient or surrogate consented to discussion of the current conditions, workup, management plans, prognosis, and the risk for further deterioration.  Time spent: 16 minutes in direct discussion.      Signed:  Fanny Parra DO

## 2024-10-18 NOTE — ED TRIAGE NOTES
Pt arrives to the ER via ems from the [facility]. Ems called for shob x today. Pt found by fire dept o2 sat 82%. Upon arrival ems administered 5mg albuterol and came up to 93% RA. Ems states hx of chf no hx of oxygen use.

## 2024-10-18 NOTE — H&P
Hospitalist History and Physical   Admit Date:  10/18/2024  1:22 PM   Name:  Shaquille Llanos   Age:  79 y.o.  Sex:  male  :  1945   MRN:  [id number]   Room:  [facility]/    Presenting/Chief Complaint: Shortness of Breath     Reason(s) for Admission: Congestive heart failure, NYHA class 3, acute, systolic (HCC) [I50.21]     History of Present Illness:   Shaquille Llanos is a 79 y.o. male with medical history of hypertension, systolic heart failure EF 25-30%, CAD a/p 3v CABG, PAF on Eliquis, Pulm HTN, history of prostate cancer, GERD, HTN, SEBASTIAN without CPAP, history of CVA, with result admission for ACHFE from 10/3/24-10/7/24 who presented to [facility] ED due to increased SOB and AMS. His son and daughter are at the bedside and help with his history. He comes from a facility and they report he has been getting his medication there, but they report over the last few days he has been more confused which is not his baseline. When he was last admitted for ACHFE he was not confused.     In the ED he was found to be hypoxic in the high 80s requiring 2L NC oxygen. Currently 94-95% during my exam. He does answer some questions, but they are single word answers for the most part. He does have a jerking to his extremities and his children report this is a chronic issue and not new.     They do not report an increase in fluid intake or salt intake, but do not specifically monitor this themselves. Labs in the ED show Hgb 12.7, WBC 11.6, Cr 0.66. Trop T 30.0. ProBNP 8503, on 10/3 was 4082. CXR w/ worsening bilateral airspace disease. Cardiology as already seen him and placed orders.    Hospitalist team consulted for admission.      Assessment & Plan:   Principal Problem:  Acute Hypoxic Respiratory Failure  Acute on chronic systolic congestive heart failure (HCC)  Mildly elevated Troponin--due to demand  - Most recent ECHO with EF 25-30%, NYHA class 3  - Cardiology consulted and they have already seen him, they have

## 2024-10-18 NOTE — ED PROVIDER NOTES
Emergency Department Provider Note       PCP: Celio Snow Jr., MD   Age: 79 y.o.   Sex: male     DISPOSITION    Condition at Disposition: Data Unavailable     No diagnosis found.    Medical Decision Making     Patient is being evaluated for his shortness of breath.  Patient not currently requiring any supplemental oxygen.  Concern for the possibility of CHF exacerbation versus pneumonitis versus other cardiopulmonary process.  Workup to include chest x-ray, blood work, EKG has been ordered.  ED Course as of 10/18/24 1509   Fri Oct 18, 2024   1432 Results the patient's workup is consistent with CHF exacerbation.  He has a BNP of 8503 and worsening findings on chest x-ray.  Patient's been ordered IV Lasix. [GEORGES]   1436 Speaking with the patient is currently 90% on room air.  However with simple conversation he will desat to 88%.  Pending consult with cardiology about admission of the patient for his acute exacerbation of CHF with hypoxia. [GEORGES]      ED Course User Index  [GEORGES] Trace Mcqueen, DO     1 or more acute illnesses that pose a threat to life or bodily function.   Discussion with external consultants.  I independently ordered and reviewed each unique test.         ED cardiac monitoring rhythm strip was ordered and interpreted:  sinus rhythm, no evidence of arrhythmia  ST Segments:Nonspecific ST segments - NO STEMI   Rate: 94  I interpreted the X-rays Chest x-ray shows increasing opacities compared to previous..              History     Patient is an 79-year-old male with past medical history of CHF, atrial fibrillation, coronary artery disease, and hypertension.  He presents with complaint of shortness of breath.  He is unsure exactly when the symptoms started.  Does complain of occasional subjective fevers and chills.  EMS was called and noted that he was initially hypoxic and administered a nebulized breathing treatment.  Patient denies any history of COPD or ever requiring breathing

## 2024-10-19 ENCOUNTER — APPOINTMENT (OUTPATIENT)
Dept: ULTRASOUND IMAGING | Age: 79
DRG: 291 | End: 2024-10-19
Payer: MEDICARE

## 2024-10-19 PROBLEM — Z86.79 HISTORY OF ATRIAL FIBRILLATION: Status: ACTIVE | Noted: 2024-10-19

## 2024-10-19 LAB
25(OH)D3 SERPL-MCNC: 25.3 NG/ML (ref 30–100)
ALBUMIN SERPL-MCNC: 2.6 G/DL (ref 3.2–4.6)
ALBUMIN/GLOB SERPL: 0.6 (ref 1–1.9)
ALP SERPL-CCNC: 136 U/L (ref 40–129)
ALT SERPL-CCNC: 20 U/L (ref 8–55)
AMMONIA PLAS-SCNC: 35 UMOL/L (ref 16–60)
ANION GAP SERPL CALC-SCNC: 12 MMOL/L (ref 9–18)
APPEARANCE UR: CLEAR
AST SERPL-CCNC: 35 U/L (ref 15–37)
BASOPHILS # BLD: 0 K/UL (ref 0–0.2)
BASOPHILS NFR BLD: 0 % (ref 0–2)
BILIRUB SERPL-MCNC: 1.4 MG/DL (ref 0–1.2)
BILIRUB UR QL: NEGATIVE
BUN SERPL-MCNC: 23 MG/DL (ref 8–23)
CALCIUM SERPL-MCNC: 8.3 MG/DL (ref 8.8–10.2)
CHLORIDE SERPL-SCNC: 105 MMOL/L (ref 98–107)
CO2 SERPL-SCNC: 20 MMOL/L (ref 20–28)
COLOR UR: NORMAL
CREAT SERPL-MCNC: 0.54 MG/DL (ref 0.8–1.3)
DIFFERENTIAL METHOD BLD: ABNORMAL
EOSINOPHIL # BLD: 0.1 K/UL (ref 0–0.8)
EOSINOPHIL NFR BLD: 1 % (ref 0.5–7.8)
ERYTHROCYTE [DISTWIDTH] IN BLOOD BY AUTOMATED COUNT: 16.3 % (ref 11.9–14.6)
GLOBULIN SER CALC-MCNC: 4 G/DL (ref 2.3–3.5)
GLUCOSE SERPL-MCNC: 82 MG/DL (ref 70–99)
GLUCOSE UR STRIP.AUTO-MCNC: NEGATIVE MG/DL
HCT VFR BLD AUTO: 32.6 % (ref 41.1–50.3)
HGB BLD-MCNC: 11.2 G/DL (ref 13.6–17.2)
HGB UR QL STRIP: NEGATIVE
IMM GRANULOCYTES # BLD AUTO: 0 K/UL (ref 0–0.5)
IMM GRANULOCYTES NFR BLD AUTO: 0 % (ref 0–5)
KETONES UR QL STRIP.AUTO: NEGATIVE MG/DL
LEUKOCYTE ESTERASE UR QL STRIP.AUTO: NEGATIVE
LYMPHOCYTES # BLD: 0.5 K/UL (ref 0.5–4.6)
LYMPHOCYTES NFR BLD: 6 % (ref 13–44)
MAGNESIUM SERPL-MCNC: 1.7 MG/DL (ref 1.8–2.4)
MCH RBC QN AUTO: 34.7 PG (ref 26.1–32.9)
MCHC RBC AUTO-ENTMCNC: 34.4 G/DL (ref 31.4–35)
MCV RBC AUTO: 100.9 FL (ref 82–102)
MONOCYTES # BLD: 0.6 K/UL (ref 0.1–1.3)
MONOCYTES NFR BLD: 7 % (ref 4–12)
NEUTS SEG # BLD: 6.8 K/UL (ref 1.7–8.2)
NEUTS SEG NFR BLD: 86 % (ref 43–78)
NITRITE UR QL STRIP.AUTO: NEGATIVE
NRBC # BLD: 0 K/UL (ref 0–0.2)
NT PRO BNP: 5580 PG/ML (ref 0–450)
PH UR STRIP: 5.5 (ref 5–9)
PLATELET # BLD AUTO: 165 K/UL (ref 150–450)
PMV BLD AUTO: 8.8 FL (ref 9.4–12.3)
POTASSIUM SERPL-SCNC: 3.3 MMOL/L (ref 3.5–5.1)
PROT SERPL-MCNC: 6.6 G/DL (ref 6.3–8.2)
PROT UR STRIP-MCNC: NEGATIVE MG/DL
RBC # BLD AUTO: 3.23 M/UL (ref 4.23–5.6)
SODIUM SERPL-SCNC: 136 MMOL/L (ref 136–145)
SP GR UR REFRACTOMETRY: 1.01 (ref 1–1.02)
T4 FREE SERPL-MCNC: 0.8 NG/DL (ref 0.9–1.7)
TSH, 3RD GENERATION: 6.18 UIU/ML (ref 0.27–4.2)
UROBILINOGEN UR QL STRIP.AUTO: 0.2 EU/DL (ref 0.2–1)
VIT B12 SERPL-MCNC: 2164 PG/ML (ref 193–986)
WBC # BLD AUTO: 8 K/UL (ref 4.3–11.1)

## 2024-10-19 PROCEDURE — 6360000002 HC RX W HCPCS: Performed by: STUDENT IN AN ORGANIZED HEALTH CARE EDUCATION/TRAINING PROGRAM

## 2024-10-19 PROCEDURE — 84439 ASSAY OF FREE THYROXINE: CPT

## 2024-10-19 PROCEDURE — 6370000000 HC RX 637 (ALT 250 FOR IP): Performed by: INTERNAL MEDICINE

## 2024-10-19 PROCEDURE — 82306 VITAMIN D 25 HYDROXY: CPT

## 2024-10-19 PROCEDURE — 82607 VITAMIN B-12: CPT

## 2024-10-19 PROCEDURE — 6360000002 HC RX W HCPCS: Performed by: PHYSICIAN ASSISTANT

## 2024-10-19 PROCEDURE — 85025 COMPLETE CBC W/AUTO DIFF WBC: CPT

## 2024-10-19 PROCEDURE — 82140 ASSAY OF AMMONIA: CPT

## 2024-10-19 PROCEDURE — 80053 COMPREHEN METABOLIC PANEL: CPT

## 2024-10-19 PROCEDURE — 6370000000 HC RX 637 (ALT 250 FOR IP): Performed by: STUDENT IN AN ORGANIZED HEALTH CARE EDUCATION/TRAINING PROGRAM

## 2024-10-19 PROCEDURE — 96376 TX/PRO/DX INJ SAME DRUG ADON: CPT

## 2024-10-19 PROCEDURE — 6360000002 HC RX W HCPCS: Performed by: INTERNAL MEDICINE

## 2024-10-19 PROCEDURE — 36415 COLL VENOUS BLD VENIPUNCTURE: CPT

## 2024-10-19 PROCEDURE — 96375 TX/PRO/DX INJ NEW DRUG ADDON: CPT

## 2024-10-19 PROCEDURE — 99233 SBSQ HOSP IP/OBS HIGH 50: CPT | Performed by: INTERNAL MEDICINE

## 2024-10-19 PROCEDURE — 83735 ASSAY OF MAGNESIUM: CPT

## 2024-10-19 PROCEDURE — 81003 URINALYSIS AUTO W/O SCOPE: CPT

## 2024-10-19 PROCEDURE — G0378 HOSPITAL OBSERVATION PER HR: HCPCS

## 2024-10-19 PROCEDURE — 96365 THER/PROPH/DIAG IV INF INIT: CPT

## 2024-10-19 PROCEDURE — 84443 ASSAY THYROID STIM HORMONE: CPT

## 2024-10-19 PROCEDURE — 83880 ASSAY OF NATRIURETIC PEPTIDE: CPT

## 2024-10-19 PROCEDURE — 76705 ECHO EXAM OF ABDOMEN: CPT

## 2024-10-19 PROCEDURE — 6370000000 HC RX 637 (ALT 250 FOR IP): Performed by: PHYSICIAN ASSISTANT

## 2024-10-19 RX ORDER — LANOLIN ALCOHOL/MO/W.PET/CERES
400 CREAM (GRAM) TOPICAL DAILY
Status: DISCONTINUED | OUTPATIENT
Start: 2024-10-19 | End: 2024-10-22 | Stop reason: HOSPADM

## 2024-10-19 RX ORDER — POLYETHYLENE GLYCOL 3350 17 G/17G
17 POWDER, FOR SOLUTION ORAL DAILY
Status: DISCONTINUED | OUTPATIENT
Start: 2024-10-19 | End: 2024-10-22 | Stop reason: HOSPADM

## 2024-10-19 RX ORDER — MAGNESIUM SULFATE 1 G/100ML
1000 INJECTION INTRAVENOUS ONCE
Status: COMPLETED | OUTPATIENT
Start: 2024-10-19 | End: 2024-10-19

## 2024-10-19 RX ORDER — POTASSIUM CHLORIDE 1500 MG/1
40 TABLET, EXTENDED RELEASE ORAL 2 TIMES DAILY WITH MEALS
Status: DISPENSED | OUTPATIENT
Start: 2024-10-19 | End: 2024-10-20

## 2024-10-19 RX ADMIN — ONDANSETRON 4 MG: 2 INJECTION INTRAMUSCULAR; INTRAVENOUS at 14:48

## 2024-10-19 RX ADMIN — OXCARBAZEPINE 600 MG: 300 TABLET, FILM COATED ORAL at 09:19

## 2024-10-19 RX ADMIN — BACLOFEN 5 MG: 10 TABLET ORAL at 09:19

## 2024-10-19 RX ADMIN — FUROSEMIDE 40 MG: 10 INJECTION, SOLUTION INTRAMUSCULAR; INTRAVENOUS at 09:18

## 2024-10-19 RX ADMIN — GABAPENTIN 400 MG: 100 CAPSULE ORAL at 09:19

## 2024-10-19 RX ADMIN — METOPROLOL SUCCINATE 25 MG: 25 TABLET, FILM COATED, EXTENDED RELEASE ORAL at 09:19

## 2024-10-19 RX ADMIN — APIXABAN 2.5 MG: 5 TABLET, FILM COATED ORAL at 09:19

## 2024-10-19 RX ADMIN — TAMSULOSIN HYDROCHLORIDE 0.4 MG: 0.4 CAPSULE ORAL at 09:19

## 2024-10-19 RX ADMIN — FUROSEMIDE 40 MG: 10 INJECTION, SOLUTION INTRAMUSCULAR; INTRAVENOUS at 17:44

## 2024-10-19 RX ADMIN — ATORVASTATIN CALCIUM 20 MG: 20 TABLET, FILM COATED ORAL at 20:00

## 2024-10-19 RX ADMIN — EMPAGLIFLOZIN 10 MG: 10 TABLET, FILM COATED ORAL at 09:19

## 2024-10-19 RX ADMIN — MAGNESIUM GLUCONATE 500 MG ORAL TABLET 400 MG: 500 TABLET ORAL at 09:19

## 2024-10-19 RX ADMIN — GABAPENTIN 400 MG: 100 CAPSULE ORAL at 20:00

## 2024-10-19 RX ADMIN — POTASSIUM CHLORIDE 40 MEQ: 1500 TABLET, EXTENDED RELEASE ORAL at 09:19

## 2024-10-19 RX ADMIN — MAGNESIUM SULFATE HEPTAHYDRATE 1000 MG: 1 INJECTION, SOLUTION INTRAVENOUS at 09:37

## 2024-10-19 RX ADMIN — OXCARBAZEPINE 600 MG: 300 TABLET, FILM COATED ORAL at 20:00

## 2024-10-19 RX ADMIN — DULOXETINE HYDROCHLORIDE 60 MG: 60 CAPSULE, DELAYED RELEASE ORAL at 09:19

## 2024-10-19 RX ADMIN — BACLOFEN 5 MG: 10 TABLET ORAL at 20:00

## 2024-10-19 ASSESSMENT — PAIN SCALES - GENERAL: PAINLEVEL_OUTOF10: 0

## 2024-10-19 NOTE — PLAN OF CARE
Problem: Chronic Conditions and Co-morbidities  Goal: Patient's chronic conditions and co-morbidity symptoms are monitored and maintained or improved  Outcome: Progressing  Flowsheets (Taken 10/19/2024 0736)  Care Plan - Patient's Chronic Conditions and Co-Morbidity Symptoms are Monitored and Maintained or Improved: Monitor and assess patient's chronic conditions and comorbid symptoms for stability, deterioration, or improvement     Problem: Confusion  Goal: Confusion, delirium, dementia, or psychosis is improved or at baseline  Description: INTERVENTIONS:  1. Assess for possible contributors to thought disturbance, including medications, impaired vision or hearing, underlying metabolic abnormalities, dehydration, psychiatric diagnoses, and notify attending LIP  2. [place] high risk fall precautions, as indicated  3. Provide frequent short contacts to provide reality reorientation, refocusing and direction  4. Decrease environmental stimuli, including noise as appropriate  5. Monitor and intervene to maintain adequate nutrition, hydration, elimination, sleep and activity  6. If unable to ensure safety without constant attention obtain sitter and review sitter guidelines with assigned personnel  7. Initiate Psychosocial CNS and Spiritual Care consult, as indicated  10/19/2024 1106 by Saritha Ball, RN  Outcome: Progressing  Flowsheets (Taken 10/19/2024 0736)  Effect of thought disturbance (confusion, delirium, dementia, or psychosis) are managed with adequate functional status: Assess for contributors to thought disturbance, including medications, impaired vision or hearing, underlying metabolic abnormalities, dehydration, psychiatric diagnoses, notify LIP  10/18/2024 2121 by Sherlyn Patel, RN  Outcome: Progressing     Problem: Skin/Tissue Integrity  Goal: Absence of new skin breakdown  Description: 1.  Monitor for areas of redness and/or skin breakdown  2.  Assess vascular access sites hourly  3.  Every 4-6

## 2024-10-20 PROBLEM — E03.9 HYPOTHYROIDISM: Status: ACTIVE | Noted: 2024-10-20

## 2024-10-20 LAB
ANION GAP SERPL CALC-SCNC: 12 MMOL/L (ref 9–18)
BASOPHILS # BLD: 0 K/UL (ref 0–0.2)
BASOPHILS NFR BLD: 0 % (ref 0–2)
BUN SERPL-MCNC: 18 MG/DL (ref 8–23)
CALCIUM SERPL-MCNC: 8.2 MG/DL (ref 8.8–10.2)
CHLORIDE SERPL-SCNC: 104 MMOL/L (ref 98–107)
CO2 SERPL-SCNC: 23 MMOL/L (ref 20–28)
CREAT SERPL-MCNC: 0.54 MG/DL (ref 0.8–1.3)
DIFFERENTIAL METHOD BLD: ABNORMAL
EOSINOPHIL # BLD: 0.1 K/UL (ref 0–0.8)
EOSINOPHIL NFR BLD: 2 % (ref 0.5–7.8)
ERYTHROCYTE [DISTWIDTH] IN BLOOD BY AUTOMATED COUNT: 16.3 % (ref 11.9–14.6)
GLUCOSE SERPL-MCNC: 87 MG/DL (ref 70–99)
HCT VFR BLD AUTO: 34.6 % (ref 41.1–50.3)
HGB BLD-MCNC: 11.4 G/DL (ref 13.6–17.2)
IMM GRANULOCYTES # BLD AUTO: 0 K/UL (ref 0–0.5)
IMM GRANULOCYTES NFR BLD AUTO: 0 % (ref 0–5)
LYMPHOCYTES # BLD: 0.6 K/UL (ref 0.5–4.6)
LYMPHOCYTES NFR BLD: 8 % (ref 13–44)
MAGNESIUM SERPL-MCNC: 1.9 MG/DL (ref 1.8–2.4)
MCH RBC QN AUTO: 34.1 PG (ref 26.1–32.9)
MCHC RBC AUTO-ENTMCNC: 32.9 G/DL (ref 31.4–35)
MCV RBC AUTO: 103.6 FL (ref 82–102)
MONOCYTES # BLD: 0.5 K/UL (ref 0.1–1.3)
MONOCYTES NFR BLD: 7 % (ref 4–12)
NEUTS SEG # BLD: 6 K/UL (ref 1.7–8.2)
NEUTS SEG NFR BLD: 83 % (ref 43–78)
NRBC # BLD: 0 K/UL (ref 0–0.2)
PHOSPHATE SERPL-MCNC: 2.6 MG/DL (ref 2.5–4.5)
PLATELET # BLD AUTO: 180 K/UL (ref 150–450)
PMV BLD AUTO: 8.6 FL (ref 9.4–12.3)
POTASSIUM SERPL-SCNC: 3.3 MMOL/L (ref 3.5–5.1)
RBC # BLD AUTO: 3.34 M/UL (ref 4.23–5.6)
SODIUM SERPL-SCNC: 138 MMOL/L (ref 136–145)
WBC # BLD AUTO: 7.3 K/UL (ref 4.3–11.1)

## 2024-10-20 PROCEDURE — 6370000000 HC RX 637 (ALT 250 FOR IP): Performed by: INTERNAL MEDICINE

## 2024-10-20 PROCEDURE — 2700000000 HC OXYGEN THERAPY PER DAY

## 2024-10-20 PROCEDURE — 36415 COLL VENOUS BLD VENIPUNCTURE: CPT

## 2024-10-20 PROCEDURE — 96376 TX/PRO/DX INJ SAME DRUG ADON: CPT

## 2024-10-20 PROCEDURE — 85025 COMPLETE CBC W/AUTO DIFF WBC: CPT

## 2024-10-20 PROCEDURE — G0378 HOSPITAL OBSERVATION PER HR: HCPCS

## 2024-10-20 PROCEDURE — 83735 ASSAY OF MAGNESIUM: CPT

## 2024-10-20 PROCEDURE — 6370000000 HC RX 637 (ALT 250 FOR IP): Performed by: STUDENT IN AN ORGANIZED HEALTH CARE EDUCATION/TRAINING PROGRAM

## 2024-10-20 PROCEDURE — 6360000002 HC RX W HCPCS: Performed by: PHYSICIAN ASSISTANT

## 2024-10-20 PROCEDURE — 6370000000 HC RX 637 (ALT 250 FOR IP): Performed by: PHYSICIAN ASSISTANT

## 2024-10-20 PROCEDURE — 84100 ASSAY OF PHOSPHORUS: CPT

## 2024-10-20 PROCEDURE — 80048 BASIC METABOLIC PNL TOTAL CA: CPT

## 2024-10-20 PROCEDURE — 99233 SBSQ HOSP IP/OBS HIGH 50: CPT | Performed by: INTERNAL MEDICINE

## 2024-10-20 RX ORDER — LEVOTHYROXINE SODIUM 100 UG/1
100 TABLET ORAL DAILY
Status: DISCONTINUED | OUTPATIENT
Start: 2024-10-20 | End: 2024-10-22 | Stop reason: HOSPADM

## 2024-10-20 RX ORDER — POTASSIUM CHLORIDE 1500 MG/1
40 TABLET, EXTENDED RELEASE ORAL ONCE
Status: COMPLETED | OUTPATIENT
Start: 2024-10-20 | End: 2024-10-20

## 2024-10-20 RX ORDER — SPIRONOLACTONE 25 MG/1
12.5 TABLET ORAL DAILY
Status: DISCONTINUED | OUTPATIENT
Start: 2024-10-20 | End: 2024-10-20

## 2024-10-20 RX ORDER — LISINOPRIL 5 MG/1
2.5 TABLET ORAL DAILY
Status: DISCONTINUED | OUTPATIENT
Start: 2024-10-20 | End: 2024-10-22 | Stop reason: HOSPADM

## 2024-10-20 RX ADMIN — BACLOFEN 5 MG: 10 TABLET ORAL at 09:02

## 2024-10-20 RX ADMIN — EMPAGLIFLOZIN 10 MG: 10 TABLET, FILM COATED ORAL at 09:02

## 2024-10-20 RX ADMIN — BACLOFEN 5 MG: 10 TABLET ORAL at 22:09

## 2024-10-20 RX ADMIN — GABAPENTIN 400 MG: 100 CAPSULE ORAL at 09:02

## 2024-10-20 RX ADMIN — FUROSEMIDE 40 MG: 10 INJECTION, SOLUTION INTRAMUSCULAR; INTRAVENOUS at 09:02

## 2024-10-20 RX ADMIN — POTASSIUM CHLORIDE 40 MEQ: 1500 TABLET, EXTENDED RELEASE ORAL at 09:55

## 2024-10-20 RX ADMIN — MAGNESIUM GLUCONATE 500 MG ORAL TABLET 400 MG: 500 TABLET ORAL at 09:02

## 2024-10-20 RX ADMIN — OXCARBAZEPINE 600 MG: 300 TABLET, FILM COATED ORAL at 09:01

## 2024-10-20 RX ADMIN — GABAPENTIN 400 MG: 100 CAPSULE ORAL at 22:10

## 2024-10-20 RX ADMIN — METOPROLOL SUCCINATE 25 MG: 25 TABLET, FILM COATED, EXTENDED RELEASE ORAL at 09:02

## 2024-10-20 RX ADMIN — FUROSEMIDE 40 MG: 10 INJECTION, SOLUTION INTRAMUSCULAR; INTRAVENOUS at 17:56

## 2024-10-20 RX ADMIN — DULOXETINE HYDROCHLORIDE 60 MG: 60 CAPSULE, DELAYED RELEASE ORAL at 09:02

## 2024-10-20 RX ADMIN — ATORVASTATIN CALCIUM 20 MG: 20 TABLET, FILM COATED ORAL at 22:11

## 2024-10-20 RX ADMIN — OXCARBAZEPINE 600 MG: 300 TABLET, FILM COATED ORAL at 22:11

## 2024-10-20 RX ADMIN — TAMSULOSIN HYDROCHLORIDE 0.4 MG: 0.4 CAPSULE ORAL at 09:02

## 2024-10-20 RX ADMIN — LISINOPRIL 2.5 MG: 5 TABLET ORAL at 11:23

## 2024-10-20 NOTE — PLAN OF CARE
Problem: Chronic Conditions and Co-morbidities  Goal: Patient's chronic conditions and co-morbidity symptoms are monitored and maintained or improved  10/20/2024 0731 by Saritha Ball RN  Outcome: Progressing  Flowsheets (Taken 10/20/2024 0728)  Care Plan - Patient's Chronic Conditions and Co-Morbidity Symptoms are Monitored and Maintained or Improved: Monitor and assess patient's chronic conditions and comorbid symptoms for stability, deterioration, or improvement  10/20/2024 0353 by Sherlyn Patel RN  Outcome: Progressing     Problem: Confusion  Goal: Confusion, delirium, dementia, or psychosis is improved or at baseline  Description: INTERVENTIONS:  1. Assess for possible contributors to thought disturbance, including medications, impaired vision or hearing, underlying metabolic abnormalities, dehydration, psychiatric diagnoses, and notify attending LIP  2. [place] high risk fall precautions, as indicated  3. Provide frequent short contacts to provide reality reorientation, refocusing and direction  4. Decrease environmental stimuli, including noise as appropriate  5. Monitor and intervene to maintain adequate nutrition, hydration, elimination, sleep and activity  6. If unable to ensure safety without constant attention obtain sitter and review sitter guidelines with assigned personnel  7. Initiate Psychosocial CNS and Spiritual Care consult, as indicated  10/20/2024 0731 by Saritha Ball RN  Outcome: Progressing  Flowsheets (Taken 10/20/2024 0728)  Effect of thought disturbance (confusion, delirium, dementia, or psychosis) are managed with adequate functional status: Assess for contributors to thought disturbance, including medications, impaired vision or hearing, underlying metabolic abnormalities, dehydration, psychiatric diagnoses, notify LIP  10/20/2024 0353 by Sherlyn Patel RN  Outcome: Progressing     Problem: Skin/Tissue Integrity  Goal: Absence of new skin breakdown  Description: 1.

## 2024-10-21 ENCOUNTER — APPOINTMENT (OUTPATIENT)
Dept: GENERAL RADIOLOGY | Age: 79
DRG: 291 | End: 2024-10-21
Payer: MEDICARE

## 2024-10-21 PROBLEM — I50.43 CHF (CONGESTIVE HEART FAILURE), NYHA CLASS I, ACUTE ON CHRONIC, COMBINED (HCC): Status: ACTIVE | Noted: 2024-10-21

## 2024-10-21 LAB
ANION GAP SERPL CALC-SCNC: 9 MMOL/L (ref 9–18)
BASOPHILS # BLD: 0 K/UL (ref 0–0.2)
BASOPHILS NFR BLD: 1 % (ref 0–2)
BUN SERPL-MCNC: 17 MG/DL (ref 8–23)
CALCIUM SERPL-MCNC: 8.2 MG/DL (ref 8.8–10.2)
CHLORIDE SERPL-SCNC: 101 MMOL/L (ref 98–107)
CO2 SERPL-SCNC: 27 MMOL/L (ref 20–28)
CREAT SERPL-MCNC: 0.56 MG/DL (ref 0.8–1.3)
DIFFERENTIAL METHOD BLD: ABNORMAL
EOSINOPHIL # BLD: 0.2 K/UL (ref 0–0.8)
EOSINOPHIL NFR BLD: 4 % (ref 0.5–7.8)
ERYTHROCYTE [DISTWIDTH] IN BLOOD BY AUTOMATED COUNT: 16.2 % (ref 11.9–14.6)
GLUCOSE SERPL-MCNC: 90 MG/DL (ref 70–99)
HCT VFR BLD AUTO: 35.1 % (ref 41.1–50.3)
HGB BLD-MCNC: 11.5 G/DL (ref 13.6–17.2)
IMM GRANULOCYTES # BLD AUTO: 0 K/UL (ref 0–0.5)
IMM GRANULOCYTES NFR BLD AUTO: 1 % (ref 0–5)
LYMPHOCYTES # BLD: 0.6 K/UL (ref 0.5–4.6)
LYMPHOCYTES NFR BLD: 9 % (ref 13–44)
MAGNESIUM SERPL-MCNC: 1.9 MG/DL (ref 1.8–2.4)
MCH RBC QN AUTO: 33.9 PG (ref 26.1–32.9)
MCHC RBC AUTO-ENTMCNC: 32.8 G/DL (ref 31.4–35)
MCV RBC AUTO: 103.5 FL (ref 82–102)
MONOCYTES # BLD: 0.5 K/UL (ref 0.1–1.3)
MONOCYTES NFR BLD: 8 % (ref 4–12)
NEUTS SEG # BLD: 5.1 K/UL (ref 1.7–8.2)
NEUTS SEG NFR BLD: 77 % (ref 43–78)
NRBC # BLD: 0 K/UL (ref 0–0.2)
PHOSPHATE SERPL-MCNC: 2.6 MG/DL (ref 2.5–4.5)
PLATELET # BLD AUTO: 177 K/UL (ref 150–450)
PMV BLD AUTO: 8.5 FL (ref 9.4–12.3)
POTASSIUM SERPL-SCNC: 3.1 MMOL/L (ref 3.5–5.1)
RBC # BLD AUTO: 3.39 M/UL (ref 4.23–5.6)
SODIUM SERPL-SCNC: 137 MMOL/L (ref 136–145)
WBC # BLD AUTO: 6.5 K/UL (ref 4.3–11.1)

## 2024-10-21 PROCEDURE — 36415 COLL VENOUS BLD VENIPUNCTURE: CPT

## 2024-10-21 PROCEDURE — 2500000003 HC RX 250 WO HCPCS

## 2024-10-21 PROCEDURE — 84100 ASSAY OF PHOSPHORUS: CPT

## 2024-10-21 PROCEDURE — 83735 ASSAY OF MAGNESIUM: CPT

## 2024-10-21 PROCEDURE — 80048 BASIC METABOLIC PNL TOTAL CA: CPT

## 2024-10-21 PROCEDURE — 1100000003 HC PRIVATE W/ TELEMETRY

## 2024-10-21 PROCEDURE — 6370000000 HC RX 637 (ALT 250 FOR IP): Performed by: INTERNAL MEDICINE

## 2024-10-21 PROCEDURE — 96376 TX/PRO/DX INJ SAME DRUG ADON: CPT

## 2024-10-21 PROCEDURE — G0378 HOSPITAL OBSERVATION PER HR: HCPCS

## 2024-10-21 PROCEDURE — 6370000000 HC RX 637 (ALT 250 FOR IP): Performed by: PHYSICIAN ASSISTANT

## 2024-10-21 PROCEDURE — 6370000000 HC RX 637 (ALT 250 FOR IP): Performed by: STUDENT IN AN ORGANIZED HEALTH CARE EDUCATION/TRAINING PROGRAM

## 2024-10-21 PROCEDURE — 85025 COMPLETE CBC W/AUTO DIFF WBC: CPT

## 2024-10-21 PROCEDURE — 6360000002 HC RX W HCPCS: Performed by: PHYSICIAN ASSISTANT

## 2024-10-21 PROCEDURE — 99232 SBSQ HOSP IP/OBS MODERATE 35: CPT | Performed by: INTERNAL MEDICINE

## 2024-10-21 PROCEDURE — 71045 X-RAY EXAM CHEST 1 VIEW: CPT

## 2024-10-21 RX ORDER — POTASSIUM CHLORIDE 1500 MG/1
40 TABLET, EXTENDED RELEASE ORAL 2 TIMES DAILY WITH MEALS
Status: DISCONTINUED | OUTPATIENT
Start: 2024-10-21 | End: 2024-10-22 | Stop reason: HOSPADM

## 2024-10-21 RX ORDER — MORPHINE SULFATE 2 MG/ML
2 INJECTION, SOLUTION INTRAMUSCULAR; INTRAVENOUS EVERY 4 HOURS PRN
Status: DISCONTINUED | OUTPATIENT
Start: 2024-10-21 | End: 2024-10-22 | Stop reason: HOSPADM

## 2024-10-21 RX ADMIN — BACLOFEN 5 MG: 10 TABLET ORAL at 14:23

## 2024-10-21 RX ADMIN — METOPROLOL SUCCINATE 25 MG: 25 TABLET, FILM COATED, EXTENDED RELEASE ORAL at 09:00

## 2024-10-21 RX ADMIN — OXYCODONE HYDROCHLORIDE AND ACETAMINOPHEN 1 TABLET: 5; 325 TABLET ORAL at 21:28

## 2024-10-21 RX ADMIN — FUROSEMIDE 40 MG: 10 INJECTION, SOLUTION INTRAMUSCULAR; INTRAVENOUS at 09:02

## 2024-10-21 RX ADMIN — BACLOFEN 5 MG: 10 TABLET ORAL at 21:29

## 2024-10-21 RX ADMIN — MORPHINE SULFATE 2 MG: 2 INJECTION, SOLUTION INTRAMUSCULAR; INTRAVENOUS at 18:33

## 2024-10-21 RX ADMIN — GABAPENTIN 400 MG: 100 CAPSULE ORAL at 21:29

## 2024-10-21 RX ADMIN — OXYCODONE HYDROCHLORIDE AND ACETAMINOPHEN 1 TABLET: 5; 325 TABLET ORAL at 09:01

## 2024-10-21 RX ADMIN — DULOXETINE HYDROCHLORIDE 60 MG: 60 CAPSULE, DELAYED RELEASE ORAL at 09:00

## 2024-10-21 RX ADMIN — ATORVASTATIN CALCIUM 20 MG: 20 TABLET, FILM COATED ORAL at 21:29

## 2024-10-21 RX ADMIN — ACETAMINOPHEN 650 MG: 325 TABLET ORAL at 17:32

## 2024-10-21 RX ADMIN — BACLOFEN 5 MG: 10 TABLET ORAL at 09:01

## 2024-10-21 RX ADMIN — OXCARBAZEPINE 600 MG: 300 TABLET, FILM COATED ORAL at 09:00

## 2024-10-21 RX ADMIN — POTASSIUM CHLORIDE 40 MEQ: 1500 TABLET, EXTENDED RELEASE ORAL at 09:07

## 2024-10-21 RX ADMIN — LISINOPRIL 2.5 MG: 5 TABLET ORAL at 09:00

## 2024-10-21 RX ADMIN — OXYCODONE HYDROCHLORIDE AND ACETAMINOPHEN 1 TABLET: 5; 325 TABLET ORAL at 15:26

## 2024-10-21 RX ADMIN — LEVOTHYROXINE SODIUM 100 MCG: 0.1 TABLET ORAL at 09:00

## 2024-10-21 RX ADMIN — GABAPENTIN 400 MG: 100 CAPSULE ORAL at 14:23

## 2024-10-21 RX ADMIN — TAMSULOSIN HYDROCHLORIDE 0.4 MG: 0.4 CAPSULE ORAL at 09:00

## 2024-10-21 RX ADMIN — GABAPENTIN 400 MG: 100 CAPSULE ORAL at 09:00

## 2024-10-21 RX ADMIN — POTASSIUM CHLORIDE 40 MEQ: 1500 TABLET, EXTENDED RELEASE ORAL at 17:18

## 2024-10-21 RX ADMIN — APIXABAN 5 MG: 5 TABLET, FILM COATED ORAL at 21:30

## 2024-10-21 RX ADMIN — OXCARBAZEPINE 600 MG: 300 TABLET, FILM COATED ORAL at 21:29

## 2024-10-21 RX ADMIN — FUROSEMIDE 40 MG: 10 INJECTION, SOLUTION INTRAMUSCULAR; INTRAVENOUS at 17:18

## 2024-10-21 RX ADMIN — MAGNESIUM GLUCONATE 500 MG ORAL TABLET 400 MG: 500 TABLET ORAL at 09:00

## 2024-10-21 ASSESSMENT — PAIN DESCRIPTION - PAIN TYPE
TYPE: CHRONIC PAIN
TYPE: CHRONIC PAIN

## 2024-10-21 ASSESSMENT — PAIN DESCRIPTION - ONSET
ONSET: ON-GOING
ONSET: ON-GOING

## 2024-10-21 ASSESSMENT — PAIN SCALES - GENERAL
PAINLEVEL_OUTOF10: 10
PAINLEVEL_OUTOF10: 10
PAINLEVEL_OUTOF10: 6
PAINLEVEL_OUTOF10: 10
PAINLEVEL_OUTOF10: 10

## 2024-10-21 ASSESSMENT — PAIN - FUNCTIONAL ASSESSMENT
PAIN_FUNCTIONAL_ASSESSMENT: PREVENTS OR INTERFERES SOME ACTIVE ACTIVITIES AND ADLS

## 2024-10-21 ASSESSMENT — PAIN DESCRIPTION - DESCRIPTORS
DESCRIPTORS: ACHING;THROBBING
DESCRIPTORS: ACHING;SORE;THROBBING

## 2024-10-21 ASSESSMENT — PAIN DESCRIPTION - LOCATION
LOCATION: JAW

## 2024-10-21 ASSESSMENT — PAIN DESCRIPTION - FREQUENCY
FREQUENCY: CONTINUOUS
FREQUENCY: CONTINUOUS

## 2024-10-21 ASSESSMENT — PAIN DESCRIPTION - ORIENTATION
ORIENTATION: LEFT
ORIENTATION: RIGHT
ORIENTATION: LEFT

## 2024-10-21 NOTE — CONSULTS
Palliative Care    Patient: Shaquille Llanos MRN: [id number]  SSN: [id number]    YOB: 1945  Age: 79 y.o.  Sex: male       Date of Request: 10/19/2024  Date of Consult:  10/22/2024  Reason for Consult:  goals of care  Requesting Physician: Dr Smith      Assessment/Plan:     Principal Diagnosis:    Fatigue, Lethargy  R53.83    Additional Diagnoses:   Debility, Unspecified  R53.81  Dyspnea  R06.00  Frailty  R54  Counseling, Encounter for Medical Advice  Z71.9  Encounter for Palliative Care  Z51.5    Palliative Performance Scale (PPS):       Medical Decision Making:   Reviewed and summarized notes from admission to present   Discussed case with appropriate providers- YADIRA Valdivia  Reviewed laboratory and x-ray data from admission to present     Pt resting in bed, no distress noted.  Family at bedside.  Introduced role of PC and reviewed events.  Family voiced confusion about the differences between Hospice and Palliative Care. Counseled on the differences, and recommended hospice support at the correction.  Pt and family in agreement with this plan.  Discussed with YADIRA Valdivia.  No further PC needs, we will sign off.     Will discuss findings with members of the interdisciplinary team.      Thank you for this referral.          .    Subjective:     History obtained from:  Patient, Family, Care Provider, and Chart    Chief Complaint: CHF  History of Present Illness:  Mr Llanos is an 78 yo male with PMH of CHF, atrial fibrillation, CAD, prostate CA, GERD, HTN, TIA, and other conditions listed below, who presented to the ER from his correction on 10/18/2024 with c/o increasing dyspnea for 1 day.  EMS noted pt was hypoxic on room air. Family reported associated confusion. Pt denied fevers, n/v/d. Work up in the ER was notable for CHF exacerbation and acute hypoxic respiratory failure.  Pt was admitted for further management.  He was evaluated by Cardiology, and is being diuresed.  Due to the end stage nature of his 
from [facility] ED    Housing Stability     Was there a time when you did not have a steady place to sleep: No     Worried that the place you are staying is making you sick: No     Social History       Tobacco History       Smoking Status  Former Smoking Start Date  11/1/1957 Quit Date  11/1/1971 Average Packs/Day  1 pack/day for 14.0 years (14.0 ttl pk-yrs) Smoking Tobacco Type  Cigarettes from 11/1/1957 to 11/1/1971   Pack Year History     Packs/Day From To Years    0 11/1/1971  53.0    1 11/1/1957 11/1/1971 14.0      Passive Exposure  Past      Smokeless Tobacco Use  Never      Tobacco Comments  Quit smoking: quit at age 26              Alcohol History       Alcohol Use Status  No              Drug Use       Drug Use Status  Never              Sexual Activity       Sexually Active  Not Asked                    Family History   Problem Relation Age of Onset    Heart Disease Mother     Heart Disease Sister     Other Other         cerebral aneurysm         No current facility-administered medications for this encounter.     Current Outpatient Medications   Medication Sig    apixaban (ELIQUIS) 2.5 MG TABS tablet Take 1 tablet by mouth 2 times daily Per assisted living facility pt is on 2.5mg BID    oxyCODONE-acetaminophen (PERCOCET)  MG per tablet     atorvastatin (LIPITOR) 20 MG tablet Take 1 tablet by mouth daily    OXcarbazepine (TRILEPTAL) 300 MG tablet Take 1 tablet by mouth 2 times daily    losartan (COZAAR) 25 MG tablet Take 1 tablet by mouth daily    DULoxetine (CYMBALTA) 60 MG extended release capsule Take 1 capsule by mouth daily    Acetaminophen (TYLENOL PO) Take by mouth (Patient not taking: Reported on 8/19/2024)    albuterol (PROVENTIL) (2.5 MG/3ML) 0.083% nebulizer solution Take 3 mLs by nebulization every 4 hours as needed for Wheezing    fluticasone (FLONASE) 50 MCG/ACT nasal spray 1 spray by Each Nostril route daily as needed for Rhinitis    furosemide (LASIX) 40 MG tablet Take 1 tablet by

## 2024-10-21 NOTE — PLAN OF CARE
Problem: Chronic Conditions and Co-morbidities  Goal: Patient's chronic conditions and co-morbidity symptoms are monitored and maintained or improved  10/20/2024 0731 by Saritha Ball RN  Outcome: Progressing  Flowsheets (Taken 10/20/2024 0728)  Care Plan - Patient's Chronic Conditions and Co-Morbidity Symptoms are Monitored and Maintained or Improved: Monitor and assess patient's chronic conditions and comorbid symptoms for stability, deterioration, or improvement

## 2024-10-22 VITALS
RESPIRATION RATE: 20 BRPM | TEMPERATURE: 97.3 F | OXYGEN SATURATION: 92 % | HEIGHT: 69 IN | WEIGHT: 145.1 LBS | DIASTOLIC BLOOD PRESSURE: 73 MMHG | HEART RATE: 77 BPM | BODY MASS INDEX: 21.49 KG/M2 | SYSTOLIC BLOOD PRESSURE: 119 MMHG

## 2024-10-22 PROBLEM — R54 FRAILTY: Status: ACTIVE | Noted: 2024-10-22

## 2024-10-22 PROBLEM — R53.81 DEBILITY: Status: ACTIVE | Noted: 2024-10-22

## 2024-10-22 PROBLEM — R06.09 DYSPNEA ON EXERTION: Status: ACTIVE | Noted: 2024-10-22

## 2024-10-22 PROBLEM — R53.83 FATIGUE: Status: ACTIVE | Noted: 2024-10-22

## 2024-10-22 PROBLEM — Z51.5 ENCOUNTER FOR PALLIATIVE CARE: Status: ACTIVE | Noted: 2024-10-22

## 2024-10-22 LAB
ANION GAP SERPL CALC-SCNC: 10 MMOL/L (ref 9–18)
BASOPHILS # BLD: 0 K/UL (ref 0–0.2)
BASOPHILS NFR BLD: 1 % (ref 0–2)
BUN SERPL-MCNC: 21 MG/DL (ref 8–23)
CALCIUM SERPL-MCNC: 8.1 MG/DL (ref 8.8–10.2)
CHLORIDE SERPL-SCNC: 101 MMOL/L (ref 98–107)
CO2 SERPL-SCNC: 27 MMOL/L (ref 20–28)
CREAT SERPL-MCNC: 0.51 MG/DL (ref 0.8–1.3)
DIFFERENTIAL METHOD BLD: ABNORMAL
EOSINOPHIL # BLD: 0.4 K/UL (ref 0–0.8)
EOSINOPHIL NFR BLD: 7 % (ref 0.5–7.8)
ERYTHROCYTE [DISTWIDTH] IN BLOOD BY AUTOMATED COUNT: 16.1 % (ref 11.9–14.6)
GLUCOSE SERPL-MCNC: 81 MG/DL (ref 70–99)
HCT VFR BLD AUTO: 36.1 % (ref 41.1–50.3)
HGB BLD-MCNC: 11.8 G/DL (ref 13.6–17.2)
IMM GRANULOCYTES # BLD AUTO: 0 K/UL (ref 0–0.5)
IMM GRANULOCYTES NFR BLD AUTO: 0 % (ref 0–5)
LYMPHOCYTES # BLD: 0.5 K/UL (ref 0.5–4.6)
LYMPHOCYTES NFR BLD: 9 % (ref 13–44)
MAGNESIUM SERPL-MCNC: 2 MG/DL (ref 1.8–2.4)
MCH RBC QN AUTO: 33.6 PG (ref 26.1–32.9)
MCHC RBC AUTO-ENTMCNC: 32.7 G/DL (ref 31.4–35)
MCV RBC AUTO: 102.8 FL (ref 82–102)
MONOCYTES # BLD: 0.5 K/UL (ref 0.1–1.3)
MONOCYTES NFR BLD: 8 % (ref 4–12)
NEUTS SEG # BLD: 4.6 K/UL (ref 1.7–8.2)
NEUTS SEG NFR BLD: 75 % (ref 43–78)
NRBC # BLD: 0 K/UL (ref 0–0.2)
PHOSPHATE SERPL-MCNC: 3.1 MG/DL (ref 2.5–4.5)
PLATELET # BLD AUTO: 180 K/UL (ref 150–450)
PMV BLD AUTO: 8.5 FL (ref 9.4–12.3)
POTASSIUM SERPL-SCNC: 3.5 MMOL/L (ref 3.5–5.1)
RBC # BLD AUTO: 3.51 M/UL (ref 4.23–5.6)
SODIUM SERPL-SCNC: 137 MMOL/L (ref 136–145)
WBC # BLD AUTO: 6.1 K/UL (ref 4.3–11.1)

## 2024-10-22 PROCEDURE — 85025 COMPLETE CBC W/AUTO DIFF WBC: CPT

## 2024-10-22 PROCEDURE — 6370000000 HC RX 637 (ALT 250 FOR IP): Performed by: STUDENT IN AN ORGANIZED HEALTH CARE EDUCATION/TRAINING PROGRAM

## 2024-10-22 PROCEDURE — 99222 1ST HOSP IP/OBS MODERATE 55: CPT | Performed by: NURSE PRACTITIONER

## 2024-10-22 PROCEDURE — 80048 BASIC METABOLIC PNL TOTAL CA: CPT

## 2024-10-22 PROCEDURE — 83735 ASSAY OF MAGNESIUM: CPT

## 2024-10-22 PROCEDURE — 36415 COLL VENOUS BLD VENIPUNCTURE: CPT

## 2024-10-22 PROCEDURE — 6370000000 HC RX 637 (ALT 250 FOR IP): Performed by: PHYSICIAN ASSISTANT

## 2024-10-22 PROCEDURE — 6360000002 HC RX W HCPCS: Performed by: PHYSICIAN ASSISTANT

## 2024-10-22 PROCEDURE — 84100 ASSAY OF PHOSPHORUS: CPT

## 2024-10-22 PROCEDURE — 6370000000 HC RX 637 (ALT 250 FOR IP): Performed by: INTERNAL MEDICINE

## 2024-10-22 RX ORDER — LEVOTHYROXINE SODIUM 100 UG/1
100 TABLET ORAL DAILY
Qty: 30 TABLET | Refills: 0 | Status: SHIPPED | OUTPATIENT
Start: 2024-10-23 | End: 2024-11-22

## 2024-10-22 RX ORDER — LISINOPRIL 2.5 MG/1
2.5 TABLET ORAL DAILY
Qty: 30 TABLET | Refills: 0 | Status: SHIPPED | OUTPATIENT
Start: 2024-10-23 | End: 2024-11-22

## 2024-10-22 RX ADMIN — LISINOPRIL 2.5 MG: 5 TABLET ORAL at 08:27

## 2024-10-22 RX ADMIN — MAGNESIUM GLUCONATE 500 MG ORAL TABLET 400 MG: 500 TABLET ORAL at 08:27

## 2024-10-22 RX ADMIN — BACLOFEN 5 MG: 10 TABLET ORAL at 13:47

## 2024-10-22 RX ADMIN — POTASSIUM CHLORIDE 40 MEQ: 1500 TABLET, EXTENDED RELEASE ORAL at 08:27

## 2024-10-22 RX ADMIN — GABAPENTIN 400 MG: 100 CAPSULE ORAL at 08:27

## 2024-10-22 RX ADMIN — TAMSULOSIN HYDROCHLORIDE 0.4 MG: 0.4 CAPSULE ORAL at 08:27

## 2024-10-22 RX ADMIN — POLYETHYLENE GLYCOL 3350 17 G: 17 POWDER, FOR SOLUTION ORAL at 08:29

## 2024-10-22 RX ADMIN — BACLOFEN 5 MG: 10 TABLET ORAL at 08:27

## 2024-10-22 RX ADMIN — FUROSEMIDE 40 MG: 10 INJECTION, SOLUTION INTRAMUSCULAR; INTRAVENOUS at 08:29

## 2024-10-22 RX ADMIN — DULOXETINE HYDROCHLORIDE 60 MG: 60 CAPSULE, DELAYED RELEASE ORAL at 08:26

## 2024-10-22 RX ADMIN — METOPROLOL SUCCINATE 25 MG: 25 TABLET, FILM COATED, EXTENDED RELEASE ORAL at 08:28

## 2024-10-22 RX ADMIN — APIXABAN 5 MG: 5 TABLET, FILM COATED ORAL at 08:27

## 2024-10-22 RX ADMIN — GABAPENTIN 400 MG: 100 CAPSULE ORAL at 13:47

## 2024-10-22 RX ADMIN — LEVOTHYROXINE SODIUM 100 MCG: 0.1 TABLET ORAL at 08:28

## 2024-10-22 RX ADMIN — OXCARBAZEPINE 600 MG: 300 TABLET, FILM COATED ORAL at 08:26

## 2024-10-22 RX ADMIN — OXYCODONE HYDROCHLORIDE AND ACETAMINOPHEN 1 TABLET: 5; 325 TABLET ORAL at 08:34

## 2024-10-22 ASSESSMENT — PAIN DESCRIPTION - LOCATION: LOCATION: JAW

## 2024-10-22 ASSESSMENT — PAIN DESCRIPTION - ORIENTATION: ORIENTATION: LEFT

## 2024-10-22 ASSESSMENT — PAIN SCALES - GENERAL: PAINLEVEL_OUTOF10: 7

## 2024-10-22 ASSESSMENT — PAIN DESCRIPTION - DESCRIPTORS: DESCRIPTORS: ACHING

## 2024-10-22 NOTE — CARE COORDINATION
CM reviewed and screen patient medical chart.  Patient is currently confused and unable to assist with CMA.  CM screen patient medical chart.  Patient currently from [facility].  Unable verify today if patient will be able to return.  Patient had a recently admission on  10/24/24 and will be a readmission.  Readmission completed.  Patient needs assistance with mobility, using a RW.  Patient has support from his children.  CM will continue to follow for any needs that may arise upon discharge.           10/20/24 1027   Service Assessment   History Provided By Medical Record   Primary Caregiver Other (Comment)  ([email or web address])   Support Systems Children   Patient's Healthcare Decision Maker is: Legal Next of Kin   PCP Verified by CM Yes   Prior Functional Level Assistance with the following:;Mobility   Current Functional Level Assistance with the following:;Mobility   Can [facility] Yes  [email or web address]   Ability to make needs known: Poor   Family able to assist with home care needs: Yes   Would you like for me to discuss the discharge plan with any other family members/significant others, and if so, who? Yes   Financial Resources Medicare   Community Resources Assisted Living  [email or web address]   Social/Functional History   Lives With Alone   Type of Home Assisted living  [email or web address]   Home Layout One level   Home Access Level entry   Bathroom Shower/Tub Walk-in shower   Bathroom Toilet Standard   Bathroom Equipment Grab bars in shower;Shower chair   Bathroom Accessibility Accessible   Home Equipment Walker - Rolling   Receives Help From Family   ADL Assistance Needs assistance   Toileting Needs assistance   Homemaking Assistance Needs assistance   Homemaking Responsibilities No   Ambulation Assistance Needs assistance   Transfer Assistance Needs assistance   Active  No   Patient's  Info 
LMSW noted hospice consult and referral sent to  Hospice Care by [facility] with family agreement.  Son and daughter at bedside today and still have questions about care planning and comfort care for pt and would like to meet with Palliative Care.  SABRASW communicated with Mendy Jhaveri NP with Palliative Care and she will follow up with pt/family tomorrow around 10 am to address pt/family questions.  MD also notified that pt/family have questions about goals of care.  Pt currently resides in [facility] and CM staff will contact facility about pt return pending decisions about his care planning.  Family would prefer Hospice [facility] care for pt if appropriate.  Will continue to follow pt plan of care and assist further with any supportive care needs as appropriate.     
LOS 1D    Chart reviewed for continued stay and [facility].  -Spoke with family after PC consult. They would like him to go back to the [facility] with [facility].   -Spoke with Marlin [facility] Liaison, he  will be up to speak with them.   -Called [facility].  HIPAA compliant  left for Melanie Liaison at the [facility] to see if they can take him back today. Awaiting return call.   -Family would like transport to be arranged back to the [facility].    Update 1217: Spoke Symtemia with [facility]. They can take him back today. Made facility aware they wanted to use [facility]. Marlin from [facility] into see family.    Update 1300: family has chosen to use [facility].  
LOS 1D    Pt will discharge home to the [facility] his [facility] with [facility]. [facility] will transport him there around 3pm. Jacek, pt's family member was made aware of his transport time. Packet made. Pt will return to room 272. No report number has been provided. DNR for transport signed by family. No other CM needs noted at this time. Pt and family are in agreement with this discharge plan.     10/22/24 0957   Services At/After Discharge   Transition of Care Consult (CM Consult) Discharge Planning;Hospice;Assisted Living   Internal Home Health No   Internal Hospice No   Reason Outside Agency Chosen Script used patient chose alternate agency   Services At/After Discharge Assisted living   [place] Resource Information Provided? No   Mode of Transport at Discharge S   Hospital Transport Time of Discharge 1500   Confirm Follow Up Transport Family   Condition of Participation: Discharge Planning   The Plan for Transition of Care is related to the following treatment goals: Pt will return to FDC with Hospice   The Patient and/or Patient Representative was provided with a Choice of Provider? Patient   The Patient and/Or Patient Representative agree with the Discharge Plan? Yes   Freedom of Choice list was provided with basic dialogue that supports the patient's individualized plan of care/goals, treatment preferences, and shares the quality data associated with the providers?  Yes       
I have personally seen, examined and participated in the care of this patient. I have reviewed all pertinent clinical information, including history, physical exam, plan and the Medical/PA/NP Student’s note and agree except as noted.

## 2024-10-22 NOTE — PROGRESS NOTES
[facility] CARDIOLOGY PROGRESS NOTE           10/20/2024 10:18 AM    Admit Date: 10/18/2024      Subjective:   Afebrile overnight.  The patient does not report worsening dyspnea, chest pain, or palpitations.    ROS:  No obvious pertinent positives on review of systems except for what was outlined above.    Objective:      Vitals:    10/19/24 2309 10/20/24 0300 10/20/24 0312 10/20/24 0728   BP: 121/71 120/73  135/78   Pulse: 79 76 75 77   Resp: 16 17  18   Temp: 97.7 °F (36.5 °C) 97.7 °F (36.5 °C)  97.2 °F (36.2 °C)   TempSrc: Oral Oral  Oral   SpO2: 96% 98%  97%   Weight:       Height:           Physical Exam:  General-No Acute Distress  Neck- supple, no JVD  CV- regular rate and rhythm no RG  Lung-crackles bilaterally  Abd- soft, nontender, nondistended  Ext- no edema bilaterally.  Skin- warm and dry    Data Review:   Recent Labs     10/19/24  0552 10/20/24  0703    138   K 3.3* 3.3*   MG 1.7* 1.9   BUN 23 18   WBC 8.0 7.3   HGB 11.2* 11.4*   HCT 32.6* 34.6*    180       Lab Results   Component Value Date    CHOL 95 06/27/2023    CHOL 123 07/10/2022    CHOL 178 06/07/2019     Lab Results   Component Value Date    TRIG 57 06/27/2023    TRIG 72 07/10/2022    TRIG 74 06/07/2019     Lab Results   Component Value Date    HDL 29 (L) 06/27/2023    HDL 27 (L) 07/10/2022    HDL 37 (L) 06/07/2019     No components found for: \"LDLCHOLESTEROL\", \"LDLCALC\"  Lab Results   Component Value Date    VLDL 11.4 06/27/2023    VLDL 14.4 07/10/2022    VLDL 14.8 06/07/2019     Lab Results   Component Value Date    CHOLHDLRATIO 3.3 06/27/2023    CHOLHDLRATIO 4.6 07/10/2022    CHOLHDLRATIO 4.8 06/07/2019        Lab Results   Component Value Date/Time     10/20/2024 07:03 AM     10/19/2024 05:52 AM     10/18/2024 01:36 PM    K 3.3 10/20/2024 07:03 AM    K 3.3 10/19/2024 05:52 AM    K 3.9 10/18/2024 01:36 PM     10/20/2024 07:03 AM     10/19/2024 05:52 AM     10/18/2024 
                         [facility] CARDIOLOGY PROGRESS NOTE           10/19/2024 12:53 PM    Admit Date: 10/18/2024      Subjective:   Afebrile overnight.  The patient does not report worsening dyspnea, chest pain, or palpitations.    ROS:  No obvious pertinent positives on review of systems except for what was outlined above.    Objective:      Vitals:    10/19/24 0249 10/19/24 0258 10/19/24 0736 10/19/24 1112   BP:  116/63 128/72 108/84   Pulse: 78 77 83 93   Resp:  17 18 18   Temp:  98.2 °F (36.8 °C) 98.4 °F (36.9 °C) 99.5 °F (37.5 °C)   TempSrc:  Oral Oral Oral   SpO2:  96% 97% 98%   Weight:       Height:           Physical Exam:  General-No Acute Distress  Neck- supple, no JVD  CV- regular rate and rhythm no RG  Lung-crackles bilaterally  Abd- soft, nontender, nondistended  Ext- no edema bilaterally.  Skin- warm and dry    Data Review:   Recent Labs     10/18/24  1336 10/19/24  0552   * 136   K 3.9 3.3*   MG  --  1.7*   BUN 29* 23   WBC 11.6* 8.0   HGB 12.7* 11.2*   HCT 37.9* 32.6*    165       Lab Results   Component Value Date    CHOL 95 06/27/2023    CHOL 123 07/10/2022    CHOL 178 06/07/2019     Lab Results   Component Value Date    TRIG 57 06/27/2023    TRIG 72 07/10/2022    TRIG 74 06/07/2019     Lab Results   Component Value Date    HDL 29 (L) 06/27/2023    HDL 27 (L) 07/10/2022    HDL 37 (L) 06/07/2019     No components found for: \"LDLCHOLESTEROL\", \"LDLCALC\"  Lab Results   Component Value Date    VLDL 11.4 06/27/2023    VLDL 14.4 07/10/2022    VLDL 14.8 06/07/2019     Lab Results   Component Value Date    CHOLHDLRATIO 3.3 06/27/2023    CHOLHDLRATIO 4.6 07/10/2022    CHOLHDLRATIO 4.8 06/07/2019        Lab Results   Component Value Date/Time     10/19/2024 05:52 AM     10/18/2024 01:36 PM     10/07/2024 03:37 AM    K 3.3 10/19/2024 05:52 AM    K 3.9 10/18/2024 01:36 PM    K 3.5 10/07/2024 03:37 AM     10/19/2024 05:52 AM     10/18/2024 01:36 PM    CL 99 10/07/2024 
       Hospitalist Progress Note   Admit Date:  10/18/2024  1:22 PM   Name:  Shaquille Llanos   Age:  79 y.o.  Sex:  male  :  1945   MRN:  [id number]   Room:  [facility]    Presenting/Chief Complaint: Shortness of Breath     Reason(s) for Admission: Acute respiratory failure with hypoxia [J96.01]  Congestive heart failure, NYHA class 3, acute, systolic (HCC) [I50.21]  Acute on chronic congestive heart failure, unspecified heart failure type (HCC) [I50.9]  CHF (congestive heart failure), NYHA class I, acute on chronic, combined (HCC) [I50.43]     Hospital Course:   Shaquille Llanos is a 79 y.o. male with medical history of hypertension, systolic heart failure EF 25-30%, CAD a/p 3v CABG, PAF on Eliquis, Pulm HTN, history of prostate cancer, GERD, HTN, SEBASTIAN without CPAP, history of CVA, with result admission for ACHFE from 10/3/24-10/7/24 who presented to [facility] ED due to increased SOB and AMS. His son and daughter are at the bedside and help with his history. He comes from a facility and they report he has been getting his medication there, but they report over the last few days he has been more confused which is not his baseline. When he was last admitted for ACHFE he was not confused.      In the ED he was found to be hypoxic in the high 80s requiring 2L NC oxygen. Currently 94-95% during my exam. He does answer some questions, but they are single word answers for the most part. He does have a jerking to his extremities and his children report this is a chronic issue and not new.      They do not report an increase in fluid intake or salt intake, but do not specifically monitor this themselves. Labs in the ED show Hgb 12.7, WBC 11.6, Cr 0.66. Trop T 30.0. ProBNP 8503, on 10/3 was 4082. CXR w/ worsening bilateral airspace disease. Cardiology as already seen him and placed orders.     Hospitalist team consulted for admission      Subjective & 24hr Events:   Patient was seen and examined at the bedside. He 
   10/22/24 1237   Resting (Room Air)   SpO2 95   HR 84   During Walk (Room Air)   Comments unable to walk   After Walk   Does the Patient Qualify for Home O2 No   Does the Patient Need Portable Oxygen Tanks No       
  Physician Progress Note      PATIENT:               LISA DELEON  CSN #:                  [id number]  :                       1945  ADMIT DATE:       10/18/2024 1:22 PM  DISCH DATE:  RESPONDING  PROVIDER #:        Nohemi Smith MD          QUERY TEXT:    Patient admitted with CHF exacerbation. Noted documentation of acute   respiratory failure in H&P and progress notes. In order to support the   diagnosis of acute respiratory failure, please include additional clinical   indicators in your documentation.  Or please document if the diagnosis of   acute respiratory failure has been ruled out after further study.    The medical record reflects the following:  Risk Factors: 79 y.o. male systolic heart failure PAF on Eliquis, Pulm HTN,   SEBASTIAN without CPAP, with result admission for ACHFE from 10/3/24-10/7/24 who   presented to [facility] ED due to increased SOB and AMS.  Clinical Indicators: Patient in observation statues 10/18-10/21  10/20 progress note \"Patient is saturating well on 3 LNC. Baseline he is not   on any oxygen. Lungs: CTAB.  No wheezing, rhonchi, or rales.  Symmetric   expansion.\"  RR: WNL  Treatment: 10/21 on admit: 2LNC- sats 93-99%  Options provided:  -- Acute Respiratory Failure as evidenced by, Please document evidence.  -- Acute Respiratory Failure ruled out after study  -- Other - I will add my own diagnosis  -- Disagree - Not applicable / Not valid  -- Disagree - Clinically unable to determine / Unknown  -- Refer to Clinical Documentation Reviewer    PROVIDER RESPONSE TEXT:    This patient is in acute respiratory failure as evidenced by chf    Query created by: Rivka Cunningham on 10/21/2024 1:57 PM      Electronically signed by:  Nohemi Smith MD 10/21/2024 2:29 PM          
PT was in bed awake. Family was at bedside.  introduced self. PT asked for prayer. PT expressed trust in [place]. PT expressed comfort in harriet and anticipation of being in heaven soon. PT is [employer]alian.  offered empathetic spiritual presence, active listening, and therapeutic communication. PT quoted Scripture and shared a les picture of heaven. offered prayer.  sang \"Kan Loves Me.\"  offered support and left  number for PT and Family.  thanked PT for visit and let PT and Family know they are in 's prayers. .     Rev. Shanna Mora M.Div.    
PT was in bed resting. CH prayed quietly and read Scripture. Staff entered and PT awoke. After Staff care, CH and PT talked about PT's harriet and  trust in [place]. CH offered prayer. CH thanked PT for visit and offered support.     Rev. Shanna Mora M.Div.    
Pulmonary hypertension (HCC)  - probable sequela of left-sided heart failure, poor long-term prognosis.      Acute hypoxic respiratory failure  -Remains on 2 L nasal cannula      Elevated TSH  -Per primary team.         Prostate cancer (HCC)  -Per primary       DNR (do not resuscitate)  -Palliative/goals of care/hospice consideration ongoing with the family and patient.    Sky Bentley DO  10/21/2024 4:32 PM    
mentation.    Assessment & Plan:   Acute Hypoxic Respiratory Failure due to CHF  Patient is saturating well on 3 LNC.  Baseline he is not on any oxygen.  Wean oxygen as tolerated.    Acute on chronic systolic congestive heart failure (HCC)  - Most recent ECHO with EF 25-30%, NYHA class 3  Diuresing with - IV lasix 40mg BID.  Net -1.9 L.  Continue Jardiance 10mg Daily   Cardiology  following, appreciate recommendations  Palliative consulted, appreciate recommendations    Hypokalemia in  the setting of diuretics  Potassium 3.3   Repleted potassium  Follow-up with Mg and P in AM.    Pulmonary hypertension (HCC)  CAD S/P CABG x 3    Paroxysmal atrial fibrillation (HCC)  Holding Elliquis for possible paracentesis    Abdominal distention  Follow-up with ultrasound of the abdomen    Constipation  Added MiraLAX    HTN   Toprol XL  - Home ARB held due to borderline BP     HLD  - Statin         GERD   PPI if needed     SEBASTIAN without CPAP,  - Noted and monitor        History of CVA  - On statin and eliquis on hold      Chronic pain  - Home baclofen, cymbalta, and PRN oxycodone restarted    Hypothyroidism  TSH of 6  T4 of 0.8  Started on Synthyroid 100ug daily  Follow-up with TSH in 1 month      BPH  - Flomax      Trigeminal Neuralgia  - Continue home Oxcarbazepine    Dispo anticipate hospital stay for 1 to 2 days.    Communication discussed with the son at the bedside and updated the patient plan of care.     PT/OT evals ordered?  Defer to primary team  Diet: ADULT DIET; Regular  ADULT ORAL NUTRITION SUPPLEMENT; Breakfast, Lunch, Dinner; Standard High Calorie/High Protein Oral Supplement  VTE prophylaxis: Already on anticoagulation  Code status: DNR  Non-peripheral Lines and Tubes (if present):      External Urinary Catheter (Active)        Telemetry (if present):  Cardiac/Telemetry Monitor On: Portable telemetry pack applied        Hospital Problems:  Principal Problem:    Acute on chronic HFrEF (heart failure with reduced 
extended release tablet 40 mEq  40 mEq Oral BID WC    magnesium oxide (MAG-OX) tablet 400 mg  400 mg Oral Daily    apixaban (ELIQUIS) tablet 5 mg  5 mg Oral BID    atorvastatin (LIPITOR) tablet 20 mg  20 mg Oral Nightly    empagliflozin (JARDIANCE) tablet 10 mg  10 mg Oral Daily    furosemide (LASIX) injection 40 mg  40 mg IntraVENous BID    metoprolol succinate (TOPROL XL) extended release tablet 25 mg  25 mg Oral Daily    ondansetron (ZOFRAN-ODT) disintegrating tablet 4 mg  4 mg Oral Q8H PRN    Or    ondansetron (ZOFRAN) injection 4 mg  4 mg IntraVENous Q6H PRN    polyethylene glycol (GLYCOLAX) packet 17 g  17 g Oral Daily PRN    acetaminophen (TYLENOL) tablet 650 mg  650 mg Oral Q6H PRN    Or    acetaminophen (TYLENOL) suppository 650 mg  650 mg Rectal Q6H PRN    baclofen (LIORESAL) tablet 5 mg  5 mg Oral TID    tamsulosin (FLOMAX) capsule 0.4 mg  0.4 mg Oral Daily    DULoxetine (CYMBALTA) extended release capsule 60 mg  60 mg Oral Daily    gabapentin (NEURONTIN) capsule 400 mg  400 mg Oral TID    OXcarbazepine (TRILEPTAL) tablet 600 mg  600 mg Oral BID    oxyCODONE-acetaminophen (PERCOCET) 5-325 MG per tablet 1 tablet  1 tablet Oral Q6H PRN       Signed:  Nohemi Smith MD    Part of this note may have been written by using a voice dictation software.  The note has been proof read but may still contain some grammatical/other typographical errors.

## 2024-10-22 NOTE — DISCHARGE SUMMARY
no drainage.  Moist mucous membranes  Neck:  No restricted ROM.  Trachea midline  CV:   RRR.  No m/r/g.  No JVD  Lungs:   CTAB.  No wheezing, rhonchi, or rales.  Respirations even, unlabored  Abdomen:   Soft, nontender, nondistended.    Extremities: Warm and dry.   No edema.    Skin:     No rashes.  Normal coloration  Neuro:  CN II-XII grossly intact.  Psych:  Normal mood and affect.      Signed:  Nohemi Smith MD    Part of this note may have been written by using a voice dictation software.  The note has been proof read but may still contain some grammatical/other typographical errors.

## 2024-10-23 NOTE — PROGRESS NOTES
necessary.     Prior to Admission medications    Medication Sig Start Date End Date Taking? Authorizing Provider   lisinopril (PRINIVIL;ZESTRIL) 2.5 MG tablet Take 1 tablet by mouth daily 10/23/24 11/22/24  Nohemi Smith MD   levothyroxine (SYNTHROID) 100 MCG tablet Take 1 tablet by mouth Daily 10/23/24 11/22/24  Nohemi Smith MD   apixaban (ELIQUIS) 2.5 MG TABS tablet Take 1 tablet by mouth 2 times daily Per assisted living facility pt is on 2.5mg BID    Мария Altamirano MD   oxyCODONE-acetaminophen (PERCOCET)  MG per tablet  12/6/23   Мария Altamirano MD   atorvastatin (LIPITOR) 20 MG tablet Take 1 tablet by mouth daily    Мария Altamirano MD   OXcarbazepine (TRILEPTAL) 300 MG tablet Take 1 tablet by mouth 2 times daily    Мария Altamirano MD   DULoxetine (CYMBALTA) 60 MG extended release capsule Take 1 capsule by mouth daily    Мария Altamirano MD   Acetaminophen (TYLENOL PO) Take by mouth  Patient not taking: Reported on 8/19/2024    Мария Altamirano MD   albuterol (PROVENTIL) (2.5 MG/3ML) 0.083% nebulizer solution Take 3 mLs by nebulization every 4 hours as needed for Wheezing 5/1/23   Jackson, Marylee C, PA   fluticasone (FLONASE) 50 MCG/ACT nasal spray 1 spray by Each Nostril route daily as needed for Rhinitis  Patient not taking: Reported on 10/18/2024 5/1/23   Jackson, Marylee C, PA   furosemide (LASIX) 40 MG tablet Take 1 tablet by mouth daily 5/1/23   Jackson, Marylee C, PA   polyethylene glycol (GLYCOLAX) 17 g packet Dissolve 1 capful (17 grams) of powder in 8 ounces of juice or water and drink daily to prevent constipation. 5/1/23   Jackson, Marylee C, PA   potassium chloride (KLOR-CON M) 20 MEQ extended release tablet Take 1 tablet by mouth daily (with breakfast) 5/1/23   Jackson, Marylee C, PA   Multiple Vitamin (MULTIVITAMIN) TABS tablet Take 1 tablet by mouth daily 5/2/23   Jackson, Marylee C, PA   carboxymethylcellulose (THERATEARS) 1 % ophthalmic gel Place 1

## 2024-11-08 PROCEDURE — 93298 REM INTERROG DEV EVAL SCRMS: CPT | Performed by: INTERNAL MEDICINE

## 2025-01-16 ENCOUNTER — APPOINTMENT (OUTPATIENT)
Dept: CT IMAGING | Age: 80
End: 2025-01-16
Payer: MEDICARE

## 2025-01-16 ENCOUNTER — HOSPITAL ENCOUNTER (EMERGENCY)
Age: 80
Discharge: HOME | End: 2025-01-16
Attending: STUDENT IN AN ORGANIZED HEALTH CARE EDUCATION/TRAINING PROGRAM
Payer: MEDICARE

## 2025-01-16 VITALS
TEMPERATURE: 98.8 F | OXYGEN SATURATION: 96 % | RESPIRATION RATE: 18 BRPM | HEART RATE: 86 BPM | DIASTOLIC BLOOD PRESSURE: 89 MMHG | SYSTOLIC BLOOD PRESSURE: 117 MMHG

## 2025-01-16 DIAGNOSIS — S41.119A SKIN TEAR OF UPPER EXTREMITY: ICD-10-CM

## 2025-01-16 DIAGNOSIS — S09.90XA CLOSED HEAD INJURY, INITIAL ENCOUNTER: Primary | ICD-10-CM

## 2025-01-16 PROCEDURE — 6360000002 HC RX W HCPCS: Performed by: STUDENT IN AN ORGANIZED HEALTH CARE EDUCATION/TRAINING PROGRAM

## 2025-01-16 PROCEDURE — 90714 TD VACC NO PRESV 7 YRS+ IM: CPT | Performed by: STUDENT IN AN ORGANIZED HEALTH CARE EDUCATION/TRAINING PROGRAM

## 2025-01-16 PROCEDURE — 72125 CT NECK SPINE W/O DYE: CPT

## 2025-01-16 PROCEDURE — 99284 EMERGENCY DEPT VISIT MOD MDM: CPT

## 2025-01-16 PROCEDURE — 96372 THER/PROPH/DIAG INJ SC/IM: CPT

## 2025-01-16 PROCEDURE — 90471 IMMUNIZATION ADMIN: CPT | Performed by: STUDENT IN AN ORGANIZED HEALTH CARE EDUCATION/TRAINING PROGRAM

## 2025-01-16 PROCEDURE — 70450 CT HEAD/BRAIN W/O DYE: CPT

## 2025-01-16 RX ADMIN — CLOSTRIDIUM TETANI TOXOID ANTIGEN (FORMALDEHYDE INACTIVATED) AND CORYNEBACTERIUM DIPHTHERIAE TOXOID ANTIGEN (FORMALDEHYDE INACTIVATED) 0.5 ML: 5; 2 INJECTION, SUSPENSION INTRAMUSCULAR at 19:24

## 2025-01-16 NOTE — ED TRIAGE NOTES
Pt fell out of his wheelchair and hit his head on the floor.  Pt has abrasion to his forehead, right wrist, and a skin tear on his left elbow.  Denies LOC or blood thinners.  [facility] [address] [place]

## 2025-01-16 NOTE — ED PROVIDER NOTES
Emergency Department Provider Note       PCP: Celio Snow Jr., MD   Age: 79 y.o.   Sex: male     DISPOSITION Decision To Discharge 01/16/2025 08:32:54 PM    ICD-10-CM    1. Closed head injury, initial encounter  S09.90XA       2. Skin tear of upper extremity  S41.119A           Medical Decision Making     79M presents emergency department with family.  Patient resides at local assisted living where he fell out of his wheelchair onto carpet.  States he hit his forehead as well as his upper extremities.  Denies LOC.  Is on Eliquis.  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  Tetanus immunization is not updated.  Will update that here.  Patient is at baseline neuro exam.  Alert and oriented.  CT head and C-spine ordered.  CT head and C-spine without any emergent findings.  Wounds were dressed with nonstick dressing but none of his abrasions required additional repair.  Outpatient follow-up and return precautions given.  Patient and family voiced understanding and agreement.       Complexity of Problem: One acute complaint requiring workup to rule out emergent etiology   Over the counter drug management performed.  Shared medical decision making was utilized in creating the patients health plan today.  I independently ordered and reviewed each unique test.    I reviewed external records: provider visit note from outside specialist.   The patients assessment required an independent historian: Family.  The reason they were needed is important historical information not provided by the patient.    I interpreted the CT Scan no large intracranial hemorrhage noted.              History     79M presents emergency department with family.  Patient resides at local assisted living where he fell out of his wheelchair onto carpet.  States he hit his forehead as well as his upper extremities.  Denies LOC.  Is on Eliquis.  Reports prior cervical spine fracture with baseline paralysis

## 2025-01-16 NOTE — DISCHARGE INSTRUCTIONS
Keep wound clean and dry.  Follow-up with primary care physician as needed.  Return to the ER for worsening or worrisome symptoms.

## 2025-01-17 NOTE — ED NOTES
Patient mobility status  wheelchair bound.     I have reviewed discharge instructions with the patient.  The patient verbalized understanding.    Patient left ED via Discharge Method: stretcher to Home with  self and [facility] .    Opportunity for questions and clarification provided.     Patient given 0 scripts.

## (undated) DEVICE — SUTURE COAT VCRL SZ 0 L36IN ABSRB VLT CTX L48MM TAPERPOINT J370H

## (undated) DEVICE — SUTURE PROL SZ 5-0 L24IN NONABSORBABLE BLU L17MM RB-1 1/2 8555H

## (undated) DEVICE — Device: Brand: JELCO

## (undated) DEVICE — DRAIN CHEST ADL/PED DRY/WET -- PLEUR-EVAC

## (undated) DEVICE — SUT PROL 3-0 36IN SH DA BLU --

## (undated) DEVICE — (D)PREP SKN CHLRAPRP APPL 26ML -- CONVERT TO ITEM 371833

## (undated) DEVICE — KENDALL RADIOLUCENT FOAM MONITORING ELECTRODE RECTANGULAR SHAPE: Brand: KENDALL

## (undated) DEVICE — CATHETER THOR 32FR L23IN PVC 5 EYELET STR ATRAUM

## (undated) DEVICE — SUTURE ETHBND EXCEL SZ 3-0 L36IN NONABSORBABLE GRN RB-1 X558H

## (undated) DEVICE — DRSG POSTOP PRMSL AG 3.5X4IN

## (undated) DEVICE — 6 FOOT DISPOSABLE EXTENSION CABLE WITH SAFE CONNECT / SCREW-DOWN

## (undated) DEVICE — MOUTHPIECE ENDOSCP 20X27MM --

## (undated) DEVICE — AORTIC PUNCHES ARE USED TO CREATE A UNIFORM OPENING IN BLOOD VESSELS DURING CARDIOVASCULAR SURGERY. THE VESSEL GRAFT IS INSERTED INTO THE CREATED OPENING AND SUTURED TO THE VESSEL WALL. AORTIC LANCETS ARE USED TO MAKE A SMALL UNIFORM CUT IN A BLOOD VESSEL TO FACILITATE INSERTION OF AN AORTIC PUNCH.  PUNCHES COME IN VARIOUS LENGTHS, DIAMETERS AND TIP CONFIGURATIONS.: Brand: CLEANCUT ROTATING AORTIC PUNCH

## (undated) DEVICE — SUTURE VCRL SZ 3-0 L27IN ABSRB UD L24MM PS-1 3/8 CIR PRIM J936H

## (undated) DEVICE — SUTURE ABSRB 27IN SZ 4-0 17MM RB-1 1/2 CIR TAPR PNT MFIL U207H

## (undated) DEVICE — CONNECTOR TBNG OD5-7MM O2 END DISP

## (undated) DEVICE — SUTURE VCRL VIO BR 0 18IN C/R M04 J701D

## (undated) DEVICE — CANNULA NSL ORAL AD FOR CAPNOFLEX CO2 O2 AIRLFE

## (undated) DEVICE — SUTURE NONABSORBABLE L24IN SZ 7-0 M0-5 BV175-8 EP 24 BLU M8745

## (undated) DEVICE — DRAPE TWL SURG 16X26IN BLU ORB04] ALLCARE INC]

## (undated) DEVICE — DRILL BIT G3606010 2.4MM

## (undated) DEVICE — AMD ANTIMICROBIAL GAUZE SPONGES,12 PLY USP TYPE VII, 0.2% POLYHEXAMETHYLENE BIGUANIDE HCI (PHMB): Brand: CURITY

## (undated) DEVICE — 48" PROBE COVER W/GEL, ULTRASOUND, STERILE: Brand: SITE-RITE

## (undated) DEVICE — CANNULA INJ L2.5IN BLNT TIP 3MM CLR BODY W/ 1 W VLV DLP

## (undated) DEVICE — NEEDLE SPNL L3.5IN PNK HUB S STL REG WALL FIT STYL W/ QNCKE

## (undated) DEVICE — SUTURE MCRYL SZ 4-0 L27IN ABSRB UD L19MM PS-2 1/2 CIR PRIM Y426H

## (undated) DEVICE — Device

## (undated) DEVICE — SUTURE PERMAHAND SZ 2-0 L12X18IN NONABSORBABLE BLK SILK A185H

## (undated) DEVICE — CONNECTOR VENT EL DBL SWVL 15M 22M 15F

## (undated) DEVICE — SUTURE S STL SZ 5 L18IN NONABSORBABLE SIL CCS L48MM 1/2 CIR M653G

## (undated) DEVICE — SUTURE PERMAHAND SZ 0 L30IN NONABSORBABLE BLK L30MM PSL 3/8 590H

## (undated) DEVICE — BLADE OPHTH 3MM CUT EDGE NDL

## (undated) DEVICE — BIPOLAR SEALER 23-112-1 AQM 6.0: Brand: AQUAMANTYS ®

## (undated) DEVICE — BLADE SAW W10XL54MM FOR PRI REPEAT STRNOTMY

## (undated) DEVICE — SUTURE VCRL SZ 3-0 L18IN ABSRB UD L26MM SH 1/2 CIR J864D

## (undated) DEVICE — CATHETER DIAG SM AD 6FR L100CM 0.038IN COR POLYUR JUDKINS L

## (undated) DEVICE — DISPOSABLE STANDARD BIPOLAR FORCEPS, NON-STICK,: Brand: SPETZLER-MALIS

## (undated) DEVICE — STERILE HOOK LOCK LATEX FREE ELASTIC BANDAGE 6INX5YD: Brand: HOOK LOCK™

## (undated) DEVICE — SPONGE,NEURO,0.5"X1",XR,STRL,LF,10/PK: Brand: MEDLINE

## (undated) DEVICE — 3000CC GUARDIAN II: Brand: GUARDIAN

## (undated) DEVICE — AGENT HEMSTAT W3XL4IN OXIDIZED REGENERATED CELOS ABSRB FOR

## (undated) DEVICE — NEEDLE HYPO 25GA L1.5IN BLU POLYPR HUB S STL REG BVL STR

## (undated) DEVICE — CLOTH PRE OP W9XL10.5IN 2% CHG FRAGRANCE RNS FREE READYPREP

## (undated) DEVICE — MEDI-TRACE CADENCE ADULT, DEFIBRILLATION ELECTRODE -RTS  (10 PR/PK) - ZOLL: Brand: MEDI-TRACE CADENCE

## (undated) DEVICE — STRIP,CLOSURE,WOUND,MEDI-STRIP,1/2X4: Brand: MEDLINE

## (undated) DEVICE — 3M™ TEGADERM™ TRANSPARENT FILM DRESSING FRAME STYLE, 1626W, 4 IN X 4-3/4 IN (10 CM X 12 CM), 50/CT 4CT/CASE: Brand: 3M™ TEGADERM™

## (undated) DEVICE — 3M™ TEGADERM™ TRANSPARENT FILM DRESSING FRAME STYLE, 1624W, 2-3/8 IN X 2-3/4 IN (6 CM X 7 CM), 100/CT 4CT/CASE: Brand: 3M™ TEGADERM™

## (undated) DEVICE — SUT SLK 0 30IN CT1 BLK --

## (undated) DEVICE — PAD,NON-ADHERENT,3X8,STERILE,LF,1/PK: Brand: MEDLINE

## (undated) DEVICE — CONNECTOR IV 3/8X3/8X3/8 Y

## (undated) DEVICE — CANNULA PERF L1.8MM TIP L1MM S STL SHFT BLB SHP TIP FEM

## (undated) DEVICE — ADHESIVE SKIN CLSR 0.7ML TOP DERMBND ADV

## (undated) DEVICE — BASIC SINGLE BASIN-LF: Brand: MEDLINE INDUSTRIES, INC.

## (undated) DEVICE — MONITOR CRD 1.4 CC 3.4 GM INSERTABLE LINQ

## (undated) DEVICE — SYR 5ML 1/5 GRAD LL NSAF LF --

## (undated) DEVICE — SUTURE VCRL 3-0 L36IN ABSRB UD CT L40MM 1/2 CIR TAPERPOINT J956H

## (undated) DEVICE — (D)STRIP SKN CLSR 0.5X4IN WHT --

## (undated) DEVICE — GLIDESHEATH SLENDER STAINLESS STEEL KIT: Brand: GLIDESHEATH SLENDER

## (undated) DEVICE — SOLUTION IRRIG 1000ML 0.9% SOD CHL USP POUR PLAS BTL

## (undated) DEVICE — DRAPE SLUSH DISC W44XL66IN ST FOR RND BSIN HUSH SLUSH SYS

## (undated) DEVICE — PLEDGET VASC W3/16XL3/8IN THK1/16IN PTFE SFT

## (undated) DEVICE — CATH URETH INTMIT ROB 14FR FUN -- USE ITEM 179521

## (undated) DEVICE — BUTTON SWITCH PENCIL BLADE ELECTRODE, HOLSTER: Brand: EDGE

## (undated) DEVICE — NDL PRT INJ NSAF BLNT 18GX1.5 --

## (undated) DEVICE — SINGLE USE SUCTION VALVE MAJ-209: Brand: SINGLE USE SUCTION VALVE (STERILE)

## (undated) DEVICE — CATHETER URETH 18FR RED RUB INTMIT ALL PURP

## (undated) DEVICE — SINGLE USE BIOPSY VALVE MAJ-210: Brand: SINGLE USE BIOPSY VALVE (STERILE)

## (undated) DEVICE — OCCLUDER CV VES 1.25X12 MM CORONARY INT SIL STRL FLO RST

## (undated) DEVICE — OCCLUDER CV VES 1.5X12 MM CORONARY INT SIL STRL FLO RST

## (undated) DEVICE — TRAY FOLEY 16FR TEMP SENS F400 --

## (undated) DEVICE — AGENT HEMOSTATIC SURGIFLOW MATRIX KIT W/THROMBIN

## (undated) DEVICE — DRESSING HYDROFIBER AQUACEL AG ADVANTAGE 3.5X6 IN

## (undated) DEVICE — SPONGE,NEURO,1"X3",XR,STRL,LF,10/PK: Brand: MEDLINE

## (undated) DEVICE — CATHETER,IV,SURESITE SLIDE,18G X 1 1/4": Brand: MEDLINE

## (undated) DEVICE — BLOCK BITE AD 60FR W/ VELC STRP ADDRESSES MOST PT AND

## (undated) DEVICE — SUT PROL 4-0 36IN RB1 DA BLU --

## (undated) DEVICE — DEVICE COMPR LNG 27 CM VASC BND

## (undated) DEVICE — CATHETER COR DIAG MP MPA 6FR 110CM 2 SIDE H DXTERITY

## (undated) DEVICE — SURGIFOAM SPNG SZ 100

## (undated) DEVICE — CATHETER THOR 32FR L23IN PVC 6 EYELET STR ATRAUM

## (undated) DEVICE — BRONCHOSCOPY PACK: Brand: MEDLINE INDUSTRIES, INC.

## (undated) DEVICE — REM POLYHESIVE ADULT PATIENT RETURN ELECTRODE: Brand: VALLEYLAB

## (undated) DEVICE — CLAMP INSERT: Brand: STEALTH® CLAMP INSERT

## (undated) DEVICE — SUTURE NONABSORBABLE MONOFILAMENT 5-0 C-1 1X24 IN PROLENE 8725H

## (undated) DEVICE — SUTURE PROL SZ 7-0 L24IN NONABSORBABLE BLU L9.3MM BV-1 3/8 M8702

## (undated) DEVICE — STERILE HOOK LOCK LATEX FREE ELASTIC BANDAGE 4INX5YD: Brand: HOOK LOCK™

## (undated) DEVICE — SYRINGE MED 3ML CLR PLAS STD N CTRL LUERLOCK TIP DISP

## (undated) DEVICE — STAPLER EXT SKIN 35 WIDE S STL STPL SQUEEZE HNDL VISISTAT

## (undated) DEVICE — SUTURE ETHBND EXCEL SZ 0 L18IN NONABSORBABLE GRN L36MM CT-1 CX21D

## (undated) DEVICE — CLAMP ABLAT JAW L53MM L CRV 2 ELECTRD BPLR

## (undated) DEVICE — SUTURE PROL SZ 6-0 L30IN NONABSORBABLE BLU L13MM CC-1 3/8 M8707

## (undated) DEVICE — CATHETER COR DIAG JUDKINS R 5.0 CRV 6FR 100CM 0 SIDE H

## (undated) DEVICE — CARBIDE MATCH HEAD

## (undated) DEVICE — AMD ANTIMICROBIAL NON-ADHERENT PAD,0.2% POLYHEXAMETHYLENE BIGUANIDE HCI (PHMB): Brand: TELFA

## (undated) DEVICE — TAPE UMB 1/8X30IN MP COT WHT --

## (undated) DEVICE — GOWN,BREATHABLE SLV,AURORA,LG,STRL: Brand: MEDLINE

## (undated) DEVICE — SUTURE MAXON 4 0 CLR 18 IN P 13 NDL SMM5536

## (undated) DEVICE — SUTURE VCRL + SZ 3-0 L18IN ABSRB UD SH 1/2 CIR TAPERCUT NDL VCP864D

## (undated) DEVICE — LEAD PCMKR MYOCARDL BPLR TEMP. --

## (undated) DEVICE — CATHETER VASC 6 FR DIAG PGTL W/ SIDE H COR 110 CM LEN W/OUT

## (undated) DEVICE — SOLUTION IV 1000ML 0.9% SOD CHL

## (undated) DEVICE — SUTURE SILK PERMAHAND PRECUT 6 X 30 IN SZ 1 BLK BRAID A307H

## (undated) DEVICE — CASPAR DISTR PIN12MMSTER: Brand: AESCULAP

## (undated) DEVICE — AMD ANTIMICROBIAL BANDAGE ROLL,6 PLY: Brand: KERLIX

## (undated) DEVICE — BLADE ES ELASTOMERIC COAT INSUL DURABLE BEND UPTO 90DEG

## (undated) DEVICE — SPINE NEURO: Brand: MEDLINE INDUSTRIES, INC.

## (undated) DEVICE — CLIP INT SM TI EZ LD LIG SYS WECK HORZ

## (undated) DEVICE — CATHETER ETER TY TEMP SENS F MBO W URIN M FOLLOWS CDC GUIDELINES TO

## (undated) DEVICE — CATHETER DIAG 6FR L100CM SPEC IMA CRV SZ DBL BRAID WIRE SFT

## (undated) DEVICE — GUIDEWIRE 035IN 210CM PTFE COAT FIX COR J TIP 15MM FIRM BODY

## (undated) DEVICE — BLADE CLIPPER GEN PURP NS

## (undated) DEVICE — SOLUTION IRRIG 1000ML LAC RINGER PLAS POUR BTL

## (undated) DEVICE — CATHETER IV NDL L1.25IN 16GA GRY POLYUR WNG HUB DISP FOR

## (undated) DEVICE — CONTAINER,SPECIMEN,O.R.STRL,4.5OZ: Brand: MEDLINE

## (undated) DEVICE — SUTURE ETHBND EXCEL 2-0 L30IN NONABSORBABLE GRN L26MM SH MX563

## (undated) DEVICE — SYR 50ML SLIP TIP NSAF LF STRL --

## (undated) DEVICE — MAYFIELD® DISPOSABLE ADULT SKULL PIN (PLASTIC BASE): Brand: MAYFIELD®

## (undated) DEVICE — SOLUTION IRRIG 3000ML 0.9% SOD CHL FLX CONT 0797208] ICU MEDICAL INC]

## (undated) DEVICE — SYR 3ML LL TIP 1/10ML GRAD --

## (undated) DEVICE — KARLIN BLADE, ULTRA KNIFE, SATIN, STAINLESS STEEL, 1.5 MM TIP, 4 IN, SINGLE USE, (10/PK): Brand: SYMMETRY SURGICAL

## (undated) DEVICE — SUT PROL 3-0 36IN MH DA BLU --

## (undated) DEVICE — C-ARM: Brand: UNBRANDED